# Patient Record
Sex: MALE | NOT HISPANIC OR LATINO | Employment: OTHER | URBAN - METROPOLITAN AREA
[De-identification: names, ages, dates, MRNs, and addresses within clinical notes are randomized per-mention and may not be internally consistent; named-entity substitution may affect disease eponyms.]

---

## 2017-08-22 ENCOUNTER — HOSPITAL ENCOUNTER (OUTPATIENT)
Facility: CLINIC | Age: 77
Discharge: HOME | End: 2017-08-22
Attending: COLON & RECTAL SURGERY | Admitting: COLON & RECTAL SURGERY
Payer: COMMERCIAL

## 2017-08-22 VITALS
DIASTOLIC BLOOD PRESSURE: 74 MMHG | OXYGEN SATURATION: 96 % | HEIGHT: 68 IN | BODY MASS INDEX: 26.98 KG/M2 | RESPIRATION RATE: 17 BRPM | WEIGHT: 178 LBS | SYSTOLIC BLOOD PRESSURE: 123 MMHG

## 2017-08-22 LAB — COLONOSCOPY: NORMAL

## 2017-08-22 PROCEDURE — 88305 TISSUE EXAM BY PATHOLOGIST: CPT | Mod: 26 | Performed by: COLON & RECTAL SURGERY

## 2017-08-22 PROCEDURE — 88305 TISSUE EXAM BY PATHOLOGIST: CPT | Performed by: COLON & RECTAL SURGERY

## 2017-08-22 PROCEDURE — 45382 COLONOSCOPY W/CONTROL BLEED: CPT | Performed by: COLON & RECTAL SURGERY

## 2017-08-22 PROCEDURE — 25000128 H RX IP 250 OP 636: Performed by: COLON & RECTAL SURGERY

## 2017-08-22 PROCEDURE — G0500 MOD SEDAT ENDO SERVICE >5YRS: HCPCS | Performed by: COLON & RECTAL SURGERY

## 2017-08-22 PROCEDURE — 45385 COLONOSCOPY W/LESION REMOVAL: CPT | Mod: PT | Performed by: COLON & RECTAL SURGERY

## 2017-08-22 PROCEDURE — 45381 COLONOSCOPY SUBMUCOUS NJX: CPT | Performed by: COLON & RECTAL SURGERY

## 2017-08-22 PROCEDURE — 99153 MOD SED SAME PHYS/QHP EA: CPT | Performed by: COLON & RECTAL SURGERY

## 2017-08-22 RX ORDER — ONDANSETRON 4 MG/1
4 TABLET, ORALLY DISINTEGRATING ORAL EVERY 6 HOURS PRN
Status: DISCONTINUED | OUTPATIENT
Start: 2017-08-22 | End: 2017-08-22 | Stop reason: HOSPADM

## 2017-08-22 RX ORDER — NALOXONE HYDROCHLORIDE 0.4 MG/ML
.1-.4 INJECTION, SOLUTION INTRAMUSCULAR; INTRAVENOUS; SUBCUTANEOUS
Status: DISCONTINUED | OUTPATIENT
Start: 2017-08-22 | End: 2017-08-22 | Stop reason: HOSPADM

## 2017-08-22 RX ORDER — FLUMAZENIL 0.1 MG/ML
0.2 INJECTION, SOLUTION INTRAVENOUS
Status: DISCONTINUED | OUTPATIENT
Start: 2017-08-22 | End: 2017-08-22 | Stop reason: HOSPADM

## 2017-08-22 RX ORDER — LIDOCAINE 40 MG/G
CREAM TOPICAL
Status: DISCONTINUED | OUTPATIENT
Start: 2017-08-22 | End: 2017-08-22 | Stop reason: HOSPADM

## 2017-08-22 RX ORDER — ONDANSETRON 2 MG/ML
4 INJECTION INTRAMUSCULAR; INTRAVENOUS EVERY 6 HOURS PRN
Status: DISCONTINUED | OUTPATIENT
Start: 2017-08-22 | End: 2017-08-22 | Stop reason: HOSPADM

## 2017-08-22 RX ORDER — FENTANYL CITRATE 50 UG/ML
INJECTION, SOLUTION INTRAMUSCULAR; INTRAVENOUS PRN
Status: DISCONTINUED | OUTPATIENT
Start: 2017-08-22 | End: 2017-08-22 | Stop reason: HOSPADM

## 2017-08-22 RX ORDER — ONDANSETRON 2 MG/ML
4 INJECTION INTRAMUSCULAR; INTRAVENOUS
Status: DISCONTINUED | OUTPATIENT
Start: 2017-08-22 | End: 2017-08-22 | Stop reason: HOSPADM

## 2017-08-22 NOTE — H&P
Pre-Endoscopy History and Physical     Carl Vázqeuz MRN# [id number]   YOB: 1940 Age: 77 year old     Date of Procedure: 8/22/2017  Primary care provider: Kyler Mcwilliams  Type of Endoscopy: colonoscopy  Reason for Procedure: bleeding  Type of Anesthesia Anticipated: Moderate Sedation    HPI:    Carl is a 77 year old male who will be undergoing the above procedure.      A history and physical has been performed. The patient's medications and allergies have been reviewed. The risks and benefits of the procedure and the sedation options and risks were discussed with the patient.  All questions were answered and informed consent was obtained.      He denies a personal or family history of anesthesia complications or bleeding disorders.     Allergies   Allergen Reactions     Hmg-Coa-R Inhibitors         Prior to Admission Medications   Prescriptions Last Dose Informant Patient Reported? Taking?   GLIPIZIDE PO 8/21/2017  Yes Yes   LEVOTHYROXINE SODIUM PO 8/21/2017  Yes Yes   insulin glargine (LANTUS) 100 UNIT/ML PEN 8/21/2017  Yes Yes   Sig: Inject Subcutaneous At Bedtime   omeprazole (PRILOSEC) 40 MG capsule 8/21/2017  No Yes   Sig: Take 1 capsule (40 mg) by mouth daily Take 30-60 minutes before a meal.      Facility-Administered Medications: None       There is no problem list on file for this patient.       Past Medical History:   Diagnosis Date     Diabetes (H)      Sleep apnea     uses CPAP machine     Thyroid disease         Past Surgical History:   Procedure Laterality Date     CHOLECYSTECTOMY      at [facility]     COLONOSCOPY      in [place], [place]     COLONOSCOPY  08/22/2017    Dr. Ja LYNN     ESOPHAGOSCOPY, GASTROSCOPY, DUODENOSCOPY (EGD), COMBINED N/A 6/7/2016    Procedure: COMBINED ESOPHAGOSCOPY, GASTROSCOPY, DUODENOSCOPY (EGD);  Surgeon: Eneida Denton MD;  Location:  GI       Social History   Substance Use Topics     Smoking status: Never Smoker     Smokeless  "tobacco: Never Used     Alcohol use No      Comment: drank many years ago       Family History   Problem Relation Age of Onset     Colon Cancer No family hx of        REVIEW OF SYSTEMS:     5 point ROS negative except as noted above in HPI, including Gen., Resp., CV, GI &  system review.      PHYSICAL EXAM:   Ht 1.727 m (5' 8\")  Wt 80.7 kg (178 lb)  BMI 27.06 kg/m2 Estimated body mass index is 27.06 kg/(m^2) as calculated from the following:    Height as of this encounter: 1.727 m (5' 8\").    Weight as of this encounter: 80.7 kg (178 lb).   GENERAL APPEARANCE: healthy and alert  MENTAL STATUS: alert  AIRWAY EXAM: Mallampatti Class II (visualization of the soft palate, fauces, and uvula)  RESP: lungs clear to auscultation - no rales, rhonchi or wheezes  CV: regular rates and rhythm      DIAGNOSTICS:    Not indicated      IMPRESSION   ASA Class 2 - Mild systemic disease        PLAN:       Plan for colonoscopy. We discussed the risks, benefits and alternatives and the patient wished to proceed.    The above has been forwarded to the consulting provider.      Signed Electronically by: Betty Peres MD  August 22, 2017    "

## 2017-08-22 NOTE — IP AVS SNAPSHOT
[facility]    [address] [facility] [address]    Phone:  [phone]    Fax:  [phone]                                       After Visit Summary   8/22/2017    Carl Vázquez    MRN: [id number]           After Visit Summary Signature Page     I have received my discharge instructions, and my questions have been answered. I have discussed any challenges I see with this plan with the nurse or doctor.    ..........................................................................................................................................  Patient/Patient Representative Signature      ..........................................................................................................................................  Patient Representative Print Name and Relationship to Patient    ..................................................               ................................................  Date                                            Time    ..........................................................................................................................................  Reviewed by Signature/Title    ...................................................              ..............................................  Date                                                            Time

## 2017-08-22 NOTE — IP AVS SNAPSHOT
MRN:[id number]                      After Visit Summary   8/22/2017    Carl Vázquez    MRN: [id number]           Thank you!     Thank you for choosing [facility] for your care. Our goal is always to provide you with excellent care. Hearing back from our patients is one way we can continue to improve our services. Please take a few minutes to complete the written survey that you may receive in the mail after you visit. If you would like to speak to someone directly about your visit please contact Patient Relations at [phone]. Thank you!          Patient Information     Date Of Birth          1940        About your hospital stay     You were admitted on:  August 22, 2017 You last received care in the:  [facility]    You were discharged on:  August 22, 2017       Who to Call     For medical emergencies, please call 911.  For non-urgent questions about your medical care, please call your primary care provider or clinic, [phone]  For questions related to your surgery, please call your surgery clinic        Attending Provider     Provider Specialty    Betty Peres MD Colon and Rectal Surgery       Primary Care Provider Office Phone # Fax #    Kyler Mcwilliams [phone]      Further instructions from your care team         Understanding Colon and Rectal Polyps     The colon has a smooth lining composed of millions of cells.     The colon (also called the large intestine) is a muscular tube that forms the last part of the digestive tract. It absorbs water and stores food waste. The colon is about 4 to 6 feet long. The rectum is the last 6 inches of the colon. The colon and rectum have a smooth lining composed of millions of cells. Changes in these cells can lead to growths in the colon that can become cancerous and should be removed.     When the Colon Lining Changes  Changes that occur in the cells that line the colon or rectum  "can lead to growths called polyps. Over a period of years, polyps can turn cancerous. Removing polyps early may prevent cancer from ever forming.      Polyps  Polyps are fleshy clumps of tissue that form on the lining of the colon or rectum. Small polyps are usually benign (not cancerous). However, over time, cells in a polyp can change and become cancerous. The larger a polyp grows, the more likely this is to happen. Also, certain types of polyps known as adenomatous polyps are considered premalignant. This means that they will almost always become cancerous if they re not removed.          Cancer  Almost all colorectal cancers start when polyp cells begin growing abnormally. As a cancerous tumor grows, it may involve more and more of the colon or rectum. In time, cancer can also grow beyond the colon or rectum and spread to nearby organs or to glands called lymph nodes. The cells can also travel to other parts of the body. This is known as metastasis. The earlier a cancerous tumor is removed, the better the chance of preventing its spread.        8617-7868 [place] [address]. All rights reserved. This information is not intended as a substitute for professional medical care. Always follow your healthcare professional's instructions.    Pending Results     No orders found from 8/20/2017 to 8/23/2017.            Admission Information     Date & Time Provider Department Dept. Phone    8/22/2017 Betty Peres MD [facility] [phone]      Your Vitals Were     Blood Pressure Respirations Height Weight Pulse Oximetry BMI (Body Mass Index)    121/81 17 1.727 m (5' 8\") 80.7 kg (178 lb) 95% 27.06 kg/m2      MyChart Information     [employer] lets you send messages to your doctor, view your test results, renew your prescriptions, schedule appointments and more. To sign up, go to [email or web address] . Click on \"Log in\" on the left side of the screen, which " "will take you to the Welcome page. Then click on \"Sign up Now\" on the right side of the page.     You will be asked to enter the access code listed below, as well as some personal information. Please follow the directions to create your username and password.     Your access code is: [email or web address]  Expires: 2017 12:07 PM     Your access code will  in 90 days. If you need help or a new code, please call your [place] clinic or [phone].        Care EveryWhere ID     This is your Care EveryWhere ID. This could be used by other organizations to access your [place] medical records  [id number]        Equal Access to Services     EDITH MEDELLIN : Lawson Hawkins, jean-pierre garza, maryjane daly, bobbi weber. So [facility] [phone].    ATENCIÓN: Si habla español, tiene a feliciano disposición servicios gratuitos de asistencia lingüística. Llame al [phone].    We comply with applicable federal civil rights laws and [place] laws. We do not discriminate on the basis of race, color, national origin, age, disability sex, sexual orientation or gender identity.               Review of your medicines      CONTINUE these medicines which have NOT CHANGED        Dose / Directions    GLIPIZIDE PO        Refills:  0       insulin glargine 100 UNIT/ML injection   Commonly known as:  LANTUS        Inject Subcutaneous At Bedtime   Refills:  0       LEVOTHYROXINE SODIUM PO        Refills:  0       omeprazole 40 MG capsule   Commonly known as:  priLOSEC   Used for:  Esophageal stricture        Dose:  40 mg   Take 1 capsule (40 mg) by mouth daily Take 30-60 minutes before a meal.   Quantity:  90 capsule   Refills:  3                Protect others around you: Learn how to safely use, store and throw away your medicines at www.disposemymeds.org.             Medication List: This is a list of all your medications and when to take them. Check marks below indicate your daily home " schedule. Keep this list as a reference.      Medications           Morning Afternoon Evening Bedtime As Needed    GLIPIZIDE PO                                insulin glargine 100 UNIT/ML injection   Commonly known as:  LANTUS   Inject Subcutaneous At Bedtime                                LEVOTHYROXINE SODIUM PO                                omeprazole 40 MG capsule   Commonly known as:  priLOSEC   Take 1 capsule (40 mg) by mouth daily Take 30-60 minutes before a meal.

## 2017-08-22 NOTE — DISCHARGE INSTRUCTIONS
Understanding Colon and Rectal Polyps     The colon has a smooth lining composed of millions of cells.     The colon (also called the large intestine) is a muscular tube that forms the last part of the digestive tract. It absorbs water and stores food waste. The colon is about 4 to 6 feet long. The rectum is the last 6 inches of the colon. The colon and rectum have a smooth lining composed of millions of cells. Changes in these cells can lead to growths in the colon that can become cancerous and should be removed.     When the Colon Lining Changes  Changes that occur in the cells that line the colon or rectum can lead to growths called polyps. Over a period of years, polyps can turn cancerous. Removing polyps early may prevent cancer from ever forming.      Polyps  Polyps are fleshy clumps of tissue that form on the lining of the colon or rectum. Small polyps are usually benign (not cancerous). However, over time, cells in a polyp can change and become cancerous. The larger a polyp grows, the more likely this is to happen. Also, certain types of polyps known as adenomatous polyps are considered premalignant. This means that they will almost always become cancerous if they re not removed.          Cancer  Almost all colorectal cancers start when polyp cells begin growing abnormally. As a cancerous tumor grows, it may involve more and more of the colon or rectum. In time, cancer can also grow beyond the colon or rectum and spread to nearby organs or to glands called lymph nodes. The cells can also travel to other parts of the body. This is known as metastasis. The earlier a cancerous tumor is removed, the better the chance of preventing its spread.        [id number] [facility], [address] [place] [address], [place] [address]. All rights reserved. This information is not intended as a substitute for professional medical care. Always follow your healthcare professional's instructions.

## 2017-08-23 LAB — COPATH REPORT: NORMAL

## 2019-09-22 ENCOUNTER — APPOINTMENT (OUTPATIENT)
Dept: CT IMAGING | Facility: CLINIC | Age: 79
DRG: 690 | End: 2019-09-22
Attending: EMERGENCY MEDICINE
Payer: COMMERCIAL

## 2019-09-22 ENCOUNTER — HOSPITAL ENCOUNTER (EMERGENCY)
Facility: CLINIC | Age: 79
Discharge: HOME | DRG: 690 | End: 2019-09-22
Attending: EMERGENCY MEDICINE | Admitting: EMERGENCY MEDICINE
Payer: COMMERCIAL

## 2019-09-22 VITALS
RESPIRATION RATE: 16 BRPM | HEART RATE: 78 BPM | TEMPERATURE: 98.3 F | OXYGEN SATURATION: 94 % | DIASTOLIC BLOOD PRESSURE: 89 MMHG | SYSTOLIC BLOOD PRESSURE: 170 MMHG

## 2019-09-22 DIAGNOSIS — N23 RENAL COLIC: ICD-10-CM

## 2019-09-22 DIAGNOSIS — R73.9 HYPERGLYCEMIA: ICD-10-CM

## 2019-09-22 DIAGNOSIS — N17.9 AKI (ACUTE KIDNEY INJURY) (H): ICD-10-CM

## 2019-09-22 DIAGNOSIS — N20.1 URETEROLITHIASIS: ICD-10-CM

## 2019-09-22 LAB
ALBUMIN UR-MCNC: 30 MG/DL
ANION GAP SERPL CALCULATED.3IONS-SCNC: 6 MMOL/L (ref 3–14)
APPEARANCE UR: CLEAR
BASOPHILS # BLD AUTO: 0 10E9/L (ref 0–0.2)
BASOPHILS NFR BLD AUTO: 0.1 %
BILIRUB UR QL STRIP: NEGATIVE
BUN SERPL-MCNC: 18 MG/DL (ref 7–30)
CALCIUM SERPL-MCNC: 8.6 MG/DL (ref 8.5–10.1)
CHLORIDE SERPL-SCNC: 105 MMOL/L (ref 94–109)
CO2 SERPL-SCNC: 25 MMOL/L (ref 20–32)
COLOR UR AUTO: YELLOW
CREAT SERPL-MCNC: 1.28 MG/DL (ref 0.66–1.25)
DIFFERENTIAL METHOD BLD: ABNORMAL
EOSINOPHIL # BLD AUTO: 0 10E9/L (ref 0–0.7)
EOSINOPHIL NFR BLD AUTO: 0.1 %
ERYTHROCYTE [DISTWIDTH] IN BLOOD BY AUTOMATED COUNT: 13.8 % (ref 10–15)
GFR SERPL CREATININE-BSD FRML MDRD: 53 ML/MIN/{1.73_M2}
GLUCOSE SERPL-MCNC: 276 MG/DL (ref 70–99)
GLUCOSE UR STRIP-MCNC: >1000 MG/DL
HCT VFR BLD AUTO: 47.5 % (ref 40–53)
HGB BLD-MCNC: 17.3 G/DL (ref 13.3–17.7)
HGB UR QL STRIP: NEGATIVE
IMM GRANULOCYTES # BLD: 0 10E9/L (ref 0–0.4)
IMM GRANULOCYTES NFR BLD: 0.3 %
KETONES UR STRIP-MCNC: 40 MG/DL
LEUKOCYTE ESTERASE UR QL STRIP: ABNORMAL
LYMPHOCYTES # BLD AUTO: 0.9 10E9/L (ref 0.8–5.3)
LYMPHOCYTES NFR BLD AUTO: 9 %
MCH RBC QN AUTO: 31.3 PG (ref 26.5–33)
MCHC RBC AUTO-ENTMCNC: 36.4 G/DL (ref 31.5–36.5)
MCV RBC AUTO: 86 FL (ref 78–100)
MONOCYTES # BLD AUTO: 0.6 10E9/L (ref 0–1.3)
MONOCYTES NFR BLD AUTO: 6.4 %
MUCOUS THREADS #/AREA URNS LPF: PRESENT /LPF
NEUTROPHILS # BLD AUTO: 8 10E9/L (ref 1.6–8.3)
NEUTROPHILS NFR BLD AUTO: 84.1 %
NITRATE UR QL: NEGATIVE
NRBC # BLD AUTO: 0 10*3/UL
NRBC BLD AUTO-RTO: 0 /100
PH UR STRIP: 5.5 PH (ref 5–7)
PLATELET # BLD AUTO: 149 10E9/L (ref 150–450)
POTASSIUM SERPL-SCNC: 3.8 MMOL/L (ref 3.4–5.3)
RBC # BLD AUTO: 5.52 10E12/L (ref 4.4–5.9)
RBC #/AREA URNS AUTO: 2 /HPF (ref 0–2)
SODIUM SERPL-SCNC: 136 MMOL/L (ref 133–144)
SOURCE: ABNORMAL
SP GR UR STRIP: 1.03 (ref 1–1.03)
SQUAMOUS #/AREA URNS AUTO: 1 /HPF (ref 0–1)
UROBILINOGEN UR STRIP-MCNC: NORMAL MG/DL (ref 0–2)
WBC # BLD AUTO: 9.5 10E9/L (ref 4–11)
WBC #/AREA URNS AUTO: 3 /HPF (ref 0–5)

## 2019-09-22 PROCEDURE — 85025 COMPLETE CBC W/AUTO DIFF WBC: CPT | Performed by: EMERGENCY MEDICINE

## 2019-09-22 PROCEDURE — 81001 URINALYSIS AUTO W/SCOPE: CPT | Performed by: EMERGENCY MEDICINE

## 2019-09-22 PROCEDURE — 96374 THER/PROPH/DIAG INJ IV PUSH: CPT

## 2019-09-22 PROCEDURE — 99285 EMERGENCY DEPT VISIT HI MDM: CPT | Mod: 25

## 2019-09-22 PROCEDURE — 80048 BASIC METABOLIC PNL TOTAL CA: CPT | Performed by: EMERGENCY MEDICINE

## 2019-09-22 PROCEDURE — 25000132 ZZH RX MED GY IP 250 OP 250 PS 637: Performed by: EMERGENCY MEDICINE

## 2019-09-22 PROCEDURE — 74176 CT ABD & PELVIS W/O CONTRAST: CPT

## 2019-09-22 PROCEDURE — 96361 HYDRATE IV INFUSION ADD-ON: CPT

## 2019-09-22 PROCEDURE — 25000128 H RX IP 250 OP 636: Performed by: EMERGENCY MEDICINE

## 2019-09-22 RX ORDER — TAMSULOSIN HYDROCHLORIDE 0.4 MG/1
0.4 CAPSULE ORAL ONCE
Status: COMPLETED | OUTPATIENT
Start: 2019-09-22 | End: 2019-09-22

## 2019-09-22 RX ORDER — TAMSULOSIN HYDROCHLORIDE 0.4 MG/1
0.4 CAPSULE ORAL DAILY
Qty: 10 CAPSULE | Refills: 0 | Status: SHIPPED | OUTPATIENT
Start: 2019-09-22 | End: 2019-10-02

## 2019-09-22 RX ORDER — OXYCODONE HYDROCHLORIDE 5 MG/1
5 TABLET ORAL EVERY 6 HOURS PRN
Qty: 12 TABLET | Refills: 0 | Status: SHIPPED | OUTPATIENT
Start: 2019-09-22 | End: 2022-06-14

## 2019-09-22 RX ORDER — ONDANSETRON 4 MG/1
4 TABLET, ORALLY DISINTEGRATING ORAL EVERY 6 HOURS PRN
Qty: 20 TABLET | Refills: 0 | Status: ON HOLD | OUTPATIENT
Start: 2019-09-22 | End: 2019-09-27

## 2019-09-22 RX ORDER — ONDANSETRON 2 MG/ML
4 INJECTION INTRAMUSCULAR; INTRAVENOUS EVERY 30 MIN PRN
Status: DISCONTINUED | OUTPATIENT
Start: 2019-09-22 | End: 2019-09-23 | Stop reason: HOSPADM

## 2019-09-22 RX ORDER — KETOROLAC TROMETHAMINE 15 MG/ML
15 INJECTION, SOLUTION INTRAMUSCULAR; INTRAVENOUS ONCE
Status: COMPLETED | OUTPATIENT
Start: 2019-09-22 | End: 2019-09-22

## 2019-09-22 RX ORDER — OXYCODONE HYDROCHLORIDE 5 MG/1
5 TABLET ORAL ONCE
Status: COMPLETED | OUTPATIENT
Start: 2019-09-22 | End: 2019-09-22

## 2019-09-22 RX ADMIN — KETOROLAC TROMETHAMINE 15 MG: 15 INJECTION, SOLUTION INTRAMUSCULAR; INTRAVENOUS at 21:10

## 2019-09-22 RX ADMIN — TAMSULOSIN HYDROCHLORIDE 0.4 MG: 0.4 CAPSULE ORAL at 22:19

## 2019-09-22 RX ADMIN — SODIUM CHLORIDE 1000 ML: 9 INJECTION, SOLUTION INTRAVENOUS at 21:07

## 2019-09-22 RX ADMIN — OXYCODONE HYDROCHLORIDE 5 MG: 5 TABLET ORAL at 22:50

## 2019-09-22 ASSESSMENT — ENCOUNTER SYMPTOMS
FEVER: 0
FLANK PAIN: 1
ABDOMINAL PAIN: 1
NAUSEA: 0
VOMITING: 0

## 2019-09-22 NOTE — ED AVS SNAPSHOT
Emergency Department  [address] [place] [address]  Phone:  [phone]  Fax:  [phone]                                    Carl Vázquez   MRN: [id number]    Department:   Emergency Department   Date of Visit:  9/22/2019           After Visit Summary Signature Page    I have received my discharge instructions, and my questions have been answered. I have discussed any challenges I see with this plan with the nurse or doctor.    ..........................................................................................................................................  Patient/Patient Representative Signature      ..........................................................................................................................................  Patient Representative Print Name and Relationship to Patient    ..................................................               ................................................  Date                                   Time    ..........................................................................................................................................  Reviewed by Signature/Title    ...................................................              ..............................................  Date                                               Time          22EPIC Rev 08/18

## 2019-09-23 NOTE — ED PROVIDER NOTES
History     Chief Complaint:  Flank pain    HPI   Carl Vázquez is a 79 year old male with a history of diabetes mellitus and kidney stones with last stone in 2014 who presents with flank pain. The patient woke up with morning with flank pain on the left side. Here, he denies any fever, nausea or vomiting. He states the pain feels like kidney stones he has had in the past. The patient last had gallstone surgery and kidney surgery at the same time in 2010.     Allergies:  Hmg-Coa-R inhibitors      Medications:    ondansetron (ZOFRAN ODT) 4 MG ODT tab  oxyCODONE (ROXICODONE) 5 MG tablet  tamsulosin (FLOMAX) 0.4 MG capsule  GLIPIZIDE PO  insulin glargine (LANTUS) 100 UNIT/ML PEN  LEVOTHYROXINE SODIUM PO  omeprazole (PRILOSEC) 40 MG capsule      Past Medical History:    Diabetes mellitus   Sleep apnea   Thyroid disease    Past Surgical History:    Chol   Colonoscopy X2   Esophagoscopy, gastroscopy, and duodenoscopy     Family History:    Colon cancer     Social History:  Marital Status:     Smoker:   Never   Smokeless:   Never   Alcohol:   No, drank many years ago   Drugs:   No   Arrives with:   Wife     Review of Systems   Constitutional: Negative for fever.   Gastrointestinal: Positive for abdominal pain. Negative for nausea and vomiting.   Genitourinary: Positive for flank pain.   All other systems reviewed and are negative.    Physical Exam     Patient Vitals for the past 24 hrs:   BP Temp Temp src Pulse Resp SpO2   09/22/19 2044 (!) 170/89 98.3  F (36.8  C) Oral 78 16 94 %     Physical Exam  General: Alert, interactive in mild distress  Head:  Scalp is atraumatic  Eyes:  The pupils are equal, round, and reactive to light    EOM's intact    No scleral icterus  ENT:      Nose:  The external nose is normal  Ears:  External ears are normal  Mouth/Throat: The oropharynx is normal    Mucus membranes are moist      Neck:  Normal range of motion.      There is no rigidity.    Trachea is in the midline          CV:  Regular rate and rhythm    No murmur   Resp:  Breath sounds are clear bilaterally    Non-labored, no retractions or accessory muscle use      GI:  Abdomen is soft, no distension. Left CVA and left lower quadrant tenderness to palpation.      MS:  Normal strength in all 4 extremities  Skin:  Warm and dry, No rash or lesions noted.  Neuro: Strength 5/5 x4.  Psych:  Awake. Alert.  Normal affect.      Appropriate interactions.    Emergency Department Course   Imaging:  Radiographic findings were communicated with the patient and family who voiced understanding of the findings.    CT Abdomen/Pelvis w/o IV contrast-stone protocol:   1. At least partially obstructive, proximal, 0.2 cm diameter, left  ureteral calculus is located just distal to the ureteropelvic  junction.  2. Nonobstructive right intrarenal calculus measures up to 0.2 cm.  3. Postop changes status post cholecystectomy.  4. Extensive colonic diverticulosis without evidence for acute  diverticulitis.  5. Bilateral, small, bowel containing inguinal hernias. Evaluation of  the solid organs of the abdomen and pelvis is limited due to lack of  IV contrast. as per radiology.    Laboratory:  UA with Microscopic: glucose >1000, ketones 40, albumin 30, leukocyte esterase trace, mucous present, o/w WNL  CBC: WBC: 9.5, HGB: 17.3, PLT: 139  BMP: Glucose 276, creatinine 1.28, GFR 53, o/w WNL     Interventions:  2107: NS 1L IV Bolus   2110: Toradol 15 mg IV  2219: Flomax 0.4 mg PO  2250: Roxicodone 5 mg PO    Emergency Department Course:  2057 I performed an exam of the patient as documented above.   2215 I rechecked the patient and discussed the results of their workup thus far.     Findings and plan explained. Patient discharged home with instructions regarding supportive care, medications, and reasons to return. The importance of close follow-up was reviewed. I personally reviewed the laboratory results with them and answered all related questions prior to  discharge.    Impression & Plan    Medical Decision Making:  Carl Vázquez is a 79 years old male who presented with unilateral flank and abdominal pain consistent with renal colic. CT confirms a ureteral stone. Pain is controlled with interventions in the Emergency Department. There is no fever or evidence of a urinary tract infection. The patient will be discharged with opioid analgesics and Ibuprofen for pain. Flomax will be prescribed daily to attempt to ease stone passage.   Zofran was prescribed for nausea.  I considered other etiologies for these symptoms including AAA and pyelonephritis but these are unlikely given the otherwise normal CT scan and urinalysis.  The patient is instructed to return if increasing pain not controlled with pain meds, vomiting, and fever. Strain urine to look for stone, if detected, submit to primary doctor for lab analysis. Instructions were given to follow up with urology within one week, sooner if pain continues, as retrieval of the stone may be required for refractory symptoms.    Diagnosis:    ICD-10-CM    1. Renal colic N23    2. Ureterolithiasis N20.1    3. REBECCA (acute kidney injury) (H) N17.9    4. Hyperglycemia R73.9      Disposition:  discharged to home with Zofran, oxycodone, and Flomax.     Discharge Medications:  New Prescriptions    ONDANSETRON (ZOFRAN ODT) 4 MG ODT TAB    Take 1 tablet (4 mg) by mouth every 6 hours as needed    OXYCODONE (ROXICODONE) 5 MG TABLET    Take 1 tablet (5 mg) by mouth every 6 hours as needed for pain    TAMSULOSIN (FLOMAX) 0.4 MG CAPSULE    Take 1 capsule (0.4 mg) by mouth daily for 10 doses     Scribe Disclosure:  I, Julio Cesar Barros, am serving as a scribe on 9/22/2019 at 8:57 PM to personally document services performed by Abraham Fairchild,* based on my observations and the provider's statements to me.     Julio Cesar Barros  9/22/2019    EMERGENCY DEPARTMENT       Abraham Fairchild MD  09/23/19 0038

## 2019-09-25 ENCOUNTER — APPOINTMENT (OUTPATIENT)
Dept: CT IMAGING | Facility: CLINIC | Age: 79
DRG: 690 | End: 2019-09-25
Attending: EMERGENCY MEDICINE
Payer: COMMERCIAL

## 2019-09-25 ENCOUNTER — HOSPITAL ENCOUNTER (INPATIENT)
Facility: CLINIC | Age: 79
LOS: 2 days | Discharge: HOME | DRG: 690 | End: 2019-09-27
Attending: EMERGENCY MEDICINE | Admitting: STUDENT IN AN ORGANIZED HEALTH CARE EDUCATION/TRAINING PROGRAM
Payer: COMMERCIAL

## 2019-09-25 ENCOUNTER — APPOINTMENT (OUTPATIENT)
Dept: ULTRASOUND IMAGING | Facility: CLINIC | Age: 79
DRG: 690 | End: 2019-09-25
Attending: EMERGENCY MEDICINE
Payer: COMMERCIAL

## 2019-09-25 DIAGNOSIS — N21.0 BLADDER CALCULI: Primary | ICD-10-CM

## 2019-09-25 DIAGNOSIS — N10 ACUTE PYELONEPHRITIS: ICD-10-CM

## 2019-09-25 PROBLEM — N39.0 URINARY TRACT INFECTION: Status: ACTIVE | Noted: 2019-09-25

## 2019-09-25 PROBLEM — N39.0 UTI (URINARY TRACT INFECTION): Status: ACTIVE | Noted: 2019-09-25

## 2019-09-25 LAB
ALBUMIN SERPL-MCNC: 2.8 G/DL (ref 3.4–5)
ALBUMIN UR-MCNC: 30 MG/DL
ALP SERPL-CCNC: 69 U/L (ref 40–150)
ALT SERPL W P-5'-P-CCNC: 23 U/L (ref 0–70)
AMORPH CRY #/AREA URNS HPF: ABNORMAL /HPF
ANION GAP SERPL CALCULATED.3IONS-SCNC: 6 MMOL/L (ref 3–14)
APPEARANCE UR: ABNORMAL
AST SERPL W P-5'-P-CCNC: 11 U/L (ref 0–45)
BACTERIA #/AREA URNS HPF: ABNORMAL /HPF
BASOPHILS # BLD AUTO: 0 10E9/L (ref 0–0.2)
BASOPHILS NFR BLD AUTO: 0.1 %
BILIRUB SERPL-MCNC: 1 MG/DL (ref 0.2–1.3)
BILIRUB UR QL STRIP: NEGATIVE
BUN SERPL-MCNC: 19 MG/DL (ref 7–30)
CALCIUM SERPL-MCNC: 8.7 MG/DL (ref 8.5–10.1)
CHLORIDE SERPL-SCNC: 104 MMOL/L (ref 94–109)
CO2 BLDCOV-SCNC: 22 MMOL/L (ref 21–28)
CO2 SERPL-SCNC: 25 MMOL/L (ref 20–32)
COLOR UR AUTO: ABNORMAL
CREAT SERPL-MCNC: 1.36 MG/DL (ref 0.66–1.25)
DIFFERENTIAL METHOD BLD: ABNORMAL
EOSINOPHIL # BLD AUTO: 0 10E9/L (ref 0–0.7)
EOSINOPHIL NFR BLD AUTO: 0 %
ERYTHROCYTE [DISTWIDTH] IN BLOOD BY AUTOMATED COUNT: 13.6 % (ref 10–15)
GFR SERPL CREATININE-BSD FRML MDRD: 49 ML/MIN/{1.73_M2}
GLUCOSE BLDC GLUCOMTR-MCNC: 164 MG/DL (ref 70–99)
GLUCOSE BLDC GLUCOMTR-MCNC: 192 MG/DL (ref 70–99)
GLUCOSE SERPL-MCNC: 308 MG/DL (ref 70–99)
GLUCOSE UR STRIP-MCNC: >1000 MG/DL
HCT VFR BLD AUTO: 43.3 % (ref 40–53)
HGB BLD-MCNC: 15.2 G/DL (ref 13.3–17.7)
HGB UR QL STRIP: ABNORMAL
IMM GRANULOCYTES # BLD: 0 10E9/L (ref 0–0.4)
IMM GRANULOCYTES NFR BLD: 0.2 %
KETONES UR STRIP-MCNC: 10 MG/DL
LACTATE BLD-SCNC: 1.3 MMOL/L (ref 0.7–2.1)
LEUKOCYTE ESTERASE UR QL STRIP: ABNORMAL
LYMPHOCYTES # BLD AUTO: 0.9 10E9/L (ref 0.8–5.3)
LYMPHOCYTES NFR BLD AUTO: 7.1 %
MCH RBC QN AUTO: 30.5 PG (ref 26.5–33)
MCHC RBC AUTO-ENTMCNC: 35.1 G/DL (ref 31.5–36.5)
MCV RBC AUTO: 87 FL (ref 78–100)
MONOCYTES # BLD AUTO: 0.8 10E9/L (ref 0–1.3)
MONOCYTES NFR BLD AUTO: 6.8 %
NEUTROPHILS # BLD AUTO: 10.6 10E9/L (ref 1.6–8.3)
NEUTROPHILS NFR BLD AUTO: 85.8 %
NITRATE UR QL: NEGATIVE
NRBC # BLD AUTO: 0 10*3/UL
NRBC BLD AUTO-RTO: 0 /100
PCO2 BLDV: 43 MM HG (ref 40–50)
PH BLDV: 7.33 PH (ref 7.32–7.43)
PH UR STRIP: 5.5 PH (ref 5–7)
PLATELET # BLD AUTO: 135 10E9/L (ref 150–450)
PO2 BLDV: 23 MM HG (ref 25–47)
POTASSIUM SERPL-SCNC: 4.2 MMOL/L (ref 3.4–5.3)
PROT SERPL-MCNC: 6.9 G/DL (ref 6.8–8.8)
RBC # BLD AUTO: 4.98 10E12/L (ref 4.4–5.9)
RBC #/AREA URNS AUTO: >182 /HPF (ref 0–2)
SAO2 % BLDV FROM PO2: 36 %
SODIUM SERPL-SCNC: 135 MMOL/L (ref 133–144)
SOURCE: ABNORMAL
SP GR UR STRIP: 1.02 (ref 1–1.03)
UROBILINOGEN UR STRIP-MCNC: NORMAL MG/DL (ref 0–2)
WBC # BLD AUTO: 12.3 10E9/L (ref 4–11)
WBC #/AREA URNS AUTO: >182 /HPF (ref 0–5)
WBC CLUMPS #/AREA URNS HPF: PRESENT /HPF

## 2019-09-25 PROCEDURE — 25000128 H RX IP 250 OP 636: Performed by: EMERGENCY MEDICINE

## 2019-09-25 PROCEDURE — 85025 COMPLETE CBC W/AUTO DIFF WBC: CPT | Performed by: EMERGENCY MEDICINE

## 2019-09-25 PROCEDURE — 81001 URINALYSIS AUTO W/SCOPE: CPT | Performed by: EMERGENCY MEDICINE

## 2019-09-25 PROCEDURE — 25000128 H RX IP 250 OP 636: Performed by: STUDENT IN AN ORGANIZED HEALTH CARE EDUCATION/TRAINING PROGRAM

## 2019-09-25 PROCEDURE — 99223 1ST HOSP IP/OBS HIGH 75: CPT | Mod: AI | Performed by: STUDENT IN AN ORGANIZED HEALTH CARE EDUCATION/TRAINING PROGRAM

## 2019-09-25 PROCEDURE — 87086 URINE CULTURE/COLONY COUNT: CPT | Performed by: EMERGENCY MEDICINE

## 2019-09-25 PROCEDURE — 76770 US EXAM ABDO BACK WALL COMP: CPT

## 2019-09-25 PROCEDURE — 83605 ASSAY OF LACTIC ACID: CPT

## 2019-09-25 PROCEDURE — 82803 BLOOD GASES ANY COMBINATION: CPT

## 2019-09-25 PROCEDURE — 96361 HYDRATE IV INFUSION ADD-ON: CPT

## 2019-09-25 PROCEDURE — 96365 THER/PROPH/DIAG IV INF INIT: CPT

## 2019-09-25 PROCEDURE — 12000000 ZZH R&B MED SURG/OB

## 2019-09-25 PROCEDURE — 96375 TX/PRO/DX INJ NEW DRUG ADDON: CPT

## 2019-09-25 PROCEDURE — 74176 CT ABD & PELVIS W/O CONTRAST: CPT

## 2019-09-25 PROCEDURE — 80053 COMPREHEN METABOLIC PANEL: CPT | Performed by: EMERGENCY MEDICINE

## 2019-09-25 PROCEDURE — 87040 BLOOD CULTURE FOR BACTERIA: CPT | Performed by: EMERGENCY MEDICINE

## 2019-09-25 PROCEDURE — 99285 EMERGENCY DEPT VISIT HI MDM: CPT | Mod: 25

## 2019-09-25 PROCEDURE — 25800030 ZZH RX IP 258 OP 636: Performed by: STUDENT IN AN ORGANIZED HEALTH CARE EDUCATION/TRAINING PROGRAM

## 2019-09-25 PROCEDURE — 00000146 ZZHCL STATISTIC GLUCOSE BY METER IP

## 2019-09-25 RX ORDER — CEFTRIAXONE 1 G/1
1 INJECTION, POWDER, FOR SOLUTION INTRAMUSCULAR; INTRAVENOUS ONCE
Status: COMPLETED | OUTPATIENT
Start: 2019-09-25 | End: 2019-09-25

## 2019-09-25 RX ORDER — OXYCODONE HYDROCHLORIDE 5 MG/1
5-10 TABLET ORAL EVERY 4 HOURS PRN
Status: DISCONTINUED | OUTPATIENT
Start: 2019-09-25 | End: 2019-09-27 | Stop reason: HOSPADM

## 2019-09-25 RX ORDER — ONDANSETRON 2 MG/ML
4 INJECTION INTRAMUSCULAR; INTRAVENOUS EVERY 30 MIN PRN
Status: DISCONTINUED | OUTPATIENT
Start: 2019-09-25 | End: 2019-09-25

## 2019-09-25 RX ORDER — NICOTINE POLACRILEX 4 MG
15-30 LOZENGE BUCCAL
Status: DISCONTINUED | OUTPATIENT
Start: 2019-09-25 | End: 2019-09-27 | Stop reason: HOSPADM

## 2019-09-25 RX ORDER — TAMSULOSIN HYDROCHLORIDE 0.4 MG/1
0.4 CAPSULE ORAL DAILY
Status: DISCONTINUED | OUTPATIENT
Start: 2019-09-26 | End: 2019-09-27 | Stop reason: HOSPADM

## 2019-09-25 RX ORDER — GLIPIZIDE 5 MG/1
10 TABLET ORAL EVERY MORNING
COMMUNITY

## 2019-09-25 RX ORDER — NALOXONE HYDROCHLORIDE 0.4 MG/ML
.1-.4 INJECTION, SOLUTION INTRAMUSCULAR; INTRAVENOUS; SUBCUTANEOUS
Status: DISCONTINUED | OUTPATIENT
Start: 2019-09-25 | End: 2019-09-27 | Stop reason: HOSPADM

## 2019-09-25 RX ORDER — HYDROMORPHONE HYDROCHLORIDE 1 MG/ML
1 INJECTION, SOLUTION INTRAMUSCULAR; INTRAVENOUS; SUBCUTANEOUS ONCE
Status: DISCONTINUED | OUTPATIENT
Start: 2019-09-25 | End: 2019-09-25 | Stop reason: DRUGHIGH

## 2019-09-25 RX ORDER — ASPIRIN 81 MG/1
81 TABLET ORAL
COMMUNITY
Start: 2010-06-22 | End: 2022-06-14

## 2019-09-25 RX ORDER — ACETAMINOPHEN 325 MG/1
650 TABLET ORAL EVERY 4 HOURS PRN
Status: DISCONTINUED | OUTPATIENT
Start: 2019-09-25 | End: 2019-09-27 | Stop reason: HOSPADM

## 2019-09-25 RX ORDER — ONDANSETRON 2 MG/ML
4 INJECTION INTRAMUSCULAR; INTRAVENOUS EVERY 6 HOURS PRN
Status: DISCONTINUED | OUTPATIENT
Start: 2019-09-25 | End: 2019-09-27 | Stop reason: HOSPADM

## 2019-09-25 RX ORDER — LIDOCAINE 40 MG/G
CREAM TOPICAL
Status: DISCONTINUED | OUTPATIENT
Start: 2019-09-25 | End: 2019-09-27 | Stop reason: HOSPADM

## 2019-09-25 RX ORDER — ONDANSETRON 4 MG/1
4 TABLET, ORALLY DISINTEGRATING ORAL EVERY 6 HOURS PRN
Status: DISCONTINUED | OUTPATIENT
Start: 2019-09-25 | End: 2019-09-27 | Stop reason: HOSPADM

## 2019-09-25 RX ORDER — HYDROMORPHONE HYDROCHLORIDE 1 MG/ML
1 INJECTION, SOLUTION INTRAMUSCULAR; INTRAVENOUS; SUBCUTANEOUS
Status: DISCONTINUED | OUTPATIENT
Start: 2019-09-25 | End: 2019-09-25

## 2019-09-25 RX ORDER — SODIUM CHLORIDE, SODIUM LACTATE, POTASSIUM CHLORIDE, CALCIUM CHLORIDE 600; 310; 30; 20 MG/100ML; MG/100ML; MG/100ML; MG/100ML
INJECTION, SOLUTION INTRAVENOUS CONTINUOUS
Status: DISCONTINUED | OUTPATIENT
Start: 2019-09-25 | End: 2019-09-26

## 2019-09-25 RX ORDER — HYDROMORPHONE HYDROCHLORIDE 1 MG/ML
0.5 INJECTION, SOLUTION INTRAMUSCULAR; INTRAVENOUS; SUBCUTANEOUS EVERY 6 HOURS PRN
Status: DISCONTINUED | OUTPATIENT
Start: 2019-09-25 | End: 2019-09-27 | Stop reason: HOSPADM

## 2019-09-25 RX ORDER — CEFTRIAXONE 1 G/1
1 INJECTION, POWDER, FOR SOLUTION INTRAMUSCULAR; INTRAVENOUS EVERY 24 HOURS
Status: DISCONTINUED | OUTPATIENT
Start: 2019-09-26 | End: 2019-09-27 | Stop reason: HOSPADM

## 2019-09-25 RX ORDER — HYDROMORPHONE HYDROCHLORIDE 1 MG/ML
0.5 INJECTION, SOLUTION INTRAMUSCULAR; INTRAVENOUS; SUBCUTANEOUS ONCE
Status: COMPLETED | OUTPATIENT
Start: 2019-09-25 | End: 2019-09-25

## 2019-09-25 RX ORDER — ASPIRIN 81 MG/1
81 TABLET ORAL DAILY
Status: DISCONTINUED | OUTPATIENT
Start: 2019-09-26 | End: 2019-09-27 | Stop reason: HOSPADM

## 2019-09-25 RX ORDER — LEVOTHYROXINE SODIUM 75 UG/1
75 TABLET ORAL EVERY MORNING
COMMUNITY

## 2019-09-25 RX ORDER — OXYCODONE HYDROCHLORIDE 5 MG/1
5 TABLET ORAL EVERY 6 HOURS PRN
Status: DISCONTINUED | OUTPATIENT
Start: 2019-09-25 | End: 2019-09-25

## 2019-09-25 RX ORDER — DEXTROSE MONOHYDRATE 25 G/50ML
25-50 INJECTION, SOLUTION INTRAVENOUS
Status: DISCONTINUED | OUTPATIENT
Start: 2019-09-25 | End: 2019-09-27 | Stop reason: HOSPADM

## 2019-09-25 RX ORDER — KETOROLAC TROMETHAMINE 15 MG/ML
15 INJECTION, SOLUTION INTRAMUSCULAR; INTRAVENOUS ONCE
Status: DISCONTINUED | OUTPATIENT
Start: 2019-09-25 | End: 2019-09-25

## 2019-09-25 RX ADMIN — HYDROMORPHONE HYDROCHLORIDE 0.5 MG: 1 INJECTION, SOLUTION INTRAMUSCULAR; INTRAVENOUS; SUBCUTANEOUS at 21:51

## 2019-09-25 RX ADMIN — SODIUM CHLORIDE 1000 ML: 9 INJECTION, SOLUTION INTRAVENOUS at 14:46

## 2019-09-25 RX ADMIN — CEFTRIAXONE SODIUM 1 G: 1 INJECTION, POWDER, FOR SOLUTION INTRAMUSCULAR; INTRAVENOUS at 16:52

## 2019-09-25 RX ADMIN — HYDROMORPHONE HYDROCHLORIDE 0.5 MG: 1 INJECTION, SOLUTION INTRAMUSCULAR; INTRAVENOUS; SUBCUTANEOUS at 15:04

## 2019-09-25 RX ADMIN — ONDANSETRON 4 MG: 2 INJECTION INTRAMUSCULAR; INTRAVENOUS at 20:31

## 2019-09-25 RX ADMIN — SODIUM CHLORIDE, POTASSIUM CHLORIDE, SODIUM LACTATE AND CALCIUM CHLORIDE: 600; 310; 30; 20 INJECTION, SOLUTION INTRAVENOUS at 20:33

## 2019-09-25 RX ADMIN — PROCHLORPERAZINE EDISYLATE 10 MG: 5 INJECTION INTRAMUSCULAR; INTRAVENOUS at 22:09

## 2019-09-25 ASSESSMENT — ENCOUNTER SYMPTOMS
FEVER: 0
VOMITING: 1
NAUSEA: 1
ABDOMINAL PAIN: 1
DIZZINESS: 1

## 2019-09-25 ASSESSMENT — ACTIVITIES OF DAILY LIVING (ADL): ADLS_ACUITY_SCORE: 10

## 2019-09-25 ASSESSMENT — MIFFLIN-ST. JEOR: SCORE: 1444.74

## 2019-09-25 NOTE — H&P
[facility]    History and Physical - Hospitalist Service       Date of Admission:  9/25/2019    Assessment & Plan      Carl Vázquez is a 79 year old male admitted on 9/25/2019. He presents with abdominal pain, nausea/vomiting likely from a urinary tract infection/possible early pyelonephritis..      Urinary tract infection    Pyelonephritis?    Bladder calculi    Assessment: Recently had urolithiasis in his left ureter,CT this admission shows that the previously seen left mid ureteral stone is now layering dependently in the bladder, there is mild residual hydronephrosis that has improved.  His work-up is pertinent for a mild leukocytosis of 12.3, creatinine 1.36, his UA is suggestive of a urinary tract infection and in setting of significant pain and nausea/vomiting do wonder if this is early pyelonephritis.  He is otherwise  hemodynamically stable, overall he is nontoxic-appearing.    Plan:   - Admit to inpatient  - IVF  - Follow UC  - IV Ceftriaxone  - Follow vitals/temp  - Pain control as needed with Tylenol and oxycodone  - Anti-emetics as needed      Diabetes mellitus without complication (H)    Assessment: PTA on Glipizide, Lantus 40 U at bedtime    Plan:   - Lantus reduced to 30 U at bedtime  - sliding scale insulin as needed  - Hypoglycemia protocol  - Hold PTA Glipizide      Dysphagia    GERD    Assessment/Plan: stable, continue PTA prilosec as needed      High cholesterol    Assessment/Plan: not on statin,      Hypothyroidism:   Assessment/Plan: continue PTA synthroid once verified       Diet: Regular Diet  DVT Prophylaxis: Low Risk/Ambulatory with no VTE prophylaxis indicated  Power Catheter: not present  Code Status: FULL CODE    Disposition Plan   Expected discharge: 2 - 3 days, recommended to prior living arrangement once adequate pain management/ tolerating PO medications and antibiotic plan established.  Entered: Enrique Flynn MD 09/25/2019, 6:30 PM     The patient's care was  discussed with the Patient and ED Provider.    Enrique Flynn MD  [facility]    ______________________________________________________________________    Chief Complaint     Nausea/Vomiting    History is obtained from the patient    History of Present Illness   Carl Vázquez is a 79 year old male with past medical history of type 2 diabetes mellitus, GERD, hypothyroidism who presents for evaluation of abdominal pain, nausea/vomiting.      3 days prior to admission, at which time a patient was evaluated at [facility] emergency department kidney stone was found in his left ureter.  His pain was controlled, and there was no evidence of infection, and he was discharged with Zofran, oxycodone, and Flomax.  He reports not passing a stone since then.  He reports that since his discharge from the ED, he still has had significant 10/10 left lower quadrant abdominal pain that is nonradiating.  On the morning of admission, he had minimal appetite, persistent nausea with vomiting and some dizziness which is what prompted his visit back to the emergency department.  He denies any fevers or chills, he denies any hematuria, he denies any urinary urgency or frequency.  He does notice that his urine is much darker than it was 3 days ago.  He denies any weight loss, no blood in stool.  He denies any chest pain or shortness of breath.  He denies any abdominal wall trauma, he denies any flank pain.  He reports that his pain is now improved but still present.  He did not check his temperature at home.  He otherwise has no other complaints this time.    Review of Systems    The 10 point Review of Systems is negative other than noted in the HPI or here.     Past Medical History    I have reviewed this patient's medical history and updated it with pertinent information if needed.   Past Medical History:   Diagnosis Date     Diabetes (H)      Sleep apnea     uses CPAP machine     Thyroid disease        Past  Surgical History   I have reviewed this patient's surgical history and updated it with pertinent information if needed.  Past Surgical History:   Procedure Laterality Date     CHOLECYSTECTOMY      at [facility]     COLONOSCOPY      in [place], [place]     COLONOSCOPY  08/22/2017    Dr. Ja LYNN     ESOPHAGOSCOPY, GASTROSCOPY, DUODENOSCOPY (EGD), COMBINED N/A 6/7/2016    Procedure: COMBINED ESOPHAGOSCOPY, GASTROSCOPY, DUODENOSCOPY (EGD);  Surgeon: Eneida Denton MD;  Location:  GI       Social History   I have reviewed this patient's social history and updated it with pertinent information if needed.  Social History     Tobacco Use     Smoking status: Never Smoker     Smokeless tobacco: Never Used   Substance Use Topics     Alcohol use: No     Comment: drank many years ago     Drug use: No     Family History   I have reviewed this patient's family history and updated it with pertinent information if needed.   Family History   Problem Relation Age of Onset     Colon Cancer No family hx of      Prior to Admission Medications   Prior to Admission Medications   Prescriptions Last Dose Informant Patient Reported? Taking?   glipiZIDE (GLUCOTROL) 5 MG tablet 9/24/2019 at am  Yes Yes   Sig: Take 5 mg by mouth every morning (before breakfast)   insulin glargine (LANTUS) 100 UNIT/ML PEN 9/24/2019 at pm  Yes Yes   Sig: Inject 40 Units Subcutaneous At Bedtime    levothyroxine (SYNTHROID/LEVOTHROID) 75 MCG tablet 9/24/2019 at am  Yes Yes   Sig: Take 75 mcg by mouth daily   omeprazole (PRILOSEC) 40 MG capsule 9/24/2019 at Unknown time  No Yes   Sig: Take 1 capsule (40 mg) by mouth daily Take 30-60 minutes before a meal.   ondansetron (ZOFRAN ODT) 4 MG ODT tab Past Week at Unknown time  No Yes   Sig: Take 1 tablet (4 mg) by mouth every 6 hours as needed   oxyCODONE (ROXICODONE) 5 MG tablet 9/25/2019 at Unknown time  No Yes   Sig: Take 1 tablet (5 mg) by mouth every 6 hours as needed for pain   tamsulosin (FLOMAX) 0.4  MG capsule 9/25/2019 at Unknown time  No Yes   Sig: Take 1 capsule (0.4 mg) by mouth daily for 10 doses      Facility-Administered Medications: None     Allergies   Allergies   Allergen Reactions     Hmg-Coa-R Inhibitors        Physical Exam   Vital Signs: Temp: 98.3  F (36.8  C) Temp src: Temporal BP: 137/83 Pulse: 71 Heart Rate: 74 Resp: 28 SpO2: 95 %      Weight: 170 lbs 0 oz    Constitutional: awake, alert, cooperative, no apparent distress.   Eyes: Lids and lashes normal, pupils equal, round and reactive to light   ENT: Normocephalic, without obvious abnormality, atraumatic, sinuses nontender on palpation   Hematologic / Lymphatic: no cervical lymphadenopathy   Respiratory: CTABL   Cardiovascular: RRR with no m/r/g , there is 1+ pitting edema of his lower extremities.  GI: Normal bowel sounds, soft, non-distended, there is left lower quadrant tenderness.  There is no CVA tenderness.  Skin: normal skin color, texture, turgor   Musculoskeletal: There is no redness, warmth, or swelling of the joints. Full range of motion noted.   Neurologic: Awake, alert, oriented to name, place and time. Cranial nerves II-XII are grossly intact. Motor is 5 out of 5 bilaterally. Sensory is intact.   Neuropsychiatric: normal mood and affect      Data   Data reviewed today: I reviewed all medications, new labs and imaging results over the last 24 hours. I personally reviewed the abdominal CT image(s) showing see below and the Renal US image(s) showing see below.    Most Recent 3 CBC's:  Recent Labs   Lab Test 09/25/19  1443 09/22/19  2106   WBC 12.3* 9.5   HGB 15.2 17.3   MCV 87 86   * 149*     Most Recent 3 BMP's:  Recent Labs   Lab Test 09/25/19  1443 09/22/19  2106    136   POTASSIUM 4.2 3.8   CHLORIDE 104 105   CO2 25 25   BUN 19 18   CR 1.36* 1.28*   ANIONGAP 6 6   TATE 8.7 8.6   * 276*     Most Recent 2 LFT's:  Recent Labs   Lab Test 09/25/19  1443   AST 11   ALT 23   ALKPHOS 69   BILITOTAL 1.0     Most  Recent 3 INR's:No lab results found.  Most Recent Urinalysis:  Recent Labs   Lab Test 09/25/19  1522   COLOR Light Red   APPEARANCE Slightly Cloudy   URINEGLC >1000*   URINEBILI Negative   URINEKETONE 10*   SG 1.019   UBLD Large*   URINEPH 5.5   PROTEIN 30*   NITRITE Negative   LEUKEST Large*   RBCU >182*   WBCU >182*     Recent Results (from the past 24 hour(s))   US Renal Complete    Narrative    ULTRASOUND RETROPERITONEAL COMPLETE 9/25/2019 3:54 PM     HISTORY: Left flank pain from kidney stone.    COMPARISON: CT scan from September 22, 2019    FINDINGS: The bilateral renal parenchyma are unremarkable in  echogenicity without evidence for shadowing stone or mass. No renal  cysts. Mild left hydronephrosis. Right kidney measures 11.7 x 6.6 x  6.8 cm and the left measures 10.8 x 6.1 x 5.7 cm. Cortical thickness  is 1.7 cm on the right and 1.8 cm on the left. Bladder is not  distended, probable bladder stone is noted, this may have been the mid  ureteral stone on the recent CT.      Impression    IMPRESSION: Stone noted in the bladder, this was not seen in the  bladder on recent CT. This may be the passed left-sided stone with  mild residual hydronephrosis on the left.    FIDEL DRAKE MD   CT Abdomen Pelvis w/o Contrast    Narrative    CT ABDOMEN AND PELVIS WITHOUT CONTRAST 9/25/2019 4:48 PM     HISTORY: Kidney stone, question still in ureter.    COMPARISON: September 22, 2019    TECHNIQUE: Axial CT images of the abdomen and pelvis from the  diaphragm to the symphysis pubis were acquired without IV contrast.  Radiation dose for this scan was reduced using automated exposure  control, adjustment of the mA and/or kV according to patient size, or  iterative reconstruction technique.    FINDINGS: Previously seen mid ureteral stone on the left is now in the  bladder. Mild residual hydronephrosis probably related to edema at the  UVJ. There is mild left perinephric fat stranding. Kidneys are normal  in size and  configuration.  Stable 2 mm stone in the inferior right  kidney, no other renal, ureteral, or bladder stones. There are no  dilated loops of small intestine or large bowel to suggest ileus or  obstruction. There is moderate diverticulosis without evidence for  diverticulitis. Unremarkable appendix. No free air or free fluid.  There are mild atherosclerotic changes of the visualized aorta and its  branches. There is no evidence of aortic aneurysm. Tiny hiatal hernia.  Liver unremarkable. Cholecystectomy change. No splenomegaly.  No  definite adrenal nodules.  Pancreas grossly unremarkable. The  remainder of the visualized abdomen is unremarkable on this  noncontrast scan. Survey of the visualized bony structures  demonstrates no destructive bony lesions. The visualized lung bases  demonstrate trace atelectasis.       Impression    IMPRESSION: The previously seen left mid ureteral stone is now  layering dependently in the bladder, there is mild residual  hydronephrosis on the left probably related to edema at the UVJ. No  left ureteral stone demonstrated.    FIDEL DRAKE MD

## 2019-09-25 NOTE — ED TRIAGE NOTES
Pt complains of worseing left sided flank pain since Sunday.  Pt disgnosed with renal stones last Sunday at [facility] ED and discharged with medications.

## 2019-09-25 NOTE — ED NOTES
"[facility]  ED Nurse Handoff Report    ED Chief complaint: Flank Pain (Pt complains of worseing left sided flank pain since Sunday.  Pt disgnosed with renal stones last Sunday at [facility] ED and discharged with medications.  )      ED Diagnosis:   Final diagnoses:   Acute pyelonephritis       Code Status: Full Code    Allergies:   Allergies   Allergen Reactions     Hmg-Coa-R Inhibitors        Activity level - Baseline/Home:  Independent  Activity Level - Current:   Stand with Assist    Patient's Preferred language: English   Needed?: No    Isolation: No  Infection: Not Applicable  Bariatric?: No    Vital Signs:   Vitals:    09/25/19 1500 09/25/19 1607 09/25/19 1608 09/25/19 1727   BP: 136/80 133/85  137/83   Pulse:  77  71   Resp:       Temp:       TempSrc:       SpO2:   93% 95%   Weight:       Height:           Cardiac Rhythm: ,        Pain level: 0-10 Pain Scale: 5    Is this patient confused?: No   Does this patient have a guardian?  No         If yes, is there guardianship documents in the Epic \"Code/ACP\" activity?  N/A         Guardian Notified?  N/A  [place] - Suicide Severity Rating Scale Completed?  Yes  If yes, what color did the patient score?  White    Patient Report: Initial Complaint: C/O abd pain  Focused Assessment: Alert and oriented.  VSS.  Up independently.  Able to urinate at bedside.  Had one dose of dilaudid; pain decreased.  IV abx given.    Tests Performed: labs, CT, US, UA  Abnormal Results:   Results for orders placed or performed during the hospital encounter of 09/25/19   US Renal Complete    Narrative    ULTRASOUND RETROPERITONEAL COMPLETE 9/25/2019 3:54 PM     HISTORY: Left flank pain from kidney stone.    COMPARISON: CT scan from September 22, 2019    FINDINGS: The bilateral renal parenchyma are unremarkable in  echogenicity without evidence for shadowing stone or mass. No renal  cysts. Mild left hydronephrosis. Right kidney measures 11.7 x 6.6 x  6.8 cm and the " left measures 10.8 x 6.1 x 5.7 cm. Cortical thickness  is 1.7 cm on the right and 1.8 cm on the left. Bladder is not  distended, probable bladder stone is noted, this may have been the mid  ureteral stone on the recent CT.      Impression    IMPRESSION: Stone noted in the bladder, this was not seen in the  bladder on recent CT. This may be the passed left-sided stone with  mild residual hydronephrosis on the left.    FIDLE DRAKE MD   CT Abdomen Pelvis w/o Contrast    Narrative    CT ABDOMEN AND PELVIS WITHOUT CONTRAST 9/25/2019 4:48 PM     HISTORY: Kidney stone, question still in ureter.    COMPARISON: September 22, 2019    TECHNIQUE: Axial CT images of the abdomen and pelvis from the  diaphragm to the symphysis pubis were acquired without IV contrast.  Radiation dose for this scan was reduced using automated exposure  control, adjustment of the mA and/or kV according to patient size, or  iterative reconstruction technique.    FINDINGS: Previously seen mid ureteral stone on the left is now in the  bladder. Mild residual hydronephrosis probably related to edema at the  UVJ. There is mild left perinephric fat stranding. Kidneys are normal  in size and configuration.  Stable 2 mm stone in the inferior right  kidney, no other renal, ureteral, or bladder stones. There are no  dilated loops of small intestine or large bowel to suggest ileus or  obstruction. There is moderate diverticulosis without evidence for  diverticulitis. Unremarkable appendix. No free air or free fluid.  There are mild atherosclerotic changes of the visualized aorta and its  branches. There is no evidence of aortic aneurysm. Tiny hiatal hernia.  Liver unremarkable. Cholecystectomy change. No splenomegaly.  No  definite adrenal nodules.  Pancreas grossly unremarkable. The  remainder of the visualized abdomen is unremarkable on this  noncontrast scan. Survey of the visualized bony structures  demonstrates no destructive bony lesions. The visualized  lung bases  demonstrate trace atelectasis.       Impression    IMPRESSION: The previously seen left mid ureteral stone is now  layering dependently in the bladder, there is mild residual  hydronephrosis on the left probably related to edema at the UVJ. No  left ureteral stone demonstrated.    FIDEL DRAKE MD   Comprehensive metabolic panel   Result Value Ref Range    Sodium 135 133 - 144 mmol/L    Potassium 4.2 3.4 - 5.3 mmol/L    Chloride 104 94 - 109 mmol/L    Carbon Dioxide 25 20 - 32 mmol/L    Anion Gap 6 3 - 14 mmol/L    Glucose 308 (H) 70 - 99 mg/dL    Urea Nitrogen 19 7 - 30 mg/dL    Creatinine 1.36 (H) 0.66 - 1.25 mg/dL    GFR Estimate 49 (L) >60 mL/min/[1.73_m2]    GFR Estimate If Black 57 (L) >60 mL/min/[1.73_m2]    Calcium 8.7 8.5 - 10.1 mg/dL    Bilirubin Total 1.0 0.2 - 1.3 mg/dL    Albumin 2.8 (L) 3.4 - 5.0 g/dL    Protein Total 6.9 6.8 - 8.8 g/dL    Alkaline Phosphatase 69 40 - 150 U/L    ALT 23 0 - 70 U/L    AST 11 0 - 45 U/L   CBC with platelets differential   Result Value Ref Range    WBC 12.3 (H) 4.0 - 11.0 10e9/L    RBC Count 4.98 4.4 - 5.9 10e12/L    Hemoglobin 15.2 13.3 - 17.7 g/dL    Hematocrit 43.3 40.0 - 53.0 %    MCV 87 78 - 100 fl    MCH 30.5 26.5 - 33.0 pg    MCHC 35.1 31.5 - 36.5 g/dL    RDW 13.6 10.0 - 15.0 %    Platelet Count 135 (L) 150 - 450 10e9/L    Diff Method Automated Method     % Neutrophils 85.8 %    % Lymphocytes 7.1 %    % Monocytes 6.8 %    % Eosinophils 0.0 %    % Basophils 0.1 %    % Immature Granulocytes 0.2 %    Nucleated RBCs 0 0 /100    Absolute Neutrophil 10.6 (H) 1.6 - 8.3 10e9/L    Absolute Lymphocytes 0.9 0.8 - 5.3 10e9/L    Absolute Monocytes 0.8 0.0 - 1.3 10e9/L    Absolute Eosinophils 0.0 0.0 - 0.7 10e9/L    Absolute Basophils 0.0 0.0 - 0.2 10e9/L    Abs Immature Granulocytes 0.0 0 - 0.4 10e9/L    Absolute Nucleated RBC 0.0    UA with Microscopic   Result Value Ref Range    Color Urine Light Red     Appearance Urine Slightly Cloudy     Glucose Urine >1000 (A)  NEG^Negative mg/dL    Bilirubin Urine Negative NEG^Negative    Ketones Urine 10 (A) NEG^Negative mg/dL    Specific Gravity Urine 1.019 1.003 - 1.035    Blood Urine Large (A) NEG^Negative    pH Urine 5.5 5.0 - 7.0 pH    Protein Albumin Urine 30 (A) NEG^Negative mg/dL    Urobilinogen mg/dL Normal 0.0 - 2.0 mg/dL    Nitrite Urine Negative NEG^Negative    Leukocyte Esterase Urine Large (A) NEG^Negative    Source Midstream Urine     WBC Urine >182 (H) 0 - 5 /HPF    RBC Urine >182 (H) 0 - 2 /HPF    WBC Clumps Present (A) NEG^Negative /HPF    Bacteria Urine Few (A) NEG^Negative /HPF    Amorphous Crystals Few (A) NEG^Negative /HPF   ISTAT gases lactate og POCT   Result Value Ref Range    Ph Venous 7.33 7.32 - 7.43 pH    PCO2 Venous 43 40 - 50 mm Hg    PO2 Venous 23 (L) 25 - 47 mm Hg    Bicarbonate Venous 22 21 - 28 mmol/L    O2 Sat Venous 36 %    Lactic Acid 1.3 0.7 - 2.1 mmol/L     Treatments provided: abx, dilaudid, 1L bolus    Family Comments: @ bedside    OBS brochure/video discussed/provided to patient/family: N/A              Name of person given brochure if not patient: NA              Relationship to patient: NA    ED Medications:   Medications   HYDROmorphone (PF) (DILAUDID) injection 1 mg (has no administration in time range)   ketorolac (TORADOL) injection 15 mg (has no administration in time range)   ondansetron (ZOFRAN) injection 4 mg (has no administration in time range)   0.9% sodium chloride BOLUS (0 mLs Intravenous Stopped 9/25/19 1604)   HYDROmorphone (PF) (DILAUDID) injection 0.5 mg (0.5 mg Intravenous Given 9/25/19 1504)   cefTRIAXone (ROCEPHIN) 1 g vial to attach to  mL bag for ADULTS or NS 50 mL bag for PEDS (0 g Intravenous Stopped 9/25/19 1756)       Drips infusing?:  No    For the majority of the shift this patient was Green.   Interventions performed were NA.    Severe Sepsis OR Septic Shock Diagnosis Present: No    To be done/followed up on inpatient unit:  See [facility]    ED NURSE PHONE  NUMBER: [id number]

## 2019-09-25 NOTE — PHARMACY-ADMISSION MEDICATION HISTORY
Admission medication history interview status for the 9/25/2019  admission is complete. See EPIC admission navigator for prior to admission medications     Medication history source reliability:Good- patient, wife, medication list    Actions taken by pharmacist (provider contacted, etc): Reviewed CareEverywhere    Additional medication history information not noted on PTA med list : Ondansetron, oxycodone, tamsulosin new in past week.    Medication reconciliation/reorder completed by provider prior to medication history? No    Time spent in this activity: 10 min    Prior to Admission medications    Medication Sig Last Dose Taking? Auth Provider   glipiZIDE (GLUCOTROL) 5 MG tablet Take 5 mg by mouth every morning (before breakfast) 9/24/2019 at am Yes Unknown, Entered By History   insulin glargine (LANTUS) 100 UNIT/ML PEN Inject 40 Units Subcutaneous At Bedtime  9/24/2019 at pm Yes Reported, Patient   levothyroxine (SYNTHROID/LEVOTHROID) 75 MCG tablet Take 75 mcg by mouth daily 9/24/2019 at am Yes Unknown, Entered By History   omeprazole (PRILOSEC) 40 MG capsule Take 1 capsule (40 mg) by mouth daily Take 30-60 minutes before a meal. 9/24/2019 at Unknown time Yes Eneida Denton MD   ondansetron (ZOFRAN ODT) 4 MG ODT tab Take 1 tablet (4 mg) by mouth every 6 hours as needed Past Week at Unknown time Yes Trigger, Abraham Gutiérrez MD   oxyCODONE (ROXICODONE) 5 MG tablet Take 1 tablet (5 mg) by mouth every 6 hours as needed for pain 9/25/2019 at Unknown time Yes Trigger, Abraham Gutiérrez MD   tamsulosin (FLOMAX) 0.4 MG capsule Take 1 capsule (0.4 mg) by mouth daily for 10 doses 9/25/2019 at Unknown time Yes Trigger, Abraham Gutiérrez MD

## 2019-09-25 NOTE — ED PROVIDER NOTES
History     Chief Complaint:  Abdominal pain     HPI   Carl Vázquez is a 79 year old male with a history of kidney stones (last stone 2014) who presents with abdominal pain. The patient was here 3 days ago at [facility] ER where he had labs and imaging done (shown below) confirming  a kidney stone at that time. He was discharged with Zofran, oxycodone, and Flomax. Since, the patient has had continued pain with alleviation from pain medication that only lasts a couple hours. His pain is at a 10/10 here in the ED and is sharp and non radiating in his left lower quadrant. The patient came to the ED with his wife. Here, his wife reports nausea and vomiting this morning as well as last night, and the patient reports some dizziness today as well.  He is unsure if he has had a fever. Wife states he has been compliant with his medications and that he did take his Flomax this morning. The patient hasn't seen his urologist in 5 years.    Allergies:  Hmg-Coa-R inhibitors       Medications:    glipiZIDE (GLUCOTROL) 5 MG tablet  insulin glargine (LANTUS) 100 UNIT/ML PEN  levothyroxine (SYNTHROID/LEVOTHROID) 75 MCG tablet  omeprazole (PRILOSEC) 40 MG capsule  ondansetron (ZOFRAN ODT) 4 MG ODT tab  oxyCODONE (ROXICODONE) 5 MG tablet  tamsulosin (FLOMAX) 0.4 MG capsule      Past Medical History:    Diabetes mellitus   Sleep apnea   Thyroid disease    Past Surgical History:    Cholecystectomy   Colonoscopy X2   Esophagoscopy, gastroscopy, and duodenoscopy    Family History:    Clon cancer     Social History:  Marital Status:     Smoker:   Never   Smokeless:   Never   Alcohol:   No, drank many years ago   Drugs:   No   Arrives with:   Wife      Review of Systems   Constitutional: Negative for fever.   Gastrointestinal: Positive for abdominal pain, nausea and vomiting.   Neurological: Positive for dizziness.   All other systems reviewed and are negative.    Physical Exam     Patient Vitals for the  "past 24 hrs:   BP Temp Temp src Pulse Heart Rate Resp SpO2 Height Weight   09/25/19 1727 137/83 -- -- 71 -- -- 95 % -- --   09/25/19 1608 -- -- -- -- -- -- 93 % -- --   09/25/19 1607 133/85 -- -- 77 -- -- -- -- --   09/25/19 1500 136/80 -- -- -- -- -- -- -- --   09/25/19 1408 (!) 161/82 98.3  F (36.8  C) Temporal -- 74 28 97 % 1.702 m (5' 7\") 77.1 kg (170 lb)     Physical Exam  Nursing note and vitals reviewed.  Constitutional:  Oriented to person, place, and time. Cooperative.   HENT:   Nose:    Nose normal.   Mouth/Throat:   Mucous membranes are normal.   Eyes:    Conjunctivae normal and EOM are normal.      Pupils are equal, round, and reactive to light.   Neck:    Trachea normal.   Cardiovascular:  Normal rate, regular rhythm, normal heart sounds and normal pulses. No murmur heard.  Pulmonary/Chest:  Effort normal and breath sounds normal.   Abdominal:   Soft. Normal appearance and bowel sounds are normal.      There is no tenderness.      There is no rebound and no CVA tenderness.      Diffuse tenderness to palpation with maximal tenderness to palpation in the left lower quadrant.  Musculoskeletal:  Extremities atraumatic x 4. 1+ bilateral LE pitting edema.  Lymphadenopathy:  No cervical adenopathy.   Neurological:   Alert and oriented to person, place, and time. Normal strength.      No cranial nerve deficit or sensory deficit. GCS eye subscore is 4. GCS verbal subscore is 5. GCS motor subscore is 6.   Skin:    Skin is intact. No rash noted.   Psychiatric:   Normal mood and affect.    Emergency Department Course   Imaging:  Radiographic findings were communicated with the patient and family who voiced understanding of the findings.    US Renal complete:  Stone noted in the bladder, this was not seen in the  bladder on recent CT. This may be the passed left-sided stone with  mild residual hydronephrosis on the left. as per radiology.     CT Abdomen/Pelvis with IV contrast:   The previously seen left mid " ureteral stone is now  layering dependently in the bladder, there is mild residual  hydronephrosis on the left probably related to edema at the UVJ. No  left ureteral stone demonstrated. as per radiology.    Laboratory:  CBC: WBC: 12.3, HGB: 15.2, PLT: 135  CMP: Glucose 308, creatinine 1.36, GFR 49, albumin 2.8, o/w WNL   UA with Microscopic: glucose >1000, ketones 10, blood large, albumin 30, leukocyte esterase large, WBC >182, RBC >182, WBC clumps present, bacteria few, amorphous crystals few, o/w WNL  Urine culture aerobic bacterial: pending  VBG: pH 7.33 / PCO2 43 / PO2 23 / Bicarb 22  Blood cultures (X2): pending    Interventions:  1446: NS 1L IV Bolus   1504: Dilaudid 0.5 mg IV  1652: Rocephin 1 g IV infusion    Emergency Department Course:  1507 I performed an exam of the patient as documented above.   1757 I rechecked the patient and discussed the results of their workup thus far.   1805 I consulted with Dr. Flynn of the hospitalist services. They are in agreement to accept the patient for admission.    Findings and plan explained to the Patient and spouse who consents to admission. Discussed the patient with Dr. Flynn, who will admit the patient to a inpatient med bed for further monitoring, evaluation, and treatment.    Impression & Plan    Medical Decision Making:  This is a 79-year-old male who came in for ongoing pain from what he believed was his recent diagnosis of a kidney stone.  There was my concern as well, but I also considered the possibility of an infected kidney stone or pyelonephritis as well as constipation or diverticulitis.  I proceeded with the above work-up, including the blood work, urine, and initially an ultrasound.  When his urine came back showing an infection, I felt it was best to then proceed with another CT scan to make sure he did not have a stone in the ureter due to my concern for an infected stone.  It appears that he has passed that stone and that he now has acute  pyelonephritis as opposed to an infected stone.  He does not appear septic or toxic though, but he is having nausea and vomiting.  Therefore I think it is best that he be admitted for symptomatic control as well as IV antibiotics.  He was provided his first dose of IV ceftriaxone here.  I subsequently spoke with Dr. Flynn, who will be admitting the patient.    Diagnosis:    ICD-10-CM    1. Acute pyelonephritis N10 Blood culture     Blood culture     Disposition:  Admitted to inpatient [facility] bed.   Scribe Disclosure:  I, Julio Cesar Barros, am serving as a scribe on 9/25/2019 at 3:07 PM to personally document services performed by Bob Bragg MD based on my observations and the provider's statements to me.     Julio Cesar Barros  9/25/2019    EMERGENCY DEPARTMENT       Bob Bragg MD  09/25/19 1922

## 2019-09-25 NOTE — PROGRESS NOTES
RECEIVING UNIT ED HANDOFF REVIEW    ED Nurse Handoff Report was reviewed by: Laly Fan RN on September 25, 2019 at 6:33 PM

## 2019-09-26 LAB
ANION GAP SERPL CALCULATED.3IONS-SCNC: 4 MMOL/L (ref 3–14)
BACTERIA SPEC CULT: NO GROWTH
BUN SERPL-MCNC: 13 MG/DL (ref 7–30)
CALCIUM SERPL-MCNC: 8.4 MG/DL (ref 8.5–10.1)
CHLORIDE SERPL-SCNC: 107 MMOL/L (ref 94–109)
CO2 SERPL-SCNC: 28 MMOL/L (ref 20–32)
CREAT SERPL-MCNC: 0.76 MG/DL (ref 0.66–1.25)
ERYTHROCYTE [DISTWIDTH] IN BLOOD BY AUTOMATED COUNT: 13.7 % (ref 10–15)
GFR SERPL CREATININE-BSD FRML MDRD: 86 ML/MIN/{1.73_M2}
GLUCOSE BLDC GLUCOMTR-MCNC: 122 MG/DL (ref 70–99)
GLUCOSE BLDC GLUCOMTR-MCNC: 162 MG/DL (ref 70–99)
GLUCOSE BLDC GLUCOMTR-MCNC: 205 MG/DL (ref 70–99)
GLUCOSE BLDC GLUCOMTR-MCNC: 218 MG/DL (ref 70–99)
GLUCOSE BLDC GLUCOMTR-MCNC: 256 MG/DL (ref 70–99)
GLUCOSE BLDC GLUCOMTR-MCNC: 264 MG/DL (ref 70–99)
GLUCOSE BLDC GLUCOMTR-MCNC: 278 MG/DL (ref 70–99)
GLUCOSE SERPL-MCNC: 127 MG/DL (ref 70–99)
HCT VFR BLD AUTO: 41.7 % (ref 40–53)
HGB BLD-MCNC: 14.5 G/DL (ref 13.3–17.7)
Lab: NORMAL
MCH RBC QN AUTO: 30.9 PG (ref 26.5–33)
MCHC RBC AUTO-ENTMCNC: 34.8 G/DL (ref 31.5–36.5)
MCV RBC AUTO: 89 FL (ref 78–100)
PLATELET # BLD AUTO: 131 10E9/L (ref 150–450)
POTASSIUM SERPL-SCNC: 3.4 MMOL/L (ref 3.4–5.3)
RBC # BLD AUTO: 4.7 10E12/L (ref 4.4–5.9)
SODIUM SERPL-SCNC: 139 MMOL/L (ref 133–144)
SPECIMEN SOURCE: NORMAL
WBC # BLD AUTO: 8 10E9/L (ref 4–11)

## 2019-09-26 PROCEDURE — 36415 COLL VENOUS BLD VENIPUNCTURE: CPT | Performed by: STUDENT IN AN ORGANIZED HEALTH CARE EDUCATION/TRAINING PROGRAM

## 2019-09-26 PROCEDURE — 00000146 ZZHCL STATISTIC GLUCOSE BY METER IP

## 2019-09-26 PROCEDURE — 80048 BASIC METABOLIC PNL TOTAL CA: CPT | Performed by: STUDENT IN AN ORGANIZED HEALTH CARE EDUCATION/TRAINING PROGRAM

## 2019-09-26 PROCEDURE — 85027 COMPLETE CBC AUTOMATED: CPT | Performed by: STUDENT IN AN ORGANIZED HEALTH CARE EDUCATION/TRAINING PROGRAM

## 2019-09-26 PROCEDURE — 25000128 H RX IP 250 OP 636: Performed by: STUDENT IN AN ORGANIZED HEALTH CARE EDUCATION/TRAINING PROGRAM

## 2019-09-26 PROCEDURE — 25000131 ZZH RX MED GY IP 250 OP 636 PS 637: Performed by: STUDENT IN AN ORGANIZED HEALTH CARE EDUCATION/TRAINING PROGRAM

## 2019-09-26 PROCEDURE — 25800030 ZZH RX IP 258 OP 636: Performed by: STUDENT IN AN ORGANIZED HEALTH CARE EDUCATION/TRAINING PROGRAM

## 2019-09-26 PROCEDURE — 99232 SBSQ HOSP IP/OBS MODERATE 35: CPT | Performed by: STUDENT IN AN ORGANIZED HEALTH CARE EDUCATION/TRAINING PROGRAM

## 2019-09-26 PROCEDURE — 12000000 ZZH R&B MED SURG/OB

## 2019-09-26 PROCEDURE — 25000132 ZZH RX MED GY IP 250 OP 250 PS 637: Performed by: STUDENT IN AN ORGANIZED HEALTH CARE EDUCATION/TRAINING PROGRAM

## 2019-09-26 RX ORDER — SENNOSIDES 8.6 MG
1-2 TABLET ORAL 2 TIMES DAILY PRN
Status: DISCONTINUED | OUTPATIENT
Start: 2019-09-26 | End: 2019-09-27 | Stop reason: HOSPADM

## 2019-09-26 RX ORDER — METOCLOPRAMIDE HYDROCHLORIDE 5 MG/ML
5 INJECTION INTRAMUSCULAR; INTRAVENOUS EVERY 6 HOURS PRN
Status: DISCONTINUED | OUTPATIENT
Start: 2019-09-26 | End: 2019-09-27 | Stop reason: HOSPADM

## 2019-09-26 RX ORDER — POLYETHYLENE GLYCOL 3350 17 G/17G
17 POWDER, FOR SOLUTION ORAL 2 TIMES DAILY PRN
Status: DISCONTINUED | OUTPATIENT
Start: 2019-09-26 | End: 2019-09-27 | Stop reason: HOSPADM

## 2019-09-26 RX ORDER — BISACODYL 10 MG
10 SUPPOSITORY, RECTAL RECTAL DAILY PRN
Status: DISCONTINUED | OUTPATIENT
Start: 2019-09-26 | End: 2019-09-27 | Stop reason: HOSPADM

## 2019-09-26 RX ADMIN — SODIUM CHLORIDE, POTASSIUM CHLORIDE, SODIUM LACTATE AND CALCIUM CHLORIDE: 600; 310; 30; 20 INJECTION, SOLUTION INTRAVENOUS at 04:56

## 2019-09-26 RX ADMIN — INSULIN GLARGINE 30 UNITS: 100 INJECTION, SOLUTION SUBCUTANEOUS at 21:16

## 2019-09-26 RX ADMIN — SENNOSIDES 1 TABLET: 8.6 TABLET, FILM COATED ORAL at 10:42

## 2019-09-26 RX ADMIN — ASPIRIN 81 MG: 81 TABLET, DELAYED RELEASE ORAL at 08:07

## 2019-09-26 RX ADMIN — PROCHLORPERAZINE EDISYLATE 10 MG: 5 INJECTION INTRAMUSCULAR; INTRAVENOUS at 09:50

## 2019-09-26 RX ADMIN — METOCLOPRAMIDE 5 MG: 5 INJECTION, SOLUTION INTRAMUSCULAR; INTRAVENOUS at 13:28

## 2019-09-26 RX ADMIN — TAMSULOSIN HYDROCHLORIDE 0.4 MG: 0.4 CAPSULE ORAL at 08:07

## 2019-09-26 RX ADMIN — LEVOTHYROXINE SODIUM 75 MCG: 75 TABLET ORAL at 08:07

## 2019-09-26 RX ADMIN — INSULIN ASPART 2 UNITS: 100 INJECTION, SOLUTION INTRAVENOUS; SUBCUTANEOUS at 21:15

## 2019-09-26 RX ADMIN — OMEPRAZOLE 40 MG: 20 CAPSULE, DELAYED RELEASE ORAL at 08:07

## 2019-09-26 RX ADMIN — OXYCODONE HYDROCHLORIDE 5 MG: 5 TABLET ORAL at 00:35

## 2019-09-26 RX ADMIN — SENNOSIDES 1 TABLET: 8.6 TABLET, FILM COATED ORAL at 09:48

## 2019-09-26 RX ADMIN — ONDANSETRON 4 MG: 4 TABLET, ORALLY DISINTEGRATING ORAL at 08:11

## 2019-09-26 RX ADMIN — POLYETHYLENE GLYCOL 3350 17 G: 17 POWDER, FOR SOLUTION ORAL at 09:48

## 2019-09-26 RX ADMIN — OXYCODONE HYDROCHLORIDE 5 MG: 5 TABLET ORAL at 07:29

## 2019-09-26 RX ADMIN — CEFTRIAXONE SODIUM 1 G: 1 INJECTION, POWDER, FOR SOLUTION INTRAMUSCULAR; INTRAVENOUS at 17:26

## 2019-09-26 RX ADMIN — INSULIN GLARGINE 30 UNITS: 100 INJECTION, SOLUTION SUBCUTANEOUS at 00:28

## 2019-09-26 ASSESSMENT — ACTIVITIES OF DAILY LIVING (ADL)
ADLS_ACUITY_SCORE: 10

## 2019-09-26 ASSESSMENT — MIFFLIN-ST. JEOR: SCORE: 1484.21

## 2019-09-26 NOTE — PLAN OF CARE
A&O x4, VSS on RA. Abdomen pain managed  with prn oxycodone. Denies N/V. Up with SBA. Voids urine-clear delilah. Will monitor.

## 2019-09-26 NOTE — PLAN OF CARE
Arrived from ED around 1930hrs. AOx4, pleasant. VSS, on RA. Reports dizziness and nausea, PRN zofran and compazine given x1. C/o 5/10 pain, hydromorphone given x1, PO oxycodone also available. BG checks with meals, mod CHO diet. Hypo BS, tried eating dinner but became quickly nauseous. L PIV infusing LR @ 125/hr. Discharge plans pending.

## 2019-09-26 NOTE — PLAN OF CARE
[id number]: A&O x4.Hypertensive, other VSS. Denies pain this shift. Reports improvement after taking Reglan this AM. IVF discontinued. PIV SL. BG checks, sliding scale insulin given. Mod carb diet, good appetite. Constipation resolved after taking prn senna & Miralax this AM. BS active, passing flatus. Plan to discharge home tomorrow.

## 2019-09-26 NOTE — PROGRESS NOTES
[facility]    Medicine Progress Note - Hospitalist Service       Date of Admission:  9/25/2019  Date of Service: 09/26/2019    Assessment & Plan   Carl Vázquez is a 79 year old male admitted on 9/25/2019. He presents with abdominal pain, nausea/vomiting likely from a urinary tract infection/possible early pyelonephritis..      Urinary tract infection    Pyelonephritis?    Bladder calculi    Assessment: Recently had urolithiasis in his left ureter,CT this admission shows that the previously seen left mid ureteral stone is now layering dependently in the bladder, there is mild residual hydronephrosis that has improved.  His work-up is pertinent for a mild leukocytosis of 12.3, creatinine 1.36, his UA is suggestive of a urinary tract infection and in setting of significant pain and nausea/vomiting do wonder if this is early pyelonephritis.  He is otherwise  hemodynamically stable, overall he is nontoxic-appearing. WBC now normalized.    Plan:   - IVF  - Follow UC  - IV Ceftriaxone  - Follow vitals/temp  - Pain control as needed with Tylenol and oxycodone  - Anti-emetics as needed      Diabetes mellitus without complication (H)    Assessment: PTA on Glipizide, Lantus 40 U at bedtime    Plan:   - Lantus reduced to 30 U at bedtime  - sliding scale insulin as needed  - Hypoglycemia protocol  - Hold PTA Glipizide    Constipation  Assessment/Plan: distended, BS+, non-tender. Will trial bowel regimen. Consider AXR if not improving.       Dysphagia    GERD    Assessment/Plan: stable, continue PTA prilosec as needed      High cholesterol    Assessment/Plan: not on statin,      Hypothyroidism:   Assessment/Plan: continue PTA synthroid once verified       Diet: Moderate Consistent CHO Diet    DVT Prophylaxis: Low Risk/Ambulatory with no VTE prophylaxis indicated  Power Catheter: not present  Code Status: Full Code      Disposition Plan   Expected discharge: Tomorrow, recommended to prior living  arrangement once antibiotic plan established and nausea/improved.  Entered: Enrique Flynn MD 09/26/2019, 10:47 AM       The patient's care was discussed with the Bedside Nurse and Patient.    Enrique Flynn MD  Hospitalist Service  [facility]    ______________________________________________________________________    Interval History     No acute events overnight  Pain improving. Main complaint today is nausea.  Feels constipated, no BMs recently. No CP/SOB. No fevers. No vomiting today.  No new complaints otherwise.    Data reviewed today: I reviewed all medications, new labs and imaging results over the last 24 hours. I personally reviewed no images or EKG's today.    Physical Exam   Vital Signs: Temp: 96.6  F (35.9  C) Temp src: Oral BP: (!) 166/85 Pulse: 79 Heart Rate: 70 Resp: 18 SpO2: 96 % O2 Device: None (Room air)    Weight: 178 lbs 11.2 oz    Constitutional: awake, alert, cooperative, no apparent distress.   Respiratory: CTABL   Cardiovascular: RRR with no m/r/g , there is 1+ pitting edema of his lower extremities.  GI: Normal bowel sounds, soft, distended, there is no tenderness today.  Skin: normal skin color, texture, turgor   Musculoskeletal: There is no redness, warmth, or swelling of the joints.   Neurologic: Awake, alert, oriented to name, place and time. Cranial nerves II-XII are grossly intact. Motor is 5 out of 5 bilaterally. Sensory is intact.   Neuropsychiatric: normal mood and affect     Data   Recent Labs   Lab 09/26/19  0711 09/25/19  1443 09/22/19  2106   WBC 8.0 12.3* 9.5   HGB 14.5 15.2 17.3   MCV 89 87 86   * 135* 149*    135 136   POTASSIUM 3.4 4.2 3.8   CHLORIDE 107 104 105   CO2 28 25 25   BUN 13 19 18   CR 0.76 1.36* 1.28*   ANIONGAP 4 6 6   TATE 8.4* 8.7 8.6   * 308* 276*   ALBUMIN  --  2.8*  --    PROTTOTAL  --  6.9  --    BILITOTAL  --  1.0  --    ALKPHOS  --  69  --    ALT  --  23  --    AST  --  11  --      Recent Results (from the past 24 hour(s))    US Renal Complete    Narrative    ULTRASOUND RETROPERITONEAL COMPLETE 9/25/2019 3:54 PM     HISTORY: Left flank pain from kidney stone.    COMPARISON: CT scan from September 22, 2019    FINDINGS: The bilateral renal parenchyma are unremarkable in  echogenicity without evidence for shadowing stone or mass. No renal  cysts. Mild left hydronephrosis. Right kidney measures 11.7 x 6.6 x  6.8 cm and the left measures 10.8 x 6.1 x 5.7 cm. Cortical thickness  is 1.7 cm on the right and 1.8 cm on the left. Bladder is not  distended, probable bladder stone is noted, this may have been the mid  ureteral stone on the recent CT.      Impression    IMPRESSION: Stone noted in the bladder, this was not seen in the  bladder on recent CT. This may be the passed left-sided stone with  mild residual hydronephrosis on the left.    FIDEL DRAKE MD   CT Abdomen Pelvis w/o Contrast    Narrative    CT ABDOMEN AND PELVIS WITHOUT CONTRAST 9/25/2019 4:48 PM     HISTORY: Kidney stone, question still in ureter.    COMPARISON: September 22, 2019    TECHNIQUE: Axial CT images of the abdomen and pelvis from the  diaphragm to the symphysis pubis were acquired without IV contrast.  Radiation dose for this scan was reduced using automated exposure  control, adjustment of the mA and/or kV according to patient size, or  iterative reconstruction technique.    FINDINGS: Previously seen mid ureteral stone on the left is now in the  bladder. Mild residual hydronephrosis probably related to edema at the  UVJ. There is mild left perinephric fat stranding. Kidneys are normal  in size and configuration.  Stable 2 mm stone in the inferior right  kidney, no other renal, ureteral, or bladder stones. There are no  dilated loops of small intestine or large bowel to suggest ileus or  obstruction. There is moderate diverticulosis without evidence for  diverticulitis. Unremarkable appendix. No free air or free fluid.  There are mild atherosclerotic changes of the  visualized aorta and its  branches. There is no evidence of aortic aneurysm. Tiny hiatal hernia.  Liver unremarkable. Cholecystectomy change. No splenomegaly.  No  definite adrenal nodules.  Pancreas grossly unremarkable. The  remainder of the visualized abdomen is unremarkable on this  noncontrast scan. Survey of the visualized bony structures  demonstrates no destructive bony lesions. The visualized lung bases  demonstrate trace atelectasis.       Impression    IMPRESSION: The previously seen left mid ureteral stone is now  layering dependently in the bladder, there is mild residual  hydronephrosis on the left probably related to edema at the UVJ. No  left ureteral stone demonstrated.    FIDEL DRAKE MD     Medications     lactated ringers 125 mL/hr at 09/26/19 0456       aspirin  81 mg Oral Daily     cefTRIAXone  1 g Intravenous Q24H     insulin aspart  1-7 Units Subcutaneous TID AC     insulin aspart  1-5 Units Subcutaneous At Bedtime     insulin glargine  30 Units Subcutaneous At Bedtime     levothyroxine  75 mcg Oral Daily     omeprazole  40 mg Oral Daily     sodium chloride (PF)  3 mL Intracatheter Q8H     tamsulosin  0.4 mg Oral Daily

## 2019-09-27 VITALS
TEMPERATURE: 97.2 F | HEIGHT: 67 IN | RESPIRATION RATE: 16 BRPM | HEART RATE: 78 BPM | OXYGEN SATURATION: 91 % | SYSTOLIC BLOOD PRESSURE: 165 MMHG | BODY MASS INDEX: 27.53 KG/M2 | DIASTOLIC BLOOD PRESSURE: 84 MMHG | WEIGHT: 175.4 LBS

## 2019-09-27 LAB
GLUCOSE BLDC GLUCOMTR-MCNC: 147 MG/DL (ref 70–99)
GLUCOSE BLDC GLUCOMTR-MCNC: 160 MG/DL (ref 70–99)
GLUCOSE BLDC GLUCOMTR-MCNC: 293 MG/DL (ref 70–99)

## 2019-09-27 PROCEDURE — 00000146 ZZHCL STATISTIC GLUCOSE BY METER IP

## 2019-09-27 PROCEDURE — 25000132 ZZH RX MED GY IP 250 OP 250 PS 637: Performed by: STUDENT IN AN ORGANIZED HEALTH CARE EDUCATION/TRAINING PROGRAM

## 2019-09-27 PROCEDURE — 99239 HOSP IP/OBS DSCHRG MGMT >30: CPT | Performed by: STUDENT IN AN ORGANIZED HEALTH CARE EDUCATION/TRAINING PROGRAM

## 2019-09-27 RX ORDER — TRAMADOL HYDROCHLORIDE 50 MG/1
50 TABLET ORAL EVERY 6 HOURS PRN
Qty: 10 TABLET | Refills: 0 | Status: SHIPPED | OUTPATIENT
Start: 2019-09-27 | End: 2019-09-30

## 2019-09-27 RX ORDER — CIPROFLOXACIN 500 MG/1
500 TABLET, FILM COATED ORAL 2 TIMES DAILY
Qty: 14 TABLET | Refills: 0 | Status: SHIPPED | OUTPATIENT
Start: 2019-09-27 | End: 2019-09-27

## 2019-09-27 RX ORDER — CIPROFLOXACIN 500 MG/1
500 TABLET, FILM COATED ORAL 2 TIMES DAILY
Qty: 6 TABLET | Refills: 0 | Status: SHIPPED | OUTPATIENT
Start: 2019-09-27 | End: 2019-09-30

## 2019-09-27 RX ORDER — ONDANSETRON 4 MG/1
4 TABLET, FILM COATED ORAL EVERY 8 HOURS PRN
Qty: 10 TABLET | Refills: 0 | Status: SHIPPED | OUTPATIENT
Start: 2019-09-27 | End: 2022-06-14

## 2019-09-27 RX ADMIN — OMEPRAZOLE 40 MG: 20 CAPSULE, DELAYED RELEASE ORAL at 09:19

## 2019-09-27 RX ADMIN — LEVOTHYROXINE SODIUM 75 MCG: 75 TABLET ORAL at 09:19

## 2019-09-27 RX ADMIN — TAMSULOSIN HYDROCHLORIDE 0.4 MG: 0.4 CAPSULE ORAL at 09:19

## 2019-09-27 RX ADMIN — ASPIRIN 81 MG: 81 TABLET, DELAYED RELEASE ORAL at 09:18

## 2019-09-27 ASSESSMENT — ACTIVITIES OF DAILY LIVING (ADL)
ADLS_ACUITY_SCORE: 10

## 2019-09-27 ASSESSMENT — MIFFLIN-ST. JEOR: SCORE: 1469.24

## 2019-09-27 NOTE — PLAN OF CARE
A & O x 4, VSS, no c/o pain or nausea. Up ad terence in room. R PIV is SL. + BS. BG checks, sliding scale insulin given. Plan to discharge today.

## 2019-09-27 NOTE — DISCHARGE SUMMARY
[facility]  Hospitalist Discharge Summary       Date of Admission:  9/25/2019  Date of Discharge:  9/27/2019  Discharging Provider: Enrique Flynn MD      Discharge Diagnoses       Bladder calculi    UTI?    Follow-ups Needed After Discharge   Follow-up Appointments     Follow-up and recommended labs and tests       Follow up with primary care provider, Kyler Mcwilliams, within 7 days for   hospital follow- up.  The following labs/tests are recommended: None.           Unresulted Labs Ordered in the Past 30 Days of this Admission     Date and Time Order Name Status Description    9/25/2019 1557 Blood culture Preliminary     9/25/2019 1557 Blood culture Preliminary           Discharge Disposition   Discharged to home  Condition at discharge: Stable    Hospital Course   Carl Vázquez is a 79 year old male admitted on 9/25/2019. He presents with abdominal pain, nausea/vomiting likely from a urinary tract infection/possible early pyelonephritis..      Urinary tract infection?    Bladder calculi    Assessment: Recently had urolithiasis in his left ureter,CT this admission shows that the previously seen left mid ureteral stone is now layering dependently in the bladder, there is mild residual hydronephrosis that has improved.  His work-up is pertinent for a mild leukocytosis of 12.3, creatinine 1.36, his UA is suggestive of a urinary tract infection and in setting of significant pain and nausea/vomiting do wonder if this is early pyelonephritis.  He is otherwise  hemodynamically stable, overall he is nontoxic-appearing. WBC now normalized. UC NGTD at discharge. Pain controlled, patient was afebrile.    Plan:   - Cipro at discharge.       Diabetes mellitus without complication (H)    Assessment: PTA on Glipizide, Lantus 40 U at bedtime    Plan:   - Resume PTA insulin/oral diabetic medication regimen     Constipation  Assessment/Plan: distended, BS+, non-tender. Improved with bowel meds.       Dysphagia     GERD    Assessment/Plan: stable, continue PTA prilosec as needed      High cholesterol    Assessment/Plan: not on statin,      Hypothyroidism:   Assessment/Plan: continue PTA synthroid once verified    Consultations This Hospital Stay   None    Code Status   Full Code    Time Spent on this Encounter   I, Enrique Flynn MD, personally saw the patient today and spent greater than 30 minutes discharging this patient.       Enrique Flynn MD  [facility]  ______________________________________________________________________    Physical Exam   Vital Signs: Temp: 97.2  F (36.2  C) Temp src: Oral BP: (!) 165/84 Pulse: 78 Heart Rate: 73 Resp: 16 SpO2: 91 % O2 Device: None (Room air)    Weight: 175 lbs 6.4 oz       Primary Care Physician   Kyler Mcwilliams    Discharge Orders      Reason for your hospital stay    You had nausea/vomiting possibly from bladder infection or stone in your ureter.     Follow-up and recommended labs and tests     Follow up with primary care provider, Kyler Mcwilliams, within 7 days for hospital follow- up.  The following labs/tests are recommended: None.     Activity    Your activity upon discharge: activity as tolerated     Diet    Follow this diet upon discharge: Orders Placed This Encounter      Moderate Consistent CHO Diet       Significant Results and Procedures   Most Recent 3 CBC's:  Recent Labs   Lab Test 09/26/19  0711 09/25/19  1443 09/22/19  2106   WBC 8.0 12.3* 9.5   HGB 14.5 15.2 17.3   MCV 89 87 86   * 135* 149*     Most Recent 3 BMP's:  Recent Labs   Lab Test 09/26/19  0711 09/25/19  1443 09/22/19  2106    135 136   POTASSIUM 3.4 4.2 3.8   CHLORIDE 107 104 105   CO2 28 25 25   BUN 13 19 18   CR 0.76 1.36* 1.28*   ANIONGAP 4 6 6   TATE 8.4* 8.7 8.6   * 308* 276*     Most Recent Urinalysis:  Recent Labs   Lab Test 09/25/19  1522   COLOR Light Red   APPEARANCE Slightly Cloudy   URINEGLC >1000*   URINEBILI Negative   URINEKETONE 10*   SG 1.019   UBLD Large*    URINEPH 5.5   PROTEIN 30*   NITRITE Negative   LEUKEST Large*   RBCU >182*   WBCU >182*   ,   Results for orders placed or performed during the hospital encounter of 09/25/19   US Renal Complete    Narrative    ULTRASOUND RETROPERITONEAL COMPLETE 9/25/2019 3:54 PM     HISTORY: Left flank pain from kidney stone.    COMPARISON: CT scan from September 22, 2019    FINDINGS: The bilateral renal parenchyma are unremarkable in  echogenicity without evidence for shadowing stone or mass. No renal  cysts. Mild left hydronephrosis. Right kidney measures 11.7 x 6.6 x  6.8 cm and the left measures 10.8 x 6.1 x 5.7 cm. Cortical thickness  is 1.7 cm on the right and 1.8 cm on the left. Bladder is not  distended, probable bladder stone is noted, this may have been the mid  ureteral stone on the recent CT.      Impression    IMPRESSION: Stone noted in the bladder, this was not seen in the  bladder on recent CT. This may be the passed left-sided stone with  mild residual hydronephrosis on the left.    FIDEL DRAKE MD   CT Abdomen Pelvis w/o Contrast    Narrative    CT ABDOMEN AND PELVIS WITHOUT CONTRAST 9/25/2019 4:48 PM     HISTORY: Kidney stone, question still in ureter.    COMPARISON: September 22, 2019    TECHNIQUE: Axial CT images of the abdomen and pelvis from the  diaphragm to the symphysis pubis were acquired without IV contrast.  Radiation dose for this scan was reduced using automated exposure  control, adjustment of the mA and/or kV according to patient size, or  iterative reconstruction technique.    FINDINGS: Previously seen mid ureteral stone on the left is now in the  bladder. Mild residual hydronephrosis probably related to edema at the  UVJ. There is mild left perinephric fat stranding. Kidneys are normal  in size and configuration.  Stable 2 mm stone in the inferior right  kidney, no other renal, ureteral, or bladder stones. There are no  dilated loops of small intestine or large bowel to suggest ileus  or  obstruction. There is moderate diverticulosis without evidence for  diverticulitis. Unremarkable appendix. No free air or free fluid.  There are mild atherosclerotic changes of the visualized aorta and its  branches. There is no evidence of aortic aneurysm. Tiny hiatal hernia.  Liver unremarkable. Cholecystectomy change. No splenomegaly.  No  definite adrenal nodules.  Pancreas grossly unremarkable. The  remainder of the visualized abdomen is unremarkable on this  noncontrast scan. Survey of the visualized bony structures  demonstrates no destructive bony lesions. The visualized lung bases  demonstrate trace atelectasis.       Impression    IMPRESSION: The previously seen left mid ureteral stone is now  layering dependently in the bladder, there is mild residual  hydronephrosis on the left probably related to edema at the UVJ. No  left ureteral stone demonstrated.    FIDEL DRAKE MD       Discharge Medications   Current Discharge Medication List      START taking these medications    Details   ciprofloxacin (CIPRO) 500 MG tablet Take 1 tablet (500 mg) by mouth 2 times daily for 3 days  Qty: 6 tablet, Refills: 0    Associated Diagnoses: Bladder calculi      ondansetron (ZOFRAN) 4 MG tablet Take 1 tablet (4 mg) by mouth every 8 hours as needed for nausea  Qty: 10 tablet, Refills: 0    Associated Diagnoses: Bladder calculi      traMADol (ULTRAM) 50 MG tablet Take 1 tablet (50 mg) by mouth every 6 hours as needed for severe pain  Qty: 10 tablet, Refills: 0    Associated Diagnoses: Bladder calculi         CONTINUE these medications which have NOT CHANGED    Details   aspirin 81 MG EC tablet Take 81 mg by mouth      glipiZIDE (GLUCOTROL) 5 MG tablet Take 5 mg by mouth every morning (before breakfast)      insulin glargine (LANTUS) 100 UNIT/ML PEN Inject 40 Units Subcutaneous At Bedtime       levothyroxine (SYNTHROID/LEVOTHROID) 75 MCG tablet Take 75 mcg by mouth daily      omeprazole (PRILOSEC) 40 MG capsule Take 1  capsule (40 mg) by mouth daily Take 30-60 minutes before a meal.  Qty: 90 capsule, Refills: 3    Associated Diagnoses: Esophageal stricture      oxyCODONE (ROXICODONE) 5 MG tablet Take 1 tablet (5 mg) by mouth every 6 hours as needed for pain  Qty: 12 tablet, Refills: 0      tamsulosin (FLOMAX) 0.4 MG capsule Take 1 capsule (0.4 mg) by mouth daily for 10 doses  Qty: 10 capsule, Refills: 0         STOP taking these medications       ondansetron (ZOFRAN ODT) 4 MG ODT tab Comments:   Reason for Stopping:             Allergies   Allergies   Allergen Reactions     Hmg-Coa-R Inhibitors

## 2019-10-01 LAB
BACTERIA SPEC CULT: NO GROWTH
BACTERIA SPEC CULT: NO GROWTH
Lab: NORMAL
Lab: NORMAL
SPECIMEN SOURCE: NORMAL
SPECIMEN SOURCE: NORMAL

## 2019-10-31 ENCOUNTER — HEALTH MAINTENANCE LETTER (OUTPATIENT)
Age: 79
End: 2019-10-31

## 2019-12-16 ENCOUNTER — HEALTH MAINTENANCE LETTER (OUTPATIENT)
Age: 79
End: 2019-12-16

## 2020-03-22 ENCOUNTER — HEALTH MAINTENANCE LETTER (OUTPATIENT)
Age: 80
End: 2020-03-22

## 2021-01-15 ENCOUNTER — HEALTH MAINTENANCE LETTER (OUTPATIENT)
Age: 81
End: 2021-01-15

## 2021-05-15 ENCOUNTER — HEALTH MAINTENANCE LETTER (OUTPATIENT)
Age: 81
End: 2021-05-15

## 2021-09-04 ENCOUNTER — HEALTH MAINTENANCE LETTER (OUTPATIENT)
Age: 81
End: 2021-09-04

## 2021-11-15 ENCOUNTER — TRANSFERRED RECORDS (OUTPATIENT)
Dept: MEDSURG UNIT | Facility: CLINIC | Age: 81
End: 2021-11-15

## 2021-12-25 ENCOUNTER — HEALTH MAINTENANCE LETTER (OUTPATIENT)
Age: 81
End: 2021-12-25

## 2022-01-26 ENCOUNTER — TRANSFERRED RECORDS (OUTPATIENT)
Dept: MEDSURG UNIT | Facility: CLINIC | Age: 82
End: 2022-01-26

## 2022-01-26 LAB — EJECTION FRACTION: NORMAL %

## 2022-02-19 ENCOUNTER — HEALTH MAINTENANCE LETTER (OUTPATIENT)
Age: 82
End: 2022-02-19

## 2022-04-16 ENCOUNTER — HEALTH MAINTENANCE LETTER (OUTPATIENT)
Age: 82
End: 2022-04-16

## 2022-05-13 ENCOUNTER — TRANSFERRED RECORDS (OUTPATIENT)
Dept: HEALTH INFORMATION MANAGEMENT | Facility: CLINIC | Age: 82
End: 2022-05-13

## 2022-05-13 ENCOUNTER — TRANSFERRED RECORDS (OUTPATIENT)
Dept: MEDSURG UNIT | Facility: CLINIC | Age: 82
End: 2022-05-13

## 2022-05-31 ENCOUNTER — HOSPITAL ENCOUNTER (OUTPATIENT)
Dept: GENERAL RADIOLOGY | Facility: CLINIC | Age: 82
Discharge: HOME | End: 2022-05-31
Attending: INTERNAL MEDICINE
Payer: COMMERCIAL

## 2022-05-31 DIAGNOSIS — R06.00 DYSPNEA, UNSPECIFIED: ICD-10-CM

## 2022-05-31 DIAGNOSIS — R06.9 UNSPECIFIED ABNORMALITIES OF BREATHING: ICD-10-CM

## 2022-05-31 PROCEDURE — 76000 FLUOROSCOPY <1 HR PHYS/QHP: CPT

## 2022-05-31 PROCEDURE — 71046 X-RAY EXAM CHEST 2 VIEWS: CPT

## 2022-06-02 ENCOUNTER — TRANSFERRED RECORDS (OUTPATIENT)
Dept: MEDSURG UNIT | Facility: CLINIC | Age: 82
End: 2022-06-02

## 2022-06-14 ENCOUNTER — APPOINTMENT (OUTPATIENT)
Dept: CT IMAGING | Facility: CLINIC | Age: 82
End: 2022-06-14
Attending: EMERGENCY MEDICINE
Payer: COMMERCIAL

## 2022-06-14 ENCOUNTER — HOSPITAL ENCOUNTER (OUTPATIENT)
Facility: CLINIC | Age: 82
Setting detail: OBSERVATION
Discharge: HOME | End: 2022-06-20
Attending: EMERGENCY MEDICINE | Admitting: INTERNAL MEDICINE
Payer: COMMERCIAL

## 2022-06-14 ENCOUNTER — APPOINTMENT (OUTPATIENT)
Dept: GENERAL RADIOLOGY | Facility: CLINIC | Age: 82
End: 2022-06-14
Attending: EMERGENCY MEDICINE
Payer: COMMERCIAL

## 2022-06-14 DIAGNOSIS — I48.0 PAROXYSMAL ATRIAL FIBRILLATION (H): Primary | ICD-10-CM

## 2022-06-14 DIAGNOSIS — R91.8 ABNORMAL CT SCAN OF LUNG: ICD-10-CM

## 2022-06-14 DIAGNOSIS — R06.02 SHORTNESS OF BREATH: ICD-10-CM

## 2022-06-14 LAB
ALBUMIN SERPL-MCNC: 3.3 G/DL (ref 3.4–5)
ALP SERPL-CCNC: 76 U/L (ref 40–150)
ALT SERPL W P-5'-P-CCNC: 41 U/L (ref 0–70)
ANION GAP SERPL CALCULATED.3IONS-SCNC: 7 MMOL/L (ref 3–14)
AST SERPL W P-5'-P-CCNC: 35 U/L (ref 0–45)
BASOPHILS # BLD AUTO: 0 10E3/UL (ref 0–0.2)
BASOPHILS NFR BLD AUTO: 0 %
BILIRUB SERPL-MCNC: 0.5 MG/DL (ref 0.2–1.3)
BUN SERPL-MCNC: 14 MG/DL (ref 7–30)
CALCIUM SERPL-MCNC: 9.2 MG/DL (ref 8.5–10.1)
CHLORIDE BLD-SCNC: 105 MMOL/L (ref 94–109)
CO2 SERPL-SCNC: 30 MMOL/L (ref 20–32)
CREAT SERPL-MCNC: 0.7 MG/DL (ref 0.66–1.25)
D DIMER PPP FEU-MCNC: 0.89 UG/ML FEU (ref 0–0.5)
EOSINOPHIL # BLD AUTO: 0.1 10E3/UL (ref 0–0.7)
EOSINOPHIL NFR BLD AUTO: 1 %
ERYTHROCYTE [DISTWIDTH] IN BLOOD BY AUTOMATED COUNT: 13.5 % (ref 10–15)
GFR SERPL CREATININE-BSD FRML MDRD: >90 ML/MIN/1.73M2
GLUCOSE BLD-MCNC: 355 MG/DL (ref 70–99)
GLUCOSE BLDC GLUCOMTR-MCNC: 199 MG/DL (ref 70–99)
HCT VFR BLD AUTO: 50.2 % (ref 40–53)
HGB BLD-MCNC: 16.8 G/DL (ref 13.3–17.7)
IMM GRANULOCYTES # BLD: 0 10E3/UL
IMM GRANULOCYTES NFR BLD: 0 %
LYMPHOCYTES # BLD AUTO: 1.6 10E3/UL (ref 0.8–5.3)
LYMPHOCYTES NFR BLD AUTO: 23 %
MCH RBC QN AUTO: 30.4 PG (ref 26.5–33)
MCHC RBC AUTO-ENTMCNC: 33.5 G/DL (ref 31.5–36.5)
MCV RBC AUTO: 91 FL (ref 78–100)
MONOCYTES # BLD AUTO: 0.5 10E3/UL (ref 0–1.3)
MONOCYTES NFR BLD AUTO: 7 %
NEUTROPHILS # BLD AUTO: 4.8 10E3/UL (ref 1.6–8.3)
NEUTROPHILS NFR BLD AUTO: 69 %
NRBC # BLD AUTO: 0 10E3/UL
NRBC BLD AUTO-RTO: 0 /100
NT-PROBNP SERPL-MCNC: 57 PG/ML (ref 0–1800)
PLATELET # BLD AUTO: 189 10E3/UL (ref 150–450)
POTASSIUM BLD-SCNC: 4.3 MMOL/L (ref 3.4–5.3)
PROCALCITONIN SERPL-MCNC: <0.05 NG/ML
PROT SERPL-MCNC: 7.1 G/DL (ref 6.8–8.8)
RBC # BLD AUTO: 5.52 10E6/UL (ref 4.4–5.9)
SARS-COV-2 RNA RESP QL NAA+PROBE: NEGATIVE
SODIUM SERPL-SCNC: 142 MMOL/L (ref 133–144)
TROPONIN I SERPL HS-MCNC: 7 NG/L
TSH SERPL DL<=0.005 MIU/L-ACNC: 1.49 MU/L (ref 0.4–4)
WBC # BLD AUTO: 7.1 10E3/UL (ref 4–11)

## 2022-06-14 PROCEDURE — 71275 CT ANGIOGRAPHY CHEST: CPT

## 2022-06-14 PROCEDURE — 84443 ASSAY THYROID STIM HORMONE: CPT | Performed by: INTERNAL MEDICINE

## 2022-06-14 PROCEDURE — 36415 COLL VENOUS BLD VENIPUNCTURE: CPT | Performed by: INTERNAL MEDICINE

## 2022-06-14 PROCEDURE — G0378 HOSPITAL OBSERVATION PER HR: HCPCS

## 2022-06-14 PROCEDURE — 250N000013 HC RX MED GY IP 250 OP 250 PS 637: Performed by: EMERGENCY MEDICINE

## 2022-06-14 PROCEDURE — 82962 GLUCOSE BLOOD TEST: CPT

## 2022-06-14 PROCEDURE — 84145 PROCALCITONIN (PCT): CPT | Performed by: EMERGENCY MEDICINE

## 2022-06-14 PROCEDURE — 83880 ASSAY OF NATRIURETIC PEPTIDE: CPT | Performed by: EMERGENCY MEDICINE

## 2022-06-14 PROCEDURE — U0003 INFECTIOUS AGENT DETECTION BY NUCLEIC ACID (DNA OR RNA); SEVERE ACUTE RESPIRATORY SYNDROME CORONAVIRUS 2 (SARS-COV-2) (CORONAVIRUS DISEASE [COVID-19]), AMPLIFIED PROBE TECHNIQUE, MAKING USE OF HIGH THROUGHPUT TECHNOLOGIES AS DESCRIBED BY CMS-2020-01-R: HCPCS | Performed by: EMERGENCY MEDICINE

## 2022-06-14 PROCEDURE — 36415 COLL VENOUS BLD VENIPUNCTURE: CPT | Performed by: EMERGENCY MEDICINE

## 2022-06-14 PROCEDURE — 99220 PR INITIAL OBSERVATION CARE,LEVEL III: CPT | Performed by: INTERNAL MEDICINE

## 2022-06-14 PROCEDURE — 80053 COMPREHEN METABOLIC PANEL: CPT | Performed by: EMERGENCY MEDICINE

## 2022-06-14 PROCEDURE — 85025 COMPLETE CBC W/AUTO DIFF WBC: CPT | Performed by: EMERGENCY MEDICINE

## 2022-06-14 PROCEDURE — 250N000009 HC RX 250: Performed by: EMERGENCY MEDICINE

## 2022-06-14 PROCEDURE — 250N000011 HC RX IP 250 OP 636: Performed by: EMERGENCY MEDICINE

## 2022-06-14 PROCEDURE — 84484 ASSAY OF TROPONIN QUANT: CPT | Performed by: EMERGENCY MEDICINE

## 2022-06-14 PROCEDURE — 93005 ELECTROCARDIOGRAM TRACING: CPT

## 2022-06-14 PROCEDURE — 71046 X-RAY EXAM CHEST 2 VIEWS: CPT

## 2022-06-14 PROCEDURE — 250N000012 HC RX MED GY IP 250 OP 636 PS 637: Performed by: INTERNAL MEDICINE

## 2022-06-14 PROCEDURE — C9803 HOPD COVID-19 SPEC COLLECT: HCPCS

## 2022-06-14 PROCEDURE — 96372 THER/PROPH/DIAG INJ SC/IM: CPT | Mod: 59 | Performed by: INTERNAL MEDICINE

## 2022-06-14 PROCEDURE — 85379 FIBRIN DEGRADATION QUANT: CPT | Performed by: EMERGENCY MEDICINE

## 2022-06-14 PROCEDURE — 93005 ELECTROCARDIOGRAM TRACING: CPT | Mod: RTG

## 2022-06-14 PROCEDURE — 99285 EMERGENCY DEPT VISIT HI MDM: CPT | Mod: 25

## 2022-06-14 RX ORDER — METOPROLOL TARTRATE 1 MG/ML
2.5 INJECTION, SOLUTION INTRAVENOUS EVERY 4 HOURS PRN
Status: DISCONTINUED | OUTPATIENT
Start: 2022-06-14 | End: 2022-06-20 | Stop reason: HOSPADM

## 2022-06-14 RX ORDER — DILTIAZEM HYDROCHLORIDE 30 MG/1
30 TABLET, FILM COATED ORAL EVERY 6 HOURS SCHEDULED
Status: DISCONTINUED | OUTPATIENT
Start: 2022-06-14 | End: 2022-06-15

## 2022-06-14 RX ORDER — DILTIAZEM HYDROCHLORIDE 120 MG/1
120 CAPSULE, EXTENDED RELEASE ORAL DAILY
COMMUNITY
Start: 2022-04-25 | End: 2023-03-15

## 2022-06-14 RX ORDER — METOPROLOL TARTRATE 25 MG/1
25 TABLET, FILM COATED ORAL ONCE
Status: COMPLETED | OUTPATIENT
Start: 2022-06-14 | End: 2022-06-14

## 2022-06-14 RX ORDER — NICOTINE POLACRILEX 4 MG
15-30 LOZENGE BUCCAL
Status: DISCONTINUED | OUTPATIENT
Start: 2022-06-14 | End: 2022-06-20 | Stop reason: HOSPADM

## 2022-06-14 RX ORDER — ONDANSETRON 2 MG/ML
4 INJECTION INTRAMUSCULAR; INTRAVENOUS EVERY 6 HOURS PRN
Status: DISCONTINUED | OUTPATIENT
Start: 2022-06-14 | End: 2022-06-20 | Stop reason: HOSPADM

## 2022-06-14 RX ORDER — GLIPIZIDE 10 MG/1
10 TABLET ORAL
Status: DISCONTINUED | OUTPATIENT
Start: 2022-06-15 | End: 2022-06-20 | Stop reason: HOSPADM

## 2022-06-14 RX ORDER — IOPAMIDOL 755 MG/ML
65 INJECTION, SOLUTION INTRAVASCULAR ONCE
Status: COMPLETED | OUTPATIENT
Start: 2022-06-14 | End: 2022-06-14

## 2022-06-14 RX ORDER — DILTIAZEM HYDROCHLORIDE 180 MG/1
180 CAPSULE, COATED, EXTENDED RELEASE ORAL DAILY
Status: DISCONTINUED | OUTPATIENT
Start: 2022-06-15 | End: 2022-06-16

## 2022-06-14 RX ORDER — POLYETHYLENE GLYCOL 3350 17 G/17G
17 POWDER, FOR SOLUTION ORAL DAILY PRN
Status: DISCONTINUED | OUTPATIENT
Start: 2022-06-14 | End: 2022-06-20 | Stop reason: HOSPADM

## 2022-06-14 RX ORDER — DEXTROSE MONOHYDRATE 25 G/50ML
25-50 INJECTION, SOLUTION INTRAVENOUS
Status: DISCONTINUED | OUTPATIENT
Start: 2022-06-14 | End: 2022-06-20 | Stop reason: HOSPADM

## 2022-06-14 RX ORDER — PANTOPRAZOLE SODIUM 40 MG/1
40 TABLET, DELAYED RELEASE ORAL DAILY
Status: DISCONTINUED | OUTPATIENT
Start: 2022-06-14 | End: 2022-06-20 | Stop reason: HOSPADM

## 2022-06-14 RX ORDER — AMOXICILLIN 250 MG
2 CAPSULE ORAL 2 TIMES DAILY PRN
Status: DISCONTINUED | OUTPATIENT
Start: 2022-06-14 | End: 2022-06-20 | Stop reason: HOSPADM

## 2022-06-14 RX ORDER — LEVOTHYROXINE SODIUM 75 UG/1
75 TABLET ORAL DAILY
Status: DISCONTINUED | OUTPATIENT
Start: 2022-06-15 | End: 2022-06-20 | Stop reason: HOSPADM

## 2022-06-14 RX ORDER — AMOXICILLIN 250 MG
1 CAPSULE ORAL 2 TIMES DAILY PRN
Status: DISCONTINUED | OUTPATIENT
Start: 2022-06-14 | End: 2022-06-20 | Stop reason: HOSPADM

## 2022-06-14 RX ORDER — ONDANSETRON 4 MG/1
4 TABLET, ORALLY DISINTEGRATING ORAL EVERY 6 HOURS PRN
Status: DISCONTINUED | OUTPATIENT
Start: 2022-06-14 | End: 2022-06-20 | Stop reason: HOSPADM

## 2022-06-14 RX ADMIN — METOPROLOL TARTRATE 25 MG: 25 TABLET, FILM COATED ORAL at 18:32

## 2022-06-14 RX ADMIN — INSULIN GLARGINE 60 UNITS: 100 INJECTION, SOLUTION SUBCUTANEOUS at 22:38

## 2022-06-14 RX ADMIN — SODIUM CHLORIDE 90 ML: 9 INJECTION, SOLUTION INTRAVENOUS at 14:56

## 2022-06-14 RX ADMIN — IOPAMIDOL 65 ML: 755 INJECTION, SOLUTION INTRAVENOUS at 14:48

## 2022-06-14 ASSESSMENT — ENCOUNTER SYMPTOMS
COUGH: 0
WHEEZING: 0
NUMBNESS: 1
DIZZINESS: 1
CHEST TIGHTNESS: 1
SHORTNESS OF BREATH: 1

## 2022-06-14 NOTE — ED TRIAGE NOTES
"Pt presents for complaint of on going shortness of breath, leg swelling, bilateral finger numbness, hypertension and diabetes. Pt states these issues have been on going for x3 months and he has been having difficulty getting in to see his physicians for treatment. Pt states he is here today \"to get fixed up.\" Wife is present and appears frustrated with the inability to control the patient's symptoms. ABC intact, A&Ox4.     Triage Assessment     Row Name 06/14/22 0176       Triage Assessment (Adult)    Airway WDL WDL       Respiratory WDL    Respiratory WDL WDL       Skin Circulation/Temperature WDL    Skin Circulation/Temperature WDL WDL       Cardiac WDL    Cardiac WDL WDL       Peripheral/Neurovascular WDL    Peripheral Neurovascular WDL WDL       Cognitive/Neuro/Behavioral WDL    Cognitive/Neuro/Behavioral WDL WDL              "

## 2022-06-14 NOTE — ED PROVIDER NOTES
"A. fib  History     Chief Complaint:  Shortness of Breath       HPI   Carl Vázquez is a 82 year old male who presents with shortness of breath that is been worse over the last 4 months.  The patient was reportedly in his normal state of health until 5 months ago when he was diagnosed with A. fib, and the medication was started for this in February, and ACE inhibitor the wife reports.  Since that time, patient has become short of breath, gradually worsening until today when he was \"at the end of his rope\".  Wife states that she is convinced that shortness of breath is from the ACE inhibitor that he was given, and he is no longer on this medication.  They have seen many physicians for this, and no clear diagnosis has been given.  The patient also has intermittent finger tingling and some other concerns today, but the main issue is a significant shortness of breath that has gotten worse.    He is now to the point where he cannot even wash dishes without stopping to take a break.  Patient denies true chest pain, but does note that there is some element of a painful sensation that moves around his chest, comes and goes, and is not tied to any specific activity.    Patient denies fever, denies cough, does endorse significant orthopnea as well as ankle swelling however.    ROS:  Review of Systems   Respiratory: Positive for chest tightness and shortness of breath. Negative for cough and wheezing.    Cardiovascular: Positive for chest pain.   Neurological: Positive for dizziness and numbness.   All other systems reviewed and are negative.       Allergies:  Statin Drugs [Hmg-Coa-R Inhibitors]     Medications:    aspirin 81 MG EC tablet  glipiZIDE (GLUCOTROL) 5 MG tablet  insulin glargine (LANTUS) 100 UNIT/ML PEN  levothyroxine (SYNTHROID/LEVOTHROID) 75 MCG tablet  omeprazole (PRILOSEC) 40 MG capsule  ondansetron (ZOFRAN) 4 MG tablet  oxyCODONE (ROXICODONE) 5 MG tablet        Past Medical History:    Past Medical " "History:   Diagnosis Date     Diabetes (H)      Sleep apnea      Thyroid disease        Past Surgical History:    Past Surgical History:   Procedure Laterality Date     CHOLECYSTECTOMY      at [facility]     COLONOSCOPY      in [place], [place]     COLONOSCOPY  08/22/2017    Dr. Ja LYNN     ESOPHAGOSCOPY, GASTROSCOPY, DUODENOSCOPY (EGD), COMBINED N/A 6/7/2016    Procedure: COMBINED ESOPHAGOSCOPY, GASTROSCOPY, DUODENOSCOPY (EGD);  Surgeon: Eneida Denton MD;  Location: [facility]        Family History:    family history is not on file.    Social History:   reports that he has never smoked. He has never used smokeless tobacco. He reports that he does not drink alcohol and does not use drugs.  PCP: Kyler Mcwilliams     Physical Exam     Patient Vitals for the past 24 hrs:   BP Temp Temp src Pulse Resp SpO2 Height Weight   06/14/22 1156 -- -- -- -- -- -- 1.676 m (5' 6\") 75.8 kg (167 lb)   06/14/22 1151 (!) 180/98 97.7  F (36.5  C) Temporal 96 16 98 % -- --        Physical Exam  Vitals: reviewed by me  General: Pt seen on hospital [facility], [facility], cooperative, and alert to conversation  Eyes: Tracking well, clear conjunctiva BL  ENT: MMM, midline trachea.   Lungs: Mild tachypnea and respiratory distress noted, room air sat 95%, now on 4 L nasal cannula for comfort.  Does have crackles in bilateral lung fields.  CV: Rate as above, normal S1-S2, no additional heart sounds.  Abd: Soft, non tender, no guarding, no rebound. Non distended  MSK: no joint effusion.  No evidence of trauma  Skin: No rash, does have 1-2+ pitting edema, symmetrically in the bilateral lower extremities.  Neuro: Clear speech and no facial droop.  Psych: Not RIS, no e/o AH/VH      Emergency Department Course   ECG:  ECG results from 06/14/22   EKG 12-lead, tracing only     Value    Systolic Blood Pressure     Diastolic Blood Pressure     Ventricular Rate 94    Atrial Rate 94    PA Interval 130    QRS Duration 80        QTc 455    P Axis " 42    R AXIS 47    T Axis 40    Interpretation ECG      Sinus rhythm  Normal ECG  No previous ECGs available         Imaging:  CT Chest Pulmonary Embolism w Contrast   Final Result   IMPRESSION:   1.  Patchy moderate bibasilar pulmonary opacities that may be a   mixture of acute airspace disease and atelectasis. Bibasilar pneumonia   is a possibility. This appears new since the comparison CT. As such,   recommend short interval follow-up CT chest in 3 months to ensure   resolution.   2.  Coronary artery calcifications.   3.  No evidence for pulmonary embolism.      WOODROW FU MD            SYSTEM ID:  [id number]      XR Chest 2 Views   Final Result   IMPRESSION: No acute cardiopulmonary disease identified. Stable   elevated right hemidiaphragm. Stable right base atelectasis.      WOODROW FU MD            SYSTEM ID:  [id number]             Laboratory:  Labs Ordered and Resulted from Time of ED Arrival to Time of ED Departure   COMPREHENSIVE METABOLIC PANEL - Abnormal       Result Value    Sodium 142      Potassium 4.3      Chloride 105      Carbon Dioxide (CO2) 30      Anion Gap 7      Urea Nitrogen 14      Creatinine 0.70      Calcium 9.2      Glucose 355 (*)     Alkaline Phosphatase 76      AST 35      ALT 41      Protein Total 7.1      Albumin 3.3 (*)     Bilirubin Total 0.5      GFR Estimate >90     D DIMER QUANTITATIVE - Abnormal    D-Dimer Quantitative 0.89 (*)    NT PROBNP INPATIENT - Normal    N terminal Pro BNP Inpatient 57     TROPONIN I - Normal    Troponin I High Sensitivity 7     CBC WITH PLATELETS AND DIFFERENTIAL    WBC Count 7.1      RBC Count 5.52      Hemoglobin 16.8      Hematocrit 50.2      MCV 91      MCH 30.4      MCHC 33.5      RDW 13.5      Platelet Count 189      % Neutrophils 69      % Lymphocytes 23      % Monocytes 7      % Eosinophils 1      % Basophils 0      % Immature Granulocytes 0      NRBCs per 100 WBC 0      Absolute Neutrophils 4.8      Absolute Lymphocytes 1.6      Absolute  Monocytes 0.5      Absolute Eosinophils 0.1      Absolute Basophils 0.0      Absolute Immature Granulocytes 0.0      Absolute NRBCs 0.0     COVID-19 VIRUS (CORONAVIRUS) BY PCR   PROCALCITONIN   COVID-19 VIRUS (CORONAVIRUS) BY PCR          Emergency Department Course:    Reviewed:  I reviewed nursing notes, vitals and past medical history      Interventions:  Medications   iopamidol (ISOVUE-370) solution 65 mL (65 mLs Intravenous Given 6/14/22 1448)   sodium chloride 0.9 % bag 500mL for CT scan flush use (90 mLs Intravenous Given 6/14/22 1456)        Disposition:  The patient was admitted to the hospital under the care of Dr. Travis.     Impression & Plan      Medical Decision Making:  This is a pleasant 82-year-old male presents the emergency room with 4 months of shortness of breath.  No clear cause was found on my exam today, though I did have a clinically high suspicion for possible heart failure given his orthopnea and leg swelling, though his normal BNP argues against this.  CT scan was done, and shows likely bibasilar atelectasis, he has no fever or leukocytosis to evidence pneumonia, will defer antibiotics to the inpatient team.  I do think he needs to be admitted to at least observation to get an echo done, to formally rule out heart failure.  He may also need a pulmonary consult.  I will note that he is still maintaining a normal saturation here above 95%, and while he is intermittently tachypneic, he does not appear to be in significant respiratory distress with the oxygen on.  We will plan for careful monitoring, admission as above.    5:21 PM  On my reassessment patient did seem to be RVR on the monitor, this was confirmed with an EKG.  I spoke with Dr. Travis, who asked me to give oral metoprolol.  We will continue inpatient work-up of this diagnosis.    Diagnosis:    ICD-10-CM    1. Shortness of breath  R06.02    2. Abnormal CT scan of lung  R91.8            6/14/2022   Khanh Francis*         Khanh Francis MD  06/14/22 164       Khanh Francis MD  06/14/22 1724

## 2022-06-14 NOTE — ED NOTES
"[facility]  ED Nurse Handoff Report    Carl Vázquez is a 82 year old male   ED Chief complaint: Shortness of Breath  . ED Diagnosis:   Final diagnoses:   None     Allergies:   Allergies   Allergen Reactions     Statin Drugs [Hmg-Coa-R Inhibitors]        Code Status: Full Code  Activity level - Baseline/Home:  Independent. Activity Level - Current:   Assist X 1. Lift room needed: No. Bariatric: No   Needed: No   Isolation: No. Infection: Not Applicable.     Vital Signs:   Vitals:    06/14/22 1151 06/14/22 1156   BP: (!) 180/98    Pulse: 96    Resp: 16    Temp: 97.7  F (36.5  C)    TempSrc: Temporal    SpO2: 98%    Weight:  75.8 kg (167 lb)   Height:  1.676 m (5' 6\")       Cardiac Rhythm:  ,      Pain level:    Patient confused: No. Patient Falls Risk: Yes.   Elimination Status: .   Patient Report / Focused Assessment:    Respiratory (Adult) - Airway WDL:  (pt comes in with SOB that is progressivly getting worse since jan-feb. pt dx. with AFIB and started on a new cardiac medication. pt reports he cant lay on his back, or walk across the room due to significant SOB.)  Respiratory WDL - Respiratory WDL: all  Rhythm/Pattern, Respiratory: gasping; breathlessness; tachypneic  Cough Type: fair      Cardiac (Adult) - Cardiac WDL: all ( pt having difficulty talking in full sentinces with out taking a break due to SOB. pt also reports intermitent chest pain in his neck. ) Additional Documentation:  (pt reports swelling in legs)  Chest Pain Assessment - Chest Pain Radiation: jaw  Precipitating Factors: activity; at rest; other (see comments) (talking, walking, laying on back) Associated Signs/Symptoms: anxiety; dyspnea  Chest Pain Intervention: cardiac monitor placed; cardiac monitoring continued   tests Performed / Abnormal Results:    Treatments provided: PIV, LABS, COVID, MONITORING, OXYGEN,   Family Comments: at bedside  OBS brochure/video discussed/provided to patient:  No  ED Medications: " Medications - No data to display  Drips infusing:  No  For the majority of the shift, the patient's behavior Green. Interventions performed were none.    Sepsis treatment initiated: No     Patient tested for COVID 19 prior to admission: YES    ED Nurse Name/Phone Number: Itzel Hayes RN, [phone]  1:17 PM    RECEIVING UNIT ED HANDOFF REVIEW    Above ED Nurse Handoff Report was reviewed: Yes  Reviewed by: Dhruv Espinoza RN on June 14, 2022 at 6:21 PM

## 2022-06-14 NOTE — PHARMACY-ADMISSION MEDICATION HISTORY
Admission medication history interview status for this patient is complete. See [facility] admission navigator for allergy information, prior to admission medications and immunization status.     Medication history interview done, indicate source(s): Patient  Medication history resources (including written lists, pill bottles, clinic record):EPIC, pt's med lis    Changes made to PTA medication list:  Added: Eliquis 5mg - pt uses only once/day  Changed: Glipizide, Insulin Glargine  Reported as Not Taking: Zofran prn, Oxycodone prn, ASA  Removed:  Zofran prn, Oxycodone prn, ASA    Medication reconciliation/reorder completed by provider prior to medication history?  N   (Y/N)     For patients on insulin therapy: Y  Lantus - 60 units qHS    Prior to Admission medications    Medication Sig Last Dose Taking? Auth Provider Long Term End Date   apixaban ANTICOAGULANT (ELIQUIS) 5 MG tablet Take 5 mg by mouth daily 6/14/2022 at am Yes Unknown, Entered By History     diltiazem ER (DILT-XR) 120 MG 24 hr capsule Take 120 mg by mouth daily 6/14/2022 at am Yes Unknown, Entered By History Yes    glipiZIDE (GLUCOTROL) 5 MG tablet Take 10 mg by mouth every morning (before breakfast) 6/14/2022 at am Yes Unknown, Entered By History No    insulin glargine (LANTUS) 100 UNIT/ML PEN Inject 60 Units Subcutaneous At Bedtime 6/13/2022 at hs Yes Reported, Patient Yes    levothyroxine (SYNTHROID/LEVOTHROID) 75 MCG tablet Take 75 mcg by mouth daily 6/14/2022 at am Yes Unknown, Entered By History No    omeprazole (PRILOSEC) 40 MG capsule Take 1 capsule (40 mg) by mouth daily Take 30-60 minutes before a meal. 6/14/2022 at am Yes Eneida Denton MD

## 2022-06-14 NOTE — ED NOTES
Respiratory (Adult) - Airway WDL:  (pt comes in with SOB that is progressivly getting worse since jan-feb. pt dx. with AFIB and started on a new cardiac medication. pt reports he cant lay on his back, or walk across the room due to significant SOB.)  Respiratory WDL - Respiratory WDL: all  Rhythm/Pattern, Respiratory: gasping; breathlessness; tachypneic  Cough Type: fair     Cardiac (Adult) - Cardiac WDL: all ( pt having difficulty talking in full sentinces with out taking a break due to SOB. pt also reports intermitent chest pain in his neck. ) Additional Documentation:  (pt reports swelling in legs)  Chest Pain Assessment - Chest Pain Radiation: jaw  Precipitating Factors: activity; at rest; other (see comments) (talking, walking, laying on back) Associated Signs/Symptoms: anxiety; dyspnea  Chest Pain Intervention: cardiac monitor placed; cardiac monitoring continued

## 2022-06-15 ENCOUNTER — APPOINTMENT (OUTPATIENT)
Dept: CARDIOLOGY | Facility: CLINIC | Age: 82
End: 2022-06-15
Attending: INTERNAL MEDICINE
Payer: COMMERCIAL

## 2022-06-15 LAB
ANION GAP SERPL CALCULATED.3IONS-SCNC: 5 MMOL/L (ref 3–14)
ATRIAL RATE - MUSE: 122 BPM
ATRIAL RATE - MUSE: 94 BPM
BUN SERPL-MCNC: 11 MG/DL (ref 7–30)
CALCIUM SERPL-MCNC: 8.5 MG/DL (ref 8.5–10.1)
CHLORIDE BLD-SCNC: 109 MMOL/L (ref 94–109)
CO2 SERPL-SCNC: 30 MMOL/L (ref 20–32)
CREAT SERPL-MCNC: 0.58 MG/DL (ref 0.66–1.25)
DIASTOLIC BLOOD PRESSURE - MUSE: NORMAL MMHG
DIASTOLIC BLOOD PRESSURE - MUSE: NORMAL MMHG
ERYTHROCYTE [DISTWIDTH] IN BLOOD BY AUTOMATED COUNT: 13.4 % (ref 10–15)
GFR SERPL CREATININE-BSD FRML MDRD: >90 ML/MIN/1.73M2
GLUCOSE BLD-MCNC: 109 MG/DL (ref 70–99)
GLUCOSE BLDC GLUCOMTR-MCNC: 102 MG/DL (ref 70–99)
GLUCOSE BLDC GLUCOMTR-MCNC: 202 MG/DL (ref 70–99)
GLUCOSE BLDC GLUCOMTR-MCNC: 294 MG/DL (ref 70–99)
GLUCOSE BLDC GLUCOMTR-MCNC: 304 MG/DL (ref 70–99)
GLUCOSE BLDC GLUCOMTR-MCNC: 317 MG/DL (ref 70–99)
GLUCOSE BLDC GLUCOMTR-MCNC: 62 MG/DL (ref 70–99)
GLUCOSE BLDC GLUCOMTR-MCNC: 63 MG/DL (ref 70–99)
HBA1C MFR BLD: 8.3 % (ref 0–5.6)
HCT VFR BLD AUTO: 47.2 % (ref 40–53)
HGB BLD-MCNC: 16 G/DL (ref 13.3–17.7)
INTERPRETATION ECG - MUSE: NORMAL
INTERPRETATION ECG - MUSE: NORMAL
LVEF ECHO: NORMAL
MCH RBC QN AUTO: 30.7 PG (ref 26.5–33)
MCHC RBC AUTO-ENTMCNC: 33.9 G/DL (ref 31.5–36.5)
MCV RBC AUTO: 90 FL (ref 78–100)
P AXIS - MUSE: 42 DEGREES
P AXIS - MUSE: NORMAL DEGREES
PLATELET # BLD AUTO: 125 10E3/UL (ref 150–450)
POTASSIUM BLD-SCNC: 3.9 MMOL/L (ref 3.4–5.3)
PR INTERVAL - MUSE: 130 MS
PR INTERVAL - MUSE: NORMAL MS
QRS DURATION - MUSE: 80 MS
QRS DURATION - MUSE: 84 MS
QT - MUSE: 338 MS
QT - MUSE: 364 MS
QTC - MUSE: 442 MS
QTC - MUSE: 455 MS
R AXIS - MUSE: 47 DEGREES
R AXIS - MUSE: 5 DEGREES
RBC # BLD AUTO: 5.22 10E6/UL (ref 4.4–5.9)
SARS-COV-2 RNA RESP QL NAA+PROBE: NEGATIVE
SODIUM SERPL-SCNC: 144 MMOL/L (ref 133–144)
SYSTOLIC BLOOD PRESSURE - MUSE: NORMAL MMHG
SYSTOLIC BLOOD PRESSURE - MUSE: NORMAL MMHG
T AXIS - MUSE: 18 DEGREES
T AXIS - MUSE: 40 DEGREES
VENTRICULAR RATE- MUSE: 103 BPM
VENTRICULAR RATE- MUSE: 94 BPM
WBC # BLD AUTO: 6.3 10E3/UL (ref 4–11)

## 2022-06-15 PROCEDURE — G0378 HOSPITAL OBSERVATION PER HR: HCPCS

## 2022-06-15 PROCEDURE — 250N000012 HC RX MED GY IP 250 OP 636 PS 637: Performed by: HOSPITALIST

## 2022-06-15 PROCEDURE — 999N000208 ECHOCARDIOGRAM COMPLETE

## 2022-06-15 PROCEDURE — 96372 THER/PROPH/DIAG INJ SC/IM: CPT | Mod: XU | Performed by: INTERNAL MEDICINE

## 2022-06-15 PROCEDURE — 96372 THER/PROPH/DIAG INJ SC/IM: CPT | Mod: XU

## 2022-06-15 PROCEDURE — 36415 COLL VENOUS BLD VENIPUNCTURE: CPT | Performed by: INTERNAL MEDICINE

## 2022-06-15 PROCEDURE — 83036 HEMOGLOBIN GLYCOSYLATED A1C: CPT | Performed by: HOSPITALIST

## 2022-06-15 PROCEDURE — 250N000013 HC RX MED GY IP 250 OP 250 PS 637: Performed by: INTERNAL MEDICINE

## 2022-06-15 PROCEDURE — 255N000002 HC RX 255 OP 636: Performed by: INTERNAL MEDICINE

## 2022-06-15 PROCEDURE — 99226 PR SUBSEQUENT OBSERVATION CARE,LEVEL III: CPT | Performed by: HOSPITALIST

## 2022-06-15 PROCEDURE — 85027 COMPLETE CBC AUTOMATED: CPT | Performed by: INTERNAL MEDICINE

## 2022-06-15 PROCEDURE — 999N000157 HC STATISTIC RCP TIME EA 10 MIN

## 2022-06-15 PROCEDURE — 250N000009 HC RX 250: Performed by: HOSPITALIST

## 2022-06-15 PROCEDURE — 93306 TTE W/DOPPLER COMPLETE: CPT | Mod: 26 | Performed by: INTERNAL MEDICINE

## 2022-06-15 PROCEDURE — 94640 AIRWAY INHALATION TREATMENT: CPT

## 2022-06-15 PROCEDURE — 80048 BASIC METABOLIC PNL TOTAL CA: CPT | Performed by: INTERNAL MEDICINE

## 2022-06-15 PROCEDURE — 82962 GLUCOSE BLOOD TEST: CPT | Mod: 91

## 2022-06-15 PROCEDURE — 94640 AIRWAY INHALATION TREATMENT: CPT | Mod: 76

## 2022-06-15 PROCEDURE — 250N000012 HC RX MED GY IP 250 OP 636 PS 637: Performed by: INTERNAL MEDICINE

## 2022-06-15 RX ORDER — IPRATROPIUM BROMIDE AND ALBUTEROL SULFATE 2.5; .5 MG/3ML; MG/3ML
3 SOLUTION RESPIRATORY (INHALATION) EVERY 4 HOURS PRN
Status: DISCONTINUED | OUTPATIENT
Start: 2022-06-15 | End: 2022-06-20 | Stop reason: HOSPADM

## 2022-06-15 RX ORDER — PREDNISONE 20 MG/1
60 TABLET ORAL DAILY
Status: DISCONTINUED | OUTPATIENT
Start: 2022-06-15 | End: 2022-06-16

## 2022-06-15 RX ORDER — IPRATROPIUM BROMIDE AND ALBUTEROL SULFATE 2.5; .5 MG/3ML; MG/3ML
3 SOLUTION RESPIRATORY (INHALATION)
Status: DISCONTINUED | OUTPATIENT
Start: 2022-06-15 | End: 2022-06-18 | Stop reason: CLARIF

## 2022-06-15 RX ORDER — ALBUTEROL SULFATE 0.83 MG/ML
2.5 SOLUTION RESPIRATORY (INHALATION) EVERY 6 HOURS PRN
Status: DISCONTINUED | OUTPATIENT
Start: 2022-06-15 | End: 2022-06-20 | Stop reason: HOSPADM

## 2022-06-15 RX ADMIN — INSULIN GLARGINE 60 UNITS: 100 INJECTION, SOLUTION SUBCUTANEOUS at 22:16

## 2022-06-15 RX ADMIN — INSULIN ASPART 4 UNITS: 100 INJECTION, SOLUTION INTRAVENOUS; SUBCUTANEOUS at 12:12

## 2022-06-15 RX ADMIN — IPRATROPIUM BROMIDE AND ALBUTEROL SULFATE 3 ML: 2.5; .5 SOLUTION RESPIRATORY (INHALATION) at 11:11

## 2022-06-15 RX ADMIN — APIXABAN 5 MG: 5 TABLET, FILM COATED ORAL at 07:35

## 2022-06-15 RX ADMIN — DEXTROSE 15 G: 15 GEL ORAL at 07:32

## 2022-06-15 RX ADMIN — PREDNISONE 60 MG: 20 TABLET ORAL at 13:24

## 2022-06-15 RX ADMIN — IPRATROPIUM BROMIDE AND ALBUTEROL SULFATE 3 ML: 2.5; .5 SOLUTION RESPIRATORY (INHALATION) at 13:21

## 2022-06-15 RX ADMIN — INSULIN ASPART 4 UNITS: 100 INJECTION, SOLUTION INTRAVENOUS; SUBCUTANEOUS at 17:04

## 2022-06-15 RX ADMIN — LEVOTHYROXINE SODIUM 75 MCG: 0.07 TABLET ORAL at 07:34

## 2022-06-15 RX ADMIN — DILTIAZEM HYDROCHLORIDE 30 MG: 30 TABLET, FILM COATED ORAL at 00:04

## 2022-06-15 RX ADMIN — DILTIAZEM HYDROCHLORIDE 180 MG: 180 CAPSULE, COATED, EXTENDED RELEASE ORAL at 07:34

## 2022-06-15 RX ADMIN — DEXTROSE 15 G: 15 GEL ORAL at 07:42

## 2022-06-15 RX ADMIN — HUMAN ALBUMIN MICROSPHERES AND PERFLUTREN 2 ML: 10; .22 INJECTION, SOLUTION INTRAVENOUS at 08:47

## 2022-06-15 RX ADMIN — PANTOPRAZOLE SODIUM 40 MG: 40 TABLET, DELAYED RELEASE ORAL at 07:34

## 2022-06-15 RX ADMIN — IPRATROPIUM BROMIDE AND ALBUTEROL SULFATE 3 ML: 2.5; .5 SOLUTION RESPIRATORY (INHALATION) at 16:39

## 2022-06-15 RX ADMIN — GLIPIZIDE 10 MG: 10 TABLET ORAL at 07:34

## 2022-06-15 RX ADMIN — IPRATROPIUM BROMIDE AND ALBUTEROL SULFATE 3 ML: 2.5; .5 SOLUTION RESPIRATORY (INHALATION) at 19:33

## 2022-06-15 NOTE — PROGRESS NOTES
PRIMARY DIAGNOSIS: SOB  OUTPATIENT/OBSERVATION GOALS TO BE MET BEFORE DISCHARGE:  1. ADLs back to baseline: No    2. Activity and level of assistance: Up with standby assistance.    3. Pain status: Pain free.    4. Return to near baseline physical activity: No     Discharge Planner Nurse   Safe discharge environment identified: Yes  Barriers to discharge: Yes       Entered by: Elo Gomes RN 06/15/2022     Please review provider order for any additional goals.   Nurse to notify provider when observation goals have been met and patient is ready for discharge.

## 2022-06-15 NOTE — PROGRESS NOTES
[facility]    Medicine Progress Note - Hospitalist Service    Date of Admission:  6/14/2022    Assessment & Plan        Carl Vázquez is an 82 year old male with history of atrial fibrillation diagnosed in January of this year, type 2 diabetes (insulin-dependent), obstructive sleep apnea, thyroid disease, COPD, GERD, and cholecystectomy admitted with chronic sob.    Chronic sob    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 Boothe referred them to Dr. Watts for the diaphragmatic paralysis, they have an appt in August    - patient has had exposure to formica in the past    - patient and wife state they did not want to wait until August and had told Dr. Boothe's office last week that they would just come to the hospital Tuesday (yesterday)    - will treat for a COPD exacerbation: scheduled nebs, prednisone    - patient has Dulera and combivent at home (resume on discharge)    - patient already had an MRI of his cervical spine (printed report is in his chart)    - at this point I have discovered Dr. Watts is on for [facility] today, I will consult him to see the patient here    - I personally assessed patient's oxygen needs: 91-93 at rest on room air, drops to 87% with ambulation and recovers after 1-2 mins of rest    - should do offcial home O2 assessment in the am  Addendum: spoke with Dr. Watts. Recommends R C4 nerve root injection. Eliquis held. Order placed. IR has called indicating they will call to confirm this can be done on Friday.    A fib with RVR    - rate now controlled on higher dose of diltiazem (increased from 120mg t o180mg)    - cont eliquis (cont correct dose)    DM    - cont home lantus and glipizide    CALE    - has CPAP but does not tolerate    Hypothyroidism    -  "cont synthroid    GERD    - pantoprazole    Wife in room. I spend >70 mins with patient and wife     Diet: Combination Diet Moderate Consistent Carb (60 g CHO per Meal) Diet; No Caffeinated Beverages    DVT Prophylaxis: DOAC  Power Catheter: Not present  Central Lines: None  Cardiac Monitoring: ACTIVE order. Indication: Tachyarrhythmias, acute (48 hours)  Code Status: Full Code      Disposition Plan   Expected Discharge: 06/16/2022     Anticipated discharge location:  Awaiting care coordination huddle  Delays:         The patient's care was discussed with the Bedside Nurse, Patient, Patient's Family and Pulm/Neuro Consultant.    Ismael Dickson MD  Hospitalist Service  [facility]  Securely message with the Vocera Web Console (learn more here)  Text page via [place] [employer]         Clinically Significant Risk Factors Present on Admission             # Hypoalbuminemia: Albumin = 3.3 g/dL (Ref range: 3.4 - 5.0 g/dL) on admission, will monitor as appropriate   # Coagulation Defect: home medication list includes an anticoagulant medication  # Thrombocytopenia: Plts = 125 10e3/uL (Ref range: 150 - 450 10e3/uL) on admission, will monitor for bleeding   # Overweight: Estimated body mass index is 27.02 kg/m  as calculated from the following:    Height as of this encounter: 1.676 m (5' 6\").    Weight as of this encounter: 75.9 kg (167 lb 6.4 oz).    __________________________________________________________________    Interval History     I spent more than an hour with this patient and his wife.  I had reviewed everything with him that had been done as an outpatient.  I was not able to see Dr. Snowden's notes.  I asked what she had diagnosed him with her found.  They told me that she had not done \"anything\".  After assessing the patient's oxygen needs to when walking and doing an exam and reviewing more history,   I came out to the desk and called Dr. Boothe.  She indicated that she had done a " full work-up including PFTs and imaging and had diagnosed him with a diaphragmatic paralysis on the right.  He was also diagnosed with mild obstructive and restrictive disease.  He does have exposure to [place].  She had referred the patient and his wife to Dr. Watts who specializes in this.  Apparently there was some trouble getting an appointment and he does have an appointment for August.  Her office has been in touch with the patient checking in on him.  Apparently the patient and his wife told him last week that they would just going to come to the hospital Tuesday because he was still short of breath and his appointment was too far away.  Lower back to the room and indicated that Dr. Boothe had MD to come up with a diagnosis and had a plan.  I also asked him where his inhalers were.  Dr. Boothe had indicated that he was on Combivent and Dulera.  Patient and his wife stated that they stopped him.  I do not know why.  I indicated that part of the work-up with Dr. Watts would be imaging of his cervical spine so I indicated I be ordering this MRI of his cervical spine.  I then went back to the desk and found out that Dr. Boothe had faxed over her records.  In these records was an MRI of his cervical spine from November 2021.  I asked the patient and his wife about this.   they stated that this was done by Dr. Desir, who recommended PT.  They never follow up with this.    Data reviewed today: I reviewed all medications, new labs and imaging results over the last 24 hours. I personally reviewed no images or EKG's today.    Physical Exam   Vital Signs: Temp: 98.1  F (36.7  C) Temp src: Oral BP: 120/67 Pulse: 72   Resp: 18 SpO2: 97 % O2 Device: Nasal cannula Oxygen Delivery: 2 LPM  Weight: 167 lbs 6.4 oz  Constitutional: awake, alert, cooperative, no apparent distress, and appears stated age  Eyes: Lids and lashes normal, pupils equal, round and reactive to light, extra ocular muscles intact, sclera clear,  conjunctiva normal  Respiratory: mod air movement, exp wheezing  Cardiovascular: s1s2 irreg  GI: No scars, normal bowel sounds, soft, non-distended, non-tender, no masses palpated, no hepatosplenomegally  Skin: no bruising or bleeding  Musculoskeletal: no lower extremity pitting edema present    Data   Recent Labs   Lab 06/15/22  1124 06/15/22  0758 06/15/22  0740 06/15/22  0717 06/15/22  0545 06/14/22  2210 06/14/22  1254   WBC  --   --   --   --  6.3  --  7.1   HGB  --   --   --   --  16.0  --  16.8   MCV  --   --   --   --  90  --  91   PLT  --   --   --   --  125*  --  189   NA  --   --   --   --  144  --  142   POTASSIUM  --   --   --   --  3.9  --  4.3   CHLORIDE  --   --   --   --  109  --  105   CO2  --   --   --   --  30  --  30   BUN  --   --   --   --  11  --  14   CR  --   --   --   --  0.58*  --  0.70   ANIONGAP  --   --   --   --  5  --  7   TATE  --   --   --   --  8.5  --  9.2   * 102* 62*   < > 109*   < > 355*   ALBUMIN  --   --   --   --   --   --  3.3*   PROTTOTAL  --   --   --   --   --   --  7.1   BILITOTAL  --   --   --   --   --   --  0.5   ALKPHOS  --   --   --   --   --   --  76   ALT  --   --   --   --   --   --  41   AST  --   --   --   --   --   --  35    < > = values in this interval not displayed.     Recent Results (from the past 24 hour(s))   CT Chest Pulmonary Embolism w Contrast    Narrative    CT CHEST PULMONARY EMBOLISM WITH CONTRAST  6/14/2022 3:05 PM    CLINICAL HISTORY: PE suspected, low/intermediate probability, positive  D-dimer. Shortness of breath.    TECHNIQUE: CT angiogram chest during arterial phase injection IV  contrast. 2D and 3D MIP reconstructions were performed by the CT  technologist. Dose reduction techniques were used.     CONTRAST: 65 mL [id number]-[id number]    COMPARISON: Chest x-ray 6/14/2022. CT abdomen limited comparison  9/25/2019.    FINDINGS:  ANGIOGRAM CHEST: Pulmonary arteries are normal caliber and negative  for pulmonary emboli. Thoracic aorta is  negative for dissection. No CT  evidence of right heart strain.    LUNGS AND PLEURA: Patchy areas of potential consolidation at the  bilateral lung bases and/or prominent atelectasis. Elevated right  hemidiaphragm. No effusions.  Small hiatal hernia.    MEDIASTINUM/AXILLAE: Borderline prominent right hilar lymph node that  is 1.1 cm, series 8 image 129. Mediastinal lymph nodes appear to be  small in size. No other acute mediastinal abnormality.    CORONARY ARTERY CALCIFICATION: Moderate.    UPPER ABDOMEN: Normal.    MUSCULOSKELETAL: No acute abnormality identified.      Impression    IMPRESSION:  1.  Patchy moderate bibasilar pulmonary opacities that may be a  mixture of acute airspace disease and atelectasis. Bibasilar pneumonia  is a possibility. This appears new since the comparison CT. As such,  recommend short interval follow-up CT chest in 3 months to ensure  resolution.  2.  Coronary artery calcifications.  3.  No evidence for pulmonary embolism.    WOODROW FU MD         SYSTEM ID:  [id number]   Echocardiogram Complete   Result Value    LVEF  55-60%    Narrative    [id number]  [id number]  [id number]  [id number]^BETTE^MARILU^VERO     [facility]  Echocardiography Laboratory  [address] [place] [address]     Name: WYATT ROMERO  MRN: [id number]  : 1940  Study Date: 06/15/2022 08:17 AM  Age: 82 yrs  Gender: Male  Patient Location: [facility]  Reason For Study: Dyspnea  Ordering Physician: MARILU AMOS  Referring Physician: Kyler Mcwilliams  Performed By: Bhakti Foster RDCS     BSA: 1.9 m2  Height: 66 in  Weight: 170 lb  HR: 69  BP: 158/86 mmHg  ______________________________________________________________________________  Procedure  Complete Portable Echo Adult. Optison (NDC #[id number]) given intravenously.  ______________________________________________________________________________  Interpretation Summary     The visual ejection fraction is 55-60%.  Left ventricular systolic  function is normal.  There is trace aortic regurgitation.  The study was technically difficult.  ______________________________________________________________________________  Left Ventricle  The left ventricle is normal in size. There is mild concentric left  ventricular hypertrophy. Diastolic Doppler findings (E/E' ratio and/or other  parameters) suggest left ventricular filling pressures are indeterminate.  Grade I or early diastolic dysfunction. The visual ejection fraction is 55-  60%. Left ventricular systolic function is normal.     Right Ventricle  The right ventricle is normal in size and function.     Atria  Normal left atrial size. Right atrial size is normal. There is no color  Doppler evidence of an atrial shunt.     Mitral Valve  There is trace mitral regurgitation.     Tricuspid Valve  There is trace tricuspid regurgitation. The right ventricular systolic  pressure is approximated at 13.6 mmHg plus the right atrial pressure.     Aortic Valve  The aortic valve is trileaflet. There is trace aortic regurgitation. No  hemodynamically significant valvular aortic stenosis.     Pulmonic Valve  There is no pulmonic valvular regurgitation. Normal pulmonic valve velocity.     Vessels  The aortic root is normal size. Normal size ascending aorta. IVC diameter <2.1  cm collapsing >50% with sniff suggests a normal RA pressure of 3 mmHg.     Pericardium  There is no pericardial effusion.     Rhythm  Sinus rhythm was noted.  ______________________________________________________________________________  MMode/2D Measurements & Calculations  IVSd: 1.3 cm     LVIDd: 4.3 cm  LVIDs: 3.1 cm  LVPWd: 1.1 cm  FS: 27.9 %  LV mass(C)d: 189.4 grams  LV mass(C)dI: 101.5 grams/m2  Ao root diam: 3.5 cm  LA dimension: 3.2 cm  asc Aorta Diam: 3.4 cm  LA/Ao: 0.91  LA Volume (BP): 42.7 ml  LA Volume Index (BP): 22.8 ml/m2  RWT: 0.53     Doppler Measurements & Calculations  MV E max cleve: 69.4 cm/sec  MV A max cleve: 92.0 cm/sec  MV E/A:  0.75  MV max P.4 mmHg  MV mean P.0 mmHg  MV V2 VTI: 27.5 cm  MV dec time: 0.21 sec  AI P1/2t: 690.0 msec  PA acc time: 0.09 sec  TR max cleve: 184.5 cm/sec  TR max P.6 mmHg  E/E' avg: 10.8  Lateral E/e': 8.4  Medial E/e': 13.3     ______________________________________________________________________________  Report approved by: Tonny Slaughter 06/15/2022 10:19 AM

## 2022-06-15 NOTE — H&P
[facility]    History and Physical - Hospitalist Service       Date of Admission:  6/14/2022    Assessment & Plan      Carl Vázquez is an 82 year old male with history of atrial fibrillation diagnosed in January of this year, type 2 diabetes (insulin-dependent), obstructive sleep apnea, thyroid disease, COPD, GERD, and cholecystectomy.  He presented to the [facility] emergency department for evaluation of shortness of breath, weakness, dyspnea on exertion, and orthopnea.  He was diagnosed with atrial fibrillation in January.  He was started on Eliquis and metoprolol.  It sounds like his metoprolol dose was titrated up and he stopped taking it in late April or May because he was having progressive shortness of breath and weakness.  He has had evaluation with echocardiogram on 1/26/2022 that showed ejection fraction of 50 to 55% and was otherwise unremarkable.  He had a nuclear stress test on 3/9/2022 that showed no ischemia.  Ejection fraction was 60%.  He had pulmonary function tests on 4/4/2022 that suggested asthma and restrictive lung disease.  He was started on diltiazem in late April or May.  He came in today with progressive symptoms.  According to his wife he cannot even wash dishes without having to rest.  He has had development of some lower extremity edema.  Emergency department evaluation initially showed unremarkable vital signs but then he went into atrial fibrillation with rapid ventricular response and rates ranging from 100-140.  Laboratory evaluation showed albumin of 3.3 but otherwise unremarkable comprehensive metabolic panel.  CBC was unremarkable.  BNP was normal at 57.  Troponin was normal at 7  Glucose was elevated at 355.  D-dimer was elevated at 0.89.  Chest x-ray showed no acute process.  CT of chest showed patchy moderate bibasilar opacities raising question of possible pneumonia.  3-month follow-up CT was recommended.  EKG showed sinus rhythm  with rate of 94.  After went into atrial fibrillation this was confirmed with EKG.  I was asked to admit Carl to the hospital with progressive dyspnea, orthopnea, dyspnea on exertion, weakness, and peripheral edema.    Problem list:    Progressive shortness of breath, dyspnea on exertion, weakness, and peripheral edema  Suspect due to atrial fibrillation with rapid ventricular response  -Carl's symptoms could be due to the rapid ventricular response alone.  Alternatively, he may have developed tachycardia related cardiomyopathy over the last several months.  -He has had work-up with nuclear stress test that did not suggest coronary ischemia.  -He has had pulmonary function test that suggest asthma and restrictive lung disease.  -He is currently in atrial fibrillation with rapid ventricular response.  He was started on diltiazem in the last month or so.  His wife had him stop his metoprolol earlier in the year because she thought that it was causing his shortness of breath and weakness.  Alternatively, he may have been having the symptoms due to atrial fibrillation with rapid ventricular response.  I suspect that is why his metoprolol dose was being titrated up.  -Admit to observation  -Give diltiazem 30 mg every 6 hours for 2 doses and then in the morning increase prior to admission dose of diltiazem ER from 120 mg to 180 mg daily  -Metoprolol 2.5 mg IV every 4 hours as needed for rate control  -Repeat echocardiogram tomorrow to assess for possible tachycardia related cardiomyopathy  -Resume prior to admission Eliquis but schedule twice daily (he has only been taking once daily)  -If rate is difficult to control consider cardiology consult for consideration of starting antiarrhythmic with possible cardioversion  -Hold off on diuretic for now    Type 2 diabetes, insulin-dependent  -Resume prior to admission Lantus insulin 60 units at bedtime and glipizide 10 mg by mouth daily  -Add NovoLog sliding scale  "insulin    Hypothyroidism  -Resume prior to admission levothyroxine 75 mcg daily    COPD  -Resume prior to admission Dulera and Combivent inhalers    GERD  -Resume prior to admission Prilosec    Bibasilar infiltrates on CT chest  -Clinically no pneumonia.  No antibiotics at this time.  If he develops fever or symptoms of pneumonia I would treat.  -Outpatient follow-up CT scan in 3 months per radiology recommendation.       Diet: Combination Diet Moderate Consistent Carb (60 g CHO per Meal) Diet; No Caffeinated Beverages    DVT Prophylaxis: DOAC  Power Catheter: Not present  Central Lines: None  Cardiac Monitoring: ACTIVE order. Indication: Tachyarrhythmias, acute (48 hours)  Code Status: Full Code      Clinically Significant Risk Factors Present on Admission             # Hypoalbuminemia: Albumin = 3.3 g/dL (Ref range: 3.4 - 5.0 g/dL) on admission, will monitor as appropriate   # Coagulation Defect: home medication list includes an anticoagulant medication    # Overweight: Estimated body mass index is 27.57 kg/m  as calculated from the following:    Height as of this encounter: 1.676 m (5' 6\").    Weight as of this encounter: 77.5 kg (170 lb 12.8 oz).      Disposition Plan   Expected Discharge: 1-2 days  Anticipated discharge location: Home         The patient's care was discussed with the Patient and Patient's Family.    Denis Travis MD  Hospitalist Service  [facility]  Securely message with the Vocera Web Console (learn more here)  Text page via [facility] Paging/Directory         ______________________________________________________________________    Chief Complaint   Shortness of breath, dyspnea on exertion, orthopnea, weakness, and lower extremity edema    History is obtained from the patient, his wife, Dr. Francis, and the medical record    History of Present Illness   Carl Vázquez is an 82 year old male with history of atrial fibrillation diagnosed in January of this " year, type 2 diabetes (insulin-dependent), obstructive sleep apnea, thyroid disease, COPD, GERD, and cholecystectomy.  He presented to the [facility] emergency department for evaluation of shortness of breath, weakness, dyspnea on exertion, and orthopnea.  He was diagnosed with atrial fibrillation in January.  He was started on Eliquis and metoprolol.  It sounds like his metoprolol dose was titrated up and he stopped taking it in late April or May because he was having progressive shortness of breath and weakness.  He has had evaluation with echocardiogram on 1/26/2022 that showed ejection fraction of 50 to 55% and was otherwise unremarkable.  He had a nuclear stress test on 3/9/2022 that showed no ischemia.  Ejection fraction was 60%.  He had pulmonary function tests on 4/4/2022 that suggested asthma and restrictive lung disease.  He was started on diltiazem in late April or May.  He came in today with progressive symptoms.  According to his wife he cannot even wash dishes without having to rest.  He has had development of some lower extremity edema.  Emergency department evaluation initially showed unremarkable vital signs but then he went into atrial fibrillation with rapid ventricular response and rates ranging from 100-140.  Laboratory evaluation showed albumin of 3.3 but otherwise unremarkable comprehensive metabolic panel.  CBC was unremarkable.  BNP was normal at 57.  Troponin was normal at 7  Glucose was elevated at 355.  D-dimer was elevated at 0.89.  Chest x-ray showed no acute process.  CT of chest showed patchy moderate bibasilar opacities raising question of possible pneumonia.  3-month follow-up CT was recommended.  EKG showed sinus rhythm with rate of 94.  After went into atrial fibrillation this was confirmed with EKG.  I was asked to admit Carl to the hospital with progressive dyspnea, orthopnea, dyspnea on exertion, weakness, and peripheral edema.    Review of Systems    The 10  point Review of Systems is negative other than noted in the HPI or here.     Past Medical History    I have reviewed this patient's medical history and updated it with pertinent information if needed.   Past Medical History:   Diagnosis Date     Diabetes (H)      Sleep apnea     uses CPAP machine     Thyroid disease        Past Surgical History   I have reviewed this patient's surgical history and updated it with pertinent information if needed.  Past Surgical History:   Procedure Laterality Date     CHOLECYSTECTOMY      at [facility]     COLONOSCOPY      in [place], [place]     COLONOSCOPY  08/22/2017    Dr. Ja LYNN     ESOPHAGOSCOPY, GASTROSCOPY, DUODENOSCOPY (EGD), COMBINED N/A 6/7/2016    Procedure: COMBINED ESOPHAGOSCOPY, GASTROSCOPY, DUODENOSCOPY (EGD);  Surgeon: Eneida Denton MD;  Location:  GI       Social History   I have reviewed this patient's social history and updated it with pertinent information if needed.  Social History     Tobacco Use     Smoking status: Never Smoker     Smokeless tobacco: Never Used   Substance Use Topics     Alcohol use: No     Comment: drank many years ago     Drug use: No       Family History   No atrial fibrillation  Throat cancer  Brain cancer  Some type of clotting disorder    Prior to Admission Medications   Prior to Admission Medications   Prescriptions Last Dose Informant Patient Reported? Taking?   apixaban ANTICOAGULANT (ELIQUIS) 5 MG tablet 6/14/2022 at am  Yes Yes   Sig: Take 5 mg by mouth 2 times daily   diltiazem ER (DILT-XR) 120 MG 24 hr capsule 6/14/2022 at am  Yes Yes   Sig: Take 120 mg by mouth daily   glipiZIDE (GLUCOTROL) 5 MG tablet 6/14/2022 at am  Yes Yes   Sig: Take 10 mg by mouth every morning (before breakfast)   insulin glargine (LANTUS) 100 UNIT/ML PEN 6/13/2022 at hs  Yes Yes   Sig: Inject 60 Units Subcutaneous At Bedtime   levothyroxine (SYNTHROID/LEVOTHROID) 75 MCG tablet 6/14/2022 at am  Yes Yes   Sig: Take 75 mcg by mouth daily    omeprazole (PRILOSEC) 40 MG capsule 6/14/2022 at am  No Yes   Sig: Take 1 capsule (40 mg) by mouth daily Take 30-60 minutes before a meal.      Facility-Administered Medications: None     Allergies   Allergies   Allergen Reactions     Statin Drugs [Hmg-Coa-R Inhibitors]        Physical Exam   Vital Signs: Temp: 97.6  F (36.4  C) Temp src: Oral BP: (!) 158/86 Pulse: 97   Resp: 18 SpO2: 97 % O2 Device: Nasal cannula Oxygen Delivery: 3 LPM  Weight: 170 lbs 12.8 oz    GENERAL: Pleasant and cooperative. No acute distress.  EYES: Pupils equal and round. No scleral erythema or icterus.  ENT: External ears are normal without deformity. Posterior oropharynx is without erythem, swelling, or exudate.  NECK: Supple. No masses or swelling. No tenderness. Thyroid is normal without mass or tenderness.  CHEST: Clear to auscultation. Normal breath sounds. No retractions.   CV: Rapid rate and irregular rhythm. No JVD. Pulses normal.  ABDOMEN: Bowel sounds present. No tenderness. No masses or hernia.  EXTREMETIES: No clubbing, cyanosis, or ischemia. Trace to 1/4 symmetric LE and pedal edema  SKIN: Warm and dry to touch. No wounds or rashes.  NEUROLOGIC: Strength and sensation are normal. Deep tendon reflexes are normal. Cranial nerves are normal.        Data   Data reviewed today: I reviewed all medications, new labs and imaging results over the last 24 hours. I personally reviewed no images or EKG's today.    Recent Labs   Lab 06/14/22  1254   WBC 7.1   HGB 16.8   MCV 91         POTASSIUM 4.3   CHLORIDE 105   CO2 30   BUN 14   CR 0.70   ANIONGAP 7   TATE 9.2   *   ALBUMIN 3.3*   PROTTOTAL 7.1   BILITOTAL 0.5   ALKPHOS 76   ALT 41   AST 35

## 2022-06-15 NOTE — PLAN OF CARE
PRIMARY DIAGNOSIS: SOB   OUTPATIENT/OBSERVATION GOALS TO BE MET BEFORE DISCHARGE:  1. ADLs back to baseline: No    2. Activity and level of assistance: Up with standby assistance.    3. Pain status: Pain free.    4. Return to near baseline physical activity: No     Discharge Planner Nurse   Safe discharge environment identified: Yes  Barriers to discharge: Yes       Entered by: Dhruv Espinoza RN 06/14/2022 11:42 PM  A&Ox4. VSS on 2L O2. Pt arrived to unit on 3 L- sats in 97%. Denies pain this evening. Refused meds this evening and states he wants to start his meds in the AM since he already took them this morning before coming to the hospital. On TELE SR.  this evening no sliding scale given. 60 units of lantus given per orders. Continue to monitor and provide cares.   Please review provider order for any additional goals.   Nurse to notify provider when observation goals have been met and patient is ready for discharge.

## 2022-06-15 NOTE — CONSULTS
Neurology Consult Note  [facility]       [Gia 15, 2022]                                                                                       Admission Date: 2022  Hospital Day: 2  Code Status: Full Code    Patient: Carl Vázquez      : 1940  MRN:  [id number]       CC:      Chief Complaint   Patient presents with     Shortness of Breath       Consult Request:  Referring Provider:  Denis Travis MD    Indication for Consultation: Elevated right hemidiaphragm, shortness of breath    Primary Care Provider:  Kyler Mcwilliams MD        HPI:  Carl Vázquez is a 82 year old yo male admitted for shortness of breath. Admission CXR identified an elevated right hemidiaphragm. This was previously noted a month ago, and six months ago. His chest xray in  was reportedly normal. 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 He reports the onset of notable shortness of breath in January. My understanding is that his cardiologist recommended the chest imaging for evaluation of his onset of afib at that time. He subsequently was evaluated by Dr. Anne Boothe, who referred him to my clinic. He was scheduled for an August appointment. He previously saw one of my partners, Dr. Donald Desir, for cervicalgia. MRI showed notable multilevel cervical spondylosis, with multilevel spondylosis and multilevel cord compression from C3-4 through C5-6. He had severe bilateral C4 radics noted at that time. He reports severe shortness of breath lying recumbent, and cannot breath lying on his back, Sitting results in less shortness of breath, but standing is the best posture for breathing. He has underlying COPD that complicates his picture, as well as intermittent afib. He is presently treated with a beta-blocker, but was recently taking an ACE inhibitor that caused unacceptable cough, resolved when the medication was discontinued.    Mr. Vázquez was prescribed  several inhalers by Dr. Boothe for symptomatic treatment of his COPD, but he self-discontinued those, for uncertain reasons. He was also previously referred fr PT on his C-spine for treatment of his chronic cervicalgia, but he did not follow through with that.    A complete review of symptoms was performed including vascular, infectious, cardiovascular, pulmonary, gastrointestinal, endocrinological, hematologic, dermatologic, musculoskeletal, and neurological. All were normal except as above.    CURRENT PROBLEM LIST:  Patient Active Problem List    Diagnosis Date Noted     Shortness of breath 06/14/2022     Priority: Medium     Abnormal CT scan of lung 06/14/2022     Priority: Medium     Urinary tract infection 09/25/2019     Priority: Medium     UTI (urinary tract infection) 09/25/2019     Priority: Medium     Dysphagia 12/10/2012     Priority: Medium     Sleep apnea 12/10/2012     Priority: Medium     Bladder calculi 05/19/2010     Priority: Medium     High cholesterol 08/28/2007     Priority: Medium     Overview:        Diabetes mellitus without complication (H) 01/08/2007     Priority: Medium     Formatting of this note might be different from the original.    HEMOGLOBIN A1C (% Total Hgb)   Date Value   5/3/10 6.2    1/8/07 6.0       Elevated prostate specific antigen (PSA) 01/08/2007     Priority: Medium     Formatting of this note might be different from the original.  Follows with Dr. Garcia.   Noe Cooney M.D. 2/9/2009 8:36 AM  Dear Mr. Vázquez:    Your PSA is high. It is not up as much as it has been at times in the past, but it is still abnormal. You need to take this letter to your urologist and see him for a follow up evaluation about this. 10/12/2009  Kyler Mcwilliams MD    PSA TOTAL (SCREEN)        (ng/mL)   Date Value   10/9/09 4.33*     PSA TOTAL (DIAGNOSTIC)    (ng/mL)   Date Value   5/3/10 5.49*     PSA TOTAL (SCREEN)        (ng/mL)   Date Value   10/9/2009 4.33*       PSA TOTAL (SCREEN)         (ng/mL)   Date Value   10/9/2009 4.33*     PSA TOTAL (DIAGNOSTIC)    (ng/mL)   Date Value   3/21/2011 1.16       Inguinal hernia 01/08/2007     Priority: Medium     Overview:   Slight; mild left involvement.  EXPECTANT MANAGEMENT.  RED FLAG SYMPTOMS TO W ED.       Osteoarthrosis 06/07/2003     Priority: Medium     DJD         PAST MEDICAL HISTORY:  ALLERGIES:   Allergies   Allergen Reactions     Statin Drugs [Hmg-Coa-R Inhibitors]      Tobacco:    History   Smoking Status     Never Smoker   Smokeless Tobacco     Never Used     Alcohol:  Social History    Substance and Sexual Activity      Alcohol use: No        Comment: drank many years ago    MEDICATIONS:       CURRENTLY SCHEDULED MEDICATIONS     apixaban ANTICOAGULANT  5 mg Oral BID     diltiazem ER COATED BEADS  180 mg Oral Daily     glipiZIDE  10 mg Oral QAM AC     insulin aspart  1-7 Units Subcutaneous TID AC     insulin aspart  1-5 Units Subcutaneous At Bedtime     insulin glargine  60 Units Subcutaneous At Bedtime     ipratropium - albuterol 0.5 mg/2.5 mg/3 mL  3 mL Nebulization 4x daily     levothyroxine  75 mcg Oral Daily     pantoprazole  40 mg Oral Daily     predniSONE  60 mg Oral Daily          HOME MEDICATIONS  Medications Prior to Admission   Medication Sig Dispense Refill Last Dose     apixaban ANTICOAGULANT (ELIQUIS) 5 MG tablet Take 5 mg by mouth 2 times daily   6/14/2022 at am     diltiazem ER (DILT-XR) 120 MG 24 hr capsule Take 120 mg by mouth daily   6/14/2022 at am     glipiZIDE (GLUCOTROL) 5 MG tablet Take 10 mg by mouth every morning (before breakfast)   6/14/2022 at am     insulin glargine (LANTUS) 100 UNIT/ML PEN Inject 60 Units Subcutaneous At Bedtime   6/13/2022 at hs     levothyroxine (SYNTHROID/LEVOTHROID) 75 MCG tablet Take 75 mcg by mouth daily   6/14/2022 at am     omeprazole (PRILOSEC) 40 MG capsule Take 1 capsule (40 mg) by mouth daily Take 30-60 minutes before a meal. 90 capsule 3 6/14/2022 at am     MEDICAL HISTORY  Past  "Medical History:   Diagnosis Date     Diabetes (H)      Sleep apnea     uses CPAP machine     Thyroid disease      SURGICAL HISTORY  Past Surgical History:   Procedure Laterality Date     CHOLE[facility]STECTOMY      at [facility]     COLONOSCOPY      in [place], [place]     COLONOSCOPY  08/22/2017    Dr. Ja LYNN     ESOPHAGOSCOPY, GASTROSCOPY, DUODENOSCOPY (EGD), COMBINED N/A 6/7/2016    Procedure: COMBINED ESOPHAGOSCOPY, GASTROSCOPY, DUODENOSCOPY (EGD);  Surgeon: Eneida Denton MD;  Location:  GI     FAMILY HISTORY    Family History   Problem Relation Age of Onset     Colon Cancer No family hx of      SOCIAL HISTORY  Social History     Socioeconomic History     Marital status:    Tobacco Use     Smoking status: Never Smoker     Smokeless tobacco: Never Used   Substance and Sexual Activity     Alcohol use: No     Comment: drank many years ago     Drug use: No         GENERAL EXAMINATION:    He is an elderly, well-developed, well-nourished man, 5' 6\" and 167 lbs 6.4 oz. Blood pressure is 120/67. His peripheral pulses are intact at 72 and regular. He has no carotid bruits. His conjunctivae are normal. His oropharynx is without lesions or inflammation. Skin examination is unremarkable. He has no pretibial edema, rashes, or unusual lesions. Nail examination shows no clubbing, cyanosis, or deformities. His respiratory examination is notable for wheezing, with rate of 18, unlabored. He is afebrile.         Height: 167.6 cm (5' 6\")     Temp: 98.1  F (36.7  C)   Weight: 75.9 kg (167 lb 6.4 oz)    Temp src: Oral         BP: 120/67         Estimated body mass index is 27.02 kg/m  as calculated from the following:    Height as of this encounter: 1.676 m (5' 6\").    Weight as of this encounter: 75.9 kg (167 lb 6.4 oz).    Resp: 18   SpO2: 97 %   O2 Device: Nasal cannula     Blood Pressure:   BP Readings from Last 3 Encounters:   06/15/22 120/67   09/27/19 (!) 165/84   09/22/19 (!) 170/89       T24 : Temp " (24hrs), Av.8  F (36.6  C), Min:97.6  F (36.4  C), Max:98.1  F (36.7  C)       NEUROLOGICAL EXAMINATION  Mental Status:  He is awake, alert, and oriented X3. His speech is spontaneous and fluent. He has no aphasia or dysarthria. His recollection of his past medical history is unreliable.    Station and Gait: He has a narrow-based gait with free arm swings.      Skull and Spine: His head is atraumatic.     Cranial Nerves: Visual fields are full. Extraocular movements are intact. Pursuits are smooth and saccades are of normal speed. His face is symmetric at rest and with movement. He has no ptosis. His tongue is midline. He has difficulty with shoulder shrug due to bilateral shoulder pain, with findings consistent with bilateral shoulder impingement syndrome. Hearing is intact.    Motor: Muscle bulk is mildly reduced throughout. Muscle tone is normal. Muscles are non-tender to palpation. He has difficulty with arm abduction due to shoulder pathology, not weakness. His plantars are extensor bilaterally, more notable on the left.     Sensory: Sensation is symmetric in his arms and legs.     Coordination: He has no resting, postural, or action tremor. He can perform finger-nose-finger quickly and accurately.      .  LABORATORY RESULTS      SMA-7:  Recent Labs   Lab Test 06/15/22  1124 06/15/22  0758 06/15/22  0740 06/15/22  0717 06/15/22  0545 22  2210 22  1254 19  0711 19  1443   0000   NA  --   --   --   --  144  --  142 139 135  --    POTASSIUM  --   --   --   --  3.9  --  4.3 3.4 4.2  --    CHLORIDE  --   --   --   --  109  --  105 107 104  --    CO2  --   --   --   --  30  --  30 28 25  --    * 102* 62* 63* 109*   < > 355* 127* 308*   < >   BUN  --   --   --   --  11  --  14 13 19  --    CR  --   --   --   --  0.58*  --  0.70 0.76 1.36*  --    TATE  --   --   --   --  8.5  --  9.2 8.4* 8.7  --     < > = values in this interval not displayed.   CMP:  Recent Labs   Lab 22  9596    PROTTOTAL 7.1   ALBUMIN 3.3*   ALKPHOS 76   AST 35   ALT 41   BILITOTAL 0.5     CBC:    Recent Labs   Lab 06/15/22  0545 06/14/22  1254   WBC 6.3 7.1   RBC 5.22 5.52   HGB 16.0 16.8   HCT 47.2 50.2   MCV 90 91   * 189     No results for input(s): IRON, IRONSAT, RETICABSCT, RETP, FEB, CHET, FOLIC, EPOE, MORPH in the last 168 hours.  U/A:    Recent Labs   Lab Test 09/25/19  1522   COLOR Light Red   APPEARANCE Slightly Cloudy   URINEGLC >1000*   URINEBILI Negative   URINEKETONE 10*   SG 1.019   UBLD Large*   URINEPH 5.5   PROTEIN 30*   NITRITE Negative   LEUKEST Large*   RBCU >182*   WBCU >182*     TSH:   Recent Labs   Lab 06/14/22  2056   TSH 1.49     CK: No results found for: CKTOTAL    Recent Labs   Lab 06/14/22  1254   NTBNPI 57     No results found for: TROPONIN, TROPI, TROPR     COAGULATION PANEL    Lab Results   Component Value Date    PATH  08/22/2017     Patient Name: WYATT ROMERO  MR#: [id number]  Specimen #: [id number]  Collected: 8/22/2017  Received: 8/22/2017  Reported: 8/23/2017 08:37  Ordering Phy(s): NETTIE SHI    For improved result formatting, select 'View Enhanced Report Format'  under Linked Documents section.    SPECIMEN(S):  A: Ileocecal valve  polyp  B: Colon polyp, transverse  C: Sigmoid colon polyp    FINAL DIAGNOSIS:  A: Ileocecal valve polyp, polypectomy.  - Fragments of chronically inflamed focally ulcerated mucosa with areas  of lymphoid follicle formation consistent with inflammatory polyp.  Negative for dysplasia and malignancy.    B: Transverse colon polyp, polypectomy.  - Tubular adenoma.  Negative for high grade dysplasia and malignancy.    C: Sigmoid colon polyp, polypectomy.  - Tubulovillous adenoma.  Negative for high grade dysplasia and  malignancy.  Stalk margin negative for adenomatous change.    Electronically signed out by:    Cristhian Estrada M.D.    CLINICAL HISTORY:  Rectal bleeding.    GROSS:  A: The specimen is received in formalin labeled with the  "patient's name,  identifying information and \"ileocecal valve polyp\".  It consists of  five tan friable tissue fragments, ranging from 0.2-0.3 cm.  Submitted  entirely in one block.    B: The specimen is received in formalin labeled with the patient's name,  identifying information and \"transverse colon polyp\".  It consists of a  0.5 cm tan tissue fragment.  Submitted entirely in one blood.    C: The specimen is received in formalin labeled with the patient's name,  identifying information and \"sigmoid polyp\".  It consists of a 2 x 0.8 x  0.8 cm pink pedunculated polyp with a 0.5 cm stalk.  The margin is inked  green.  Submitted entirely in 2 blocks. (Dictated by: Naomie Zendejas  8/22/2017 02:01 PM)    MICROSCOPIC:  A., B., C. Microscopic evaluation performed.    CPT Codes:  A: 29342-SN0  B: 25345-HF4  C: 26599-SK3    TESTING LAB LOCATION:  [facility] [address] [place]  [address]  [phone]    COLLECTION SITE:  Client: [facility]  Location: [facility] (R)         IMAGING RESULTS   XR Chest 2 Views    Result Date: 6/14/2022  CHEST TWO VIEW   6/14/2022 12:37 PM HISTORY: SOB COMPARISON: Chest x-ray 5/31/2022.     IMPRESSION: No acute cardiopulmonary disease identified. Stable elevated right hemidiaphragm. Stable right base atelectasis. WOODROW FU MD   SYSTEM ID:  [id number]    XR Chest 2 Views    Result Date: 5/31/2022  CHEST TWO VIEWS   5/31/2022 8:44 AM HISTORY: Dyspnea since January 2022. Elevated diaphragm, history of a-fib. Dyspnea, unspecified. Unspecified abnormalities of breathing. COMPARISON: None available.     IMPRESSION: PA and lateral views of the chest were obtained. Cardiomediastinal silhouette is within normal limits. Asymmetric elevation of the right hemidiaphragm as compared to the left. Right basilar platelike pulmonary opacities, likely atelectasis. No significant pleural effusion or pneumothorax. STACIE STUBBS MD   SYSTEM ID:  " [id number]    Echocardiogram Complete    Result Date: 6/15/2022  [id number]^BETTE^MARILU^VERO  [facility] Echocardiography Laboratory [address] [place] [address]  Name: WYATT ROMERO MRN: [id number] : 1940 Study Date: 06/15/2022 08:17 AM Age: 82 yrs Gender: Male Patient Location: [facility] Reason For Study: Dyspnea Ordering Physician: MARILU AMOS Referring Physician: Kyler Mcwilliams Performed By: Bhakti Foster RDCS  BSA: 1.9 m2 Height: 66 in Weight: 170 lb HR: 69 BP: 158/86 mmHg ______________________________________________________________________________ Procedure Complete Portable Echo Adult. Optison (NDC #[id number]) given intravenously. ______________________________________________________________________________ Interpretation Summary  The visual ejection fraction is 55-60%. Left ventricular systolic function is normal. There is trace aortic regurgitation. The study was technically difficult. ______________________________________________________________________________ Left Ventricle The left ventricle is normal in size. There is mild concentric left ventricular hypertrophy. Diastolic Doppler findings (E/E' ratio and/or other parameters) suggest left ventricular filling pressures are indeterminate. Grade I or early diastolic dysfunction. The visual ejection fraction is 55- 60%. Left ventricular systolic function is normal.  Right Ventricle The right ventricle is normal in size and function.  Atria Normal left atrial size. Right atrial size is normal. There is no color Doppler evidence of an atrial shunt.  Mitral Valve There is trace mitral regurgitation.  Tricuspid Valve There is trace tricuspid regurgitation. The right ventricular systolic pressure is approximated at 13.6 mmHg plus the right atrial pressure.  Aortic Valve The aortic valve is trileaflet. There is trace aortic regurgitation. No hemodynamically significant valvular aortic  stenosis.  Pulmonic Valve There is no pulmonic valvular regurgitation. Normal pulmonic valve velocity.  Vessels The aortic root is normal size. Normal size ascending aorta. IVC diameter <2.1 cm collapsing >50% with sniff suggests a normal RA pressure of 3 mmHg.  Pericardium There is no pericardial effusion.  Rhythm Sinus rhythm was noted. ______________________________________________________________________________ MMode/2D Measurements & Calculations IVSd: 1.3 cm  LVIDd: 4.3 cm LVIDs: 3.1 cm LVPWd: 1.1 cm FS: 27.9 % LV mass(C)d: 189.4 grams LV mass(C)dI: 101.5 grams/m2 Ao root diam: 3.5 cm LA dimension: 3.2 cm asc Aorta Diam: 3.4 cm LA/Ao: 0.91 LA Volume (BP): 42.7 ml LA Volume Index (BP): 22.8 ml/m2 RWT: 0.53  Doppler Measurements & Calculations MV E max cleve: 69.4 cm/sec MV A max cleve: 92.0 cm/sec MV E/A: 0.75 MV max P.4 mmHg MV mean P.0 mmHg MV V2 VTI: 27.5 cm MV dec time: 0.21 sec AI P1/2t: 690.0 msec PA acc time: 0.09 sec TR max cleve: 184.5 cm/sec TR max P.6 mmHg E/E' avg: 10.8 Lateral E/e': 8.4 Medial E/e': 13.3  ______________________________________________________________________________ Report approved by: Tonny Slaughter 06/15/2022 10:19 AM       XR Chest/Heart Fluoro    Result Date: 2022  CHEST/HEART FLUORO   2022 8:44 AM HISTORY: Dyspnea since 2022. Elevated diaphragm, history of a-fib. Dyspnea, unspecified. Unspecified abnormalities of breathing. COMPARISON: Chest x-ray on same day earlier. FLUOROSCOPY TIME: 0.2 minutes. Two fluoroscopic images/cine clips were obtained. FINDINGS: There is asymmetric elevation of the right hemidiaphragm as compared to the left. Minimal up and down movement of the right hemidiaphragm as compared to the left.     IMPRESSION: Asymmetric elevation of the right hemidiaphragm with minimal craniocaudal movement of the right hemidiaphragm as compared to the left, suggesting right hemidiaphragmatic paralysis. STACIE STUBBS MD   SYSTEM  ID:  [id number]    CT Chest Pulmonary Embolism w Contrast    Result Date: 6/14/2022  CT CHEST PULMONARY EMBOLISM WITH CONTRAST  6/14/2022 3:05 PM CLINICAL HISTORY: PE suspected, low/intermediate probability, positive D-dimer. Shortness of breath. TECHNIQUE: CT angiogram chest during arterial phase injection IV contrast. 2D and 3D MIP reconstructions were performed by the CT technologist. Dose reduction techniques were used. CONTRAST: 65 mL Isovue-370 COMPARISON: Chest x-ray 6/14/2022. CT abdomen limited comparison 9/25/2019. FINDINGS: ANGIOGRAM CHEST: Pulmonary arteries are normal caliber and negative for pulmonary emboli. Thoracic aorta is negative for dissection. No CT evidence of right heart strain. LUNGS AND PLEURA: Patchy areas of potential consolidation at the bilateral lung bases and/or prominent atelectasis. Elevated right hemidiaphragm. No effusions. Small hiatal hernia. MEDIASTINUM/AXILLAE: Borderline prominent right hilar lymph node that is 1.1 cm, series 8 image 129. Mediastinal lymph nodes appear to be small in size. No other acute mediastinal abnormality. CORONARY ARTERY CALCIFICATION: Moderate. UPPER ABDOMEN: Normal. MUSCULOSKELETAL: No acute abnormality identified.     IMPRESSION: 1.  Patchy moderate bibasilar pulmonary opacities that may be a mixture of acute airspace disease and atelectasis. Bibasilar pneumonia is a possibility. This appears new since the comparison CT. As such, recommend short interval follow-up CT chest in 3 months to ensure resolution. 2.  Coronary artery calcifications. 3.  No evidence for pulmonary embolism. WOODROW FU MD   SYSTEM ID:  [id number]      Recent Results (from the past 24 hour(s))   CT Chest Pulmonary Embolism w Contrast    Narrative    CT CHEST PULMONARY EMBOLISM WITH CONTRAST  6/14/2022 3:05 PM    CLINICAL HISTORY: PE suspected, low/intermediate probability, positive  D-dimer. Shortness of breath.    TECHNIQUE: CT angiogram chest during arterial phase injection  IV  contrast. 2D and 3D MIP reconstructions were performed by the CT  technologist. Dose reduction techniques were used.     CONTRAST: 65 mL Isovue-370    COMPARISON: Chest x-ray 2022. CT abdomen limited comparison  2019.    FINDINGS:  ANGIOGRAM CHEST: Pulmonary arteries are normal caliber and negative  for pulmonary emboli. Thoracic aorta is negative for dissection. No CT  evidence of right heart strain.    LUNGS AND PLEURA: Patchy areas of potential consolidation at the  bilateral lung bases and/or prominent atelectasis. Elevated right  hemidiaphragm. No effusions.  Small hiatal hernia.    MEDIASTINUM/AXILLAE: Borderline prominent right hilar lymph node that  is 1.1 cm, series 8 image 129. Mediastinal lymph nodes appear to be  small in size. No other acute mediastinal abnormality.    CORONARY ARTERY CALCIFICATION: Moderate.    UPPER ABDOMEN: Normal.    MUSCULOSKELETAL: No acute abnormality identified.      Impression    IMPRESSION:  1.  Patchy moderate bibasilar pulmonary opacities that may be a  mixture of acute airspace disease and atelectasis. Bibasilar pneumonia  is a possibility. This appears new since the comparison CT. As such,  recommend short interval follow-up CT chest in 3 months to ensure  resolution.  2.  Coronary artery calcifications.  3.  No evidence for pulmonary embolism.    WOODROW FU MD         SYSTEM ID:  [id number]   Echocardiogram Complete   Result Value    LVEF  55-60%    Narrative    [id number]  [id number]  [id number]^BETTE^MARILU^VERO     [facility]  Echocardiography Laboratory  [address] [place] [address]     Name: WYATT ROMERO  MRN: [id number]  : 1940  Study Date: 06/15/2022 08:17 AM  Age: 82 yrs  Gender: Male  Patient Location: [facility]  Reason For Study: Dyspnea  Ordering Physician: MARILU AMOS  Referring Physician: Kyler Mcwilliams  Performed By: Bhakti Foster RDCS     BSA: 1.9 m2  Height: 66 in  Weight: 170 lb  HR: 69  BP:  158/86 mmHg  ______________________________________________________________________________  Procedure  Complete Portable Echo Adult. Optison (NDC #[id number]) given intravenously.  ______________________________________________________________________________  Interpretation Summary     The visual ejection fraction is 55-60%.  Left ventricular systolic function is normal.  There is trace aortic regurgitation.  The study was technically difficult.  ______________________________________________________________________________  Left Ventricle  The left ventricle is normal in size. There is mild concentric left  ventricular hypertrophy. Diastolic Doppler findings (E/E' ratio and/or other  parameters) suggest left ventricular filling pressures are indeterminate.  Grade I or early diastolic dysfunction. The visual ejection fraction is 55-  60%. Left ventricular systolic function is normal.     Right Ventricle  The right ventricle is normal in size and function.     Atria  Normal left atrial size. Right atrial size is normal. There is no color  Doppler evidence of an atrial shunt.     Mitral Valve  There is trace mitral regurgitation.     Tricuspid Valve  There is trace tricuspid regurgitation. The right ventricular systolic  pressure is approximated at 13.6 mmHg plus the right atrial pressure.     Aortic Valve  The aortic valve is trileaflet. There is trace aortic regurgitation. No  hemodynamically significant valvular aortic stenosis.     Pulmonic Valve  There is no pulmonic valvular regurgitation. Normal pulmonic valve velocity.     Vessels  The aortic root is normal size. Normal size ascending aorta. IVC diameter <2.1  cm collapsing >50% with sniff suggests a normal RA pressure of 3 mmHg.     Pericardium  There is no pericardial effusion.     Rhythm  Sinus rhythm was noted.  ______________________________________________________________________________  MMode/2D Measurements & Calculations  IVSd: 1.3 cm     LVIDd: 4.3  cm  LVIDs: 3.1 cm  LVPWd: 1.1 cm  FS: 27.9 %  LV mass(C)d: 189.4 grams  LV mass(C)dI: 101.5 grams/m2  Ao root diam: 3.5 cm  LA dimension: 3.2 cm  asc Aorta Diam: 3.4 cm  LA/Ao: 0.91  LA Volume (BP): 42.7 ml  LA Volume Index (BP): 22.8 ml/m2  RWT: 0.53     Doppler Measurements & Calculations  MV E max cleve: 69.4 cm/sec  MV A max cleve: 92.0 cm/sec  MV E/A: 0.75  MV max P.4 mmHg  MV mean P.0 mmHg  MV V2 VTI: 27.5 cm  MV dec time: 0.21 sec  AI P1/2t: 690.0 msec  PA acc time: 0.09 sec  TR max cleve: 184.5 cm/sec  TR max P.6 mmHg  E/E' avg: 10.8  Lateral E/e': 8.4  Medial E/e': 13.3     ______________________________________________________________________________  Report approved by: Tonny Slaughter 06/15/2022 10:19 AM             CXR:    _____________________________________________________________________________    EKG:        ASSESSMENT     Shortness of breath, multifactorial, including 1) intermittent afib;  2) COPD, with apparent recent worsening; and 3) multilevel cervical stenosis/cord impingements, though 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 However, this paralysis is likely long-standing, present for at least months, if not years, and I suspect other treatable causes (e.g., his COPD) are the most likely acute contributors to his respiratory decline.    RECOMMENDATIONS   Oral prednisone is reasonable for treatment of his CODP exacerbation, and my provide improvement in his cervical stenosis/radiculoapthy symptoms.  I recommend a diagnostic/therapeutic right C4 nerve root injection as his best chance short of surgery to relieve pressure on his cervical cord and right C4 nerve root.  Continue O2 by nasal canula.  While lying in bed he should not have his legs elevated, as even this increases abdominal pressure on his diaphragms, increasing his shortness of breath. Standing, leaning forward with his hands on his knees, will maximally reduce  pressure on his diaphragm if he notes shortness of breath while walking.         Josias Watts M.D., Ph.D.    [facility]

## 2022-06-15 NOTE — PLAN OF CARE
BS was 63 this morning    15 g of glucose given at 0732 and BS went to 62  Another 15 g or glucose gel given at 0742 along with 240 mls of apple juice with extra sugar.    0802- BS now at 102

## 2022-06-15 NOTE — PROGRESS NOTES
PRIMARY DIAGNOSIS: SOB  OUTPATIENT/OBSERVATION GOALS TO BE MET BEFORE DISCHARGE:  1. ADLs back to baseline: No    2. Activity and level of assistance: Up with standby assistance.    3. Pain status: Pain free.    4. Return to near baseline physical activity: No     Discharge Planner Nurse   Safe discharge environment identified: Yes  Barriers to discharge: Yes       Entered by: Elo Gomes RN 06/15/2022     VSS on 2L. Pt still reporting SOB, O2 maintained for comfort. Denies pain. Sleeping well between cares. Tele monitoring; SR. Mod carb diet. PIV SL. SBA. Current plan is for a repeat echo this AM. Will continue to provide supportive cares.     Please review provider order for any additional goals.   Nurse to notify provider when observation goals have been met and patient is ready for discharge.

## 2022-06-15 NOTE — PLAN OF CARE
"Patient is alert and oriented x4. VS WNL and documented on the FS. Lung sounds diminished in posterior lobes. Patient remains on 2 liters of O2 NC to keep sats >90%. Patient is very SOB with activity. Duonebs, albuterol and prednisone started. Active bowel sounds in all 4 quadrants with LBM yesterday. Patient denies any pain, urgency, and frequency when voiding.  Mild pitting edema noted in LE's. SL. Mod carb diet and ate 100% of his meals. Tele in place and patient is SR HR 64.  Echo completed and showed 55-60% EF. Denied pain. BS was 317 and correction given.  Wife at bedside.     Plan: Neurology consult for R hemidiaphragmatic paralysis and obstructive/restrictive disease.    /67   Pulse 72   Temp 98.1  F (36.7  C) (Oral)   Resp 18   Ht 1.676 m (5' 6\")   Wt 75.9 kg (167 lb 6.4 oz)   SpO2 97%   BMI 27.02 kg/m      "

## 2022-06-15 NOTE — PROGRESS NOTES
Pt ambulated in maldonado SBA for 150 ft.  Denies dizziness, C/O SOB.  On 2L O2, SpO2 maintained 94%.  Spoke to RN about possible Neb?

## 2022-06-15 NOTE — PLAN OF CARE
"Patient is alert and oriented x4. VS WNL and documented on the FS. Lung sounds diminished in posterior lobes. Patient remains on 2 liters of O2 NC to keep sats >90%. Patient is very SOB with activity. Talked to Dr. Dickson and will add nebs on. Active bowel sounds in all 4 quadrants with LBM yesterday. Patient denies any pain, urgency, and frequency when voiding.  Mild pitting edema noted in LE's. SL. Mod carb diet and ate 100% of his breakfast. Tele in place and patient is SR HR 64.  Echo completed and showed 55-60% EF. Denied pain.     Patient was complaining of 7/10 midsternal (R) chest pain this morning and MD aware. No new orders at this time.     /67   Pulse 72   Temp 98.1  F (36.7  C) (Oral)   Resp 18   Ht 1.676 m (5' 6\")   Wt 75.9 kg (167 lb 6.4 oz)   SpO2 97%   BMI 27.02 kg/m        "

## 2022-06-15 NOTE — PROGRESS NOTES
Spoke with Floor regarding ordered cervical epidural steroid injection. Pt presently on eliquis last dose 0735 6/15/22. This procedure requires 2 day hold for Eliquis which would mean procedure would have to be after 0735 . Procedure will likely need to be coordinated with neuro IR   6/17/22. This was communicated to inpatient floor.

## 2022-06-16 LAB
GLUCOSE BLDC GLUCOMTR-MCNC: 262 MG/DL (ref 70–99)
GLUCOSE BLDC GLUCOMTR-MCNC: 314 MG/DL (ref 70–99)
GLUCOSE BLDC GLUCOMTR-MCNC: 320 MG/DL (ref 70–99)
GLUCOSE BLDC GLUCOMTR-MCNC: 369 MG/DL (ref 70–99)
GLUCOSE BLDC GLUCOMTR-MCNC: 378 MG/DL (ref 70–99)
GLUCOSE BLDC GLUCOMTR-MCNC: 397 MG/DL (ref 70–99)
GLUCOSE BLDC GLUCOMTR-MCNC: 413 MG/DL (ref 70–99)

## 2022-06-16 PROCEDURE — 96372 THER/PROPH/DIAG INJ SC/IM: CPT | Mod: XU

## 2022-06-16 PROCEDURE — 94640 AIRWAY INHALATION TREATMENT: CPT | Mod: 76

## 2022-06-16 PROCEDURE — 82962 GLUCOSE BLOOD TEST: CPT

## 2022-06-16 PROCEDURE — 250N000013 HC RX MED GY IP 250 OP 250 PS 637: Performed by: HOSPITALIST

## 2022-06-16 PROCEDURE — G0378 HOSPITAL OBSERVATION PER HR: HCPCS

## 2022-06-16 PROCEDURE — 94640 AIRWAY INHALATION TREATMENT: CPT

## 2022-06-16 PROCEDURE — 250N000009 HC RX 250: Performed by: HOSPITALIST

## 2022-06-16 PROCEDURE — 93010 ELECTROCARDIOGRAM REPORT: CPT | Performed by: INTERNAL MEDICINE

## 2022-06-16 PROCEDURE — 999N000157 HC STATISTIC RCP TIME EA 10 MIN

## 2022-06-16 PROCEDURE — 250N000013 HC RX MED GY IP 250 OP 250 PS 637: Performed by: INTERNAL MEDICINE

## 2022-06-16 PROCEDURE — 96374 THER/PROPH/DIAG INJ IV PUSH: CPT

## 2022-06-16 PROCEDURE — 99204 OFFICE O/P NEW MOD 45 MIN: CPT | Performed by: INTERNAL MEDICINE

## 2022-06-16 PROCEDURE — 99236 HOSP IP/OBS SAME DATE HI 85: CPT | Performed by: HOSPITALIST

## 2022-06-16 PROCEDURE — 250N000012 HC RX MED GY IP 250 OP 636 PS 637: Performed by: HOSPITALIST

## 2022-06-16 RX ORDER — DILTIAZEM HYDROCHLORIDE 30 MG/1
30 TABLET, FILM COATED ORAL EVERY 6 HOURS SCHEDULED
Status: DISCONTINUED | OUTPATIENT
Start: 2022-06-16 | End: 2022-06-16

## 2022-06-16 RX ORDER — DILTIAZEM HYDROCHLORIDE 30 MG/1
30 TABLET, FILM COATED ORAL EVERY 6 HOURS SCHEDULED
Status: COMPLETED | OUTPATIENT
Start: 2022-06-16 | End: 2022-06-16

## 2022-06-16 RX ORDER — DILTIAZEM HYDROCHLORIDE 120 MG/1
240 CAPSULE, COATED, EXTENDED RELEASE ORAL DAILY
Status: DISCONTINUED | OUTPATIENT
Start: 2022-06-17 | End: 2022-06-17

## 2022-06-16 RX ORDER — PREDNISONE 20 MG/1
40 TABLET ORAL DAILY
Status: DISCONTINUED | OUTPATIENT
Start: 2022-06-17 | End: 2022-06-17

## 2022-06-16 RX ORDER — METOPROLOL TARTRATE 25 MG/1
25 TABLET, FILM COATED ORAL 2 TIMES DAILY
Status: DISCONTINUED | OUTPATIENT
Start: 2022-06-16 | End: 2022-06-16

## 2022-06-16 RX ORDER — METOPROLOL TARTRATE 1 MG/ML
5 INJECTION, SOLUTION INTRAVENOUS ONCE
Status: COMPLETED | OUTPATIENT
Start: 2022-06-16 | End: 2022-06-16

## 2022-06-16 RX ADMIN — IPRATROPIUM BROMIDE AND ALBUTEROL SULFATE 3 ML: 2.5; .5 SOLUTION RESPIRATORY (INHALATION) at 15:32

## 2022-06-16 RX ADMIN — IPRATROPIUM BROMIDE AND ALBUTEROL SULFATE 3 ML: 2.5; .5 SOLUTION RESPIRATORY (INHALATION) at 11:23

## 2022-06-16 RX ADMIN — DILTIAZEM HYDROCHLORIDE 30 MG: 30 TABLET, FILM COATED ORAL at 12:25

## 2022-06-16 RX ADMIN — DILTIAZEM HYDROCHLORIDE 30 MG: 30 TABLET, FILM COATED ORAL at 17:38

## 2022-06-16 RX ADMIN — INSULIN ASPART 3 UNITS: 100 INJECTION, SOLUTION INTRAVENOUS; SUBCUTANEOUS at 07:59

## 2022-06-16 RX ADMIN — DILTIAZEM HYDROCHLORIDE 180 MG: 180 CAPSULE, COATED, EXTENDED RELEASE ORAL at 08:00

## 2022-06-16 RX ADMIN — LEVOTHYROXINE SODIUM 75 MCG: 0.07 TABLET ORAL at 08:00

## 2022-06-16 RX ADMIN — IPRATROPIUM BROMIDE AND ALBUTEROL SULFATE 3 ML: 2.5; .5 SOLUTION RESPIRATORY (INHALATION) at 19:42

## 2022-06-16 RX ADMIN — PANTOPRAZOLE SODIUM 40 MG: 40 TABLET, DELAYED RELEASE ORAL at 08:00

## 2022-06-16 RX ADMIN — GLIPIZIDE 10 MG: 10 TABLET ORAL at 08:00

## 2022-06-16 RX ADMIN — PREDNISONE 60 MG: 20 TABLET ORAL at 08:00

## 2022-06-16 RX ADMIN — APIXABAN 5 MG: 5 TABLET, FILM COATED ORAL at 22:30

## 2022-06-16 RX ADMIN — METOPROLOL TARTRATE 5 MG: 5 INJECTION INTRAVENOUS at 10:14

## 2022-06-16 RX ADMIN — DILTIAZEM HYDROCHLORIDE 30 MG: 30 TABLET, FILM COATED ORAL at 23:45

## 2022-06-16 RX ADMIN — IPRATROPIUM BROMIDE AND ALBUTEROL SULFATE 3 ML: 2.5; .5 SOLUTION RESPIRATORY (INHALATION) at 07:57

## 2022-06-16 NOTE — PLAN OF CARE
PRIMARY DIAGNOSIS: SOB  Pt alert and oriented but sometimes forgetful - anxious, tolerating regular diet and PO meds, up SBA. Denies any changes in pain - milt left sided chest pain since admission. Resp - 2L O2 via NC, nebs via RT; pt has increased HR and anxiety post neb treatments. Diltiazem, prednisone, holding eliquis. Continued tele monitoring *frequent calls from tele tech regarding a.fib RVR, heart rates 80s-150s, provider notified - see notes. IV metoprolol pushed over 5 minutes with minimal improvement, diltiazem then ordered and given. Pt did later convert to SR 80s-90s. See provider note. Continued monitoring and POC.  OUTPATIENT/OBSERVATION GOALS TO BE MET BEFORE DISCHARGE:  1. Dyspnea improved and O2 sats >88% on RA or back to baseline O2 levels: No   SpO2: 93 %, O2 Device: Nasal cannula - 2L    2. Tolerating oral abx or appropriate plans made outpatient infusion:  N/A    3. Vitals signs normal or return to baseline: 2L O2 NC required to keep sats >90% o/w WNL    4. Short term supplemental O2 needed with activity at home:  Unsure at this time    5. Tolerate oral intake to maintain hydration: Yes    6. Return to near baseline physical activity: Yes, still experiencing increased dyspnea on exertion    Discharge Planner Nurse   Safe discharge environment identified: Yes  Barriers to discharge: Yes       Entered by: Andrae Frost RN 06/16/2022      Please review provider order for any additional goals.   Nurse to notify provider when observation goals have been met and patient is ready for discharge.

## 2022-06-16 NOTE — PROGRESS NOTES
[facility]    Hospitalist Progress Note             Date of Admission:  6/14/2022                   Day of hospitalization: 0    Assessment and Plan:   Carl Vázquez is an 82 year old male with history of atrial fibrillation diagnosed in January of this year, type 2 diabetes (insulin-dependent), obstructive sleep apnea, thyroid disease, COPD, GERD, and cholecystectomy admitted with chronic sob.     Chronic sob  -Multifactorial suspect related to his atrial fib and COPD as a possible component of his diaphragmatic paralysis.    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 Boothe referred them to Dr. Watts for the diaphragmatic paralysis, they have an appt in August    - patient has had exposure to formica in the past    - patient and wife state they did not want to wait until August and had told Dr. Boothe's office last week that they would just come to the hospital Tuesday     - will treat for a COPD exacerbation: scheduled nebs, prednisone    - patient has Dulera and combivent at home (resume on discharge)    - patient already had an MRI of his cervical spine (printed report is in his chart)  -See below for atrial fibrillation treatment  - Dr. Watts recommends R C4 nerve root injection. Discussed with neurology the urgency for this injection, they are ok with scheduling outpatient considering his heart rate variability and Atrial fibrillation with RVR.      A fib with RVR  -not well controlled increase diltiazem tomorrow to 240 milligrams daily, will give the additional 30 mg every 6 hours today  -Cardiology consulted for cardioversion  - will restart his Eliquis, see above    DM    - cont home lantus and glipizide.  Sugars not well controlled while on steroids Lantus increased to  "70 units at bedtime continues to high sliding scale     CALE    - has CPAP but does not tolerate     Hypothyroidism    - cont synthroid     GERD    - pantoprazole     Wife in room. I spend >70 mins with patient and wife        # Code status: Full   # Anticipated discharge date and Disposition: 1-2 days  # DVT: Eliquis   # IVF: none                       Sabrina Franco MD  Text Page (7am - 6pm, M-F)               Subjective   Chief Complaint: Shortness of breath  Subjective: Is fairly asymptomatic with heart rates in the 150s denies any shortness of breath any dizziness lightheadedness palpitations or chest pain.  Otherwise no other complaints nausea vomiting or abdominal pain        Objective   /68 (BP Location: Right arm)   Pulse 88   Temp 98  F (36.7  C) (Oral)   Resp 18   Ht 1.676 m (5' 6\")   Wt 75.9 kg (167 lb 6.4 oz)   SpO2 92%   BMI 27.02 kg/m       Physical Exam  General: Pt in NAD, normal appearance  HEENT: OP clear MMM, no JVD  Lungs: Clear to Auscultation Bilateral, normal breathing  without accessory muscle usage, no wheezing, rhonchi or crackles  Cardiac: +S1, S2, tachycardic, no MRG, no edema  Abdominal: normal bowel sounds, NT/ND, no hepatosplenomegaly  Skin: warm, dry, normal turgor, no rash  Psyche: A& O x3, appropriate affect             Intake/Output Summary (Last 24 hours) at 6/16/2022 1408  Last data filed at 6/15/2022 1800  Gross per 24 hour   Intake 380 ml   Output --   Net 380 ml           Labs and Imaging Results:      Recent Labs   Lab 06/15/22  0545 06/14/22  1254   WBC 6.3 7.1   HGB 16.0 16.8   * 189        Recent Labs   Lab 06/15/22  0545 06/14/22  1254    142   CO2 30 30   BUN 11 14      No results for input(s): INR, PTT in the last 168 hours.   No results for input(s): CKMB in the last 168 hours.    Invalid input(s): TROPONINT     Recent Labs   Lab 06/14/22  1254   ALBUMIN 3.3*   AST 35   ALT 41   ALKPHOS 76        Micro:     Radio:  Echocardiogram Complete "   Final Result      CT Chest Pulmonary Embolism w Contrast   Final Result   IMPRESSION:   1.  Patchy moderate bibasilar pulmonary opacities that may be a   mixture of acute airspace disease and atelectasis. Bibasilar pneumonia   is a possibility. This appears new since the comparison CT. As such,   recommend short interval follow-up CT chest in 3 months to ensure   resolution.   2.  Coronary artery calcifications.   3.  No evidence for pulmonary embolism.      WOODROW FU MD            SYSTEM ID:  [id number]      XR Chest 2 Views   Final Result   IMPRESSION: No acute cardiopulmonary disease identified. Stable   elevated right hemidiaphragm. Stable right base atelectasis.      WOODROW FU MD            SYSTEM ID:  [id number]      XR Cervical/Thoracic Epidural Inj Incl Imaging    (Results Pending)           Medications:      Scheduled Meds:      [Held by provider] apixaban ANTICOAGULANT  5 mg Oral BID     diltiazem  30 mg Oral Q6H DEDRICK     [START ON 6/17/2022] diltiazem ER COATED BEADS  240 mg Oral Daily     glipiZIDE  10 mg Oral QAM AC     insulin aspart  1-10 Units Subcutaneous TID AC     insulin aspart  1-7 Units Subcutaneous At Bedtime     insulin glargine  70 Units Subcutaneous At Bedtime     ipratropium - albuterol 0.5 mg/2.5 mg/3 mL  3 mL Nebulization 4x daily     levothyroxine  75 mcg Oral Daily     pantoprazole  40 mg Oral Daily     [START ON 6/17/2022] predniSONE  40 mg Oral Daily     Continuous Infusions:    PRN Meds:  albuterol, glucose **OR** dextrose **OR** glucagon, ipratropium - albuterol 0.5 mg/2.5 mg/3 mL, melatonin, metoprolol, ondansetron **OR** ondansetron, polyethylene glycol, senna-docusate **OR** senna-docusate

## 2022-06-16 NOTE — PROGRESS NOTES
"PRIMARY DIAGNOSIS: SOB  OUTPATIENT/OBSERVATION GOALS TO BE MET BEFORE DISCHARGE:  1. Dyspnea improved and O2 sats >88% on RA or back to baseline O2 levels: No   SpO2: 90 %, O2 Device: Nasal cannula, 2L     2. Tolerating oral abx or appropriate plans made outpatient infusion: N/A     3. Vitals signs normal or return to baseline: Pt requiring 2L on NC to keep sats >90.     4. Short term supplemental O2 needed with activity at home: unclear at this time     5. Tolerate oral intake to maintain hydration: Yes     6. Return to near baseline physical activity: Yes        Discharge Planner Nurse      Safe discharge environment identified: Yes  Barriers to discharge: Yes       Entered by: Elo Gomes RN 06/16/2022     Vitals: /87 (BP Location: Right arm)   Pulse 96   Temp 98.2  F (36.8  C) (Oral)   Resp 18   Ht 1.676 m (5' 6\")   Wt 75.9 kg (167 lb 6.4 oz)   SpO2 90%   BMI 27.02 kg/m    BMI= Body mass index is 27.02 kg/m .    VSS on 2L NC. Pt sleeping well between cares. LPIV SL. SBA. Tolerating mod carb diet. Pt denies pain, denies SOB at this time. Plan is to have Neuro IR appt Friday. Continue to provide supportive cares.     Please review provider order for any additional goals.   Nurse to notify provider when observation goals have been met and patient is ready for discharge.                    "

## 2022-06-16 NOTE — CONSULTS
[facility]    Cardiology Consultation     Date of Admission:  6/14/2022    Primary Care Physician   Kyler Mcwilliams    Consult Date: 06/14/2022    REASON FOR CONSULTATION:  Atrial fibrillation with rapid ventricular response, consider cardioversion.    REFERRING PHYSICIAN:  Dr. Franco    IMPRESSION:     1.  Paroxysmal atrial fibrillation with rapid ventricular response; currently, back in normal sinus rhythm.  2.  COPD.  3.  Diaphragmatic paralysis, likely, secondary to C4 radiculopathy.  4.  Diabetes mellitus, suboptimal control.  5.  Obstructive sleep apnea, noncompliant with CPAP.  6.  Hypothyroidism.    This gentleman has spontaneously converted back into a normal sinus rhythm and, therefore, does not need cardioversion.  In fact cardioversion would be unhelpful in somebody with PAF.  His [id number] score is at least 3, and he should be on long-term Eliquis.    His SOB is likely multifactorial:  age, physical deconditioning, copd, diaphragmatic paralysis, untreated CALE with minimal contribution from PAF.    He is noncompliant with CPAP, which would increase the likelihood of him having runs of paroxysmal atrial fibrillation with RVR.  I advised him of the importance of continued CPAP use if tolerable.    When he went into atrial fibrillation, heart rate was somewhat difficult to control.  However, I think we can increase the dose of his diltiazem to a maximum of 360 a day, and add digoxin as necessary.  If heart rate is really difficult to control, we can always consider AV jesse ablation with pacer insertion.  Antiarrthymic drugs may not work well in pts of his age.    Currently, he appears stable.  We will follow his in-hospital course with you as necessary.    HISTORY OF PRESENT ILLNESS:  This is a very pleasant 82-year-old gentleman admitted with shortness of breath.  This has been ongoing for several months and has been gradual in onset.  He noticed some ankle swelling.  Denies chest  discomfort.  His breathing is particularly worse at night.  He denies cough, and he has not lost or gained any significant amounts of weight.  Though his HR is the 120's he denies palpitations.  In fact he has not noticed much difference in his shortness of breath when he is in sinus rhythm or when he is in A. fib with RVR.    Echocardiography has demonstrated a structurally normal heart and stress nuclear study showed no evidence of ischemia or infarction.    FAMILY HISTORY:  Is negative for atrial fibrillation.    TOXIC HABITS:  He smoked as a teenager, but not since.  No history of drug or alcohol abuse.    Other medical conditions are as noted above.  Past Medical History   I have reviewed this patient's medical history and updated it with pertinent information if needed.   Past Medical History:   Diagnosis Date    Diabetes (H)     Sleep apnea     uses CPAP machine    Thyroid disease        Past Surgical History   I have reviewed this patient's surgical history and updated it with pertinent information if needed.  Past Surgical History:   Procedure Laterality Date    CHOLECYSTECTOMY      at [facility]    COLONOSCOPY      in [place], [place]    COLONOSCOPY  08/22/2017    Dr. Ja LYNN    ESOPHAGOSCOPY, GASTROSCOPY, DUODENOSCOPY (EGD), COMBINED N/A 6/7/2016    Procedure: COMBINED ESOPHAGOSCOPY, GASTROSCOPY, DUODENOSCOPY (EGD);  Surgeon: Eneida Denton MD;  Location:  GI       Prior to Admission Medications   Prior to Admission Medications   Prescriptions Last Dose Informant Patient Reported? Taking?   apixaban ANTICOAGULANT (ELIQUIS) 5 MG tablet 6/14/2022 at am  Yes Yes   Sig: Take 5 mg by mouth 2 times daily   diltiazem ER (DILT-XR) 120 MG 24 hr capsule 6/14/2022 at am  Yes Yes   Sig: Take 120 mg by mouth daily   glipiZIDE (GLUCOTROL) 5 MG tablet 6/14/2022 at am  Yes Yes   Sig: Take 10 mg by mouth every morning (before breakfast)   insulin glargine (LANTUS) 100 UNIT/ML PEN 6/13/2022 at hs  Yes Yes    Sig: Inject 60 Units Subcutaneous At Bedtime   levothyroxine (SYNTHROID/LEVOTHROID) 75 MCG tablet 6/14/2022 at am  Yes Yes   Sig: Take 75 mcg by mouth daily   omeprazole (PRILOSEC) 40 MG capsule 6/14/2022 at am  No Yes   Sig: Take 1 capsule (40 mg) by mouth daily Take 30-60 minutes before a meal.      Facility-Administered Medications: None     Allergies   Allergies   Allergen Reactions    Statin Drugs [Hmg-Coa-R Inhibitors]        Social History   I have reviewed this patient's social history and updated it with pertinent information if needed. Carl Vázquez  reports that he has never smoked. He has never used smokeless tobacco. He reports that he does not drink alcohol and does not use drugs.    Family History   I have reviewed this patient's family history and updated it with pertinent information if needed.   Family History   Problem Relation Age of Onset    Colon Cancer No family hx of        Review of Systems   The 10 point Review of Systems is negative other than noted in the HPI or here.     Physical Exam   Temp: 98  F (36.7  C) Temp src: Oral BP: 129/86 Pulse: 105   Resp: 20 SpO2: 96 % O2 Device: Nasal cannula Oxygen Delivery: 2 LPM  Vital Signs with Ranges  Temp:  [97.9  F (36.6  C)-98.6  F (37  C)] 98  F (36.7  C)  Pulse:  [] 105  Resp:  [18-30] 20  BP: ()/(65-86) 129/86  SpO2:  [92 %-96 %] 96 %  165 lbs 9.6 oz  GENERAL:  A very pleasant gentleman lying flat in bed and in no acute distress.  VITAL SIGNS:  Blood pressure 129/68.  Current heart rate 80-90, regular rhythm.  When he was in atrial fibrillation, heart rates went as high as 130-140.  I personally reviewed his rhythm strips.    EXTREMITIES:  He has no clubbing, no peripheral central cyanosis.  HEENT:  Mucous membranes appear normal.    NECK:  No raised JVP.  No thyromegaly.  CARDIAC:  Cardiac apex is not palpable.  Heart sounds are unremarkable.  No significant peripheral edema.   CHEST:  Symmetric expansion without the use  of accessory muscles.  LUNGS:  Breath sounds are quiet, but no added wheezes or crackles.  ABDOMEN:  Soft and nontender.  No hepatosplenomegaly.  The abdominal aorta is not palpable.  EXTREMITIES:  Foot pulses are mildly diminished.  CENTRAL NERVOUS SYSTEM:  Grossly intact.  PSYCHIATRIC:  His mood appears normal.    LABORATORY INVESTIGATIONS:  Echocardiogram done on this admission reveals normal biventricular function.  Atrial sizes appear normal.  Pulmonary artery pressures also appear normal.  There were no significant valvular lesions.      His EKG this morning showed atrial fibrillation with rapid ventricular response.  There are nonspecific ST segment changes.    HbA1c 8.3.  Basic metabolic panel within normal limits.  Hemoglobin 16, white cell count 6.3, platelet count of 125.     His chest x-ray is reported as showing no acute cardiopulmonary disease.  There is elevated right hemidiaphragm.    Data   Results for orders placed or performed during the hospital encounter of 06/14/22 (from the past 24 hour(s))   Glucose by meter   Result Value Ref Range    GLUCOSE BY METER POCT 369 (H) 70 - 99 mg/dL   Glucose by meter   Result Value Ref Range    GLUCOSE BY METER POCT 378 (H) 70 - 99 mg/dL   Glucose by meter   Result Value Ref Range    GLUCOSE BY METER POCT 397 (H) 70 - 99 mg/dL   Glucose by meter   Result Value Ref Range    GLUCOSE BY METER POCT 413 (H) 70 - 99 mg/dL   Glucose by meter   Result Value Ref Range    GLUCOSE BY METER POCT 350 (H) 70 - 99 mg/dL   Glucose by meter   Result Value Ref Range    GLUCOSE BY METER POCT 266 (H) 70 - 99 mg/dL   Basic metabolic panel   Result Value Ref Range    Sodium 143 133 - 144 mmol/L    Potassium 3.8 3.4 - 5.3 mmol/L    Chloride 108 94 - 109 mmol/L    Carbon Dioxide (CO2) 31 20 - 32 mmol/L    Anion Gap 4 3 - 14 mmol/L    Urea Nitrogen 28 7 - 30 mg/dL    Creatinine 0.75 0.66 - 1.25 mg/dL    Calcium 10.0 8.5 - 10.1 mg/dL    Glucose 117 (H) 70 - 99 mg/dL    GFR Estimate 90  >60 mL/min/1.73m2   CBC with platelets   Result Value Ref Range    WBC Count 17.1 (H) 4.0 - 11.0 10e3/uL    RBC Count 5.60 4.40 - 5.90 10e6/uL    Hemoglobin 17.3 13.3 - 17.7 g/dL    Hematocrit 51.7 40.0 - 53.0 %    MCV 92 78 - 100 fL    MCH 30.9 26.5 - 33.0 pg    MCHC 33.5 31.5 - 36.5 g/dL    RDW 13.7 10.0 - 15.0 %    Platelet Count 221 150 - 450 10e3/uL   RBC and Platelet Morphology   Result Value Ref Range    Platelet Assessment  Automated Count Confirmed. Platelet morphology is normal.     Automated Count Confirmed. Platelet morphology is normal.    RBC Morphology Confirmed RBC Indices    Glucose by meter   Result Value Ref Range    GLUCOSE BY METER POCT 133 (H) 70 - 99 mg/dL           Vernon Emery MD, [facility]        D: 2022   T: 2022   MT: SUMAYA    Name:     WYATT ROMEROSaeed  MRN:      [id number]        Account:      [id number]   :      1940           Consult Date: 2022     Document: [id number]

## 2022-06-16 NOTE — PROGRESS NOTES
PRIMARY DIAGNOSIS: SOB  OUTPATIENT/OBSERVATION GOALS TO BE MET BEFORE DISCHARGE:  1. Dyspnea improved and O2 sats >88% on RA or back to baseline O2 levels: No   SpO2: 90 %, O2 Device: Nasal cannula, 2L    2. Tolerating oral abx or appropriate plans made outpatient infusion: N/A    3. Vitals signs normal or return to baseline: Pt requiring 2L on NC to keep sats >90.    4. Short term supplemental O2 needed with activity at home: unclear at this time    5. Tolerate oral intake to maintain hydration: Yes    6. Return to near baseline physical activity: Yes    Discharge Planner Nurse   Safe discharge environment identified: Yes  Barriers to discharge: Yes       Entered by: Elo Gomes RN 06/16/2022   VSS on 2L NC. Pt sleeping well between cares. LPIV SL. SBA. Tolerating mod carb diet. Pt denies pain, denies SOB at this time. Plan is to have Neuro IR appt Friday. Continue to provide supportive cares.   Please review provider order for any additional goals.   Nurse to notify provider when observation goals have been met and patient is ready for discharge.

## 2022-06-16 NOTE — PLAN OF CARE
PRIMARY DIAGNOSIS: SOB x 4 months  OUTPATIENT/OBSERVATION GOALS TO BE MET BEFORE DISCHARGE:  1. Dyspnea improved and O2 sats >88% on RA or back to baseline O2 levels: No   SpO2: 93 %, O2 Device: Nasal cannula- 2L    2. Tolerating oral abx or appropriate plans made outpatient infusion:  N/A    3. Vitals signs normal or return to baseline: 2L O2 NC required to keep sats >90% o/w WNL    4. Short term supplemental O2 needed with activity at home:  Unsure at this time    5. Tolerate oral intake to maintain hydration: Yes    6. Return to near baseline physical activity: Yes    Discharge Planner Nurse   Safe discharge environment identified: Yes  Barriers to discharge: Yes       Entered by: Mattie Orlando RN 06/15/2022      Please review provider order for any additional goals.   Nurse to notify provider when observation goals have been met and patient is ready for discharge.    AOx4- forgetful. Up SBA. Denies pain. Infrequent cough after neb treatments. O2 2L NC to keep sats >90%. Holding eliquis in prep for cervical/thoracic/epidural injection with neuro IR on Friday.

## 2022-06-16 NOTE — UTILIZATION REVIEW
"[facility]     Admission Status; Secondary Review Determination     Admission Date: 6/14/2022 12:01 PM      Under the authority of the Utilization Management Committee, the utilization review process indicated a secondary review on the above patient.  The review outcome is based on review of the medical records, discussions with staff, and applying clinical experience noted on the date of the review.          (x) Observation Status Appropriate - This patient does not meet hospital inpatient criteria and is placed in observation status. If this patient's primary payer is Medicare and was admitted as an inpatient, Condition Code 44 should be used and patient status changed to \"observation\".     RATIONALE FOR DETERMINATION   82-year-old male with a history of atrial fibrillation, diabetes, sleep apnea, COPD, and GERD was admitted on 6/14 with shortness of breath which is chronic in nature.  He has been extensively worked up from the cardiac standpoint as well as pulmonology.  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  Neurology was contacted and is recommending C4 nerve root injection which can be performed as an outpatient.  Atrial relation is not optimally controlled so diltiazem dose is being increased and cardiology has been consulted for possible electrical cardioversion.  Patient overall is showing some improvement and at the time of this review remains appropriate for observation status but if he remains hospitalized beyond tomorrow with ongoing/active medical issues he may be appropriate for advancing to inpatient status.    The severity of illness, intensity of service provided, expected LOS and risk for adverse outcome make the care appropriate for further observation; however, doesn't meet criteria for hospital inpatient admission.          The information on this document is developed by the utilization review team in order for the business office to ensure " compliance.  This only denotes the appropriateness of proper admission status and does not reflect the quality of care rendered.         The definitions of Inpatient Status and Observation Status used in making the determination above are those provided in the CMS Coverage Manual, Chapter 1 and Chapter 6, section 70.4.      Sincerely,     Cresencio Sagastume DO MPH   Physician Advisor  Utilization Review  [facility]

## 2022-06-16 NOTE — PLAN OF CARE
PRIMARY DIAGNOSIS: SOB  Pt alert and oriented but sometimes forgetful - anxious, tolerating regular diet and PO meds, up SBA. Denies any changes in pain - milt left sided chest pain since admission. Resp - 2L O2 via NC, nebs via RT; pt has increased HR and anxiety post neb treatments. Diltiazem, prednisone, holding eliquis. Continued tele monitoring.  OUTPATIENT/OBSERVATION GOALS TO BE MET BEFORE DISCHARGE:  1. Dyspnea improved and O2 sats >88% on RA or back to baseline O2 levels: No   SpO2: 93 %, O2 Device: Nasal cannula - 2L    2. Tolerating oral abx or appropriate plans made outpatient infusion:  N/A    3. Vitals signs normal or return to baseline: 2L O2 NC required to keep sats >90% o/w WNL    4. Short term supplemental O2 needed with activity at home:  Unsure at this time    5. Tolerate oral intake to maintain hydration: Yes    6. Return to near baseline physical activity: Yes, still experiencing increased dyspnea on exertion    Discharge Planner Nurse   Safe discharge environment identified: Yes  Barriers to discharge: Yes       Entered by: Andrae Frost RN 06/16/2022      Please review provider order for any additional goals.   Nurse to notify provider when observation goals have been met and patient is ready for discharge.

## 2022-06-16 NOTE — PLAN OF CARE
PRIMARY DIAGNOSIS: SOB x 4 months  OUTPATIENT/OBSERVATION GOALS TO BE MET BEFORE DISCHARGE:  Dyspnea improved and O2 sats >88% on RA or back to baseline O2 levels: No   SpO2: 93 %, O2 Device: Nasal cannula- 2L    Tolerating oral abx or appropriate plans made outpatient infusion:  N/A    Vitals signs normal or return to baseline: 2L O2 NC required to keep sats >90% o/w WNL    Short term supplemental O2 needed with activity at home:  Unsure at this time    Tolerate oral intake to maintain hydration: Yes    Return to near baseline physical activity: Yes    Discharge Planner Nurse   Safe discharge environment identified: Yes  Barriers to discharge: Yes       Entered by: Mattie Orlando RN 06/15/2022      Please review provider order for any additional goals.   Nurse to notify provider when observation goals have been met and patient is ready for discharge.    AOx4- forgetful. Up SBA. Denies pain. Infrequent cough after neb treatments. O2 2L NC to keep sats >90%. Holding eliquis in prep for cervical/thoracic/epidural injection with neuro IR on Friday.

## 2022-06-17 LAB
ANION GAP SERPL CALCULATED.3IONS-SCNC: 4 MMOL/L (ref 3–14)
ATRIAL RATE - MUSE: 156 BPM
BUN SERPL-MCNC: 28 MG/DL (ref 7–30)
CALCIUM SERPL-MCNC: 10 MG/DL (ref 8.5–10.1)
CHLORIDE BLD-SCNC: 108 MMOL/L (ref 94–109)
CO2 SERPL-SCNC: 31 MMOL/L (ref 20–32)
CREAT SERPL-MCNC: 0.75 MG/DL (ref 0.66–1.25)
DIASTOLIC BLOOD PRESSURE - MUSE: NORMAL MMHG
ERYTHROCYTE [DISTWIDTH] IN BLOOD BY AUTOMATED COUNT: 13.7 % (ref 10–15)
GFR SERPL CREATININE-BSD FRML MDRD: 90 ML/MIN/1.73M2
GLUCOSE BLD-MCNC: 117 MG/DL (ref 70–99)
GLUCOSE BLDC GLUCOMTR-MCNC: 133 MG/DL (ref 70–99)
GLUCOSE BLDC GLUCOMTR-MCNC: 266 MG/DL (ref 70–99)
GLUCOSE BLDC GLUCOMTR-MCNC: 344 MG/DL (ref 70–99)
GLUCOSE BLDC GLUCOMTR-MCNC: 350 MG/DL (ref 70–99)
GLUCOSE BLDC GLUCOMTR-MCNC: 372 MG/DL (ref 70–99)
GLUCOSE BLDC GLUCOMTR-MCNC: 451 MG/DL (ref 70–99)
HCT VFR BLD AUTO: 51.7 % (ref 40–53)
HGB BLD-MCNC: 17.3 G/DL (ref 13.3–17.7)
INTERPRETATION ECG - MUSE: NORMAL
MCH RBC QN AUTO: 30.9 PG (ref 26.5–33)
MCHC RBC AUTO-ENTMCNC: 33.5 G/DL (ref 31.5–36.5)
MCV RBC AUTO: 92 FL (ref 78–100)
P AXIS - MUSE: NORMAL DEGREES
PLAT MORPH BLD: NORMAL
PLATELET # BLD AUTO: 221 10E3/UL (ref 150–450)
POTASSIUM BLD-SCNC: 3.8 MMOL/L (ref 3.4–5.3)
PR INTERVAL - MUSE: NORMAL MS
QRS DURATION - MUSE: 84 MS
QT - MUSE: 314 MS
QTC - MUSE: 484 MS
R AXIS - MUSE: 54 DEGREES
RBC # BLD AUTO: 5.6 10E6/UL (ref 4.4–5.9)
RBC MORPH BLD: NORMAL
SODIUM SERPL-SCNC: 143 MMOL/L (ref 133–144)
SYSTOLIC BLOOD PRESSURE - MUSE: NORMAL MMHG
T AXIS - MUSE: -53 DEGREES
VENTRICULAR RATE- MUSE: 143 BPM
WBC # BLD AUTO: 17.1 10E3/UL (ref 4–11)

## 2022-06-17 PROCEDURE — 250N000013 HC RX MED GY IP 250 OP 250 PS 637: Performed by: HOSPITALIST

## 2022-06-17 PROCEDURE — 99235 HOSP IP/OBS SAME DATE MOD 70: CPT | Performed by: HOSPITALIST

## 2022-06-17 PROCEDURE — 80048 BASIC METABOLIC PNL TOTAL CA: CPT | Performed by: HOSPITALIST

## 2022-06-17 PROCEDURE — 96376 TX/PRO/DX INJ SAME DRUG ADON: CPT

## 2022-06-17 PROCEDURE — 250N000009 HC RX 250: Performed by: HOSPITALIST

## 2022-06-17 PROCEDURE — 250N000013 HC RX MED GY IP 250 OP 250 PS 637: Performed by: INTERNAL MEDICINE

## 2022-06-17 PROCEDURE — 96372 THER/PROPH/DIAG INJ SC/IM: CPT

## 2022-06-17 PROCEDURE — 250N000009 HC RX 250: Performed by: INTERNAL MEDICINE

## 2022-06-17 PROCEDURE — 250N000012 HC RX MED GY IP 250 OP 636 PS 637: Performed by: HOSPITALIST

## 2022-06-17 PROCEDURE — 94640 AIRWAY INHALATION TREATMENT: CPT

## 2022-06-17 PROCEDURE — 96375 TX/PRO/DX INJ NEW DRUG ADDON: CPT

## 2022-06-17 PROCEDURE — G0378 HOSPITAL OBSERVATION PER HR: HCPCS

## 2022-06-17 PROCEDURE — 999N000157 HC STATISTIC RCP TIME EA 10 MIN

## 2022-06-17 PROCEDURE — 99214 OFFICE O/P EST MOD 30 MIN: CPT | Performed by: INTERNAL MEDICINE

## 2022-06-17 PROCEDURE — 82962 GLUCOSE BLOOD TEST: CPT

## 2022-06-17 PROCEDURE — 94640 AIRWAY INHALATION TREATMENT: CPT | Mod: 76

## 2022-06-17 PROCEDURE — 36415 COLL VENOUS BLD VENIPUNCTURE: CPT | Performed by: HOSPITALIST

## 2022-06-17 PROCEDURE — 250N000011 HC RX IP 250 OP 636: Performed by: HOSPITALIST

## 2022-06-17 PROCEDURE — 85027 COMPLETE CBC AUTOMATED: CPT | Performed by: HOSPITALIST

## 2022-06-17 RX ORDER — AMIODARONE HYDROCHLORIDE 200 MG/1
400 TABLET ORAL DAILY
Status: DISCONTINUED | OUTPATIENT
Start: 2022-06-17 | End: 2022-06-20 | Stop reason: HOSPADM

## 2022-06-17 RX ORDER — DILTIAZEM HYDROCHLORIDE 30 MG/1
30 TABLET, FILM COATED ORAL EVERY 6 HOURS SCHEDULED
Status: COMPLETED | OUTPATIENT
Start: 2022-06-17 | End: 2022-06-17

## 2022-06-17 RX ORDER — DILTIAZEM HYDROCHLORIDE 180 MG/1
360 CAPSULE, COATED, EXTENDED RELEASE ORAL DAILY
Status: DISCONTINUED | OUTPATIENT
Start: 2022-06-18 | End: 2022-06-20 | Stop reason: HOSPADM

## 2022-06-17 RX ORDER — LIDOCAINE 40 MG/G
CREAM TOPICAL
Status: DISCONTINUED | OUTPATIENT
Start: 2022-06-17 | End: 2022-06-20 | Stop reason: HOSPADM

## 2022-06-17 RX ORDER — DIGOXIN 125 MCG
125 TABLET ORAL DAILY
Status: DISCONTINUED | OUTPATIENT
Start: 2022-06-17 | End: 2022-06-20 | Stop reason: HOSPADM

## 2022-06-17 RX ORDER — NITROGLYCERIN 0.4 MG/1
0.4 TABLET SUBLINGUAL EVERY 5 MIN PRN
Status: DISCONTINUED | OUTPATIENT
Start: 2022-06-17 | End: 2022-06-20 | Stop reason: HOSPADM

## 2022-06-17 RX ORDER — DIGOXIN 0.25 MG/ML
250 INJECTION INTRAMUSCULAR; INTRAVENOUS ONCE
Status: COMPLETED | OUTPATIENT
Start: 2022-06-17 | End: 2022-06-17

## 2022-06-17 RX ORDER — PREDNISONE 20 MG/1
20 TABLET ORAL DAILY
Status: DISCONTINUED | OUTPATIENT
Start: 2022-06-18 | End: 2022-06-18

## 2022-06-17 RX ADMIN — APIXABAN 5 MG: 5 TABLET, FILM COATED ORAL at 20:09

## 2022-06-17 RX ADMIN — DILTIAZEM HYDROCHLORIDE 30 MG: 30 TABLET, FILM COATED ORAL at 17:43

## 2022-06-17 RX ADMIN — PANTOPRAZOLE SODIUM 40 MG: 40 TABLET, DELAYED RELEASE ORAL at 08:04

## 2022-06-17 RX ADMIN — AMIODARONE HYDROCHLORIDE 400 MG: 200 TABLET ORAL at 14:42

## 2022-06-17 RX ADMIN — DIGOXIN 250 MCG: 250 INJECTION, SOLUTION INTRAMUSCULAR; INTRAVENOUS; PARENTERAL at 11:23

## 2022-06-17 RX ADMIN — SENNOSIDES AND DOCUSATE SODIUM 1 TABLET: 50; 8.6 TABLET ORAL at 20:09

## 2022-06-17 RX ADMIN — DIGOXIN 125 MCG: 125 TABLET ORAL at 13:15

## 2022-06-17 RX ADMIN — IPRATROPIUM BROMIDE AND ALBUTEROL SULFATE 3 ML: 2.5; .5 SOLUTION RESPIRATORY (INHALATION) at 07:37

## 2022-06-17 RX ADMIN — DILTIAZEM HYDROCHLORIDE 240 MG: 120 CAPSULE, COATED, EXTENDED RELEASE ORAL at 05:49

## 2022-06-17 RX ADMIN — IPRATROPIUM BROMIDE AND ALBUTEROL SULFATE 3 ML: 2.5; .5 SOLUTION RESPIRATORY (INHALATION) at 19:55

## 2022-06-17 RX ADMIN — PREDNISONE 40 MG: 20 TABLET ORAL at 08:03

## 2022-06-17 RX ADMIN — GLIPIZIDE 10 MG: 10 TABLET ORAL at 08:04

## 2022-06-17 RX ADMIN — LEVOTHYROXINE SODIUM 75 MCG: 0.07 TABLET ORAL at 08:04

## 2022-06-17 RX ADMIN — INSULIN GLARGINE 60 UNITS: 100 INJECTION, SOLUTION SUBCUTANEOUS at 22:29

## 2022-06-17 RX ADMIN — APIXABAN 5 MG: 5 TABLET, FILM COATED ORAL at 08:04

## 2022-06-17 RX ADMIN — METOPROLOL TARTRATE 2.5 MG: 5 INJECTION INTRAVENOUS at 17:44

## 2022-06-17 RX ADMIN — DILTIAZEM HYDROCHLORIDE 30 MG: 30 TABLET, FILM COATED ORAL at 12:29

## 2022-06-17 NOTE — PLAN OF CARE
AOx4. Up SBA. Denies pain. SOB on exertion-relieved with rest. O2 sats >90% on 2L NC. Per tele- SR , 80's.  BG elevated at 397- insulin given per orders- order to notify provider if still greater than 350 after correcting, will re-assess. Plan to re-start eliquis tonight.

## 2022-06-17 NOTE — PLAN OF CARE
PRIMARY DIAGNOSIS: CONGESTIVE HEART FAILURE/SOB  OUTPATIENT/OBSERVATION GOALS TO BE MET BEFORE DISCHARGE:  Dyspnea improved and O2 sats >88% at RA or at prior home O2 therapy level: No        SpO2: 94 %, O2 Device: Nasal cannula  Vitals:    06/14/22 1156 06/14/22 1853 06/15/22 0333   Weight: 75.8 kg (167 lb) 77.5 kg (170 lb 12.8 oz) 75.9 kg (167 lb 6.4 oz)        ECHO and other diagnostic testing complete (if applicable): No    Return to near baseline physical activity: Yes    Discharge Planner Nurse   Safe discharge environment identified: No  Barriers to discharge: Yes       Entered by: Aga Ni RN 06/17/2022 2:27 AM   Pt A/O x4. SBA with mobility. Moderate consistent carb diet r/t DMII. On telemetry: NSR 70's. Pt had an episode of SOB just after shift change. Pt was feeling SOB and had dyspnea on exertion. Pt appeared red and was tachycardic. O2 sats were 81-82% on 2L. Increased the oxygen to 3L and pt was stable at 92-93%. Ambulated to the bathroom following SOB and tolerated it. Received orders for hyperglycemia to administer 10 units of Novolog.  following. Received orders to administer 8 units. BG now 266.   POC: per notes if HR is not managed considering AV jesse ablation with pacer insertion; neurology recommending C4 nerve root injection outpatient, and increase diltiazem with a max of 360/day. Continue to provide supportive cares.  Please review provider order for any additional goals.   Nurse to notify provider when observation goals have been met and patient is ready for discharge.

## 2022-06-17 NOTE — PLAN OF CARE
AOx4. Up SBA. Denies pain. SOB on exertion-relieved with rest. O2 sats >90% on 2L NC. Per tele- SR , 80's.  BG elevated at 397- insulin given per orders- order to notify provider if still greater than 350 after correcting, will re-assess HS. Eliquis unheld this evening. Continue current POC

## 2022-06-17 NOTE — PROGRESS NOTES
Patient's heart rate got up to 160 with very minimal exertion.  Patient continues to be asymptomatic.  I will start him on p.o. amiodarone.  Continue to observe, he may need AV jesse ablation with pacemaker insertion.

## 2022-06-17 NOTE — PROGRESS NOTES
[facility] Cardiology Progress Note       Assessment and Plan:   Paroxysmal atrial fibrillation with RVR.  High CHADS2 Vas score and is on Eliquis.  Agree with increasing diltiazem SR to 360 mg daily.  We will add in low dose digoxin for rate control at rest.  Suggest mobilizing patient to see if he becomes symptomatic with higher heart rates.  Antiarrhythmic drugs may not be efficacious in this gentleman though we may have to give it a try.  If heart rate control is resistant to polypharmacy with multiple drugs I think AV jesse ablation with pacer insertion will need to be considered.  As far as his cervical spine injection is concerned I would be fine with him having this procedure with heart rates around 100 to 110.  Obviously his Eliquis will need to be held prior to this procedure.    As mentioned previously multiple other factors for this gentleman to be short of breath.  Age physical deconditioning COPD untreated sleep apnea diaphragmatic paralysis.            Interval History:     Denies shortness of breath at rest.  Has reverted back into atrial fibrillation for now with heart rates varying between 100-1 30.  However he has no palpitations and does not notice his heart rate being high.  He has no chest pains.  No signs of CHF.              Medications:     Current Facility-Administered Medications   Medication     albuterol (PROVENTIL) neb solution 2.5 mg     apixaban ANTICOAGULANT (ELIQUIS) tablet 5 mg     glucose gel 15-30 g    Or     dextrose 50 % injection 25-50 mL    Or     glucagon injection 1 mg     digoxin (LANOXIN) injection 250 mcg     diltiazem (CARDIZEM) tablet 30 mg     [START ON 6/18/2022] diltiazem ER COATED BEADS (CARDIZEM CD/CARTIA XT) 24 hr capsule 360 mg     glipiZIDE (GLUCOTROL) tablet 10 mg     insulin aspart (NovoLOG) injection (RAPID ACTING)     insulin aspart (NovoLOG) injection (RAPID ACTING)     insulin glargine (LANTUS PEN) injection 60 Units     ipratropium - albuterol 0.5  "mg/2.5 mg/3 mL (DUONEB) neb solution 3 mL     ipratropium - albuterol 0.5 mg/2.5 mg/3 mL (DUONEB) neb solution 3 mL     levothyroxine (SYNTHROID/LEVOTHROID) tablet 75 mcg     melatonin tablet 1 mg     metoprolol (LOPRESSOR) injection 2.5 mg     ondansetron (ZOFRAN ODT) ODT tab 4 mg    Or     ondansetron (ZOFRAN) injection 4 mg     pantoprazole (PROTONIX) EC tablet 40 mg     polyethylene glycol (MIRALAX) Packet 17 g     [START ON 2022] predniSONE (DELTASONE) tablet 20 mg     senna-docusate (SENOKOT-S/PERICOLACE) 8.6-50 MG per tablet 1 tablet    Or     senna-docusate (SENOKOT-S/PERICOLACE) 8.6-50 MG per tablet 2 tablet             Physical Exam:   Blood pressure 129/86, pulse 105, temperature 98  F (36.7  C), temperature source Oral, resp. rate 20, height 1.676 m (5' 6\"), weight 75.1 kg (165 lb 9.6 oz), SpO2 96 %.  Wt Readings from Last 4 Encounters:   22 75.1 kg (165 lb 9.6 oz)   19 79.6 kg (175 lb 6.4 oz)   17 80.7 kg (178 lb)   16 81.6 kg (180 lb)         Vital Sign Ranges  Temperature Temp  Av.1  F (36.7  C)  Min: 97.9  F (36.6  C)  Max: 98.6  F (37  C)   Blood pressure Systolic (24hrs), Av , Min:96 , Max:157        Diastolic (24hrs), Av, Min:65, Max:86      Pulse Pulse  Av.1  Min: 65  Max: 108   Respirations Resp  Av  Min: 18  Max: 30   Pulse oximetry SpO2  Av.6 %  Min: 92 %  Max: 96 %       No intake or output data in the 24 hours ending 22 1120       Cardiovascular:   Normal apical impulse, irregular rate and rhythm, normal S1 and S2, no S3 or S4, and no murmur noted   Chest clear.  No peripheral oedema         Data:     Labs:  Lab Results   Component Value Date     2022     2019    Lab Results   Component Value Date    CHLORIDE 108 2022    CHLORIDE 107 2019    Lab Results   Component Value Date    BUN 28 2022    BUN 13 2019      Lab Results   Component Value Date    POTASSIUM 3.8 2022    " POTASSIUM 3.4 09/26/2019    Lab Results   Component Value Date    CO2 31 06/17/2022    CO2 28 09/26/2019    Lab Results   Component Value Date    CR 0.75 06/17/2022    CR 0.76 09/26/2019        Lab Results   Component Value Date    WBC 17.1 (H) 06/17/2022    HGB 17.3 06/17/2022    HCT 51.7 06/17/2022    MCV 92 06/17/2022     06/17/2022     No results found for: TROPONIN, TROPI, TROPR        Attestation:  I have reviewed today's vital signs, notes, medications, labs and imaging.         DR REYNALDO ALBRIGHT MD 6/17/2022  11:20 AM

## 2022-06-17 NOTE — PROGRESS NOTES
[facility]    Hospitalist Progress Note             Date of Admission:  6/14/2022                   Day of hospitalization: 0    Assessment and Plan:   Carl Vázquez is an 82 year old male with history of atrial fibrillation diagnosed in January of this year, type 2 diabetes (insulin-dependent), obstructive sleep apnea, thyroid disease, COPD, GERD, and cholecystectomy admitted with chronic sob.     Chronic sob  -Multifactorial suspect related to his atrial fib and COPD as a possible component of his diaphragmatic paralysis.    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 Boothe referred them to Dr. Watts for the diaphragmatic paralysis, they have an appt in August    - patient and wife state they did not want to wait until August and had told Dr. Boothe's office last week that they would just come to the hospital Tuesday     - will treat for a COPD exacerbation: scheduled nebs, prednisone    - patient has Dulera and combivent at home (resume on discharge)    - patient already had an MRI of his cervical spine (printed report is in his chart)  -See below for atrial fibrillation treatment  - Dr. Watts recommends R C4 nerve root injection. Discussed with neurology the urgency for this injection, they are ok with scheduling outpatient considering his heart rate variability and Atrial fibrillation with RVR. Per cards ok for injection if HR in 110s, not there yet. Discussed with patient can have it scheduled outpatient, will talk to IR and neurology to have it scheduled outpatient.  - CT chest PE negative for PE, but shows Patchy moderate bibasilar pulmonary opacities that may be a mixture of acute airspace disease and atelectasis. Bibasilar pneumoniais a possibility. This appears new  "since the comparison CT. As such, recommend short interval follow-up CT chest in 3 months to ensure resolution.    Leukocytosis  - monitor, possibly related to steroids, currently asymptomatic, afebrile. Consider blood cultures, UA if worsens, procalcitonin if continues to worsen    A fib with RVR  -not well controlled increase diltiazem tomorrow to 360 milligrams daily tomorrow, will give the additional 30 mg every 6 hours today, patient has received digoxin 250 mcg today as well.  -Cardiology consulted appreciate help  - will restart his Eliquis, see above.     DM    - cont home lantus and glipizide. Required   -Required 48 units of insulin aspart along with insulin Lantus 70 units in the evening now blood sugars in the low end and 130s this morning  - I will change his sliding scale to medium and Lantus to 60 units in the evening as I would not be decreasing his steroids and hopefully stopping steroids in the next 1 to 2 days.    CALE    - has CPAP but does not tolerate     Hypothyroidism    - cont synthroid     GERD    - pantoprazole        # Code status: Full   # Anticipated discharge date and Disposition: 1-2 days  # DVT: Eliquis   # IVF: none                       Sabrina Franco MD  Text Page (7am - 6pm, M-F)               Subjective   Chief Complaint: Shortness of breath  Subjective: This a.m. patient felt short of breath noted his heart rates were fairly high at that time to.  Otherwise currently doing okay minimal complaints no palpitations no dizziness lightheadedness shortness of breath at baseline.     Objective   /86 (BP Location: Right arm)   Pulse 105   Temp 98  F (36.7  C) (Oral)   Resp 20   Ht 1.676 m (5' 6\")   Wt 75.1 kg (165 lb 9.6 oz)   SpO2 96%   BMI 26.73 kg/m       Physical Exam  General: Pt in NAD, normal appearance  HEENT: OP clear MMM, no JVD  Lungs: Clear to Auscultation Bilateral, normal breathing  without accessory muscle usage, no wheezing, rhonchi or crackles  Cardiac: +S1, " S2, tachycardic, no MRG, no edema  Abdominal: normal bowel sounds, NT/ND, no hepatosplenomegaly  Skin: warm, dry, normal turgor, no rash  Psyche: A& O x3, appropriate affect             Intake/Output Summary (Last 24 hours) at 6/16/2022 1408  Last data filed at 6/15/2022 1800  Gross per 24 hour   Intake 380 ml   Output --   Net 380 ml           Labs and Imaging Results:      Recent Labs   Lab 06/17/22  0757 06/15/22  0545   WBC 17.1* 6.3   HGB 17.3 16.0    125*        Recent Labs   Lab 06/17/22  0757 06/15/22  0545    144   CO2 31 30   BUN 28 11      No results for input(s): INR, PTT in the last 168 hours.   No results for input(s): CKMB in the last 168 hours.    Invalid input(s): TROPONINT     Recent Labs   Lab 06/14/22  1254   ALBUMIN 3.3*   AST 35   ALT 41   ALKPHOS 76        Micro:     Radio:  Echocardiogram Complete   Final Result      CT Chest Pulmonary Embolism w Contrast   Final Result   IMPRESSION:   1.  Patchy moderate bibasilar pulmonary opacities that may be a   mixture of acute airspace disease and atelectasis. Bibasilar pneumonia   is a possibility. This appears new since the comparison CT. As such,   recommend short interval follow-up CT chest in 3 months to ensure   resolution.   2.  Coronary artery calcifications.   3.  No evidence for pulmonary embolism.      WOODROW FU MD            SYSTEM ID:  [id number]      XR Chest 2 Views   Final Result   IMPRESSION: No acute cardiopulmonary disease identified. Stable   elevated right hemidiaphragm. Stable right base atelectasis.      WOODROW FU MD            SYSTEM ID:  [id number]              Medications:      Scheduled Meds:      apixaban ANTICOAGULANT  5 mg Oral BID     diltiazem  30 mg Oral Q6H [facility]     [START ON 6/18/2022] diltiazem ER COATED BEADS  360 mg Oral Daily     glipiZIDE  10 mg Oral QAM AC     insulin aspart  1-7 Units Subcutaneous TID AC     insulin aspart  1-5 Units Subcutaneous At Bedtime     insulin glargine  60 Units  Subcutaneous At Bedtime     ipratropium - albuterol 0.5 mg/2.5 mg/3 mL  3 mL Nebulization 4x daily     levothyroxine  75 mcg Oral Daily     pantoprazole  40 mg Oral Daily     [START ON 6/18/2022] predniSONE  20 mg Oral Daily     Continuous Infusions:    PRN Meds:  albuterol, glucose **OR** dextrose **OR** glucagon, ipratropium - albuterol 0.5 mg/2.5 mg/3 mL, melatonin, metoprolol, ondansetron **OR** ondansetron, polyethylene glycol, senna-docusate **OR** senna-docusate

## 2022-06-17 NOTE — PLAN OF CARE
"Goal Outcome Evaluation:          PRIMARY DIAGNOSIS: Dyspnea, A fib  OUTPATIENT/OBSERVATION GOALS TO BE MET BEFORE DISCHARGE:  1. Ruled out acute coronary syndrome (negative or stable Troponin):  n/a  2. Pain Status: Pain free.  3. Appropriate provocative testing performed: N/A  - Interpretation of cardiac rhythm per telemetry tech: A fib with RVR, rate variable, up to 150's with activity    4. Cleared by Consultants (if applicable):No  5. Return to near baseline physical activity: No  Discharge Planner Nurse   Safe discharge environment identified: No  Barriers to discharge: Yes       Entered by: Sebastian Parks RN 06/17/2022      /86 (BP Location: Right arm)   Pulse 105   Temp 98  F (36.7  C) (Oral)   Resp 20   Ht 1.676 m (5' 6\")   Wt 75.1 kg (165 lb 9.6 oz)   SpO2 96%   BMI 26.73 kg/m    Pt A & 0 x 4.  02 sats >90% on 2 lpm 02 via nc.  On tele and numerous calls from tele tech due to heart rate up to 150's with activity.  Provider notified and pt was given oral Diltiazem early this morning.  Pt denies chest pain, dizziness, lightheadedness.  Does endorse GARCIA but denies at rest.  Blood sugar 133 this morning. Pt up in room with SBA.  Tolerating mod CHO diet.  Continue tele monitoring and continuous pulse ox. Will continue to monitor and provide supportive cares.   Please review provider order for any additional goals.   Nurse to notify provider when observation goals have been met and patient is ready for discharge.    "

## 2022-06-17 NOTE — PLAN OF CARE
"PRIMARY DIAGNOSIS: A fib, Dyspnea  OUTPATIENT/OBSERVATION GOALS TO BE MET BEFORE DISCHARGE:  1. Ruled out acute coronary syndrome (negative or stable Troponin):  n/a  2. Pain Status: Pain free.  3. Appropriate provocative testing performed: N/A  - Interpretation of cardiac rhythm per telemetry tech: A fib with RVR, rate 100's-120's at rest, up to 150 with activity    4. Cleared by Consultants (if applicable):No  5. Return to near baseline physical activity: Yes  Discharge Planner Nurse   Safe discharge environment identified: Yes  Barriers to discharge: Yes       Entered by: Sebastian Parks RN 06/17/2022      /86 (BP Location: Right arm)   Pulse 105   Temp 98  F (36.7  C) (Oral)   Resp 20   Ht 1.676 m (5' 6\")   Wt 75.1 kg (165 lb 9.6 oz)   SpO2 96%   BMI 26.73 kg/m    Pt A & 0 x 4.  02 sats >90% on 2 lpm 02 via nc.  On tele and numerous calls this morning from tele tech due to heart rate up to 150's with activity.  Provider notified and pt was given oral Diltiazem early this morning.  Pt given IV Digoxin around 1120 and additional 30 mg oral Diltiazem given with heart rate currently 120s.  Pt denies chest pain, dizziness, lightheadedness.  Does endorse GARCIA but denies at rest.  Blood sugar 133 this morning. Pt up in room with SBA.  Tolerating mod CHO diet. Plan: Per Cards rec, increase oral Dilt to 360 mg daily and start low dose Digoxin. Possible AV jesse ablation with pacer if unable to control heart rate with meds.  OK for cervical spine injection (outpatient) once heart rate 110's consistently.  Continue tele monitoring and continuous pulse ox. Will continue to monitor and provide supportive cares.   Please review provider order for any additional goals.   Nurse to notify provider when observation goals have been met and patient is ready for discharge.  Goal Outcome Evaluation:                      "

## 2022-06-17 NOTE — PLAN OF CARE
PRIMARY DIAGNOSIS: CONGESTIVE HEART FAILURE/SOB  OUTPATIENT/OBSERVATION GOALS TO BE MET BEFORE DISCHARGE:  1. Dyspnea improved and O2 sats >88% at RA or at prior home O2 therapy level: No        SpO2: 94 %, O2 Device: Nasal cannula  Vitals:    06/14/22 1156 06/14/22 1853 06/15/22 0333   Weight: 75.8 kg (167 lb) 77.5 kg (170 lb 12.8 oz) 75.9 kg (167 lb 6.4 oz)        2. ECHO and other diagnostic testing complete (if applicable): No    3. Return to near baseline physical activity: Yes    Discharge Planner Nurse   Safe discharge environment identified: No  Barriers to discharge: Yes       Entered by: Aga Ni RN 06/17/2022 4:11 AM   Pt A/O x4. SBA with mobility. Moderate consistent carb diet r/t DMII. On telemetry: NSR 70's. Pt stable on 2L via NC. . Provider recommending no more additional insulin for the night. Pt is now comfortably sleeping.  POC: per notes if HR is not managed considering AV jesse ablation with pacer insertion; neurology recommending C4 nerve root injection outpatient, and increase diltiazem with a max of 360/day. Continue to provide supportive cares.  Please review provider order for any additional goals.   Nurse to notify provider when observation goals have been met and patient is ready for discharge.

## 2022-06-17 NOTE — PROGRESS NOTES
Cross cover notified of patient with hyperglycemia with blood glucose of 350.  She received 7 units glargine tonight.  Also received 17 units aspart with the last 2 hours.  Blood glucose was 413.  Reviewing her blood glucose readings over the past 48 hours she has been hyperglycemic above 300 although 1 time.  She has insulin resistance and is on baseline high-dose glargine.  She is also receiving prednisone.  -Give additional 8 units aspart now, she should have roughly 25 units of active aspart for the next 2 hours or so based on timing.  Unless becoming more hyperglycemic would hold off on any further insulin overnight and see where she ends up in the morning in a fasting state.

## 2022-06-17 NOTE — PLAN OF CARE
"PRIMARY DIAGNOSIS:  A Fib, Dyspnea  OUTPATIENT/OBSERVATION GOALS TO BE MET BEFORE DISCHARGE:  1. Ruled out acute coronary syndrome (negative or stable Troponin):  NA  2. Pain Status: Pain free.  3. Appropriate provocative testing performed: N/A    - Interpretation of cardiac rhythm per telemetry tech:  Atrial Fib with RVR to 160's    4. Cleared by Consultants (if applicable):No  5. Return to near baseline physical activity: Yes   Blood pressure (!) 159/88, pulse (!) 161, temperature 97.9  F (36.6  C), temperature source Oral, resp. rate 20, height 1.676 m (5' 6\"), weight 75.1 kg (165 lb 9.6 oz), SpO2 96 %.    VSS   LS diminished.  Patient currently denies any SOB.  02 continues at 2 liters nasal cannula to keep sats greater than 90%.  Monitor per tele tech  Atrial Fib, RVR, HR to 160.  Patient asymptomatic.  Patient advised of his pending transfer to [facility].  Patient is a standby assist with gait belt, ambulating to the bathroom prn.  Plan:  Transfer to [facility] for further care.  Continue to provide supportive cares.   Report to C RN at 17:06 PM.  Transfer to [facility] via wheelchair and 02.     Discharge Planner Nurse   Safe discharge environment identified: Yes  Barriers to discharge: Yes, unless medically cleared.           Entered by: Karen M. Lesch, RN 06/17/2022 4:28 PM     Please review provider order for any additional goals.   Nurse to notify provider when observation goals have been met and patient is ready for discharge.Goal Outcome Evaluation:                      "

## 2022-06-17 NOTE — PROGRESS NOTES
Cross cover notified of patient in A. fib with RVR.  Reviewed telemetry.  Rates roughly 130.  Was receiving 30 mg oral diltiazem every 6 hours.  Has order to start diltiazem  mg this morning.  -Give the scheduled long-acting oral diltiazem now

## 2022-06-18 LAB
ANION GAP SERPL CALCULATED.3IONS-SCNC: 5 MMOL/L (ref 3–14)
BUN SERPL-MCNC: 30 MG/DL (ref 7–30)
CALCIUM SERPL-MCNC: 9 MG/DL (ref 8.5–10.1)
CHLORIDE BLD-SCNC: 108 MMOL/L (ref 94–109)
CO2 SERPL-SCNC: 32 MMOL/L (ref 20–32)
CREAT SERPL-MCNC: 0.78 MG/DL (ref 0.66–1.25)
ERYTHROCYTE [DISTWIDTH] IN BLOOD BY AUTOMATED COUNT: 13.6 % (ref 10–15)
GFR SERPL CREATININE-BSD FRML MDRD: 89 ML/MIN/1.73M2
GLUCOSE BLD-MCNC: 75 MG/DL (ref 70–99)
GLUCOSE BLDC GLUCOMTR-MCNC: 224 MG/DL (ref 70–99)
GLUCOSE BLDC GLUCOMTR-MCNC: 256 MG/DL (ref 70–99)
GLUCOSE BLDC GLUCOMTR-MCNC: 301 MG/DL (ref 70–99)
GLUCOSE BLDC GLUCOMTR-MCNC: 390 MG/DL (ref 70–99)
GLUCOSE BLDC GLUCOMTR-MCNC: 86 MG/DL (ref 70–99)
HCT VFR BLD AUTO: 50 % (ref 40–53)
HGB BLD-MCNC: 16.4 G/DL (ref 13.3–17.7)
MCH RBC QN AUTO: 30.5 PG (ref 26.5–33)
MCHC RBC AUTO-ENTMCNC: 32.8 G/DL (ref 31.5–36.5)
MCV RBC AUTO: 93 FL (ref 78–100)
PLATELET # BLD AUTO: 188 10E3/UL (ref 150–450)
POTASSIUM BLD-SCNC: 3.8 MMOL/L (ref 3.4–5.3)
RBC # BLD AUTO: 5.38 10E6/UL (ref 4.4–5.9)
SODIUM SERPL-SCNC: 145 MMOL/L (ref 133–144)
WBC # BLD AUTO: 12.2 10E3/UL (ref 4–11)

## 2022-06-18 PROCEDURE — 85027 COMPLETE CBC AUTOMATED: CPT | Performed by: HOSPITALIST

## 2022-06-18 PROCEDURE — 82962 GLUCOSE BLOOD TEST: CPT

## 2022-06-18 PROCEDURE — 99225 PR SUBSEQUENT OBSERVATION CARE,LEVEL II: CPT | Performed by: HOSPITALIST

## 2022-06-18 PROCEDURE — 36415 COLL VENOUS BLD VENIPUNCTURE: CPT | Performed by: HOSPITALIST

## 2022-06-18 PROCEDURE — 80048 BASIC METABOLIC PNL TOTAL CA: CPT | Performed by: HOSPITALIST

## 2022-06-18 PROCEDURE — 250N000012 HC RX MED GY IP 250 OP 636 PS 637: Performed by: HOSPITALIST

## 2022-06-18 PROCEDURE — 250N000013 HC RX MED GY IP 250 OP 250 PS 637: Performed by: INTERNAL MEDICINE

## 2022-06-18 PROCEDURE — 999N000157 HC STATISTIC RCP TIME EA 10 MIN

## 2022-06-18 PROCEDURE — 250N000013 HC RX MED GY IP 250 OP 250 PS 637: Performed by: HOSPITALIST

## 2022-06-18 PROCEDURE — 99214 OFFICE O/P EST MOD 30 MIN: CPT | Performed by: INTERNAL MEDICINE

## 2022-06-18 PROCEDURE — 96372 THER/PROPH/DIAG INJ SC/IM: CPT

## 2022-06-18 PROCEDURE — G0378 HOSPITAL OBSERVATION PER HR: HCPCS

## 2022-06-18 PROCEDURE — 96372 THER/PROPH/DIAG INJ SC/IM: CPT | Performed by: HOSPITALIST

## 2022-06-18 RX ADMIN — PREDNISONE 20 MG: 20 TABLET ORAL at 07:58

## 2022-06-18 RX ADMIN — AMIODARONE HYDROCHLORIDE 400 MG: 200 TABLET ORAL at 07:58

## 2022-06-18 RX ADMIN — POLYETHYLENE GLYCOL 3350 17 G: 17 POWDER, FOR SOLUTION ORAL at 08:50

## 2022-06-18 RX ADMIN — SENNOSIDES AND DOCUSATE SODIUM 1 TABLET: 50; 8.6 TABLET ORAL at 08:50

## 2022-06-18 RX ADMIN — APIXABAN 5 MG: 5 TABLET, FILM COATED ORAL at 20:30

## 2022-06-18 RX ADMIN — PANTOPRAZOLE SODIUM 40 MG: 40 TABLET, DELAYED RELEASE ORAL at 07:58

## 2022-06-18 RX ADMIN — DIGOXIN 125 MCG: 125 TABLET ORAL at 07:58

## 2022-06-18 RX ADMIN — GLIPIZIDE 10 MG: 10 TABLET ORAL at 07:57

## 2022-06-18 RX ADMIN — LEVOTHYROXINE SODIUM 75 MCG: 0.07 TABLET ORAL at 07:58

## 2022-06-18 RX ADMIN — INSULIN GLARGINE 60 UNITS: 100 INJECTION, SOLUTION SUBCUTANEOUS at 22:11

## 2022-06-18 RX ADMIN — DILTIAZEM HYDROCHLORIDE 360 MG: 180 CAPSULE, COATED, EXTENDED RELEASE ORAL at 05:58

## 2022-06-18 RX ADMIN — APIXABAN 5 MG: 5 TABLET, FILM COATED ORAL at 07:58

## 2022-06-18 NOTE — PROGRESS NOTES
[facility]    Cardiology Progress Note     Assessment & Plan     In summary this is a  82 year old male with PMH COPD, pAF, T2DM, CALE with AF with RVR and difficult to control rates. Echocardiogram with normal LV function, no significant valvular disease.       1. Paroxysmal AFIB: on maximum diltiazem and digoxin, and now amiodarone loading.   -eliquis for anticoagulation   -will consider AV jesse ablation and PPM if no control over the weekend as long term amiodarone is not as ideal     2. Hypoxic failure: multifactorial, diaphragmatic paralysis and COPD.       Will continue to follow.     Lisa Clay MD  Text Page  (Monday - Friday, 8 am- 5 pm)    Interval History     Still tachycardic on telemetry especially with movement.     Physical Exam   Temp: 98.3  F (36.8  C) Temp src: Oral BP: 116/66 Pulse: 75   Resp: 18 SpO2: 98 % O2 Device: Nasal cannula Oxygen Delivery: 2 LPM  Vitals:    06/15/22 0333 06/17/22 0605 06/18/22 0553   Weight: 75.9 kg (167 lb 6.4 oz) 75.1 kg (165 lb 9.6 oz) 74.9 kg (165 lb 3.2 oz)     Vital Signs with Ranges  Temp:  [97.5  F (36.4  C)-98.3  F (36.8  C)] 98.3  F (36.8  C)  Pulse:  [] 75  Resp:  [16-22] 18  BP: (106-159)/(54-95) 116/66  SpO2:  [93 %-98 %] 98 %  No intake/output data recorded.  Patient Active Problem List   Diagnosis     Bladder calculi     Diabetes mellitus without complication (H)     Dysphagia     Elevated prostate specific antigen (PSA)     High cholesterol     Inguinal hernia     Osteoarthrosis     Sleep apnea     Urinary tract infection     UTI (urinary tract infection)     Shortness of breath     Abnormal CT scan of lung       Constitutional: Awake, alert, cooperative, no apparent distress  Respiratory: Clear to auscultation bilaterally, no crackles or wheezing  Cardiovascular: Irregularly irregular.   GI: Normal bowel sounds, soft, non-distended, non-tender  Skin/Integumen: No rashes, no cyanosis, no edema  Other:      Medications        amiodarone  400 mg Oral Daily     apixaban ANTICOAGULANT  5 mg Oral BID     digoxin  125 mcg Oral Daily     diltiazem ER COATED BEADS  360 mg Oral Daily     glipiZIDE  10 mg Oral QAM AC     insulin aspart  1-7 Units Subcutaneous TID AC     insulin aspart  1-5 Units Subcutaneous At Bedtime     insulin aspart   Subcutaneous TID AC     insulin glargine  60 Units Subcutaneous At Bedtime     ipratropium - albuterol 0.5 mg/2.5 mg/3 mL  3 mL Nebulization 4x daily     levothyroxine  75 mcg Oral Daily     pantoprazole  40 mg Oral Daily     predniSONE  20 mg Oral Daily     sodium chloride (PF)  3 mL Intracatheter Q8H       Data   Results for orders placed or performed during the hospital encounter of 06/14/22 (from the past 24 hour(s))   Glucose by meter   Result Value Ref Range    GLUCOSE BY METER POCT 451 (H) 70 - 99 mg/dL   Glucose by meter   Result Value Ref Range    GLUCOSE BY METER POCT 372 (H) 70 - 99 mg/dL   Glucose by meter   Result Value Ref Range    GLUCOSE BY METER POCT 224 (H) 70 - 99 mg/dL   Basic metabolic panel   Result Value Ref Range    Sodium 145 (H) 133 - 144 mmol/L    Potassium 3.8 3.4 - 5.3 mmol/L    Chloride 108 94 - 109 mmol/L    Carbon Dioxide (CO2) 32 20 - 32 mmol/L    Anion Gap 5 3 - 14 mmol/L    Urea Nitrogen 30 7 - 30 mg/dL    Creatinine 0.78 0.66 - 1.25 mg/dL    Calcium 9.0 8.5 - 10.1 mg/dL    Glucose 75 70 - 99 mg/dL    GFR Estimate 89 >60 mL/min/1.73m2   CBC with platelets   Result Value Ref Range    WBC Count 12.2 (H) 4.0 - 11.0 10e3/uL    RBC Count 5.38 4.40 - 5.90 10e6/uL    Hemoglobin 16.4 13.3 - 17.7 g/dL    Hematocrit 50.0 40.0 - 53.0 %    MCV 93 78 - 100 fL    MCH 30.5 26.5 - 33.0 pg    MCHC 32.8 31.5 - 36.5 g/dL    RDW 13.6 10.0 - 15.0 %    Platelet Count 188 150 - 450 10e3/uL   Glucose by meter   Result Value Ref Range    GLUCOSE BY METER POCT 86 70 - 99 mg/dL   Glucose by meter   Result Value Ref Range    GLUCOSE BY METER POCT 301 (H) 70 - 99 mg/dL

## 2022-06-18 NOTE — PROGRESS NOTES
"[facility]    Medicine Progress Note - Hospitalist Service    Date of Admission:  6/14/2022    Assessment & Plan   Carl Vázquez is an 82 year old male with history of atrial fibrillation diagnosed in January of this year, type 2 diabetes (insulin-dependent), obstructive sleep apnea, thyroid disease, COPD, GERD, and cholecystectomy admitted with chronic sob and found to be in afib with RVR that's been somewhat refractory to treatment up to today.     A fib with RVR  -multifactorial, nebs likely contributing some   -cardizem increased to 360mg daily, digoxin 125mg and amio 400mg added yesterday and HR has finally come under control past 24 hours. Cardiology had been considering need for ablation but may not need that now  -await further cardiology recs and continue home eliquis    Chronic sob w/acute hypoxic resp failure  -Multifactorial suspect related to his atrial fib and COPD as a possible component of his diaphragmatic paralysis.  - patient has been worked up extensively as an outpt  - has had a full cardiac work-up (ECHO/stress testing) which was negative  - has seen Dr. Boothe who diagnosed him with R hemidiaphragmatic paralysis and obstructive/restrictive disease  - Dr. Boothe referred them to Dr. Watts for the diaphragmatic paralysis, they have an appt in August  - patient and wife state they did not want to wait until August and had told Dr. Boothe's office last week that they would just come to the hospital Tuesday   - will treat for a COPD exacerbation: scheduled nebs, prednisone (now completed)  - patient has Dulera and combivent at home but refuses to use as they 'just make me cough so I stopped them\"  - CT chest PE negative for PE, but shows Patchy moderate bibasilar pulmonary opacities that may be a mixture of acute airspace disease and atelectasis. Bibasilar pneumoniais a possibility. This appears new since the comparison CT. As such, recommend short interval follow-up " CT chest in 3 months to ensure resolution.  -currently on 2L NC but sats in mid 90's, if unable to wean off consider walk test tomorrow    Chronic diaphragmatic paralysis  - Dr. Watts recommends R C4 nerve root injection. they are ok with scheduling outpatient considering his heart rate variability and Atrial fibrillation with RVR  -Per cards ok for injection if HR in 110s so if still here may be able to do it Monday?     Leukocytosis  - monitor, possibly related to steroids, currently asymptomatic, afebrile     DM  -blood sugars labile while here, steroids contributing and stopped today  -cont home glipizide and lantus 60units with DM diet and insulin sliding scale  -should come down with stopping steroids but will add 5 units novolog with meals     CALE    - has CPAP but does not tolerate     Hypothyroidism    - cont synthroid     GERD    - pantoprazole              Diet: Combination Diet Moderate Consistent Carb (60 g CHO per Meal) Diet; No Caffeinated Beverages    DVT Prophylaxis: DOAC  Power Catheter: Not present  Central Lines: None  Cardiac Monitoring: ACTIVE order. Indication: Tachyarrhythmias, acute (48 hours)  Code Status: Full Code         The patient's care was discussed with the Bedside Nurse and Patient.    Marc Trotter DO  Hospitalist Service  [facility]  Securely message with the [employer] Web Console (learn more here)  Text page via [employer] Paging/Directory   ______________________________________________________________________    Interval History   Heart rate has improved, in the 70-90's now. Denies any CP, palpitations. Still with some sob but only with exertion really and quite mild. Blood sugar has been elevated    Data reviewed today: I reviewed all medications, new labs and imaging results over the last 24 hours.     Physical Exam   Vital Signs: Temp: 98.3  F (36.8  C) Temp src: Oral BP: 134/63 Pulse: 90   Resp: 20 SpO2: 97 % O2 Device: Nasal cannula Oxygen Delivery: 2  LPM  Weight: 165 lbs 3.2 oz  Constitutional: awake, alert, cooperative and no apparent distress  Eyes: pupils equal, round and reactive to light and conjunctiva normal  ENT: normocepalic, without obvious abnormality, atramatic  Respiratory: good air entry, no wheezing, diffusely diminished but mild, no crackles  Cardiovascular: regular rate and rhythm and no murmur noted  GI: normal bowel sounds, soft, non-distended and non-tender  Skin: no bruising or bleeding  Neurologic: alert, oriented x4, no focal deficits    Data   Recent Labs   Lab 06/18/22  1321 06/18/22  0805 06/18/22  0755 06/17/22  0810 06/17/22  0757 06/15/22  0717 06/15/22  0545 06/14/22  2210 06/14/22  1254   WBC  --   --  12.2*  --  17.1*  --  6.3  --  7.1   HGB  --   --  16.4  --  17.3  --  16.0  --  16.8   MCV  --   --  93  --  92  --  90  --  91   PLT  --   --  188  --  221  --  125*  --  189   NA  --   --  145*  --  143  --  144  --  142   POTASSIUM  --   --  3.8  --  3.8  --  3.9  --  4.3   CHLORIDE  --   --  108  --  108  --  109  --  105   CO2  --   --  32  --  31  --  30  --  30   BUN  --   --  30  --  28  --  11  --  14   CR  --   --  0.78  --  0.75  --  0.58*  --  0.70   ANIONGAP  --   --  5  --  4  --  5  --  7   TATE  --   --  9.0  --  10.0  --  8.5  --  9.2   * 86 75   < > 117*   < > 109*   < > 355*   ALBUMIN  --   --   --   --   --   --   --   --  3.3*   PROTTOTAL  --   --   --   --   --   --   --   --  7.1   BILITOTAL  --   --   --   --   --   --   --   --  0.5   ALKPHOS  --   --   --   --   --   --   --   --  76   ALT  --   --   --   --   --   --   --   --  41   AST  --   --   --   --   --   --   --   --  35    < > = values in this interval not displayed.     No results found for this or any previous visit (from the past 24 hour(s)).  Medications       amiodarone  400 mg Oral Daily     apixaban ANTICOAGULANT  5 mg Oral BID     digoxin  125 mcg Oral Daily     diltiazem ER COATED BEADS  360 mg Oral Daily     glipiZIDE  10 mg Oral  QAM AC     insulin aspart  5 Units Subcutaneous TID w/meals     insulin aspart  1-7 Units Subcutaneous TID AC     insulin aspart  1-5 Units Subcutaneous At Bedtime     insulin aspart   Subcutaneous TID AC     insulin glargine  60 Units Subcutaneous At Bedtime     levothyroxine  75 mcg Oral Daily     pantoprazole  40 mg Oral Daily     sodium chloride (PF)  3 mL Intracatheter Q8H

## 2022-06-18 NOTE — PROGRESS NOTES
PRIMARY DIAGNOSIS: SOB     OUTPATIENT/OBSERVATION GOALS TO BE MET BEFORE DISCHARGE:  1. Dyspnea improved and O2 sats >89% on RA ambulating in halls.    SpO2: 95 %, O2 Device: Nasal cannula - 2L     2. Tolerating oral abx or appropriate plans made outpatient infusion:  N/A     3. Vitals signs normal or return to baseline: 2L O2 NC required to keep sats >90% o/w WNL     4. Short term supplemental O2 needed with activity at home:  Unsure at this time     5. Tolerate oral intake to maintain hydration: Yes     6. Return to near baseline physical activity: No, still experiencing increased dyspnea on exertion     Discharge Planner Nurse   Safe discharge environment identified: Yes  Barriers to discharge: Yes       Entered by: Giovana Carter RN 06/18/2022   Please review provider order for any additional goals.   Nurse to notify provider when observation goals have been met and patient is ready for discharge.

## 2022-06-18 NOTE — PLAN OF CARE
PRIMARY DIAGNOSIS: SOB    OUTPATIENT/OBSERVATION GOALS TO BE MET BEFORE DISCHARGE:  1. Dyspnea improved and O2 sats >89% on RA ambulating in halls.    SpO2: 95 %, O2 Device: Nasal cannula - 2L     2. Tolerating oral abx or appropriate plans made outpatient infusion:  N/A     3. Vitals signs normal or return to baseline: 2L O2 NC required to keep sats >90% o/w WNL     4. Short term supplemental O2 needed with activity at home:  Unsure at this time     5. Tolerate oral intake to maintain hydration: Yes     6. Return to near baseline physical activity: No, still experiencing increased dyspnea on exertion     Discharge Planner Nurse   Safe discharge environment identified: Yes  Barriers to discharge: Yes       Entered by: Giovana Carter RN 06/18/2022   Please review provider order for any additional goals.   Nurse to notify provider when observation goals have been met and patient is ready for discharge. Alert/oriented. Up SBA to ambulate in halls/BR etc. Tele afib, rate to 150s with straining in BR/ambulating in halls. At rest, HR 's. 90-92% room air post ambulation. O2 2L NC. BLE ankle +1 edema. Lungs clear w/RLL & LLL fine crackles audible. Uses IS appropriately. WBC trending down. IMC status.

## 2022-06-18 NOTE — PLAN OF CARE
Goal Outcome Evaluation:    VSS on 2 L NC. Tele A fib CVR, HR 70s-90s most of the night. One event of HR increasing to 140 while sleeping, did not sustain. A/O x 4. Up SBA. Denies pain, c/o SOB at times. , 224. LPIV SL. On scheduled nebs, prednisone for COPD. Possible discharge in 1-2 days. Will continue with plan of care.

## 2022-06-18 NOTE — UTILIZATION REVIEW
"  [facility] Utilization Review  Admission Status; Secondary Review Determination     Admission Date: 6/14/2022 12:01 PM      Under the authority of the Utilization Management Committee, the utilization review process indicated a secondary review on the above patient.  The review outcome is based on review of the medical records, discussions with staff, and applying clinical experience noted on the date of the review.        (X) Observation Status Appropriate - This patient does not meet hospital inpatient criteria and is placed in observation status. If this patient's primary payer is Medicare and was admitted as an inpatient, Condition Code 44 should be used and patient status changed to \"observation\".   () Observation Status concurrent Review           RATIONALE FOR DETERMINATION   82-year-old male with history of atrial fibrillation, diabetes mellitus, CALE, thyroid disease, COPD, GERD, cholecystectomy admitted with chronic shortness of breath and found to be in A. fib with RVR.  Patient also receiving nebulizer treatments, Cardizem dose increased, also amiodarone, digoxin added, heart rate is improving in the past 24 hours.  Patient has had extensive work-up for shortness of breath as an outpatient, now completed prednisone and scheduled duo nebs for COPD exacerbation.  Patient is requiring 2 L oxygen via nasal cannula, CT chest negative for PE but shows bibasilar pulmonary opacities.  Complex patient who is now improving in the last day, no further work-up anticipated, per cardiology or pulmonary standpoint, does not meet criteria for inpatient stay, recommend continue observation status      The severity of illness, intensity of service provided, expected LOS make the care appropriate for observation status at this time.        The information on this document is developed by the utilization review team in order for the business office to ensure compliance.  This only denotes the appropriateness of " proper admission status and does not reflect the quality of care rendered.         The definitions of Inpatient Status and Observation Status used in making the determination above are those provided in the CMS Coverage Manual, Chapter 1 and Chapter 6, section 70.4.      Sincerely,       George Lord MD  Physician Advisor  Utilization Review-[place]    Phone: [phone]

## 2022-06-19 ENCOUNTER — APPOINTMENT (OUTPATIENT)
Dept: PHYSICAL THERAPY | Facility: CLINIC | Age: 82
End: 2022-06-19
Attending: INTERNAL MEDICINE
Payer: COMMERCIAL

## 2022-06-19 LAB
ANION GAP SERPL CALCULATED.3IONS-SCNC: 4 MMOL/L (ref 3–14)
BUN SERPL-MCNC: 29 MG/DL (ref 7–30)
CALCIUM SERPL-MCNC: 8.6 MG/DL (ref 8.5–10.1)
CHLORIDE BLD-SCNC: 105 MMOL/L (ref 94–109)
CO2 SERPL-SCNC: 31 MMOL/L (ref 20–32)
CREAT SERPL-MCNC: 0.8 MG/DL (ref 0.66–1.25)
ERYTHROCYTE [DISTWIDTH] IN BLOOD BY AUTOMATED COUNT: 13.2 % (ref 10–15)
GFR SERPL CREATININE-BSD FRML MDRD: 88 ML/MIN/1.73M2
GLUCOSE BLD-MCNC: 80 MG/DL (ref 70–99)
GLUCOSE BLDC GLUCOMTR-MCNC: 116 MG/DL (ref 70–99)
GLUCOSE BLDC GLUCOMTR-MCNC: 158 MG/DL (ref 70–99)
GLUCOSE BLDC GLUCOMTR-MCNC: 165 MG/DL (ref 70–99)
GLUCOSE BLDC GLUCOMTR-MCNC: 201 MG/DL (ref 70–99)
GLUCOSE BLDC GLUCOMTR-MCNC: 275 MG/DL (ref 70–99)
GLUCOSE BLDC GLUCOMTR-MCNC: 57 MG/DL (ref 70–99)
GLUCOSE BLDC GLUCOMTR-MCNC: 88 MG/DL (ref 70–99)
HCT VFR BLD AUTO: 46.9 % (ref 40–53)
HGB BLD-MCNC: 15.7 G/DL (ref 13.3–17.7)
MCH RBC QN AUTO: 30.4 PG (ref 26.5–33)
MCHC RBC AUTO-ENTMCNC: 33.5 G/DL (ref 31.5–36.5)
MCV RBC AUTO: 91 FL (ref 78–100)
PLATELET # BLD AUTO: 165 10E3/UL (ref 150–450)
POTASSIUM BLD-SCNC: 3.5 MMOL/L (ref 3.4–5.3)
RBC # BLD AUTO: 5.16 10E6/UL (ref 4.4–5.9)
SODIUM SERPL-SCNC: 140 MMOL/L (ref 133–144)
WBC # BLD AUTO: 10.5 10E3/UL (ref 4–11)

## 2022-06-19 PROCEDURE — 250N000013 HC RX MED GY IP 250 OP 250 PS 637: Performed by: INTERNAL MEDICINE

## 2022-06-19 PROCEDURE — 999N000111 HC STATISTIC OT IP EVAL DEFER: Performed by: REHABILITATION PRACTITIONER

## 2022-06-19 PROCEDURE — 82310 ASSAY OF CALCIUM: CPT | Performed by: HOSPITALIST

## 2022-06-19 PROCEDURE — 36415 COLL VENOUS BLD VENIPUNCTURE: CPT | Performed by: HOSPITALIST

## 2022-06-19 PROCEDURE — 97116 GAIT TRAINING THERAPY: CPT | Mod: GP | Performed by: PHYSICAL THERAPIST

## 2022-06-19 PROCEDURE — G0378 HOSPITAL OBSERVATION PER HR: HCPCS

## 2022-06-19 PROCEDURE — 99225 PR SUBSEQUENT OBSERVATION CARE,LEVEL II: CPT | Performed by: INTERNAL MEDICINE

## 2022-06-19 PROCEDURE — 250N000013 HC RX MED GY IP 250 OP 250 PS 637: Performed by: HOSPITALIST

## 2022-06-19 PROCEDURE — 96372 THER/PROPH/DIAG INJ SC/IM: CPT

## 2022-06-19 PROCEDURE — 85027 COMPLETE CBC AUTOMATED: CPT | Performed by: HOSPITALIST

## 2022-06-19 PROCEDURE — 99214 OFFICE O/P EST MOD 30 MIN: CPT | Performed by: INTERNAL MEDICINE

## 2022-06-19 PROCEDURE — 82962 GLUCOSE BLOOD TEST: CPT

## 2022-06-19 PROCEDURE — 97161 PT EVAL LOW COMPLEX 20 MIN: CPT | Mod: GP | Performed by: PHYSICAL THERAPIST

## 2022-06-19 RX ORDER — DILTIAZEM HYDROCHLORIDE 360 MG/1
360 CAPSULE, EXTENDED RELEASE ORAL DAILY
Qty: 30 CAPSULE | Refills: 1 | Status: SHIPPED | OUTPATIENT
Start: 2022-06-20 | End: 2022-07-25

## 2022-06-19 RX ORDER — DIGOXIN 125 MCG
125 TABLET ORAL DAILY
Qty: 30 TABLET | Refills: 1 | Status: SHIPPED | OUTPATIENT
Start: 2022-06-20 | End: 2022-07-15

## 2022-06-19 RX ORDER — AMIODARONE HYDROCHLORIDE 400 MG/1
400 TABLET ORAL DAILY
Qty: 30 TABLET | Refills: 1 | Status: SHIPPED | OUTPATIENT
Start: 2022-06-20 | End: 2022-06-20

## 2022-06-19 RX ADMIN — DILTIAZEM HYDROCHLORIDE 360 MG: 180 CAPSULE, COATED, EXTENDED RELEASE ORAL at 06:14

## 2022-06-19 RX ADMIN — DIGOXIN 125 MCG: 125 TABLET ORAL at 08:13

## 2022-06-19 RX ADMIN — APIXABAN 5 MG: 5 TABLET, FILM COATED ORAL at 08:12

## 2022-06-19 RX ADMIN — GLIPIZIDE 10 MG: 10 TABLET ORAL at 09:23

## 2022-06-19 RX ADMIN — LEVOTHYROXINE SODIUM 75 MCG: 0.07 TABLET ORAL at 08:12

## 2022-06-19 RX ADMIN — AMIODARONE HYDROCHLORIDE 400 MG: 200 TABLET ORAL at 08:12

## 2022-06-19 RX ADMIN — PANTOPRAZOLE SODIUM 40 MG: 40 TABLET, DELAYED RELEASE ORAL at 08:12

## 2022-06-19 NOTE — UTILIZATION REVIEW
"  [facility] Utilization Review  Admission Status; Secondary Review Determination     Admission Date: 6/14/2022 12:01 PM      Under the authority of the Utilization Management Committee, the utilization review process indicated a secondary review on the above patient.  The review outcome is based on review of the medical records, discussions with staff, and applying clinical experience noted on the date of the review.        (X) Observation Status Appropriate - This patient does not meet hospital inpatient criteria and is placed in observation status. If this patient's primary payer is Medicare and was admitted as an inpatient, Condition Code 44 should be used and patient status changed to \"observation\".   () Observation Status concurrent Review           RATIONALE FOR DETERMINATION   Carl Vázquez is a 82 year old male with past medical history of COPD, A. fib, diabetes who presented with atrial fibrillation with RVR and difficult to control rates.  Initial evaluation showed stable hemodynamics but tachycardia, mild hypoxic respiratory failure requiring supplemental oxygen without evidence of pneumonia.  He is spontaneously converted to normal sinus rhythm and per cardiology no further intervention at this time.  He is weaned off of oxygen and is currently stable for discharge.  Safe disposition is being discussed with the possibility of possible spinal injection by IR for chronic low back pain in a.m.  No plans for aggressive intervention, monitoring at this time.Based on severity of illness and intensity of service, patient does not meet criteria for inpatient admission.  Observation status is appropriate with  anticipated discharge today or early a.m.        The definitions of Inpatient Status and Observation Status used in making the determination above are those provided in the CMS Coverage Manual, Chapter 1 and Chapter 6, section 70.4.      Sincerely,       Radha Lord MD, MS  Physician " Advisor  Utilization Review-[place]    Phone: [phone]

## 2022-06-19 NOTE — PROVIDER NOTIFICATION
"Blood glucose at 6:02 Lab was 80. Glucose check per order at 0804 prior to meal was 57. The patient reported feeling \"a little off\". The protocol calls for glutose gel 15, however when the Rn writer brought the medication in to give to the patient, he had already drank 8 oz of orange juice. Recheck glucose 0820 was 88. Pt denies symptoms of hypoglycemia. Meal tray in room, patient eating at 0824. Total breakfast meal carbs = 53. Glucose at 0835 is 158.     Web page to MD Please advise regarding what to give at this time for diabetes.    Addendum: response received by MD 0821 that he will look at orders, MAR orders updated.   "
"Noon glucose 275. Orders for 3 units novolog. Web page to MD to see if he wants add'l insulin coverage. The pt/spouse stated \"we don';t count carbs, we just eat whatever\".   "
"Provider notified of pt's rhythm of A. Fib with 's. Advised to administer ER diltiazem early this morning. Given. Pt was explaining the situation as to why: \"the lab lady came in and told me to uncover myself so she can draw my lab. I was explaining to her that I have all these wires going on, and I got twisted in all of them and the bed sheets, and I feel like I can't breathe, I feel like I'm dying but I'm trying what I can. I have a collapsed lung so it's hard for me to breathe, I have a neck that can't really move, and so yeah there is gonna be something wrong with my heart. They wanted to give me the shot in my neck, but I can't get it because they said they need to stabilize my HR. I'm just mad at myself because I have so much going on. Thank you for listening to me.\"  "
"Pt heart rate back up into 150's-160's intermittently at rest. Pt remains asx. Dr Emery paged at 1422 and returned call to unit at 1430.  Dr. Emery informed of pt's heart rate and stated \"I will put in order for amiodarone.\"    "
Patient will need a cervical thoracic epidural injection, but since patient has had Eliquis will not be able to do for a couple of days.  Dr. Dickson placed Eliquis on hold.   
Patient's BG continues to rise despite correction- pre prandial  at 1700- corrected per orders.  BG - paged provider per current orders, awaiting any new orders- will give scheduled novolog and sliding scale insulin in the interim.      Received call back - new order for 4 units NPH  
Provider (Dr. Franco) notified of pt's continued A fib with RVR and numerous calls from tele tech in regards to pt's heart rate 110s-120's at rest with increase to 150's with activity.  Pt asx and denies chest pain, palpitations, dizziness or any other symptoms.  Pt has been reporting GARCIA since admit and is currently on 2 lpm 02 via nc.  Order for additional 30 mg Diltiazem ordered to be given at 1100.  Continue to monitor and tele and pt instructed to inform RN of any symptoms.   
Provider FYI    Continued frequent calls from tele tech, variable HR 80s to 150s. PO diltiazem given at 1225. Continued cardiac monitoring.  
Provider notified that pt's heart rate 155-160 at rest for past 10 min per tele tech. Pt given 125 mcg IV dig at 1120 and additional 30 mg Dilt 30 minutes ago as per MD order/MAR.  Pharmacist paged Cards to see if safe to give oral Digoxin that was ordered. Pt remains asx. Denies chest pain, dizziness, nausea or any other symptoms.  Does report GARCIA but denies at rest. On 2 lpm 02 via nc and 02 sats >90%.  Addendum (1320)  Cards gave ok to give 125 mcg oral Digoxin which was given at 1315.  Dr. Mendenhall updated that pt given oral Digoxin and pt's heart rate currently 120's-130's.   Per Dr. Mendenhall, RN to page Cards if pt experiences sustained heart rate in 160's again.   
Report provided to Lara Dejesus RN from Obs nurse. Pt transferred from [facility] to [facility] at 1721. He had eaten a full meal at 1700 and his glucose was not checked. He did not receive any insulin by the Obs RN. His glucose at 1800 is 451. Unable to determine total carbs eaten with his meal. Call placed to admitting MD to get recommendation of what insulin to give.     The pt -150's asymptomatic. 2L NC. PO cardizem and prn IV metoprolol given upon arrival to [facility]. HR decreased to 90s-100's Afib after the medications. +1 BLE ankle foot edema. Lungs clear, diminished. Education provided to pt regarding POC and new meds.  
Variable HR, possible a. Fib    Tele tech called twice with possible a.fib, variable high HR 140s-180s. Strips requested, EKG ordered. Provider notified - updated orders for metoprolol and continued diltiazem, continued cardiac monitoring and pulse ox. Pt otherwise stable, no complaints of increased SOB or chest pain. Continued assessment, monitoring and POC.    Andrae Frost RN on 6/16/2022    
Web page to Dr Franklin regarding Eliquis. Dose given yesterday and this a.m. - should be held prior to IR injection? Need IR consult if still plan to do as IP.   
(0) indicator not present

## 2022-06-19 NOTE — PLAN OF CARE
"PRIMARY DIAGNOSIS: \"SOB\" NURSING  OUTPATIENT/OBSERVATION GOALS TO BE MET BEFORE DISCHARGE:  ADLs back to baseline: Yes    Activity and level of assistance: Up with standby assistance.    Pain status: Pain free.    Return to near baseline physical activity: Yes     Discharge Planner Nurse   Safe discharge environment identified: Yes  Barriers to discharge: Yes       Entered by: oJann Bonilla RN 06/19/2022 6:59 AM     Please review provider order for any additional goals.   Nurse to notify provider when observation goals have been met and patient is ready for discharge.  "

## 2022-06-19 NOTE — PROGRESS NOTES
"   06/19/22 0835   Quick Adds   Quick Adds Certification   Type of Visit Initial PT Evaluation   Living Environment   People in Home spouse   Current Living Arrangements house   Home Accessibility stairs to enter home   Number of Stairs, Main Entrance 2   Stair Railings, Main Entrance none   Transportation Anticipated family or friend will provide   Living Environment Comments PTA, pt. was living with wife (Dorothy, good physical health, driving) in single-level home with 2 MOSHE (no HRs) with bathroom in basement (12, R HR; walk-in shower) with 1st floor B/B (tubshower) with the following DME: SPC, FWW, adjustable bed, grab bars (shower), shower chair.   Self-Care   Usual Activity Tolerance good   Current Activity Tolerance moderate   Regular Exercise No   Equipment Currently Used at Home grab bar, tub/shower   Fall history within last six months no   Activity/Exercise/Self-Care Comment PTA, pt. was independent with ADLs, receiving assistance for IADLs from wife, ambulating short community distances with no AD independently.   General Information   Onset of Illness/Injury or Date of Surgery 06/14/22   Referring Physician Dr. Denis Travis   Patient/Family Therapy Goals Statement (PT) \"I want to be able to walk better.\"   Pertinent History of Current Problem (include personal factors and/or comorbidities that impact the POC) Mr. Vázquez is an 82 y.o.  male who presents to [facility] secondary to debility resulting from A-fib with RVR and acute hypoxic respiratory failure with PMH significant for the following: A-fib, DM II, CALE, thyroid disease, COPD, GERD, cholecystectomy, hypothyroidism, multi-level cervical myelopathy, chronic diaphragmatic paralysis.   Existing Precautions/Restrictions fall   General Observations Pt. presents sitting in bedside chair in no acute distress.   Cognition   Affect/Mental Status (Cognition) WFL   Orientation Status (Cognition) disoriented to;time   Follows Commands (Cognition) " WFL   Pain Assessment   Patient Currently in Pain No   Posture    Posture Forward head position;Protracted shoulders;Kyphosis   Range of Motion (ROM)   Range of Motion ROM is WFL   ROM Comment B LE ROM WFL   Strength (Manual Muscle Testing)   Strength (Manual Muscle Testing) strength is WFL   Strength Comments B LE strength WFL   Bed Mobility   Comment, (Bed Mobility) Unable to formally assess due to time constraints   Transfers   Transfers bed-chair transfer   Bed-Chair Transfer   Bed-Chair Stone (Transfers) modified independence   Comment, (Bed-Chair Transfer) Mod. I due to increased time to perform task   Gait/Stairs (Locomotion)   Stone Level (Gait) modified independence   Distance in Feet (Required for LE Total Joints) 400   Pattern (Gait) swing-through   Negotiation (Stairs) stairs independence;stairs assistive device;handrail location;number of steps;ascending technique;descending technique   Stone Level (Stairs) modified independence   Assistive Device (Stairs) other (see comments)  (Unilateral HR)   Handrail Location (Stairs) right side (ascending);left side (descending)   Number of Steps (Stairs) 12   Ascending Technique (Stairs) step-over-step   Descending Technique (Stairs) step-to-step   Comment, (Gait/Stairs) Performed gait trial of 400', no AD, mod. I due to increased time to perform task. Performed management of 12 steps, unilateral HR, mod. I due to increased time to perform task.   Balance   Balance other (describe)   Standing Balance: Static good balance   Standing Balance: Dynamic fair balance   Systems Impairment Contributing to Balance Disturbance musculoskeletal   Identified Impairments Contributing to Balance Disturbance impaired coordination   Balance Quick Add Standing balance: static;Standing balance: dynamic;Systems impairment contributing to balance disturbance;Identified impairments contributing to balance disturbance   Sensory Examination   Sensory Perception  patient reports no sensory changes   Coordination   Coordination other (see comments)   Coordination Comments B LE coordination minimally impaired during functional activities.   Clinical Impression   Criteria for Skilled Therapeutic Intervention Yes, treatment indicated   PT Diagnosis (PT) Pt. presents with the aforementioned impairments, necessitating the need for skilled PT services to assist in his return to his PLOF. Given the minor severity of his deficits, his prognosis to return to his PLOF is good.   Influenced by the following impairments Decreased activity tolerance, decreased dynamic standing balance   Functional limitations due to impairments Decreased activity tolerance, decreased dynamic standing balance   Clinical Presentation (PT Evaluation Complexity) Stable/Uncomplicated   Clinical Presentation Rationale Pt.'s clinical presentation is stable due to pt.'s limited impairments and being near baseline function.   Clinical Decision Making (Complexity) low complexity   Planned Therapy Interventions (PT) balance training;bed mobility training;gait training;home exercise program;motor coordination training;neuromuscular re-education;patient/family education;postural re-education;stair training;strengthening;transfer training;progressive activity/exercise;home program guidelines   Risk & Benefits of therapy have been explained evaluation/treatment results reviewed;care plan/treatment goals reviewed;risks/benefits reviewed;current/potential barriers reviewed;participants voiced agreement with care plan;participants included;patient   PT Discharge Planning   PT Discharge Recommendation (DC Rec) home with assist;home with outpatient cardiac rehab   PT Rationale for DC Rec Recommend discharge home with wife with intermittent supervision (check-ins every 2 - 4 hrs. in person), requiring assistance for IADLs. Recommend continued therapy services via OP cardiac rehabilitation to promote improved activity  tolerance and indedpendence with mobility skills.   PT Brief overview of current status Independent in room/hallway   Plan of Care Review   Plan of Care Reviewed With patient   Therapy Certification   Start of care date 06/19/22   Certification date from 06/19/22   Certification date to 06/19/22   Medical Diagnosis Generalized weakness   Total Evaluation Time   Total Evaluation Time (Minutes) 15   Physical Therapy Goals   PT Frequency One time eval and treatment only   PT Predicted Duration/Target Date for Goal Attainment 06/19/22   PT Goals Transfers;Gait;Stairs   PT: Transfers Modified independent;Goal Met   PT: Gait Modified independent;Greater than 200 feet;Goal Met   PT: Stairs Greater than 10 stairs;Rail on right;Modified independent;Goal Met

## 2022-06-19 NOTE — PLAN OF CARE
OT: Orders received. Chart reviewed and discussed with care team.  OT not indicated as patient is I with mobility and ADls and spouse support as needed for ADL/IADls.  Defer discharge recommendations to care team.  Will complete orders.

## 2022-06-19 NOTE — PLAN OF CARE
Physical Therapy Discharge Summary    Reason for therapy discharge:    Discharged to home with outpatient therapy.    Progress towards therapy goal(s). See goals on Care Plan in [facility] electronic health record for goal details.  Goals met    Therapy recommendation(s):    Continued therapy is recommended.  Rationale/Recommendations:  Recommend discharge home with wife with intermittent supervision (check-ins every 2 - 4 hrs. In person), requiring assistance for IADLs. Recommend continued therapy services via OP cardiac rehabilitation to promote increased activty tolerance and independence with mobility skills.

## 2022-06-19 NOTE — PROGRESS NOTES
Potential for discharge to home today. MD to review chart.     PRIMARY DIAGNOSIS: SOB     OUTPATIENT/OBSERVATION GOALS TO BE MET BEFORE DISCHARGE:  Dyspnea improved and O2 sats - goal met, room air, independent per PT eval     2. Tolerating oral abx or appropriate plans made outpatient infusion:  N/A     3. Vitals signs normal or return to baseline:yes      5. Short term supplemental O2 needed with activity at home:  N/A  6. Tolerate oral intake to maintain hydration: Yes     Return to near baseline physical activity: yes  Discharge Planner Nurse   Safe discharge environment identified: Yes  Barriers to discharge: no  Entered by: Giovana Carter RN 06/19/2022   Please review provider order for any additional goals.   Nurse to notify provider when observation goals have been met and patient is ready for discharge. Discussed plan w/goals met w/MD. He will determine if discharge appropriate today.

## 2022-06-19 NOTE — PROGRESS NOTES
PRIMARY DIAGNOSIS: SOB     OUTPATIENT/OBSERVATION GOALS TO BE MET BEFORE DISCHARGE:  Dyspnea improved and O2 sats - goal met, room air, independent per PT eval    2. Tolerating oral abx or appropriate plans made outpatient infusion:  N/A     3. Vitals signs normal or return to baseline:yes     5. Short term supplemental O2 needed with activity at home:  N/A  6. Tolerate oral intake to maintain hydration: Yes     Return to near baseline physical activity: yes  Discharge Planner Nurse   Safe discharge environment identified: Yes  Barriers to discharge: no  Entered by: Giovana Carter RN 06/19/2022   Please review provider order for any additional goals.   Nurse to notify provider when observation goals have been met and patient is ready for discharge. Discussed plan w/goals met w/MD. He will determine if discharge appropriate today.  Alert/oriented. Up independent to ambulate in halls/BR etc. Tele NSR. Instructions regarding home meds to be discussed w/spouse at discharge. Pt reports he cannot read well. Teaching provided by RN writer to be able to check pulse, identify WNL rate and irregularity. Afib teaching completed. Pt in NSR.

## 2022-06-19 NOTE — PLAN OF CARE
"Aox4. Denies pain and CP. Reports some GARCIA and SOB and states that breathing is easier when laying on R side. VSS on RA. Tele: SR. Per Tele Tech, pt had run of PSVT. RN w/ pt at that time and pt asymptomatic. B. Last BM  and pt voiding well. Trace edema noted in BLE. Plan: continue w/ IMC monitoring and possible R C4 nerve root injection on Monday or OP pending pt's HR per MD note. Continue w/ POC.     PRIMARY DIAGNOSIS: \"SOB\" NURSING  OUTPATIENT/OBSERVATION GOALS TO BE MET BEFORE DISCHARGE:  ADLs back to baseline: Yes    Activity and level of assistance: Up with standby assistance.    Pain status: Pain free.    Return to near baseline physical activity: Yes     Discharge Planner Nurse   Safe discharge environment identified: Yes  Barriers to discharge: Yes       Entered by: Joann Bonilla RN 2022 3:38 AM     Please review provider order for any additional goals.   Nurse to notify provider when observation goals have been met and patient is ready for discharge.  "

## 2022-06-19 NOTE — PROGRESS NOTES
"[facility]  Hospitalist Progress Note     Assessment & Plan     ASSESSMENT:    82M with hx of pAF and IDDM Type II presents with acute hypoxic respiratory failure that appears to be 2/2 afib with RVR +/- diaphragm paralysis.     PLAN:  -Acute Hypoxic Respiratory Failure: 2/2 issues below, now weaned to RA.  -Afib with RVR: Converted to NSR. Continue amio, dig, and dilt with cardiology f/u. Continue Eliquis.  -Diaphragm Paralysis: Will try to get patient scheduled for injection tomorrow for this.  -IDDM Type II: LDSS + Long-acting insulin. Will make adjustments today given hypoglycemic episodes.  -DVT Prophy: Eliquis.  -Disposition: Likely home tomorrow.      Apollo Franklin MD    Subjective     Doing well and denies SOB currently. Converted to NSR. Discussed case with patient and wife regarding discharge today and doing spinal injection as an outpatient vs seeing if he can get on the schedule tomorrow for injection and he would rather try to get this done as an inpatient.        Objective   Blood pressure 122/72, pulse 70, temperature 97.7  F (36.5  C), temperature source Oral, resp. rate 18, height 1.676 m (5' 6\"), weight 74.9 kg (165 lb 1.6 oz), SpO2 94 %.    PHYSICAL EXAM  General: In no acute distress  CV: RRR.  Lungs: CTAB. Nl WOB.  Abd: Non-tender.  Ext: No edema.    LABS AND IMAGING: Reviewed and pertinent results discussed in assessment and plan.    "

## 2022-06-19 NOTE — PLAN OF CARE
[facility]      OUTPATIENT PHYSICAL THERAPY EVALUATION  PLAN OF TREATMENT FOR OUTPATIENT REHABILITATION  (COMPLETE FOR INITIAL CLAIMS ONLY)  Patient's Last Name, First Name, M.I.  YOB: 1940  Carl Vázquez                        Provider's Name  [facility] Medical Record No.  [id number]                               Onset Date:  06/14/22   Start of Care Date:  06/19/22      Type:     _X_PT   ___OT   ___SLP Medical Diagnosis:  Generalized weakness                        PT Diagnosis:  Pt. presents with the aforementioned impairments, necessitating the need for skilled PT services to assist in his return to his PLOF. Given the minor severity of his deficits, his prognosis to return to his PLOF is good.   Visits from SOC:  1   _________________________________________________________________________________  Plan of Treatment/Functional Goals    Planned Interventions: balance training, bed mobility training, gait training, home exercise program, motor coordination training, neuromuscular re-education, patient/family education, postural re-education, stair training, strengthening, transfer training, progressive activity/exercise, home program guidelines     Goals: See Physical Therapy Goals on Care Plan in Epic electronic health record.    Therapy Frequency: One time eval and treatment only  Predicted Duration of Therapy Intervention: 06/19/22  _________________________________________________________________________________    I CERTIFY THE NEED FOR THESE SERVICES FURNISHED UNDER        THIS PLAN OF TREATMENT AND WHILE UNDER MY CARE     (Physician co-signature of this document indicates review and certification of the therapy plan).              Certification date from: 06/19/22, Certification date to: 06/19/22    Referring Physician: Dr. Barrios  Angy            Initial Assessment        See Physical Therapy evaluation dated 06/19/22 in Epic electronic health record.

## 2022-06-19 NOTE — PROGRESS NOTES
[facility]    Cardiology Progress Note     Assessment & Plan     In summary this is an 82 year old male with PMH COPD, pAF, T2DM, CALE with AF with RVR and difficult to control rates. Echocardiogram with normal LV function, no significant valvular disease.         1. Paroxysmal AFIB: on maximum diltiazem and digoxin, and now amiodarone loading.   -eliquis for anticoagulation   -will consider AV jesse ablation and PPM if no control over the weekend as long term amiodarone is not as ideal   -discharge on full amiodarone load: 400 mg twice a day x 7 days, then 200 mg twice a day x 7 days then 200 mg once a day maintenance   -place ziopatch at discharge and will have EP follow up in 2 weeks (orders placed).     2. Hypoxic failure: multifactorial, diaphragmatic paralysis and COPD.   -It has been recommended he undergo a R C4 nerve root injection  -there is no cardiac limitation for him undergoing this injection, I think this should be done while he is here as inpatient on Monday. Rates are controlled and he only has brief bursts of PAF.     Will sign off. Please page with questions.     Lisa Clay MD  Text Page  (Monday - Friday, 8 am- 5 pm)    Interval History      Converted to sinus rhythm. Remains on amidarone loading dose, eliquis, digoxin and diltiazem. Occasional ongoing SOB.    Physical Exam   Temp: 97.7  F (36.5  C) Temp src: Oral BP: 128/58 Pulse: 69   Resp: 18 SpO2: 93 % O2 Device: None (Room air) Oxygen Delivery: 2 LPM  Vitals:    06/17/22 0605 06/18/22 0553 06/19/22 0401   Weight: 75.1 kg (165 lb 9.6 oz) 74.9 kg (165 lb 3.2 oz) 74.9 kg (165 lb 1.6 oz)     Vital Signs with Ranges  Temp:  [97.6  F (36.4  C)-98.3  F (36.8  C)] 97.7  F (36.5  C)  Pulse:  [] 69  Resp:  [18-28] 18  BP: (116-153)/(54-81) 128/58  SpO2:  [91 %-97 %] 93 %  I/O last 3 completed shifts:  In: 2363 [P.O.:2360; I.V.:3]  Out: -   Patient Active Problem List   Diagnosis     Bladder calculi     Diabetes  mellitus without complication (H)     Dysphagia     Elevated prostate specific antigen (PSA)     High cholesterol     Inguinal hernia     Osteoarthrosis     Sleep apnea     Urinary tract infection     UTI (urinary tract infection)     Shortness of breath     Abnormal CT scan of lung       Constitutional: Awake, alert, cooperative, no apparent distress  Respiratory: Clear to auscultation bilaterally, no crackles or wheezing  Cardiovascular: Regular rate and rhythm, normal S1 and S2, and no murmur noted  GI: Normal bowel sounds, soft, non-distended, non-tender  Skin/Integumen: No rashes, no cyanosis, no edema  Other:      Medications       amiodarone  400 mg Oral Daily     apixaban ANTICOAGULANT  5 mg Oral BID     digoxin  125 mcg Oral Daily     diltiazem ER COATED BEADS  360 mg Oral Daily     glipiZIDE  10 mg Oral QAM AC     insulin aspart  1-7 Units Subcutaneous TID AC     insulin aspart  1-5 Units Subcutaneous At Bedtime     insulin glargine  30 Units Subcutaneous BID     levothyroxine  75 mcg Oral Daily     pantoprazole  40 mg Oral Daily     sodium chloride (PF)  3 mL Intracatheter Q8H       Data   Results for orders placed or performed during the hospital encounter of 06/14/22 (from the past 24 hour(s))   Glucose by meter   Result Value Ref Range    GLUCOSE BY METER POCT 301 (H) 70 - 99 mg/dL   Glucose by meter   Result Value Ref Range    GLUCOSE BY METER POCT 390 (H) 70 - 99 mg/dL   Glucose by meter   Result Value Ref Range    GLUCOSE BY METER POCT 256 (H) 70 - 99 mg/dL   Glucose by meter   Result Value Ref Range    GLUCOSE BY METER POCT 116 (H) 70 - 99 mg/dL   Basic metabolic panel   Result Value Ref Range    Sodium 140 133 - 144 mmol/L    Potassium 3.5 3.4 - 5.3 mmol/L    Chloride 105 94 - 109 mmol/L    Carbon Dioxide (CO2) 31 20 - 32 mmol/L    Anion Gap 4 3 - 14 mmol/L    Urea Nitrogen 29 7 - 30 mg/dL    Creatinine 0.80 0.66 - 1.25 mg/dL    Calcium 8.6 8.5 - 10.1 mg/dL    Glucose 80 70 - 99 mg/dL    GFR  Estimate 88 >60 mL/min/1.73m2   CBC with platelets   Result Value Ref Range    WBC Count 10.5 4.0 - 11.0 10e3/uL    RBC Count 5.16 4.40 - 5.90 10e6/uL    Hemoglobin 15.7 13.3 - 17.7 g/dL    Hematocrit 46.9 40.0 - 53.0 %    MCV 91 78 - 100 fL    MCH 30.4 26.5 - 33.0 pg    MCHC 33.5 31.5 - 36.5 g/dL    RDW 13.2 10.0 - 15.0 %    Platelet Count 165 150 - 450 10e3/uL   Glucose by meter   Result Value Ref Range    GLUCOSE BY METER POCT 57 (L) 70 - 99 mg/dL   Glucose by meter   Result Value Ref Range    GLUCOSE BY METER POCT 88 70 - 99 mg/dL   Glucose by meter   Result Value Ref Range    GLUCOSE BY METER POCT 158 (H) 70 - 99 mg/dL   Glucose by meter   Result Value Ref Range    GLUCOSE BY METER POCT 275 (H) 70 - 99 mg/dL

## 2022-06-19 NOTE — PROGRESS NOTES
Potential for discharge to home today. MD to review chart.      PRIMARY DIAGNOSIS: SOB     OUTPATIENT/OBSERVATION GOALS TO BE MET BEFORE DISCHARGE:  Dyspnea improved and O2 sats - goal met, room air, independent per PT eval     2. Tolerating oral abx or appropriate plans made outpatient infusion:  N/A     3. Vitals signs normal or return to baseline:yes      5. Short term supplemental O2 needed with activity at home:  N/A  6. Tolerate oral intake to maintain hydration: Yes     Return to near baseline physical activity: yes  Discharge Planner Nurse   Safe discharge environment identified: Yes  Barriers to discharge: no  Entered by: Giovana Carter RN 06/19/2022     Please review provider order for any additional goals.   Nurse to notify provider when observation goals have been met and patient is ready for discharge. Discussed plan w/goals met w/MD. Continue to monitor today with Neck injection tomorrow.

## 2022-06-19 NOTE — PROGRESS NOTES
PRIMARY DIAGNOSIS: SOBOUTPATIENT/OBSERVATION GOALS OUTPATIENT/OBSERVATION GOALS TO BE MET BEFORE DISCHARGE.  1. ADLs back to baseline: Yes patient independent in the room SBA     2. Activity and level of assistance: Ambulating independently.    3. Pain status: Improved with use of alternative comfort measures i.e.: positioning Vital signs are stable.  No need for O2 during shift.      4. Short term supplemental O2 needed with activity at home.  Unsure patient only able to ambulate a short distance before returning to the room.    5. Tolerate oral intake to maintain Hydration: Yes    6. Return to near baseline physical activity: No.  Patient reports dyspnea on exertion after ambulating     Discharge Planner Nurse   Safe discharge environment identified: Yes  Barriers to discharge: Yes       Entered by: Ramin Grullon RN 06/18/2022 10:42 PM     Please review provider order for any additional goals.   Nurse to notify provider when observation goals have been met and patient is ready for discharge.

## 2022-06-19 NOTE — PLAN OF CARE
"Goal Outcome Evaluation:    Plan of Care Reviewed With: patient     Overall Patient Progress: improving     Blood pressure (!) 141/68, pulse 63, temperature 97.8  F (36.6  C), temperature source Oral, resp. rate 20, height 1.676 m (5' 6\"), weight 74.9 kg (165 lb 3.2 oz), SpO2 93 %.    VSS. Pt A&Ox4 on Ra.  SR on Tele. Able to ambulate in the hallway 2x during shift.  Pt reports GARCIA but oxygen sats at 91% after activity.  and 256.  PIV in Left arm SL.  SBA.  Patient using Incentive spirometry.  Continue POC.        "

## 2022-06-20 ENCOUNTER — APPOINTMENT (OUTPATIENT)
Dept: CARDIOLOGY | Facility: CLINIC | Age: 82
End: 2022-06-20
Attending: INTERNAL MEDICINE
Payer: COMMERCIAL

## 2022-06-20 VITALS
WEIGHT: 167.6 LBS | BODY MASS INDEX: 26.93 KG/M2 | RESPIRATION RATE: 18 BRPM | SYSTOLIC BLOOD PRESSURE: 149 MMHG | HEART RATE: 76 BPM | HEIGHT: 66 IN | DIASTOLIC BLOOD PRESSURE: 74 MMHG | OXYGEN SATURATION: 91 % | TEMPERATURE: 98.1 F

## 2022-06-20 LAB
ANION GAP SERPL CALCULATED.3IONS-SCNC: 1 MMOL/L (ref 3–14)
BUN SERPL-MCNC: 22 MG/DL (ref 7–30)
CALCIUM SERPL-MCNC: 8.8 MG/DL (ref 8.5–10.1)
CHLORIDE BLD-SCNC: 107 MMOL/L (ref 94–109)
CO2 SERPL-SCNC: 32 MMOL/L (ref 20–32)
CREAT SERPL-MCNC: 0.78 MG/DL (ref 0.66–1.25)
ERYTHROCYTE [DISTWIDTH] IN BLOOD BY AUTOMATED COUNT: 13.2 % (ref 10–15)
GFR SERPL CREATININE-BSD FRML MDRD: 89 ML/MIN/1.73M2
GLUCOSE BLD-MCNC: 92 MG/DL (ref 70–99)
GLUCOSE BLDC GLUCOMTR-MCNC: 136 MG/DL (ref 70–99)
GLUCOSE BLDC GLUCOMTR-MCNC: 142 MG/DL (ref 70–99)
GLUCOSE BLDC GLUCOMTR-MCNC: 328 MG/DL (ref 70–99)
HCT VFR BLD AUTO: 51.1 % (ref 40–53)
HGB BLD-MCNC: 17 G/DL (ref 13.3–17.7)
MCH RBC QN AUTO: 30.4 PG (ref 26.5–33)
MCHC RBC AUTO-ENTMCNC: 33.3 G/DL (ref 31.5–36.5)
MCV RBC AUTO: 91 FL (ref 78–100)
PLATELET # BLD AUTO: 167 10E3/UL (ref 150–450)
POTASSIUM BLD-SCNC: 3.7 MMOL/L (ref 3.4–5.3)
RBC # BLD AUTO: 5.6 10E6/UL (ref 4.4–5.9)
SODIUM SERPL-SCNC: 140 MMOL/L (ref 133–144)
WBC # BLD AUTO: 8 10E3/UL (ref 4–11)

## 2022-06-20 PROCEDURE — 80048 BASIC METABOLIC PNL TOTAL CA: CPT | Performed by: HOSPITALIST

## 2022-06-20 PROCEDURE — 36415 COLL VENOUS BLD VENIPUNCTURE: CPT | Performed by: HOSPITALIST

## 2022-06-20 PROCEDURE — 250N000013 HC RX MED GY IP 250 OP 250 PS 637: Performed by: HOSPITALIST

## 2022-06-20 PROCEDURE — 93246 EXT ECG>7D<15D RECORDING: CPT

## 2022-06-20 PROCEDURE — 250N000013 HC RX MED GY IP 250 OP 250 PS 637: Performed by: INTERNAL MEDICINE

## 2022-06-20 PROCEDURE — 96372 THER/PROPH/DIAG INJ SC/IM: CPT

## 2022-06-20 PROCEDURE — 99217 PR OBSERVATION CARE DISCHARGE: CPT | Performed by: INTERNAL MEDICINE

## 2022-06-20 PROCEDURE — 85027 COMPLETE CBC AUTOMATED: CPT | Performed by: HOSPITALIST

## 2022-06-20 PROCEDURE — G0378 HOSPITAL OBSERVATION PER HR: HCPCS

## 2022-06-20 PROCEDURE — 99207 PR NO BILLABLE SERVICE THIS VISIT: CPT | Performed by: INTERNAL MEDICINE

## 2022-06-20 PROCEDURE — 93248 EXT ECG>7D<15D REV&INTERPJ: CPT | Performed by: INTERNAL MEDICINE

## 2022-06-20 PROCEDURE — 82962 GLUCOSE BLOOD TEST: CPT

## 2022-06-20 RX ORDER — AMIODARONE HYDROCHLORIDE 200 MG/1
200 TABLET ORAL 2 TIMES DAILY
Qty: 60 TABLET | Refills: 1 | Status: SHIPPED | OUTPATIENT
Start: 2022-06-20 | End: 2022-07-15

## 2022-06-20 RX ORDER — AMIODARONE HYDROCHLORIDE 400 MG/1
400 TABLET ORAL DAILY
Qty: 30 TABLET | Refills: 1
Start: 2022-06-20 | End: 2022-06-20

## 2022-06-20 RX ORDER — AMIODARONE HYDROCHLORIDE 400 MG/1
400 TABLET ORAL 2 TIMES DAILY
Qty: 7 TABLET | Refills: 0 | Status: SHIPPED | OUTPATIENT
Start: 2022-06-20 | End: 2022-06-21

## 2022-06-20 RX ADMIN — PANTOPRAZOLE SODIUM 40 MG: 40 TABLET, DELAYED RELEASE ORAL at 08:52

## 2022-06-20 RX ADMIN — AMIODARONE HYDROCHLORIDE 400 MG: 200 TABLET ORAL at 08:51

## 2022-06-20 RX ADMIN — GLIPIZIDE 10 MG: 10 TABLET ORAL at 08:52

## 2022-06-20 RX ADMIN — DIGOXIN 125 MCG: 125 TABLET ORAL at 08:52

## 2022-06-20 RX ADMIN — LEVOTHYROXINE SODIUM 75 MCG: 0.07 TABLET ORAL at 08:52

## 2022-06-20 RX ADMIN — DILTIAZEM HYDROCHLORIDE 360 MG: 180 CAPSULE, COATED, EXTENDED RELEASE ORAL at 06:02

## 2022-06-20 NOTE — PLAN OF CARE
"Goal Outcome Evaluation:    Plan of Care Reviewed With: patient     Overall Patient Progress: improving     Blood pressure 135/67, pulse 68, temperature 98.1  F (36.7  C), temperature source Oral, resp. rate 18, height 1.676 m (5' 6\"), weight 74.9 kg (165 lb 1.6 oz), SpO2 93 %.    PRIMARY DIAGNOSIS: SOB  OUTPATIENT/OBSERVATION GOALS TO BE MET BEFORE DISCHARGE:  1. ADLs back to baseline: Dyspnea improved and O2 sats stable on room air. Yes    2. Activity and level of assistance: Ambulating independently.    3. Pain status: Pain free.    4. Short term Supplemental O2 needed with a activity at home: NA  5. Tolerates oral intake to maintain hydration: Yes    6. Return to near baseline physical activity: Yes     Discharge Planner Nurse   Safe discharge environment identified: Yes  Barriers to discharge: No       Entered by: Ramin Grullon RN 06/19/2022 11:44 PM     Please review provider order for any additional goals.   Nurse to notify provider when observation goals have been met and patient is ready for discharge.    "

## 2022-06-20 NOTE — DISCHARGE INSTRUCTIONS
Your hospital follow up appointment has been scheduled for you with Dr. Adriana Smiley for June 29.2022 at 10 AM. Please bring your hospital discharge instructions, a photo ID, and insurance information with you to your appointment. Please call the clinic at [phone] if you need to reschedule.      Dari Smiley   [facility]  [address]  [place] [address]

## 2022-06-20 NOTE — PLAN OF CARE
"Pt A/O x 4. VSS on RA except elevated BP. Denied pain.  (drank one OJ w/ AM meds). Tele SR, denied CP. PIV to left intact, SL. Up ad terence in room, steady gait, denies dizziness. Potential discharge 6/20. Will continue POC.     PRIMARY DIAGNOSIS: \"GENERIC\" NURSING  OUTPATIENT/OBSERVATION GOALS TO BE MET BEFORE DISCHARGE:  ADLs back to baseline: Yes    Activity and level of assistance: Ambulating independently.    Pain status: Pain free.    Return to near baseline physical activity: Yes     Discharge Planner Nurse   Safe discharge environment identified: Yes  Barriers to discharge: Yes       Entered by: Madelyn Barcenas RN 06/20/2022 6:30 AM     Please review provider order for any additional goals.   Nurse to notify provider when observation goals have been met and patient is ready for discharge.    Goal Outcome Evaluation:    Plan of Care Reviewed With: patient     Overall Patient Progress: no change           "

## 2022-06-20 NOTE — PROGRESS NOTES
"PRIMARY DIAGNOSIS: \"GENERIC\" NURSING  OUTPATIENT/OBSERVATION GOALS TO BE MET BEFORE DISCHARGE:  1. ADLs back to baseline: Yes    2. Activity and level of assistance: Ambulating independently.    3. Pain status: Pain free.    4. Return to near baseline physical activity: Yes     Discharge Planner Nurse   Safe discharge environment identified: Yes  Barriers to discharge: Yes       Entered by: Madelyn Barcenas RN 06/20/2022 3:06 AM     Please review provider order for any additional goals.   Nurse to notify provider when observation goals have been met and patient is ready for discharge.  "

## 2022-06-20 NOTE — PLAN OF CARE
"PRIMARY DIAGNOSIS: \"GENERIC\" NURSING  OUTPATIENT/OBSERVATION GOALS TO BE MET BEFORE DISCHARGE:  ADLs back to baseline: Yes    Activity and level of assistance: Ambulating independently.    Pain status: Pain free.    Return to near baseline physical activity: Yes     Discharge Planner Nurse   Safe discharge environment identified: Yes  Barriers to discharge: No       Entered by: Radha Mcnulty RN 2022 12:15 PM     Please review provider order for any additional goals.   Nurse to notify provider when observation goals have been met and patient is ready for discharge.Goal Outcome Evaluation:    VSS  A&Ox4. Pt denies CP or SOB. 92% RA. Pt would like to leave today if not receiving neck injection. Pt independent in room. LS diminished. BS, 328.  Wife in room. Sw consult and Cardio before discharge. Will continue to monitor.                     "

## 2022-06-20 NOTE — DISCHARGE SUMMARY
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 Cardiology was consulted during hospital stay and amiodarone and digoxin were added and diltiazem dose was increased. They are considering ablation on an outpatient basis - patient will discharge with a Ziopatch and follow-up with EP in two weeks. In regards to the diaphragm paralysis, patient will undergo C4 injection by IR Wednesday 06/22 @9:15am. He will f/u with his PCP as well.    Patient Discharge Condition & Exam     Discharge condition: Improved    /78 (BP Location: Right arm)   Pulse 72   Temp 97.5  F (36.4  C) (Oral)   Resp 18   Ht 1.676 m (5' 6\")   Wt 76 kg (167 lb 9.6 oz)   SpO2 93%   BMI 27.05 kg/m       General: In NAD.  Cardiac: RRR.  Lungs: CTAB.  Abd: Non-tender.  Ext: No edema.    Discharge Instructions     Patient/family instructions: Written discharge instruction given to patient/family    Discharge Medications:     Review of your medicines      START taking      Dose / Directions   * amiodarone 400 MG tablet  Commonly known as: PACERONE  Used for: Paroxysmal atrial fibrillation (H)      Dose: 400 mg  Take 1 tablet (400 mg) by mouth 2 times daily  Quantity: 7 tablet  Refills: 0     * amiodarone 200 MG tablet  Commonly known as: PACERONE  Used for: Paroxysmal atrial fibrillation (H)      Dose: 200 mg  Take 1 tablet (200 mg) by mouth 2 times daily  Quantity: 60 tablet  Refills: 1     digoxin 125 MCG tablet  Commonly known as: LANOXIN  Used for: Paroxysmal atrial " fibrillation (H)      Dose: 125 mcg  Take 1 tablet (125 mcg) by mouth daily  Quantity: 30 tablet  Refills: 1     diltiazem ER COATED BEADS 360 MG 24 hr capsule  Commonly known as: CARDIZEM CD  Used for: Paroxysmal atrial fibrillation (H)      Dose: 360 mg  Take 1 capsule (360 mg) by mouth daily  Quantity: 30 capsule  Refills: 1         * This list has 2 medication(s) that are the same as other medications prescribed for you. Read the directions carefully, and ask your doctor or other care provider to review them with you.            CONTINUE these medicines which have NOT CHANGED      Dose / Directions   glipiZIDE 5 MG tablet  Commonly known as: GLUCOTROL      Dose: 10 mg  Take 10 mg by mouth every morning (before breakfast)  Refills: 0     insulin glargine 100 UNIT/ML pen  Commonly known as: LANTUS PEN      Dose: 60 Units  Inject 60 Units Subcutaneous At Bedtime  Refills: 0     levothyroxine 75 MCG tablet  Commonly known as: SYNTHROID/LEVOTHROID      Dose: 75 mcg  Take 75 mcg by mouth daily  Refills: 0     omeprazole 40 MG DR capsule  Commonly known as: priLOSEC  Used for: Esophageal stricture      Dose: 40 mg  Take 1 capsule (40 mg) by mouth daily Take 30-60 minutes before a meal.  Quantity: 90 capsule  Refills: 3        STOP taking    apixaban ANTICOAGULANT 5 MG tablet  Commonly known as: ELIQUIS        diltiazem  MG 24 hr capsule  Commonly known as: DILT-XR              Where to get your medicines      These medications were sent to [facility] [address] [place] [address] - [address] [facility]  [address] [facility] [address]    Phone: [phone]     amiodarone 200 MG tablet    amiodarone 400 MG tablet    digoxin 125 MCG tablet    diltiazem ER COATED BEADS 360 MG 24 hr capsule        Discharge diet: Diabetic    Discharge activity: Activity as tolerated    Discharge follow-up:     Follow up with primary care provider within one week or earlier if symptoms return or gets worse.    Follow  up with consultant as instructed.    Pending Results     Unresulted Labs Ordered in the Past 30 Days of this Admission     No orders found from 5/15/2022 to 6/15/2022.           Patient Allergies     Allergies   Allergen Reactions     Statin Drugs [Hmg-Coa-R Inhibitors]      Disposition   Disposition: Home     I saw and evaluated the patient on day of discharge and  discharge instructions reviewed  and  all the patient's questions and concerns addressed. Over 30 minutes spent on discharge and coordination of discharge process for this patient.

## 2022-06-20 NOTE — PLAN OF CARE
"PRIMARY DIAGNOSIS: \"GENERIC\" NURSING  OUTPATIENT/OBSERVATION GOALS TO BE MET BEFORE DISCHARGE:  ADLs back to baseline: Yes    Activity and level of assistance: Ambulating independently.    Pain status: Pain free.    Return to near baseline physical activity: Yes     Discharge Planner Nurse   Safe discharge environment identified: Yes  Barriers to discharge: No       Entered by: Radha Mcnulty RN 06/20/2022 12:18 PM     Please review provider order for any additional goals.   Nurse to notify provider when observation goals have been met and patient is ready for discharge.Goal Outcome Evaluation:    Pt ready for discharge. IR injection scheduled for 06/22 outpatient. Cardio paged for patch placement before discharge. SW consulted for new PCP.  Discharge instructions given and understood  no further questions or concerns. Pt verbalized understanding of instructions and follow up appts.  Will continue to monitor and follow POC                       "

## 2022-06-21 ENCOUNTER — TELEPHONE (OUTPATIENT)
Dept: INTERVENTIONAL RADIOLOGY/VASCULAR | Facility: CLINIC | Age: 82
End: 2022-06-21

## 2022-06-21 ENCOUNTER — TELEPHONE (OUTPATIENT)
Dept: CARDIOLOGY | Facility: CLINIC | Age: 82
End: 2022-06-21
Payer: COMMERCIAL

## 2022-06-21 DIAGNOSIS — J98.6 DIAPHRAGM PARALYSIS: Primary | ICD-10-CM

## 2022-06-21 DIAGNOSIS — I48.0 PAROXYSMAL ATRIAL FIBRILLATION (H): ICD-10-CM

## 2022-06-21 RX ORDER — AMIODARONE HYDROCHLORIDE 400 MG/1
400 TABLET ORAL 2 TIMES DAILY
Qty: 7 TABLET | Refills: 0 | Status: SHIPPED | OUTPATIENT
Start: 2022-06-21 | End: 2022-07-15

## 2022-06-21 NOTE — TELEPHONE ENCOUNTER
Spoke to patients wife and she stated the pharmacy did not have amiodarone 400 mg ordered so RN sent script in for 400 mg tablets to take for 7 days. They got the 200 mg tablets that patient will take after the 7 days per Dr. Bianchi recommendations     Dr. Bianchi visit 6/19/22 in the hospital:   -discharge on full amiodarone load: 400 mg twice a day x 7 days, then 200 mg twice a day x 7 days then 200 mg once a day maintenance     Pts wife did not remember what medication costed $900 but she did not pick it up. RN reviewed patients med list with what they currently have at home and the only medication that is missing is the amiodarone 400 mg so she will go pick that up.

## 2022-06-21 NOTE — TELEPHONE ENCOUNTER
[facility]    Phone Message    May a detailed message be left on voicemail: yes     Reason for Call: Other: Pt wife would like a call back asap as pharmacy stated they did not recieve the prescription for the 400 mg but did recieve a medication that was not on discharge papers for 900 dollars, Please reach out to pt to discuss     Action Taken: Message routed to:  [facility] ([facility]): Cardio    Travel Screening: Not Applicable

## 2022-06-22 ENCOUNTER — TRANSFERRED RECORDS (OUTPATIENT)
Dept: HEALTH INFORMATION MANAGEMENT | Facility: CLINIC | Age: 82
End: 2022-06-22

## 2022-06-22 ENCOUNTER — HOSPITAL ENCOUNTER (OUTPATIENT)
Dept: GENERAL RADIOLOGY | Facility: CLINIC | Age: 82
Discharge: HOME | End: 2022-06-22
Attending: INTERNAL MEDICINE
Payer: COMMERCIAL

## 2022-06-22 ENCOUNTER — TELEPHONE (OUTPATIENT)
Dept: CARDIOLOGY | Facility: CLINIC | Age: 82
End: 2022-06-22

## 2022-06-22 PROCEDURE — 250N000011 HC RX IP 250 OP 636

## 2022-06-22 PROCEDURE — 250N000011 HC RX IP 250 OP 636: Performed by: PHYSICIAN ASSISTANT

## 2022-06-22 PROCEDURE — 64479 NJX AA&/STRD TFRM EPI C/T 1: CPT

## 2022-06-22 PROCEDURE — 255N000002 HC RX 255 OP 636: Performed by: PHYSICIAN ASSISTANT

## 2022-06-22 PROCEDURE — 250N000009 HC RX 250: Performed by: PHYSICIAN ASSISTANT

## 2022-06-22 RX ORDER — LIDOCAINE HYDROCHLORIDE 10 MG/ML
INJECTION, SOLUTION EPIDURAL; INFILTRATION; INTRACAUDAL; PERINEURAL
Status: DISPENSED
Start: 2022-06-22 | End: 2022-06-22

## 2022-06-22 RX ORDER — DEXAMETHASONE SODIUM PHOSPHATE 10 MG/ML
INJECTION, SOLUTION INTRAMUSCULAR; INTRAVENOUS
Status: DISPENSED
Start: 2022-06-22 | End: 2022-06-22

## 2022-06-22 RX ADMIN — LIDOCAINE HYDROCHLORIDE 1 ML: 10 INJECTION, SOLUTION EPIDURAL; INFILTRATION; INTRACAUDAL; PERINEURAL at 10:25

## 2022-06-22 RX ADMIN — IOPAMIDOL 1 ML: 408 INJECTION, SOLUTION INTRATHECAL at 10:24

## 2022-06-22 RX ADMIN — DEXAMETHASONE SODIUM PHOSPHATE 10 MG: 10 INJECTION, SOLUTION INTRAMUSCULAR; INTRAVENOUS at 10:25

## 2022-06-22 NOTE — TELEPHONE ENCOUNTER
"Patient was evaluated by cardiology while inpatient for multifactorial hypoxic failure:diaphragmatic paralysis and COPD-it has been recommended he undergo a R C4 nerve root injection, A. Fib with RVR. 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 Echocardiogram with normal LV function, no significant valvular disease. A. Fib with RVR  difficult to control rates, on maximum Diltiazem and Digoxin. Cardiology consulted and pt stared on Amiodarone. Per Dr. Clay's IP note, \"will consider AV jesse ablation and PPM if no control over the weekend as long term Amiodarone is not as ideal. Discharge on full Amiodarone load: 400 mg twice a day x 7 days, then 200 mg twice a day x 7 days then 200 mg once a day maintenance. PTA Cardizem dosage was increased and started on Digoxin. Pt was discharged wearing a 2 week Zio Patch monitor. Pt is scheduled for a C4 injection by IR Wednesday 06/22 @9:15 am-PTA Eliquis held at time of discharge. Called patient to discuss any post hospital d/c questions he may have, review medication changes, and confirm f/u appts. Patient/wife denied any questions regarding new medications or changes to PTA medications. Pt was not sent home with enough Amiodarone 400 mg tablets and Team 4 RN has followed up with this-see her note. Patient denied any SOB, chest pain, palpitations or light headedness. Pt did have C4 injection today and will restart Eliquis tomorrow as instructed by GREGG STARKS. RN confirmed with patient that he has an OV scheduled on 7/25/22 at 1510 with OLIVE Roberta Dangelo at our [place] Office. Patient advised to call clinic with any cardiac related questions or concerns prior to this olive't. Patient verbalized understanding and agreed with plan. BRAYDEN Britt RN.        "

## 2022-06-22 NOTE — PROGRESS NOTES
RADIOLOGY PROCEDURE NOTE  Patient name: Carl Vázquez  MRN: [id number]  : 1940    Pre-procedure diagnosis: LEft neck and diaphragm paralysis  Post-procedure diagnosis: Same    Procedure Date/Time: 2022  10:26 AM  Procedure: Left C4 cervical nerve root block/steroid injection  Estimated blood loss: None  Specimen(s) collected with description: none  The patient tolerated the procedure well with no immediate complications.  Significant findings:none    See imaging dictation for procedural details.    Provider name: Obinna Gamino PA-C  Assistant(s):None

## 2022-06-22 NOTE — PROGRESS NOTES
Pt was in Radiology today for a left L4 nerve root injection. Pt tolerated ortho procedure well. Pre procedure pain was 6-7 post procedure pain level unchanged.Procedure was completed by PITO OSEI. There were no complications during this procedure. Pt verbalized understanding of written and verbal instructions and left department in stable and satisfactory condition with wife. There is no evidence of bleeding or any other complications upon discharge.

## 2022-07-13 ENCOUNTER — TELEPHONE (OUTPATIENT)
Dept: CARDIOLOGY | Facility: CLINIC | Age: 82
End: 2022-07-13

## 2022-07-13 NOTE — TELEPHONE ENCOUNTER
Spoke to patients wife ( in the background) and she stated that patient has been having worse SOB for days. He cant walk more than 10 ft  Without getting out of breath (before going into the hospital he could go 30ft). He gets more SOB when just standing. Has a constant mild SOB even at rest.     Vitals stable: HR while on the phone was 60s and blood pressure has been 120s to low 130s systolic. Pts wife checks oxygen level and stated they have been normal.     Pt did note slight swelling in ankles and stated that the compression socks have not helped.     Pt thinks this SOB is from medication that were prescribed during his hospitalization. Pt is currently on Amiodarone which could be causing some SOB  And/or swelling. Pt is refusing ED evaluation. OV with Roberta Dangelo was able to be moved up to 7/15/22. RN encouraged patient to go to ED if he cannot catch his breath, HR elevates or any other symptoms arise.

## 2022-07-13 NOTE — TELEPHONE ENCOUNTER
[facility]    Phone Message    May a detailed message be left on voicemail: yes     Reason for Call: Symptoms or Concerns     If patient has red-flag symptoms, warm transfer to triage line    Current symptom or concern: Shortness of breath    Symptoms have been present for:  7 month(s)    Has patient previously been seen for this? Yes    By Dangelo/Primary Doctor/Etc    Date: N/A    Are there any new or worsening symptoms? Yes: Worsening of shortness of breath       Action Taken: Message routed to:  Other: Cardiology    Travel Screening: Not Applicable

## 2022-07-14 NOTE — PROGRESS NOTES
[facility] CLINIC    I had the pleasure of meeting Carl when he and his wife Dorothy came for concerns re: SOB.  This 82 year old saw Dr. Clay while hospitalized for his history of:    1. Paroxysmal AFib - had been seen at [facility] in 2/2022 for this. 4% burden on monitor   2. NSVT - noted on previous monitor 2/2022 ([facility])  3. HTN   4. DM2 - A1c 8.3% 6/2022  5. Dyslipidemia  6. R Diaphragm Paralysis  Has seen Dr. Josias Watts. Had C4 nerve block done 6/2022 but note this was on the L, despite R hemidiaphragm being elevated.       Carl had a phone visit with Dr. Cross at [facility] 2/2022 after his PCP was noted to have AFib.  At that time, he had occasional GARCIA and depending edema which was not new. He noted palpitations only with emotional excitement. At that visit, it appears he'd started the BB rec'd by PCP, but hadn't started AC due to cost despite CHADSVASc 4 (HTN, DM, age). Monitor and Stress test was rec'd. Follow-up monitor reportedly showed  4% AFib burden and episodes of NSVT for which stress testing was rec'd.     Unfortunately, he was then hospitalized 6/14-20/2022 for AFib with RVR after presenting with acute hypoxic respiratory failure thought d/t RVR and diaphragmatic paralysis from C4 nerve impingement. He was seen in consultation by Dr. Emery 6/16 who reviewed echo showing nl VLEF and initially, Diltiazem increased and digoxin added for rate control. Oral amiodarone was then added as HRs continued to be elevated. Dr. Clay then saw and meds continued. OP Pantera recommended along with EP follow-up.     He was discharged home on 6/20/2022 @ 167# on amiodarone 400 mg BID x 7 days, then 200 mg BID x 7 days then 200 mg daily, and digoxin 125 mcg daily. PTA Diltiazem was increased from 120 to 360 mg daily. Eliquis was stopped as he was to undergo C4 injection by IR on Wednesday 6/22. Exam notes indicate heart rhythm was regular at that time.    He had his injection with IR 6/22 and was reportedly  "instructed to restart Eliquis the following day 6/23.    He called 7/13/2022 and reported to Team 4 RN that he'd been having worsening SOB x days, only able to walk ~10 feet (instead of PTA 30 feet) before feeling SOB. SBPs 120-130s, HR 60s. O2 levels \"wnl.\" He was asked to see me to review this and I am meeting him today.      Interval History:  Carl and Dorothy note that Carl is \"miserable\" since his hospitalization. No issues with palpitations, dizziness, lightheadedness. No c/o exertional CP.    Notes LE edema, without much change over the last few months. He feels short of breath \"all the time\" even while sitting. Notes worse with laying flat (but can lay on side). No improvement after injection but Dorothy points out that R diaphragm was paralyzed and injection was administered on the L. Indeed,  Report on 6/22 indicates L C4 injection d/t \"L diaphragm paralysis and L shoulder pain.\"     O2 sats today 93-94% at rest. EKG showed SR.    VITALS:  Vitals: /70 (BP Location: Right arm, Cuff Size: Adult Large)   Pulse 73   Ht 1.702 m (5' 7\")   Wt 77.6 kg (171 lb)   SpO2 94%   BMI 26.78 kg/m      Diagnostic Testing:  EKG today, which I overread, showed SR 71 bpm. QTc ~440 ms on amiodarone  ZioPatch 6/20-7/4/2022 - SR with 3% pAFib burden. In SR, avg HR 66 bpm (range 47--112 bpm. 3% AFib burden, with avg HR in AFib 79 bpm (range  bpm), with longest episode lasting ~5h (episodes 6/25, 7/3). 17 episodes of SVT noted, longest episode 18s. <1% PVCs and 1.6% PACs.  On amiodarone loading dose, Diltiazem 360 mg daily and digoxin 125 mcg daily  Echocardiogram 6/2022 - Nl LVEF 55-60%. Nl RV. Trace MR. Trace TR. RVSP 14+RAP. Trace AI  Stress Testing (Care Everywhere) 3/2022 - nl effusion    Plan:  Stop amiodarone  Repeat Hgb, NTpBNP, CMP  CXR  Will message IR with ?R C4 injection    Assessment/Plan:    1. GARCIA/SOB    Called and urgently requested appt as sxs acutely worsened 7/13/2022. Notes this has " "been going on since 1/2022    Note pAFib on ZioPatch, with controlled HR. Only 3% burden    Echo 6/2022 with nl LVEF and nl RV/RVSP    NTpBNP 6/2022 while hospitalized wnl    CT PE while hospitalized showed no PE. Has been on Eliquis without interruption since restarted after C4 root injection . 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 Saw Dr. Boothe from [facility] 5/2022 and she set up NIF testing showing paralyzed diaphragm. Now s/p technically successful C4 injection 6/22/2022; Procedural PA noted C7/T1 epidural steroid injection could be attempted    Nuclear stress test 3/2022 (Care Everywhere) negative for ischemia. Remains without exertional CP    CBC 6/2022 wnl 17.0 g/dL    PLAN:    Stop amiodarone in case this is contributing to acute worsening of SOB    Repeat NTpBNP, CMP, Hgb    CXR     I'll message IR PA who did procedure to see if repeat injection on R would be recommended    Keep appt with me later this month    2. On Amiodarone therapy    Started while hospitalized for pAFib/RVR 6/2022    Baseline TSH 6/2022 wnl 1.49; ALT/AST wnl 41/35    PLAN:    Stop amiodarone in case this is contributing to acute worsening of SOB    ADDENDUM: Reviewed labs and CXR. No indication for diuretic seen given nl NTpBNP. Compression stockings could be used for peripheral edema. Confirmed no edema, nl electrolytes, renal function, just mildly low protein. Let them know I had sent a Staff Message to IR re: C4 injection and once I heard back could contact them or Dr. Josias Watts.   Will see how he does after stopping amiodarone. Encouraged he start using Incentive Spirometer to see if this could help the atelectasis.    Voiced understanding and appreciation.    ADDENDUM 7/21/2022: Multiple Staff messages exchanged with SEA Roman in IR between 7/15-7/21/2022.  Order for nerve root injection placed for \"L\" when was " re-ordered to be done as an Outpatient.  SEA delgado'd placing order for repeat injection on R and I sent Staff message sent to Dr. Watts in Neurology to give update/question closer follow-up. AFib RNs called pt today and spoke with wife Dorothy. They have an appt to see Dr. Watts tomorrow.    Component      Latest Ref Rng & Units 6/19/2022 7/15/2022   Hemoglobin      13.3 - 17.7 g/dL 15.7 16.6     Component      Latest Ref Rng & Units 6/14/2022 7/15/2022   N-Terminal Pro BNP Inpatient      0 - 1,800 pg/mL 57    N-Terminal Pro Bnp      0 - 1,800 pg/mL  52     Component      Latest Ref Rng & Units 6/14/2022 6/18/2022 7/15/2022   Sodium      133 - 144 mmol/L 142 145 (H) 137   Potassium      3.4 - 5.3 mmol/L 4.3 3.8 4.3   Chloride      94 - 109 mmol/L 105 108 104   Carbon Dioxide      20 - 32 mmol/L 30 32 30   Anion Gap      3 - 14 mmol/L 7 5 3   Urea Nitrogen      7 - 30 mg/dL 14 30 15   Creatinine      0.66 - 1.25 mg/dL 0.70 0.78 0.63 (L)   Calcium      8.5 - 10.1 mg/dL 9.2 9.0 8.8   Glucose      70 - 99 mg/dL 355 (H) 75 320 (H)     Component      Latest Ref Rng & Units 6/14/2022 7/15/2022   Alkaline Phosphatase      40 - 150 U/L 76 76   AST      0 - 45 U/L 35 18   ALT      0 - 70 U/L 41 36   Protein Total      6.8 - 8.8 g/dL 7.1 6.6 (L)   Albumin      3.4 - 5.0 g/dL 3.3 (L) 3.4   Bilirubin Total      0.2 - 1.3 mg/dL 0.5 0.8   GFR Estimate      >60 mL/min/1.73m2 >90 >90   XR CHEST 2 VW  7/15/2022 8:43 AM       INDICATION: Shortness of breath  COMPARISON: 6/14/2022                                                                     IMPRESSION: Elevated right hemidiaphragm is unchanged. Adjacent atelectasis right lung base. Otherwise negative chest.       Roberta Dangelo PA-C, MSPAS      Orders Placed This Encounter   Procedures     X-ray Chest 2 vws*     Comprehensive metabolic panel     N terminal pro BNP outpatient     Hemoglobin     EKG 12-lead complete w/read - Clinics (performed today)     Orders Placed This  Encounter   Medications     apixaban ANTICOAGULANT (ELIQUIS) 5 MG tablet     Sig: Take 5 mg by mouth 2 times daily     Medications Discontinued During This Encounter   Medication Reason     amiodarone (PACERONE) 400 MG tablet Medication Reconciliation Clean Up     digoxin (LANOXIN) 125 MCG tablet Stopped by Patient     amiodarone (PACERONE) 200 MG tablet          Encounter Diagnoses   Name Primary?     Paroxysmal atrial fibrillation (H) Yes     SOB (shortness of breath)        CURRENT MEDICATIONS:  Current Outpatient Medications   Medication Sig Dispense Refill     apixaban ANTICOAGULANT (ELIQUIS) 5 MG tablet Take 5 mg by mouth 2 times daily       diltiazem ER COATED BEADS (CARDIZEM CD) 360 MG 24 hr capsule Take 1 capsule (360 mg) by mouth daily 30 capsule 1     glipiZIDE (GLUCOTROL) 5 MG tablet Take 10 mg by mouth every morning (before breakfast)       insulin glargine (LANTUS) 100 UNIT/ML PEN Inject 60 Units Subcutaneous At Bedtime       levothyroxine (SYNTHROID/LEVOTHROID) 75 MCG tablet Take 75 mcg by mouth daily       omeprazole (PRILOSEC) 40 MG capsule Take 1 capsule (40 mg) by mouth daily Take 30-60 minutes before a meal. 90 capsule 3       ALLERGIES     Allergies   Allergen Reactions     Statin Drugs [Hmg-Coa-R Inhibitors]          Review of Systems:  Skin:  Negative     Eyes:  Negative    ENT:  Negative    Respiratory:  Positive for dyspnea on exertion;shortness of breath;dyspnea at rest;sleep apnea  Cardiovascular:  Negative for;palpitations;chest pain;lightheadedness;dizziness Positive for;edema;exercise intolerance;fatigue  Gastroenterology: Negative for melena;hematochezia  Genitourinary:  Negative    Musculoskeletal:  Negative    Neurologic:  Positive for local weakness;numbness or tingling of hands  Psychiatric:  Negative    Heme/Lymph/Imm:  Negative    Endocrine:  Positive for diabetes    Physical Exam:  Vitals: /70 (BP Location: Right arm, Cuff Size: Adult Large)   Pulse 73   Ht 1.702 m (5'  "7\")   Wt 77.6 kg (171 lb)   SpO2 94%   BMI 26.78 kg/m      Constitutional:  cooperative, alert and oriented, well developed, well nourished, in no acute distress        Skin:  warm and dry to the touch, no apparent skin lesions or masses noted        Head:  normocephalic, no masses or lesions        Eyes:  pupils equal and round;conjunctivae and lids unremarkable;sclera white        ENT:  not assessed this visit        Neck:           Chest:    basal rales;coarse rales      Cardiac: regular rhythm;no murmurs, gallops or rubs detected                  Abdomen:  abdomen soft        Vascular: pulses full and equal                                      Extremities and Back:  no deformities, clubbing, cyanosis, erythema observed        Neurological:  no gross motor deficits            PAST MEDICAL HISTORY:  Past Medical History:   Diagnosis Date     Diabetes (H)      Sleep apnea     uses CPAP machine     Thyroid disease        PAST SURGICAL HISTORY:  Past Surgical History:   Procedure Laterality Date     CHOLECYSTECTOMY      at [facility]     COLONOSCOPY      in [place], [place]     COLONOSCOPY  08/22/2017    Dr. Ja LYNN     ESOPHAGOSCOPY, GASTROSCOPY, DUODENOSCOPY (EGD), COMBINED N/A 6/7/2016    Procedure: COMBINED ESOPHAGOSCOPY, GASTROSCOPY, DUODENOSCOPY (EGD);  Surgeon: Eneida Denton MD;  Location:  GI       FAMILY HISTORY:  Family History   Problem Relation Age of Onset     Colon Cancer No family hx of        SOCIAL HISTORY:  Social History     Socioeconomic History     Marital status:      Spouse name: None     Number of children: None     Years of education: None     Highest education level: None   Tobacco Use     Smoking status: Never Smoker     Smokeless tobacco: Never Used   Substance and Sexual Activity     Alcohol use: No     Comment: drank many years ago     Drug use: No                 "

## 2022-07-15 ENCOUNTER — LAB (OUTPATIENT)
Dept: LAB | Facility: CLINIC | Age: 82
End: 2022-07-15

## 2022-07-15 ENCOUNTER — HOSPITAL ENCOUNTER (OUTPATIENT)
Dept: GENERAL RADIOLOGY | Facility: CLINIC | Age: 82
Discharge: HOME | End: 2022-07-15
Attending: PHYSICIAN ASSISTANT | Admitting: PHYSICIAN ASSISTANT
Payer: COMMERCIAL

## 2022-07-15 ENCOUNTER — OFFICE VISIT (OUTPATIENT)
Dept: CARDIOLOGY | Facility: CLINIC | Age: 82
End: 2022-07-15
Payer: COMMERCIAL

## 2022-07-15 VITALS
HEART RATE: 73 BPM | HEIGHT: 67 IN | DIASTOLIC BLOOD PRESSURE: 70 MMHG | SYSTOLIC BLOOD PRESSURE: 138 MMHG | OXYGEN SATURATION: 94 % | BODY MASS INDEX: 26.84 KG/M2 | WEIGHT: 171 LBS

## 2022-07-15 DIAGNOSIS — R06.02 SOB (SHORTNESS OF BREATH): ICD-10-CM

## 2022-07-15 DIAGNOSIS — I48.0 PAROXYSMAL ATRIAL FIBRILLATION (H): ICD-10-CM

## 2022-07-15 DIAGNOSIS — I48.0 PAROXYSMAL ATRIAL FIBRILLATION (H): Primary | ICD-10-CM

## 2022-07-15 LAB
ALBUMIN SERPL-MCNC: 3.4 G/DL (ref 3.4–5)
ALP SERPL-CCNC: 76 U/L (ref 40–150)
ALT SERPL W P-5'-P-CCNC: 36 U/L (ref 0–70)
ANION GAP SERPL CALCULATED.3IONS-SCNC: 3 MMOL/L (ref 3–14)
AST SERPL W P-5'-P-CCNC: 18 U/L (ref 0–45)
BILIRUB SERPL-MCNC: 0.8 MG/DL (ref 0.2–1.3)
BUN SERPL-MCNC: 15 MG/DL (ref 7–30)
CALCIUM SERPL-MCNC: 8.8 MG/DL (ref 8.5–10.1)
CHLORIDE BLD-SCNC: 104 MMOL/L (ref 94–109)
CO2 SERPL-SCNC: 30 MMOL/L (ref 20–32)
CREAT SERPL-MCNC: 0.63 MG/DL (ref 0.66–1.25)
GFR SERPL CREATININE-BSD FRML MDRD: >90 ML/MIN/1.73M2
GLUCOSE BLD-MCNC: 320 MG/DL (ref 70–99)
HGB BLD-MCNC: 16.6 G/DL (ref 13.3–17.7)
NT-PROBNP SERPL-MCNC: 52 PG/ML (ref 0–1800)
POTASSIUM BLD-SCNC: 4.3 MMOL/L (ref 3.4–5.3)
PROT SERPL-MCNC: 6.6 G/DL (ref 6.8–8.8)
SODIUM SERPL-SCNC: 137 MMOL/L (ref 133–144)

## 2022-07-15 PROCEDURE — 71046 X-RAY EXAM CHEST 2 VIEWS: CPT

## 2022-07-15 PROCEDURE — 99214 OFFICE O/P EST MOD 30 MIN: CPT | Performed by: PHYSICIAN ASSISTANT

## 2022-07-15 PROCEDURE — 80053 COMPREHEN METABOLIC PANEL: CPT | Performed by: PHYSICIAN ASSISTANT

## 2022-07-15 PROCEDURE — 93000 ELECTROCARDIOGRAM COMPLETE: CPT | Performed by: PHYSICIAN ASSISTANT

## 2022-07-15 PROCEDURE — 85018 HEMOGLOBIN: CPT | Performed by: PHYSICIAN ASSISTANT

## 2022-07-15 PROCEDURE — 36415 COLL VENOUS BLD VENIPUNCTURE: CPT | Performed by: PHYSICIAN ASSISTANT

## 2022-07-15 PROCEDURE — 83880 ASSAY OF NATRIURETIC PEPTIDE: CPT | Performed by: PHYSICIAN ASSISTANT

## 2022-07-15 NOTE — LETTER
7/15/2022    Kyler Mcwilliams MD  [facility] [address] [facility] [address]    RE: Carl DOSREY Kathie       Dear Colleague,     I had the pleasure of seeing Carl DORSEY Kathie in the [facility].  [facility]    I had the pleasure of meeting Carl when he and his wife Dorothy came for concerns re: SOB.  This 82 year old saw Dr. Clay while hospitalized for his history of:    1. Paroxysmal AFib - had been seen at [facility] in 2/2022 for this. 4% burden on monitor   2. NSVT - noted on previous monitor 2/2022 ([facility])  3. HTN   4. DM2 - A1c 8.3% 6/2022  5. Dyslipidemia  6. R Diaphragm Paralysis  Has seen Dr. Josias Watts. Had C4 nerve block done 6/2022 but note this was on the L, despite R hemidiaphragm being elevated.       Carl had a phone visit with Dr. Cross at [facility] 2/2022 after his PCP was noted to have AFib.  At that time, he had occasional GARCIA and depending edema which was not new. He noted palpitations only with emotional excitement. At that visit, it appears he'd started the BB rec'd by PCP, but hadn't started AC due to cost despite CHADSVASc 4 (HTN, DM, age). Monitor and Stress test was rec'd. Follow-up monitor reportedly showed  4% AFib burden and episodes of NSVT for which stress testing was rec'd.     Unfortunately, he was then hospitalized 6/14-20/2022 for AFib with RVR after presenting with acute hypoxic respiratory failure thought d/t RVR and diaphragmatic paralysis from C4 nerve impingement. He was seen in consultation by Dr. Emery 6/16 who reviewed echo showing nl VLEF and initially, Diltiazem increased and digoxin added for rate control. Oral amiodarone was then added as HRs continued to be elevated. Dr. Clay then saw and meds continued. OP Pantera recommended along with EP follow-up.     He was discharged home on 6/20/2022 @ 167# on amiodarone 400 mg BID x 7 days, then 200 mg BID x 7 days then 200 mg daily, and digoxin 125 mcg daily. PTA Diltiazem was  "increased from 120 to 360 mg daily. Eliquis was stopped as he was to undergo C4 injection by IR on Wednesday 6/22. Exam notes indicate heart rhythm was regular at that time.    He had his injection with IR 6/22 and was reportedly instructed to restart Eliquis the following day 6/23.    He called 7/13/2022 and reported to Team 4 RN that he'd been having worsening SOB x days, only able to walk ~10 feet (instead of PTA 30 feet) before feeling SOB. SBPs 120-130s, HR 60s. O2 levels \"wnl.\" He was asked to see me to review this and I am meeting him today.      Interval History:  Carl and Dorothy note that Carl is \"miserable\" since his hospitalization. No issues with palpitations, dizziness, lightheadedness. No c/o exertional CP.    Notes LE edema, without much change over the last few months. He feels short of breath \"all the time\" even while sitting. Notes worse with laying flat (but can lay on side). No improvement after injection but Dorothy points out that R diaphragm was paralyzed and injection was administered on the L. Indeed,  Report on 6/22 indicates L C4 injection d/t \"L diaphragm paralysis and L shoulder pain.\"     O2 sats today 93-94% at rest. EKG showed SR.    VITALS:  Vitals: /70 (BP Location: Right arm, Cuff Size: Adult Large)   Pulse 73   Ht 1.702 m (5' 7\")   Wt 77.6 kg (171 lb)   SpO2 94%   BMI 26.78 kg/m      Diagnostic Testing:  EKG today, which I overread, showed SR 71 bpm. QTc ~440 ms on amiodarone  ZioPatch 6/20-7/4/2022 - SR with 3% pAFib burden. In SR, avg HR 66 bpm (range 47--112 bpm. 3% AFib burden, with avg HR in AFib 79 bpm (range  bpm), with longest episode lasting ~5h (episodes 6/25, 7/3). 17 episodes of SVT noted, longest episode 18s. <1% PVCs and 1.6% PACs.  On amiodarone loading dose, Diltiazem 360 mg daily and digoxin 125 mcg daily  Echocardiogram 6/2022 - Nl LVEF 55-60%. Nl RV. Trace MR. Trace TR. RVSP 14+RAP. Trace AI  Stress Testing (Care Everywhere) 3/2022 - " nl effusion    Plan:  Stop amiodarone  Repeat Hgb, NTpBNP, CMP  CXR  Will message IR with ?R C4 injection    Assessment/Plan:    1. GARCIA/SOB    Called and urgently requested appt as sxs acutely worsened 7/13/2022. Notes this has been going on since 1/2022    Note pAFib on ZioPatch, with controlled HR. Only 3% burden    Echo 6/2022 with nl LVEF and nl RV/RVSP    NTpBNP 6/2022 while hospitalized wnl    CT PE while hospitalized showed no PE. Has been on Eliquis without interruption since restarted after C4 root injection . 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 Saw Dr. Boothe from [facility] 5/2022 and she set up NIF testing showing paralyzed diaphragm. Now s/p technically successful C4 injection 6/22/2022; Procedural PA noted C7/T1 epidural steroid injection could be attempted    Nuclear stress test 3/2022 (Care Everywhere) negative for ischemia. Remains without exertional CP    CBC 6/2022 wnl 17.0 g/dL    PLAN:    Stop amiodarone in case this is contributing to acute worsening of SOB    Repeat NTpBNP, CMP, Hgb    CXR     I'll message IR PA who did procedure to see if repeat injection on R would be recommended    Keep appt with me later this month    2. On Amiodarone therapy    Started while hospitalized for pAFib/RVR 6/2022    Baseline TSH 6/2022 wnl 1.49; ALT/AST wnl 41/35    PLAN:    Stop amiodarone in case this is contributing to acute worsening of SOB    ADDENDUM: Reviewed labs and CXR. No indication for diuretic seen given nl NTpBNP. Compression stockings could be used for peripheral edema. Confirmed no edema, nl electrolytes, renal function, just mildly low protein. Let them know I had sent a Staff Message to IR re: C4 injection and once I heard back could contact them or Dr. Josias Watts.   Will see how he does after stopping amiodarone. Encouraged he start using Incentive Spirometer to see if this could help  the atelectasis.    Voiced understanding and appreciation.  Component      Latest Ref Rng & Units 6/19/2022 7/15/2022   Hemoglobin      13.3 - 17.7 g/dL 15.7 16.6     Component      Latest Ref Rng & Units 6/14/2022 7/15/2022   N-Terminal Pro BNP Inpatient      0 - 1,800 pg/mL 57    N-Terminal Pro Bnp      0 - 1,800 pg/mL  52     Component      Latest Ref Rng & Units 6/14/2022 6/18/2022 7/15/2022   Sodium      133 - 144 mmol/L 142 145 (H) 137   Potassium      3.4 - 5.3 mmol/L 4.3 3.8 4.3   Chloride      94 - 109 mmol/L 105 108 104   Carbon Dioxide      20 - 32 mmol/L 30 32 30   Anion Gap      3 - 14 mmol/L 7 5 3   Urea Nitrogen      7 - 30 mg/dL 14 30 15   Creatinine      0.66 - 1.25 mg/dL 0.70 0.78 0.63 (L)   Calcium      8.5 - 10.1 mg/dL 9.2 9.0 8.8   Glucose      70 - 99 mg/dL 355 (H) 75 320 (H)     Component      Latest Ref Rng & Units 6/14/2022 7/15/2022   Alkaline Phosphatase      40 - 150 U/L 76 76   AST      0 - 45 U/L 35 18   ALT      0 - 70 U/L 41 36   Protein Total      6.8 - 8.8 g/dL 7.1 6.6 (L)   Albumin      3.4 - 5.0 g/dL 3.3 (L) 3.4   Bilirubin Total      0.2 - 1.3 mg/dL 0.5 0.8   GFR Estimate      >60 mL/min/1.73m2 >90 >90   XR CHEST 2 VW  7/15/2022 8:43 AM       INDICATION: Shortness of breath  COMPARISON: 6/14/2022                                                                     IMPRESSION: Elevated right hemidiaphragm is unchanged. Adjacent atelectasis right lung base. Otherwise negative chest.       Roberta Dangelo PA-C, MSPAS      Orders Placed This Encounter   Procedures     X-ray Chest 2 vws*     Comprehensive metabolic panel     N terminal pro BNP outpatient     Hemoglobin     EKG 12-lead complete w/read - Clinics (performed today)     Orders Placed This Encounter   Medications     apixaban ANTICOAGULANT (ELIQUIS) 5 MG tablet     Sig: Take 5 mg by mouth 2 times daily     Medications Discontinued During This Encounter   Medication Reason     amiodarone (PACERONE) 400 MG tablet Medication  "Reconciliation Clean Up     digoxin (LANOXIN) 125 MCG tablet Stopped by Patient     amiodarone (PACERONE) 200 MG tablet          Encounter Diagnoses   Name Primary?     Paroxysmal atrial fibrillation (H) Yes     SOB (shortness of breath)        CURRENT MEDICATIONS:  Current Outpatient Medications   Medication Sig Dispense Refill     apixaban ANTICOAGULANT (ELIQUIS) 5 MG tablet Take 5 mg by mouth 2 times daily       diltiazem ER COATED BEADS (CARDIZEM CD) 360 MG 24 hr capsule Take 1 capsule (360 mg) by mouth daily 30 capsule 1     glipiZIDE (GLUCOTROL) 5 MG tablet Take 10 mg by mouth every morning (before breakfast)       insulin glargine (LANTUS) 100 UNIT/ML PEN Inject 60 Units Subcutaneous At Bedtime       levothyroxine (SYNTHROID/LEVOTHROID) 75 MCG tablet Take 75 mcg by mouth daily       omeprazole (PRILOSEC) 40 MG capsule Take 1 capsule (40 mg) by mouth daily Take 30-60 minutes before a meal. 90 capsule 3       ALLERGIES     Allergies   Allergen Reactions     Statin Drugs [Hmg-Coa-R Inhibitors]          Review of Systems:  Skin:  Negative     Eyes:  Negative    ENT:  Negative    Respiratory:  Positive for dyspnea on exertion;shortness of breath;dyspnea at rest;sleep apnea  Cardiovascular:  Negative for;palpitations;chest pain;lightheadedness;dizziness Positive for;edema;exercise intolerance;fatigue  Gastroenterology: Negative for melena;hematochezia  Genitourinary:  Negative    Musculoskeletal:  Negative    Neurologic:  Positive for local weakness;numbness or tingling of hands  Psychiatric:  Negative    Heme/Lymph/Imm:  Negative    Endocrine:  Positive for diabetes    Physical Exam:  Vitals: /70 (BP Location: Right arm, Cuff Size: Adult Large)   Pulse 73   Ht 1.702 m (5' 7\")   Wt 77.6 kg (171 lb)   SpO2 94%   BMI 26.78 kg/m      Constitutional:  cooperative, alert and oriented, well developed, well nourished, in no acute distress        Skin:  warm and dry to the touch, no apparent skin lesions or " masses noted        Head:  normocephalic, no masses or lesions        Eyes:  pupils equal and round;conjunctivae and lids unremarkable;sclera white        ENT:  not assessed this visit        Neck:           Chest:    basal rales;coarse rales      Cardiac: regular rhythm;no murmurs, gallops or rubs detected                  Abdomen:  abdomen soft        Vascular: pulses full and equal                                      Extremities and Back:  no deformities, clubbing, cyanosis, erythema observed        Neurological:  no gross motor deficits            PAST MEDICAL HISTORY:  Past Medical History:   Diagnosis Date     Diabetes (H)      Sleep apnea     uses CPAP machine     Thyroid disease        PAST SURGICAL HISTORY:  Past Surgical History:   Procedure Laterality Date     CHOLECYSTECTOMY      at [facility]     COLONOSCOPY      in [place], [place]     COLONOSCOPY  08/22/2017    Dr. Ja LYNN     ESOPHAGOSCOPY, GASTROSCOPY, DUODENOSCOPY (EGD), COMBINED N/A 6/7/2016    Procedure: COMBINED ESOPHAGOSCOPY, GASTROSCOPY, DUODENOSCOPY (EGD);  Surgeon: Eneida Denton MD;  Location:  GI       FAMILY HISTORY:  Family History   Problem Relation Age of Onset     Colon Cancer No family hx of        SOCIAL HISTORY:  Social History     Socioeconomic History     Marital status:      Spouse name: None     Number of children: None     Years of education: None     Highest education level: None   Tobacco Use     Smoking status: Never Smoker     Smokeless tobacco: Never Used   Substance and Sexual Activity     Alcohol use: No     Comment: drank many years ago     Drug use: No         Thank you for allowing me to participate in the care of your patient.      Sincerely,     Lety Dangelo PA-C     [facility]  cc:   No referring provider defined for this encounter.

## 2022-07-15 NOTE — PATIENT INSTRUCTIONS
Carl - it was good to meet you and Dorothy today!    Reviewed your continued terrible shortness of breath, worse since you got out of the hospital  Unsure of reason - your monitor showed just occasional atrial fibrillation and EKG today showed normal rhythm ... therefore don't think that the AFIb is causing too much breathing trouble  Reviewed negative stress test 3/2022 and normal echo 6/2022  Reviewed normal CT without blood clot 6/2022    PLAN:  STOP amiodarone  .. let;s see if this helps breathing at all  Get CXR and labs today  The AFib nurses (Jessica Monsalve and Anne) [phone]

## 2022-07-20 ENCOUNTER — DOCUMENTATION ONLY (OUTPATIENT)
Dept: CARDIOLOGY | Facility: CLINIC | Age: 82
End: 2022-07-20

## 2022-07-20 NOTE — PROGRESS NOTES
Message left for patient to get update on symptoms.  Awaiting return call from patient.  NATALY Munoz

## 2022-07-20 NOTE — PROGRESS NOTES
Pls call Roosevelt today or tomorrow.    1. Any improvement in SOB since stopping Amiodarone at our visit 7/15/2022?  2. Has anyone from Dr. Watts's office (Neuro) or IR reached out to them?   - I heard back from PA in IR that the nerve injection was ordered on the L even though it was the R hemidiaphragm that was elevated. This is being reviewed to determine how exactly that happened. The PA agreed that the R injection can be done, just needs to be ordered. Typically that would be the Neurology team (Dr. Watts)   - I have not heard from Dr. Watts in Neuro yet - I had messaged to see if he could order the procedure to be reordered on the R AND to see if he'd be able to see Carl any sooner (b/c apparently they're not set up until late August).    3. For now, keep his appt with me in late July. Let me know if anything's been set up to repeat procedure on R!    Ismael Granados

## 2022-07-21 NOTE — PROGRESS NOTES
Pt wife called back and states that breathing has not improved at all. Pt states that she talked to a nurse in Dr Watts's office and was told that they are on the list to move OV up, but at this time they have the appointment on 8/23. Pt wife is very frustrated and concerned because pt is unable to walk the distance that he once could. Will make Roberta aware. Cesario    Addeundum:Call back from Pt wife Dorothy and pt will be seen tomorrow AM ~-0800 with Dr Watts. Roberta made aware.  Cesario

## 2022-07-24 NOTE — PROGRESS NOTES
"[facility]    I had the pleasure of seeing Carl when he and Dorothy came for follow up of SOB, pAFIb.  This 82 year old saw Dr. Clay while hospitalized for his history of:    1. Paroxysmal AFib - had been seen at [facility] in 2/2022 for this. 4% burden on monitor.  Admitted 6/2022 with terrible SOB and noted to have pAFib. Follow-up ZIoPatch with 3% burden.  2. NSVT - noted on previous monitor 2/2022 ([facility])  3. HTN   4. DM2 - A1c 8.3% 6/2022  5. Dyslipidemia  6. R Diaphragm Paralysis  Has seen Dr. Josias Watts. Had C4 nerve block done 6/2022 but note this was on the L, despite R hemidiaphragm being elevated.     I met Carl and Dorothy 7/15 when he was added to my schedule urgently d/t ongoing SOB following hospitalization.  We reviewed a ZioPatch showing only 3% paroxysmal AFib burden, with average heart rate well controlled 79 bpm and longest episodes occurring 6/25 and 7/3.  He increased to that SOB was \"constant\" and \"absolutely not worse\" during these long episodes of AFib.  Furthermore, he thought this had gotten worse since hospitalization.     Therefore, it did not appear that his arrhythmia was contributing to his chronic SOB, which had worsened since hospitalization.  I did stop amiodarone as this has been started while hospitalized and he thought SOB has worsened since he got home.  Repeat hemoglobin, NTpBNP, CMP and CXR were all unremarkable and recommended that he use incentive spirometry given atelectasis seen on CXR.    It turns out that the order for the nerve root injection had been erroneously entered to be done on the LEFT, and IR recommended order be reentered so it could be done on the RIGHT, which was the side of the elevated hemidiaphragm..    He saw Dr. Josias Watts, Neurologist, who had initially recommended nerve root injection, just a few days ago 7/22, and review of his notes indicates SOB was felt to be related to his cardiac condition and cervical pathology.  It " "was recommended he see orthopedics for spine evaluation as well as follow-up NIF to see if there was any quantitative evidence of improvement since his C4 nerve root injection (though had been done on the Left in error).     Interval History:  Unfortunately but not surprisingly, Carl feels no different since I saw him 7/15.  He continues to deny palpitations, lightheadedness or dizziness.  Continues to deny any exertional chest pain, pressure or tightness.    Major issue continues to be extreme shortness of breath with very little activity.  Dorothy notes that he can walk about 10-15 feet without having to stop.  They have not yet heard from [facility] to get an evaluation.  Dorohty is concerned that they will recommend a multilevel spine surgery.    Denies any change in his mild LE edema.  He is not wearing compression stockings.  No orthopnea/PND.    By exam today, he is in SR.     VITALS:  Vitals: BP (!) 147/73   Pulse 69   Ht 1.702 m (5' 7\")   Wt 77.1 kg (170 lb)   SpO2 94%   BMI 26.63 kg/m      Diagnostic Testing:  SigifredooPatch 6/20-7/4/2022 - SR with 3% pAFib burden. In SR, avg HR 66 bpm (range 47--112 bpm. 3% AFib burden, with avg HR in AFib 79 bpm (range  bpm), with longest episode lasting ~5h (episodes 6/25, 7/3). 17 episodes of SVT noted, longest episode 18s. <1% PVCs and 1.6% PACs.  On amiodarone loading dose, Diltiazem 360 mg daily and digoxin 125 mcg daily  Echocardiogram 6/2022 - Nl LVEF 55-60%. Nl RV. Trace MR. Trace TR. RVSP 14+RAP. Trace AI  Stress Testing (Care Everywhere) 3/2022 - nl effusion  Component      Latest Ref Rng & Units 6/20/2022 7/15/2022   Hemoglobin      13.3 - 17.7 g/dL 17.0 16.6     Component      Latest Ref Rng & Units 6/14/2022 7/15/2022   N-Terminal Pro BNP Inpatient      0 - 1,800 pg/mL 57    N-Terminal Pro Bnp      0 - 1,800 pg/mL  52     Component      Latest Ref Rng & Units 6/20/2022 7/15/2022   Sodium      133 - 144 mmol/L 140 137   Potassium      3.4 - 5.3 " "mmol/L 3.7 4.3   Chloride      94 - 109 mmol/L 107 104   Carbon Dioxide      20 - 32 mmol/L 32 30   Anion Gap      3 - 14 mmol/L 1 (L) 3   Urea Nitrogen      7 - 30 mg/dL 22 15   Creatinine      0.66 - 1.25 mg/dL 0.78 0.63 (L)   Calcium      8.5 - 10.1 mg/dL 8.8 8.8   Glucose      70 - 99 mg/dL 92 320 (H)     Component      Latest Ref Rng & Units 7/15/2022   Alkaline Phosphatase      40 - 150 U/L 76   AST      0 - 45 U/L 18   ALT      0 - 70 U/L 36   Protein Total      6.8 - 8.8 g/dL 6.6 (L)   Albumin      3.4 - 5.0 g/dL 3.4   Bilirubin Total      0.2 - 1.3 mg/dL 0.8   GFR Estimate      >60 mL/min/1.73m2 >90   Study Result    Narrative & Impression   XR CHEST 2 VW  7/15/2022 8:43 AM       INDICATION: Shortness of breath  COMPARISON: 6/14/2022                                                                       IMPRESSION: Elevated right hemidiaphragm is unchanged. Adjacent  atelectasis right lung base. Otherwise negative chest.     EVA GOODWIN MD          Plan:  Torsemide 20 mg daily as a trial  Contact us in 1 week with update on weight, edema and breathing.    Assessment/Plan:    1. Paroxysmal AFib    Had been diagnosed with this 2/2022, and ZioPatch at that time showed 4% burden ([facility]).  At that time, Dr. Cross noted palpitations only with emotional excitement     Hospitalized 6/2022 and had pAFib treated with rate control (amiodarone, digoxin, diltiazem). Follow-up ZioPatch 6/2022 on these medications continue to show very low burden of pAFib, which did not correlate with symptoms of his SOB, which was \"constant.\"    ZioPatch 6/2022 on amiodarone load, digoxin 125 and Diltiazem 360 mg daily showed 3% AFib burden with controlled HR.     He was off of digoxin 125 mcg by our visit 7/15 as \"had run out.\" EKG at that time showed SR 70s. Amiodarone was stopped 7/15 in case it was causing acute worsening of his SOB.  This has been trialed for rate control (not rhythm control) during hospitalization 6/2022      LVEF " "6/2022 wnl 55-60%.     Remains on AC with Eliquis for [id number] 4 (hypertension, diabetes, age).      PLAN:     As pAFib appears to be asymptomatic, would continue rate control and anticoagulation.  We will continue Diltiazem 360 mg daily with plans to repeat ZioPatch before meeting with KHARI STARKS ([place]) in 2-3 months.    2. GARCIA/SOB    Unfortunately, this continues to be Carl's overwhelming complaint.  He noted occasional GARCIA when he saw Dr. Cross @ [facility] 2/2022, but ZioPatch showed only 4% AFib burden.     Hospitalized 6/14-20/2022 for acute hypoxic respiratory failure thought d/t AFib/RVR and R diaphragmatic paralysis from C4 nerve impingement    Started on rate control (digoxin and amiodarone added, increased Diltiazem). Follow-up ZioPatch showed 3% AFib burden with controlled HR on these medications    Discrete AFib episodes 6/25 & 7/3 did not correlate with SOB, which was \"constant\"      Repeat blood work done during our visit a few weeks ago continued to show normal hemoglobin, normal NTpBNP, CMP and CXR (with continued R hemidiaphragm elevation)    Again reviewed multiple studies done while hospitalized  o CT PE 6/2022 without PE.  Remains on Eliquis  o PFTs 4/2022 suggestive of asthma with possible combined obstructive/restrictive lung disease.  Dr. Boothe saw from [facility] 5/2022 and she set up NIF testing showing paralyzed R diaphragm. Now s/p technically successful C4 injection 6/22/2022 but on the left). Procedural PA noted C7/T1 epidural steroid injection could be attempted  o Nuclear stress test 3/2022 ([facility] Everywhere) negative for ischemia. Remains without exertional CP  o Echo 6/2022 with normal LVEF and nl RV/RVSP.  No pericardial effusion.  Normal RAP and collapsing IVC.      Confirms no improvement after stopping amiodarone during our visit 7/15/2022 on the small chance that amiodarone was affecting symptoms acutely after being started in the hospital    Note Dr. Watts's plan to have him see " Orthopedics for spine evaluation    PLAN:    Given c/o LE edema, will trial torsemide 20 mg daily x 1 week.  Asked them to contact us in 1 week with update on any improvement in edema, or SOB or drop in weight    Will have Patient Relations contact Roosevelt Dangelo PA-C, MSPAS      No orders of the defined types were placed in this encounter.    Orders Placed This Encounter   Medications     diltiazem ER COATED BEADS (CARDIZEM CD) 360 MG 24 hr capsule     Sig: Take 1 capsule (360 mg) by mouth daily     Dispense:  90 capsule     Refill:  3     torsemide (DEMADEX) 20 MG tablet     Sig: Take 1 tablet (20 mg) by mouth daily     Dispense:  30 tablet     Refill:  0     Medications Discontinued During This Encounter   Medication Reason     diltiazem ER COATED BEADS (CARDIZEM CD) 360 MG 24 hr capsule Reorder         Encounter Diagnoses   Name Primary?     Paroxysmal atrial fibrillation (H)      SOB (shortness of breath) Yes       CURRENT MEDICATIONS:  Current Outpatient Medications   Medication Sig Dispense Refill     apixaban ANTICOAGULANT (ELIQUIS) 5 MG tablet Take 5 mg by mouth 2 times daily       diltiazem ER COATED BEADS (CARDIZEM CD) 360 MG 24 hr capsule Take 1 capsule (360 mg) by mouth daily 90 capsule 3     glipiZIDE (GLUCOTROL) 5 MG tablet Take 10 mg by mouth every morning (before breakfast)       insulin glargine (LANTUS) 100 UNIT/ML PEN Inject 60 Units Subcutaneous At Bedtime       levothyroxine (SYNTHROID/LEVOTHROID) 75 MCG tablet Take 75 mcg by mouth daily       omeprazole (PRILOSEC) 40 MG capsule Take 1 capsule (40 mg) by mouth daily Take 30-60 minutes before a meal. 90 capsule 3     torsemide (DEMADEX) 20 MG tablet Take 1 tablet (20 mg) by mouth daily 30 tablet 0       ALLERGIES     Allergies   Allergen Reactions     Statin Drugs [Hmg-Coa-R Inhibitors]          Review of Systems:  Skin:  Negative     Eyes:  Negative    ENT:  Negative    Respiratory:  Positive for dyspnea on  "exertion;shortness of breath;dyspnea at rest;sleep apnea  Cardiovascular:  Negative for;palpitations;chest pain;lightheadedness;dizziness Positive for;edema;exercise intolerance;fatigue  Gastroenterology: Negative for melena;hematochezia  Genitourinary:  Negative    Musculoskeletal:  Negative    Neurologic:  Positive for local weakness;numbness or tingling of hands  Psychiatric:  Negative    Heme/Lymph/Imm:  Negative    Endocrine:  Positive for diabetes    Physical Exam:  Vitals: BP (!) 147/73   Pulse 69   Ht 1.702 m (5' 7\")   Wt 77.1 kg (170 lb)   SpO2 94%   BMI 26.63 kg/m      Constitutional:  cooperative, alert and oriented, well developed, well nourished, in no acute distress        Skin:  warm and dry to the touch, no apparent skin lesions or masses noted        Head:  normocephalic, no masses or lesions        Eyes:  pupils equal and round;conjunctivae and lids unremarkable;sclera white        ENT:  not assessed this visit        Neck:           Chest:    coarse rales;diminished breath sounds right sided      Cardiac: regular rhythm;no murmurs, gallops or rubs detected                  Abdomen:  abdomen soft        Vascular: pulses full and equal                                      Extremities and Back:  no deformities, clubbing, cyanosis, erythema observed        Neurological:  no gross motor deficits            PAST MEDICAL HISTORY:  Past Medical History:   Diagnosis Date     Diabetes (H)      Sleep apnea     uses CPAP machine     Thyroid disease        PAST SURGICAL HISTORY:  Past Surgical History:   Procedure Laterality Date     CHOLECYSTECTOMY      at [facility]     COLONOSCOPY      in [place], [place]     COLONOSCOPY  08/22/2017    Dr. Ja LYNN     ESOPHAGOSCOPY, GASTROSCOPY, DUODENOSCOPY (EGD), COMBINED N/A 6/7/2016    Procedure: COMBINED ESOPHAGOSCOPY, GASTROSCOPY, DUODENOSCOPY (EGD);  Surgeon: Eneida Denton MD;  Location:  GI       FAMILY HISTORY:  Family History   Problem " Relation Age of Onset     Colon Cancer No family hx of        SOCIAL HISTORY:  Social History     Socioeconomic History     Marital status:    Tobacco Use     Smoking status: Never Smoker     Smokeless tobacco: Never Used   Substance and Sexual Activity     Alcohol use: No     Comment: drank many years ago     Drug use: No

## 2022-07-25 ENCOUNTER — OFFICE VISIT (OUTPATIENT)
Dept: CARDIOLOGY | Facility: CLINIC | Age: 82
End: 2022-07-25
Payer: COMMERCIAL

## 2022-07-25 VITALS
BODY MASS INDEX: 26.68 KG/M2 | DIASTOLIC BLOOD PRESSURE: 73 MMHG | OXYGEN SATURATION: 94 % | SYSTOLIC BLOOD PRESSURE: 147 MMHG | HEART RATE: 69 BPM | WEIGHT: 170 LBS | HEIGHT: 67 IN

## 2022-07-25 DIAGNOSIS — R06.02 SOB (SHORTNESS OF BREATH): Primary | ICD-10-CM

## 2022-07-25 DIAGNOSIS — I48.0 PAROXYSMAL ATRIAL FIBRILLATION (H): ICD-10-CM

## 2022-07-25 PROCEDURE — 99214 OFFICE O/P EST MOD 30 MIN: CPT | Performed by: PHYSICIAN ASSISTANT

## 2022-07-25 RX ORDER — DILTIAZEM HYDROCHLORIDE 360 MG/1
360 CAPSULE, EXTENDED RELEASE ORAL DAILY
Qty: 90 CAPSULE | Refills: 3 | Status: ON HOLD | OUTPATIENT
Start: 2022-07-25 | End: 2023-02-23

## 2022-07-25 RX ORDER — TORSEMIDE 20 MG/1
20 TABLET ORAL DAILY
Qty: 30 TABLET | Refills: 0 | Status: SHIPPED | OUTPATIENT
Start: 2022-07-25 | End: 2022-08-17

## 2022-07-25 NOTE — PATIENT INSTRUCTIONS
"Carl - it was good to see you today!    Reviewed that your breathing is still terrible despite all the good heart tests, stopping amiodarone, etc  Reviewed Dr. Watts's note referring you to Ortho      PLAN:  Continue the Diltiazem @ 360 (Cardizem) as the ZioPatch on this med showed good HR control - I sent new Rx in x 90 days  Weigh yourself tomorrow morning  Start a \"trial\" of torsemide 20 mg daily in AM    4. In 1 week (8/2) [facility]:   1) Is breathing any different?   2) How is swelling in legs?      3) Has weight gone down? [phone]    5. See Dr. Blackmon or Dr. De in consultation ~10/2022 (they're \"electricians\") for the heart rhythm  "

## 2022-07-25 NOTE — LETTER
"7/25/2022    Kyler Mcwilliams MD  [facility] [address] [facility] [address]    RE: Carl Vázquez       Dear Colleague,     I had the pleasure of seeing Carl Vázquez in the [facility].  [facility]ART [facility]    I had the pleasure of seeing Carl when he and Dorothy came for follow up of SOB, pAFIb.  This 82 year old saw Dr. Clay while hospitalized for his history of:    1. Paroxysmal AFib - had been seen at [facility] in 2/2022 for this. 4% burden on monitor.  Admitted 6/2022 with terrible SOB and noted to have pAFib. Follow-up ZIoPatch with 3% burden.  2. NSVT - noted on previous monitor 2/2022 ([facility])  3. HTN   4. DM2 - A1c 8.3% 6/2022  5. Dyslipidemia  6. R Diaphragm Paralysis  Has seen Dr. Josias Watts. Had C4 nerve block done 6/2022 but note this was on the L, despite R hemidiaphragm being elevated.     I met Carl and Dorothy 7/15 when he was added to my schedule urgently d/t ongoing SOB following hospitalization.  We reviewed a ZioPatch showing only 3% paroxysmal AFib burden, with average heart rate well controlled 79 bpm and longest episodes occurring 6/25 and 7/3.  He increased to that SOB was \"constant\" and \"absolutely not worse\" during these long episodes of AFib.  Furthermore, he thought this had gotten worse since hospitalization.     Therefore, it did not appear that his arrhythmia was contributing to his chronic SOB, which had worsened since hospitalization.  I did stop amiodarone as this has been started while hospitalized and he thought SOB has worsened since he got home.  Repeat hemoglobin, NTpBNP, CMP and CXR were all unremarkable and recommended that he use incentive spirometry given atelectasis seen on CXR.    It turns out that the order for the nerve root injection had been erroneously entered to be done on the LEFT, and IR recommended order be reentered so it could be done on the RIGHT, which was the side of the elevated " "hemidiaphragm..    He saw Dr. Josias Watts, Neurologist, who had initially recommended nerve root injection, just a few days ago 7/22, and review of his notes indicates SOB was felt to be related to his cardiac condition and cervical pathology.  It was recommended he see orthopedics for spine evaluation as well as follow-up NIF to see if there was any quantitative evidence of improvement since his C4 nerve root injection (though had been done on the Left in error).     Interval History:  Unfortunately but not surprisingly, Carl feels no different since I saw him 7/15.  He continues to deny palpitations, lightheadedness or dizziness.  Continues to deny any exertional chest pain, pressure or tightness.    Major issue continues to be extreme shortness of breath with very little activity.  Dorothy notes that he can walk about 10-15 feet without having to stop.  They have not yet heard from Ortho Spine to get an evaluation.  Dorothy is concerned that they will recommend a multilevel spine surgery.    Denies any change in his mild LE edema.  He is not wearing compression stockings.  No orthopnea/PND.    By exam today, he is in SR.     VITALS:  Vitals: BP (!) 147/73   Pulse 69   Ht 1.702 m (5' 7\")   Wt 77.1 kg (170 lb)   SpO2 94%   BMI 26.63 kg/m      Diagnostic Testing:  [facility] 6/20-7/4/2022 - SR with 3% pAFib burden. In SR, avg HR 66 bpm (range 47--112 bpm. 3% AFib burden, with avg HR in AFib 79 bpm (range  bpm), with longest episode lasting ~5h (episodes 6/25, 7/3). 17 episodes of SVT noted, longest episode 18s. <1% PVCs and 1.6% PACs.  On amiodarone loading dose, Diltiazem 360 mg daily and digoxin 125 mcg daily  Echocardiogram 6/2022 - Nl LVEF 55-60%. Nl RV. Trace MR. Trace TR. RVSP 14+RAP. Trace AI  Stress Testing (Care Everywhere) 3/2022 - nl effusion  Component      Latest Ref Rng & Units 6/20/2022 7/15/2022   Hemoglobin      13.3 - 17.7 g/dL 17.0 16.6     Component      Latest Ref Rng & Units " "6/14/2022 7/15/2022   N-Terminal Pro BNP Inpatient      0 - 1,800 pg/mL 57    N-Terminal Pro Bnp      0 - 1,800 pg/mL  52     Component      Latest Ref Rng & Units 6/20/2022 7/15/2022   Sodium      133 - 144 mmol/L 140 137   Potassium      3.4 - 5.3 mmol/L 3.7 4.3   Chloride      94 - 109 mmol/L 107 104   Carbon Dioxide      20 - 32 mmol/L 32 30   Anion Gap      3 - 14 mmol/L 1 (L) 3   Urea Nitrogen      7 - 30 mg/dL 22 15   Creatinine      0.66 - 1.25 mg/dL 0.78 0.63 (L)   Calcium      8.5 - 10.1 mg/dL 8.8 8.8   Glucose      70 - 99 mg/dL 92 320 (H)     Component      Latest Ref Rng & Units 7/15/2022   Alkaline Phosphatase      40 - 150 U/L 76   AST      0 - 45 U/L 18   ALT      0 - 70 U/L 36   Protein Total      6.8 - 8.8 g/dL 6.6 (L)   Albumin      3.4 - 5.0 g/dL 3.4   Bilirubin Total      0.2 - 1.3 mg/dL 0.8   GFR Estimate      >60 mL/min/1.73m2 >90   Study Result    Narrative & Impression   XR CHEST 2 VW  7/15/2022 8:43 AM       INDICATION: Shortness of breath  COMPARISON: 6/14/2022                                                                       IMPRESSION: Elevated right hemidiaphragm is unchanged. Adjacent  atelectasis right lung base. Otherwise negative chest.     EVA GOODWIN MD          Plan:  Torsemide 20 mg daily as a trial  Contact us in 1 week with update on weight, edema and breathing.    Assessment/Plan:    1. Paroxysmal AFib    Had been diagnosed with this 2/2022, and ZioPatch at that time showed 4% burden ([facility]).  At that time, Dr. Cross noted palpitations only with emotional excitement     Hospitalized 6/2022 and had pAFib treated with rate control (amiodarone, digoxin, diltiazem). Follow-up ZioPatch 6/2022 on these medications continue to show very low burden of pAFib, which did not correlate with symptoms of his SOB, which was \"constant.\"    ZioPatch 6/2022 on amiodarone load, digoxin 125 and Diltiazem 360 mg daily showed 3% AFib burden with controlled HR.     He was off of digoxin 125 " "mcg by our visit 7/15 as \"had run out.\" EKG at that time showed SR 70s. Amiodarone was stopped 7/15 in case it was causing acute worsening of his SOB.  This has been trialed for rate control (not rhythm control) during hospitalization 6/2022      LVEF 6/2022 wnl 55-60%.     Remains on AC with Eliquis for [id number] 4 (hypertension, diabetes, age).      PLAN:     As pAFib appears to be asymptomatic, would continue rate control and anticoagulation.  We will continue Diltiazem 360 mg daily with plans to repeat ZioPatch before meeting with KHARI STARKS ([place]) in 2-3 months.    2. GARCIA/SOB    Unfortunately, this continues to be Carl's overwhelming complaint.  He noted occasional GARCIA when he saw Dr. Cross @ [facility] 2/2022, but ZioPatch showed only 4% AFib burden.     Hospitalized 6/14-20/2022 for acute hypoxic respiratory failure thought d/t AFib/RVR and R diaphragmatic paralysis from C4 nerve impingement    Started on rate control (digoxin and amiodarone added, increased Diltiazem). Follow-up ZioPatch showed 3% AFib burden with controlled HR on these medications    Discrete AFib episodes 6/25 & 7/3 did not correlate with SOB, which was \"constant\"      Repeat blood work done during our visit a few weeks ago continued to show normal hemoglobin, normal NTpBNP, CMP and CXR (with continued R hemidiaphragm elevation)    Again reviewed multiple studies done while hospitalized  o CT PE 6/2022 without PE.  Remains on Eliquis  o PFTs 4/2022 suggestive of asthma with possible combined obstructive/restrictive lung disease.  Dr. Boothe saw from [facility] 5/2022 and she set up NIF testing showing paralyzed R diaphragm. Now s/p technically successful C4 injection 6/22/2022 but on the left). Procedural PA noted C7/T1 epidural steroid injection could be attempted  o Nuclear stress test 3/2022 (Care Everywhere) negative for ischemia. Remains without exertional CP  o Echo 6/2022 with normal LVEF and nl RV/RVSP.  No pericardial effusion.  Normal " RAP and collapsing IVC.      Confirms no improvement after stopping amiodarone during our visit 7/15/2022 on the small chance that amiodarone was affecting symptoms acutely after being started in the hospital    Note Dr. Watts's plan to have him see Orthopedics for spine evaluation    PLAN:    Given c/o LE edema, will trial torsemide 20 mg daily x 1 week.  Asked them to contact us in 1 week with update on any improvement in edema, or SOB or drop in weight    Will have Patient Relations contact [facility] Dangelo PA-C, MSPAS      No orders of the defined types were placed in this encounter.    Orders Placed This Encounter   Medications     diltiazem ER COATED BEADS (CARDIZEM CD) 360 MG 24 hr capsule     Sig: Take 1 capsule (360 mg) by mouth daily     Dispense:  90 capsule     Refill:  3     torsemide (DEMADEX) 20 MG tablet     Sig: Take 1 tablet (20 mg) by mouth daily     Dispense:  30 tablet     Refill:  0     Medications Discontinued During This Encounter   Medication Reason     diltiazem ER COATED BEADS (CARDIZEM CD) 360 MG 24 hr capsule Reorder         Encounter Diagnoses   Name Primary?     Paroxysmal atrial fibrillation (H)      SOB (shortness of breath) Yes       CURRENT MEDICATIONS:  Current Outpatient Medications   Medication Sig Dispense Refill     apixaban ANTICOAGULANT (ELIQUIS) 5 MG tablet Take 5 mg by mouth 2 times daily       diltiazem ER COATED BEADS (CARDIZEM CD) 360 MG 24 hr capsule Take 1 capsule (360 mg) by mouth daily 90 capsule 3     glipiZIDE (GLUCOTROL) 5 MG tablet Take 10 mg by mouth every morning (before breakfast)       insulin glargine (LANTUS) 100 UNIT/ML PEN Inject 60 Units Subcutaneous At Bedtime       levothyroxine (SYNTHROID/LEVOTHROID) 75 MCG tablet Take 75 mcg by mouth daily       omeprazole (PRILOSEC) 40 MG capsule Take 1 capsule (40 mg) by mouth daily Take 30-60 minutes before a meal. 90 capsule 3     torsemide (DEMADEX) 20 MG tablet Take 1 tablet (20 mg) by  "mouth daily 30 tablet 0       ALLERGIES     Allergies   Allergen Reactions     Statin Drugs [Hmg-Coa-R Inhibitors]          Review of Systems:  Skin:  Negative     Eyes:  Negative    ENT:  Negative    Respiratory:  Positive for dyspnea on exertion;shortness of breath;dyspnea at rest;sleep apnea  Cardiovascular:  Negative for;palpitations;chest pain;lightheadedness;dizziness Positive for;edema;exercise intolerance;fatigue  Gastroenterology: Negative for melena;hematochezia  Genitourinary:  Negative    Musculoskeletal:  Negative    Neurologic:  Positive for local weakness;numbness or tingling of hands  Psychiatric:  Negative    Heme/Lymph/Imm:  Negative    Endocrine:  Positive for diabetes    Physical Exam:  Vitals: BP (!) 147/73   Pulse 69   Ht 1.702 m (5' 7\")   Wt 77.1 kg (170 lb)   SpO2 94%   BMI 26.63 kg/m      Constitutional:  cooperative, alert and oriented, well developed, well nourished, in no acute distress        Skin:  warm and dry to the touch, no apparent skin lesions or masses noted        Head:  normocephalic, no masses or lesions        Eyes:  pupils equal and round;conjunctivae and lids unremarkable;sclera white        ENT:  not assessed this visit        Neck:           Chest:    coarse rales;diminished breath sounds right sided      Cardiac: regular rhythm;no murmurs, gallops or rubs detected                  Abdomen:  abdomen soft        Vascular: pulses full and equal                                      Extremities and Back:  no deformities, clubbing, cyanosis, erythema observed        Neurological:  no gross motor deficits            PAST MEDICAL HISTORY:  Past Medical History:   Diagnosis Date     Diabetes (H)      Sleep apnea     uses CPAP machine     Thyroid disease        PAST SURGICAL HISTORY:  Past Surgical History:   Procedure Laterality Date     [facility]     COLONOSCOPY      in [place], [place]     COLONOSCOPY  08/22/2017    Dr. Ja LYNN     " ESOPHAGOSCOPY, GASTROSCOPY, DUODENOSCOPY (EGD), COMBINED N/A 6/7/2016    Procedure: COMBINED ESOPHAGOSCOPY, GASTROSCOPY, DUODENOSCOPY (EGD);  Surgeon: Eneida Denton MD;  Location:  GI       FAMILY HISTORY:  Family History   Problem Relation Age of Onset     Colon Cancer No family hx of        SOCIAL HISTORY:  Social History     Socioeconomic History     Marital status:    Tobacco Use     Smoking status: Never Smoker     Smokeless tobacco: Never Used   Substance and Sexual Activity     Alcohol use: No     Comment: drank many years ago     Drug use: No           Thank you for allowing me to participate in the care of your patient.      Sincerely,     Lety Dangelo PA-C     [facility]  cc:   Lisa Clay MD  [address] [place] [id number]

## 2022-08-02 ENCOUNTER — TRANSFERRED RECORDS (OUTPATIENT)
Dept: HEALTH INFORMATION MANAGEMENT | Facility: CLINIC | Age: 82
End: 2022-08-02

## 2022-08-02 ENCOUNTER — TELEPHONE (OUTPATIENT)
Dept: CARDIOLOGY | Facility: CLINIC | Age: 82
End: 2022-08-02

## 2022-08-02 DIAGNOSIS — I48.0 PAROXYSMAL ATRIAL FIBRILLATION (H): Primary | ICD-10-CM

## 2022-08-02 NOTE — TELEPHONE ENCOUNTER
8/2/22 Wife Dorothy called with update from OV on 7/25  with Roberta Dangelo PA-C where Torsemide 20 m daily was started.  Weights  Sat 7/30 - 156  Sun 7/31- 155  Mon 8/1- 155  Tuesday 8/2- 159  Pt weighs himself each day in the am when he wakes up     Edema is finally a little bit better this am  Breathing - no improvement and is worse with the hot weather   Pt has noticed increased urination since starting the Torsemide  Pt met with Ortho MD today ( Dr Greene, [facility] in [place]) today. They do not think his breathing issues  are due to the issues in his neck. Dr Greene stated he is going to reach out to Roberta, Dr Boothe and Dr Watts to figure out plan to [facility] this pt feel better.   Will send Roberta update and call family back w recommendations.  Pt voiced understanding and agreement with plan.   Macie 345 pm

## 2022-08-02 NOTE — TELEPHONE ENCOUNTER
Would have him continue the torsemide as edema improved. Pls get BMP in 1 week (PCP's is fine) to make sure kidney/electrolytes OK.    ** would be great to talk to Dr. Greene about Carl's SOB.  Pls give him my cell phone # if helpful as I'm off a bunch this week.    Basically, does not appear GARCIA is cardiac as:  1. Negative NTpBNP x 2 and now no help with torsemide  2. Just PAROXYSMAL AFib (3% burden) ... confirmed that his breathing was NO worse on days he had AFib on ZioPatch  3. Echo 6/15/2022 while hospitalized for SOB showed nl LVEF 60%, nl RVEF, no sig valve abnls, no effusion, nl RA pressure  4. Stress test 3/2022 (Care Everywhere) without ischemia  5. Nl Hgb 7/2022 so no indication blood loss causing.    6. Pulm/Diaphragm cause concerning based on PFTs 6/2022 abnl, with very abnl NIF testing

## 2022-08-03 NOTE — TELEPHONE ENCOUNTER
8/3/22 Spoke w  pts wife Mariann and explained that Roberta would like him  to continue on Torsemide for now as she feels its helping with swelling. Order for BMP entered and pt will have it drawn at PCP appt next week. Wife will call to schedule .  I called Dr Greene office at [facility] ( ph [phone])  and [facility] for his care coordinator Magaly, requesting OV note from 8/2. Also LVM that Roberta Antolin VENTURA is requesting phone call with him to discuss pt. Awaiting phone call back from Magaly.  Wife informed of communication w Dr Greene office as well.  Macie 1205 pm

## 2022-08-05 NOTE — TELEPHONE ENCOUNTER
8/5/22 OV note recd from [facility] DR Greene visit on 8/2/22. Copy on Roberta Dangelo PA-C desk, original to HIM for scanning   [facility] 840 am

## 2022-08-10 NOTE — TELEPHONE ENCOUNTER
8/10/22 Wife called again wondering if Roberta has seen OV note from Dr Greene or if she has spoken w him. Inf[employer] wife the note from Dr Greene came 8/5 but Roberta has not reviewed as she is on vacation until 8/15.   Wife stated pt is having another MRI today at [facility] to assess back and neck . He saw his PCP yesterday and had blood work which was normal . Swelling was a bit better and weight is stable at 160#.  Informed wife  that we will get in touch with her and pt after Roberta reviews note and possibly speaks with Dr Greene.  Wife voiced understanding. She states for callback to try home phone first, then her cell # if no answer.  Macie 1135 am

## 2022-08-11 ENCOUNTER — TELEPHONE (OUTPATIENT)
Dept: CARDIOLOGY | Facility: CLINIC | Age: 82
End: 2022-08-11

## 2022-08-11 DIAGNOSIS — I48.0 PAROXYSMAL ATRIAL FIBRILLATION (H): Primary | ICD-10-CM

## 2022-08-11 RX ORDER — POTASSIUM CHLORIDE 750 MG/1
20 TABLET, EXTENDED RELEASE ORAL DAILY
Qty: 60 TABLET | Refills: 1 | Status: SHIPPED | OUTPATIENT
Start: 2022-08-11 | End: 2022-10-18

## 2022-08-11 NOTE — TELEPHONE ENCOUNTER
8/11/22 Verbal order from Roberta after she reviewed labs that were drawn on 8/9. With K level 3.3 pt should start KCL 20 meq/day and have BMP rechecked in 2 weeks if pt thinks Torsemide is helping with swelling .  Spoke w pt and wife who would like to continue on Torsemide.  Therefoer, KCL rx sent to [facility] in [place] and BMP ordered for 8/25 at 1045  Pt /wife voiced understanding and agreement with plan.   Macie 1153 am

## 2022-08-15 ENCOUNTER — TRANSCRIBE ORDERS (OUTPATIENT)
Dept: OTHER | Age: 82
End: 2022-08-15

## 2022-08-15 DIAGNOSIS — R06.89 BREATHING DIFFICULTY: Primary | ICD-10-CM

## 2022-08-15 DIAGNOSIS — J98.6 DIAPHRAGM PARALYSIS: ICD-10-CM

## 2022-08-15 NOTE — TELEPHONE ENCOUNTER
Called Chloe Penn, Care Coordinator for Dr. Greene ([phone]) and left  with my cell # for Dr. Greene to reach me at his convenience.     Reviewed his OV (not yet scanned to [facility]) recommending he be seen at [facility] for comprehensive care. Noted MRI was done 11/2021, prior to any SOB issues and cervical stenosis was not convincingly a cause of his current respiratory issues.     ** Reviewed events to date with Dr. Emery, who cared for him in [facility]) for pAFib.    KHARI Timmons  August 15, 2022 at 11:13 AM

## 2022-08-16 ENCOUNTER — TRANSCRIBE ORDERS (OUTPATIENT)
Dept: OTHER | Age: 82
End: 2022-08-16

## 2022-08-16 DIAGNOSIS — G98.8 NEUROLOGICAL DISORDER: Primary | ICD-10-CM

## 2022-08-17 DIAGNOSIS — I48.0 PAROXYSMAL ATRIAL FIBRILLATION (H): ICD-10-CM

## 2022-08-17 DIAGNOSIS — R06.02 SOB (SHORTNESS OF BREATH): ICD-10-CM

## 2022-08-17 RX ORDER — TORSEMIDE 20 MG/1
20 TABLET ORAL DAILY
Qty: 90 TABLET | Refills: 0 | Status: SHIPPED | OUTPATIENT
Start: 2022-08-17 | End: 2023-01-27

## 2022-08-18 ENCOUNTER — TRANSFERRED RECORDS (OUTPATIENT)
Dept: HEALTH INFORMATION MANAGEMENT | Facility: CLINIC | Age: 82
End: 2022-08-18

## 2022-08-22 ENCOUNTER — MYC MEDICAL ADVICE (OUTPATIENT)
Dept: CARDIOLOGY | Facility: CLINIC | Age: 82
End: 2022-08-22

## 2022-08-22 DIAGNOSIS — J98.6 DIAPHRAGM PARALYSIS: Primary | ICD-10-CM

## 2022-08-22 NOTE — TELEPHONE ENCOUNTER
Called [facility] again in an effort to reach Dr. Greene through Chloe Campbell, Care Coordinator to get update.     Left message requesting call back with Reception, who emailed her with information from me.    August 22, 2022 at 3:46 PM

## 2022-08-23 ENCOUNTER — TRANSFERRED RECORDS (OUTPATIENT)
Dept: HEALTH INFORMATION MANAGEMENT | Facility: CLINIC | Age: 82
End: 2022-08-23

## 2022-08-23 ENCOUNTER — TELEPHONE (OUTPATIENT)
Dept: PULMONOLOGY | Facility: CLINIC | Age: 82
End: 2022-08-23

## 2022-08-23 ENCOUNTER — MEDICAL CORRESPONDENCE (OUTPATIENT)
Dept: HEALTH INFORMATION MANAGEMENT | Facility: CLINIC | Age: 82
End: 2022-08-23

## 2022-08-23 ENCOUNTER — EXTERNAL ORDER RESULTS (OUTPATIENT)
Dept: LAB | Facility: CLINIC | Age: 82
End: 2022-08-23

## 2022-08-23 NOTE — TELEPHONE ENCOUNTER
Received call from Chloe Campbell, Care Coordinator with Dr. Greene.    Reviewed that Dr. Greene had wanted to have Carl be evaluated at the [facility].  No other updates.    Also responded to [employer] message dated today (once before and then after the return call from Chloe).    August 23, 2022 at 8:44 AM

## 2022-08-24 ENCOUNTER — MEDICAL CORRESPONDENCE (OUTPATIENT)
Dept: HEALTH INFORMATION MANAGEMENT | Facility: CLINIC | Age: 82
End: 2022-08-24

## 2022-08-25 ENCOUNTER — LAB (OUTPATIENT)
Dept: LAB | Facility: CLINIC | Age: 82
End: 2022-08-25
Payer: COMMERCIAL

## 2022-08-25 ENCOUNTER — DOCUMENTATION ONLY (OUTPATIENT)
Dept: CARDIOLOGY | Facility: CLINIC | Age: 82
End: 2022-08-25

## 2022-08-25 DIAGNOSIS — I48.0 PAROXYSMAL ATRIAL FIBRILLATION (H): ICD-10-CM

## 2022-08-25 DIAGNOSIS — I48.0 PAROXYSMAL ATRIAL FIBRILLATION (H): Primary | ICD-10-CM

## 2022-08-25 LAB
ANION GAP SERPL CALCULATED.3IONS-SCNC: 4 MMOL/L (ref 3–14)
BUN SERPL-MCNC: 20 MG/DL (ref 7–30)
CALCIUM SERPL-MCNC: 9.5 MG/DL (ref 8.5–10.1)
CHLORIDE BLD-SCNC: 102 MMOL/L (ref 94–109)
CO2 SERPL-SCNC: 36 MMOL/L (ref 20–32)
CREAT SERPL-MCNC: 0.84 MG/DL (ref 0.66–1.25)
GFR SERPL CREATININE-BSD FRML MDRD: 87 ML/MIN/1.73M2
GLUCOSE BLD-MCNC: 136 MG/DL (ref 70–99)
POTASSIUM BLD-SCNC: 3.8 MMOL/L (ref 3.4–5.3)
SODIUM SERPL-SCNC: 142 MMOL/L (ref 133–144)

## 2022-08-25 PROCEDURE — 36415 COLL VENOUS BLD VENIPUNCTURE: CPT | Performed by: PHYSICIAN ASSISTANT

## 2022-08-25 PROCEDURE — 80048 BASIC METABOLIC PNL TOTAL CA: CPT | Performed by: PHYSICIAN ASSISTANT

## 2022-08-25 NOTE — PROGRESS NOTES
"Spoke to patient and wife (verbal consent from patient) to review recommendations per SEA Timmons:  Pls call Carl and Dorothy. BMP continues to show renal function is OK on the torsemide AND K is now normal after adding KCl 8/11/2022.    1. Sounds like torsemide has continue to improve LE swelling but NO improvement in SOB  Patient agreed with this.    2. Reviewed [employer] message 8/22 where Dorothy recapped Dr. Boothe's recommendations for cardiac med changes. * Pls confirm with Dorothy that he's on Diltiazem 360, Eliquis 5 mg BID and torsemide 20 mg/KCl 20 daily from a heart standpoint ... he's no longer on digoxin and Amiodarone.    Patient reduced diltiazem to 120mg po every day and eliquis to 5mg po every day on the 8/16.  Reviewed with patient that he needs to increase his eliquis dose to 5mg po bid to be adequately covered for stroke prevention.  Will update Roberta on diltiazem dosing to see if she is ok with this.    3. Reviewed Dr. Watts (Neurology) had placed order for orthopedic evaluation for \"cervical stenosis, cervical myelopathy and SOB d/t respiratory weakness (reduced NIF) caused by R hemidiaphragm paralysis\"  - this was in udpated [employer] message to me dated 8/23 as well   Patient has appointment on 9/6 with Dr Tirado at the [facility].    4. If torsemide has continued to help edema, OK to continue this and the KCl as the BMP looks good.   Patient and wife agreed with plan    5. At this point, my plan was to have him see KHARI STARKS (would be new) in [facility] or me in [place] in 2-3 months (Oct/Nov 2022).  Let me know if this will work for them and if so, I'll have Scheduling call to arrange.   Requesting to see SEA Timmons at the 3 month jamila.    No consent to communicate in chart will send to patient to fill out.  Also patient and wife reported new cough noted started yesterday (patient coughing throughout conversation) reports he is spitting up green phlegm.  Denies feeling like he has a fever. "  No other symptoms at this time.  Advised patient and wife to contact PCP to see if he can get in for evaluation.  No known exposure to sickness.  Also suggested that COVID test be completed.  Wife to look to see if she has any in the home.  Wife indicated she needed to contact the PCP tonight for another reason (has his cell number) and will update him on patients cough.  Will update Roberta regarding above.  NATALY Munoz

## 2022-08-25 NOTE — PROGRESS NOTES
"Pls call Carl and Dorothy. BMP continues to show renal function is OK on the torsemide AND K is now normal after adding KCl 8/11/2022.    1. Sounds like torsemide has continue to improve LE swelling but NO improvement in SOB  2. Reviewed [employer] message 8/22 where Dorothy recapped Dr. Boothe's recommendations for cardiac med changes. * Pls confirm with Dorothy that he's on Diltiazem 360, Eliquis 5 mg BID and torsemide 20 mg/KCl 20 daily from a heart standpoint ... he's no longer on digoxin and Amiodarone.    3. Reviewed Dr. Watts (Neurology) had placed order for orthopedic evaluation for \"cervical stenosis, cervical myelopathy and SOB d/t respiratory weakness (reduced NIF) caused by R hemidiaphragm paralysis\"  - this was in udpated [employer] message to me dated 8/23 as well    4. If torsemide has continued to help edema, OK to continue this and the KCl as the BMP looks good.      5. At this point, my plan was to have him see KHARI STARKS (would be new) in [facility] or me in [place] in 2-3 months (Oct/Nov 2022).  Let me know if this will work for them and if so, I'll have Scheduling call to arrange.    THANK YOU!    "

## 2022-08-26 NOTE — PROGRESS NOTES
Thank you for update.    When he wore his ZioPatch 6/2022, he had just 3% AFib burden with well controlled HR in AFib (79 bpm avg, up to 139) while on amiodarone, diltiazem 360 and digoxin 125.    He had stopped digoxin 125 mcg daily by our visit 7/15, but at that time, was still on amiodarone 200 mg daily (for rate control) and diltiazem 360.    Given that he is now off amiodarone (stopped 7/15 at my visit d/t continued SOB) and he has reduced his diltiazem to 120 mg daily (which should not affect breathing at all), I think we need to move up plan for repeat ZP and follow-up.  I am concerned that if/when he goes back into pAFib, HR will be fast like it was in the hospital and he will feel poorly..    1.  Please update medication list with what he is currently taking  2.  Please set up 14-d ZioPatch in the next week and see Dr. Emery (cared for him at [facility]) or new appt with KHARI STARKS for pAFib at [facility] or me in [place] in follow-up, instead of waiting 2 months as initially planned    Thanks-Roberta

## 2022-08-29 ENCOUNTER — TRANSCRIBE ORDERS (OUTPATIENT)
Dept: OTHER | Age: 82
End: 2022-08-29

## 2022-08-29 DIAGNOSIS — G95.9 CERVICAL MYELOPATHY (H): ICD-10-CM

## 2022-08-29 DIAGNOSIS — N88.2 CERVICAL OS STENOSIS: Primary | ICD-10-CM

## 2022-09-02 NOTE — TELEPHONE ENCOUNTER
Action 9/2/22 sv    Action Taken Image request sent to [facility] for   CT-8.26.22  CXR- 8.26.22, 1.26.22,       RECORDS RECEIVED FROM: internal /ce   DATE RECEIVED: 11.2.22    NOTES STATUS DETAILS   OFFICE NOTE from referring provider internal  Jeffrey Greene MD   OFFICE NOTE from other specialist internal  7/25/22 Dangelo    DISCHARGE SUMMARY from hospital     DISCHARGE REPORT from the ER ce [facility]- 8.26.22 Phlipot    Internal-   6.14.22 [place]   MEDICATION LIST internal     IMAGING  (NEED IMAGES AND REPORTS)     CT SCAN internal /ce Internal 6.14.22    [facility]- 8.26.22   CHEST XRAY (CXR) internal /ce Scheduled 11.2.22   7/15/22, 6.14.22, 5.31.22    [facility]- 8.26.22, 1.26.22,    TESTS     PULMONARY FUNCTION TESTING (PFT) internal  Scheduled 11.2.22

## 2022-09-12 ENCOUNTER — DOCUMENTATION ONLY (OUTPATIENT)
Dept: CARDIOLOGY | Facility: CLINIC | Age: 82
End: 2022-09-12

## 2022-09-12 DIAGNOSIS — I48.0 PAROXYSMAL ATRIAL FIBRILLATION (H): Primary | ICD-10-CM

## 2022-09-12 NOTE — PROGRESS NOTES
"Received call note from Lani Iglesias, Pharm.D. from [place].  She is a pharmacist practitioner with [facility] with [facility] 9/9/2022.  He indicated to Lani that he was taking Eliquis 5 mg only once daily as he was concerned that it was causing side effects of upset stomach and \"breathing issues.\"    Agreed that he should be switched to Xarelto 20 mg daily in an effort to improve adherence.  Reviewed last BMP 8/27/2022  With Cr 0.81 and weight 7/25 of 170# with CrCl 77 ml/min.    Therefore, happy to send over Xarelto 20 mg daily with supper Rx in lieu of Eliqius 5 mg BID (Chris's)    Called Lani (left ) - [phone] to let her know I sent Rx in.      "

## 2022-09-21 ENCOUNTER — TELEPHONE (OUTPATIENT)
Dept: PULMONOLOGY | Facility: CLINIC | Age: 82
End: 2022-09-21

## 2022-09-21 NOTE — TELEPHONE ENCOUNTER
Spoke with Dorothy regarding pt's ('s) new pulm appt on 11/2. Informed her that appt was placed on wait list for sooner appt and opening with Dr. Valdovinos two weeks sooner on 10/19. Wife is agreeable to appt so appts on 11/2 were cancelled. CXR was initially scheduled, but was completed on 7/15 (4 days past 3 month protocol, and per NATALY Whitney, CXR can be cancelled). Wife also states CXR will be done some time next week. FPFT and appt rescheduled for 10/19 with Dr. Valdovinos.

## 2022-09-22 NOTE — TELEPHONE ENCOUNTER
Action 9/22/22 sv    Action Taken Image request sent to [facility] for   CT-8.26.22  CXR- 8.26.22, 1.26.22,        RECORDS RECEIVED FROM: internal /ce    DATE RECEIVED: 10.19.22     NOTES STATUS DETAILS   OFFICE NOTE from referring provider internal  Jeffrey Greene MD   OFFICE NOTE from other specialist internal  7/25/22 Dangelo    DISCHARGE SUMMARY from hospital       DISCHARGE REPORT from the ER ce [facility]- 8.26.22 Phlipot     Internal-   6.14.22 Sebrin   MEDICATION LIST internal      IMAGING  (NEED IMAGES AND REPORTS)       CT SCAN internal /ce Internal 6.14.22     [facility]- 8.26.22   CHEST XRAY (CXR) internal /ce Scheduled 11.2.22   7/15/22, 6.14.22, 5.31.22     [facility]- 8.26.22, 1.26.22,    TESTS       PULMONARY FUNCTION TESTING (PFT) internal  Scheduled 11.2.22

## 2022-09-26 NOTE — TELEPHONE ENCOUNTER
DIAGNOSIS: NEED IMAGING & RECORDS FROM REFERRING DR ROBIN DYSON AT [facility].   cervical stenosis, cervical myelopathy, and shortness of breathe.   APPOINTMENT DATE: 10.6.22   NOTES STATUS DETAILS   OFFICE NOTE from referring provider [facility] records scanned 8.2.22   OFFICE NOTE from other specialist [facility] records sent to scan    MEDICATION LIST Internal    LABS     CBC/DIFF Care Everywhere    INJECTIONS DONE IN RADIOLOGY In process    MRI [facility] MRI resolved     CT SCAN In process    XRAYS (IMAGES & REPORTS) In process      Action 9.26.22 7:20 AM ADEOLA    Action Taken Faxed request to [facility] [phone] for images, [facility] [phone] for records      Action 10.3.22 6:56 AM ADEOLA   Action Taken Faxed 2nd request to [facility]

## 2022-09-30 DIAGNOSIS — M54.2 NECK PAIN: ICD-10-CM

## 2022-09-30 DIAGNOSIS — M54.9 BACK PAIN: Primary | ICD-10-CM

## 2022-10-06 ENCOUNTER — PRE VISIT (OUTPATIENT)
Dept: ORTHOPEDICS | Facility: CLINIC | Age: 82
End: 2022-10-06

## 2022-10-18 DIAGNOSIS — R06.02 SHORTNESS OF BREATH: Primary | ICD-10-CM

## 2022-10-18 DIAGNOSIS — I48.0 PAROXYSMAL ATRIAL FIBRILLATION (H): ICD-10-CM

## 2022-10-18 RX ORDER — POTASSIUM CHLORIDE 750 MG/1
20 TABLET, EXTENDED RELEASE ORAL DAILY
Qty: 180 TABLET | Refills: 3 | Status: ON HOLD | OUTPATIENT
Start: 2022-10-18 | End: 2023-02-23

## 2022-10-18 NOTE — TELEPHONE ENCOUNTER
Received refill request from [facility] for Potassium Chloride 10 mEq  2 tabs daily    Last OV   7\25\22  Roberta Dangelo  Last labwork   8\25\22   K 3.8    [facility] Refill Guideline reviewed.  Medication meets criteria for refill.     Joanna Bolivar RN on 10/18/2022 at 4:14 PM

## 2022-10-19 ENCOUNTER — PRE VISIT (OUTPATIENT)
Dept: PULMONOLOGY | Facility: CLINIC | Age: 82
End: 2022-10-19

## 2022-10-22 ENCOUNTER — HEALTH MAINTENANCE LETTER (OUTPATIENT)
Age: 82
End: 2022-10-22

## 2022-10-31 ENCOUNTER — HOSPITAL ENCOUNTER (OUTPATIENT)
Dept: CARDIOLOGY | Facility: CLINIC | Age: 82
Discharge: HOME | End: 2022-10-31
Attending: PHYSICIAN ASSISTANT | Admitting: PHYSICIAN ASSISTANT
Payer: COMMERCIAL

## 2022-10-31 DIAGNOSIS — I48.0 PAROXYSMAL ATRIAL FIBRILLATION (H): ICD-10-CM

## 2022-10-31 PROCEDURE — 93248 EXT ECG>7D<15D REV&INTERPJ: CPT | Performed by: INTERNAL MEDICINE

## 2022-10-31 PROCEDURE — 93246 EXT ECG>7D<15D RECORDING: CPT

## 2022-11-02 ENCOUNTER — PRE VISIT (OUTPATIENT)
Dept: PULMONOLOGY | Facility: CLINIC | Age: 82
End: 2022-11-02

## 2022-11-22 ENCOUNTER — DOCUMENTATION ONLY (OUTPATIENT)
Dept: CARDIOLOGY | Facility: CLINIC | Age: 82
End: 2022-11-22

## 2022-11-22 DIAGNOSIS — I48.0 PAROXYSMAL ATRIAL FIBRILLATION (H): Primary | ICD-10-CM

## 2022-11-22 NOTE — PROGRESS NOTES
Pls let Carl and Dorothy know that I got the results of the ZP     ZioPatch showed ~41% AFib burden, with avg  bpm when in AFib. Otherwise, avg HR 82 bpm in SR.      I know he's been in the hospital and required thoracenteses. Most recent MyChart message indicates another 430 ml taken out earlier this month. What's currently the plan with his lungs?     Pls check with him:  1. Any awareness of AFib?  He was basically in this all day Sun 11/6 and Mon 11/7 ... any recollection if he felt worse those days? Like worsening of his chronic SOB/GARCIA? Any palpitations?  2. Confirm he's on Diltiazem 360 mg daily  3. Continue on Xarelto 20 mg daily with ruby Granados

## 2022-11-22 NOTE — PROGRESS NOTES
11/22/22 Msg recd from Roberta Dangelo PA-C    Pls let Carl and Dorothy know that I got the results of the ZP      ZioPatch showed ~41% AFib burden, with avg  bpm when in AFib. Otherwise, avg HR 82 bpm in SR.        I know he's been in the hospital and required thoracenteses. [facility] indicates another 430 ml taken out earlier this month. What's currently the plan with his lungs?     They recently saw the lung MD who told them he thinks the fluid is going away . They are to wait approx 1 month and if his sx do not improve they will be doing a CXR. Dorothy stated he is not doing better and still struggles quite a lot to breathe.  [place] [facility] [place] and will be having another MRI and xray of his neck . He is having an EMG on 12/9 and another consult on 12/13 as they feel the breathing issues is due to a nerve problem.      Pls check with him:  1. Any awareness of AFib?  He was basically in this all day Sun 11/6 and Mon 11/7 ... any recollection if he felt worse those days? Like worsening of his chronic SOB/GARCIA? Any palpitations?- Pt denies awareness of when he is in Afib , he does not have palpitations or worsening SOB/GARCIA   2. Confirm he's on Diltiazem 360 mg daily confirmed   3. Continue on Xarelto 20 mg daily with supper confirmed     Routing to Roberta Dangelo PA-C for update   KHerroRN 254 pm

## 2022-11-29 NOTE — PROGRESS NOTES
Message left for patient to review Roberta Dangelo's recommendations.  Awaiting return call.  NATALY Munoz

## 2022-11-29 NOTE — PROGRESS NOTES
Thanks for update.  I am glad he has no symptoms with the atrial fibrillation and remains unaware of it, without increased SOB.    His HR is too fast when he is in atrial fibrillation, which could cause drop in LVEF.  This would make fluid management even harder.    1.  Continue Xarelto 20 mg daily  2.  Continue diltiazem 360 mg daily  3.  Restart digoxin 125 mcg daily for HR control.  He was on this briefly while in the hospital and for about 1 month afterwards.  BPs have been running 100-120s at his last 3 OVs, so do not want to start metoprolol.  Please send in Rx    4.  In 2-3 months, please have him repeat 1 week ZP to assess HR when in AFib.  I have ordered, though know they may not be able to get this done in 2-3 months as he is undergoing quite a bit of testing for his breathing.    Thanks-Roberta  November 29, 2022 at 10:12 AM      Thanks-Roberta

## 2022-11-30 NOTE — PROGRESS NOTES
Pt wife called back and LM. This writer attempted to call back with no answer.  Await call back. Cesario

## 2022-12-01 ENCOUNTER — MYC MEDICAL ADVICE (OUTPATIENT)
Dept: CARDIOLOGY | Facility: CLINIC | Age: 82
End: 2022-12-01

## 2022-12-01 RX ORDER — DIGOXIN 125 MCG
125 TABLET ORAL DAILY
Qty: 90 TABLET | Refills: 1 | Status: SHIPPED | OUTPATIENT
Start: 2022-12-01 | End: 2023-01-27

## 2022-12-01 NOTE — PROGRESS NOTES
Spoke with pt wife Dorothy and made her aware that the HR was too fast on the monitor and that Roberta would like pt to restart Digoxin 125 mcg daily, escript sent.  Pt will stay on the Xarelto and Diltiazem.  Made aware that in 2-3 months pt is to wear a 1 wk ZP to assess HR in  Fib.  Wife given scheduling phone number to call when ready to schedule monitor. No other  Questions at this time. Cesario

## 2022-12-10 ENCOUNTER — HEALTH MAINTENANCE LETTER (OUTPATIENT)
Age: 82
End: 2022-12-10

## 2022-12-10 NOTE — TELEPHONE ENCOUNTER
FUTURE VISIT INFORMATION      FUTURE VISIT INFORMATION:    Date: 2/17/2023    Time: 115pm    Location: [facility]  REFERRAL INFORMATION:    Referring provider:  Dr. Greene     Referring providers clinic:  [facility]     Reason for visit/diagnosis  Neurological Disorder     RECORDS REQUESTED FROM:       Clinic name Comments Records Status Imaging Status   [facility]   Requested  Requested          [facility]   Care Everywhere Requested          [facility]  Scanned to chart Requested

## 2022-12-13 ENCOUNTER — TRANSFERRED RECORDS (OUTPATIENT)
Dept: HEALTH INFORMATION MANAGEMENT | Facility: CLINIC | Age: 82
End: 2022-12-13

## 2022-12-15 ENCOUNTER — TRANSCRIBE ORDERS (OUTPATIENT)
Dept: OTHER | Age: 82
End: 2022-12-15

## 2022-12-15 DIAGNOSIS — G25.9 MOVEMENT DISORDER: Primary | ICD-10-CM

## 2022-12-15 NOTE — TELEPHONE ENCOUNTER
Action 12/15/22 MV 12.48pm   Action Taken Imaging request faxed to [facility]         RECORDS RECEIVED FROM: external   REASON FOR VISIT: concern for cervical myelopathy but we are also concerned about the possibility of a superimposed movement disorder or even that much of the pts symptoms may be due to a movement disorder.   Date of Appt: 3/16/23   NOTES (FOR ALL VISITS) STATUS DETAILS   OFFICE NOTE from referring provider Care Everywhere Dr Yoyn Brantley @ [facility]:  12/13/22 11/21/22   OFFICE NOTE from other specialist Care Everywhere Dr Josias Watts @ [facility]:  8/23/22 7/22/22    Dr Donald Desir @ [facility]:  12/6/21    Yoana OSEI @ [facility]:  11/11/21   MEDICATION LIST Care Everywhere    IMAGING  (FOR ALL VISITS)     EMG Care Everywhere [facility]:  12/9/22   MRI (HEAD, NECK, SPINE) Received [facility]:  MRI Cervical Spine 8/18/22    [facility]:  MRI Cervical Spine 11/14/21   CT (HEAD, NECK, SPINE) In process [facility]:  CT Myelogram 10/17/22

## 2022-12-19 ENCOUNTER — MYC MEDICAL ADVICE (OUTPATIENT)
Dept: CARDIOLOGY | Facility: CLINIC | Age: 82
End: 2022-12-19

## 2023-01-04 ENCOUNTER — TELEPHONE (OUTPATIENT)
Dept: NEUROLOGY | Facility: CLINIC | Age: 83
End: 2023-01-04

## 2023-01-04 ENCOUNTER — APPOINTMENT (OUTPATIENT)
Dept: GENERAL RADIOLOGY | Facility: CLINIC | Age: 83
DRG: 177 | End: 2023-01-04
Attending: EMERGENCY MEDICINE
Payer: COMMERCIAL

## 2023-01-04 ENCOUNTER — HOSPITAL ENCOUNTER (INPATIENT)
Facility: CLINIC | Age: 83
LOS: 4 days | Discharge: HOME HEALTH | DRG: 177 | End: 2023-01-08
Attending: EMERGENCY MEDICINE | Admitting: HOSPITALIST
Payer: COMMERCIAL

## 2023-01-04 ENCOUNTER — MYC MEDICAL ADVICE (OUTPATIENT)
Dept: NEUROLOGY | Facility: CLINIC | Age: 83
End: 2023-01-04

## 2023-01-04 ENCOUNTER — MYC MEDICAL ADVICE (OUTPATIENT)
Dept: CARDIOLOGY | Facility: CLINIC | Age: 83
End: 2023-01-04

## 2023-01-04 DIAGNOSIS — R33.9 URINARY RETENTION: ICD-10-CM

## 2023-01-04 DIAGNOSIS — J90 PLEURAL EFFUSION: ICD-10-CM

## 2023-01-04 DIAGNOSIS — I48.0 PAROXYSMAL ATRIAL FIBRILLATION (H): Primary | ICD-10-CM

## 2023-01-04 DIAGNOSIS — J96.02 ACUTE RESPIRATORY FAILURE WITH HYPOXIA AND HYPERCAPNIA (H): ICD-10-CM

## 2023-01-04 DIAGNOSIS — R06.02 SHORTNESS OF BREATH: ICD-10-CM

## 2023-01-04 DIAGNOSIS — J96.01 ACUTE RESPIRATORY FAILURE WITH HYPOXIA AND HYPERCAPNIA (H): ICD-10-CM

## 2023-01-04 DIAGNOSIS — U07.1 INFECTION DUE TO 2019 NOVEL CORONAVIRUS: ICD-10-CM

## 2023-01-04 DIAGNOSIS — R06.89 CO2 NARCOSIS: ICD-10-CM

## 2023-01-04 LAB
ALBUMIN SERPL-MCNC: 3 G/DL (ref 3.4–5)
ALP SERPL-CCNC: 69 U/L (ref 40–150)
ALT SERPL W P-5'-P-CCNC: 33 U/L (ref 0–70)
ANION GAP SERPL CALCULATED.3IONS-SCNC: 4 MMOL/L (ref 3–14)
AST SERPL W P-5'-P-CCNC: 46 U/L (ref 0–45)
ATRIAL RATE - MUSE: 81 BPM
BASE EXCESS BLDV CALC-SCNC: 18.3 MMOL/L (ref -7.7–1.9)
BASOPHILS # BLD AUTO: 0 10E3/UL (ref 0–0.2)
BASOPHILS NFR BLD AUTO: 0 %
BILIRUB SERPL-MCNC: 0.5 MG/DL (ref 0.2–1.3)
BUN SERPL-MCNC: 23 MG/DL (ref 7–30)
CALCIUM SERPL-MCNC: 9.3 MG/DL (ref 8.5–10.1)
CHLORIDE BLD-SCNC: 91 MMOL/L (ref 94–109)
CO2 SERPL-SCNC: 42 MMOL/L (ref 20–32)
CREAT SERPL-MCNC: 0.83 MG/DL (ref 0.66–1.25)
CRP SERPL-MCNC: 30.3 MG/L (ref 0–8)
DIASTOLIC BLOOD PRESSURE - MUSE: NORMAL MMHG
DIGOXIN SERPL-MCNC: 0.9 UG/L
EOSINOPHIL # BLD AUTO: 0.1 10E3/UL (ref 0–0.7)
EOSINOPHIL NFR BLD AUTO: 1 %
ERYTHROCYTE [DISTWIDTH] IN BLOOD BY AUTOMATED COUNT: 14.3 % (ref 10–15)
FLUAV RNA SPEC QL NAA+PROBE: NEGATIVE
FLUBV RNA RESP QL NAA+PROBE: NEGATIVE
GFR SERPL CREATININE-BSD FRML MDRD: 87 ML/MIN/1.73M2
GLUCOSE BLD-MCNC: 185 MG/DL (ref 70–99)
GLUCOSE BLDC GLUCOMTR-MCNC: 169 MG/DL (ref 70–99)
GLUCOSE BLDC GLUCOMTR-MCNC: 196 MG/DL (ref 70–99)
HBA1C MFR BLD: 8.2 % (ref 0–5.6)
HCO3 BLDV-SCNC: 49 MMOL/L (ref 21–28)
HCT VFR BLD AUTO: 50.8 % (ref 40–53)
HGB BLD-MCNC: 16.1 G/DL (ref 13.3–17.7)
HOLD SPECIMEN: NORMAL
IMM GRANULOCYTES # BLD: 0.1 10E3/UL
IMM GRANULOCYTES NFR BLD: 1 %
INTERPRETATION ECG - MUSE: NORMAL
LIPASE SERPL-CCNC: 58 U/L (ref 73–393)
LYMPHOCYTES # BLD AUTO: 0.8 10E3/UL (ref 0.8–5.3)
LYMPHOCYTES NFR BLD AUTO: 11 %
MCH RBC QN AUTO: 29.7 PG (ref 26.5–33)
MCHC RBC AUTO-ENTMCNC: 31.7 G/DL (ref 31.5–36.5)
MCV RBC AUTO: 94 FL (ref 78–100)
MONOCYTES # BLD AUTO: 0.4 10E3/UL (ref 0–1.3)
MONOCYTES NFR BLD AUTO: 5 %
NEUTROPHILS # BLD AUTO: 6 10E3/UL (ref 1.6–8.3)
NEUTROPHILS NFR BLD AUTO: 82 %
NRBC # BLD AUTO: 0 10E3/UL
NRBC BLD AUTO-RTO: 0 /100
NT-PROBNP SERPL-MCNC: 815 PG/ML (ref 0–1800)
O2/TOTAL GAS SETTING VFR VENT: 4 %
OXYHGB MFR BLDV: 24 % (ref 70–75)
P AXIS - MUSE: 21 DEGREES
PCO2 BLDV: 84 MM HG (ref 40–50)
PH BLDV: 7.38 [PH] (ref 7.32–7.43)
PLATELET # BLD AUTO: 300 10E3/UL (ref 150–450)
PO2 BLDV: 20 MM HG (ref 25–47)
POTASSIUM BLD-SCNC: 4.3 MMOL/L (ref 3.4–5.3)
PR INTERVAL - MUSE: 122 MS
PROT SERPL-MCNC: 6.9 G/DL (ref 6.8–8.8)
PROT SERPL-MCNC: 7 G/DL (ref 6.8–8.8)
QRS DURATION - MUSE: 82 MS
QT - MUSE: 370 MS
QTC - MUSE: 429 MS
R AXIS - MUSE: 45 DEGREES
RBC # BLD AUTO: 5.42 10E6/UL (ref 4.4–5.9)
RSV RNA SPEC NAA+PROBE: NEGATIVE
SARS-COV-2 RNA RESP QL NAA+PROBE: POSITIVE
SODIUM SERPL-SCNC: 137 MMOL/L (ref 133–144)
SYSTOLIC BLOOD PRESSURE - MUSE: NORMAL MMHG
T AXIS - MUSE: 34 DEGREES
TROPONIN I SERPL HS-MCNC: 32 NG/L
TSH SERPL DL<=0.005 MIU/L-ACNC: 1.64 MU/L (ref 0.4–4)
VENTRICULAR RATE- MUSE: 81 BPM
WBC # BLD AUTO: 7.3 10E3/UL (ref 4–11)

## 2023-01-04 PROCEDURE — 250N000012 HC RX MED GY IP 250 OP 636 PS 637: Performed by: HOSPITALIST

## 2023-01-04 PROCEDURE — 999N000157 HC STATISTIC RCP TIME EA 10 MIN

## 2023-01-04 PROCEDURE — XW033E5 INTRODUCTION OF REMDESIVIR ANTI-INFECTIVE INTO PERIPHERAL VEIN, PERCUTANEOUS APPROACH, NEW TECHNOLOGY GROUP 5: ICD-10-PCS | Performed by: HOSPITALIST

## 2023-01-04 PROCEDURE — 99223 1ST HOSP IP/OBS HIGH 75: CPT | Mod: AI | Performed by: HOSPITALIST

## 2023-01-04 PROCEDURE — 80053 COMPREHEN METABOLIC PANEL: CPT | Performed by: EMERGENCY MEDICINE

## 2023-01-04 PROCEDURE — 36415 COLL VENOUS BLD VENIPUNCTURE: CPT | Performed by: HOSPITALIST

## 2023-01-04 PROCEDURE — 87637 SARSCOV2&INF A&B&RSV AMP PRB: CPT | Performed by: EMERGENCY MEDICINE

## 2023-01-04 PROCEDURE — 85025 COMPLETE CBC W/AUTO DIFF WBC: CPT | Performed by: EMERGENCY MEDICINE

## 2023-01-04 PROCEDURE — 258N000003 HC RX IP 258 OP 636: Performed by: HOSPITALIST

## 2023-01-04 PROCEDURE — 71046 X-RAY EXAM CHEST 2 VIEWS: CPT

## 2023-01-04 PROCEDURE — 250N000011 HC RX IP 250 OP 636: Performed by: HOSPITALIST

## 2023-01-04 PROCEDURE — 94660 CPAP INITIATION&MGMT: CPT

## 2023-01-04 PROCEDURE — 99285 EMERGENCY DEPT VISIT HI MDM: CPT | Mod: 25

## 2023-01-04 PROCEDURE — 120N000013 HC R&B IMCU

## 2023-01-04 PROCEDURE — 250N000013 HC RX MED GY IP 250 OP 250 PS 637: Performed by: HOSPITALIST

## 2023-01-04 PROCEDURE — C9803 HOPD COVID-19 SPEC COLLECT: HCPCS

## 2023-01-04 PROCEDURE — 83036 HEMOGLOBIN GLYCOSYLATED A1C: CPT | Performed by: HOSPITALIST

## 2023-01-04 PROCEDURE — 83690 ASSAY OF LIPASE: CPT | Performed by: EMERGENCY MEDICINE

## 2023-01-04 PROCEDURE — 84443 ASSAY THYROID STIM HORMONE: CPT | Performed by: HOSPITALIST

## 2023-01-04 PROCEDURE — 5A09357 ASSISTANCE WITH RESPIRATORY VENTILATION, LESS THAN 24 CONSECUTIVE HOURS, CONTINUOUS POSITIVE AIRWAY PRESSURE: ICD-10-PCS | Performed by: HOSPITALIST

## 2023-01-04 PROCEDURE — 93005 ELECTROCARDIOGRAM TRACING: CPT

## 2023-01-04 PROCEDURE — 36415 COLL VENOUS BLD VENIPUNCTURE: CPT | Performed by: EMERGENCY MEDICINE

## 2023-01-04 PROCEDURE — 86140 C-REACTIVE PROTEIN: CPT | Performed by: HOSPITALIST

## 2023-01-04 PROCEDURE — 84484 ASSAY OF TROPONIN QUANT: CPT | Performed by: EMERGENCY MEDICINE

## 2023-01-04 PROCEDURE — 82805 BLOOD GASES W/O2 SATURATION: CPT | Performed by: EMERGENCY MEDICINE

## 2023-01-04 PROCEDURE — 80162 ASSAY OF DIGOXIN TOTAL: CPT | Performed by: HOSPITALIST

## 2023-01-04 PROCEDURE — 250N000011 HC RX IP 250 OP 636: Performed by: EMERGENCY MEDICINE

## 2023-01-04 PROCEDURE — 83880 ASSAY OF NATRIURETIC PEPTIDE: CPT | Performed by: EMERGENCY MEDICINE

## 2023-01-04 RX ORDER — OXYCODONE HYDROCHLORIDE 5 MG/1
5 TABLET ORAL EVERY 4 HOURS PRN
Status: DISCONTINUED | OUTPATIENT
Start: 2023-01-04 | End: 2023-01-08 | Stop reason: HOSPADM

## 2023-01-04 RX ORDER — DEXTROSE MONOHYDRATE 25 G/50ML
25-50 INJECTION, SOLUTION INTRAVENOUS
Status: DISCONTINUED | OUTPATIENT
Start: 2023-01-04 | End: 2023-01-08 | Stop reason: HOSPADM

## 2023-01-04 RX ORDER — DOCUSATE SODIUM 100 MG/1
100 CAPSULE, LIQUID FILLED ORAL 2 TIMES DAILY PRN
Status: DISCONTINUED | OUTPATIENT
Start: 2023-01-04 | End: 2023-01-08 | Stop reason: HOSPADM

## 2023-01-04 RX ORDER — IPRATROPIUM BROMIDE AND ALBUTEROL SULFATE 2.5; .5 MG/3ML; MG/3ML
3 SOLUTION RESPIRATORY (INHALATION) ONCE
Status: DISCONTINUED | OUTPATIENT
Start: 2023-01-04 | End: 2023-01-04

## 2023-01-04 RX ORDER — CARBOXYMETHYLCELLULOSE SODIUM 5 MG/ML
1 SOLUTION/ DROPS OPHTHALMIC
Status: DISCONTINUED | OUTPATIENT
Start: 2023-01-04 | End: 2023-01-08 | Stop reason: HOSPADM

## 2023-01-04 RX ORDER — DEXAMETHASONE SODIUM PHOSPHATE 4 MG/ML
10 INJECTION, SOLUTION INTRA-ARTICULAR; INTRALESIONAL; INTRAMUSCULAR; INTRAVENOUS; SOFT TISSUE ONCE
Status: DISCONTINUED | OUTPATIENT
Start: 2023-01-04 | End: 2023-01-04

## 2023-01-04 RX ORDER — LIDOCAINE 40 MG/G
CREAM TOPICAL
Status: DISCONTINUED | OUTPATIENT
Start: 2023-01-04 | End: 2023-01-08 | Stop reason: HOSPADM

## 2023-01-04 RX ORDER — OXYCODONE HYDROCHLORIDE 5 MG/1
5 TABLET ORAL EVERY 4 HOURS PRN
COMMUNITY

## 2023-01-04 RX ORDER — LIDOCAINE 40 MG/G
CREAM TOPICAL
Status: DISCONTINUED | OUTPATIENT
Start: 2023-01-04 | End: 2023-01-04

## 2023-01-04 RX ORDER — NALOXONE HYDROCHLORIDE 0.4 MG/ML
0.2 INJECTION, SOLUTION INTRAMUSCULAR; INTRAVENOUS; SUBCUTANEOUS
Status: DISCONTINUED | OUTPATIENT
Start: 2023-01-04 | End: 2023-01-08 | Stop reason: HOSPADM

## 2023-01-04 RX ORDER — ACETAMINOPHEN 325 MG/1
650 TABLET ORAL EVERY 6 HOURS PRN
Status: DISCONTINUED | OUTPATIENT
Start: 2023-01-04 | End: 2023-01-08 | Stop reason: HOSPADM

## 2023-01-04 RX ORDER — BISACODYL 10 MG
10 SUPPOSITORY, RECTAL RECTAL DAILY PRN
Status: DISCONTINUED | OUTPATIENT
Start: 2023-01-04 | End: 2023-01-08 | Stop reason: HOSPADM

## 2023-01-04 RX ORDER — NICOTINE POLACRILEX 4 MG
15-30 LOZENGE BUCCAL
Status: DISCONTINUED | OUTPATIENT
Start: 2023-01-04 | End: 2023-01-08 | Stop reason: HOSPADM

## 2023-01-04 RX ORDER — POTASSIUM CHLORIDE 750 MG/1
10 TABLET, EXTENDED RELEASE ORAL 2 TIMES DAILY
Status: DISCONTINUED | OUTPATIENT
Start: 2023-01-04 | End: 2023-01-06

## 2023-01-04 RX ORDER — DEXAMETHASONE SODIUM PHOSPHATE 4 MG/ML
6 INJECTION, SOLUTION INTRA-ARTICULAR; INTRALESIONAL; INTRAMUSCULAR; INTRAVENOUS; SOFT TISSUE ONCE
Status: COMPLETED | OUTPATIENT
Start: 2023-01-04 | End: 2023-01-04

## 2023-01-04 RX ORDER — ONDANSETRON 2 MG/ML
4 INJECTION INTRAMUSCULAR; INTRAVENOUS EVERY 6 HOURS PRN
Status: DISCONTINUED | OUTPATIENT
Start: 2023-01-04 | End: 2023-01-08 | Stop reason: HOSPADM

## 2023-01-04 RX ORDER — DIGOXIN 125 MCG
125 TABLET ORAL EVERY EVENING
Status: DISCONTINUED | OUTPATIENT
Start: 2023-01-04 | End: 2023-01-08 | Stop reason: HOSPADM

## 2023-01-04 RX ORDER — ALBUTEROL SULFATE 90 UG/1
2 AEROSOL, METERED RESPIRATORY (INHALATION) 4 TIMES DAILY
Status: DISCONTINUED | OUTPATIENT
Start: 2023-01-04 | End: 2023-01-06

## 2023-01-04 RX ORDER — DILTIAZEM HYDROCHLORIDE 180 MG/1
360 CAPSULE, COATED, EXTENDED RELEASE ORAL DAILY
Status: DISCONTINUED | OUTPATIENT
Start: 2023-01-05 | End: 2023-01-05

## 2023-01-04 RX ORDER — LEVOTHYROXINE SODIUM 75 UG/1
75 TABLET ORAL DAILY
Status: DISCONTINUED | OUTPATIENT
Start: 2023-01-05 | End: 2023-01-08 | Stop reason: HOSPADM

## 2023-01-04 RX ORDER — NITROGLYCERIN 0.4 MG/1
0.4 TABLET SUBLINGUAL EVERY 5 MIN PRN
Status: DISCONTINUED | OUTPATIENT
Start: 2023-01-04 | End: 2023-01-08 | Stop reason: HOSPADM

## 2023-01-04 RX ORDER — ALBUTEROL SULFATE 90 UG/1
2 AEROSOL, METERED RESPIRATORY (INHALATION) EVERY 4 HOURS PRN
Status: DISCONTINUED | OUTPATIENT
Start: 2023-01-04 | End: 2023-01-06

## 2023-01-04 RX ORDER — NALOXONE HYDROCHLORIDE 0.4 MG/ML
0.4 INJECTION, SOLUTION INTRAMUSCULAR; INTRAVENOUS; SUBCUTANEOUS
Status: DISCONTINUED | OUTPATIENT
Start: 2023-01-04 | End: 2023-01-08 | Stop reason: HOSPADM

## 2023-01-04 RX ORDER — ACETAMINOPHEN 650 MG/1
650 SUPPOSITORY RECTAL EVERY 6 HOURS PRN
Status: DISCONTINUED | OUTPATIENT
Start: 2023-01-04 | End: 2023-01-08 | Stop reason: HOSPADM

## 2023-01-04 RX ORDER — ONDANSETRON 4 MG/1
4 TABLET, ORALLY DISINTEGRATING ORAL EVERY 6 HOURS PRN
Status: DISCONTINUED | OUTPATIENT
Start: 2023-01-04 | End: 2023-01-08 | Stop reason: HOSPADM

## 2023-01-04 RX ORDER — FUROSEMIDE 10 MG/ML
60 INJECTION INTRAMUSCULAR; INTRAVENOUS
Status: DISCONTINUED | OUTPATIENT
Start: 2023-01-04 | End: 2023-01-06

## 2023-01-04 RX ADMIN — DEXAMETHASONE SODIUM PHOSPHATE 6 MG: 4 INJECTION, SOLUTION INTRAMUSCULAR; INTRAVENOUS at 13:45

## 2023-01-04 RX ADMIN — POTASSIUM CHLORIDE 10 MEQ: 750 TABLET, EXTENDED RELEASE ORAL at 21:01

## 2023-01-04 RX ADMIN — INSULIN ASPART 2 UNITS: 100 INJECTION, SOLUTION INTRAVENOUS; SUBCUTANEOUS at 18:35

## 2023-01-04 RX ADMIN — FUROSEMIDE 60 MG: 10 INJECTION, SOLUTION INTRAMUSCULAR; INTRAVENOUS at 18:41

## 2023-01-04 RX ADMIN — INSULIN GLARGINE 40 UNITS: 100 INJECTION, SOLUTION SUBCUTANEOUS at 20:44

## 2023-01-04 RX ADMIN — SODIUM CHLORIDE 50 ML: 9 INJECTION, SOLUTION INTRAVENOUS at 20:44

## 2023-01-04 RX ADMIN — REMDESIVIR 200 MG: 100 INJECTION, POWDER, LYOPHILIZED, FOR SOLUTION INTRAVENOUS at 20:40

## 2023-01-04 RX ADMIN — DIGOXIN 125 MCG: 125 TABLET ORAL at 21:01

## 2023-01-04 ASSESSMENT — ACTIVITIES OF DAILY LIVING (ADL)
ADLS_ACUITY_SCORE: 35
ADLS_ACUITY_SCORE: 35
ADLS_ACUITY_SCORE: 37
ADLS_ACUITY_SCORE: 37
ADLS_ACUITY_SCORE: 35
ADLS_ACUITY_SCORE: 37

## 2023-01-04 ASSESSMENT — ENCOUNTER SYMPTOMS
ABDOMINAL PAIN: 1
FEVER: 0
SHORTNESS OF BREATH: 1
COUGH: 1
HALLUCINATIONS: 1

## 2023-01-04 NOTE — PROGRESS NOTES
Pt placed on Bipap for hypercapneic resp failure.  14/5,50%, patient RR 30-40- vt 275-450ml, Ve 7-8Lpm, sats 95%.  Tolerating well.

## 2023-01-04 NOTE — TELEPHONE ENCOUNTER
[facility] Call Center    Phone Message    May a detailed message be left on voicemail: yes     Reason for Call: Other:   Spouse called to advise that she is taking pt to ED due to shortness of breath. Spouse wanted writer to send a message.    Writer reviewed pt appointment desk and she has the following appointments:  1/9/2023 MD Hair for movement disorder at [place] (spouse would like Dr. Arndt to know they ar going to the ED)  2/17/2023 [place] [facility] [place]  [place]16[place]2023 [place] [facility]     Writer not sure if all three appointments are needed? Please review to determine if they should remain scheduled or if we can free up some of them if not needed?    Action Taken: Other: [place] [facility]    Travel Screening: Not Applicable     Isra MEYER   Neurology Intake Representative

## 2023-01-04 NOTE — PHARMACY-ADMISSION MEDICATION HISTORY
Pharmacy Medication History  Admission medication history interview status for the 1/4/2023  admission is complete. See EPIC admission navigator for prior to admission medications     Location of Interview: Outside patient room but on unit  Medication history sources: Spoke w/ patient's wife.  Reviewed recent fill history through [facility].     Significant changes made to the medication list:  - Added Oxycodone  - Removed Omeprazole    In the past week, patient estimated taking medication this percent of the time: greater than 90%    Medication reconciliation completed by provider prior to medication history? No    Time spent in this activity: 15 minutes    Prior to Admission medications    Medication Sig Last Dose Taking? Auth Provider Long Term End Date   digoxin (LANOXIN) 125 MCG tablet Take 1 tablet (125 mcg) by mouth daily 1/3/2023 at pm Yes Lety Dangelo PA-C Yes    diltiazem ER COATED BEADS (CARDIZEM CD) 360 MG 24 hr capsule Take 1 capsule (360 mg) by mouth daily 1/4/2023 at am Yes Lety Dangelo PA-C Yes    glipiZIDE (GLUCOTROL) 5 MG tablet Take 10 mg by mouth every morning (before breakfast) 1/4/2023 at am Yes Unknown, Entered By History No    insulin glargine (LANTUS) 100 UNIT/ML PEN Inject 60 Units Subcutaneous At Bedtime 1/3/2023 at pm Yes Reported, Patient Yes    levothyroxine (SYNTHROID/LEVOTHROID) 75 MCG tablet Take 75 mcg by mouth daily 1/4/2023 at am Yes Unknown, Entered By History No    oxyCODONE (ROXICODONE) 5 MG tablet Take 5 mg by mouth every 4 hours as needed for pain 1/3/2023 Yes Unknown, Entered By History     potassium chloride ER (KLOR-CON M) 10 MEQ CR tablet Take 2 tablets (20 mEq) by mouth daily  Patient taking differently: Take 10 mEq by mouth 2 times daily 1/4/2023 at am Yes Lety Dangelo PA-C  10/18/23   rivaroxaban ANTICOAGULANT (XARELTO) 20 MG TABS tablet Take 1 tablet (20 mg) by mouth daily (with dinner) 1/3/2023 at pm Yes Lety Dangelo  AUDREY Palomino Yes    torsemide (DEMADEX) 20 MG tablet Take 1 tablet (20 mg) by mouth daily 1/4/2023 at am Yes Lety Dangelo PA-C Yes    diltiazem ER (DILT-XR) 120 MG 24 hr capsule Take 120 mg by mouth daily   Unknown, Entered By History Yes 6/19/22       The information provided in this note is only as accurate as the sources available at the time of update(s)     Joanna Atkins, MendozaD, BCPS

## 2023-01-04 NOTE — ED PROVIDER NOTES
History     Chief Complaint:  Shortness of Breath       HPI   Carl Vázquez is a 82 year old male, history of paroxysmal A. fib anticoagulated on Xarelto, who presents with shortness of breath. French states that he's had worsening intermittent shortness of breath for the past 11 months.  He has a history of right diaphragmatic paralysis and is status post plication this fall.  He presents today due to worsening, consistent shortness of breath. During evaluation, French also complains of some lower abdominal pain that radiates up to his chest that has been present for months. He also has a cough that is producible with inspiration and movement, but denies any fevers or tobacco use.  Does have a history of recurrent pleural effusions requiring repeated thoracentesis; most recently in October per report.    Upon speaking with the patient's wife, she notes that French has also been experiencing some visual hallucinations over the last 2 days; seeing bugs/insects that are not in the room.     Independent Historian: yes, supplemented by spouse    Review of External Notes: Reviewed recent PCP notes and hospital discharge summaries in care everywhere    ROS:  Review of Systems   Constitutional: Negative for fever.   Respiratory: Positive for cough and shortness of breath.    Cardiovascular: Positive for chest pain.   Gastrointestinal: Positive for abdominal pain.   Psychiatric/Behavioral: Positive for hallucinations.   All other systems reviewed and are negative.    Allergies:  Statin drugs  Metformin  Ezetimibe     Medications:    Glipizide  Levothyroxine  Xarelto  Lantus solostar  Diltiazem  Potassium-chloride  Torsemide  Oxycodone  Amiodarone  Digoxin    Past Medical History:    Depression  Cervical myelopathy  Atrial fibrillation  Hypothyroidism  Sleep apnea  Hyperlipidemia   Type II diabetes mellitus  Pleural effusion  Gallstones  BPH  Hypertension   Bilateral inguinal hernia  Osteoarthrosis    Past Surgical  "History:    Biceps tendon release  Hemorrhoidectomy  Composite skin graft  TURP  Laparoscopic cholecystectomy  Laparoscopic inguinal hernia repair  Diaphragm plication  Colonoscopy x2  EGD     Family History:    Throat cancer - father  Brain tumor - mother  Blood disorder - sister    Social History:  Patient came from home.  Patient is accompanied in the ED by his spouse.  Patient denies tobacco use.  Patient denies alcohol use.  Patient denies drug use.  PCP: Kyler Mcwilliams     Physical Exam     Patient Vitals for the past 24 hrs:   BP Temp Temp src Pulse Resp SpO2 Height Weight   01/04/23 1336 122/69 -- -- 80 22 91 % -- --   01/04/23 1321 131/72 -- -- 81 24 93 % -- --   01/04/23 1306 119/73 -- -- 77 26 95 % -- --   01/04/23 1251 123/70 -- -- 80 18 95 % -- --   01/04/23 1236 131/75 -- -- 78 (!) 34 97 % -- --   01/04/23 1223 128/69 -- -- 79 20 97 % -- --   01/04/23 1137 -- -- -- -- -- 97 % 1.676 m (5' 6\") 73 kg (161 lb)   01/04/23 1133 129/58 98  F (36.7  C) Oral 83 (!) 32 (!) 86 % -- --        Physical Exam  General: Alert and cooperative with exam. Patient in mild distress. Normal mentation.  Head:  Scalp is NC/AT  Eyes:  No scleral icterus, PERRL  ENT:  The external nose and ears are normal. The oropharynx is normal and without erythema; mucus membranes are moist. Uvula midline, no evidence of deep space infection.  Neck:  Normal range of motion without rigidity.  CV:  Regular rate and rhythm    No pathologic murmur   Resp:  Breath sounds are clear bilaterally    Non-labored, no retractions or accessory muscle use    Nasal cannula in place  GI:  Abdomen is soft, no distension, no tenderness. No peritoneal signs  MS:  +1 pitting edema in the lower extremities  Skin:  Warm and dry, No rash or lesions noted.  Neuro: Oriented x 3. No gross motor deficits.    Emergency Department Course   ECG  ECG obtained at 1214, ECG read at 1350  Sinus rhythm with premature supraventricular complexes  Otherwise normal ECG  Rate 81 " bpm. FL interval 122 ms. QRS duration 82 ms. QT/QTc 370/429 ms. P-R-T axes 21 45 34.    Imaging:  Chest XR,  PA & LAT   Preliminary Result   IMPRESSION: AP and lateral views of the chest were obtained.   Cardiomediastinal silhouette is within normal limits. Small left and   moderate right pleural effusion including small amount of fluid along   the right minor fissure with associated basilar   atelectasis/consolidation. No significant pneumothorax.         Report per radiology    Laboratory:  Labs Ordered and Resulted from Time of ED Arrival to Time of ED Departure   COMPREHENSIVE METABOLIC PANEL - Abnormal       Result Value    Sodium 137      Potassium 4.3      Chloride 91 (*)     Carbon Dioxide (CO2) 42 (*)     Anion Gap 4      Urea Nitrogen 23      Creatinine 0.83      Calcium 9.3      Glucose 185 (*)     Alkaline Phosphatase 69      AST 46 (*)     ALT 33      Protein Total 6.9      Albumin 3.0 (*)     Bilirubin Total 0.5      GFR Estimate 87     BLOOD GAS VENOUS WITH OXYHEMOGLOBIN - Abnormal    pH Venous 7.38      pCO2 Venous 84 (*)     pO2 Venous 20 (*)     Bicarbonate Venous 49 (*)     FIO2 4      Oxyhemoglobin Venous 24 (*)     Base Excess/Deficit (+/-) 18.3 (*)    INFLUENZA A/B & SARS-COV2 PCR MULTIPLEX - Abnormal    Influenza A PCR Negative      Influenza B PCR Negative      RSV PCR Negative      SARS CoV2 PCR Positive (*)    LIPASE - Abnormal    Lipase 58 (*)    TROPONIN I - Normal    Troponin I High Sensitivity 32     NT PROBNP INPATIENT - Normal    N terminal Pro BNP Inpatient 815     CBC WITH PLATELETS AND DIFFERENTIAL    WBC Count 7.3      RBC Count 5.42      Hemoglobin 16.1      Hematocrit 50.8      MCV 94      MCH 29.7      MCHC 31.7      RDW 14.3      Platelet Count 300      % Neutrophils 82      % Lymphocytes 11      % Monocytes 5      % Eosinophils 1      % Basophils 0      % Immature Granulocytes 1      NRBCs per 100 WBC 0      Absolute Neutrophils 6.0      Absolute Lymphocytes 0.8      Absolute  Monocytes 0.4      Absolute Eosinophils 0.1      Absolute Basophils 0.0      Absolute Immature Granulocytes 0.1      Absolute NRBCs 0.0          Emergency Department Course & Assessments:       Interventions:  1345 Decadron 6 mg IV        Consultations/Discussion of Management or Tests:  1148 I obtained history and examined the patient as noted above.   1317 I rechecked and updated the patient. I spoke with the patient's wife and obtained more history.  1333 I consulted with Dr. Sánchez of the hospitalist service and discussed patient admission. He accepted care of the patient.           Disposition:  The patient was admitted to the hospital under the care of Dr. Sánchez.     Impression & Plan    CMS Diagnoses: None    Medical Decision Making:  Patient is a 82-year-old male with history of right diaphragmatic paralysis status post plication, atrial fibrillation (anticoagulant on Xarelto) and recurrent pleural effusions who presents with increased shortness of breath and mild confusion/hallucinations over the last 2 days.  Patient's medical history and records were reviewed.  In triage patient was noted to be satting 86% on room air and was placed on nasal cannula oxygen with normalization of oxygen saturations and clinical improvement.  He is noted to have some lower extremity edema and chest x-ray demonstrates pleural effusions as above; BNP not significantly elevated and he does not appear to be acutely volume overloaded.  Pleural effusions may be contributing to hypoxia; consider thoracentesis as inpatient.  Abdominal exam demonstrates only of mild diffuse tenderness and abdominal pain is chronic; will defer any abdominal imaging to inpatient team.  Blood gas notable for significantly elevated PCO2 and there is clinical concern for developing CO2 narcosis.  Patient initiated on BiPAP.  Lab testing notable for positive COVID test.  Patient is vaccinated.  Was provided 6 mg Decadron.  Low clinical suspicion for  PE/DVT given compliance with Xarelto.  No clinical evidence of bacterial infection or indication for antibiotics at this time.  EKG demonstrates normal sinus rhythm with PVCs without evidence of acute ischemia, infarction, or significant arrhythmia.  Troponin is within normal range; low suspicion for ACS.  Given current use of BiPAP patient will require [facility] admission for further evaluation and care.  Admitted to hospitalist service.    Critical care time: 35 minutes.  Time exclusive of procedures.    Diagnosis:    ICD-10-CM    1. Acute respiratory failure with hypoxia and hypercapnia (H)  J96.01     J96.02       2. CO2 narcosis  R06.89       3. Infection due to 2019 novel coronavirus  U07.1       4. Pleural effusion  J90              Scribe Disclosure:  Rosa PERKINS, am serving as a scribe at 11:54 AM on 1/4/2023 to document services personally performed by Buddy Dixon DO based on my observations and the provider's statements to me.     1/4/2023   Buddy Dixon DO O'Neill, Christopher Warren, DO  01/04/23 2107

## 2023-01-04 NOTE — H&P
"[facility]    History and Physical - Hospitalist Service       Date of Admission:  1/4/2023    Assessment & Plan      Carl Vázquez is a 82 year old male with medical history significant for IDDM, CALE, hypothyroidism, paroxysmal atrial fibrillation on anticoagulation, diaphragmatic paralysis, s/p plication of right hemidiaphragm, recurrent right pleural effusion was brought to the ER for evaluation of worsening shortness of breath and is being admitted on 1/4/2023 for further manage.       # Confirmed COVID-19 infection    # Viral Pneumonia secondary to COVID-19 infection  # Acute Metabolic Encephalopathy  # Acute hypercapnic respiratory failure     Symptom Onset Date used to determine duration of isolation.  If unsure, enter \"unknown\"   Date of 1st Positive Test 1/4/22   Vaccination Status Partially Vaccinated, laterst pending booster dose       - COVID-19 special precautions, continuous pulse-ox  - Oxygen: continue current support with O2 Device: Nasal cannula at Oxygen Delivery: 4 LPM; titrate to keep SpO2 between 90-96%.   - Given hypercapnia and metabolic encephalopathy, will place on BiPAP treatment and follow-up ABG.  - Labs: Standard COVID admission labs ordered (CBC with diff, CMP, INR, D-dimer, CRP).   - Imaging: needs CT chest abd pelvis, will order once able to lie flat  - Breathing treatments: albuterol inhaler four times a day scheduled and PRN; avoid nebulizers in favor of MDIs   - IV fluids: not indicated at this time  - Antibiotics: not indicated   - COVID-Focused Medications: Dexamethasone 6 mg x 10 days or until hospital discharge, started on 1/4/22 and Remdesivir x 3 days or until hospital discharge, started on 1/4/22  - DVT Prophylaxis:         - At high risk of thrombotic complications due to COVID-19 (DDimer = N/A )         - on DOAC, To resume after thoracentesis    Recurrent right pleural effusion  Bilateral leg edema  Progressive dyspnea of 1 year, see " background in H&P for details.   TTE from January 2022 shows normal LV function, grade 1 pattern of LV diastolic filling.  No significant valvular stenosis or regurgitation.  Patient had regadenoson stress test March 2022, was normal.  Had CT abdomen pelvis September 2021, no finding of cirrhosis.  -Patient has been on diuretic, torsemide, still with recurrent right pleural effusion needing multiple thoracentesis.  Has worsening leg edema despite diuretic.  Patient is on anticoagulation, DVT unlikely.  -Change PTA torsemide to IV lasix, follow daily I&O, weight and fluid restriction  -Will obtain TTE  -Right upper quadrant ultrasound to evaluate for cirrhosis and any portal vein occlusion.  -Will consider CT chest abdomen pelvis for further evaluation after diuresed adequately (patient states he is orthopneic, cannot lie flat on his back currently)  -Rt thoracentesis (consideration of pleurex catheter in past, Pulm consulted for recommendation), pleural fluid analysis.    Paroxysmal A. fib  EKG shows sinus rhythm, rate controlled.  Patient was on metoprolol but apparently had ongoing/worsening dyspnea on exertion and was changed to Cardizem.  -On Cardizem and digoxin, continue.  Check digoxin level.  -On Xarelto, hold tonight for thoracentesis/possible Pleurx tomorrow, resume after procedure  -Continue to monitor on telemetry.    Rt diaphragmatic paralysis  S/p plication     IDDM  PTA medications include glipizide 10 Mg every morning, Lantus 60 units at HS.  -Resume Lantus at 40 units at bedtime as will keep him NPO in case pleurex recommended by pulm.  - hold glipizide.  -Moderate ISS  -patient on steroid, expect uncontrolled BS, add premeal novolog while not NPO.   -Hypoglycemia protocol    Hypothyroidism  - Resume PTA Synthroid    CALE  -Patient had difficulty wearing CPAP.  Given marked hypercarbia, will be on BiPAP as needed.  -repeat blood gas       Diet:   moderate CHO  DVT Prophylaxis: DOAC   Power Catheter:  "Not present  Lines: None     Cardiac Monitoring: None  Code Status:  DNR/DNI, discussed with patient and his spouse    Clinically Significant Risk Factors Present on Admission              # Hypoalbuminemia: Lowest albumin = 3 g/dL at 1/4/2023 11:41 AM, will monitor as appropriate  # Drug Induced Coagulation Defect: home medication list includes an anticoagulant medication         # Overweight: Estimated body mass index is 25.99 kg/m  as calculated from the following:    Height as of this encounter: 1.676 m (5' 6\").    Weight as of this encounter: 73 kg (161 lb).           Disposition Plan      Expected Discharge Date: 01/06/2023                  Greg Sánchez MD  Hospitalist Service  [facility]  Securely message with [employer] (more info)  Text page via [employer] Paging/Directory     ______________________________________________________________________    Chief Complaint   Worsening shortness of breath    History is obtained from the patient/family, chart review, discussion with ER physician    History of Present Illness   Carl Vázquez is a 82 year old male with medical history significant for IDDM, CALE, hypothyroidism, paroxysmal atrial fibrillation on anticoagulation, diaphragmatic paralysis, s/p plication of right hemidiaphragm, recurrent right pleural effusion was brought to the ER for evaluation of worsening shortness of breath.    Patient with complex medical conditions, see below for detail copied from discharge summary from October.  Patient continues to have progressive dyspnea.  For the last 2 days, wife noticed that he is hallucinating as well seeing bugs piled up in the room.  Because of this concern, she brought him to the ER today.  Has chronic cough.  Denies chest pain.  Reports orthopnea.  Worsening leg edema which has not improved despite diuretic use.  Also has chronic abdominal pain, no nausea vomiting or diarrhea.  No hematochezia or melena.  Patient feels " fatigued, bilateral arm weakness is progressive.    In ER, patient was evaluated by Dr. Dixon.  Labs including CBC CMP unremarkable, troponin, proBNP, lipase WNL.  Twelve-lead EKG showed sinus rhythm, normal rate.  No ischemic changes.  Chest x-ray showed moderate right effusion with fluid in the fissure.  COVID-19 PCR positive.  Patient was mildly hypoxic, 86% on room air.  Venous blood gas showed marked hypercarbia with PCO2 of 84.  Appears compensated.  Dexamethasone was given and hospitalist service contacted for admission for further management.      Background  Dyspnea started ~Jan '22. Used to use treadmill daily, worsened to only walking 10-15 steps. Found two main issues: new-onset paroxysmal A fib and R hemidiaphragm paralysis.   - A fib found in clinic Jan '22, started DOAC (delayed due to cost) and metoprolol. Established with Cards Feb '22 (Dr Cross). TTE Jan '22 EF 50-55%. ZioPatch Feb '22 with 4% A fib burden and some NSVT. Stress test March '22 negative.   - marked GARCIA started right after BB was started for A fib, but didn't resolve after BB stopped   - Still with GARCIA, so established with Pulmonary (Dr Boothe) May '22, PFTs with moderate restriction. Found R hemidiaphgram paralysis (I believe attributed to C-spine stenosis). C-spine injection (on left side) June '22 did not help.   - [facility] admit 6/14-6/20/22 with RVR and hypoxemia. Added amiodarone and digoxin, dilt increased, considered outpatient ablation.   - torsemide started 7/25/22. I don't he had been on diuretic previously.   - [facility] admission 8/26-8/28 for hypoxemia thought multifactorial. Given antibiotics for presumed bronchitis (neg strep, legionella, COVID-19, sputum Cx, and procal). Added mucinex, advised to continue CPAP, Neurosurgery (Dr [facility]) did not think C-spine decompression would help R hemidiaphragm paralysis (but still might help his bilateral arm weakness). Outpatient Thoracic Surgery  consult planned  - 9/19/22 Dr Turpin performed R hemidiaphragm plication. GARCIA improved on his 9/27 clinic f/u.   - 10/3 clinic visit still looking dyspneic and frail. Arms getting weaker.   - 10/17 saw Dr Ramos. Unable to raise arms above head x2 years. Wondered if his limited arm ROM is more a shoulder joint issue, referred to Ortho (Juanito). Consider EMG with Tiffanie to evaluate diffuse weakness. Did not feel his C-spine stenosis was severe enough to be causing his symptoms. Cervical myelogram 10/17 moderate-severe spinal canal stenosis, and showed R pleural effusion. 10/18 had R thoracentesis (650cc clear fluid, no studies)  - telephone visit with primary clinic 10/24. Patient and wife feeling overwhelmed and confused about care plan, biggest worry was arm weakness. Re-emphasized Richard's outline.         Past Medical History    Past Medical History:   Diagnosis Date     Diabetes (H)      Sleep apnea     uses CPAP machine     Thyroid disease        Past Surgical History   Past Surgical History:   Procedure Laterality Date     CHOLECYSTECTOMY      at [facility]     COLONOSCOPY      in [place], [place]     COLONOSCOPY  08/22/2017    Dr. Peres [facility]     ESOPHAGOSCOPY, GASTROSCOPY, DUODENOSCOPY (EGD), COMBINED N/A 6/7/2016    Procedure: COMBINED ESOPHAGOSCOPY, GASTROSCOPY, DUODENOSCOPY (EGD);  Surgeon: Eneida Denton MD;  Location:  GI       Prior to Admission Medications   Prior to Admission Medications   Prescriptions Last Dose Informant Patient Reported? Taking?   digoxin (LANOXIN) 125 MCG tablet   No No   Sig: Take 1 tablet (125 mcg) by mouth daily   diltiazem ER COATED BEADS (CARDIZEM CD) 360 MG 24 hr capsule   No No   Sig: Take 1 capsule (360 mg) by mouth daily   glipiZIDE (GLUCOTROL) 5 MG tablet   Yes No   Sig: Take 10 mg by mouth every morning (before breakfast)   insulin glargine (LANTUS) 100 UNIT/ML PEN   Yes No   Sig: Inject 60 Units Subcutaneous At Bedtime   levothyroxine  (SYNTHROID/LEVOTHROID) 75 MCG tablet   Yes No   Sig: Take 75 mcg by mouth daily   omeprazole (PRILOSEC) 40 MG capsule   No No   Sig: Take 1 capsule (40 mg) by mouth daily Take 30-60 minutes before a meal.   potassium chloride ER (KLOR-CON M) 10 MEQ CR tablet   No No   Sig: Take 2 tablets (20 mEq) by mouth daily   rivaroxaban ANTICOAGULANT (XARELTO) 20 MG TABS tablet   No No   Sig: Take 1 tablet (20 mg) by mouth daily (with dinner)   torsemide (DEMADEX) 20 MG tablet   No No   Sig: Take 1 tablet (20 mg) by mouth daily      Facility-Administered Medications: None        Review of Systems    The 10 point Review of Systems is negative other than noted in the HPI or here.        Physical Exam   Vital Signs: Temp: 98  F (36.7  C) Temp src: Oral BP: 122/69 Pulse: 80   Resp: 22 SpO2: 91 % O2 Device: Nasal cannula Oxygen Delivery: 4 LPM  Weight: 161 lbs 0 oz    General: AAOx3, appears comfortable.  HEENT: PERRLA EOMI. Mucosa moist.   Lungs: Sounds are very diminished bilaterally especially on right side posteriorly, no wheezing, normal work of breathing.   CVS: S1S2 regular, no tachycardia or murmur.   Abdomen: Soft, distended, generalized tenderness without guarding or rigidity. BS heard.  MSK: Bilateral 2-3 pitting pedal edema.  Neuro: AAOX3. CN 2-12 normal. Strength symmetrical.  Skin: No rash.       Medical Decision Making       80 MINUTES SPENT BY ME on the date of service doing chart review, history, exam, documentation & further activities per the note.      Data        I have personally reviewed the following data over the past 24 hrs:    7.3  \   16.1   / 300     137 91 (L) 23 /  185 (H)   4.3 42 (H) 0.83 \       ALT: 33 AST: 46 (H) AP: 69 TBILI: 0.5   ALB: 3.0 (L) TOT PROTEIN: 6.9 LIPASE: 58 (L)       Trop: N/A BNP: 815       Imaging results reviewed over the past 24 hrs:   Recent Results (from the past 24 hour(s))   Chest XR,  PA & LAT    Narrative    CHEST TWO VIEWS January 4, 2023 12:04 PM     HISTORY: Cough,  shortness of breath, hypoxia.    COMPARISON: Chest x-ray on 7/15/2022.      Impression    IMPRESSION: AP and lateral views of the chest were obtained.  Cardiomediastinal silhouette is within normal limits. Small left and  moderate right pleural effusion including small amount of fluid along  the right minor fissure with associated basilar  atelectasis/consolidation. No significant pneumothorax.    STACIE STUBBS MD         SYSTEM ID:  [id number]

## 2023-01-04 NOTE — SUMMARY OF CARE
RECEIVING UNIT ED HANDOFF REVIEW    ED Nurse Handoff Report was reviewed by: Jacqueline Woods RN on January 4, 2023 at 5:40 PM

## 2023-01-04 NOTE — ED TRIAGE NOTES
"Pt presents with long standing lung complications and reports \"I cant breath for the past 11 months.\" Pt has hx of fluid on the lungs, afib, CHF, and a paralyzed R diagram. Pt reports getting worse each day and was last hospitalized at [facility] a few months ago. Wife reports poor appetite as well. Pt 86% on RA in triage and o2 applied      Triage Assessment     Row Name 01/04/23 1133       Triage Assessment (Adult)    Airway WDL WDL       Respiratory WDL    Respiratory WDL X  sob, cough, hypoxia       Skin Circulation/Temperature WDL    Skin Circulation/Temperature WDL X  pale       Cardiac WDL    Cardiac WDL X  hx of afib       Peripheral/Neurovascular WDL    Peripheral Neurovascular WDL WDL       Cognitive/Neuro/Behavioral WDL    Cognitive/Neuro/Behavioral WDL WDL              "

## 2023-01-05 ENCOUNTER — APPOINTMENT (OUTPATIENT)
Dept: ULTRASOUND IMAGING | Facility: CLINIC | Age: 83
DRG: 177 | End: 2023-01-05
Attending: HOSPITALIST
Payer: COMMERCIAL

## 2023-01-05 LAB
ANION GAP SERPL CALCULATED.3IONS-SCNC: 7 MMOL/L (ref 3–14)
BUN SERPL-MCNC: 24 MG/DL (ref 7–30)
CALCIUM SERPL-MCNC: 9 MG/DL (ref 8.5–10.1)
CHLORIDE BLD-SCNC: 94 MMOL/L (ref 94–109)
CO2 SERPL-SCNC: 38 MMOL/L (ref 20–32)
CREAT SERPL-MCNC: 0.74 MG/DL (ref 0.66–1.25)
CRP SERPL-MCNC: 20.7 MG/L (ref 0–8)
D DIMER PPP FEU-MCNC: 1.09 UG/ML FEU (ref 0–0.5)
ERYTHROCYTE [DISTWIDTH] IN BLOOD BY AUTOMATED COUNT: 13.9 % (ref 10–15)
FIBRINOGEN PPP-MCNC: 602 MG/DL (ref 170–490)
GFR SERPL CREATININE-BSD FRML MDRD: 90 ML/MIN/1.73M2
GLUCOSE BLD-MCNC: 100 MG/DL (ref 70–99)
GLUCOSE BLDC GLUCOMTR-MCNC: 100 MG/DL (ref 70–99)
GLUCOSE BLDC GLUCOMTR-MCNC: 284 MG/DL (ref 70–99)
GLUCOSE BLDC GLUCOMTR-MCNC: 65 MG/DL (ref 70–99)
GLUCOSE BLDC GLUCOMTR-MCNC: 70 MG/DL (ref 70–99)
GLUCOSE BLDC GLUCOMTR-MCNC: 75 MG/DL (ref 70–99)
GLUCOSE BODY FLUID SOURCE: NORMAL
GLUCOSE FLD-MCNC: 68 MG/DL
HCT VFR BLD AUTO: 48.3 % (ref 40–53)
HGB BLD-MCNC: 15.4 G/DL (ref 13.3–17.7)
LD BODY BODY FLUID SOURCE: NORMAL
LDH FLD L TO P-CCNC: 152 U/L
LDH SERPL L TO P-CCNC: 246 U/L (ref 85–227)
MCH RBC QN AUTO: 29.7 PG (ref 26.5–33)
MCHC RBC AUTO-ENTMCNC: 31.9 G/DL (ref 31.5–36.5)
MCV RBC AUTO: 93 FL (ref 78–100)
PLATELET # BLD AUTO: 279 10E3/UL (ref 150–450)
POTASSIUM BLD-SCNC: 4.3 MMOL/L (ref 3.4–5.3)
PROT FLD-MCNC: 4 G/DL
PROTEIN BODY FLUID SOURCE: NORMAL
RBC # BLD AUTO: 5.19 10E6/UL (ref 4.4–5.9)
SODIUM SERPL-SCNC: 139 MMOL/L (ref 133–144)
WBC # BLD AUTO: 7.5 10E3/UL (ref 4–11)

## 2023-01-05 PROCEDURE — 0W993ZZ DRAINAGE OF RIGHT PLEURAL CAVITY, PERCUTANEOUS APPROACH: ICD-10-PCS | Performed by: RADIOLOGY

## 2023-01-05 PROCEDURE — 93005 ELECTROCARDIOGRAM TRACING: CPT

## 2023-01-05 PROCEDURE — 32555 ASPIRATE PLEURA W/ IMAGING: CPT

## 2023-01-05 PROCEDURE — 250N000011 HC RX IP 250 OP 636: Performed by: HOSPITALIST

## 2023-01-05 PROCEDURE — 93010 ELECTROCARDIOGRAM REPORT: CPT | Mod: 59 | Performed by: INTERNAL MEDICINE

## 2023-01-05 PROCEDURE — 80048 BASIC METABOLIC PNL TOTAL CA: CPT | Performed by: HOSPITALIST

## 2023-01-05 PROCEDURE — 36415 COLL VENOUS BLD VENIPUNCTURE: CPT | Performed by: HOSPITALIST

## 2023-01-05 PROCEDURE — 272N000706 US THORACENTESIS

## 2023-01-05 PROCEDURE — 99233 SBSQ HOSP IP/OBS HIGH 50: CPT | Performed by: INTERNAL MEDICINE

## 2023-01-05 PROCEDURE — 250N000013 HC RX MED GY IP 250 OP 250 PS 637: Performed by: INTERNAL MEDICINE

## 2023-01-05 PROCEDURE — 86140 C-REACTIVE PROTEIN: CPT | Performed by: HOSPITALIST

## 2023-01-05 PROCEDURE — 99222 1ST HOSP IP/OBS MODERATE 55: CPT | Mod: FS | Performed by: PHYSICIAN ASSISTANT

## 2023-01-05 PROCEDURE — 250N000009 HC RX 250: Performed by: INTERNAL MEDICINE

## 2023-01-05 PROCEDURE — 93975 VASCULAR STUDY: CPT

## 2023-01-05 PROCEDURE — 120N000013 HC R&B IMCU

## 2023-01-05 PROCEDURE — 258N000003 HC RX IP 258 OP 636: Performed by: HOSPITALIST

## 2023-01-05 PROCEDURE — 85379 FIBRIN DEGRADATION QUANT: CPT | Performed by: HOSPITALIST

## 2023-01-05 PROCEDURE — 87205 SMEAR GRAM STAIN: CPT | Performed by: HOSPITALIST

## 2023-01-05 PROCEDURE — 82945 GLUCOSE OTHER FLUID: CPT | Performed by: HOSPITALIST

## 2023-01-05 PROCEDURE — 250N000013 HC RX MED GY IP 250 OP 250 PS 637: Performed by: HOSPITALIST

## 2023-01-05 PROCEDURE — 83615 LACTATE (LD) (LDH) ENZYME: CPT | Performed by: HOSPITALIST

## 2023-01-05 PROCEDURE — 89050 BODY FLUID CELL COUNT: CPT | Performed by: HOSPITALIST

## 2023-01-05 PROCEDURE — 85384 FIBRINOGEN ACTIVITY: CPT | Performed by: HOSPITALIST

## 2023-01-05 PROCEDURE — 85014 HEMATOCRIT: CPT | Performed by: HOSPITALIST

## 2023-01-05 PROCEDURE — 84157 ASSAY OF PROTEIN OTHER: CPT | Performed by: HOSPITALIST

## 2023-01-05 PROCEDURE — 250N000009 HC RX 250: Performed by: RADIOLOGY

## 2023-01-05 PROCEDURE — 258N000003 HC RX IP 258 OP 636: Performed by: INTERNAL MEDICINE

## 2023-01-05 RX ORDER — DILTIAZEM HYDROCHLORIDE 5 MG/ML
10 INJECTION INTRAVENOUS ONCE
Status: COMPLETED | OUTPATIENT
Start: 2023-01-05 | End: 2023-01-05

## 2023-01-05 RX ORDER — LIDOCAINE HYDROCHLORIDE 10 MG/ML
10 INJECTION, SOLUTION EPIDURAL; INFILTRATION; INTRACAUDAL; PERINEURAL ONCE
Status: DISCONTINUED | OUTPATIENT
Start: 2023-01-05 | End: 2023-01-05

## 2023-01-05 RX ADMIN — ACETAMINOPHEN 650 MG: 325 TABLET, FILM COATED ORAL at 08:13

## 2023-01-05 RX ADMIN — INSULIN GLARGINE 40 UNITS: 100 INJECTION, SOLUTION SUBCUTANEOUS at 21:48

## 2023-01-05 RX ADMIN — POTASSIUM CHLORIDE 10 MEQ: 750 TABLET, EXTENDED RELEASE ORAL at 20:29

## 2023-01-05 RX ADMIN — SODIUM CHLORIDE 50 ML: 900 INJECTION INTRAVENOUS at 18:20

## 2023-01-05 RX ADMIN — RIVAROXABAN 20 MG: 20 TABLET, FILM COATED ORAL at 20:28

## 2023-01-05 RX ADMIN — REMDESIVIR 100 MG: 100 INJECTION, POWDER, LYOPHILIZED, FOR SOLUTION INTRAVENOUS at 18:19

## 2023-01-05 RX ADMIN — DIGOXIN 125 MCG: 125 TABLET ORAL at 20:27

## 2023-01-05 RX ADMIN — LEVOTHYROXINE SODIUM 75 MCG: 75 TABLET ORAL at 08:01

## 2023-01-05 RX ADMIN — DILTIAZEM HYDROCHLORIDE 360 MG: 180 CAPSULE, COATED, EXTENDED RELEASE ORAL at 08:00

## 2023-01-05 RX ADMIN — FUROSEMIDE 60 MG: 10 INJECTION, SOLUTION INTRAMUSCULAR; INTRAVENOUS at 08:02

## 2023-01-05 RX ADMIN — LIDOCAINE HYDROCHLORIDE 10 ML: 10 INJECTION, SOLUTION EPIDURAL; INFILTRATION; INTRACAUDAL; PERINEURAL at 15:14

## 2023-01-05 RX ADMIN — DEXAMETHASONE 6 MG: 2 TABLET ORAL at 16:20

## 2023-01-05 RX ADMIN — DOCUSATE SODIUM 100 MG: 100 CAPSULE, LIQUID FILLED ORAL at 08:13

## 2023-01-05 RX ADMIN — POTASSIUM CHLORIDE 10 MEQ: 750 TABLET, EXTENDED RELEASE ORAL at 08:02

## 2023-01-05 RX ADMIN — FUROSEMIDE 60 MG: 10 INJECTION, SOLUTION INTRAMUSCULAR; INTRAVENOUS at 16:20

## 2023-01-05 RX ADMIN — DILTIAZEM HYDROCHLORIDE 15 MG/HR: 5 INJECTION, SOLUTION INTRAVENOUS at 21:51

## 2023-01-05 RX ADMIN — DILTIAZEM HYDROCHLORIDE 5 MG/HR: 5 INJECTION, SOLUTION INTRAVENOUS at 09:45

## 2023-01-05 ASSESSMENT — ACTIVITIES OF DAILY LIVING (ADL)
ADLS_ACUITY_SCORE: 37
ADLS_ACUITY_SCORE: 37
ADLS_ACUITY_SCORE: 39
ADLS_ACUITY_SCORE: 37
ADLS_ACUITY_SCORE: 39
ADLS_ACUITY_SCORE: 37
ADLS_ACUITY_SCORE: 39
ADLS_ACUITY_SCORE: 39

## 2023-01-05 NOTE — PLAN OF CARE
A&O x 4. Tachycardic, otherwise VSS on 6LNC. Ax1. Regular diet, 1500mL fluid restriction, tolerating well. PIV in RUE, SL. Productive cough, dim LS. Pt denies pain. Up to commode to void; -BM, +BS. Continue plan of care.

## 2023-01-05 NOTE — PROGRESS NOTES
[employer]lying [employer]:         1443 Paged to take pt down for Thoracentesis.  Pt in COVID ISO Pt waiting on cart with NA in maldonado Pt has mask on.. Pt on Monitor ( IMC status)  5L O2, sating 97% RR 36 Diltiazem gtt 5ml/hr HR afib/flutter rate 78-87, /78. Donned appropriate ISO material(gown, gloves, N95, face shield). Introduced myself to pt and received report from pt's RN Maira. Pt A/Ox3 Transported pt down to .      1500 In US got pt sitting on edge of cart so US tech can scan pt's back to determine if there is enough fluid to drain.      1510-- Doctor in to talk with pt and get consent.  Time out taken ( asked pts name, bday, and what procedure were we doing) Pt answered all questions appropriately. Dr. melvinred sterile field on pt's right side and numbed the area. They then inserted drainage catheter which he attached to a vacu jar.     1525-- 400ml of a redish pink fluid were removed.  Drainage catheter removed and site cleaned. Band aid applied Pt then went into a rapid a flutter of 144. No complaints of SOB, CXP, nor feeling his heart was racing. Turned Diltiazem gtt to 10ml/hr BP after 5 min 123/74 Sats 96%     1530  Got pt lying back on cart, and US tech informed us that another US needed to be done.  ABdominal US and vasculature. Pt still no complaints of SOB nor CXP. Pt stated his breathing felt somewhat better.    1550-- transported pt back to room and gave report to Maira INGRAM  Aflutter 2:1 /87 Sats 97% 5L/nc RR 21 Helped pt to bed where he wanted to sit on edge of bed for a moment. Then we helped him lie down.      1600--  /90 RR 19 Sats 98%

## 2023-01-05 NOTE — PLAN OF CARE
Arrived from ED around 1815. IMC status. COVID +. A&O, VSS on 4-6L nc. Bipap used intermittently. Productive cough noted. Lasik given, DTV. Tolerating mod carb diet. Blood sugar 169. SBA/ Assist x1, up to chair. Denies pain. Tele: NSR

## 2023-01-05 NOTE — CONSULTS
Pulmonary Medicine Consultation        Date of Admission: 1/4/2023  Primary Attending:  Greg Sánchez MD  Consulting Physician: Tomas Rivera MD      History:    Carl Vázquez is a 82 year old male with medical history significant for diabetes mellitus, obstructive sleep apnea,  hypothyroidism, paroxysmal atrial fibrillation on anticoagulation, diaphragmatic paralysis, s/p plication of right hemidiaphragm, by Dr Turpin recurrent right pleural effusion was brought to the ER for evaluation of worsening shortness of breath.  He last saw thoracic surgery November 22, 2022 and at that time recommendation was for repeat thoracentesis if reaccumulation occurred.     Patient with complex medical conditions, see below for detail copied from discharge summary from October.  Patient continues to have progressive dyspnea.  For the last 2 days, wife noticed that he is hallucinating as well seeing bugs piled up in the room.  Because of this concern, she brought him to the ER today.  Has chronic cough.  Denies chest pain.  Reports orthopnea.  Worsening leg edema which has not improved despite diuretic use.  Also has chronic abdominal pain, no nausea vomiting or diarrhea.       COVID-19 PCR positive.  Patient was mildly hypoxic, 86% on room air.  Venous blood gas showed marked hypercarbia with PCO2 of 84.  Appears compensated.  Dexamethasone was given and hospitalist service          Review of system:   ROS is negative except for items mentioned above and in HPI.           Prior medical history:  Past Medical History:   Diagnosis Date     Diabetes (H)      Sleep apnea     uses CPAP machine     Thyroid disease        Past Surgical History:   Procedure Laterality Date     CHOLECYSTECTOMY      at [facility]     COLONOSCOPY      in [place], [place]     COLONOSCOPY  08/22/2017    Dr. Ja LYNN     ESOPHAGOSCOPY, GASTROSCOPY, DUODENOSCOPY (EGD), COMBINED N/A 6/7/2016    Procedure: COMBINED ESOPHAGOSCOPY,  GASTROSCOPY, DUODENOSCOPY (EGD);  Surgeon: Eneida Denton MD;  Location:  GI       Patient Active Problem List   Diagnosis     Bladder calculi     Diabetes mellitus without complication (H)     Dysphagia     Elevated prostate specific antigen (PSA)     High cholesterol     Inguinal hernia     Osteoarthrosis     Sleep apnea     Urinary tract infection     UTI (urinary tract infection)     Shortness of breath     Abnormal CT scan of lung     CO2 narcosis     Infection due to 2019 novel coronavirus       Social History     Social History     Socioeconomic History     Marital status:      Spouse name: Not on file     Number of children: Not on file     Years of education: Not on file     Highest education level: Not on file   Occupational History     Not on file   Tobacco Use     Smoking status: Never     Smokeless tobacco: Never   Substance and Sexual Activity     Alcohol use: No     Comment: drank many years ago     Drug use: No     Sexual activity: Not on file   Other Topics Concern     Parent/sibling w/ CABG, MI or angioplasty before 65F 55M? Not Asked   Social History Narrative     Not on file     Social Determinants of Health     Financial Resource Strain: Not on file   Food Insecurity: Not on file   Transportation Needs: Not on file   Physical Activity: Not on file   Stress: Not on file   Social Connections: Not on file   Intimate Partner Violence: Not on file   Housing Stability: Not on file         Family History  Family History   Problem Relation Age of Onset     Colon Cancer No family hx of            Medications  No current outpatient medications on file.     Current Facility-Administered Medications Ordered in Epic   Medication Dose Route Frequency Last Rate Last Admin     remdesivir 100 mg in sodium chloride 0.9 % 250 mL intermittent infusion  100 mg Intravenous Q24H 125 mL/hr at 01/05/23 1819 100 mg at 01/05/23 1819    And     0.9% sodium chloride BOLUS  50 mL Intravenous Q24H 100  mL/hr at 01/05/23 1820 50 mL at 01/05/23 1820     acetaminophen (TYLENOL) tablet 650 mg  650 mg Oral Q6H PRN   650 mg at 01/05/23 0813    Or     acetaminophen (TYLENOL) Suppository 650 mg  650 mg Rectal Q6H PRN         albuterol (PROVENTIL HFA/VENTOLIN HFA) inhaler  2 puff Inhalation 4x Daily         albuterol (PROVENTIL HFA/VENTOLIN HFA) inhaler  2 puff Inhalation Q4H PRN         bisacodyl (DULCOLAX) suppository 10 mg  10 mg Rectal Daily PRN         carboxymethylcellulose PF (REFRESH PLUS) 0.5 % ophthalmic solution 1 drop  1 drop Both Eyes Q1H PRN         dexamethasone (DECADRON) tablet 6 mg  6 mg Oral Daily   6 mg at 01/05/23 1620     glucose gel 15-30 g  15-30 g Oral Q15 Min PRN        Or     dextrose 50 % injection 25-50 mL  25-50 mL Intravenous Q15 Min PRN        Or     glucagon injection 1 mg  1 mg Subcutaneous Q15 Min PRN         digoxin (LANOXIN) tablet 125 mcg  125 mcg Oral QPM   125 mcg at 01/05/23 2027     diltiazem (CARDIZEM) 125 mg in sodium chloride 0.9 % 125 mL infusion  5-15 mg/hr Intravenous Continuous 15 mL/hr at 01/06/23 0631 15 mg/hr at 01/06/23 0631     docusate sodium (COLACE) capsule 100 mg  100 mg Oral BID PRN   100 mg at 01/05/23 0813     insulin aspart (NovoLOG) injection (RAPID ACTING)  1-10 Units Subcutaneous TID AC   2 Units at 01/04/23 1835     insulin aspart (NovoLOG) injection (RAPID ACTING)  1-7 Units Subcutaneous At Bedtime   4 Units at 01/05/23 2150     insulin aspart (NovoLOG) injection (RAPID ACTING)   Subcutaneous TID AC   2 Units at 01/04/23 1835     insulin glargine (LANTUS PEN) injection 40 Units  40 Units Subcutaneous QPM   40 Units at 01/05/23 2148     levothyroxine (SYNTHROID/LEVOTHROID) tablet 75 mcg  75 mcg Oral Daily   75 mcg at 01/05/23 0801     lidocaine (LMX4) cream   Topical Q1H PRN         lidocaine 1 % 0.1-1 mL  0.1-1 mL Other Q1H PRN         Medication instructions: Do NOT use nebulized medications   Does not apply Continuous PRN         naloxone (NARCAN)  injection 0.2 mg  0.2 mg Intravenous Q2 Min PRN        Or     naloxone (NARCAN) injection 0.4 mg  0.4 mg Intravenous Q2 Min PRN        Or     naloxone (NARCAN) injection 0.2 mg  0.2 mg Intramuscular Q2 Min PRN        Or     naloxone (NARCAN) injection 0.4 mg  0.4 mg Intramuscular Q2 Min PRN         nitroGLYcerin (NITROSTAT) sublingual tablet 0.4 mg  0.4 mg Sublingual Q5 Min PRN         No lozenges or gum should be given while patient on BIPAP/AVAPS/AVAPS AE   Does not apply Continuous PRN         ondansetron (ZOFRAN ODT) ODT tab 4 mg  4 mg Oral Q6H PRN        Or     ondansetron (ZOFRAN) injection 4 mg  4 mg Intravenous Q6H PRN         oxyCODONE (ROXICODONE) tablet 5 mg  5 mg Oral Q4H PRN         Patient may continue current oral medications   Does not apply Continuous PRN         potassium chloride ER (KLOR-CON M) CR tablet 10 mEq  10 mEq Oral BID   10 mEq at 23     rivaroxaban ANTICOAGULANT (XARELTO) tablet 20 mg  20 mg Oral Daily with supper   20 mg at 23     sodium chloride (PF) 0.9% PF flush 3 mL  3 mL Intracatheter Q8H   3 mL at 23 0823     sodium chloride (PF) 0.9% PF flush 3 mL  3 mL Intracatheter q1 min prn         sodium chloride (PF) 0.9% PF flush 3 mL  3 mL Intracatheter Q8H   3 mL at 23 1807     sodium chloride (PF) 0.9% PF flush 3 mL  3 mL Intracatheter q1 min prn         No current Epic-ordered outpatient medications on file.       Allergies   Allergen Reactions     Statin Drugs [Hmg-Coa-R Inhibitors]            Physical Examination:   Vitals:    23 0500 23 0530 23 0630 23 0637   BP: 109/89 104/60 96/52 106/56   Pulse: 81 78 101 75   Resp: 25 13 (!) 59 21   Temp:       TempSrc:       SpO2: 97% 99% 90% 95%   Weight:       Height:         Body mass index is 25.99 kg/m .  Temp (24hrs), Av.8  F (36.6  C), Min:97.6  F (36.4  C), Max:98.1  F (36.7  C)        Constitutional:  Appears comfortable.  HENT:  mucous membranes moist.  Eyes: PERRLA, no  icterus, no pallor.   Neck: No lymphadenopathy or thyromegaly, trachea midline, no carotid bruits.  Cardiovascular: Regular rate and rythym, no murmurs, rubs or gallops, no peripheral edema.  Respiratory/Chest: decreased Breath sounds R base  Gastrointestinal: Abdomen was soft, non-tender, non-distended, no masses felt, no hepatosplenomegaly.  Musculoskeletal: No clubbing or cyanosis, full range of motion in all extremities.  Neurological: No focal motor or sensory deficits.   Skin: No skin rash, hives, petechiae, or breakdown.          CMP  Recent Labs   Lab 01/06/23  0714 01/06/23  0615 01/06/23  0050 01/05/23  2147 01/05/23  0847 01/05/23  0554 01/04/23  1834 01/04/23  1141     --   --   --   --  139  --  137   POTASSIUM 4.0  --   --   --   --  4.3  --  4.3   CHLORIDE 93*  --   --   --   --  94  --  91*   CO2 39*  --   --   --   --  38*  --  42*   ANIONGAP 7  --   --   --   --  7  --  4   * 132* 218* 284*   < > 100*   < > 185*   BUN 37*  --   --   --   --  24  --  23   CR 0.81  --   --   --   --  0.74  --  0.83   GFRESTIMATED 88  --   --   --   --  90  --  87   TATE 8.7  --   --   --   --  9.0  --  9.3   PROTTOTAL  --   --   --   --   --   --   --  7.0  6.9   ALBUMIN  --   --   --   --   --   --   --  3.0*   BILITOTAL  --   --   --   --   --   --   --  0.5   ALKPHOS  --   --   --   --   --   --   --  69   AST  --   --   --   --   --   --   --  46*   ALT  --   --   --   --   --   --   --  33    < > = values in this interval not displayed.     CBC  Recent Labs   Lab 01/06/23  0714 01/05/23  0554 01/04/23  1141   WBC 13.8* 7.5 7.3   RBC 5.30 5.19 5.42   HGB 15.9 15.4 16.1   HCT 48.6 48.3 50.8   MCV 92 93 94   MCH 30.0 29.7 29.7   MCHC 32.7 31.9 31.7   RDW 14.1 13.9 14.3    279 300     INRNo lab results found in last 7 days.  Arterial Blood Gas  Recent Labs   Lab 01/04/23  1141   O2PER 4     No results for input(s): CULT in the last 168 hours.    Diagnostic Studies:  Chest Radiology:     CHEST TWO  "VIEWS January 4, 2023 12:04 PM      HISTORY: Cough, shortness of breath, hypoxia.     COMPARISON: Chest x-ray on 7/15/2022.                                                                      IMPRESSION: AP and lateral views of the chest were obtained.  Cardiomediastinal silhouette is within normal limits. Small left and  moderate right pleural effusion including small amount of fluid along  the right minor fissure with associated basilar  atelectasis/consolidation. No significant pneumothorax.          CT CHEST 12/22/22      1. Small to moderate pleural effusion. Patchy atelectasis in both lungs, greatest in the right lower lobe.   2. No acute abnormality in the abdomen or pelvis.   3. Colonic diverticulosis.   4. Small nonobstructing right renal calculus.       Please note that all CT scans at this facility use dose modulation, iterative reconstruction, and/or weight-based dosing when appropriate to reduce radiation dose to as low as reasonably achievable.           Assessment:     82 year old male with medical history significant for diabetes mellitus, obstructive sleep apnea,  hypothyroidism, paroxysmal atrial fibrillation on anticoagulation, diaphragmatic paralysis, s/p plication of right hemidiaphragm, by Dr Turpin recurrent right pleural effusion was brought to the ER for evaluation of worsening shortness of breath.  He last saw thoracic surgery November 22, 2022 and at that time recommendation was for repeat thoracentesis if reaccumulation occurred.   When last seen by thoracic surgery\" Minimal persistent right-sided pleural effusion after recent diaphragm plication. We discussed that the very small amount of fluid present is unlikely causing lung compromise and contributing to significant shortness of breath. We discussed options including attempted repeat thoracentesis. However I think this would be unwise given his recent experience with attempted percutaneous pleural drain placement via CT guidance " "without enough fluid for tube placement.\"    Pulmonary Diagnoses: Abnl CT/CXR R91.8, CHF I50.9, GARCIA R06.09, Hypercapnia R06.89, Pleural effusion J91.8, Resp fail acute J96.00, Resp fail chronic J96.10, Sleep apnea obstr G47.33 and SOB R06.02  Pneumonia due to confirmed COVID-19 J12.89,     Recommendations              Duonebs every four hours as needed     Supplemental oxygen target pulse oxymetry >89%, wean as able    Diuresis as tolerated    BiPAP with sleep opportunity    Recommend against Pleurx catheter for drainage of effusion as was previously recommended by his thoracic surgeon, should see Dr. Turpin following discharge to discuss plans for management of his chronic effusion    Avoid line placements on the left side of his neck since he has a chronic paralyzed right hemidiaphragm in order to avoid damaging the left phrenic nerve    Obtain ultrasound-guided thoracentesis  Please send fluid for the following studies: LDH, protein, glucose, pH (On ice), Gram stain, culture, and cytology. Concomitant serum protein and glucose Should be sent as well.    Physical activity as tolerated    Discussed with nursing and radiology techs    Please call if any questions          Tomas Boland M.D.  Pulmonary, Critical Care and Sleep Medicine  [facility]   Pager: [phone]  Office:[phone]            "

## 2023-01-05 NOTE — CONSULTS
"[facility]    Electrophysiology Consultation     Date of Admission:  1/4/2023  Date of Consult (When we saw the patient): 01/05/23    Assessment & Plan   Carl Vázquez is a 82 year old male who was admitted on 1/4/2023 for worsening GARCIA. Dx'd with COVID PNA. We were asked to see the patient for atrial arrhythmias.    1. 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 He was followed by Dr. Emery while hospitalized. I have seen him as an OP, most recently 7/2022, with subsequent ZioPatch monitors showing pAFib necessitating medication changes.  During our last communication 11/22 to review ZIoPatch, I added back digoxin 125 mcg and had him continue Diltiazem 360 mg daily and Xarelto 20 mg daily with plans for repeat ZP.    - Interestingly, his severe and chronic SOB has NOT been correlated with paroxysms of AFib or RVR but has been complained of \"24/7.\"     - Admitted here 1/4 with increased GARCIA. EKG 1/4/23 @ 1214 showed SR/PVCs.  PTA on digoxin 125 mcg daily and Diltiazem 360 mg daily    - This AM 1/5 Smart Disclosure looks to be AFib/RVR ~3 AM. At ~0750 went into AFlutter with 2:1 conduction ~150 bpm. EKG showed this appears typical. We've not seen this in him before.  Then went back into AFib, and HR now 90-120s.    - Currently on digoxin 125 mcg and Diltiazem 360 (stopped in favor of gtt).  Likely that arrhythmia/RVR exacerbated by COVID infection     PLAN:   1. Would continue attempts at rate control - Diltiazem gtt, digoxin - as he is treated for acute infection. In past BB did seem to cause worsening GARCIA, but this has been [id number] regardless   2. Would not plan ablation at this point given both AFlutter and AFib seen.  Furthermore, in the past his AFib/RVR has been " asx'c without worsening of his severe chronic GARCIA when in AFib   3. PTA On Xarelto 20 mg daily (had been switched from Eliquis d/t desire to take only daily meds). This has been HELD since admit 1/4 given need for thoracentesis.    2. GARCIA/SOB -  - This has been severe/longstanding since ~1/2022, previously using TM daily  - Initially thought to be related to new-onset AFib and R hemidiaphragm paralysis.   - Have subsequently confirmed on ZioPatch x 2 that days of pAFib have NOT correlated with worsening of his breathing  - I started torsemide as OP 7/2022 (nl LVEF and NTpBNP) for edema, which improved edema, but not surprisingly, no improvement in GARCIA.  - Saw Dr. Boothe 5022 and PFTs with mod restriction  - Has undergone C-spine injection (erroneously on L 6/2022), ultimately underwent R hemidiaphragm plication 9/19/2022. GARCIA only improved for a short period.  - S/p thoracentesis for R pleural effusion x 3: 10/18 (650 ml), 10/27 (630 ml) and again 11/4 (430). Cytology and cultures reportedly negative. Apparently pleural drain placement via CT guidance was not done as there was not enough fluid for tube placement    - Negative stress test 3/202     PLAN:   1. As above, stress test 3/2022 negative for ischemia, last echo 6/2022 with nl LVEF & no sig valve abnls. Grade I DD and ZioPatch x 2 hasn't been able to correlate increased sxs/GARCIA with pAFib (41% burden on last - 24h/day for a few days and no sx changes).  Therefore, it doesn't seem as though cardiac issue is cause of his unrelenting GARCIA, now worse with COVID infection   2. Per Hospitalist/Pulmology       Lety Dangelo PA-C, AUDREY MSPAS    Code Status    No CPR- Do NOT Intubate    Reason for Consult   Reason for consult: We were asked by Hospitalist to evaluate for atrial arrhythmias    Primary Care Physician   Kyler Mcwilliams    Chief Complaint   Hypoxic respiratory failure  COVID +  AFlutter/AFib    History is obtained from the patient and EPIC  chart.      History of Present Illness   Carl Vázquez is a 82 year old male with severe/unrelenting SOB, h/o paroxysmal AFib (rate control, AC), NSVT, HTN, IDDM Type 2, dyslipidemia and h/o R hemidiaphragm paralysis who presents with worsening GARCIA. Dx'd with COVID. Found to have AFlutter and now back in AFib.    Carl's SOB started 1/2022, shortly after he was started on BB for new dx of AFib.  BB was stopped, but GARCIA persisted. Hospitalized 6/2022 for GARCIA and was seen by Cardiology. Rate control recommended for pAFib, with plans for OP C-spine nerve block 6/2022 (L) d/t R hemidiaphragm paralysis.     I've seen Carl and his wife Dorothy in follow-up in EP clinic a few times and Dorothy has kept me updated on Carl's many tests/specialty visits to get to the bottom of severe SOB and upper arm weakness. He's seen Ortho, NSG, Neurology and Pulmonology.    Most recently underwent plication of R hemidiaphragm to improve lung function and GARCIA 9/19/2022 with Dr. Turpin ([place]) with thoracentesis.     Has had multiple admissions (6/2022 at [facility], 8/2022 at [facility] for hypoxemia). Now with worsening GARCIA and COVID + and recurrent R pleural effusion.    Has known pAFib. This AM, went into AFlutter, then back to AFib. Currently on Diltiazem gtt and po digoxin for rate control.     Past Medical History   I have reviewed this patient's medical history and updated it with pertinent information if needed.   Past Medical History:   Diagnosis Date     Diabetes (H)      Sleep apnea     uses CPAP machine     Thyroid disease        Past Surgical History   I have reviewed this patient's surgical history and updated it with pertinent information if needed.  Past Surgical History:   Procedure Laterality Date     CHOLECYSTECTOMY      at [facility]     COLONOSCOPY      in [place], [place]     COLONOSCOPY  08/22/2017    Dr. Ja LYNN     ESOPHAGOSCOPY, GASTROSCOPY, DUODENOSCOPY (EGD), COMBINED N/A 6/7/2016    Procedure:  COMBINED ESOPHAGOSCOPY, GASTROSCOPY, DUODENOSCOPY (EGD);  Surgeon: Eneida Denton MD;  Location:  GI       Prior to Admission Medications   Prior to Admission Medications   Prescriptions Last Dose Informant Patient Reported? Taking?   digoxin (LANOXIN) 125 MCG tablet 1/3/2023 at pm Spouse/Significant Other No Yes   Sig: Take 1 tablet (125 mcg) by mouth daily   diltiazem ER COATED BEADS (CARDIZEM CD) 360 MG 24 hr capsule 1/4/2023 at am Spouse/Significant Other No Yes   Sig: Take 1 capsule (360 mg) by mouth daily   glipiZIDE (GLUCOTROL) 5 MG tablet 1/4/2023 at am Spouse/Significant Other Yes Yes   Sig: Take 10 mg by mouth every morning (before breakfast)   insulin glargine (LANTUS) 100 UNIT/ML PEN 1/3/2023 at pm Spouse/Significant Other Yes Yes   Sig: Inject 60 Units Subcutaneous At Bedtime   levothyroxine (SYNTHROID/LEVOTHROID) 75 MCG tablet 1/4/2023 at am Spouse/Significant Other Yes Yes   Sig: Take 75 mcg by mouth daily   oxyCODONE (ROXICODONE) 5 MG tablet 1/3/2023 Spouse/Significant Other Yes Yes   Sig: Take 5 mg by mouth every 4 hours as needed for pain   potassium chloride ER (KLOR-CON M) 10 MEQ CR tablet 1/4/2023 at am Spouse/Significant Other No Yes   Sig: Take 2 tablets (20 mEq) by mouth daily   Patient taking differently: Take 10 mEq by mouth 2 times daily   rivaroxaban ANTICOAGULANT (XARELTO) 20 MG TABS tablet 1/3/2023 at pm Spouse/Significant Other No Yes   Sig: Take 1 tablet (20 mg) by mouth daily (with dinner)   torsemide (DEMADEX) 20 MG tablet 1/4/2023 at am Spouse/Significant Other No Yes   Sig: Take 1 tablet (20 mg) by mouth daily      Facility-Administered Medications: None         Medications     dilTIAZem       - MEDICATION INSTRUCTIONS -       - MEDICATION INSTRUCTIONS -       - MEDICATION INSTRUCTIONS -         remdesivir  100 mg Intravenous Q24H    And     sodium chloride 0.9%  50 mL Intravenous Q24H     albuterol  2 puff Inhalation 4x Daily     dexamethasone  6 mg Oral Daily      digoxin  125 mcg Oral QPM     diltiazem  10 mg Intravenous Once     furosemide  60 mg Intravenous BID     insulin aspart  1-10 Units Subcutaneous TID AC     insulin aspart  1-7 Units Subcutaneous At Bedtime     insulin aspart   Subcutaneous TID AC     insulin glargine  40 Units Subcutaneous QPM     levothyroxine  75 mcg Oral Daily     potassium chloride ER  10 mEq Oral BID     sodium chloride (PF)  3 mL Intracatheter Q8H     sodium chloride (PF)  3 mL Intracatheter Q8H       Allergies   Allergies   Allergen Reactions     Statin Drugs [Hmg-Coa-R Inhibitors]        Social History   I have updated and reviewed the following Social History Narrative:   Social History     Social History Narrative     Not on file   No current EToH  Never smoker    Family History   I have reviewed this patient's family history and updated it with pertinent information if needed.   Family History   Problem Relation Age of Onset     Colon Cancer No family hx of    No AFib    Review of Systems   A complete 10-point review of systems was done and is negative with the exceptions noted in HPI.      Physical Exam   Temp: 97  F (36.1  C) Temp src: Oral BP: 114/84 Pulse: (!) 144   Resp: 10 SpO2: 96 % O2 Device: Nasal cannula Oxygen Delivery: 6 LPM  Vital Signs with Ranges  Temp:  [97  F (36.1  C)-98.2  F (36.8  C)] 97  F (36.1  C)  Pulse:  [] 144  Resp:  [10-34] 10  BP: (106-131)/(58-84) 114/84  SpO2:  [86 %-98 %] 96 %  161 lbs 0 oz    Telemetry:  Currently AFib 105 bpm  Constitutional: Awake alert, sitting in chair. Conversant  Eyes: Lids/lashes wnl  ENT: Normocephalic/atraumatic  Respiratory: Decreased BS R>L. No wheeze noted  Cardiovascular: Irregular. HR 90s. Distant  GI: BS present  Skin: Scattered bruises  Musculoskeletal: Foot>Leg LE edema, ~2-3+  Neurologic: Awake, alert    Data   I personally reviewed the EKG tracing showing AFlutter 2:1 conduction (appears typical).  Results for orders placed or performed during the hospital  encounter of 01/04/23 (from the past 24 hour(s))   CBC with platelets + differential    Narrative    The following orders were created for panel order CBC with platelets + differential.  Procedure                               Abnormality         Status                     ---------                               -----------         ------                     CBC with platelets and d...[[id number]]                      Final result                 Please view results for these tests on the individual orders.   Comprehensive metabolic panel   Result Value Ref Range    Sodium 137 133 - 144 mmol/L    Potassium 4.3 3.4 - 5.3 mmol/L    Chloride 91 (L) 94 - 109 mmol/L    Carbon Dioxide (CO2) 42 (H) 20 - 32 mmol/L    Anion Gap 4 3 - 14 mmol/L    Urea Nitrogen 23 7 - 30 mg/dL    Creatinine 0.83 0.66 - 1.25 mg/dL    Calcium 9.3 8.5 - 10.1 mg/dL    Glucose 185 (H) 70 - 99 mg/dL    Alkaline Phosphatase 69 40 - 150 U/L    AST 46 (H) 0 - 45 U/L    ALT 33 0 - 70 U/L    Protein Total 6.9 6.8 - 8.8 g/dL    Albumin 3.0 (L) 3.4 - 5.0 g/dL    Bilirubin Total 0.5 0.2 - 1.3 mg/dL    GFR Estimate 87 >60 mL/min/1.73m2   Troponin I (now)   Result Value Ref Range    Troponin I High Sensitivity 32 <79 ng/L   Blood gas venous and oxyhgb   Result Value Ref Range    pH Venous 7.38 7.32 - 7.43    pCO2 Venous 84 (HH) 40 - 50 mm Hg    pO2 Venous 20 (L) 25 - 47 mm Hg    Bicarbonate Venous 49 (HH) 21 - 28 mmol/L    FIO2 4     Oxyhemoglobin Venous 24 (L) 70 - 75 %    Base Excess/Deficit (+/-) 18.3 (H) -7.7 - 1.9 mmol/L   [place] Draw    Narrative    The following orders were created for panel order [place] Draw.  Procedure                               Abnormality         Status                     ---------                               -----------         ------                     Extra Blue Top Tube[[id number]]                              Final result               Extra Red Top Tube[[id number]]                                                             Please view results for these tests on the individual orders.   CBC with platelets and differential   Result Value Ref Range    WBC Count 7.3 4.0 - 11.0 10e3/uL    RBC Count 5.42 4.40 - 5.90 10e6/uL    Hemoglobin 16.1 13.3 - 17.7 g/dL    Hematocrit 50.8 40.0 - 53.0 %    MCV 94 78 - 100 fL    MCH 29.7 26.5 - 33.0 pg    MCHC 31.7 31.5 - 36.5 g/dL    RDW 14.3 10.0 - 15.0 %    Platelet Count 300 150 - 450 10e3/uL    % Neutrophils 82 %    % Lymphocytes 11 %    % Monocytes 5 %    % Eosinophils 1 %    % Basophils 0 %    % Immature Granulocytes 1 %    NRBCs per 100 WBC 0 <1 /100    Absolute Neutrophils 6.0 1.6 - 8.3 10e3/uL    Absolute Lymphocytes 0.8 0.8 - 5.3 10e3/uL    Absolute Monocytes 0.4 0.0 - 1.3 10e3/uL    Absolute Eosinophils 0.1 0.0 - 0.7 10e3/uL    Absolute Basophils 0.0 0.0 - 0.2 10e3/uL    Absolute Immature Granulocytes 0.1 <=0.4 10e3/uL    Absolute NRBCs 0.0 10e3/uL   Extra Blue Top Tube   Result Value Ref Range    Hold Specimen JIC    Lipase   Result Value Ref Range    Lipase 58 (L) 73 - 393 U/L   Nt probnp inpatient (BNP)   Result Value Ref Range    N terminal Pro BNP Inpatient 815 0 - 1,800 pg/mL   CRP inflammation   Result Value Ref Range    CRP Inflammation 30.3 (H) 0.0 - 8.0 mg/L   Protein total   Result Value Ref Range    Protein Total 7.0 6.8 - 8.8 g/dL   TSH with free T4 reflex   Result Value Ref Range    TSH 1.64 0.40 - 4.00 mU/L   Hemoglobin A1c   Result Value Ref Range    Hemoglobin A1C 8.2 (H) 0.0 - 5.6 %   Symptomatic Influenza A/B & SARS-CoV2 (COVID-19) Virus PCR Multiplex Nasopharyngeal    Specimen: Nasopharyngeal; Swab   Result Value Ref Range    Influenza A PCR Negative Negative    Influenza B PCR Negative Negative    RSV PCR Negative Negative    SARS CoV2 PCR Positive (A) Negative    Narrative    Testing was performed using the Xpert Xpress CoV2/Flu/RSV Assay on the [employer]. This test should be ordered for the detection of SARS-CoV-2 and influenza viruses in  individuals who meet clinical and/or epidemiological criteria. Test performance is unknown in asymptomatic patients. This test is for in vitro diagnostic use under the FDA EUA for laboratories certified under CLIA to perform high or moderate complexity testing. This test has not been FDA cleared or approved. A negative result does not rule out the presence of PCR inhibitors in the specimen or target RNA in concentration below the limit of detection for the assay. If only one viral target is positive but coinfection with multiple targets is suspected, the sample should be re-tested with another FDA cleared, approved, or authorized test, if coinfection would change clinical management. This test was validated by the [facility]. These laboratories are certified under the Clinical Laboratory Improvement Amendments of 1988 (CLIA-88) as qualified to perform high complexity laboratory testing.   Chest XR,  PA & LAT    Narrative    CHEST TWO VIEWS January 4, 2023 12:04 PM     HISTORY: Cough, shortness of breath, hypoxia.    COMPARISON: Chest x-ray on 7/15/2022.      Impression    IMPRESSION: AP and lateral views of the chest were obtained.  Cardiomediastinal silhouette is within normal limits. Small left and  moderate right pleural effusion including small amount of fluid along  the right minor fissure with associated basilar  atelectasis/consolidation. No significant pneumothorax.    STACIE STUBBS MD         SYSTEM ID:  [id number]   EKG 12 lead   Result Value Ref Range    Systolic Blood Pressure  mmHg    Diastolic Blood Pressure  mmHg    Ventricular Rate 81 BPM    Atrial Rate 81 BPM    OR Interval 122 ms    QRS Duration 82 ms     ms    QTc 429 ms    P Axis 21 degrees    R AXIS 45 degrees    T Axis 34 degrees    Interpretation ECG       Sinus rhythm with Premature supraventricular complexes  Otherwise normal ECG  When compared with ECG of 16-JUN-2022 09:37,  Sinus rhythm has replaced Atrial  fibrillation  Vent. rate has decreased BY  62 BPM  Nonspecific T wave abnormality, improved in Inferior leads  Nonspecific T wave abnormality now evident in Anterior leads  Confirmed by GENERATED REPORT, COMPUTER ([id number]),  Soha Chaves ([id number]) on 1/4/2023 12:22:33 PM     Glucose by meter   Result Value Ref Range    GLUCOSE BY METER POCT 169 (H) 70 - 99 mg/dL   Digoxin level   Result Value Ref Range    Digoxin 0.9   ug/L   Glucose by meter   Result Value Ref Range    GLUCOSE BY METER POCT 196 (H) 70 - 99 mg/dL   CBC with platelets   Result Value Ref Range    WBC Count 7.5 4.0 - 11.0 10e3/uL    RBC Count 5.19 4.40 - 5.90 10e6/uL    Hemoglobin 15.4 13.3 - 17.7 g/dL    Hematocrit 48.3 40.0 - 53.0 %    MCV 93 78 - 100 fL    MCH 29.7 26.5 - 33.0 pg    MCHC 31.9 31.5 - 36.5 g/dL    RDW 13.9 10.0 - 15.0 %    Platelet Count 279 150 - 450 10e3/uL   Basic metabolic panel   Result Value Ref Range    Sodium 139 133 - 144 mmol/L    Potassium 4.3 3.4 - 5.3 mmol/L    Chloride 94 94 - 109 mmol/L    Carbon Dioxide (CO2) 38 (H) 20 - 32 mmol/L    Anion Gap 7 3 - 14 mmol/L    Urea Nitrogen 24 7 - 30 mg/dL    Creatinine 0.74 0.66 - 1.25 mg/dL    Calcium 9.0 8.5 - 10.1 mg/dL    Glucose 100 (H) 70 - 99 mg/dL    GFR Estimate 90 >60 mL/min/1.73m2   CRP inflammation   Result Value Ref Range    CRP Inflammation 20.7 (H) 0.0 - 8.0 mg/L   D dimer quantitative   Result Value Ref Range    D-Dimer Quantitative 1.09 (H) 0.00 - 0.50 ug/mL FEU    Narrative    This D-dimer assay is intended for use in conjunction with a clinical pretest probability assessment model to exclude pulmonary embolism (PE) and deep venous thrombosis (DVT) in outpatients suspected of PE or DVT. The cut-off value is 0.50 ug/mL FEU.   Lactate Dehydrogenase   Result Value Ref Range    Lactate Dehydrogenase 246 (H) 85 - 227 U/L   Fibrinogen activity   Result Value Ref Range    Fibrinogen Activity 602 (H) 170 - 490 mg/dL   EKG 12-lead, tracing only   Result Value Ref Range     Systolic Blood Pressure  mmHg    Diastolic Blood Pressure  mmHg    Ventricular Rate 150 BPM    Atrial Rate 300 BPM    CA Interval  ms    QRS Duration 78 ms     ms    QTc 341 ms    P Axis 241 degrees    R AXIS 94 degrees    T Axis 221 degrees    Interpretation ECG       Atrial flutter with 2:1 A-V conduction  Rightward axis  Pulmonary disease pattern  Marked ST abnormality, possible inferior subendocardial injury  Abnormal ECG  When compared with ECG of 04-JAN-2023 12:14,  Significant changes have occurred     Glucose by meter   Result Value Ref Range    GLUCOSE BY METER POCT 75 70 - 99 mg/dL

## 2023-01-05 NOTE — PROGRESS NOTES
A/Ox4. AVSS w/ 6L NC. Attempted to have BM but was unsuccessful. Voiding in urinal. Denies SOB. Up with Aof1.

## 2023-01-05 NOTE — PROGRESS NOTES
Orientations: A&Ox4, forgetful at times  Vitals/Pain: VSS on cardizem gtt, 3L NC.  Tele: A-flutter 2:1 w/140's HR this morning, started on cardizem gtt. Converted back into a-fib most of the late morning/afternoon. Converted back into A-flutter 2:1 w/140's HR after thoracentesis. EKG ordered, showing a-fib at this time. Spoke with cardiologist, continue to monitor as long as pt is stable/asymptomatic.  Lines/Drains: PIV x2 infusing Cardizem gtt @15mg/hr and remdesevir.   Skin/Wounds: blanchable redness to coccyx  GI/: Multiple BM's today after stool softener. Comb reg diet. Only drank 400cc of fluid as he was npo most of the day. BG checks.  Ambulation/Assist: SBA, shuffled hunched over gait.    Thoracentesis today. Cardizem gtt started today. Weaned down to 3L NC.

## 2023-01-05 NOTE — PROGRESS NOTES
"[facility]    Hospitalist Progress Note    Date of Service (when I saw the patient): 01/05/2023    Assessment & Plan   Carl Vázquez is a 82 year old male who was admitted on 1/4/2023.    Carl Vázquez is a 82 year old male with medical history significant for IDDM, CALE, hypothyroidism, paroxysmal atrial fibrillation on anticoagulation, diaphragmatic paralysis, s/p plication of right hemidiaphragm, recurrent right pleural effusion was brought to the ER for evaluation of worsening shortness of breath and is being admitted on 1/4/2023 for further manage.         # Confirmed COVID-19 infection    # Viral Pneumonia secondary to COVID-19 infection  # Acute Metabolic Encephalopathy  # Acute hypercapnic respiratory failure     Symptom Onset Date used to determine duration of isolation.  If unsure, enter \"unknown\"   Date of 1st Positive Test 1/4/22   Vaccination Status Partially Vaccinated, laterst pending booster dose         - COVID-19 special precautions, continuous pulse-ox  - Oxygen: continue current support with O2 Device: Nasal cannula at Oxygen Delivery: 4 LPM; titrate to keep SpO2 between 90-96%.   - Given hypercapnia and metabolic encephalopathy, will place on BiPAP treatment and follow-up ABG.  - Labs: Standard COVID admission labs ordered (CBC with diff, CMP, INR, D-dimer, CRP).   - Imaging: needs CT chest abd pelvis, will order once able to lie flat  - Breathing treatments: albuterol inhaler four times a day scheduled and PRN; avoid nebulizers in favor of MDIs   - IV fluids: not indicated at this time  - Antibiotics: not indicated   - COVID-Focused Medications: Dexamethasone 6 mg x 10 days or until hospital discharge, started on 1/4/22 and Remdesivir x 3 days or until hospital discharge, started on 1/4/22  - DVT Prophylaxis:         - At high risk of thrombotic complications due to COVID-19 (DDimer = N/A )         - on DOAC, To resume after thoracentesis     Recurrent " right pleural effusion  Bilateral leg edema  Progressive dyspnea of 1 year, see background in H&P for details.   TTE from January 2022 shows normal LV function, grade 1 pattern of LV diastolic filling.  No significant valvular stenosis or regurgitation.  Patient had regadenoson stress test March 2022, was normal.  Had CT abdomen pelvis September 2021, no finding of cirrhosis.  -Patient has been on diuretic, torsemide, still with recurrent right pleural effusion needing multiple thoracentesis.  Has worsening leg edema despite diuretic.  Patient is on anticoagulation, DVT unlikely.  -Change PTA torsemide to IV lasix, follow daily I&O, weight and fluid restriction  -Will obtain TTE  -Right upper quadrant ultrasound to evaluate for cirrhosis and any portal vein occlusion.  -Will consider CT chest abdomen pelvis for further evaluation after diuresed adequately (patient states he is orthopneic, cannot lie flat on his back currently)  -Rt thoracentesis (consideration of pleurex catheter in past, Pulm consulted for recommendation), pleural fluid analysis.     Paroxysmal A. Fib   atrial flutter with RVR on 1/5/2023  EKG shows sinus rhythm, rate controlled.  Patient was on metoprolol but apparently had ongoing/worsening dyspnea on exertion and was changed to Cardizem.  -On Xarelto, hold tonight for thoracentesis/possible Pleurx tomorrow, resume after procedure  -Continue to monitor on telemetry.  Patient was in atrial flutter with RVR with heart rates in the 140s to 150s  He was started on diltiazem drip after diltiazem 10 mg bolus.  EP consulted and recommended rate control with diltiazem and just digoxin  Heart rate is currently well controlled     Rt diaphragmatic paralysis  S/p plication      IDDM  PTA medications include glipizide 10 Mg every morning, Lantus 60 units at HS.  -Resume Lantus at 40 units at bedtime as will keep him NPO in case pleurex recommended by pulm.  - hold glipizide.  -Moderate ISS  -patient on  "steroid, expect uncontrolled BS, add premeal novolog while not NPO.   -Hypoglycemia protocol     Hypothyroidism  - Resume PTA Synthroid     CALE  -Patient had difficulty wearing CPAP.  Given marked hypercarbia, will be on BiPAP as needed.  -repeat blood gas           Diet:   moderate CHO  DVT Prophylaxis: DOAC   Power Catheter: Not present  Lines: None     Cardiac Monitoring: None  Code Status:  DNR/DNI, discussed with patient and his spouse        Clinically Significant Risk Factors Present on Admission                 # Hypoalbuminemia: Lowest albumin = 3 g/dL at 1/4/2023 11:41 AM, will monitor as appropriate  # Drug Induced Coagulation Defect: home medication list includes an anticoagulant medication         # Overweight: Estimated body mass index is 25.99 kg/m  as calculated from the following:    Height as of this encounter: 1.676 m (5' 6\").    Weight as of this encounter: 73 kg (161 lb).                  Disposition Plan    Disposition: Expected discharge in 2 to 3 days if patient's respiratory status improves and remained stable and was able to be weaned off of oxygen    Nichelle Meredith MD, MD  [phone] (P)      Interval History   Patient is sitting in chair.  Complains of shortness of breath with lying down.  This morning he was in atrial flutter RVR with heart rates in the 140s to 150s so was started on diltiazem drip.  Awaiting pulmonary consultation for right-sided thoracentesis versus Pleurx catheter placement.  Xarelto on hold for the procedure.  Currently on 6 L of oxygen by nasal cannula.  No other acute issues since admission    -Data reviewed today: I reviewed all new labs and imaging results over the last 24 hours. I personally reviewed the chest x-ray image(s) showing Moderate right-sided pleural effusion and small left-sided pleural fluid.    Physical Exam   Temp: 97  F (36.1  C) Temp src: Oral BP: 103/67 Pulse: 98   Resp: 23 SpO2: (!) 86 % O2 Device: Nasal cannula Oxygen Delivery: 5 " LPM  Vitals:    01/04/23 1137   Weight: 73 kg (161 lb)     Vital Signs with Ranges  Temp:  [97  F (36.1  C)-98.2  F (36.8  C)] 97  F (36.1  C)  Pulse:  [] 98  Resp:  [10-34] 23  BP: (103-131)/(61-88) 103/67  SpO2:  [86 %-98 %] 86 %  I/O last 3 completed shifts:  In: -   Out: 500 [Urine:500]    Constitutional: Awake, alert, cooperative, no apparent distress  Respiratory: Very diminished breath sounds bilaterally.  No wheezing  Cardiovascular: Irregularly irregular, no murmurs    GI: Normal bowel sounds, soft, non-distended, non-tender  Skin/Integumen: No rashes, no cyanosis, no edema  Other:     Medications     dilTIAZem 5 mg/hr (01/05/23 0945)     - MEDICATION INSTRUCTIONS -       - MEDICATION INSTRUCTIONS -       - MEDICATION INSTRUCTIONS -         remdesivir  100 mg Intravenous Q24H    And     sodium chloride 0.9%  50 mL Intravenous Q24H     albuterol  2 puff Inhalation 4x Daily     dexamethasone  6 mg Oral Daily     digoxin  125 mcg Oral QPM     furosemide  60 mg Intravenous BID     insulin aspart  1-10 Units Subcutaneous TID AC     insulin aspart  1-7 Units Subcutaneous At Bedtime     insulin aspart   Subcutaneous TID AC     insulin glargine  40 Units Subcutaneous QPM     levothyroxine  75 mcg Oral Daily     potassium chloride ER  10 mEq Oral BID     sodium chloride (PF)  3 mL Intracatheter Q8H     sodium chloride (PF)  3 mL Intracatheter Q8H       Data   Recent Labs   Lab 01/05/23  0847 01/05/23  0554 01/04/23  2258 01/04/23  1834 01/04/23  1141   WBC  --  7.5  --   --  7.3   HGB  --  15.4  --   --  16.1   MCV  --  93  --   --  94   PLT  --  279  --   --  300   NA  --  139  --   --  137   POTASSIUM  --  4.3  --   --  4.3   CHLORIDE  --  94  --   --  91*   CO2  --  38*  --   --  42*   BUN  --  24  --   --  23   CR  --  0.74  --   --  0.83   ANIONGAP  --  7  --   --  4   TATE  --  9.0  --   --  9.3   GLC 75 100* 196*   < > 185*   ALBUMIN  --   --   --   --  3.0*   PROTTOTAL  --   --   --   --  7.0  6.9    BILITOTAL  --   --   --   --  0.5   ALKPHOS  --   --   --   --  69   ALT  --   --   --   --  33   AST  --   --   --   --  46*   LIPASE  --   --   --   --  58*    < > = values in this interval not displayed.       No results found for this or any previous visit (from the past 24 hour(s)).

## 2023-01-05 NOTE — PROGRESS NOTES
A&Ox4. VSS on NC 5LPM, -110s. LS diminished in all fields. When asked to take a deep breath, pt uses neck refractory muscles, otherwise, no accessory muscle use noted at rest. Pt informed and encouraged to perform IS and did so during shift. Tele is a-fib, had a run of a-flutter that resolved, per cardiology consult is now on diltiazem at 5mg/h in L PIV.  BS +, normoactive; one very small BM this morning, pt didn't feel any relief, still reports feeling constipated. Pt voids by self and hasn't had any measurable output this shift but has had an occurrence when attempting a BM. Pt currently NPO except for meds, due for a thoracentesis procedure this afternoon. Once able, pt on a combination diet with carb counting. Pt on fluid restriction, 1500mL. Pt is a standby assist x1 with gait belt, has a shuffling gait, walks hunched over, walker use advised in instances where he is doing more than standing and pivoting. CMS intact, mild-moderate edema of bilateral ankles and feet, left sided hand strength is slightly stronger than right. Pt complained of discomfort in the URQ of abdomen that radiated towards his heart, likely a result of fluid retention. Gave pt tylenol with no change in comfort.

## 2023-01-06 LAB
% LINING CELLS, BODY FLUID: 4 %
ANION GAP SERPL CALCULATED.3IONS-SCNC: 7 MMOL/L (ref 3–14)
APPEARANCE FLD: ABNORMAL
BASE EXCESS BLDV CALC-SCNC: 14.7 MMOL/L (ref -7.7–1.9)
BASE EXCESS BLDV CALC-SCNC: 15.7 MMOL/L (ref -7.7–1.9)
BASE EXCESS BLDV CALC-SCNC: 18.5 MMOL/L (ref -7.7–1.9)
BASE EXCESS BLDV CALC-SCNC: 19 MMOL/L (ref -7.7–1.9)
BUN SERPL-MCNC: 37 MG/DL (ref 7–30)
CALCIUM SERPL-MCNC: 8.7 MG/DL (ref 8.5–10.1)
CELL COUNT BODY FLUID SOURCE: ABNORMAL
CHLORIDE BLD-SCNC: 93 MMOL/L (ref 94–109)
CO2 SERPL-SCNC: 39 MMOL/L (ref 20–32)
COLOR FLD: ABNORMAL
CREAT SERPL-MCNC: 0.81 MG/DL (ref 0.66–1.25)
CRP SERPL-MCNC: 14.9 MG/L (ref 0–8)
D DIMER PPP FEU-MCNC: 0.76 UG/ML FEU (ref 0–0.5)
ERYTHROCYTE [DISTWIDTH] IN BLOOD BY AUTOMATED COUNT: 14.1 % (ref 10–15)
FIBRINOGEN PPP-MCNC: 530 MG/DL (ref 170–490)
GFR SERPL CREATININE-BSD FRML MDRD: 88 ML/MIN/1.73M2
GLUCOSE BLD-MCNC: 135 MG/DL (ref 70–99)
GLUCOSE BLDC GLUCOMTR-MCNC: 114 MG/DL (ref 70–99)
GLUCOSE BLDC GLUCOMTR-MCNC: 124 MG/DL (ref 70–99)
GLUCOSE BLDC GLUCOMTR-MCNC: 132 MG/DL (ref 70–99)
GLUCOSE BLDC GLUCOMTR-MCNC: 218 MG/DL (ref 70–99)
GLUCOSE BLDC GLUCOMTR-MCNC: 291 MG/DL (ref 70–99)
GLUCOSE BLDC GLUCOMTR-MCNC: 99 MG/DL (ref 70–99)
HCO3 BLDV-SCNC: 44 MMOL/L (ref 21–28)
HCO3 BLDV-SCNC: 45 MMOL/L (ref 21–28)
HCO3 BLDV-SCNC: 46 MMOL/L (ref 21–28)
HCO3 BLDV-SCNC: 48 MMOL/L (ref 21–28)
HCT VFR BLD AUTO: 48.6 % (ref 40–53)
HGB BLD-MCNC: 15.9 G/DL (ref 13.3–17.7)
LDH SERPL L TO P-CCNC: 254 U/L (ref 85–227)
LYMPHOCYTES NFR FLD MANUAL: 66 %
MCH RBC QN AUTO: 30 PG (ref 26.5–33)
MCHC RBC AUTO-ENTMCNC: 32.7 G/DL (ref 31.5–36.5)
MCV RBC AUTO: 92 FL (ref 78–100)
MONOS+MACROS NFR FLD MANUAL: 28 %
NEUTS BAND NFR FLD MANUAL: 2 %
O2/TOTAL GAS SETTING VFR VENT: 35 %
O2/TOTAL GAS SETTING VFR VENT: 35 %
O2/TOTAL GAS SETTING VFR VENT: 4 %
O2/TOTAL GAS SETTING VFR VENT: 4 %
OTHER CELLS FLD MANUAL: 0 %
OXYHGB MFR BLDV: 50 % (ref 70–75)
OXYHGB MFR BLDV: 53 % (ref 70–75)
PCO2 BLDV: 58 MM HG (ref 40–50)
PCO2 BLDV: 66 MM HG (ref 40–50)
PCO2 BLDV: 70 MM HG (ref 40–50)
PCO2 BLDV: 79 MM HG (ref 40–50)
PH BLDV: 7.36 [PH] (ref 7.32–7.43)
PH BLDV: 7.43 [PH] (ref 7.32–7.43)
PH BLDV: 7.44 [PH] (ref 7.32–7.43)
PH BLDV: 7.51 [PH] (ref 7.32–7.43)
PLATELET # BLD AUTO: 289 10E3/UL (ref 150–450)
PO2 BLDV: 27 MM HG (ref 25–47)
PO2 BLDV: 32 MM HG (ref 25–47)
PO2 BLDV: 32 MM HG (ref 25–47)
PO2 BLDV: 35 MM HG (ref 25–47)
POTASSIUM BLD-SCNC: 4 MMOL/L (ref 3.4–5.3)
RBC # BLD AUTO: 5.3 10E6/UL (ref 4.4–5.9)
SODIUM SERPL-SCNC: 139 MMOL/L (ref 133–144)
WBC # BLD AUTO: 13.8 10E3/UL (ref 4–11)
WBC # FLD AUTO: 2342 /UL

## 2023-01-06 PROCEDURE — 85384 FIBRINOGEN ACTIVITY: CPT | Performed by: HOSPITALIST

## 2023-01-06 PROCEDURE — 82805 BLOOD GASES W/O2 SATURATION: CPT | Performed by: HOSPITALIST

## 2023-01-06 PROCEDURE — 36415 COLL VENOUS BLD VENIPUNCTURE: CPT | Performed by: HOSPITALIST

## 2023-01-06 PROCEDURE — 258N000003 HC RX IP 258 OP 636: Performed by: HOSPITALIST

## 2023-01-06 PROCEDURE — 80048 BASIC METABOLIC PNL TOTAL CA: CPT | Performed by: HOSPITALIST

## 2023-01-06 PROCEDURE — 99232 SBSQ HOSP IP/OBS MODERATE 35: CPT | Mod: FS | Performed by: PHYSICIAN ASSISTANT

## 2023-01-06 PROCEDURE — 94640 AIRWAY INHALATION TREATMENT: CPT

## 2023-01-06 PROCEDURE — 250N000009 HC RX 250: Performed by: INTERNAL MEDICINE

## 2023-01-06 PROCEDURE — 36415 COLL VENOUS BLD VENIPUNCTURE: CPT | Performed by: PHYSICIAN ASSISTANT

## 2023-01-06 PROCEDURE — 999N000157 HC STATISTIC RCP TIME EA 10 MIN

## 2023-01-06 PROCEDURE — 86140 C-REACTIVE PROTEIN: CPT | Performed by: HOSPITALIST

## 2023-01-06 PROCEDURE — 82803 BLOOD GASES ANY COMBINATION: CPT | Performed by: PHYSICIAN ASSISTANT

## 2023-01-06 PROCEDURE — 250N000013 HC RX MED GY IP 250 OP 250 PS 637: Performed by: INTERNAL MEDICINE

## 2023-01-06 PROCEDURE — 85379 FIBRIN DEGRADATION QUANT: CPT | Performed by: HOSPITALIST

## 2023-01-06 PROCEDURE — 94660 CPAP INITIATION&MGMT: CPT

## 2023-01-06 PROCEDURE — 120N000013 HC R&B IMCU

## 2023-01-06 PROCEDURE — 99233 SBSQ HOSP IP/OBS HIGH 50: CPT | Performed by: INTERNAL MEDICINE

## 2023-01-06 PROCEDURE — 83615 LACTATE (LD) (LDH) ENZYME: CPT | Performed by: HOSPITALIST

## 2023-01-06 PROCEDURE — 250N000011 HC RX IP 250 OP 636: Performed by: HOSPITALIST

## 2023-01-06 PROCEDURE — 94640 AIRWAY INHALATION TREATMENT: CPT | Mod: 76

## 2023-01-06 PROCEDURE — 250N000013 HC RX MED GY IP 250 OP 250 PS 637: Performed by: HOSPITALIST

## 2023-01-06 PROCEDURE — 258N000003 HC RX IP 258 OP 636: Performed by: INTERNAL MEDICINE

## 2023-01-06 PROCEDURE — 85027 COMPLETE CBC AUTOMATED: CPT | Performed by: HOSPITALIST

## 2023-01-06 RX ORDER — DEXAMETHASONE SODIUM PHOSPHATE 10 MG/ML
6 INJECTION, SOLUTION INTRAMUSCULAR; INTRAVENOUS DAILY
Status: DISCONTINUED | OUTPATIENT
Start: 2023-01-06 | End: 2023-01-07

## 2023-01-06 RX ORDER — LEVALBUTEROL 1.25 MG/.5ML
1.25 SOLUTION, CONCENTRATE RESPIRATORY (INHALATION) 4 TIMES DAILY
Status: DISCONTINUED | OUTPATIENT
Start: 2023-01-06 | End: 2023-01-08 | Stop reason: HOSPADM

## 2023-01-06 RX ADMIN — RIVAROXABAN 20 MG: 20 TABLET, FILM COATED ORAL at 21:12

## 2023-01-06 RX ADMIN — DIGOXIN 125 MCG: 125 TABLET ORAL at 21:14

## 2023-01-06 RX ADMIN — DEXAMETHASONE SODIUM PHOSPHATE 6 MG: 10 INJECTION, SOLUTION INTRAMUSCULAR; INTRAVENOUS at 14:50

## 2023-01-06 RX ADMIN — REMDESIVIR 100 MG: 100 INJECTION, POWDER, LYOPHILIZED, FOR SOLUTION INTRAVENOUS at 17:51

## 2023-01-06 RX ADMIN — DILTIAZEM HYDROCHLORIDE 15 MG/HR: 5 INJECTION, SOLUTION INTRAVENOUS at 06:31

## 2023-01-06 RX ADMIN — LEVALBUTEROL HYDROCHLORIDE 1.25 MG: 1.25 SOLUTION, CONCENTRATE RESPIRATORY (INHALATION) at 19:48

## 2023-01-06 RX ADMIN — INSULIN GLARGINE 40 UNITS: 100 INJECTION, SOLUTION SUBCUTANEOUS at 22:59

## 2023-01-06 RX ADMIN — DILTIAZEM HYDROCHLORIDE 15 MG/HR: 5 INJECTION, SOLUTION INTRAVENOUS at 14:50

## 2023-01-06 RX ADMIN — DILTIAZEM HYDROCHLORIDE 15 MG/HR: 5 INJECTION, SOLUTION INTRAVENOUS at 22:57

## 2023-01-06 RX ADMIN — SODIUM CHLORIDE 50 ML: 900 INJECTION INTRAVENOUS at 17:51

## 2023-01-06 RX ADMIN — LEVALBUTEROL HYDROCHLORIDE 1.25 MG: 1.25 SOLUTION, CONCENTRATE RESPIRATORY (INHALATION) at 15:27

## 2023-01-06 ASSESSMENT — ACTIVITIES OF DAILY LIVING (ADL)
ADLS_ACUITY_SCORE: 39
ADLS_ACUITY_SCORE: 40
ADLS_ACUITY_SCORE: 39
ADLS_ACUITY_SCORE: 39
ADLS_ACUITY_SCORE: 40
ADLS_ACUITY_SCORE: 39

## 2023-01-06 NOTE — PROGRESS NOTES
[facility]    EP Progress Note    Date of Service (when I saw the patient): 01/06/2023     Assessment & Plan   Carl Vázquez is a 82 year old male with severe/unrelenting SOB, h/o paroxysmal AFib (rate control, AC), NSVT, HTN, IDDM Type 2, dyslipidemia and h/o R hemidiaphragm paralysis who presents with worsening GARCIA 1/4/2023. Dx'd with COVID. Found to have AFlutter, atrial fibrillation and SR. Now on Dilt gtt. RRT called last night d/t fall to his knees between bed and chair (hadn't used call light).    1. Paroxysmal Atrial arrhythmias -   - See history in my consult note 1/5/2023  - Here, continues to have episodes of both atrial fibrillation (from 70-120s) and atrial flutter (to 140s).  Intermittently in SR, without change in GARCIA with this.     - PTA on digoxin 125 mcg and Diltiazem 360 mg daily  - Yesterday, underwent thoracentesis with flip from AFib to AFlutter, then back to AFib.  Tele currently with AFib 80s on Diltiazem gtt 15 mg/h and digoxin 125 mcg daily    - Xarelto resumed last night (held 1/4 for thoracentesis)     - Echo 6/2022 with nl LVEF 55-60%      PLAN:   1. Has known pAFib (has been unrelated to increased GARCIA on previous ZioPatch x 2) and not surprisingly, also with AFlutter.  Likely exacerbated with respiratory failure requiring BiPAP   2. Continue rate control/AC.  If AFlutter becomes more of the dominant arrhythmia and causes instability, could consider CTI ablation, but as has both arrhythmias, would typically not proceed with ablation.     2. GARCIA -   - Persistent and unrelenting. See my consult note 1/5. Has seen Pulmonology, Cardiology, EP, Neurosurgery, Neurology and Orthopedics for this and bilateral upper extremity weakness  - As noted in previous note, we've not been able to correlate paroxysms of arrhythmia with increase in GARCIA.  - Torsemide had been started for edema (normal LVEF, nl BNP and clear CXR), which improved edema but not breathing.    -  Has undergone multiple thoracenteses for R pleural effusion x 3: 10/18 (650 ml), 10/27 (630 ml) and again 11/4 (430). Cytology and cultures reportedly negative. Apparently pleural drain placement via CT guidance was not done as there was not enough fluid for tube placement. Now s/p repeat thoracentesis here 1/5/2023, removing 400 ml.     - Requiring BiPAP this AM for CO 79; previously on 4L nc.      PLAN:   1. Per Pulmonology and Hospitalist Teams    Lety Dangelo PA-C, MSPAS      Interval History   Did not see pt today - chart evaluation and review with tele RN and NATALY Horne. Did not call in as on BiPAP    Physical Exam   Temp: 97.6  F (36.4  C) Temp src: Oral BP: 106/56 Pulse: 75   Resp: 21 SpO2: 95 % O2 Device: Nasal cannula Oxygen Delivery: 4 LPM  Vitals:    01/04/23 1137   Weight: 73 kg (161 lb)     Vital Signs with Ranges  Temp:  [97.6  F (36.4  C)-98.1  F (36.7  C)] 97.6  F (36.4  C)  Pulse:  [] 75  Resp:  [5-59] 21  BP: ()/() 106/56  SpO2:  [86 %-100 %] 95 %  I/O last 3 completed shifts:  In: 550 [P.O.:550]  Out: 650 [Urine:650]    Telemetry: Currently AFib 80s  **Did not see patient today**  Constitutional: On BiPAP  Respiratory: Placed on BiPAP  Cardiovascular: Tele with AFib/CVR  Musculoskeletal: Did not assess    Medications     dilTIAZem 15 mg/hr (01/06/23 0631)     - MEDICATION INSTRUCTIONS -       - MEDICATION INSTRUCTIONS -       - MEDICATION INSTRUCTIONS -         remdesivir  100 mg Intravenous Q24H    And     sodium chloride 0.9%  50 mL Intravenous Q24H     albuterol  2 puff Inhalation 4x Daily     dexamethasone  6 mg Oral Daily     digoxin  125 mcg Oral QPM     furosemide  60 mg Intravenous BID     insulin aspart  1-10 Units Subcutaneous TID AC     insulin aspart  1-7 Units Subcutaneous At Bedtime     insulin aspart   Subcutaneous TID AC     insulin glargine  40 Units Subcutaneous QPM     levothyroxine  75 mcg Oral Daily     potassium chloride ER  10 mEq Oral BID      rivaroxaban ANTICOAGULANT  20 mg Oral Daily with supper     sodium chloride (PF)  3 mL Intracatheter Q8H     sodium chloride (PF)  3 mL Intracatheter Q8H       Data   I personally reviewed no images or EKG's today.  Results for orders placed or performed during the hospital encounter of 01/04/23 (from the past 24 hour(s))   EKG 12-lead, tracing only   Result Value Ref Range    Systolic Blood Pressure  mmHg    Diastolic Blood Pressure  mmHg    Ventricular Rate 150 BPM    Atrial Rate 300 BPM    OH Interval  ms    QRS Duration 78 ms     ms    QTc 341 ms    P Axis 241 degrees    R AXIS 94 degrees    T Axis 221 degrees    Interpretation ECG       Atrial flutter with 2:1 A-V conduction  Rightward axis  Pulmonary disease pattern  Marked ST abnormality, possible inferior subendocardial injury  Abnormal ECG  When compared with ECG of 04-JAN-2023 12:14,  Significant changes have occurred     Glucose by meter   Result Value Ref Range    GLUCOSE BY METER POCT 75 70 - 99 mg/dL   Cell count with differential fluid    Narrative    The following orders were created for panel order Cell count with differential fluid.  Procedure                               Abnormality         Status                     ---------                               -----------         ------                     Cell Count Body Fluid[[id number]]        Abnormal            Preliminary result         Differential Body Fluid[[id number]]                          In process                   Please view results for these tests on the individual orders.   Glucose fluid   Result Value Ref Range    Glucose Fluid Source Abdomen     Glucose fluid 68 mg/dL    Narrative    No reference ranges have been established.  This result should be interpreted in the context of the patient's clinical condition and compared to simultaneous measurement in the patient's blood.  This is a lab developed test. It has not been cleared or approved by the FDA.   Lactate dehydrogenase  fluid   Result Value Ref Range    LD Fluid Source Abdomen     Lactate dehydrogenase fluid 152 U/L    Narrative    No reference ranges have been established.  This result should be interpreted in the context of the patient's clinical condition and compared to simultaneous measurement in the patient's blood.   Protein fluid   Result Value Ref Range    Protein Fluid Source Abdomen     Protein Total Fluid 4.0 g/dL    Narrative    No reference ranges have been established.  This result should be interpreted in the context of the patient's clinical condition and compared to simultaneous measurement in the patient's blood.  This is a lab developed test. It has not been cleared or approved by the FDA.   Pleural fluid Aerobic Bacterial Culture Routine with Gram Stain    Specimen: Pleural Cavity, Right; Pleural fluid   Result Value Ref Range    Gram Stain Result No organisms seen     Gram Stain Result 4+ WBC seen     Gram Stain Result 4+ Red blood cells seen    Cell Count Body Fluid   Result Value Ref Range    Color Orange (A) Colorless, Yellow    Clarity Cloudy (A) Clear    Total Nucleated Cells 2,342 /uL    Cell Count Fluid Source Abdomen     Narrative    No reference ranges have been established.  This result  should be interpreted in the context of the patient's clinical condition and   compared to simultaneous measurement in the patient's blood.         US Thoracentesis    Narrative    EXAM:  1. RIGHT THORACENTESIS  2. ULTRASOUND GUIDANCE  LOCATION: [facility]  DATE/TIME: 1/5/2023 3:51 PM    INDICATION: Pleural effusion.    PROCEDURE: Informed consent obtained. Time out performed. The chest  was prepped and draped in a sterile fashion. 10 mL of 1% lidocaine was  infused into local soft tissues. A 5 American catheter system was  introduced into the pleural effusion under ultrasound guidance.    0.4 liters of clear fluid were removed and sent to lab if requested.    Patient tolerated procedure well.    Ultrasound  images have been permanently captured for documentation.      Impression    IMPRESSION:  Status post ultrasound-guided right thoracentesis.    EVA GOODWIN MD         SYSTEM ID:  [id number]   Glucose by meter   Result Value Ref Range    GLUCOSE BY METER POCT 65 (L) 70 - 99 mg/dL   Glucose by meter   Result Value Ref Range    GLUCOSE BY METER POCT 70 70 - 99 mg/dL   Glucose by meter   Result Value Ref Range    GLUCOSE BY METER POCT 100 (H) 70 - 99 mg/dL   EKG 12-lead, tracing only   Result Value Ref Range    Systolic Blood Pressure  mmHg    Diastolic Blood Pressure  mmHg    Ventricular Rate 79 BPM    Atrial Rate 182 BPM    MO Interval  ms    QRS Duration 84 ms     ms    QTc 403 ms    P Axis  degrees    R AXIS 71 degrees    T Axis 98 degrees    Interpretation ECG       Atrial fibrillation  Nonspecific ST and T wave abnormality  Abnormal ECG  When compared with ECG of 05-JAN-2023 08:38, (unconfirmed)  Atrial fibrillation has replaced Atrial flutter  Vent. rate has decreased BY  71 BPM  ST no longer depressed in Inferior leads  ST no longer depressed in Lateral leads  T wave inversion now evident in Anterior leads  Nonspecific T wave abnormality has replaced inverted T waves in Lateral leads     Glucose by meter   Result Value Ref Range    GLUCOSE BY METER POCT 284 (H) 70 - 99 mg/dL   Glucose by meter   Result Value Ref Range    GLUCOSE BY METER POCT 218 (H) 70 - 99 mg/dL   Glucose by meter   Result Value Ref Range    GLUCOSE BY METER POCT 132 (H) 70 - 99 mg/dL   CBC with platelets   Result Value Ref Range    WBC Count 13.8 (H) 4.0 - 11.0 10e3/uL    RBC Count 5.30 4.40 - 5.90 10e6/uL    Hemoglobin 15.9 13.3 - 17.7 g/dL    Hematocrit 48.6 40.0 - 53.0 %    MCV 92 78 - 100 fL    MCH 30.0 26.5 - 33.0 pg    MCHC 32.7 31.5 - 36.5 g/dL    RDW 14.1 10.0 - 15.0 %    Platelet Count 289 150 - 450 10e3/uL   Basic metabolic panel   Result Value Ref Range    Sodium 139 133 - 144 mmol/L    Potassium 4.0 3.4 - 5.3 mmol/L     Chloride 93 (L) 94 - 109 mmol/L    Carbon Dioxide (CO2) 39 (H) 20 - 32 mmol/L    Anion Gap 7 3 - 14 mmol/L    Urea Nitrogen 37 (H) 7 - 30 mg/dL    Creatinine 0.81 0.66 - 1.25 mg/dL    Calcium 8.7 8.5 - 10.1 mg/dL    Glucose 135 (H) 70 - 99 mg/dL    GFR Estimate 88 >60 mL/min/1.73m2   CRP inflammation   Result Value Ref Range    CRP Inflammation 14.9 (H) 0.0 - 8.0 mg/L   D dimer quantitative   Result Value Ref Range    D-Dimer Quantitative 0.76 (H) 0.00 - 0.50 ug/mL FEU    Narrative    This D-dimer assay is intended for use in conjunction with a clinical pretest probability assessment model to exclude pulmonary embolism (PE) and deep venous thrombosis (DVT) in outpatients suspected of PE or DVT. The cut-off value is 0.50 ug/mL FEU.   Lactate Dehydrogenase   Result Value Ref Range    Lactate Dehydrogenase 254 (H) 85 - 227 U/L   Fibrinogen activity   Result Value Ref Range    Fibrinogen Activity 530 (H) 170 - 490 mg/dL   Blood gas venous with oxyhemoglobin   Result Value Ref Range    pH Venous 7.36 7.32 - 7.43    pCO2 Venous 79 (HH) 40 - 50 mm Hg    pO2 Venous 32 25 - 47 mm Hg    Bicarbonate Venous 45 (H) 21 - 28 mmol/L    FIO2 4     Oxyhemoglobin Venous 50 (L) 70 - 75 %    Base Excess/Deficit (+/-) 14.7 (H) -7.7 - 1.9 mmol/L

## 2023-01-06 NOTE — PROGRESS NOTES
"[facility]    Hospitalist Progress Note    Date of Service (when I saw the patient): 01/06/2023    Assessment & Plan   Carl Vázquez is a 82 year old male who was admitted on 1/4/2023.    Carl Vázquez is a 82 year old male with medical history significant for IDDM, CALE, hypothyroidism, paroxysmal atrial fibrillation on anticoagulation, diaphragmatic paralysis, s/p plication of right hemidiaphragm, recurrent right pleural effusion was brought to the ER for evaluation of worsening shortness of breath and is being admitted on 1/4/2023 for further manage.         # Confirmed COVID-19 infection    # Viral Pneumonia secondary to COVID-19 infection  # Acute Metabolic Encephalopathy  # Acute hypercapnic respiratory failure     Symptom Onset Date used to determine duration of isolation.  If unsure, enter \"unknown\"   Date of 1st Positive Test 1/4/22   Vaccination Status Partially Vaccinated, laterst pending booster dose         - COVID-19 special precautions, continuous pulse-ox  - Oxygen: continue current support with O2 Device: Nasal cannula at Oxygen Delivery: 4 LPM; titrate to keep SpO2 between 90-96%.   - Given hypercapnia and metabolic encephalopathy, will place on BiPAP treatment and follow-up ABG.  - Labs: Standard COVID admission labs ordered (CBC with diff, CMP, INR, D-dimer, CRP).   - Imaging: needs CT chest abd pelvis, will order once able to lie flat  - Breathing treatments: albuterol inhaler four times a day scheduled and PRN; avoid nebulizers in favor of MDIs   - IV fluids: not indicated at this time  - Antibiotics: not indicated   - COVID-Focused Medications: Dexamethasone 6 mg x 10 days or until hospital discharge, started on 1/4/22 and Remdesivir x 3 days or until hospital discharge, started on 1/4/22  - DVT Prophylaxis:         - At high risk of thrombotic complications due to COVID-19 (DDimer = N/A )        PTA Xarelto resumed after thoracentesis on " 1/5/2023  Patient was placed on BiPAP early this morning as he was confused overnight and also his p.o. PCO2 returned elevated at 79  Wean him off of BiPAP as tolerated     Recurrent right pleural effusion status post right thoracentesis on 1/5/2023  Bilateral leg edema  Progressive dyspnea of 1 year, see background in H&P for details.   TTE from January 2022 shows normal LV function, grade 1 pattern of LV diastolic filling.  No significant valvular stenosis or regurgitation.  Patient had regadenoson stress test March 2022, was normal.  Had CT abdomen pelvis September 2021, no finding of cirrhosis.  -Patient has been on diuretic, torsemide, still with recurrent right pleural effusion needing multiple thoracentesis.  Has worsening leg edema despite diuretic.  Patient is on anticoagulation, DVT unlikely.  -Change PTA torsemide to IV lasix, follow daily I&O, weight and fluid restriction  Stop Lasix today as patient bicarb went up to 37 and patient is n.p.o. on BiPAP  -Will obtain TTE.  -Rt thoracentesis done on 1/5/2023 with 0.4 L of fluid removal with no signs of infection    Coarsended  Texture  of the liver with concern for intrinsic hepatic disease with possible cirrhosis;  Abdominal ultrasound done on 1/6/2023 showed the liver has a coarsened echoes texture consistent with intrinsic hepatic disease.  Portal vein hepatic vein splenic vein and hepatic artery are patent.  Changes of cholecystectomy.  Nonobstructing stones noted in the right kidney measuring 0.3 and 0.4 cm  Patient's total bilirubin was quite elevated at 30.3 on admission improved to 20.7-14.9 on 1/6/2023  We will request a [facility] consultation to see and evaluate him for possible underlying cirrhosis    Paroxysmal A. Fib   atrial flutter with RVR on 1/5/2023  EKG shows sinus rhythm, rate controlled.  Patient was on metoprolol but apparently had ongoing/worsening dyspnea on exertion and was changed to Cardizem.  -On Xarelto, hold tonight for  "thoracentesis/possible Pleurx tomorrow, resume after procedure  -Continue to monitor on telemetry.  Patient was in atrial flutter with RVR with heart rates in the 140s to 150s  He was started on diltiazem drip after diltiazem 10 mg bolus on 1/5/2023.  EP consulted and recommended rate control with diltiazem and  digoxin  Heart rate is currently well controlled     Rt diaphragmatic paralysis  S/p plication  Avoid any procedures on the left side of his neck to avoid any injury to phrenic now as per pulmonary      IDDM  PTA medications include glipizide 10 Mg every morning, Lantus 60 units at HS.  -Resume Lantus at 40 units at bedtime as patient is n.p.o. on BiPAP  - hold glipizide.  -Moderate ISS  -patient on steroid, expect uncontrolled BS, add premeal novolog while not NPO.   -Hypoglycemia protocol     Hypothyroidism  - Resume PTA Synthroid     CALE  -Patient had difficulty wearing CPAP.  Given marked hypercarbia, will be on BiPAP as needed.  Patient had a controlled mechanical fall last night  No injuries noted evaluated by house provider           Diet:   moderate CHO  DVT Prophylaxis: DOAC   Power Catheter: Not present  Lines: None     Cardiac Monitoring: None  Code Status:  DNR/DNI, discussed with patient and his spouse        Clinically Significant Risk Factors Present on Admission                 # Hypoalbuminemia: Lowest albumin = 3 g/dL at 1/4/2023 11:41 AM, will monitor as appropriate  # Drug Induced Coagulation Defect: home medication list includes an anticoagulant medication         # Overweight: Estimated body mass index is 25.99 kg/m  as calculated from the following:    Height as of this encounter: 1.676 m (5' 6\").    Weight as of this encounter: 73 kg (161 lb).                  Disposition Plan    Disposition: Expected discharge in 2 to 3 days if patient's respiratory status improves and remained stable and was able to be weaned off of oxygen    Discussed with bedside RN, patient, pulmonary medicine, " his wife was updated by me over the phone on 1/6/2023    Greater than 55 minutes were spent in taking care of him today in  total    Nichelle Meredith MD, MD  [phone] (P)      Interval History   Patient is resting comfortably in bed with BiPAP mask on.  Appears somnolent today.  Denies any shortness of breath currently.  His bicarb went up to 37 today.  Patient had a controlled mechanical fall yesterday where he went down on his knees.  No injuries noted patient was evaluated by house provider.  No other acute events overnight    -Data reviewed today: I reviewed all new labs and imaging results over the last 24 hours. I personally reviewed the abdominal ultrasound results showing changes consistent with possible cirrhosis of the liver    Physical Exam   Temp: 98  F (36.7  C) Temp src: Axillary BP: 110/74 Pulse: 62   Resp: 13 SpO2: 96 % O2 Device: BiPAP/CPAP Oxygen Delivery: 4 LPM  Vitals:    01/04/23 1137   Weight: 73 kg (161 lb)     Vital Signs with Ranges  Temp:  [97.6  F (36.4  C)-98.1  F (36.7  C)] 98  F (36.7  C)  Pulse:  [] 62  Resp:  [5-59] 13  BP: ()/() 110/74  FiO2 (%):  [35 %] 35 %  SpO2:  [90 %-100 %] 96 %  I/O last 3 completed shifts:  In: 550 [P.O.:550]  Out: 650 [Urine:650]    Constitutional: Somnolent, resting comfortably in bed with BiPAP mask on, no apparent distress  Respiratory: Very diminished breath sounds bilaterally.  No wheezing  Cardiovascular: Irregularly irregular, no murmurs    GI: Normal bowel sounds, soft, non-distended, non-tender  Skin/Integumen: No rashes, no cyanosis, no edema  Other:     Medications     dilTIAZem 15 mg/hr (01/06/23 0631)     - MEDICATION INSTRUCTIONS -       - MEDICATION INSTRUCTIONS -       - MEDICATION INSTRUCTIONS -         remdesivir  100 mg Intravenous Q24H    And     sodium chloride 0.9%  50 mL Intravenous Q24H     dexamethasone  6 mg Oral Daily     digoxin  125 mcg Oral QPM     insulin aspart  1-10 Units Subcutaneous TID AC     insulin  aspart  1-7 Units Subcutaneous At Bedtime     insulin aspart   Subcutaneous TID AC     insulin glargine  40 Units Subcutaneous QPM     levalbuterol  1.25 mg Nebulization 4x Daily     levothyroxine  75 mcg Oral Daily     potassium chloride ER  10 mEq Oral BID     rivaroxaban ANTICOAGULANT  20 mg Oral Daily with supper     sodium chloride (PF)  3 mL Intracatheter Q8H     sodium chloride (PF)  3 mL Intracatheter Q8H       Data   Recent Labs   Lab 01/06/23  0714 01/06/23  0615 01/06/23  0050 01/05/23  0847 01/05/23  0554 01/04/23  1834 01/04/23  1141   WBC 13.8*  --   --   --  7.5  --  7.3   HGB 15.9  --   --   --  15.4  --  16.1   MCV 92  --   --   --  93  --  94     --   --   --  279  --  300     --   --   --  139  --  137   POTASSIUM 4.0  --   --   --  4.3  --  4.3   CHLORIDE 93*  --   --   --  94  --  91*   CO2 39*  --   --   --  38*  --  42*   BUN 37*  --   --   --  24  --  23   CR 0.81  --   --   --  0.74  --  0.83   ANIONGAP 7  --   --   --  7  --  4   TATE 8.7  --   --   --  9.0  --  9.3   * 132* 218*   < > 100*   < > 185*   ALBUMIN  --   --   --   --   --   --  3.0*   PROTTOTAL  --   --   --   --   --   --  7.0  6.9   BILITOTAL  --   --   --   --   --   --  0.5   ALKPHOS  --   --   --   --   --   --  69   ALT  --   --   --   --   --   --  33   AST  --   --   --   --   --   --  46*   LIPASE  --   --   --   --   --   --  58*    < > = values in this interval not displayed.       Recent Results (from the past 24 hour(s))   US Thoracentesis    Narrative    EXAM:  1. RIGHT THORACENTESIS  2. ULTRASOUND GUIDANCE  LOCATION: [facility]  DATE/TIME: 1/5/2023 3:51 PM    INDICATION: Pleural effusion.    PROCEDURE: Informed consent obtained. Time out performed. The chest  was prepped and draped in a sterile fashion. 10 mL of 1% lidocaine was  infused into local soft tissues. A 5 Turkish catheter system was  introduced into the pleural effusion under ultrasound guidance.    0.4 liters of clear fluid  were removed and sent to lab if requested.    Patient tolerated procedure well.    Ultrasound images have been permanently captured for documentation.      Impression    IMPRESSION:  Status post ultrasound-guided right thoracentesis.    EVA GOODWIN MD         SYSTEM ID:  [id number]   US Abdomen Limited w Abd/Pelvis Duplex Complete    Narrative    US ABDOMEN LIMITED WITH DOPPLER COMPLETE  1/5/2023 3:59 PM    HISTORY: Evaluate for cirrhosis and portal vein anatomy.    COMPARISON: Outside CT 8/26/2022.    FINDINGS: The liver has a coarsened echotexture, which can be seen in  patients with intrinsic hepatic disease. Changes of cholecystectomy.  No intra or extrahepatic bile duct dilatation. Pancreas is partially  obscured by overlying bowel gas, but appears unremarkable where seen.  Two echogenic shadowing nonobstructing stones are noted in the right  kidney measuring 0.3 cm and 0.4 cm.    DOPPLER:  Abdominal aorta and IVC are segmentally seen, and are of normal  caliber where visualized.    Splenic vein is patent with antegrade flow.    Left, right and main portal veins are patent with antegrade flow.    Left, middle and right hepatic veins are patent with antegrade flow.    Hepatic artery is patent with antegrade flow.       Impression    IMPRESSION:  1. The liver has a coarsened echotexture, which can be seen in  patients with intrinsic hepatic disease.  2. Portal veins, hepatic veins, splenic vein and hepatic artery are  all patent with antegrade flow.  3. Changes of cholecystectomy.  4. Nonobstructing stones noted in the right kidney measuring 0.3 and  0.4 cm.

## 2023-01-06 NOTE — PROVIDER NOTIFICATION
DATE:  1/6/2023   TIME OF RECEIPT FROM LAB:  1/6/22 0737   LAB TEST:  CO2  LAB VALUE:  79  Text Paged Dr. Meredith - will place pt on BiPAP and continue to monitor. Text paged respiratory as well.

## 2023-01-06 NOTE — CODE/RAPID RESPONSE
[facility]    Fall Note  1/6/2023   Time Called: 0041    Code Status: No CPR- Do NOT Intubate  Called for fall assessment    Assessment & Plan     I was called to assess Carl Vázquez following a fall. The patient reports prior to the fall he was moving to the edge of his bed to move over to his chair but he began to slide down in the crack between the bed and chair and was not strong enough to stop himself so he sank to his knees and immediately used his call light to request assistance.     Unwitnessed, braced fall to knees - mechanical  I arrived shortly after the fall and patient had been assisted back to bed by staff prior to my arrival. Patient was A/Ox4, pleasant. Patient has no visible signs of injury aside from erythema to bilateral knees. No pain associated with erythema and patient is able to adduct/abduct and push/pull bilaterally with full ROM and without pain. Patient denies hitting head, losing consciousness, dizziness, light-headedness, and chest pain.     No obvious injury on initial assessment. Patient assisted back to bed by staff prior to my arrival.    I informed the patient that not all injuries are obvious at the time of injury. No interventions at this time. I stressed the importance of call light utilization with patient.    Patient remains on station 33.       Allergies   Allergies   Allergen Reactions     Statin Drugs [Hmg-Coa-R Inhibitors]        Physical Exam   Vital Signs with Ranges:  Temp:  [97  F (36.1  C)-98.2  F (36.8  C)] 98.1  F (36.7  C)  Pulse:  [] 73  Resp:  [0-36] 0  BP: ()/(57-94) 122/66  SpO2:  [86 %-100 %] 96 %  I/O last 3 completed shifts:  In: 550 [P.O.:550]  Out: 950 [Urine:950]      Data       ABG:  -  Recent Labs   Lab 01/04/23  1141   O2PER 4       IMAGING: (X-ray/CT/MRI)   Recent Results (from the past 24 hour(s))   US Thoracentesis    Narrative    EXAM:  1. RIGHT THORACENTESIS  2. ULTRASOUND GUIDANCE  LOCATION: [facility]  DATE/TIME: 1/5/2023 3:51 PM    INDICATION: Pleural effusion.    PROCEDURE: Informed consent obtained. Time out performed. The chest  was prepped and draped in a sterile fashion. 10 mL of 1% lidocaine was  infused into local soft tissues. A 5 [place]ij[place] catheter system was  introduced into the pleural effusion under ultrasound guidance.    0.4 liters of clear fluid were removed and sent to lab if requested.    Patient tolerated procedure well.    Ultrasound images have been permanently captured for documentation.      Impression    IMPRESSION:  Status post ultrasound-guided right thoracentesis.    EVA GOODWIN MD         SYSTEM ID:  [id number]       CBC with Diff:  Recent Labs   Lab Test 01/05/23  0554   WBC 7.5   HGB 15.4   MCV 93           Lactic Acid:    Lab Results   Component Value Date    LACT 1.3 09/25/2019           Comprehensive Metabolic Panel:  Recent Labs   Lab 01/06/23  0050 01/05/23  0847 01/05/23  0554 01/04/23  1834 01/04/23  1141   NA  --   --  139  --  137   POTASSIUM  --   --  4.3  --  4.3   CHLORIDE  --   --  94  --  91*   CO2  --   --  38*  --  42*   ANIONGAP  --   --  7  --  4   *   < > 100*   < > 185*   BUN  --   --  24  --  23   CR  --   --  0.74  --  0.83   GFRESTIMATED  --   --  90  --  87   TATE  --   --  9.0  --  9.3   PROTTOTAL  --   --   --   --  7.0  6.9   ALBUMIN  --   --   --   --  3.0*   BILITOTAL  --   --   --   --  0.5   ALKPHOS  --   --   --   --  69   AST  --   --   --   --  46*   ALT  --   --   --   --  33    < > = values in this interval not displayed.       INR:    No lab results found.      BNP:  No results found for: BNP    UA:  No results for input(s): COLOR, APPEARANCE, URINEGLC, URINEBILI, URINEKETONE, SG, UBLD, URINEPH, PROTEIN, UROBILINOGEN, NITRITE, LEUKEST, RBCU, WBCU in the last 168 hours.    Time Spent on this Encounter   I spent 15 minutes on the unit/floor managing the care of Carl Vázquez. Over 50% of my time was spent counseling the  patient and/or coordinating care regarding services listed in this note.    SE Malcolm CNP   SE Carolina, CNP  Hospitalist - House [facility]  Text me on the [employer] olive for a textback  Text page with web based paging for a callback

## 2023-01-06 NOTE — PROGRESS NOTES
"  Pulmonary Medicine Progress Note        Date of Admission: 2023  Primary Attending:  Greg Sánchez MD  Consulting Physician: Tomas Rivera MD      History:      SUBJECTIVE: S/p Thoracentesis with   0.4 liters of clear fluid were removed and sent to lab.  No new events overnight,  Afebrile. No worsening respiratory complaints at this time.      OBJECTIVE:    Vital signs:  Temp: 97.6  F (36.4  C) Temp src: Oral BP: 106/56 Pulse: 75   Resp: 21 SpO2: 95 % O2 Device: Nasal cannula Oxygen Delivery: 4 LPM Height: 167.6 cm (5' 6\") Weight: 73 kg (161 lb)  Estimated body mass index is 25.99 kg/m  as calculated from the following:    Height as of this encounter: 1.676 m (5' 6\").    Weight as of this encounter: 73 kg (161 lb).       Body mass index is 25.99 kg/m .  Temp (24hrs), Av.8  F (36.6  C), Min:97.6  F (36.4  C), Max:98.1  F (36.7  C)        Constitutional:  Sleepy, wearing BiPAP  Neck: No lymphadenopathy or thyromegaly, trachea midline, no carotid bruits.  Cardiovascular: Regular rate and rythym, no murmurs, rubs or gallops, no peripheral edema.  Respiratory/Chest: decreased BS R base   Gastrointestinal: Abdomen was soft, non-tender, non-distended, no masses felt, no hepatosplenomegaly.  Musculoskeletal: No clubbing or cyanosis, full range of motion in all extremities.        Medications  Current Facility-Administered Medications Ordered in Epic   Medication Dose Route Frequency Last Rate Last Admin     remdesivir 100 mg in sodium chloride 0.9 % 250 mL intermittent infusion  100 mg Intravenous Q24H 125 mL/hr at 23 181 100 mg at 23 181    And     0.9% sodium chloride BOLUS  50 mL Intravenous Q24H 100 mL/hr at 23 1820 50 mL at 23 182     acetaminophen (TYLENOL) tablet 650 mg  650 mg Oral Q6H PRN   650 mg at 23 0813    Or     acetaminophen (TYLENOL) Suppository 650 mg  650 mg Rectal Q6H PRN         albuterol (PROVENTIL HFA/VENTOLIN HFA) inhaler  2 puff Inhalation 4x Daily  "        albuterol (PROVENTIL HFA/VENTOLIN HFA) inhaler  2 puff Inhalation Q4H PRN         bisacodyl (DULCOLAX) suppository 10 mg  10 mg Rectal Daily PRN         carboxymethylcellulose PF (REFRESH PLUS) 0.5 % ophthalmic solution 1 drop  1 drop Both Eyes Q1H PRN         dexamethasone (DECADRON) tablet 6 mg  6 mg Oral Daily   6 mg at 01/05/23 1620     glucose gel 15-30 g  15-30 g Oral Q15 Min PRN        Or     dextrose 50 % injection 25-50 mL  25-50 mL Intravenous Q15 Min PRN        Or     glucagon injection 1 mg  1 mg Subcutaneous Q15 Min PRN         digoxin (LANOXIN) tablet 125 mcg  125 mcg Oral QPM   125 mcg at 01/05/23 2027     diltiazem (CARDIZEM) 125 mg in sodium chloride 0.9 % 125 mL infusion  5-15 mg/hr Intravenous Continuous 15 mL/hr at 01/06/23 0631 15 mg/hr at 01/06/23 0631     docusate sodium (COLACE) capsule 100 mg  100 mg Oral BID PRN   100 mg at 01/05/23 0813     insulin aspart (NovoLOG) injection (RAPID ACTING)  1-10 Units Subcutaneous TID AC   2 Units at 01/04/23 1835     insulin aspart (NovoLOG) injection (RAPID ACTING)  1-7 Units Subcutaneous At Bedtime   4 Units at 01/05/23 2150     insulin aspart (NovoLOG) injection (RAPID ACTING)   Subcutaneous TID AC   2 Units at 01/04/23 1835     insulin glargine (LANTUS PEN) injection 40 Units  40 Units Subcutaneous QPM   40 Units at 01/05/23 2148     levothyroxine (SYNTHROID/LEVOTHROID) tablet 75 mcg  75 mcg Oral Daily   75 mcg at 01/05/23 0801     lidocaine (LMX4) cream   Topical Q1H PRN         lidocaine 1 % 0.1-1 mL  0.1-1 mL Other Q1H PRN         Medication instructions: Do NOT use nebulized medications   Does not apply Continuous PRN         naloxone (NARCAN) injection 0.2 mg  0.2 mg Intravenous Q2 Min PRN        Or     naloxone (NARCAN) injection 0.4 mg  0.4 mg Intravenous Q2 Min PRN        Or     naloxone (NARCAN) injection 0.2 mg  0.2 mg Intramuscular Q2 Min PRN        Or     naloxone (NARCAN) injection 0.4 mg  0.4 mg Intramuscular Q2 Min PRN          nitroGLYcerin (NITROSTAT) sublingual tablet 0.4 mg  0.4 mg Sublingual Q5 Min PRN         No lozenges or gum should be given while patient on BIPAP/AVAPS/AVAPS AE   Does not apply Continuous PRN         ondansetron (ZOFRAN ODT) ODT tab 4 mg  4 mg Oral Q6H PRN        Or     ondansetron (ZOFRAN) injection 4 mg  4 mg Intravenous Q6H PRN         oxyCODONE (ROXICODONE) tablet 5 mg  5 mg Oral Q4H PRN         Patient may continue current oral medications   Does not apply Continuous PRN         potassium chloride ER (KLOR-CON M) CR tablet 10 mEq  10 mEq Oral BID   10 mEq at 01/05/23 2029     rivaroxaban ANTICOAGULANT (XARELTO) tablet 20 mg  20 mg Oral Daily with supper   20 mg at 01/05/23 2028     sodium chloride (PF) 0.9% PF flush 3 mL  3 mL Intracatheter Q8H   3 mL at 01/05/23 0823     sodium chloride (PF) 0.9% PF flush 3 mL  3 mL Intracatheter q1 min prn         sodium chloride (PF) 0.9% PF flush 3 mL  3 mL Intracatheter Q8H   3 mL at 01/05/23 1807     sodium chloride (PF) 0.9% PF flush 3 mL  3 mL Intracatheter q1 min prn         No current [facility]-ordered outpatient medications on file.           CMP  Recent Labs   Lab 01/06/23  0714 01/06/23  0615 01/06/23  0050 01/05/23  2147 01/05/23  0847 01/05/23  0554 01/04/23  1834 01/04/23  1141     --   --   --   --  139  --  137   POTASSIUM 4.0  --   --   --   --  4.3  --  4.3   CHLORIDE 93*  --   --   --   --  94  --  91*   CO2 39*  --   --   --   --  38*  --  42*   ANIONGAP 7  --   --   --   --  7  --  4   * 132* 218* 284*   < > 100*   < > 185*   BUN 37*  --   --   --   --  24  --  23   CR 0.81  --   --   --   --  0.74  --  0.83   GFRESTIMATED 88  --   --   --   --  90  --  87   TATE 8.7  --   --   --   --  9.0  --  9.3   PROTTOTAL  --   --   --   --   --   --   --  7.0  6.9   ALBUMIN  --   --   --   --   --   --   --  3.0*   BILITOTAL  --   --   --   --   --   --   --  0.5   ALKPHOS  --   --   --   --   --   --   --  69   AST  --   --   --   --   --   --   --  46*    ALT  --   --   --   --   --   --   --  33    < > = values in this interval not displayed.     CBC  Recent Labs   Lab 01/06/23  0714 01/05/23  0554 01/04/23  1141   WBC 13.8* 7.5 7.3   RBC 5.30 5.19 5.42   HGB 15.9 15.4 16.1   HCT 48.6 48.3 50.8   MCV 92 93 94   MCH 30.0 29.7 29.7   MCHC 32.7 31.9 31.7   RDW 14.1 13.9 14.3    279 300     INRNo lab results found in last 7 days.  Arterial Blood Gas  Recent Labs   Lab 01/06/23  0715 01/04/23  1141   O2PER 4 4     No results for input(s): CULT in the last 168 hours.          Diagnostic Studies:  No results for input(s): CULT in the last 168 hours.     Diagnostic Studies:  Chest Radiology:      CHEST TWO VIEWS January 4, 2023 12:04 PM      HISTORY: Cough, shortness of breath, hypoxia.     COMPARISON: Chest x-ray on 7/15/2022.                                                                      IMPRESSION: AP and lateral views of the chest were obtained.  Cardiomediastinal silhouette is within normal limits. Small left and  moderate right pleural effusion including small amount of fluid along  the right minor fissure with associated basilar  atelectasis/consolidation. No significant pneumothorax.           CT CHEST 12/22/22        1. Small to moderate pleural effusion. Patchy atelectasis in both lungs, greatest in the right lower lobe.   2. No acute abnormality in the abdomen or pelvis.   3. Colonic diverticulosis.   4. Small nonobstructing right renal calculus.       Please note that all CT scans at this facility use dose modulation, iterative reconstruction, and/or weight-based dosing when appropriate to reduce radiation dose to as low as reasonably achievable.              Assessment:      82 year old male with medical history significant for diabetes mellitus, obstructive sleep apnea,  hypothyroidism, paroxysmal atrial fibrillation on anticoagulation, diaphragmatic paralysis, s/p plication of right hemidiaphragm, by Dr Turpin recurrent right pleural effusion  "was brought to the ER for evaluation of worsening shortness of breath.  He last saw thoracic surgery November 22, 2022 and at that time recommendation was for repeat thoracentesis if reaccumulation occurred.   When last seen by thoracic surgery\" Minimal persistent right-sided pleural effusion after recent diaphragm plication. We discussed that the very small amount of fluid present is unlikely causing lung compromise and contributing to significant shortness of breath. We discussed options including attempted repeat thoracentesis. However I think this would be unwise given his recent experience with attempted percutaneous pleural drain placement via CT guidance without enough fluid for tube placement.\"  Thoracentesis was exudative but no evidence of infection.     Pulmonary Diagnoses: Abnl CT/CXR R91.8, CHF I50.9, GARCIA R06.09, Hypercapnia R06.89, Pleural effusion J91.8, Resp fail acute J96.00, Resp fail chronic J96.10, Sleep apnea obstr G47.33 and SOB R06.02  Pneumonia due to confirmed COVID-19 J12.89,      Recommendations                  Continue BiPAP, adjusted to 15/8 cm H2O    Duonebs every four hours as needed     Supplemental oxygen target pulse oxymetry >89%, wean as able    Diuresis as tolerated    BiPAP with sleep opportunity    Recommend against Pleurx catheter for drainage of effusion as was previously recommended by his thoracic surgeon, should see Dr. Turpin following discharge to discuss plans for management of his chronic effusion    Avoid line placements on the left side of his neck since he has a chronic paralyzed right hemidiaphragm in order to avoid damaging the left phrenic nerve  S/p Thoracentesis with   0.4 liters of clear fluid were removed and sent to lab, results pending    Physical activity as tolerated    Case discussed with hospitalist    Please call if any questions               Tomas Boland M.D.  Pulmonary, Critical Care and Sleep Medicine  [facility]  Pager: " [phone]  Office:[phone]

## 2023-01-06 NOTE — PLAN OF CARE
Goal Outcome Evaluation:    Pt. Alert and oriented x 2, disoriented to time and situation, forgetful at times. Vital signs stable on 4L NC, tachycardic in the 140s. Converted to NSR @ 10pm. Converted back to A-flutter at 12:30 AM after experiencing a fall to the knees from bed. Assist of 1. Tolerating regular combo diet. Lung sounds equal bilaterally/diminished. Bowel sounds +, passing flatus. BM -, no urine output this shift. 2 PIV w/ Cardizem gtt@15ml/hr. +2 pitting edema in the left/right leg and feet. Pt denies pain. Denies nausea. Tele A-flutter.

## 2023-01-07 ENCOUNTER — APPOINTMENT (OUTPATIENT)
Dept: PHYSICAL THERAPY | Facility: CLINIC | Age: 83
DRG: 177 | End: 2023-01-07
Attending: INTERNAL MEDICINE
Payer: COMMERCIAL

## 2023-01-07 LAB
ANION GAP SERPL CALCULATED.3IONS-SCNC: 7 MMOL/L (ref 3–14)
ATRIAL RATE - MUSE: 182 BPM
ATRIAL RATE - MUSE: 300 BPM
BUN SERPL-MCNC: 41 MG/DL (ref 7–30)
CALCIUM SERPL-MCNC: 8.6 MG/DL (ref 8.5–10.1)
CHLORIDE BLD-SCNC: 95 MMOL/L (ref 94–109)
CO2 SERPL-SCNC: 38 MMOL/L (ref 20–32)
CREAT SERPL-MCNC: 0.85 MG/DL (ref 0.66–1.25)
CRP SERPL-MCNC: 9.3 MG/L (ref 0–8)
D DIMER PPP FEU-MCNC: 0.8 UG/ML FEU (ref 0–0.5)
DIASTOLIC BLOOD PRESSURE - MUSE: NORMAL MMHG
DIASTOLIC BLOOD PRESSURE - MUSE: NORMAL MMHG
FIBRINOGEN PPP-MCNC: 443 MG/DL (ref 170–490)
GFR SERPL CREATININE-BSD FRML MDRD: 87 ML/MIN/1.73M2
GLUCOSE BLD-MCNC: 309 MG/DL (ref 70–99)
GLUCOSE BLDC GLUCOMTR-MCNC: 281 MG/DL (ref 70–99)
GLUCOSE BLDC GLUCOMTR-MCNC: 372 MG/DL (ref 70–99)
GLUCOSE BLDC GLUCOMTR-MCNC: 391 MG/DL (ref 70–99)
GLUCOSE BLDC GLUCOMTR-MCNC: 434 MG/DL (ref 70–99)
GLUCOSE BLDC GLUCOMTR-MCNC: 474 MG/DL (ref 70–99)
INTERPRETATION ECG - MUSE: NORMAL
INTERPRETATION ECG - MUSE: NORMAL
LDH SERPL L TO P-CCNC: 264 U/L (ref 85–227)
P AXIS - MUSE: 241 DEGREES
P AXIS - MUSE: NORMAL DEGREES
POTASSIUM BLD-SCNC: 4 MMOL/L (ref 3.4–5.3)
PR INTERVAL - MUSE: NORMAL MS
PR INTERVAL - MUSE: NORMAL MS
QRS DURATION - MUSE: 78 MS
QRS DURATION - MUSE: 84 MS
QT - MUSE: 216 MS
QT - MUSE: 352 MS
QTC - MUSE: 341 MS
QTC - MUSE: 403 MS
R AXIS - MUSE: 71 DEGREES
R AXIS - MUSE: 94 DEGREES
SODIUM SERPL-SCNC: 140 MMOL/L (ref 133–144)
SYSTOLIC BLOOD PRESSURE - MUSE: NORMAL MMHG
SYSTOLIC BLOOD PRESSURE - MUSE: NORMAL MMHG
T AXIS - MUSE: 221 DEGREES
T AXIS - MUSE: 98 DEGREES
VENTRICULAR RATE- MUSE: 150 BPM
VENTRICULAR RATE- MUSE: 79 BPM

## 2023-01-07 PROCEDURE — 250N000009 HC RX 250: Performed by: INTERNAL MEDICINE

## 2023-01-07 PROCEDURE — 94640 AIRWAY INHALATION TREATMENT: CPT

## 2023-01-07 PROCEDURE — 94660 CPAP INITIATION&MGMT: CPT

## 2023-01-07 PROCEDURE — 999N000157 HC STATISTIC RCP TIME EA 10 MIN

## 2023-01-07 PROCEDURE — 250N000011 HC RX IP 250 OP 636: Performed by: INTERNAL MEDICINE

## 2023-01-07 PROCEDURE — 250N000013 HC RX MED GY IP 250 OP 250 PS 637: Performed by: HOSPITALIST

## 2023-01-07 PROCEDURE — 97161 PT EVAL LOW COMPLEX 20 MIN: CPT | Mod: GP

## 2023-01-07 PROCEDURE — 97110 THERAPEUTIC EXERCISES: CPT | Mod: GP

## 2023-01-07 PROCEDURE — 250N000013 HC RX MED GY IP 250 OP 250 PS 637: Performed by: INTERNAL MEDICINE

## 2023-01-07 PROCEDURE — 83615 LACTATE (LD) (LDH) ENZYME: CPT | Performed by: HOSPITALIST

## 2023-01-07 PROCEDURE — 97530 THERAPEUTIC ACTIVITIES: CPT | Mod: GP

## 2023-01-07 PROCEDURE — 120N000013 HC R&B IMCU

## 2023-01-07 PROCEDURE — 94640 AIRWAY INHALATION TREATMENT: CPT | Mod: 76

## 2023-01-07 PROCEDURE — 99233 SBSQ HOSP IP/OBS HIGH 50: CPT | Performed by: INTERNAL MEDICINE

## 2023-01-07 PROCEDURE — 85384 FIBRINOGEN ACTIVITY: CPT | Performed by: HOSPITALIST

## 2023-01-07 PROCEDURE — 85379 FIBRIN DEGRADATION QUANT: CPT | Performed by: HOSPITALIST

## 2023-01-07 PROCEDURE — 86140 C-REACTIVE PROTEIN: CPT | Performed by: HOSPITALIST

## 2023-01-07 PROCEDURE — 80048 BASIC METABOLIC PNL TOTAL CA: CPT | Performed by: HOSPITALIST

## 2023-01-07 PROCEDURE — 36415 COLL VENOUS BLD VENIPUNCTURE: CPT | Performed by: HOSPITALIST

## 2023-01-07 RX ORDER — POLYETHYLENE GLYCOL 3350 17 G/17G
17 POWDER, FOR SOLUTION ORAL DAILY
Status: DISCONTINUED | OUTPATIENT
Start: 2023-01-07 | End: 2023-01-08 | Stop reason: HOSPADM

## 2023-01-07 RX ORDER — ACETAZOLAMIDE 500 MG/5ML
250 INJECTION, POWDER, LYOPHILIZED, FOR SOLUTION INTRAVENOUS ONCE
Status: COMPLETED | OUTPATIENT
Start: 2023-01-07 | End: 2023-01-07

## 2023-01-07 RX ORDER — DILTIAZEM HYDROCHLORIDE 180 MG/1
360 CAPSULE, COATED, EXTENDED RELEASE ORAL DAILY
Status: DISCONTINUED | OUTPATIENT
Start: 2023-01-07 | End: 2023-01-08 | Stop reason: HOSPADM

## 2023-01-07 RX ORDER — DEXAMETHASONE 1 MG
3 TABLET ORAL DAILY
Status: DISCONTINUED | OUTPATIENT
Start: 2023-01-08 | End: 2023-01-08 | Stop reason: HOSPADM

## 2023-01-07 RX ORDER — POTASSIUM CHLORIDE 750 MG/1
10 TABLET, EXTENDED RELEASE ORAL 2 TIMES DAILY
Status: DISCONTINUED | OUTPATIENT
Start: 2023-01-07 | End: 2023-01-08 | Stop reason: HOSPADM

## 2023-01-07 RX ORDER — TAMSULOSIN HYDROCHLORIDE 0.4 MG/1
0.4 CAPSULE ORAL DAILY
Status: DISCONTINUED | OUTPATIENT
Start: 2023-01-07 | End: 2023-01-08 | Stop reason: HOSPADM

## 2023-01-07 RX ORDER — TORSEMIDE 10 MG/1
20 TABLET ORAL DAILY
Status: DISCONTINUED | OUTPATIENT
Start: 2023-01-07 | End: 2023-01-08 | Stop reason: HOSPADM

## 2023-01-07 RX ORDER — DILTIAZEM HYDROCHLORIDE 30 MG/1
90 TABLET, FILM COATED ORAL EVERY 6 HOURS SCHEDULED
Status: DISCONTINUED | OUTPATIENT
Start: 2023-01-07 | End: 2023-01-07

## 2023-01-07 RX ADMIN — DILTIAZEM HYDROCHLORIDE 90 MG: 30 TABLET, FILM COATED ORAL at 07:46

## 2023-01-07 RX ADMIN — INSULIN ASPART 10 UNITS: 100 INJECTION, SOLUTION INTRAVENOUS; SUBCUTANEOUS at 13:40

## 2023-01-07 RX ADMIN — DIGOXIN 125 MCG: 125 TABLET ORAL at 21:09

## 2023-01-07 RX ADMIN — TAMSULOSIN HYDROCHLORIDE 0.4 MG: 0.4 CAPSULE ORAL at 09:58

## 2023-01-07 RX ADMIN — POTASSIUM CHLORIDE 10 MEQ: 750 TABLET, EXTENDED RELEASE ORAL at 09:58

## 2023-01-07 RX ADMIN — INSULIN ASPART 6 UNITS: 100 INJECTION, SOLUTION INTRAVENOUS; SUBCUTANEOUS at 09:58

## 2023-01-07 RX ADMIN — INSULIN ASPART 10 UNITS: 100 INJECTION, SOLUTION INTRAVENOUS; SUBCUTANEOUS at 18:37

## 2023-01-07 RX ADMIN — LEVOTHYROXINE SODIUM 75 MCG: 75 TABLET ORAL at 07:46

## 2023-01-07 RX ADMIN — LEVALBUTEROL HYDROCHLORIDE 1.25 MG: 1.25 SOLUTION, CONCENTRATE RESPIRATORY (INHALATION) at 19:04

## 2023-01-07 RX ADMIN — DILTIAZEM HYDROCHLORIDE 360 MG: 180 CAPSULE, COATED, EXTENDED RELEASE ORAL at 12:28

## 2023-01-07 RX ADMIN — RIVAROXABAN 20 MG: 20 TABLET, FILM COATED ORAL at 17:19

## 2023-01-07 RX ADMIN — LEVALBUTEROL HYDROCHLORIDE 1.25 MG: 1.25 SOLUTION, CONCENTRATE RESPIRATORY (INHALATION) at 15:50

## 2023-01-07 RX ADMIN — ACETAZOLAMIDE 250 MG: 500 INJECTION, POWDER, LYOPHILIZED, FOR SOLUTION INTRAVENOUS at 10:14

## 2023-01-07 RX ADMIN — POTASSIUM CHLORIDE 10 MEQ: 750 TABLET, EXTENDED RELEASE ORAL at 21:09

## 2023-01-07 RX ADMIN — DEXAMETHASONE 6 MG: 2 TABLET ORAL at 09:58

## 2023-01-07 RX ADMIN — POLYETHYLENE GLYCOL 3350 17 G: 17 POWDER, FOR SOLUTION ORAL at 09:57

## 2023-01-07 RX ADMIN — TORSEMIDE 20 MG: 10 TABLET ORAL at 09:58

## 2023-01-07 RX ADMIN — LEVALBUTEROL HYDROCHLORIDE 1.25 MG: 1.25 SOLUTION, CONCENTRATE RESPIRATORY (INHALATION) at 08:56

## 2023-01-07 RX ADMIN — LEVALBUTEROL HYDROCHLORIDE 1.25 MG: 1.25 SOLUTION, CONCENTRATE RESPIRATORY (INHALATION) at 12:02

## 2023-01-07 ASSESSMENT — ACTIVITIES OF DAILY LIVING (ADL)
CONCENTRATING,_REMEMBERING_OR_MAKING_DECISIONS_DIFFICULTY: NO
ADLS_ACUITY_SCORE: 45
ADLS_ACUITY_SCORE: 39
WALKING_OR_CLIMBING_STAIRS_DIFFICULTY: YES
DRESSING/BATHING_DIFFICULTY: YES
ADLS_ACUITY_SCORE: 45
TRANSFERRING: 0-->ASSISTANCE NEEDED (DEVELOPMENTALLY APPROPRIATE)
ADLS_ACUITY_SCORE: 39
ADLS_ACUITY_SCORE: 45
DIFFICULTY_EATING/SWALLOWING: YES
TOILETING: 1-->ASSISTANCE (EQUIPMENT/PERSON) NEEDED (NOT DEVELOPMENTALLY APPROPRIATE)
WEAR_GLASSES_OR_BLIND: NO
TOILETING_ASSISTANCE: TOILETING DIFFICULTY, ASSISTANCE 1 PERSON
NUMBER_OF_TIMES_PATIENT_HAS_FALLEN_WITHIN_LAST_SIX_MONTHS: 3
ADLS_ACUITY_SCORE: 39
EATING/SWALLOWING: SWALLOWING LIQUIDS;SWALLOWING SOLID FOOD;EATING
ADLS_ACUITY_SCORE: 40
DOING_ERRANDS_INDEPENDENTLY_DIFFICULTY: YES
TOILETING_ISSUES: YES
BATHING: 1-->ASSISTANCE NEEDED
DRESS: 0-->ASSISTANCE NEEDED (DEVELOPMENTALLY APPROPRIATE)
FALL_HISTORY_WITHIN_LAST_SIX_MONTHS: YES
TOILETING: 1-->ASSISTANCE (EQUIPMENT/PERSON) NEEDED
TRANSFERRING: 1-->ASSISTANCE (EQUIPMENT/PERSON) NEEDED
EATING: 0-->INDEPENDENT
DRESSING/BATHING: BATHING DIFFICULTY, ASSISTANCE 1 PERSON
DRESS: 1-->ASSISTANCE (EQUIPMENT/PERSON) NEEDED
DRESSING/BATHING_MANAGEMENT: SPOUSE HELPS
ADLS_ACUITY_SCORE: 48
EATING: 0-->ASSISTANCE NEEDED (DEVELOPMENTALLY APPROPRIATE)
WALKING_OR_CLIMBING_STAIRS: STAIR CLIMBING DIFFICULTY, ASSISTANCE 1 PERSON
SWALLOWING: 2-->DIFFICULTY SWALLOWING LIQUIDS
ADLS_ACUITY_SCORE: 39
SWALLOWING: 2-->DIFFICULTY SWALLOWING LIQUIDS
ADLS_ACUITY_SCORE: 45
CHANGE_IN_FUNCTIONAL_STATUS_SINCE_ONSET_OF_CURRENT_ILLNESS/INJURY: YES

## 2023-01-07 NOTE — PLAN OF CARE
[facility] [place] Vital Signs stable, Pt on BIPAP for the duration of the shift. Telemetry CHUCK.Lorraine (Controlled). Alert and Oriented, at times pt is disoriented to the year, otherwise Neuros intact. Lung sounds diminished throughout. Bowel sounds active and audible. Passing flatus. 2 G Sodium Diet with Fluid Restriction, Pt did not eat breakfast or lunch.  Adequate urinary output - pt was straight cathed for 400 at 1300 and is due to void, bladder scanned for 125 at 1800. CMS intact, bilateral lower extremity edema present. Up with assist of 1. Pt has denied pain.      Patient slept the entire day and was on BiPAP - Pt did wake up around 1800 and did order dinner.  Prior to 1800 Pt was lethargic and appeared to be resting comfortably on the BiPAP.

## 2023-01-07 NOTE — PROGRESS NOTES
Cross Cover Note    Called re VBG.  Paged earlier this evening as patient was more alert and requesting to eat.  VBG drawn and patient taken off bIPAP. Tolerating dinner. On 4 L oxygen with no hypoxemia or ventilation issues noted.  ABG with pH 7.51 (upfrom 7.44), PCO2 58 (down from 70) and bicarb 46 (down from 48). Question is over ventilation on BIPAP caused respiratory alkalosis.      Since tolerating NC and improved mentation will continue to monitor. Repeat VBG in 2 hours.  Close monitoring with IMC status. If any clinical deterioration, please call rapid response and/or replace BIPAP if indicated.    Discussed with RN    Renata Lock PA-C

## 2023-01-07 NOTE — PROVIDER NOTIFICATION
DATE:  1/6/2023   TIME OF RECEIPT FROM LAB:  1/6/23 1911  LAB TEST:  Bicarb from VBG  LAB VALUE:  46  Text Paged Renata OSEI. Provider stated it is okay to let patient eat dinner and keep on Nasal Cannula.  Pt is alert and oriented.  Provider placed orders for a repeat VBG at 2130 and to continue to monitor.

## 2023-01-07 NOTE — PROGRESS NOTES
01/07/23 1610   Appointment Info   Signing Clinician's Name / Credentials (PT) Kera Barrios, PT, DPT   Living Environment   People in Home spouse   Current Living Arrangements house   Home Accessibility stairs to enter home;stairs within home   Number of Stairs, Main Entrance 2   Transportation Anticipated family or friend will provide   Living Environment Comments pt can stay on 1 level until stronger per report, however typically uses the downstairs bathroom/shower, has a walk out basement.   Self-Care   Usual Activity Tolerance good   Current Activity Tolerance moderate   Equipment Currently Used at Home walker, rolling;walker, standard;glucometer   Fall history within last six months yes   Number of times patient has fallen within last six months 3  (per chart)   Activity/Exercise/Self-Care Comment pt reports has not been very active the past few months, reports his wife recently obtained a walker for him. Pt reports wife assist with bathing and anything he needs. Pt reports mostly only walking household distances, wife does shopping.   General Information   Onset of Illness/Injury or Date of Surgery 01/04/23   Referring Physician Nichelle Meredith MD   Patient/Family Therapy Goals Statement (PT) to return home   Pertinent History of Current Problem (include personal factors and/or comorbidities that impact the POC) 83y/o M admitted with SOB, found to have COVID, viral pneumonia, acute metabolic encephalopathy, acute hypercapnic respiratory failure. medical history significant for IDDM, CALE, hypothyroidism, paroxysmal atrial fibrillation on anticoagulation, diaphragmatic paralysis, s/p plication of right hemidiaphragm, recurrent right pleural effusion  See chart for further PMHx.   Existing Precautions/Restrictions fall   General Observations resting in chair, NAD, on room air   Cognition   Affect/Mental Status (Cognition) WFL   Orientation Status (Cognition) oriented to;person;place   Follows Commands  (Cognition) WFL   Pain Assessment   Patient Currently in Pain No  (offers no active complaints)   Posture    Posture Forward head position;Protracted shoulders;Kyphosis   Range of Motion (ROM)   ROM Comment appears limited B shoulder ROM, otherwise grossly intact for basic mobility   Strength (Manual Muscle Testing)   Strength (Manual Muscle Testing) Deficits observed during functional mobility   Bed Mobility   Comment, (Bed Mobility) NT-pt sitting in chair at time of encounter   Transfers   Comment, (Transfers) CGA with sit to stand with FWW   Gait/Stairs (Locomotion)   [place] Level (Gait) contact guard   Assistive Device (Gait) walker, front-wheeled   Distance in Feet 5   Distance in Feet (Gait) 35ft   Pattern (Gait) swing-through   Balance   Balance Comments CGA with dynamic balance with FWW   Clinical Impression   Criteria for Skilled Therapeutic Intervention Yes, treatment indicated   PT Diagnosis (PT) impaired gait   Influenced by the following impairments impaired activity tolerance, generalized weakness, impaired balance   Functional limitations due to impairments impaired IND with functional mobility   Clinical Presentation (PT Evaluation Complexity) Stable/Uncomplicated   Clinical Presentation Rationale clinical judgement   Clinical Decision Making (Complexity) low complexity   Planned Therapy Interventions (PT) balance training;gait training;home exercise program;home program guidelines;risk factor education;progressive activity/exercise;transfer training;strengthening;patient/family education;bed mobility training   Risk & Benefits of therapy have been explained evaluation/treatment results reviewed;care plan/treatment goals reviewed;risks/benefits reviewed;current/potential barriers reviewed;participants included;participants voiced agreement with care plan;patient   PT Total Evaluation Time   PT Eval, Low Complexity Minutes (96657) 8   Physical Therapy Goals   PT Frequency Daily   PT Predicted  Duration/Target Date for Goal Attainment 01/14/23   PT Goals Bed Mobility;Transfers;Gait   PT: Bed Mobility Independent;Supine to/from sit   PT: Transfers Modified independent;Bed to/from chair;Sit to/from stand;Assistive device   PT: Gait Modified independent;Rolling walker;150 feet   Interventions   Interventions Quick Adds Gait Training;Therapeutic Activity;Therapeutic Procedure   Therapeutic Procedure/Exercise   Ther. Procedure: strength, endurance, ROM, flexibillity Minutes (53529) 10   Symptoms Noted During/After Treatment fatigue;shortness of breath   Treatment Detail/Skilled Intervention instructed with sit to stand ex x 5 reps for strengthening with CGA-progressing to SBA, standing marching with CGA with FWW x 10 reps B, then x 5 reps B, sitting rest breaks due to fatigue and GARCIA. SPO2 brief desat to mid 80s, however improved quickly to mid 90s on RA   Therapeutic Activity   Therapeutic Activities: dynamic activities to improve functional performance Minutes (52934) 8   Treatment Detail/Skilled Intervention Discussed with pt recommendations for d/c including home health PT, wife providing close SBA to CGA with all mobility and consistent use of his walker. pt verbalized understanding and reported his wife could assist as needed. discussed shower chair-pt reports has a built in shower chair. Pt left in chair with needs in reach and alarm on.   Gait Training   Gait Training Minutes (88725) 5   Symptoms Noted During/After Treatment (Gait Training) fatigue;shortness of breath   Treatment Detail/Skilled Intervention Amb in room with CGA to close SBA with frequent turning, slight decreased stability, however no LOB. SPO2 brief desat to mid 80s, however improved quickly to mid 90s on RA, distance mostly limited by equipment in room.   PT Discharge Planning   PT Plan progress amb distance, standing ex, monitor O2   PT Discharge Recommendation (DC Rec) home with assist;home with home care physical therapy   PT  Rationale for DC Rec Pt appears below baseline with mobility, currently close SBA to CGA for safety with FWW. Discussed recommended assist at d/c of family and use of FWW at home. Pt reports wife very supportive and can provide his with assist at discharge. Also recommend home health PT-pt was agreeable.   PT Brief overview of current status tolerated standing ex and short distance amb in room SPO2 brief desat to mid 80s, however improved quickly to mid 90s on RA   Total Session Time   Timed Code Treatment Minutes 23   Total Session Time (sum of timed and untimed services) 31

## 2023-01-07 NOTE — PROVIDER NOTIFICATION
MD Notification    Notified Person: MD    Notified Person Name: Dr. Tucker     Notification Date/Time: 0330    Notification Interaction: Amcon Page    Purpose of Notification: Blood sugar 372    Orders Received: 1 time order for 6 units Novolog     Comments:

## 2023-01-07 NOTE — PROGRESS NOTES
"[facility]    Hospitalist Progress Note    Date of Service (when I saw the patient): 01/07/2023    Assessment & Plan   Carl Vázquez is a 82 year old male who was admitted on 1/4/2023.    Carl Vázquez is a 82 year old male with medical history significant for IDDM, CALE, hypothyroidism, paroxysmal atrial fibrillation on anticoagulation, diaphragmatic paralysis, s/p plication of right hemidiaphragm, recurrent right pleural effusion was brought to the ER for evaluation of worsening shortness of breath and is being admitted on 1/4/2023 for further manage.         # Confirmed COVID-19 infection    # Viral Pneumonia secondary to COVID-19 infection  # Acute Metabolic Encephalopathy  # Acute hypercapnic respiratory failure     Symptom Onset Date used to determine duration of isolation.  If unsure, enter \"unknown\"   Date of 1st Positive Test 1/4/22   Vaccination Status Partially Vaccinated, laterst pending booster dose         - COVID-19 special precautions, continuous pulse-ox  - Oxygen: continue current support with O2 Device: Nasal cannula at Oxygen Delivery: 4 LPM; titrate to keep SpO2 between 90-96%.   - Given hypercapnia and metabolic encephalopathy, will place on BiPAP treatment and follow-up ABG.  - Labs: Standard COVID admission labs ordered (CBC with diff, CMP, INR, D-dimer, CRP).   - Imaging: needs CT chest abd pelvis, will order once able to lie flat  - Breathing treatments: albuterol inhaler four times a day scheduled and PRN; avoid nebulizers in favor of MDIs   - IV fluids: not indicated at this time  - Antibiotics: not indicated   - COVID-Focused Medications: Dexamethasone 6 mg x 10 days or until hospital discharge, started on 1/4/22 and Remdesivir x 3 days or until hospital discharge, started on 1/4/22  - DVT Prophylaxis:         - At high risk of thrombotic complications due to COVID-19 (DDimer = N/A )        PTA Xarelto resumed after thoracentesis on " 1/5/2023  Patient was placed on BiPAP on 1/6  morning as he was confused overnight and also his p.o. PCO2 returned elevated at 79  Was on BIPAP on 1/6/23   Use only BIPAP at night   p alert and awake doing well on 1/7/23   Completed 3 days of Remdesivir   Continue dexa for 7more days   Wean off of O2 as tolerated encourage IS and mobility as tolerated   PT consult requested          Recurrent right pleural effusion status post right thoracentesis on 1/5/2023  Bilateral leg edema  Progressive dyspnea of 1 year, see background in H&P for details.   TTE from January 2022 shows normal LV function, grade 1 pattern of LV diastolic filling.  No significant valvular stenosis or regurgitation.  Patient had regadenoson stress test March 2022, was normal.  Had CT abdomen pelvis September 2021, no finding of cirrhosis.  -Patient has been on diuretic, torsemide, still with recurrent right pleural effusion needing multiple thoracentesis.  Has worsening leg edema despite diuretic.  Patient is on anticoagulation, DVT unlikely.  -Change PTA torsemide to IV lasix, follow daily I&O, weight and fluid restriction  Stop Lasix today as patient bicarb went up to 37 and patient is n.p.o. on BiPAP  -Will obtain TTE.  -Rt thoracentesis done on 1/5/2023 with 0.4 L of fluid removal with no signs of infection    Coarsended  Texture  of the liver with concern for intrinsic hepatic disease with possible cirrhosis;  Abdominal ultrasound done on 1/6/2023 showed the liver has a coarsened echoes texture consistent with intrinsic hepatic disease.  Portal vein hepatic vein splenic vein and hepatic artery are patent.  Changes of cholecystectomy.  Nonobstructing stones noted in the right kidney measuring 0.3 and 0.4 cm  Patient's total bilirubin was quite elevated at 30.3 on admission improved to 20.7-14.9-9.3  on 1/7/2023  We will request a [facility] consultation to see and evaluate him for possible underlying cirrhosis    Paroxysmal A. Fib   atrial  flutter with RVR on 1/5/2023  EKG shows sinus rhythm, rate controlled.  Patient was on metoprolol but apparently had ongoing/worsening dyspnea on exertion and was changed to Cardizem.  -On Xarelto, hold tonight for thoracentesis/possible Pleurx tomorrow, resume after procedure  -Continue to monitor on telemetry.  Patient was in atrial flutter with RVR with heart rates in the 140s to 150s  He was started on diltiazem drip after diltiazem 10 mg bolus on 1/5/2023.  EP consulted and recommended rate control with diltiazem and  digoxin  Heart rate is currently well controlled  Restart PTA oral diliazem today. Wean off of diltiazem drip      Rt diaphragmatic paralysis  S/p plication  Avoid any procedures on the left side of his neck to avoid any injury to phrenic now as per pulmonary      IDDM  PTA medications include glipizide 10 Mg every morning, Lantus 60 units at HS.  -Resume Lantus at 40 units at bedtime as patient is n.p.o. on BiPAP  - hold glipizide.  -Moderate ISS  -patient on steroid, expect uncontrolled BS, add premeal novolog while not NPO.   -Hypoglycemia protocol  Increase lantus dose to 60units at bedtime on 1/7/23   Given additional 10units of lantus now      Hypothyroidism  - Resume PTA Synthroid     CALE  -Patient had difficulty wearing CPAP.  Given marked hypercarbia, will be on BiPAP as needed.  Patient had a controlled mechanical fall last night  No injuries noted evaluated by house provider           Diet:   moderate CHO  DVT Prophylaxis: DOAC   Power Catheter: Not present  Lines: None     Cardiac Monitoring: None  Code Status:  DNR/DNI, discussed with patient and his spouse        Clinically Significant Risk Factors Present on Admission                 # Hypoalbuminemia: Lowest albumin = 3 g/dL at 1/4/2023 11:41 AM, will monitor as appropriate  # Drug Induced Coagulation Defect: home medication list includes an anticoagulant medication         # Overweight: Estimated body mass index is 25.99 kg/m  as  "calculated from the following:    Height as of this encounter: 1.676 m (5' 6\").    Weight as of this encounter: 73 kg (161 lb).                  Disposition Plan    Disposition: Expected discharge in 1-2days if patient's respiratory status improves and remained stable and was able to be weaned off of oxygen    Discussed with bedside RN, patient, his wife was updated by me over the phone on 1/6/2023    Greater than 55 minutes were spent in taking care of him today in  total    Nichelle Meredith MD, MD  [phone] (P)      Interval History      Patient is resting comfortably in chair. Feels lot better today. SOB improved .  Diltiazem drip to be weaned off today . No acute issues since yesterday     -Data reviewed today: I reviewed all new labs and imaging results over the last 24 hours. I personally reviewed in imaging today   Physical Exam   Temp: 97.6  F (36.4  C) Temp src: Oral BP: 129/76 Pulse: 70   Resp: 25 SpO2: (P) 97 % O2 Device: (P) Nasal cannula Oxygen Delivery: (P) 3 LPM  Vitals:    01/04/23 1137 01/07/23 0608   Weight: 73 kg (161 lb) 69.1 kg (152 lb 4.8 oz)     Vital Signs with Ranges  Temp:  [97.6  F (36.4  C)-98.6  F (37  C)] 97.6  F (36.4  C)  Pulse:  [] 70  Resp:  [14-43] 25  BP: (108-144)/(63-81) 129/76  FiO2 (%):  [35 %] 35 %  SpO2:  [92 %-98 %] (P) 97 %  I/O last 3 completed shifts:  In: 480 [P.O.:480]  Out: 1100 [Urine:1100]    Constitutional: Alert, awake, NAD  resting comfortably in chair Respiratory: Very diminished breath sounds bilaterally.  No wheezing  Cardiovascular: Irregularly irregular, no murmurs    GI: Normal bowel sounds, soft, non-distended, non-tender  Skin/Integumen: No rashes, no cyanosis, no edema  Other:     Medications     - MEDICATION INSTRUCTIONS -       - MEDICATION INSTRUCTIONS -       - MEDICATION INSTRUCTIONS -         dexamethasone  6 mg Oral Daily     digoxin  125 mcg Oral QPM     diltiazem ER COATED BEADS  360 mg Oral Daily     insulin aspart  1-10 Units " Subcutaneous TID AC     insulin aspart  1-7 Units Subcutaneous At Bedtime     insulin aspart   Subcutaneous TID AC     insulin glargine  10 Units Subcutaneous Once     insulin glargine  60 Units Subcutaneous QPM     levalbuterol  1.25 mg Nebulization 4x Daily     levothyroxine  75 mcg Oral Daily     polyethylene glycol  17 g Oral Daily     potassium chloride ER  10 mEq Oral BID     rivaroxaban ANTICOAGULANT  20 mg Oral Daily with supper     sodium chloride (PF)  3 mL Intracatheter Q8H     sodium chloride (PF)  3 mL Intracatheter Q8H     tamsulosin  0.4 mg Oral Daily     torsemide  20 mg Oral Daily       Data   Recent Labs   Lab 01/07/23  1248 01/07/23  0841 01/07/23  0627 01/06/23  1341 01/06/23  0714 01/05/23  0847 01/05/23  0554 01/04/23  1834 01/04/23  1141   WBC  --   --   --   --  13.8*  --  7.5  --  7.3   HGB  --   --   --   --  15.9  --  15.4  --  16.1   MCV  --   --   --   --  92  --  93  --  94   PLT  --   --   --   --  289  --  279  --  300   NA  --   --  140  --  139  --  139  --  137   POTASSIUM  --   --  4.0  --  4.0  --  4.3  --  4.3   CHLORIDE  --   --  95  --  93*  --  94  --  91*   CO2  --   --  38*  --  39*  --  38*  --  42*   BUN  --   --  41*  --  37*  --  24  --  23   CR  --   --  0.85  --  0.81  --  0.74  --  0.83   ANIONGAP  --   --  7  --  7  --  7  --  4   TATE  --   --  8.6  --  8.7  --  9.0  --  9.3   * 281* 309*   < > 135*   < > 100*   < > 185*   ALBUMIN  --   --   --   --   --   --   --   --  3.0*   PROTTOTAL  --   --   --   --   --   --   --   --  7.0  6.9   BILITOTAL  --   --   --   --   --   --   --   --  0.5   ALKPHOS  --   --   --   --   --   --   --   --  69   ALT  --   --   --   --   --   --   --   --  33   AST  --   --   --   --   --   --   --   --  46*   LIPASE  --   --   --   --   --   --   --   --  58*    < > = values in this interval not displayed.       No results found for this or any previous visit (from the past 24 hour(s)).

## 2023-01-07 NOTE — PLAN OF CARE
Goal Outcome Evaluation:  Orientations: Pt A/Ox 4, can be forgetful at times.   Vitals/Pain: VSS on 4L NC  Tele: NSR, occasionally in/out of A-fib/A-flutter RVR. MD aware. Dilt gtt @15ml/hr  Lines/Drains: 2 PIV  Skin/Wounds: CDI, 2+ pitting edema right/left ankle and foot.   GI/: Bladder scan >539, straight cathd @ 0450 w/ 700ml output. No BM this shift  Labs: VBGs drawn at 2130, spoke w/ TJ from respiratory regarding results. No interventions needed at this time. Blood sugar 372 at 0300. Additional 6 units Novolog given.   Ambulation/Assist: Assist x 1  Plan: Due to void, wean O2 as able.

## 2023-01-07 NOTE — PROVIDER NOTIFICATION
MD Notification    Notified Person: MD    Notified Person Name:  Dr. Tucker     Notification Date/Time: 0400    Notification Interaction: Amcon Page    Purpose of Notification: Bladder Scan >539 ml     Orders Received: Bladder Management Protocol     Comments:

## 2023-01-08 ENCOUNTER — APPOINTMENT (OUTPATIENT)
Dept: PHYSICAL THERAPY | Facility: CLINIC | Age: 83
DRG: 177 | End: 2023-01-08
Payer: COMMERCIAL

## 2023-01-08 VITALS
SYSTOLIC BLOOD PRESSURE: 106 MMHG | DIASTOLIC BLOOD PRESSURE: 68 MMHG | RESPIRATION RATE: 23 BRPM | HEART RATE: 68 BPM | HEIGHT: 66 IN | WEIGHT: 152.3 LBS | OXYGEN SATURATION: 93 % | TEMPERATURE: 97.4 F | BODY MASS INDEX: 24.48 KG/M2

## 2023-01-08 LAB
ANION GAP SERPL CALCULATED.3IONS-SCNC: 7 MMOL/L (ref 3–14)
BUN SERPL-MCNC: 49 MG/DL (ref 7–30)
CALCIUM SERPL-MCNC: 8.8 MG/DL (ref 8.5–10.1)
CHLORIDE BLD-SCNC: 93 MMOL/L (ref 94–109)
CO2 SERPL-SCNC: 35 MMOL/L (ref 20–32)
CREAT SERPL-MCNC: 0.84 MG/DL (ref 0.66–1.25)
CRP SERPL-MCNC: 7.3 MG/L (ref 0–8)
D DIMER PPP FEU-MCNC: 1.02 UG/ML FEU (ref 0–0.5)
ERYTHROCYTE [DISTWIDTH] IN BLOOD BY AUTOMATED COUNT: 14.1 % (ref 10–15)
FIBRINOGEN PPP-MCNC: 404 MG/DL (ref 170–490)
GFR SERPL CREATININE-BSD FRML MDRD: 87 ML/MIN/1.73M2
GLUCOSE BLD-MCNC: 284 MG/DL (ref 70–99)
GLUCOSE BLDC GLUCOMTR-MCNC: 279 MG/DL (ref 70–99)
GLUCOSE BLDC GLUCOMTR-MCNC: 312 MG/DL (ref 70–99)
GLUCOSE BLDC GLUCOMTR-MCNC: 313 MG/DL (ref 70–99)
GLUCOSE BLDC GLUCOMTR-MCNC: 402 MG/DL (ref 70–99)
HCT VFR BLD AUTO: 47.8 % (ref 40–53)
HGB BLD-MCNC: 16 G/DL (ref 13.3–17.7)
LDH SERPL L TO P-CCNC: 239 U/L (ref 85–227)
MCH RBC QN AUTO: 30.3 PG (ref 26.5–33)
MCHC RBC AUTO-ENTMCNC: 33.5 G/DL (ref 31.5–36.5)
MCV RBC AUTO: 91 FL (ref 78–100)
PLATELET # BLD AUTO: 273 10E3/UL (ref 150–450)
POTASSIUM BLD-SCNC: 4.1 MMOL/L (ref 3.4–5.3)
RBC # BLD AUTO: 5.28 10E6/UL (ref 4.4–5.9)
SODIUM SERPL-SCNC: 135 MMOL/L (ref 133–144)
WBC # BLD AUTO: 15.2 10E3/UL (ref 4–11)

## 2023-01-08 PROCEDURE — 250N000013 HC RX MED GY IP 250 OP 250 PS 637: Performed by: HOSPITALIST

## 2023-01-08 PROCEDURE — 250N000013 HC RX MED GY IP 250 OP 250 PS 637: Performed by: INTERNAL MEDICINE

## 2023-01-08 PROCEDURE — 86140 C-REACTIVE PROTEIN: CPT | Performed by: HOSPITALIST

## 2023-01-08 PROCEDURE — 97530 THERAPEUTIC ACTIVITIES: CPT | Mod: GP

## 2023-01-08 PROCEDURE — 85384 FIBRINOGEN ACTIVITY: CPT | Performed by: HOSPITALIST

## 2023-01-08 PROCEDURE — 250N000011 HC RX IP 250 OP 636: Performed by: INTERNAL MEDICINE

## 2023-01-08 PROCEDURE — 99232 SBSQ HOSP IP/OBS MODERATE 35: CPT | Performed by: INTERNAL MEDICINE

## 2023-01-08 PROCEDURE — 97116 GAIT TRAINING THERAPY: CPT | Mod: GP

## 2023-01-08 PROCEDURE — 999N000157 HC STATISTIC RCP TIME EA 10 MIN

## 2023-01-08 PROCEDURE — 99239 HOSP IP/OBS DSCHRG MGMT >30: CPT | Performed by: INTERNAL MEDICINE

## 2023-01-08 PROCEDURE — 94660 CPAP INITIATION&MGMT: CPT

## 2023-01-08 PROCEDURE — 83615 LACTATE (LD) (LDH) ENZYME: CPT | Performed by: HOSPITALIST

## 2023-01-08 PROCEDURE — 85027 COMPLETE CBC AUTOMATED: CPT | Performed by: HOSPITALIST

## 2023-01-08 PROCEDURE — 80048 BASIC METABOLIC PNL TOTAL CA: CPT | Performed by: HOSPITALIST

## 2023-01-08 PROCEDURE — 85379 FIBRIN DEGRADATION QUANT: CPT | Performed by: HOSPITALIST

## 2023-01-08 PROCEDURE — 36415 COLL VENOUS BLD VENIPUNCTURE: CPT | Performed by: HOSPITALIST

## 2023-01-08 RX ORDER — POLYETHYLENE GLYCOL 3350 17 G/17G
17 POWDER, FOR SOLUTION ORAL DAILY
Qty: 510 G | Refills: 0 | Status: SHIPPED | OUTPATIENT
Start: 2023-01-08

## 2023-01-08 RX ORDER — METOPROLOL TARTRATE 25 MG/1
25 TABLET, FILM COATED ORAL 2 TIMES DAILY
Qty: 60 TABLET | Refills: 0 | Status: SHIPPED | OUTPATIENT
Start: 2023-01-08 | End: 2023-01-16

## 2023-01-08 RX ORDER — METOPROLOL TARTRATE 25 MG/1
25 TABLET, FILM COATED ORAL 2 TIMES DAILY
Status: DISCONTINUED | OUTPATIENT
Start: 2023-01-08 | End: 2023-01-08 | Stop reason: HOSPADM

## 2023-01-08 RX ORDER — DEXAMETHASONE 1.5 MG/1
3 TABLET ORAL DAILY
Qty: 6 TABLET | Refills: 0 | Status: SHIPPED | OUTPATIENT
Start: 2023-01-08 | End: 2023-02-17

## 2023-01-08 RX ORDER — GLIPIZIDE 10 MG/1
10 TABLET ORAL
Status: DISCONTINUED | OUTPATIENT
Start: 2023-01-08 | End: 2023-01-08 | Stop reason: HOSPADM

## 2023-01-08 RX ORDER — TAMSULOSIN HYDROCHLORIDE 0.4 MG/1
0.4 CAPSULE ORAL DAILY
Qty: 90 CAPSULE | Refills: 0 | Status: ON HOLD | OUTPATIENT
Start: 2023-01-08 | End: 2023-02-23

## 2023-01-08 RX ADMIN — TAMSULOSIN HYDROCHLORIDE 0.4 MG: 0.4 CAPSULE ORAL at 09:46

## 2023-01-08 RX ADMIN — DEXAMETHASONE 3 MG: 1 TABLET ORAL at 09:45

## 2023-01-08 RX ADMIN — TORSEMIDE 20 MG: 10 TABLET ORAL at 10:06

## 2023-01-08 RX ADMIN — GLIPIZIDE 10 MG: 10 TABLET ORAL at 11:31

## 2023-01-08 RX ADMIN — LEVOTHYROXINE SODIUM 75 MCG: 75 TABLET ORAL at 09:46

## 2023-01-08 RX ADMIN — ACETAMINOPHEN 650 MG: 325 TABLET, FILM COATED ORAL at 12:45

## 2023-01-08 RX ADMIN — DILTIAZEM HYDROCHLORIDE 360 MG: 180 CAPSULE, COATED, EXTENDED RELEASE ORAL at 09:45

## 2023-01-08 RX ADMIN — POTASSIUM CHLORIDE 10 MEQ: 750 TABLET, EXTENDED RELEASE ORAL at 09:46

## 2023-01-08 RX ADMIN — INSULIN ASPART 10 UNITS: 100 INJECTION, SOLUTION INTRAVENOUS; SUBCUTANEOUS at 12:45

## 2023-01-08 RX ADMIN — METOPROLOL TARTRATE 25 MG: 25 TABLET, FILM COATED ORAL at 09:45

## 2023-01-08 RX ADMIN — INSULIN ASPART 6 UNITS: 100 INJECTION, SOLUTION INTRAVENOUS; SUBCUTANEOUS at 09:59

## 2023-01-08 RX ADMIN — POLYETHYLENE GLYCOL 3350 17 G: 17 POWDER, FOR SOLUTION ORAL at 09:44

## 2023-01-08 ASSESSMENT — ACTIVITIES OF DAILY LIVING (ADL)
ADLS_ACUITY_SCORE: 48
ADLS_ACUITY_SCORE: 48
ADLS_ACUITY_SCORE: 47
ADLS_ACUITY_SCORE: 48

## 2023-01-08 NOTE — PLAN OF CARE
C Status - Vital Signs stable, pt on Room Air to 2L Oxygen Via Nasal Cannula. Telemetry Sinus Rhythm with Prolonged QT. Alert and Oriented, Neuros Intact. Lung sounds Diminished, IS in Use. Bowel sounds Active and Audible, pt had Medium hard BM. Passing flatus. Moderate Carb diet with 1500 mL fluid restriction. Denies nausea. Adequate urinary output via Commode. CMS intact, +2-3 bilateral lower extremity edema, tingling present, pulses via doppler. Up with assist of 1 gait belt and walker. Pt has denied pain.    Patient was awake for most of the day and sat in the chair.  Specialty chair cushion was utilized. Plan for possible discharge tomorrow.

## 2023-01-08 NOTE — PLAN OF CARE
Goal Outcome Evaluation:  Pt. Alert and oriented x 4. VSS on 2L NC. Assist of 1. Tolerating mod carb diet w/ 1500 ml fluid restriction diet. Lung sounds diminished, equal bilaterally. BIPAP @ night. Bowel sounds +, passing flatus. BM +, adequate urine output. +2 edema to left and right leg,ankle, and foot. Occasional tenderness and pain in abdomen, does not want pain medication at this time. Denies nausea. NSR w/ PACs, converted t0 A-fib/A-flutter CVR at 0300. PIV x 2 SL. Plan for possible discharge later today.

## 2023-01-08 NOTE — DISCHARGE SUMMARY
"[facility]  Discharge Summary        Carl Vázquez MRN# [id number]   YOB: 1940 Age: 82 year old     Date of Admission:  1/4/2023  Date of Discharge:  1/8/2023  Admitting Physician:  Greg Sánchez MD  Discharge Physician: Nichelle Meredith MD  Discharging Service: Hospitalist     Primary Provider: Kyler Mcwilliams  Primary Care Physician Phone Number: [phone]         Discharge Diagnoses/Problem Oriented Hospital Course (Providers):    Carl Vázquez was admitted on 1/4/2023 by Greg Sánchez MD and I would refer you to their history and physical.  The following problems were addressed during his hospitalization:    Carl Vázquez is a 82 year old male with medical history significant for IDDM, CALE, hypothyroidism, paroxysmal atrial fibrillation on anticoagulation, diaphragmatic paralysis, s/p plication of right hemidiaphragm, recurrent right pleural effusion was brought to the ER for evaluation of worsening shortness of breath and is being admitted on 1/4/2023 for further manage.         # Confirmed COVID-19 infection    # Viral Pneumonia secondary to COVID-19 infection  # Acute Metabolic Encephalopathy  # Acute hypercapnic respiratory failure     Symptom Onset Date used to determine duration of isolation.  If unsure, enter \"unknown\"   Date of 1st Positive Test 1/4/22   Vaccination Status Partially Vaccinated, laterst pending booster dose         - COVID-19 special precautions, continuous pulse-ox  - Oxygen: continue current support with O2 Device: Nasal cannula at Oxygen Delivery: 4 LPM; titrate to keep SpO2 between 90-96%.   - Given hypercapnia and metabolic encephalopathy, will place on BiPAP treatment and follow-up ABG.  - Labs: Standard COVID admission labs ordered (CBC with diff, CMP, INR, D-dimer, CRP).   - Imaging: needs CT chest abd pelvis, will order once able to lie flat  - Breathing treatments: albuterol inhaler four times a day scheduled and PRN; " avoid nebulizers in favor of MDIs        - At high risk of thrombotic complications due to COVID-19 (DDimer = N/A )        PTA Xarelto resumed after thoracentesis on 1/5/2023  Patient was placed on BiPAP on 1/6  morning as he was confused overnight and also his p.o. PCO2 returned elevated at 79  Was on BIPAP on 1/6/23   Use only BIPAP at night   p alert and awake doing well on 1/7/23   Completed 3 days of Remdesivir   Patient's respiratory status much improved after thoracentesis  Patient received 4 days of dexamethasone now and his blood sugars has been running really high with him being on dexamethasone so dexamethasone dose reduced to 3 mg p.o. daily to continue for 3 more days on discharge    Wean off of O2 as tolerated encourage IS and mobility as tolerated   PT consult requested  recommended discharge to home with home PT OT.  Orders placed on discharge  Patient is currently on room air and shortness of breath much improved           Recurrent right pleural effusion status post right thoracentesis on 1/5/2023  Bilateral leg edema  Progressive dyspnea of 1 year, see background in H&P for details.   TTE from January 2022 shows normal LV function, grade 1 pattern of LV diastolic filling.  No significant valvular stenosis or regurgitation.  Patient had regadenoson stress test March 2022, was normal.  Had CT abdomen pelvis September 2021, no finding of cirrhosis.  -Patient has been on diuretic, torsemide, still with recurrent right pleural effusion needing multiple thoracentesis.  Has worsening leg edema despite diuretic.  Patient is on anticoagulation, DVT unlikely.  -Change PTA torsemide to IV lasix, follow daily I&O, weight and fluid restriction  Stop Lasix today as patient bicarb went up to 37 and patient is n.p.o. on BiPAP  -Will obtain TTE.  -Rt thoracentesis done on 1/5/2023 with 0.4 L of fluid removal with no signs of infection  Resumed PTA torsemide and potassium supplementation      Coarsended  Texture   of the liver with concern for intrinsic hepatic disease with possible cirrhosis;  Abdominal ultrasound done on 1/6/2023 showed the liver has a coarsened echoes texture consistent with intrinsic hepatic disease.  Portal vein hepatic vein splenic vein and hepatic artery are patent.  Changes of cholecystectomy.  Nonobstructing stones noted in the right kidney measuring 0.3 and 0.4 cm  His LFTs are normal total bilirubin 0.5  Outpatient follow-up with [facility] recommended regarding further evaluation management  Resumed PTA torsemide and potassium supplementation     Paroxysmal A. Fib   atrial flutter with RVR on 1/5/2023  EKG shows sinus rhythm, rate controlled.  Patient was on metoprolol but apparently had ongoing/worsening dyspnea on exertion and was changed to Cardizem.  -On Xarelto, held t for thoracentesis/possible Pleurx tomorrow, resume after procedure  -Continue to monitor on telemetry.  Xarelto restarted post thoracentesis  Patient was in atrial flutter with RVR with heart rates in the 140s to 150s  He was started on diltiazem drip after diltiazem 10 mg bolus on 1/5/2023.  EP consulted and recommended rate control with diltiazem and  digoxin  Heart rate is currently well controlled  Restart PTA oral diliazem . Weaned off of diltiazem drip   Patient's heart rate is well controlled at rest but goes up to 110s to 120s with minimal ambulation  Added metoprolol 25 mg p.o. twice daily for better heart rate control with activity  Zio patch for 2 weeks ordered to assess the A. fib burden on discharge with results to his primary cardiologist    Urinary retention;  Patient had any issues with urinary retention needing straight catheterization during this hospitalization  Started on Flomax.  He was able to void well after starting the Flomax    History of constipation;  Started on bowel regimen     Rt diaphragmatic paralysis  S/p plication  Avoid any procedures on the left side of his neck to avoid any injury to  "phrenic now as per pulmonary      IDDM  PTA medications include glipizide 10 Mg every morning, Lantus 60 units at HS.  -Resume Lantus at 40 units at bedtime as patient is n.p.o. on BiPAP  - hold glipizide.  -Moderate ISS  -patient on steroid, expect uncontrolled BS, add premeal novolog while not NPO.   -Hypoglycemia protocol  Increase lantus dose to 60units at bedtime on 1/7/23   Restart glipizide  Blood sugars running high on dexamethasone total dexamethasone dose is reduced to 3 mg for the next 3 days     Hypothyroidism  - Resume PTA Synthroid     CALE  -Patient had difficulty wearing CPAP.  Given marked hypercarbia, will be on BiPAP as needed.  Patient had a controlled mechanical fall in the hospital  No injuries noted evaluated by house provider           Diet:   moderate CHO  DVT Prophylaxis: DOAC   Power Catheter: Not present  Lines: None     Cardiac Monitoring: None  Code Status:  DNR/DNI, discussed with patient and his spouse        Clinically Significant Risk Factors Present on Admission                  # Hypoalbuminemia: Lowest albumin = 3 g/dL at 1/4/2023 11:41 AM, will monitor as appropriate  # Drug Induced Coagulation Defect: home medication list includes an anticoagulant medication         # Overweight: Estimated body mass index is 25.99 kg/m  as calculated from the following:    Height as of this encounter: 1.676 m (5' 6\").    Weight as of this encounter: 73 kg (161 lb).                  Disposition Plan     Disposition:  Discharge to home today in stable condition with family with home PT OT ordered on discharge     Discussed with bedside RN, patient, his wife was updated by me over the phone on 1/6/2023     Greater than 55 minutes were spent in taking care of him today in  total     Nichelle Meredith MD, MD  [phone] (P)                     Code Status:      DNR / DNI        Brief Hospital Stay Summary Sent Home With Patient in AVS:        Reason for your hospital stay      SOB secondary to COVID " infection and Recurrent right sided PLeural   effusion S/p thoracentesis and SOB improved. AFIB RVR due to respiratory   HR improved now                 Important Results:      Please see below        Pending Results:        Unresulted Labs Ordered in the Past 30 Days of this Admission     Date and Time Order Name Status Description    1/4/2023  6:18 PM Pleural fluid Aerobic Bacterial Culture Routine with Gram Stain Preliminary             Discharge Instructions and Follow-Up:      Follow-up Appointments     Follow-up and recommended labs and tests       Follow up with primary care provider, Kyler Mcwilliams, within 7 days for   hospital follow- up.  The following labs/tests are recommended: CBC, BMP   in 1week   F/u with [facility] in 2-3weeks   F/u with cardiology in 3-4weeks   F/u with Pulmonary medicine as needed .               Discharge Disposition:      Discharged to home        Discharge Medications:        Current Discharge Medication List      START taking these medications    Details   dexamethasone (DECADRON) 1.5 MG tablet Take 2 tablets (3 mg) by mouth daily for 3 days Then stop  Qty: 6 tablet, Refills: 0    Associated Diagnoses: Acute respiratory failure with hypoxia and hypercapnia (H)      metoprolol tartrate (LOPRESSOR) 25 MG tablet Take 1 tablet (25 mg) by mouth 2 times daily  Qty: 60 tablet, Refills: 0    Comments: Future refills by PCP Dr. Kyler Mcwilliams with phone number [phone].  Associated Diagnoses: Acute respiratory failure with hypoxia and hypercapnia (H)      polyethylene glycol (MIRALAX) 17 GM/Dose powder Take 17 g by mouth daily  Qty: 510 g, Refills: 0    Associated Diagnoses: Acute respiratory failure with hypoxia and hypercapnia (H)      tamsulosin (FLOMAX) 0.4 MG capsule Take 1 capsule (0.4 mg) by mouth daily  Qty: 90 capsule, Refills: 0    Associated Diagnoses: Urinary retention         CONTINUE these medications which have NOT CHANGED    Details   digoxin (LANOXIN) 125 MCG tablet  "Take 1 tablet (125 mcg) by mouth daily  Qty: 90 tablet, Refills: 1    Associated Diagnoses: Paroxysmal atrial fibrillation (H)      diltiazem ER COATED BEADS (CARDIZEM CD) 360 MG 24 hr capsule Take 1 capsule (360 mg) by mouth daily  Qty: 90 capsule, Refills: 3    Associated Diagnoses: Paroxysmal atrial fibrillation (H)      glipiZIDE (GLUCOTROL) 5 MG tablet Take 10 mg by mouth every morning (before breakfast)      insulin glargine (LANTUS) 100 UNIT/ML PEN Inject 60 Units Subcutaneous At Bedtime      levothyroxine (SYNTHROID/LEVOTHROID) 75 MCG tablet Take 75 mcg by mouth daily      oxyCODONE (ROXICODONE) 5 MG tablet Take 5 mg by mouth every 4 hours as needed for pain      potassium chloride ER (KLOR-CON M) 10 MEQ CR tablet Take 2 tablets (20 mEq) by mouth daily  Qty: 180 tablet, Refills: 3    Associated Diagnoses: Paroxysmal atrial fibrillation (H); Shortness of breath      rivaroxaban ANTICOAGULANT (XARELTO) 20 MG TABS tablet Take 1 tablet (20 mg) by mouth daily (with dinner)  Qty: 30 tablet, Refills: 5    Comments: Replaces Eliquis  Associated Diagnoses: Paroxysmal atrial fibrillation (H)      torsemide (DEMADEX) 20 MG tablet Take 1 tablet (20 mg) by mouth daily  Qty: 90 tablet, Refills: 0    Associated Diagnoses: Paroxysmal atrial fibrillation (H); SOB (shortness of breath)               Allergies:         Allergies   Allergen Reactions     Statin Drugs [Hmg-Coa-R Inhibitors]            Consultations This Hospital Stay:      Consultation during this admission received from cardiology and pulmonary medicine, PT, Cognitive         Condition and Physical on Discharge:      Discharge condition: Stable   Vitals: Blood pressure 133/74, pulse 97, temperature 97.5  F (36.4  C), temperature source Oral, resp. rate 30, height 1.676 m (5' 6\"), weight 69.1 kg (152 lb 4.8 oz), SpO2 94 %.     Constitutional:  Alert awake, not in acute distress, sitting comfortably in chair   Lungs:  Diminished in bases with good " air entry, no active wheezing heard on auscultation   Cardiovascular:  Irregularly irregular, no murmurs   Abdomen:  Soft, nontender, nondistended, no hepatosplenomegaly   Skin:  Warm and dry   Other:          Discharge Time:      Greater than 30 minutes.        Image Results From This Hospital Stay (For Non-EPIC Providers):        Results for orders placed or performed during the hospital encounter of 01/04/23   Chest XR,  PA & LAT    Narrative    CHEST TWO VIEWS January 4, 2023 12:04 PM     HISTORY: Cough, shortness of breath, hypoxia.    COMPARISON: Chest x-ray on 7/15/2022.      Impression    IMPRESSION: AP and lateral views of the chest were obtained.  Cardiomediastinal silhouette is within normal limits. Small left and  moderate right pleural effusion including small amount of fluid along  the right minor fissure with associated basilar  atelectasis/consolidation. No significant pneumothorax.    STACIE STUBBS MD         SYSTEM ID:  [id number]   US Thoracentesis    Narrative    EXAM:  1. RIGHT THORACENTESIS  2. ULTRASOUND GUIDANCE  LOCATION: [facility]  DATE/TIME: 1/5/2023 3:51 PM    INDICATION: Pleural effusion.    PROCEDURE: Informed consent obtained. Time out performed. The chest  was prepped and draped in a sterile fashion. 10 mL of 1% lidocaine was  infused into local soft tissues. A 5 Mongolian catheter system was  introduced into the pleural effusion under ultrasound guidance.    0.4 liters of clear fluid were removed and sent to lab if requested.    Patient tolerated procedure well.    Ultrasound images have been permanently captured for documentation.      Impression    IMPRESSION:  Status post ultrasound-guided right thoracentesis.    EVA GOODWIN MD         SYSTEM ID:  [id number]   US Abdomen Limited w Abd/Pelvis Duplex Complete    Narrative    US ABDOMEN LIMITED WITH DOPPLER COMPLETE  1/5/2023 3:59 PM    HISTORY: Evaluate for cirrhosis and portal vein anatomy.    COMPARISON: Outside CT  8/26/2022.    FINDINGS: The liver has a coarsened echotexture, which can be seen in  patients with intrinsic hepatic disease. Changes of cholecystectomy.  No intra or extrahepatic bile duct dilatation. Pancreas is partially  obscured by overlying bowel gas, but appears unremarkable where seen.  Two echogenic shadowing nonobstructing stones are noted in the right  kidney measuring 0.3 cm and 0.4 cm.    DOPPLER:  Abdominal aorta and IVC are segmentally seen, and are of normal  caliber where visualized.    Splenic vein is patent with antegrade flow.    Left, right and main portal veins are patent with antegrade flow.    Left, middle and right hepatic veins are patent with antegrade flow.    Hepatic artery is patent with antegrade flow.       Impression    IMPRESSION:  1. The liver has a coarsened echotexture, which can be seen in  patients with intrinsic hepatic disease.  2. Portal veins, hepatic veins, splenic vein and hepatic artery are  all patent with antegrade flow.  3. Changes of cholecystectomy.  4. Nonobstructing stones noted in the right kidney measuring 0.3 and  0.4 cm.     LON LAYNE DO         SYSTEM ID:  [id number]             Most Recent Lab Results In EPIC (For Non-EPIC Providers):    Most Recent 3 CBC's:  Recent Labs   Lab Test 01/06/23  0714 01/05/23  0554 01/04/23  1141   WBC 13.8* 7.5 7.3   HGB 15.9 15.4 16.1   MCV 92 93 94    279 300      Most Recent 3 BMP's:  Recent Labs   Lab Test 01/08/23  1147 01/08/23  0907 01/08/23  0137 01/07/23  0841 01/07/23  0627 01/06/23  1341 01/06/23  0714 01/05/23  0847 01/05/23  0554   NA  --   --   --   --  140  --  139  --  139   POTASSIUM  --   --   --   --  4.0  --  4.0  --  4.3   CHLORIDE  --   --   --   --  95  --  93*  --  94   CO2  --   --   --   --  38*  --  39*  --  38*   BUN  --   --   --   --  41*  --  37*  --  24   CR  --   --   --   --  0.85  --  0.81  --  0.74   ANIONGAP  --   --   --   --  7  --  7  --  7   TATE  --   --   --   --  8.6  --   8.7  --  9.0   * 279* 313*   < > 309*   < > 135*   < > 100*    < > = values in this interval not displayed.     Most Recent 3 Troponin's:No lab results found.    Invalid input(s): TROP, TROPONINIES  Most Recent 3 INR's:No lab results found.  Most Recent 2 LFT's:  Recent Labs   Lab Test 01/04/23  1141 07/15/22  0810   AST 46* 18   ALT 33 36   ALKPHOS 69 76   BILITOTAL 0.5 0.8     Most Recent Cholesterol Panel:No lab results found.  Most Recent 6 Bacteria Isolates From Any Culture (See EPIC Reports for Culture Details):  Recent Labs   Lab Test 09/25/19  1620 09/25/19  1605 09/25/19  1522   CULT No growth No growth No growth     Most Recent TSH, T4 and HgbA1c:   Recent Labs   Lab Test 01/04/23  1141   TSH 1.64   A1C 8.2*

## 2023-01-08 NOTE — PROGRESS NOTES
[facility]    Cardiology Progress     Date of Admission:  1/4/2023  Reason for Consult:     Impression/plan:    1. AF/AFL/CVR (intermittent)  2. COVID pneumonia  3. HTN  4. HLP  5. R pleural effusion s/p thoracentesis 1/5    Plan  Ok to dismiss with rate control with diltizem and digoxin. Metoprolol added. Zio patch on dismissal. Follow up in Cardiology clinic with 2 weeks ordrered.   Continue torsemide given lower extremity edema. No SOB currently. Echo 6/2022 showed normal EF, early diastolic dysfx. BNP normal. May need to increase torsemide as outpatient.       Justin Henriquez M.D.    Interval History/Subjective:    No SOB.         Physical Exam   Temp: 97.5  F (36.4  C) Temp src: Oral BP: 133/74 Pulse: 70   Resp: 25 SpO2: 94 % O2 Device: None (Room air) Oxygen Delivery: 2 LPM  Vital Signs with Ranges  Temp:  [97.5  F (36.4  C)-97.9  F (36.6  C)] 97.5  F (36.4  C)  Pulse:  [] 70  Resp:  [11-34] 25  BP: (106-139)/(56-74) 133/74  SpO2:  [91 %-99 %] 94 %  152 lbs 4.8 oz    Constitutional: Awake, alert, cooperative, no apparent distress.  Respiratory: Clear to auscultation bilaterally, no crackles or wheezing. On RA.   Neck: No JVD  Cardiovascular: irregular rhythm, normal S1 and S2, and no murmur noted.  Extremities: 2+ lower extremity edema   Vascular: Radial pulses 4+ and symmetric bilaterally    Data   Most Recent 3 CBC's:  Recent Labs   Lab Test 01/06/23  0714 01/05/23  0554 01/04/23  1141   WBC 13.8* 7.5 7.3   HGB 15.9 15.4 16.1   MCV 92 93 94    279 300     Most Recent 3 BMP's:  Recent Labs   Lab Test 01/08/23  1457 01/08/23  1147 01/08/23  0907 01/07/23  0841 01/07/23  0627 01/06/23  1341 01/06/23  0714 01/05/23  0847 01/05/23  0554   NA  --   --   --   --  140  --  139  --  139   POTASSIUM  --   --   --   --  4.0  --  4.0  --  4.3   CHLORIDE  --   --   --   --  95  --  93*  --  94   CO2  --   --   --   --  38*  --  39*  --  38*   BUN  --   --   --   --  41*  --   37*  --  24   CR  --   --   --   --  0.85  --  0.81  --  0.74   ANIONGAP  --   --   --   --  7  --  7  --  7   TATE  --   --   --   --  8.6  --  8.7  --  9.0   * 402* 279*   < > 309*   < > 135*   < > 100*    < > = values in this interval not displayed.     Most Recent 3 Troponin's:No lab results found.  Most Recent 3 BNP's:  Recent Labs   Lab Test 01/04/23  1141 07/15/22  0810 06/14/22  1254   NTBNPI 815  --  57   NTBNP  --  52  --      Most Recent Cholesterol Panel:No lab results found.

## 2023-01-08 NOTE — DISCHARGE INSTRUCTIONS
Following discharge should follow-up with thoracic surgery and oncology.  Pulmonary follow-up should be scheduled [phone]    Home PT will start in 7-10 days.  They will call you to set up this 1st appt.  [facility]  Services: Home Health Services   Address: [address] [address]   Phone: [phone]

## 2023-01-08 NOTE — PROGRESS NOTES
"  Pulmonary Medicine Progress Note        Date of Admission: 2023  Primary Attending:  Greg Sánchez MD  Consulting Physician: Tomas Rivera MD      History:      SUBJECTIVE: Significant improvement since yesterday, more alert, now down to 2 L/min of supplemental oxygen.  Says his breathing feels more comfortable.  Now he is alert and interactive      OBJECTIVE:    Vital signs:  Temp: 97.9  F (36.6  C) Temp src: Oral BP: 133/74 Pulse: 97   Resp: 30 SpO2: 94 % O2 Device: None (Room air) Oxygen Delivery: 2 LPM Height: 167.6 cm (5' 6\") Weight: 69.1 kg (152 lb 4.8 oz)  Estimated body mass index is 24.58 kg/m  as calculated from the following:    Height as of this encounter: 1.676 m (5' 6\").    Weight as of this encounter: 69.1 kg (152 lb 4.8 oz).       Body mass index is 24.58 kg/m .  Temp (24hrs), Av.8  F (36.6  C), Min:97.6  F (36.4  C), Max:98.1  F (36.7  C)        Constitutional:  Sleepy, wearing BiPAP  Neck: No lymphadenopathy or thyromegaly, trachea midline, no carotid bruits.  Cardiovascular: Regular rate and rythym, no murmurs, rubs or gallops, no peripheral edema.  Respiratory/Chest: decreased BS R base   Gastrointestinal: Abdomen was soft, non-tender, non-distended, no masses felt, no hepatosplenomegaly.  Musculoskeletal: No clubbing or cyanosis, full range of motion in all extremities.        Medications  Current Facility-Administered Medications Ordered in Epic   Medication Dose Route Frequency Last Rate Last Admin     acetaminophen (TYLENOL) tablet 650 mg  650 mg Oral Q6H PRN   650 mg at 23 0813    Or     acetaminophen (TYLENOL) Suppository 650 mg  650 mg Rectal Q6H PRN         bisacodyl (DULCOLAX) suppository 10 mg  10 mg Rectal Daily PRN         carboxymethylcellulose PF (REFRESH PLUS) 0.5 % ophthalmic solution 1 drop  1 drop Both Eyes Q1H PRN         dexamethasone (DECADRON) tablet 3 mg  3 mg Oral Daily   3 mg at 23 0945     glucose gel 15-30 g  15-30 g Oral Q15 Min PRN        " Or     dextrose 50 % injection 25-50 mL  25-50 mL Intravenous Q15 Min PRN        Or     glucagon injection 1 mg  1 mg Subcutaneous Q15 Min PRN         digoxin (LANOXIN) tablet 125 mcg  125 mcg Oral QPM   125 mcg at 01/07/23 2109     diltiazem ER COATED BEADS (CARDIZEM CD/CARTIA XT) 24 hr capsule 360 mg  360 mg Oral Daily   360 mg at 01/08/23 0945     docusate sodium (COLACE) capsule 100 mg  100 mg Oral BID PRN   100 mg at 01/05/23 0813     glipiZIDE (GLUCOTROL) tablet 10 mg  10 mg Oral QAM AC         insulin aspart (NovoLOG) injection (RAPID ACTING)  1-10 Units Subcutaneous TID AC   6 Units at 01/08/23 0959     insulin aspart (NovoLOG) injection (RAPID ACTING)  1-7 Units Subcutaneous At Bedtime   7 Units at 01/07/23 2133     insulin aspart (NovoLOG) injection (RAPID ACTING)   Subcutaneous TID AC   4 Units at 01/08/23 0959     insulin glargine (LANTUS PEN) injection 60 Units  60 Units Subcutaneous QPM   60 Units at 01/07/23 2129     levalbuterol (XOPENEX CONC) neb solution 1.25 mg  1.25 mg Nebulization 4x Daily   1.25 mg at 01/07/23 1904     levothyroxine (SYNTHROID/LEVOTHROID) tablet 75 mcg  75 mcg Oral Daily   75 mcg at 01/08/23 0946     lidocaine (LMX4) cream   Topical Q1H PRN         lidocaine 1 % 0.1-1 mL  0.1-1 mL Other Q1H PRN         Medication instructions: Do NOT use nebulized medications   Does not apply Continuous PRN         metoprolol tartrate (LOPRESSOR) tablet 25 mg  25 mg Oral BID   25 mg at 01/08/23 0945     naloxone (NARCAN) injection 0.2 mg  0.2 mg Intravenous Q2 Min PRN        Or     naloxone (NARCAN) injection 0.4 mg  0.4 mg Intravenous Q2 Min PRN        Or     naloxone (NARCAN) injection 0.2 mg  0.2 mg Intramuscular Q2 Min PRN        Or     naloxone (NARCAN) injection 0.4 mg  0.4 mg Intramuscular Q2 Min PRN         nitroGLYcerin (NITROSTAT) sublingual tablet 0.4 mg  0.4 mg Sublingual Q5 Min PRN         No lozenges or gum should be given while patient on BIPAP/AVAPS/AVAPS AE   Does not apply  Continuous PRN         ondansetron (ZOFRAN ODT) ODT tab 4 mg  4 mg Oral Q6H PRN        Or     ondansetron (ZOFRAN) injection 4 mg  4 mg Intravenous Q6H PRN         oxyCODONE (ROXICODONE) tablet 5 mg  5 mg Oral Q4H PRN         Patient may continue current oral medications   Does not apply Continuous PRN         polyethylene glycol (MIRALAX) Packet 17 g  17 g Oral Daily   17 g at 01/08/23 0944     potassium chloride ER (KLOR-CON M) CR tablet 10 mEq  10 mEq Oral BID   10 mEq at 01/08/23 0946     rivaroxaban ANTICOAGULANT (XARELTO) tablet 20 mg  20 mg Oral Daily with supper   20 mg at 01/07/23 1719     sodium chloride (PF) 0.9% PF flush 3 mL  3 mL Intracatheter Q8H   3 mL at 01/08/23 0207     sodium chloride (PF) 0.9% PF flush 3 mL  3 mL Intracatheter q1 min prn         sodium chloride (PF) 0.9% PF flush 3 mL  3 mL Intracatheter Q8H   3 mL at 01/08/23 1000     sodium chloride (PF) 0.9% PF flush 3 mL  3 mL Intracatheter q1 min prn         tamsulosin (FLOMAX) capsule 0.4 mg  0.4 mg Oral Daily   0.4 mg at 01/08/23 0946     torsemide (DEMADEX) tablet 20 mg  20 mg Oral Daily   20 mg at 01/08/23 1006     Current Outpatient Medications Ordered in Epic   Medication     dexamethasone (DECADRON) 1.5 MG tablet     metoprolol tartrate (LOPRESSOR) 25 MG tablet     polyethylene glycol (MIRALAX) 17 GM/Dose powder     tamsulosin (FLOMAX) 0.4 MG capsule           CMP  Recent Labs   Lab 01/08/23  0907 01/08/23  0137 01/07/23  2132 01/07/23  1712 01/07/23  0841 01/07/23  0627 01/06/23  1341 01/06/23  0714 01/05/23  0847 01/05/23  0554 01/04/23  1834 01/04/23  1141   NA  --   --   --   --   --  140  --  139  --  139  --  137   POTASSIUM  --   --   --   --   --  4.0  --  4.0  --  4.3  --  4.3   CHLORIDE  --   --   --   --   --  95  --  93*  --  94  --  91*   CO2  --   --   --   --   --  38*  --  39*  --  38*  --  42*   ANIONGAP  --   --   --   --   --  7  --  7  --  7  --  4   * 313* 434* 391*   < > 309*   < > 135*   < > 100*   < >  185*   BUN  --   --   --   --   --  41*  --  37*  --  24  --  23   CR  --   --   --   --   --  0.85  --  0.81  --  0.74  --  0.83   GFRESTIMATED  --   --   --   --   --  87  --  88  --  90  --  87   TATE  --   --   --   --   --  8.6  --  8.7  --  9.0  --  9.3   PROTTOTAL  --   --   --   --   --   --   --   --   --   --   --  7.0  6.9   ALBUMIN  --   --   --   --   --   --   --   --   --   --   --  3.0*   BILITOTAL  --   --   --   --   --   --   --   --   --   --   --  0.5   ALKPHOS  --   --   --   --   --   --   --   --   --   --   --  69   AST  --   --   --   --   --   --   --   --   --   --   --  46*   ALT  --   --   --   --   --   --   --   --   --   --   --  33    < > = values in this interval not displayed.     CBC  Recent Labs   Lab 01/06/23  0714 01/05/23  0554 01/04/23  1141   WBC 13.8* 7.5 7.3   RBC 5.30 5.19 5.42   HGB 15.9 15.4 16.1   HCT 48.6 48.3 50.8   MCV 92 93 94   MCH 30.0 29.7 29.7   MCHC 32.7 31.9 31.7   RDW 14.1 13.9 14.3    279 300     INRNo lab results found in last 7 days.  Arterial Blood Gas  Recent Labs   Lab 01/06/23  2306 01/06/23  1901 01/06/23  1328 01/06/23  0715   O2PER 4 35 35 4     No results for input(s): CULT in the last 168 hours.          Diagnostic Studies:  No results for input(s): CULT in the last 168 hours.     Diagnostic Studies:  Chest Radiology:      CHEST TWO VIEWS January 4, 2023 12:04 PM      HISTORY: Cough, shortness of breath, hypoxia.     COMPARISON: Chest x-ray on 7/15/2022.                                                                      IMPRESSION: AP and lateral views of the chest were obtained.  Cardiomediastinal silhouette is within normal limits. Small left and  moderate right pleural effusion including small amount of fluid along  the right minor fissure with associated basilar  atelectasis/consolidation. No significant pneumothorax.           CT CHEST 12/22/22        1. Small to moderate pleural effusion. Patchy atelectasis in both lungs, greatest  "in the right lower lobe.   2. No acute abnormality in the abdomen or pelvis.   3. Colonic diverticulosis.   4. Small nonobstructing right renal calculus.       Please note that all CT scans at this facility use dose modulation, iterative reconstruction, and/or weight-based dosing when appropriate to reduce radiation dose to as low as reasonably achievable.              Assessment:      82 year old male with medical history significant for diabetes mellitus, obstructive sleep apnea,  hypothyroidism, paroxysmal atrial fibrillation on anticoagulation, diaphragmatic paralysis, s/p plication of right hemidiaphragm, by Dr Turpin recurrent right pleural effusion was brought to the ER for evaluation of worsening shortness of breath.  He last saw thoracic surgery November 22, 2022 and at that time recommendation was for repeat thoracentesis if reaccumulation occurred.   When last seen by thoracic surgery\" Minimal persistent right-sided pleural effusion after recent diaphragm plication. We discussed that the very small amount of fluid present is unlikely causing lung compromise and contributing to significant shortness of breath. We discussed options including attempted repeat thoracentesis. However I think this would be unwise given his recent experience with attempted percutaneous pleural drain placement via CT guidance without enough fluid for tube placement.\"  Thoracentesis was exudative but no evidence of infection.     Pulmonary Diagnoses: Abnl CT/CXR R91.8, CHF I50.9, GARCIA R06.09, Hypercapnia R06.89, Pleural effusion J91.8, Resp fail acute J96.00, Resp fail chronic J96.10, Sleep apnea obstr G47.33 and SOB R06.02  Pneumonia due to confirmed COVID-19 J12.89,      Recommendations                   Supplemental oxygen target pulse oxymetry >89%, wean as able    Diuresis as tolerated    Recommend against Pleurx catheter for drainage of effusion as was previously recommended by his thoracic surgeon, should see Dr. Turpin " following discharge to discuss plans for management of his chronic effusion    Avoid line placements on the left side of his neck since he has a chronic paralyzed right hemidiaphragm in order to avoid damaging the left phrenic nerve  S/p Thoracentesis with   0.4 liters of clear fluid were removed and sent to lab, results pending    Physical activity as tolerated    Discharge per internal medicine, patient may need supplemental oxygen with discharge.    Following discharge should follow-up with thoracic surgery and oncology.    Pulmonary follow-up should be scheduled [phone]    Please call if any questions               Tomas Boland M.D.  Pulmonary, Critical Care and Sleep Medicine  [facility]  Pager: [phone]  Office:[phone]

## 2023-01-08 NOTE — PROVIDER NOTIFICATION
MD Notification    Notified Person: MD    Notified Person Name: Dr. Tucker    Notification Date/Time: 1/8 0200    Notification Interaction: Amcon Page    Purpose of Notification: Blood sugar 313    Orders Received: 1 time order for 5 units Novolog     Comments:

## 2023-01-08 NOTE — CONSULTS
Care Management Initial Consult    General Information  Assessment completed with: Spouse or significant other,    Type of CM/SW Visit: Offer D/C Planning    Primary Care Provider verified and updated as needed: Yes   Readmission within the last 30 days:        Reason for Consult: discharge planning  Advance Care Planning:            Communication Assessment  Patient's communication style: spoken language (English or Bilingual)    Hearing Difficulty or Deaf: yes   Wear Glasses or Blind: no    Cognitive  Cognitive/Neuro/Behavioral: WDL  Level of Consciousness: alert  Arousal Level: opens eyes spontaneously  Orientation: oriented x 4  Mood/Behavior: cooperative, calm  Best Language: 0 - No aphasia  Speech: clear, spontaneous, logical    Living Environment:   People in home: spouse     Current living Arrangements:        Able to return to prior arrangements: yes       Family/Social Support:  Care provided by: spouse/significant other  Provides care for: no one, unable/limited ability to care for self  Marital Status:              Description of Support System:           Current Resources:   Patient receiving home care services: No     Community Resources:    Equipment currently used at home: walker, rolling, walker, standard, glucometer  Supplies currently used at home:      Employment/Financial:  Employment Status: retired        Financial Concerns:             Lifestyle & Psychosocial Needs:  Social Determinants of Health     Tobacco Use: Low Risk      Smoking Tobacco Use: Never     Smokeless Tobacco Use: Never     Passive Exposure: Not on file   Alcohol Use: Not on file   Financial Resource Strain: Not on file   Food Insecurity: Not on file   Transportation Needs: Not on file   Physical Activity: Not on file   Stress: Not on file   Social Connections: Not on file   Intimate Partner Violence: Not on file   Depression: Not on file   Housing Stability: Not on file       Functional Status:  Prior to admission  patient needed assistance:              Mental Health Status:          Chemical Dependency Status:                Values/Beliefs:  Spiritual, Cultural Beliefs, Taoist Practices, Values that affect care:                 Additional Information:  Called to patient's wife to discuss role in discharge planning. Pt lives indep with wife in house.  Wife assists with cares as needed.  She notes she helps with dressing, showers.  They have no help in the home.  She has a BP cuff and oximeter.  She feels she can manage him at home.  Discussed recommendation for HH PT.  Wife in agreement with this.  Does not want Home RN as she feels she can manage.     Pt/family was provided with the Medicare Compare list for Home Care.  Discussed associated Medicare star ratings to assist with choice for referrals/discharge planning Yes    Education was given to pt/family that star ratings are updated/maintained by Medicare and can be reviewed by visiting www.medicare.gov Yes    Wife is fine with any agency that can accept.  She understands there may be delayed start of care and she is okay with this.    Wife is aware Pt may need oxygen and feels she can manage this.  Wife wants to make Follow-up appointments with PCP and specialists.   She can transport at discharge.      Referral sent to [facility] via DOD.    CC to watch for acceptance.    Addendum 1545:    Pt declined from [facility].  [facility] via [facility] to [facility].   MD updated as was possible going to discharge today.   If HHC not secured prior to discharge would need to go through PCP to set up.  Wife noted last time they tried over 10 agencies.     Care Management Discharge Note    Discharge Date: 01/08/2023       Discharge Disposition: Home, Home Care    Discharge Services:      Discharge DME:      Discharge Transportation: family or friend will provide    Private pay costs discussed: Not applicable    PAS Confirmation Code:    Patient/family educated on Medicare  website which has current facility and service quality ratings:      Education Provided on the Discharge Plan:    Persons Notified of Discharge Plans: Charge RN  Patient/Family in Agreement with the Plan: yes    Handoff Referral Completed: No    Additional Information:  Updated that [facility] can accept for start of care in 7-10 days.  Patient is okay with this.  Initial faxed to [phone]    Charge nurse to fax signed orders to above number upon completion.   Information added to AVS.         NATALY Ivy, RN

## 2023-01-09 ENCOUNTER — PATIENT OUTREACH (OUTPATIENT)
Dept: CARE COORDINATION | Facility: CLINIC | Age: 83
End: 2023-01-09

## 2023-01-09 NOTE — PROGRESS NOTES
Clinic Care Coordination Contact  [facility]: Post-Discharge Note  SITUATION                                                      Admission:    Admission Date: 01/04/23   Reason for Admission: # Confirmed COVID-19 infection    # Viral Pneumonia secondary to COVID-19 infection  # Acute Metabolic Encephalopathy  # Acute hypercapnic respiratory failure  Discharge:   Discharge Date: 01/08/23  Discharge Diagnosis: # Confirmed COVID-19 infection    # Viral Pneumonia secondary to COVID-19 infection  # Acute Metabolic Encephalopathy  # Acute hypercapnic respiratory failure    BACKGROUND                                                      Per hospital discharge summary and inpatient provider notes:    Carl Vázquez is a 82 year old male with medical history significant for IDDM, CALE, hypothyroidism, paroxysmal atrial fibrillation on anticoagulation, diaphragmatic paralysis, s/p plication of right hemidiaphragm, recurrent right pleural effusion was brought to the ER for evaluation of worsening shortness of breath.     Patient with complex medical conditions, see below for detail copied from discharge summary from October.  Patient continues to have progressive dyspnea.  For the last 2 days, wife noticed that he is hallucinating as well seeing bugs piled up in the room.  Because of this concern, she brought him to the ER today.  Has chronic cough.  Denies chest pain.  Reports orthopnea.  Worsening leg edema which has not improved despite diuretic use.  Also has chronic abdominal pain, no nausea vomiting or diarrhea.  No hematochezia or melena.  Patient feels fatigued, bilateral arm weakness is progressive.     In ER, patient was evaluated by Dr. Dixon.  Labs including CBC CMP unremarkable, troponin, proBNP, lipase WNL.  Twelve-lead EKG showed sinus rhythm, normal rate.  No ischemic changes.  Chest x-ray showed moderate right effusion with fluid in the fissure.  COVID-19 PCR positive.  Patient was mildly  "hypoxic, 86% on room air.  Venous blood gas showed marked hypercarbia with PCO2 of 84.  Appears compensated.  Dexamethasone was given and hospitalist service contacted for admission for further management.    ASSESSMENT      Discharge Assessment  How are you doing now that you are home?: \"Could be a lot better\"  How are your symptoms? (Red Flag symptoms escalate to triage hotline per guidelines): Unchanged  Do you feel your condition is stable enough to be safe at home until your provider visit?: Yes  Does the patient have their discharge instructions? : Yes  Does the patient have questions regarding their discharge instructions? : No  Were you started on any new medications or were there changes to any of your previous medications? : Yes  Does the patient have all of their medications?: Yes  Do you have questions regarding any of your medications? : No  Do you have all of your needed medical supplies or equipment (DME)?  (i.e. oxygen tank, CPAP, cane, etc.): Yes  Discharge follow-up appointment scheduled within 14 calendar days? : Yes  Discharge Follow Up Appointment Date: 01/13/23  Discharge Follow Up Appointment Scheduled with?: Primary Care Provider    Post-op ([facility] Only)  If the patient had a surgery or procedure, do they have any questions for a nurse?: No    PLAN                                                      Outpatient Plan:    Follow up with primary care provider, Kyler Mcwilliams, within 7 days for hospital follow- up.  The following labs/tests are recommended: CBC, BMP   in 1week   F/u with [facility] in 2-3weeks   F/u with cardiology in 3-4weeks   F/u with Pulmonary medicine as needed    Future Appointments   Date Time Provider Department Center   1/23/2023  2:00 PM Valdo Thao MD [facility]    2/17/2023  1:15 PM Henrique Victor MD [facility]         For any urgent concerns, please contact our 24 hour nurse triage line: [phone] ([phone])         Chris, " [place]  [phone]  [facility] Resource [facility]

## 2023-01-09 NOTE — PLAN OF CARE
IMC status discontinued. Vital Signs stable pt on room air. Telemetry A.Flutter controlled. Alert and Oriented, Neuros intact. Lung sounds Diminished, IS in use. Bowel sounds active and audible, pt had BM. Passing flatus. Mod Carb diet with 1500 mL fluid restriction. Denies nausea. Adequate urinary output. CMS intact, +2-3 edema present in bilateral lower extremities, palpable pulses. Up with assist of 1. Pain controlled with Ice pack.      All discharge instructions gone over, pt and wife verbalized understanding. Wife was on the phone and heard all discharge instructions.  Pt discharged to home and wife provided transportation.  Pt discharged without Zio patch.  Writer called spouse back and gave the number to the clinic and spouse stated that she would call in the morning and arrange for an appointment for patient to get the Zio patch placed.

## 2023-01-10 ENCOUNTER — TELEPHONE (OUTPATIENT)
Dept: CARDIOLOGY | Facility: CLINIC | Age: 83
End: 2023-01-10

## 2023-01-10 LAB
BACTERIA PLR CULT: NO GROWTH
GRAM STAIN RESULT: NORMAL

## 2023-01-10 NOTE — TELEPHONE ENCOUNTER
Patient was admitted to [facility] on 1/4/23 with severe SOB and increased GARCIA. Dx'd with COVID and A. Fib/Flutter with RVR-spontaneously converted to NSR while on Diltiazem gtt.    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    6/2022: Echo showed EF of 55-60%.    Pt was started on Decadron, Metoprolol, Flomax. PTA Xarelto was continued at time of discharge. Pt to wear a 14 day Zio Patch monitor.    Called patient to discuss any post hospital d/c questions he may have, review medication changes, and confirm f/u appts, but phone answered by his wife. She states pt is asleep.    She states pt has had no chest pain, lightheadedness. Continues to experience SOB, that pt has noticed since Sunday (while hospitalized). Wife states pt's HR has been within recommended limit since pt has been home. 02 sats low to mid 90's.    Pt is scheduled to have Zio Patch placed on 1/20/22 in [place]. Wife states pt is having mobility issues, and requires a walker for ambulation now. She will plan on scheduling a follow up OV with [facility] post monitoring period. Wife has been in contact with [facility] D[facility] via [facility] post discharge.    Wife advised to call clinic with any cardiac related questions or concerns prior to this olive't and she verbalized understanding and agreed with plan. BRAYDEN Britt RN.

## 2023-01-13 ENCOUNTER — MYC MEDICAL ADVICE (OUTPATIENT)
Dept: CARDIOLOGY | Facility: CLINIC | Age: 83
End: 2023-01-13

## 2023-01-20 ENCOUNTER — HOSPITAL ENCOUNTER (OUTPATIENT)
Dept: CARDIOLOGY | Facility: CLINIC | Age: 83
Discharge: HOME | End: 2023-01-20
Attending: INTERNAL MEDICINE | Admitting: INTERNAL MEDICINE
Payer: COMMERCIAL

## 2023-01-20 ENCOUNTER — DOCUMENTATION ONLY (OUTPATIENT)
Dept: NEUROLOGY | Facility: CLINIC | Age: 83
End: 2023-01-20

## 2023-01-20 DIAGNOSIS — J96.02 ACUTE RESPIRATORY FAILURE WITH HYPOXIA AND HYPERCAPNIA (H): ICD-10-CM

## 2023-01-20 DIAGNOSIS — J96.01 ACUTE RESPIRATORY FAILURE WITH HYPOXIA AND HYPERCAPNIA (H): ICD-10-CM

## 2023-01-20 PROCEDURE — 93246 EXT ECG>7D<15D RECORDING: CPT

## 2023-01-20 PROCEDURE — 93248 EXT ECG>7D<15D REV&INTERPJ: CPT | Performed by: INTERNAL MEDICINE

## 2023-01-20 NOTE — PROGRESS NOTES
"Dorothy is spouse.    BLANK authorization to discuss protected health information in chart.  Uses [employer] reliably.    Referred by Dr. Brantley for \"concern for cervical myelopathy but we are also concerned about the possibility of a superimposed movement disorder or even that much of the pts symptoms may be due to a movement disorder.\"    8/23/22 office visit note from Dr. Mac Ferrara (Neurologist at [facility])    At his last appointment I referred him for orthopedic evaluation for cervical decompression surgery. He was evaluated by Dr. Spence, who reportedly referred him to the [facility] for C-spine evaluation. He was also referred to the [facility] for neurological consultation, but I am not certain why. He apparently has been scheduled for evaluation January 23, 2023, which seems very odd. He and his wife have expressed notable frustration with all of this. He feels that his shortness of breath is progressing, though his wife feels that it has been stable since his last appointment. He reports persisting cervicalgia, gait instability, and nocturnal calf cramps, but curiously he denies significant urinary urgency. On specific questioning he denies chewing or swallowing difficulties. He has clinical evidence of myelopathy, including bilateral extensor plantar responses. He reports no PFT's/NIF since his discharge from the hospital. It is clear that his shortness of breath, arm weakness, and gait ataxia are attributable to his cervical pathology.    1/8/23 recently discharge from [facility].     Labs 1/8/23:        "

## 2023-01-23 ENCOUNTER — OFFICE VISIT (OUTPATIENT)
Dept: NEUROLOGY | Facility: CLINIC | Age: 83
End: 2023-01-23
Payer: COMMERCIAL

## 2023-01-23 VITALS — OXYGEN SATURATION: 86 % | DIASTOLIC BLOOD PRESSURE: 58 MMHG | HEART RATE: 83 BPM | SYSTOLIC BLOOD PRESSURE: 120 MMHG

## 2023-01-23 DIAGNOSIS — R13.10 DYSPHAGIA, UNSPECIFIED TYPE: ICD-10-CM

## 2023-01-23 DIAGNOSIS — Z87.898 HISTORY OF PROGRESSIVE WEAKNESS: Primary | ICD-10-CM

## 2023-01-23 DIAGNOSIS — M62.50 MUSCULAR ATROPHY, UNSPECIFIED SITE: ICD-10-CM

## 2023-01-23 DIAGNOSIS — R25.3 FASCICULATIONS: ICD-10-CM

## 2023-01-23 PROCEDURE — 99205 OFFICE O/P NEW HI 60 MIN: CPT | Performed by: PSYCHIATRY & NEUROLOGY

## 2023-01-23 PROCEDURE — 99417 PROLNG OP E/M EACH 15 MIN: CPT | Performed by: PSYCHIATRY & NEUROLOGY

## 2023-01-23 ASSESSMENT — PAIN SCALES - GENERAL: PAINLEVEL: SEVERE PAIN (7)

## 2023-01-23 NOTE — PROGRESS NOTES
"Department of Neurology  Movement Disorders Division   New Patient Visit    Patient: Carl Vázquez   MRN: [id number]   : 1940   Date of Visit: 2023  PCP: Kyler Mcwilliams MD   Referring provider: Karon    CC:  Involuntary movements    HPI:  Mr. Vázquez is a 82 year old R-handed male with history significant for C spinal stenosis who presents to movement disorder clinic for involuntary movements. He is accompanied by his wife who provides collateral history.  His history begins with a fall that happened about 3 years ago in the context of tripping and he hit the side of his head and neck. He's had some recurrent falls since, mostly in the context of tripping over and not being able to catch his balance, leading to him to go to the ground.   Then he began noticing gradual weakness in his arms and legs, and wife points out that around 2021 he was so weak in his hands that he had problems holding things and he was dropping objects while trying to hold them because his muscles are weak. Per wife he was doing well from walking standpoint until about a year ago, when he went from being able to walk on a treadmill to having more problems with his legs getting weak. Further, he began experiencing some shortness of breath with exertion, and then eventually at rest.  An extensive evaluation demonstrated a reportedly R diaphragmatic paralysis, for which he had a procedure done that helped. He's also had a few instances of \"fluid in his lungs\", for which he's received treatments in the hospital to clear.  As for other symptoms, he denies any sensory changes. He complaints of a \"neck swelling\", and also he's lost a significant amount of weight and a good amount of muscle mass. He complaints of difficulties with his swallowing, with daily choking episodes, now for a few months.  When further asked about involuntary movements, she points out twitching in his arms and legs and this muscle twitching " under his skin, noted randomly throughout his body.  As per his workup he has a severe C spine MRI, which has been suspected to be the culprit for his problems. He was seen by Neurosurgery here in our system, who was unclear on whether surgical intervention would be advised in his case, which led to this referral here for our opinion on the case.     ROS:  All others negative except as listed above. Pertinent movement disorders-specific ROS listed above.    Past Medical History:   Diagnosis Date     Diabetes (H)      Sleep apnea     uses CPAP machine     Thyroid disease         Past Surgical History:   Procedure Laterality Date     CHOLECYSTECTOMY      at [facility]     COLONOSCOPY      in [place], [place]     COLONOSCOPY  08/22/2017    Dr. Ja LYNN     ESOPHAGOSCOPY, GASTROSCOPY, DUODENOSCOPY (EGD), COMBINED N/A 6/7/2016    Procedure: COMBINED ESOPHAGOSCOPY, GASTROSCOPY, DUODENOSCOPY (EGD);  Surgeon: Eneida Denton MD;  Location:  GI          Current Outpatient Medications:      dexamethasone (DECADRON) 1.5 MG tablet, Take 2 tablets (3 mg) by mouth daily for 3 days Then stop, Disp: 6 tablet, Rfl: 0     digoxin (LANOXIN) 125 MCG tablet, Take 1 tablet (125 mcg) by mouth daily, Disp: 90 tablet, Rfl: 1     diltiazem ER COATED BEADS (CARDIZEM CD) 360 MG 24 hr capsule, Take 1 capsule (360 mg) by mouth daily, Disp: 90 capsule, Rfl: 3     glipiZIDE (GLUCOTROL) 5 MG tablet, Take 10 mg by mouth every morning (before breakfast), Disp: , Rfl:      insulin glargine (LANTUS) 100 UNIT/ML PEN, Inject 60 Units Subcutaneous At Bedtime, Disp: , Rfl:      levothyroxine (SYNTHROID/LEVOTHROID) 75 MCG tablet, Take 75 mcg by mouth daily, Disp: , Rfl:      oxyCODONE (ROXICODONE) 5 MG tablet, Take 5 mg by mouth every 4 hours as needed for pain, Disp: , Rfl:      polyethylene glycol (MIRALAX) 17 GM/Dose powder, Take 17 g by mouth daily, Disp: 510 g, Rfl: 0     potassium chloride ER (KLOR-CON M) 10 MEQ CR tablet, Take 2 tablets  (20 mEq) by mouth daily, Disp: 180 tablet, Rfl: 3     rivaroxaban ANTICOAGULANT (XARELTO) 20 MG TABS tablet, Take 1 tablet (20 mg) by mouth daily (with dinner), Disp: 30 tablet, Rfl: 5     tamsulosin (FLOMAX) 0.4 MG capsule, Take 1 capsule (0.4 mg) by mouth daily, Disp: 90 capsule, Rfl: 0     torsemide (DEMADEX) 20 MG tablet, Take 1 tablet (20 mg) by mouth daily, Disp: 90 tablet, Rfl: 0     Allergies   Allergen Reactions     Metoprolol Shortness Of Breath     Tried 1/2022, 1/2023, both time stopped for increased SOB     Statin Drugs [Hmg-Coa-R Inhibitors]         Family History   Problem Relation Age of Onset     Colon Cancer No family hx of         Social History:  He  reports that he has never smoked. He has never used smokeless tobacco. He reports that he does not drink alcohol and does not use drugs.     PHYSICAL EXAM:  /58   Pulse 83   SpO2 (!) 86%      Gen: no acute distress, nonlabored respiraitons on room air today    NEURO:  MS:  Alert, oriented, appropriate    CN:  PERRLA, EOMI, face symmetric, with normal strength and sensation, he appears to have some tongue weakness and appears to have some degree of tongue atrophy on the sides.     Motor:    Remarkable kyphosis with a large amount of trapezius atrophy. Considerable atrophy of distal upper extremities, thenar and hypothenar eminences. Attention drawn to presence of fasciculations in bilateral upper extremities, chest and back.  Strength is 3-4/5 throughout all muscle groups    Sensation:  Fairly preserved to touch, temperature and vibration throughout    Coordination:  Normal finger-nose    Reflexes: 1+throughout, no Peña's or Babisnki    Gait:  He could barely stand with a significant amount of assistance.      DATA REVIEWED:  Independently reviewed MRIs of his C-spine region, attention noted to severe C3-4 and C4-5 on images from 8-2022    ASSESSMENT:  81 yo M with a progressive disorder leading to gradual weakness and muscle atrophy, with  diffuse fasciullations on examination. He has been experiencing difficulties ambulating likely due to progressive weakness. Some respiratory issues, attributed to diaphragmatic weakness. Also some dysphagia. Exam shows no signs of UMN, but a florid number of findings concerning for diffuse LMN involvement in multiple areas of his body including trunk and extremities. That also appears to be extending into the bulbar area given tongue atrophy, dysphagia and trapezius atrophy. I am concerned regarding motor neuron disease as part of the picture explaining his symptoms.     PLAN:  - reviewed impression and plan  - sent him for an opinion with neuromuscular  - today I see no signs of an underlying neurodegenerative movement disorder  - follow up with movement as needed, he has a general neurologist at an outside institution who can follow him up once his workup is completed  - They were encouraged to call if questions, concerns, or changes.       Valdo Arndt MD (Leo) (he/him/his)   of Neurology  [facility]  Department of Neurology      Thank you for referring Carl Vázquez to our [facility] Movement Disorders Program, we appreciate the opportunity of being your partner in healthcare. Please feel free to reach out to us at any point if any questions or concerns about this visit.    Attending attestation: Today I spent a total 80 minutes on this case, with greater than 50% of the time spent in face-to-face, examining, obtaining history, providing education and coordination of care. Additional time was spent in chart review, ancillary data, and documentation as delineated above.

## 2023-01-23 NOTE — NURSING NOTE
"Carl Vázquez is a 82 year old male who presents for:  Chief Complaint   Patient presents with     Consult     Referred by Dr. Brantley for \"concern for cervical myelopathy but we are also concerned about the possibility of a superimposed movement disorder or even that much of the pts symptoms may be due to a movement disorder.\"        Initial Vitals:  /58   Pulse 83   SpO2 (!) 86%  Estimated body mass index is 24.58 kg/m  as calculated from the following:    Height as of 1/4/23: 1.676 m (5' 6\").    Weight as of 1/7/23: 69.1 kg (152 lb 4.8 oz).. There is no height or weight on file to calculate BSA. BP completed using cuff size: regular  Severe Pain (7)    Nursing Comments:     Suzan Rosales MA    "

## 2023-01-23 NOTE — LETTER
"    2023         RE: Carl Vázquez  [address] [address] [address]        Dear Colleague,    Thank you for referring your patient, Carl Vázquez, to the [facility] NEUROLOGY CLINICS [facility]. Please see a copy of my visit note below.    Department of Neurology  Movement Disorders Division   New Patient Visit    Patient: Carl Vázquez   MRN: [id number]   : 1940   Date of Visit: 2023  PCP: Kyler Mcwilliams MD   Referring provider: Karon    CC:  Involuntary movements    HPI:  Mr. Vázquez is a 82 year old R-handed male with history significant for C spinal stenosis who presents to movement disorder clinic for involuntary movements. He is accompanied by his wife who provides collateral history.  His history begins with a fall that happened about 3 years ago in the context of tripping and he hit the side of his head and neck. He's had some recurrent falls since, mostly in the context of tripping over and not being able to catch his balance, leading to him to go to the ground.   Then he began noticing gradual weakness in his arms and legs, and wife points out that around 2021 he was so weak in his hands that he had problems holding things and he was dropping objects while trying to hold them because his muscles are weak. Per wife he was doing well from walking standpoint until about a year ago, when he went from being able to walk on a treadmill to having more problems with his legs getting weak. Further, he began experiencing some shortness of breath with exertion, and then eventually at rest.  An extensive evaluation demonstrated a reportedly R diaphragmatic paralysis, for which he had a procedure done that helped. He's also had a few instances of \"fluid in his lungs\", for which he's received treatments in the hospital to clear.  As for other symptoms, he denies any sensory changes. He complaints of a \"neck swelling\", and also he's lost a significant " amount of weight and a good amount of muscle mass. He complaints of difficulties with his swallowing, with daily choking episodes, now for a few months.  When further asked about involuntary movements, she points out twitching in his arms and legs and this muscle twitching under his skin, noted randomly throughout his body.  As per his workup he has a severe C spine MRI, which has been suspected to be the culprit for his problems. He was seen by Neurosurgery here in our system, who was unclear on whether surgical intervention would be advised in his case, which led to this referral here for our opinion on the case.     ROS:  All others negative except as listed above. Pertinent movement disorders-specific ROS listed above.    Past Medical History:   Diagnosis Date     Diabetes (H)      Sleep apnea     uses CPAP machine     Thyroid disease         Past Surgical History:   Procedure Laterality Date     CHOLECYSTECTOMY      at [facility]     COLONOSCOPY      in [place], [place]     COLONOSCOPY  08/22/2017    Dr. Ja LYNN     ESOPHAGOSCOPY, GASTROSCOPY, DUODENOSCOPY (EGD), COMBINED N/A 6/7/2016    Procedure: COMBINED ESOPHAGOSCOPY, GASTROSCOPY, DUODENOSCOPY (EGD);  Surgeon: Eneida Denton MD;  Location:  GI          Current Outpatient Medications:      dexamethasone (DECADRON) 1.5 MG tablet, Take 2 tablets (3 mg) by mouth daily for 3 days Then stop, Disp: 6 tablet, Rfl: 0     digoxin (LANOXIN) 125 MCG tablet, Take 1 tablet (125 mcg) by mouth daily, Disp: 90 tablet, Rfl: 1     diltiazem ER COATED BEADS (CARDIZEM CD) 360 MG 24 hr capsule, Take 1 capsule (360 mg) by mouth daily, Disp: 90 capsule, Rfl: 3     glipiZIDE (GLUCOTROL) 5 MG tablet, Take 10 mg by mouth every morning (before breakfast), Disp: , Rfl:      insulin glargine (LANTUS) 100 UNIT/ML PEN, Inject 60 Units Subcutaneous At Bedtime, Disp: , Rfl:      levothyroxine (SYNTHROID/LEVOTHROID) 75 MCG tablet, Take 75 mcg by mouth daily, Disp: , Rfl:       oxyCODONE (ROXICODONE) 5 MG tablet, Take 5 mg by mouth every 4 hours as needed for pain, Disp: , Rfl:      polyethylene glycol (MIRALAX) 17 GM/Dose powder, Take 17 g by mouth daily, Disp: 510 g, Rfl: 0     potassium chloride ER (KLOR-CON M) 10 MEQ CR tablet, Take 2 tablets (20 mEq) by mouth daily, Disp: 180 tablet, Rfl: 3     rivaroxaban ANTICOAGULANT (XARELTO) 20 MG TABS tablet, Take 1 tablet (20 mg) by mouth daily (with dinner), Disp: 30 tablet, Rfl: 5     tamsulosin (FLOMAX) 0.4 MG capsule, Take 1 capsule (0.4 mg) by mouth daily, Disp: 90 capsule, Rfl: 0     torsemide (DEMADEX) 20 MG tablet, Take 1 tablet (20 mg) by mouth daily, Disp: 90 tablet, Rfl: 0     Allergies   Allergen Reactions     Metoprolol Shortness Of Breath     Tried 1/2022, 1/2023, both time stopped for increased SOB     Statin Drugs [Hmg-Coa-R Inhibitors]         Family History   Problem Relation Age of Onset     Colon Cancer No family hx of         Social History:  He  reports that he has never smoked. He has never used smokeless tobacco. He reports that he does not drink alcohol and does not use drugs.     PHYSICAL EXAM:  /58   Pulse 83   SpO2 (!) 86%      Gen: no acute distress, nonlabored respiraitons on room air today    NEURO:  MS:  Alert, oriented, appropriate    CN:  PERRLA, EOMI, face symmetric, with normal strength and sensation, he appears to have some tongue weakness and appears to have some degree of tongue atrophy on the sides.     Motor:    Remarkable kyphosis with a large amount of trapezius atrophy. Considerable atrophy of distal upper extremities, thenar and hypothenar eminences. Attention drawn to presence of fasciculations in bilateral upper extremities, chest and back.  Strength is 3-4/5 throughout all muscle groups    Sensation:  Fairly preserved to touch, temperature and vibration throughout    Coordination:  Normal finger-nose    Reflexes: 1+throughout, no Peña's or Babisnki    Gait:  He could barely stand with a  significant amount of assistance.      DATA REVIEWED:  Independently reviewed MRIs of his C-spine region, attention noted to severe C3-4 and C4-5 on images from 8-2022    ASSESSMENT:  81 yo M with a progressive disorder leading to gradual weakness and muscle atrophy, with diffuse fasciullations on examination. He has been experiencing difficulties ambulating likely due to progressive weakness. Some respiratory issues, attributed to diaphragmatic weakness. Also some dysphagia. Exam shows no signs of UMN, but a florid number of findings concerning for diffuse LMN involvement in multiple areas of his body including trunk and extremities. That also appears to be extending into the bulbar area given tongue atrophy, dysphagia and trapezius atrophy. I am concerned regarding motor neuron disease as part of the picture explaining his symptoms.     PLAN:  - reviewed impression and plan  - sent him for an opinion with neuromuscular  - today I see no signs of an underlying neurodegenerative movement disorder  - follow up with movement as needed, he has a general neurologist at an outside institution who can follow him up once his workup is completed  - They were encouraged to call if questions, concerns, or changes.       Valdo Arndt MD (Leo) (he/him/his)   of Neurology  [facility]  Department of Neurology      Thank you for referring Carl Vázquez to our [facility] Movement Disorders Program, we appreciate the opportunity of being your partner in healthcare. Please feel free to reach out to us at any point if any questions or concerns about this visit.    Attending attestation: Today I spent a total 80 minutes on this case, with greater than 50% of the time spent in face-to-face, examining, obtaining history, providing education and coordination of care. Additional time was spent in chart review, ancillary data, and documentation as delineated above.        Again, thank you for allowing  me to participate in the care of your patient.        Sincerely,        Valdo Calderon MD

## 2023-01-27 ENCOUNTER — MYC MEDICAL ADVICE (OUTPATIENT)
Dept: CARDIOLOGY | Facility: CLINIC | Age: 83
End: 2023-01-27
Payer: COMMERCIAL

## 2023-01-27 DIAGNOSIS — R06.02 SOB (SHORTNESS OF BREATH): ICD-10-CM

## 2023-01-27 DIAGNOSIS — I48.0 PAROXYSMAL ATRIAL FIBRILLATION (H): Primary | ICD-10-CM

## 2023-01-27 RX ORDER — DIGOXIN 125 MCG
125 TABLET ORAL DAILY
Qty: 90 TABLET | Refills: 1 | COMMUNITY
Start: 2023-01-27 | End: 2023-02-03 | Stop reason: SINTOL

## 2023-01-27 RX ORDER — TORSEMIDE 20 MG/1
20 TABLET ORAL DAILY
Qty: 90 TABLET | Refills: 0 | Status: ON HOLD | COMMUNITY
Start: 2023-01-27 | End: 2023-02-23

## 2023-01-27 NOTE — TELEPHONE ENCOUNTER
Received verbal consent to talk to patient's wife per patient.  Reviewed recommendations per SEA Timmons:  Can you pls call Dorothy? She's to make an appt for Carl to review Kristopher while on the Diltiazem 360 and digoxin 125 but hasn't done so. I actually think he should meet with Santino to review as if we have to stop digoxin (they're concerned it's causing hallucinations) and the Diltiazem 360 is likely causing worsening edema, and BB had to be stopped d/t worsening SOB, I think we might be looking at AVN ablation/PPM for paroxysmal AFib.    1. Pls make appt with Santino to review ZP  Patient to see Dr De on 3/8 at 1:45pm.  2. For hallucinations, OK to HOLD digoxin x 2 weeks (though I think unlikely to be causing) ... we'll be able to see what HR does without it as started wearing it 1/20.  Asked wife to contact us with update to see if hallucinations go away after 2 weeks.    3. LE edema likely worsened by Diltiazem high dose HOWEVER don't want to reduce it yet given he's had problems with AFib/RVR.      Continue stockings/Velcro wraps, ELEVATE, limit salt.    Increase torsemide to 30 mg daily (currently on 20) x 1 week, then back to 20 mg daily. Last blood work 1/2023 showed high BUN but nl Cr so don't want to dehyrdate him.    Wife to call in one week with update on edema after increasing torsemide.      Consent to communicate sent to wife to fill out.  She will return completed form.  Patient and wife provided verbal understanding regarding above.  Medication list updated in epic.  NATALY Munoz

## 2023-01-27 NOTE — TELEPHONE ENCOUNTER
Can you pls call Dorothy? She's to make an appt for Carl to review ZIoPatch while on the Diltiazem 360 and digoxin 125 but hasn't done so. I actually think he should meet with Santino to review as if we have to stop digoxin (they're concerned it's causing hallucinations) and the Diltiazem 360 is likely causing worsening edema, and BB had to be stopped d/t worsening SOB, I think we might be looking at AVN ablation/PPM for paroxysmal AFib.    1. Pls make appt with Santino to review ZP  2. For hallucinations, OK to HOLD digoxin x 2 weeks (though I think unlikely to be causing) ... we'll be able to see what HR does without it as started wearing it 1/20.   3. LE edema likely worsened by Diltiazem high dose HOWEVER don't want to reduce it yet given he's had problems with AFib/RVR.    Continue stockings/Velcro wraps, ELEVATE, limit salt.    Increase torsemide to 30 mg daily (currently on 20) x 1 week, then back to 20 mg daily. Last blood work 1/2023 showed high BUN but nl Cr so don't want to dehyrdate him.    Pls update Epic med list    Kinsey Granados

## 2023-02-01 ENCOUNTER — TELEPHONE (OUTPATIENT)
Dept: CARDIOLOGY | Facility: CLINIC | Age: 83
End: 2023-02-01
Payer: COMMERCIAL

## 2023-02-01 NOTE — TELEPHONE ENCOUNTER
Received completed signed consent to communicate.   Will have  staff scan into chart.  [facility]  NATALY Munoz

## 2023-02-02 ENCOUNTER — MYC MEDICAL ADVICE (OUTPATIENT)
Dept: CARDIOLOGY | Facility: CLINIC | Age: 83
End: 2023-02-02
Payer: COMMERCIAL

## 2023-02-07 ENCOUNTER — MYC MEDICAL ADVICE (OUTPATIENT)
Dept: CARDIOLOGY | Facility: CLINIC | Age: 83
End: 2023-02-07
Payer: COMMERCIAL

## 2023-02-14 ENCOUNTER — MYC MEDICAL ADVICE (OUTPATIENT)
Dept: CARDIOLOGY | Facility: CLINIC | Age: 83
End: 2023-02-14
Payer: COMMERCIAL

## 2023-02-15 ENCOUNTER — TELEPHONE (OUTPATIENT)
Dept: CARDIOLOGY | Facility: CLINIC | Age: 83
End: 2023-02-15
Payer: COMMERCIAL

## 2023-02-15 ENCOUNTER — HOSPITAL ENCOUNTER (OUTPATIENT)
Facility: CLINIC | Age: 83
End: 2023-02-15
Payer: COMMERCIAL

## 2023-02-15 NOTE — TELEPHONE ENCOUNTER
Health Call Center    Phone Message    May a detailed message be left on voicemail: no     Reason for Call: Medication Question or concern regarding medication   Prescription Clarification  Name of Medication: rivaroxaban ANTICOAGULANT (XARELTO) 20 MG TABS tablet   Prescribing Provider: Roberta Dangelo   What on the order needs clarification? Carl is having an upper endoscopy on 2/28 and needs to have a 3 day hold on his rivaroxaban ANTICOAGULANT (XARELTO) 20 MG TABS tablet while he has his procedure. Please call Jackie at [phone] if you have any questions. Thank you!          Action Taken: Other: Cardiology    Travel Screening: Not Applicable     Thank you!  [facility]

## 2023-02-16 NOTE — TELEPHONE ENCOUNTER
"02/16/23 Returned call to Jackie at [facility] and left detailed message on RN secure  with the following instructions:  Pt can hold Xarelto x3 days, no bridge and resume the day after procedure   Per LOV with 7/25/23 with Roberta Dangelo PA-C, CHADS-VASc is 4 ( HTN, Diabetes, Age) so per \"Candice procedural Management of Anticoagulation in Afib Patients \" flow sheet, recommendation is as above.  KHerroRN 750 am  "

## 2023-02-17 ENCOUNTER — APPOINTMENT (OUTPATIENT)
Dept: ULTRASOUND IMAGING | Facility: CLINIC | Age: 83
DRG: 246 | End: 2023-02-17
Attending: NURSE PRACTITIONER
Payer: COMMERCIAL

## 2023-02-17 ENCOUNTER — APPOINTMENT (OUTPATIENT)
Dept: GENERAL RADIOLOGY | Facility: CLINIC | Age: 83
DRG: 246 | End: 2023-02-17
Attending: EMERGENCY MEDICINE
Payer: COMMERCIAL

## 2023-02-17 ENCOUNTER — PRE VISIT (OUTPATIENT)
Dept: NEUROLOGY | Facility: CLINIC | Age: 83
End: 2023-02-17

## 2023-02-17 ENCOUNTER — APPOINTMENT (OUTPATIENT)
Dept: CT IMAGING | Facility: CLINIC | Age: 83
DRG: 246 | End: 2023-02-17
Attending: NURSE PRACTITIONER
Payer: COMMERCIAL

## 2023-02-17 ENCOUNTER — HOSPITAL ENCOUNTER (INPATIENT)
Facility: CLINIC | Age: 83
LOS: 6 days | Discharge: HOME HEALTH | DRG: 246 | End: 2023-02-23
Attending: EMERGENCY MEDICINE | Admitting: HOSPITALIST
Payer: COMMERCIAL

## 2023-02-17 DIAGNOSIS — J96.01 ACUTE RESPIRATORY FAILURE WITH HYPOXIA (H): ICD-10-CM

## 2023-02-17 DIAGNOSIS — I48.91 NEW ONSET ATRIAL FIBRILLATION (H): ICD-10-CM

## 2023-02-17 DIAGNOSIS — J96.12 CHRONIC RESPIRATORY FAILURE WITH HYPOXIA AND HYPERCAPNIA (H): ICD-10-CM

## 2023-02-17 DIAGNOSIS — K21.9 GASTROESOPHAGEAL REFLUX DISEASE WITHOUT ESOPHAGITIS: ICD-10-CM

## 2023-02-17 DIAGNOSIS — I50.9 ACUTE ON CHRONIC CONGESTIVE HEART FAILURE, UNSPECIFIED HEART FAILURE TYPE (H): ICD-10-CM

## 2023-02-17 DIAGNOSIS — J90 PLEURAL EFFUSION: ICD-10-CM

## 2023-02-17 DIAGNOSIS — E11.649 DIABETIC HYPOGLYCEMIA (H): ICD-10-CM

## 2023-02-17 DIAGNOSIS — R79.89 ELEVATED TROPONIN: ICD-10-CM

## 2023-02-17 DIAGNOSIS — E87.6 HYPOKALEMIA: ICD-10-CM

## 2023-02-17 DIAGNOSIS — J44.9 CHRONIC OBSTRUCTIVE PULMONARY DISEASE, UNSPECIFIED COPD TYPE (H): ICD-10-CM

## 2023-02-17 DIAGNOSIS — I50.23 ACUTE ON CHRONIC SYSTOLIC CHF (CONGESTIVE HEART FAILURE) (H): Primary | ICD-10-CM

## 2023-02-17 DIAGNOSIS — E83.42 HYPOMAGNESEMIA: ICD-10-CM

## 2023-02-17 DIAGNOSIS — E11.9 DIABETES MELLITUS WITHOUT COMPLICATION (H): ICD-10-CM

## 2023-02-17 DIAGNOSIS — J96.11 CHRONIC RESPIRATORY FAILURE WITH HYPOXIA AND HYPERCAPNIA (H): ICD-10-CM

## 2023-02-17 PROBLEM — F33.9 DEPRESSION, RECURRENT (H): Status: ACTIVE | Noted: 2022-11-09

## 2023-02-17 PROBLEM — J44.1 COPD EXACERBATION (H): Status: ACTIVE | Noted: 2023-01-09

## 2023-02-17 PROBLEM — G62.9 POLYNEUROPATHY: Status: ACTIVE | Noted: 2021-12-07

## 2023-02-17 PROBLEM — R53.1 GENERALIZED WEAKNESS: Status: ACTIVE | Noted: 2022-08-09

## 2023-02-17 LAB
% LINING CELLS, BODY FLUID: 8 %
ALBUMIN SERPL BCG-MCNC: 3.5 G/DL (ref 3.5–5.2)
ALBUMIN SERPL BCG-MCNC: 3.5 G/DL (ref 3.5–5.2)
ALBUMIN UR-MCNC: NEGATIVE MG/DL
ALLEN'S TEST: YES
ALLEN'S TEST: YES
ALP SERPL-CCNC: 87 U/L (ref 40–129)
ALT SERPL W P-5'-P-CCNC: 19 U/L (ref 10–50)
ANION GAP SERPL CALCULATED.3IONS-SCNC: 5 MMOL/L (ref 7–15)
APPEARANCE FLD: CLEAR
APPEARANCE UR: CLEAR
AST SERPL W P-5'-P-CCNC: 27 U/L (ref 10–50)
ATRIAL RATE - MUSE: 90 BPM
BASE EXCESS BLDA CALC-SCNC: 14.6 MMOL/L (ref -9–1.8)
BASE EXCESS BLDA CALC-SCNC: 16.7 MMOL/L (ref -9–1.8)
BASOPHILS # BLD AUTO: 0 10E3/UL (ref 0–0.2)
BASOPHILS NFR BLD AUTO: 1 %
BILIRUB SERPL-MCNC: 0.4 MG/DL
BILIRUB UR QL STRIP: NEGATIVE
BUN SERPL-MCNC: 14.6 MG/DL (ref 8–23)
CALCIUM SERPL-MCNC: 8.6 MG/DL (ref 8.8–10.2)
CELL COUNT BODY FLUID SOURCE: NORMAL
CHLORIDE SERPL-SCNC: 97 MMOL/L (ref 98–107)
COLOR FLD: YELLOW
COLOR UR AUTO: ABNORMAL
CREAT SERPL-MCNC: 0.82 MG/DL (ref 0.67–1.17)
DEPRECATED HCO3 PLAS-SCNC: 44 MMOL/L (ref 22–29)
DIASTOLIC BLOOD PRESSURE - MUSE: NORMAL MMHG
EOSINOPHIL # BLD AUTO: 0 10E3/UL (ref 0–0.7)
EOSINOPHIL NFR BLD AUTO: 1 %
ERYTHROCYTE [DISTWIDTH] IN BLOOD BY AUTOMATED COUNT: 16.1 % (ref 10–15)
GFR SERPL CREATININE-BSD FRML MDRD: 88 ML/MIN/1.73M2
GLUCOSE BLDC GLUCOMTR-MCNC: 100 MG/DL (ref 70–99)
GLUCOSE BLDC GLUCOMTR-MCNC: 131 MG/DL (ref 70–99)
GLUCOSE BLDC GLUCOMTR-MCNC: 136 MG/DL (ref 70–99)
GLUCOSE BLDC GLUCOMTR-MCNC: 69 MG/DL (ref 70–99)
GLUCOSE BLDC GLUCOMTR-MCNC: 95 MG/DL (ref 70–99)
GLUCOSE BLDC GLUCOMTR-MCNC: 96 MG/DL (ref 70–99)
GLUCOSE BODY FLUID SOURCE: NORMAL
GLUCOSE FLD-MCNC: 112 MG/DL
GLUCOSE SERPL-MCNC: 59 MG/DL (ref 70–99)
GLUCOSE UR STRIP-MCNC: NEGATIVE MG/DL
HCO3 BLD-SCNC: 45 MMOL/L (ref 21–28)
HCO3 BLD-SCNC: 47 MMOL/L (ref 21–28)
HCT VFR BLD AUTO: 49.7 % (ref 40–53)
HGB BLD-MCNC: 15.1 G/DL (ref 13.3–17.7)
HGB UR QL STRIP: NEGATIVE
HYALINE CASTS: 11 /LPF
IMM GRANULOCYTES # BLD: 0 10E3/UL
IMM GRANULOCYTES NFR BLD: 0 %
INTERPRETATION ECG - MUSE: NORMAL
KETONES UR STRIP-MCNC: NEGATIVE MG/DL
LD BODY BODY FLUID SOURCE: NORMAL
LDH FLD L TO P-CCNC: 103 U/L
LDH SERPL L TO P-CCNC: 280 U/L (ref 0–250)
LEUKOCYTE ESTERASE UR QL STRIP: NEGATIVE
LYMPHOCYTES # BLD AUTO: 1.3 10E3/UL (ref 0.8–5.3)
LYMPHOCYTES NFR BLD AUTO: 20 %
LYMPHOCYTES NFR FLD MANUAL: 75 %
MAGNESIUM SERPL-MCNC: 1.3 MG/DL (ref 1.7–2.3)
MAGNESIUM SERPL-MCNC: 1.4 MG/DL (ref 1.7–2.3)
MCH RBC QN AUTO: 29.3 PG (ref 26.5–33)
MCHC RBC AUTO-ENTMCNC: 30.4 G/DL (ref 31.5–36.5)
MCV RBC AUTO: 97 FL (ref 78–100)
MONOCYTES # BLD AUTO: 0.6 10E3/UL (ref 0–1.3)
MONOCYTES NFR BLD AUTO: 9 %
MONOS+MACROS NFR FLD MANUAL: NORMAL %
MUCOUS THREADS #/AREA URNS LPF: PRESENT /LPF
NEUTROPHILS # BLD AUTO: 4.4 10E3/UL (ref 1.6–8.3)
NEUTROPHILS NFR BLD AUTO: 69 %
NEUTS BAND NFR FLD MANUAL: 17 %
NITRATE UR QL: NEGATIVE
NRBC # BLD AUTO: 0 10E3/UL
NRBC BLD AUTO-RTO: 0 /100
NT-PROBNP SERPL-MCNC: 3084 PG/ML (ref 0–1800)
O2/TOTAL GAS SETTING VFR VENT: 40 %
O2/TOTAL GAS SETTING VFR VENT: 50 %
OXYHGB MFR BLD: 66 % (ref 92–100)
OXYHGB MFR BLD: 91 % (ref 92–100)
P AXIS - MUSE: 26 DEGREES
PCO2 BLD: 80 MM HG (ref 35–45)
PCO2 BLD: 84 MM HG (ref 35–45)
PH BLD: 7.34 [PH] (ref 7.35–7.45)
PH BLD: 7.37 [PH] (ref 7.35–7.45)
PH BODY FLUID SOURCE: NORMAL
PH FLD: 8 PH
PH UR STRIP: 6 [PH] (ref 5–7)
PLATELET # BLD AUTO: 195 10E3/UL (ref 150–450)
PO2 BLD: 40 MM HG (ref 80–105)
PO2 BLD: 70 MM HG (ref 80–105)
POTASSIUM SERPL-SCNC: 3.6 MMOL/L (ref 3.4–5.3)
POTASSIUM SERPL-SCNC: 3.9 MMOL/L (ref 3.4–5.3)
PR INTERVAL - MUSE: 120 MS
PROCALCITONIN SERPL IA-MCNC: 0.1 NG/ML
PROT FLD-MCNC: 2.7 G/DL
PROT SERPL-MCNC: 6 G/DL (ref 6.4–8.3)
PROT SERPL-MCNC: 6 G/DL (ref 6.4–8.3)
PROTEIN BODY FLUID SOURCE: NORMAL
QRS DURATION - MUSE: 86 MS
QT - MUSE: 368 MS
QTC - MUSE: 450 MS
R AXIS - MUSE: 66 DEGREES
RBC # BLD AUTO: 5.15 10E6/UL (ref 4.4–5.9)
RBC URINE: 1 /HPF
SODIUM SERPL-SCNC: 146 MMOL/L (ref 136–145)
SP GR UR STRIP: 1.01 (ref 1–1.03)
SQUAMOUS EPITHELIAL: <1 /HPF
SYSTOLIC BLOOD PRESSURE - MUSE: NORMAL MMHG
T AXIS - MUSE: 36 DEGREES
TROPONIN T SERPL HS-MCNC: 88 NG/L
TROPONIN T SERPL HS-MCNC: 91 NG/L
UROBILINOGEN UR STRIP-MCNC: NORMAL MG/DL
VENTRICULAR RATE- MUSE: 90 BPM
WBC # BLD AUTO: 6.3 10E3/UL (ref 4–11)
WBC # FLD AUTO: 386 /UL
WBC URINE: 2 /HPF

## 2023-02-17 PROCEDURE — 74177 CT ABD & PELVIS W/CONTRAST: CPT

## 2023-02-17 PROCEDURE — 36415 COLL VENOUS BLD VENIPUNCTURE: CPT | Performed by: NURSE PRACTITIONER

## 2023-02-17 PROCEDURE — 80053 COMPREHEN METABOLIC PANEL: CPT | Performed by: EMERGENCY MEDICINE

## 2023-02-17 PROCEDURE — 84155 ASSAY OF PROTEIN SERUM: CPT | Performed by: NURSE PRACTITIONER

## 2023-02-17 PROCEDURE — 99291 CRITICAL CARE FIRST HOUR: CPT | Mod: 25

## 2023-02-17 PROCEDURE — 84145 PROCALCITONIN (PCT): CPT | Performed by: NURSE PRACTITIONER

## 2023-02-17 PROCEDURE — 84157 ASSAY OF PROTEIN OTHER: CPT | Performed by: NURSE PRACTITIONER

## 2023-02-17 PROCEDURE — 210N000001 HC R&B IMCU HEART CARE

## 2023-02-17 PROCEDURE — 87205 SMEAR GRAM STAIN: CPT | Performed by: NURSE PRACTITIONER

## 2023-02-17 PROCEDURE — 76705 ECHO EXAM OF ABDOMEN: CPT

## 2023-02-17 PROCEDURE — 83615 LACTATE (LD) (LDH) ENZYME: CPT | Performed by: NURSE PRACTITIONER

## 2023-02-17 PROCEDURE — 83735 ASSAY OF MAGNESIUM: CPT | Performed by: NURSE PRACTITIONER

## 2023-02-17 PROCEDURE — 84132 ASSAY OF SERUM POTASSIUM: CPT | Performed by: HOSPITALIST

## 2023-02-17 PROCEDURE — 83735 ASSAY OF MAGNESIUM: CPT | Performed by: HOSPITALIST

## 2023-02-17 PROCEDURE — 82805 BLOOD GASES W/O2 SATURATION: CPT | Performed by: NURSE PRACTITIONER

## 2023-02-17 PROCEDURE — 250N000013 HC RX MED GY IP 250 OP 250 PS 637: Performed by: HOSPITALIST

## 2023-02-17 PROCEDURE — 36415 COLL VENOUS BLD VENIPUNCTURE: CPT | Performed by: EMERGENCY MEDICINE

## 2023-02-17 PROCEDURE — 5A09557 ASSISTANCE WITH RESPIRATORY VENTILATION, GREATER THAN 96 CONSECUTIVE HOURS, CONTINUOUS POSITIVE AIRWAY PRESSURE: ICD-10-PCS | Performed by: HOSPITALIST

## 2023-02-17 PROCEDURE — 82962 GLUCOSE BLOOD TEST: CPT

## 2023-02-17 PROCEDURE — 999N000065 XR CHEST 2 VIEWS

## 2023-02-17 PROCEDURE — 250N000011 HC RX IP 250 OP 636: Performed by: EMERGENCY MEDICINE

## 2023-02-17 PROCEDURE — 83880 ASSAY OF NATRIURETIC PEPTIDE: CPT | Performed by: EMERGENCY MEDICINE

## 2023-02-17 PROCEDURE — 87070 CULTURE OTHR SPECIMN AEROBIC: CPT | Performed by: NURSE PRACTITIONER

## 2023-02-17 PROCEDURE — 250N000013 HC RX MED GY IP 250 OP 250 PS 637: Performed by: NURSE PRACTITIONER

## 2023-02-17 PROCEDURE — 84484 ASSAY OF TROPONIN QUANT: CPT | Performed by: NURSE PRACTITIONER

## 2023-02-17 PROCEDURE — 250N000009 HC RX 250: Performed by: EMERGENCY MEDICINE

## 2023-02-17 PROCEDURE — 250N000011 HC RX IP 250 OP 636: Performed by: INTERNAL MEDICINE

## 2023-02-17 PROCEDURE — 36600 WITHDRAWAL OF ARTERIAL BLOOD: CPT

## 2023-02-17 PROCEDURE — 0W9B3ZZ DRAINAGE OF LEFT PLEURAL CAVITY, PERCUTANEOUS APPROACH: ICD-10-PCS | Performed by: RADIOLOGY

## 2023-02-17 PROCEDURE — 81001 URINALYSIS AUTO W/SCOPE: CPT | Performed by: EMERGENCY MEDICINE

## 2023-02-17 PROCEDURE — 250N000011 HC RX IP 250 OP 636: Performed by: NURSE PRACTITIONER

## 2023-02-17 PROCEDURE — 272N000706 US THORACENTESIS

## 2023-02-17 PROCEDURE — 93005 ELECTROCARDIOGRAM TRACING: CPT

## 2023-02-17 PROCEDURE — 89051 BODY FLUID CELL COUNT: CPT | Performed by: NURSE PRACTITIONER

## 2023-02-17 PROCEDURE — 99223 1ST HOSP IP/OBS HIGH 75: CPT | Mod: AI | Performed by: NURSE PRACTITIONER

## 2023-02-17 PROCEDURE — 258N000001 HC RX 258: Performed by: EMERGENCY MEDICINE

## 2023-02-17 PROCEDURE — 93010 ELECTROCARDIOGRAM REPORT: CPT | Performed by: INTERNAL MEDICINE

## 2023-02-17 PROCEDURE — 84484 ASSAY OF TROPONIN QUANT: CPT | Performed by: EMERGENCY MEDICINE

## 2023-02-17 PROCEDURE — 999N000157 HC STATISTIC RCP TIME EA 10 MIN

## 2023-02-17 PROCEDURE — 82945 GLUCOSE OTHER FLUID: CPT | Performed by: NURSE PRACTITIONER

## 2023-02-17 PROCEDURE — 94660 CPAP INITIATION&MGMT: CPT

## 2023-02-17 PROCEDURE — 83986 ASSAY PH BODY FLUID NOS: CPT | Performed by: NURSE PRACTITIONER

## 2023-02-17 PROCEDURE — 82805 BLOOD GASES W/O2 SATURATION: CPT | Performed by: HOSPITALIST

## 2023-02-17 PROCEDURE — 250N000009 HC RX 250: Performed by: RADIOLOGY

## 2023-02-17 PROCEDURE — 36415 COLL VENOUS BLD VENIPUNCTURE: CPT | Performed by: HOSPITALIST

## 2023-02-17 PROCEDURE — 85025 COMPLETE CBC W/AUTO DIFF WBC: CPT | Performed by: EMERGENCY MEDICINE

## 2023-02-17 PROCEDURE — 96374 THER/PROPH/DIAG INJ IV PUSH: CPT

## 2023-02-17 PROCEDURE — 258N000001 HC RX 258: Performed by: NURSE PRACTITIONER

## 2023-02-17 PROCEDURE — 0W993ZZ DRAINAGE OF RIGHT PLEURAL CAVITY, PERCUTANEOUS APPROACH: ICD-10-PCS | Performed by: RADIOLOGY

## 2023-02-17 RX ORDER — ONDANSETRON 2 MG/ML
4 INJECTION INTRAMUSCULAR; INTRAVENOUS EVERY 6 HOURS PRN
Status: DISCONTINUED | OUTPATIENT
Start: 2023-02-17 | End: 2023-02-23 | Stop reason: HOSPADM

## 2023-02-17 RX ORDER — LIDOCAINE 40 MG/G
CREAM TOPICAL
Status: DISCONTINUED | OUTPATIENT
Start: 2023-02-17 | End: 2023-02-23 | Stop reason: HOSPADM

## 2023-02-17 RX ORDER — AMOXICILLIN 250 MG
2 CAPSULE ORAL 2 TIMES DAILY PRN
Status: DISCONTINUED | OUTPATIENT
Start: 2023-02-17 | End: 2023-02-23 | Stop reason: HOSPADM

## 2023-02-17 RX ORDER — OXYCODONE HYDROCHLORIDE 5 MG/1
5 TABLET ORAL EVERY 4 HOURS PRN
Status: DISCONTINUED | OUTPATIENT
Start: 2023-02-17 | End: 2023-02-23 | Stop reason: HOSPADM

## 2023-02-17 RX ORDER — POLYETHYLENE GLYCOL 3350 17 G/17G
17 POWDER, FOR SOLUTION ORAL DAILY
Status: DISCONTINUED | OUTPATIENT
Start: 2023-02-17 | End: 2023-02-23 | Stop reason: HOSPADM

## 2023-02-17 RX ORDER — ACETAMINOPHEN 650 MG/1
650 SUPPOSITORY RECTAL EVERY 6 HOURS PRN
Status: DISCONTINUED | OUTPATIENT
Start: 2023-02-17 | End: 2023-02-23 | Stop reason: HOSPADM

## 2023-02-17 RX ORDER — NITROGLYCERIN 0.4 MG/1
0.4 TABLET SUBLINGUAL EVERY 5 MIN PRN
Status: DISCONTINUED | OUTPATIENT
Start: 2023-02-17 | End: 2023-02-22

## 2023-02-17 RX ORDER — AMOXICILLIN 250 MG
1 CAPSULE ORAL 2 TIMES DAILY PRN
Status: DISCONTINUED | OUTPATIENT
Start: 2023-02-17 | End: 2023-02-23 | Stop reason: HOSPADM

## 2023-02-17 RX ORDER — LIDOCAINE 40 MG/G
CREAM TOPICAL
Status: DISCONTINUED | OUTPATIENT
Start: 2023-02-17 | End: 2023-02-18

## 2023-02-17 RX ORDER — POTASSIUM CHLORIDE 1500 MG/1
20 TABLET, EXTENDED RELEASE ORAL ONCE
Status: COMPLETED | OUTPATIENT
Start: 2023-02-18 | End: 2023-02-17

## 2023-02-17 RX ORDER — DILTIAZEM HYDROCHLORIDE 180 MG/1
360 CAPSULE, COATED, EXTENDED RELEASE ORAL EVERY EVENING
Status: DISCONTINUED | OUTPATIENT
Start: 2023-02-18 | End: 2023-02-18

## 2023-02-17 RX ORDER — MAGNESIUM SULFATE HEPTAHYDRATE 40 MG/ML
4 INJECTION, SOLUTION INTRAVENOUS ONCE
Status: COMPLETED | OUTPATIENT
Start: 2023-02-18 | End: 2023-02-18

## 2023-02-17 RX ORDER — ACETAMINOPHEN 325 MG/1
650 TABLET ORAL EVERY 6 HOURS PRN
Status: DISCONTINUED | OUTPATIENT
Start: 2023-02-17 | End: 2023-02-22

## 2023-02-17 RX ORDER — DEXTROSE MONOHYDRATE 25 G/50ML
25-50 INJECTION, SOLUTION INTRAVENOUS
Status: DISCONTINUED | OUTPATIENT
Start: 2023-02-17 | End: 2023-02-23 | Stop reason: HOSPADM

## 2023-02-17 RX ORDER — CARBOXYMETHYLCELLULOSE SODIUM 5 MG/ML
1 SOLUTION/ DROPS OPHTHALMIC
Status: DISCONTINUED | OUTPATIENT
Start: 2023-02-17 | End: 2023-02-23 | Stop reason: HOSPADM

## 2023-02-17 RX ORDER — ONDANSETRON 4 MG/1
4 TABLET, ORALLY DISINTEGRATING ORAL EVERY 6 HOURS PRN
Status: DISCONTINUED | OUTPATIENT
Start: 2023-02-17 | End: 2023-02-23 | Stop reason: HOSPADM

## 2023-02-17 RX ORDER — NICOTINE POLACRILEX 4 MG
15-30 LOZENGE BUCCAL
Status: DISCONTINUED | OUTPATIENT
Start: 2023-02-17 | End: 2023-02-23 | Stop reason: HOSPADM

## 2023-02-17 RX ORDER — POLYETHYLENE GLYCOL 3350 17 G/17G
17 POWDER, FOR SOLUTION ORAL DAILY PRN
Status: DISCONTINUED | OUTPATIENT
Start: 2023-02-17 | End: 2023-02-23 | Stop reason: HOSPADM

## 2023-02-17 RX ORDER — DILTIAZEM HYDROCHLORIDE 5 MG/ML
15 INJECTION INTRAVENOUS ONCE
Status: COMPLETED | OUTPATIENT
Start: 2023-02-17 | End: 2023-02-17

## 2023-02-17 RX ORDER — PANTOPRAZOLE SODIUM 40 MG/1
40 TABLET, DELAYED RELEASE ORAL 2 TIMES DAILY
Status: DISCONTINUED | OUTPATIENT
Start: 2023-02-17 | End: 2023-02-23 | Stop reason: HOSPADM

## 2023-02-17 RX ORDER — IOPAMIDOL 755 MG/ML
75 INJECTION, SOLUTION INTRAVASCULAR ONCE
Status: COMPLETED | OUTPATIENT
Start: 2023-02-17 | End: 2023-02-17

## 2023-02-17 RX ORDER — DEXTROSE MONOHYDRATE 25 G/50ML
25 INJECTION, SOLUTION INTRAVENOUS ONCE
Status: COMPLETED | OUTPATIENT
Start: 2023-02-17 | End: 2023-02-17

## 2023-02-17 RX ORDER — PROCHLORPERAZINE MALEATE 5 MG
5 TABLET ORAL EVERY 6 HOURS PRN
Status: DISCONTINUED | OUTPATIENT
Start: 2023-02-17 | End: 2023-02-23 | Stop reason: HOSPADM

## 2023-02-17 RX ORDER — NALOXONE HYDROCHLORIDE 0.4 MG/ML
0.2 INJECTION, SOLUTION INTRAMUSCULAR; INTRAVENOUS; SUBCUTANEOUS
Status: DISCONTINUED | OUTPATIENT
Start: 2023-02-17 | End: 2023-02-23 | Stop reason: HOSPADM

## 2023-02-17 RX ORDER — LIDOCAINE HYDROCHLORIDE 10 MG/ML
10 INJECTION, SOLUTION EPIDURAL; INFILTRATION; INTRACAUDAL; PERINEURAL ONCE
Status: COMPLETED | OUTPATIENT
Start: 2023-02-17 | End: 2023-02-17

## 2023-02-17 RX ORDER — POTASSIUM CHLORIDE 750 MG/1
10 TABLET, EXTENDED RELEASE ORAL 2 TIMES DAILY
Status: DISCONTINUED | OUTPATIENT
Start: 2023-02-17 | End: 2023-02-23 | Stop reason: HOSPADM

## 2023-02-17 RX ORDER — BISACODYL 10 MG
10 SUPPOSITORY, RECTAL RECTAL DAILY PRN
Status: DISCONTINUED | OUTPATIENT
Start: 2023-02-17 | End: 2023-02-23 | Stop reason: HOSPADM

## 2023-02-17 RX ORDER — PROCHLORPERAZINE 25 MG
12.5 SUPPOSITORY, RECTAL RECTAL EVERY 12 HOURS PRN
Status: DISCONTINUED | OUTPATIENT
Start: 2023-02-17 | End: 2023-02-23 | Stop reason: HOSPADM

## 2023-02-17 RX ORDER — OMEPRAZOLE 40 MG/1
40 CAPSULE, DELAYED RELEASE ORAL 2 TIMES DAILY
Status: ON HOLD | COMMUNITY
End: 2023-02-23

## 2023-02-17 RX ORDER — DEXTROSE MONOHYDRATE 100 MG/ML
INJECTION, SOLUTION INTRAVENOUS CONTINUOUS
Status: DISCONTINUED | OUTPATIENT
Start: 2023-02-17 | End: 2023-02-23 | Stop reason: HOSPADM

## 2023-02-17 RX ORDER — FUROSEMIDE 10 MG/ML
40 INJECTION INTRAMUSCULAR; INTRAVENOUS EVERY 8 HOURS
Status: DISCONTINUED | OUTPATIENT
Start: 2023-02-17 | End: 2023-02-17

## 2023-02-17 RX ORDER — NALOXONE HYDROCHLORIDE 0.4 MG/ML
0.4 INJECTION, SOLUTION INTRAMUSCULAR; INTRAVENOUS; SUBCUTANEOUS
Status: DISCONTINUED | OUTPATIENT
Start: 2023-02-17 | End: 2023-02-23 | Stop reason: HOSPADM

## 2023-02-17 RX ORDER — FUROSEMIDE 10 MG/ML
40 INJECTION INTRAMUSCULAR; INTRAVENOUS ONCE
Status: COMPLETED | OUTPATIENT
Start: 2023-02-17 | End: 2023-02-17

## 2023-02-17 RX ORDER — TAMSULOSIN HYDROCHLORIDE 0.4 MG/1
0.4 CAPSULE ORAL EVERY EVENING
Status: DISCONTINUED | OUTPATIENT
Start: 2023-02-17 | End: 2023-02-23 | Stop reason: HOSPADM

## 2023-02-17 RX ORDER — LEVOTHYROXINE SODIUM 75 UG/1
75 TABLET ORAL DAILY
Status: DISCONTINUED | OUTPATIENT
Start: 2023-02-18 | End: 2023-02-23 | Stop reason: HOSPADM

## 2023-02-17 RX ORDER — FUROSEMIDE 10 MG/ML
40 INJECTION INTRAMUSCULAR; INTRAVENOUS EVERY 8 HOURS
Status: COMPLETED | OUTPATIENT
Start: 2023-02-18 | End: 2023-02-18

## 2023-02-17 RX ADMIN — FUROSEMIDE 40 MG: 10 INJECTION, SOLUTION INTRAMUSCULAR; INTRAVENOUS at 18:19

## 2023-02-17 RX ADMIN — SODIUM CHLORIDE 62 ML: 900 INJECTION INTRAVENOUS at 14:03

## 2023-02-17 RX ADMIN — DEXTROSE MONOHYDRATE: 100 INJECTION, SOLUTION INTRAVENOUS at 12:28

## 2023-02-17 RX ADMIN — PANTOPRAZOLE SODIUM 40 MG: 40 TABLET, DELAYED RELEASE ORAL at 18:20

## 2023-02-17 RX ADMIN — POTASSIUM CHLORIDE 10 MEQ: 750 TABLET, EXTENDED RELEASE ORAL at 22:08

## 2023-02-17 RX ADMIN — TAMSULOSIN HYDROCHLORIDE 0.4 MG: 0.4 CAPSULE ORAL at 22:08

## 2023-02-17 RX ADMIN — IOPAMIDOL 75 ML: 755 INJECTION, SOLUTION INTRAVENOUS at 14:03

## 2023-02-17 RX ADMIN — POTASSIUM CHLORIDE 20 MEQ: 1500 TABLET, EXTENDED RELEASE ORAL at 23:51

## 2023-02-17 RX ADMIN — LIDOCAINE HYDROCHLORIDE 10 ML: 10 INJECTION, SOLUTION EPIDURAL; INFILTRATION; INTRACAUDAL; PERINEURAL at 15:32

## 2023-02-17 RX ADMIN — RIVAROXABAN 20 MG: 10 TABLET, FILM COATED ORAL at 18:20

## 2023-02-17 RX ADMIN — DILTIAZEM HYDROCHLORIDE 15 MG: 5 INJECTION INTRAVENOUS at 22:07

## 2023-02-17 RX ADMIN — DEXTROSE MONOHYDRATE 25 ML: 25 INJECTION, SOLUTION INTRAVENOUS at 11:16

## 2023-02-17 RX ADMIN — FUROSEMIDE 40 MG: 10 INJECTION, SOLUTION INTRAMUSCULAR; INTRAVENOUS at 09:21

## 2023-02-17 ASSESSMENT — ACTIVITIES OF DAILY LIVING (ADL)
ADLS_ACUITY_SCORE: 35

## 2023-02-17 ASSESSMENT — ENCOUNTER SYMPTOMS
ABDOMINAL PAIN: 1
FACIAL SWELLING: 1
SHORTNESS OF BREATH: 1

## 2023-02-17 NOTE — ED NOTES
[facility]  ED Nurse Handoff Report    ED Chief complaint: Shortness of Breath (Pt c/o SOB WORSENING TODAY)      ED Diagnosis:   Final diagnoses:   Acute respiratory failure with hypoxia (H)   Acute on chronic congestive heart failure, unspecified heart failure type (H)   Pleural effusion   Elevated troponin   Chronic obstructive pulmonary disease, unspecified COPD type (H)   Diabetic hypoglycemia (H)       Code Status: DNR / DNI    Allergies:   Allergies   Allergen Reactions    Metoprolol Shortness Of Breath     Tried 1/2022, 1/2023, both time stopped for increased SOB    Statin Drugs [Hmg-Coa-R Inhibitors]        Patient Story: Awoke with worse than typical facial edema, recent worsening of leg edema, dyspnea, home sat monitor revealed sats in 70's.  Focused Assessment:  Diminished lung sounds, 6lpm O2 to keep sats in low 90's, accessory muscle use.      Treatments and/or interventions provided: Lasix, d50, D10 infusion, bipap, ct, thora and para planned at 1500 in ED room  Patient's response to treatments and/or interventions: Lessened accessory muscle use, sleeping on bipap, blood glucose maintained between D50 and D10 infusion.    To be done/followed up on inpatient unit:  See inpatient orders    Does this patient have any cognitive concerns?:  n/a    Activity level - Baseline/Home:  Independent  Activity Level - Current:   Stand with Assist    Patient's Preferred language: English   Needed?: No    Isolation: None  Infection: Not Applicable  Patient tested for COVID 19 prior to admission: NO  Bariatric?: No    Vital Signs:   Vitals:    02/17/23 1431 02/17/23 1432 02/17/23 1451 02/17/23 1458   BP:       Pulse: 77 78 73 77   Resp: 18 14 17   Temp:       TempSrc:       SpO2: 92%  95% 95%   Weight:       Height:           Cardiac Rhythm:     Was the PSS-3 completed:   Yes  What interventions are required if any?               Family Comments: wife at bedside  OBS brochure/video  discussed/provided to patient/family: N/A              Name of person given brochure if not patient: n/a              Relationship to patient: n/a    For the majority of the shift this patient's behavior was Green.   Behavioral interventions performed were n/a.    ED NURSE PHONE NUMBER: *[phone]

## 2023-02-17 NOTE — Clinical Note
188
194
301
6 Fr Angio-Seal utilized for closure of site.
799
948
Atherectomy performed in the left anterior descending. Directional atherectomy was performed. Pass rate = 150 RPM. Pass duration = 11 seconds.
Atherectomy performed in the left anterior descending. Directional atherectomy was performed. Pass rate = 150 RPM. Pass duration = 20 seconds.
Atherectomy performed in the left anterior descending. Directional atherectomy was performed. Pass rate = 152 RPM. Pass duration = 13 seconds.
Atherectomy removed over the wire.
Balloon inserted to left anterior descending and proximal left anterior descending.
Balloon inserted to left anterior descending.
Balloon prepped per 's instructions.
Balloon removed intact.
Catheter removed over wire.
Conscious sedation is planned for this procedure.    Provider immediate assessment completed. 
Decision made to proceed with intervention. Family notified. 
Guide across the lesion to left anterior descending.
Guide across the lesion to left anterior descending.
Guide removed intact.
Hemodynamic equipment used: 5 lead ECG, LIKEPAK Hands Off Patches, LIFEPAK With 3 Leads, Machine BP Cuff and pulse oximeter probe.
LCA Cine(s)  injected utilizing power injector system.
Lab results reviewed and abnormals discussed with physician.
Max pressure = 4 black. Total duration = 10 seconds.     Max pressure = 6 black. Total duration = 10 seconds.  Balloon reinflated a fourth time: Max pressure = 4 black. Total duration = 10 seconds.
Oxygen saturations documented through [facility].
Patient education provided. 
Potential access sites were evaluated for patency using ultrasound.   The right femoral artery was selected. Access was obtained under with Sonosite guidance using a micropuncture 21 gauge needle with direct visualization of needle entry.   
Pre-calculated contrast dose 333 ml
Prepped and Inserted.
Prepped: groin. Prepped with: ChloraPrep. The patient was draped. .Pre-procedure site marking:N/A
Procedure is scheduled in [facility].
Procedure scheduled as Elective.
Removed intact
Rotablator was platformed outside the body at 150 RPM's. 
Rotofloppy wire advanced to the LCA.
Rotofloppy wire advanced to the LCA.
Secured by transparent dressing.
Sheath exchanged in the right femoral artery.
Sheath inserted in the right femoral artery.
Sheath prepped and inserted in the right femoral artery.
Stent deployed in the left anterior descending. Max pressure = 12 black. Total duration = 28 seconds. Balloon reinflated a second time: Max pressure = 16 black. Total duration = 20 seconds.
Stent deployed in the left anterior descending. Max pressure = 18 black. Total duration = 40 seconds.
Stent deployed in the ostium left anterior descending. Max pressure = 12 black. Total duration = 20 seconds.
Stent deployed in the proximal left anterior descending. Max pressure = 12 black. Total duration = 30 seconds. Balloon reinflated a second time: Max pressure = 14 black. Total duration = 20 seconds.
Stent inserted over the wire. 
The ECG shows a sinus rhythm.
The first balloon was inserted into the left anterior descending and proximal left anterior descending.Max pressure = 12 black. Total duration = 16 seconds.     Max pressure = 16 black. Total duration = 28 seconds.    Balloon reinflated a second time: Max pressure = 16 black. Total duration = 28 seconds.  Balloon reinflated a third time: Max pressure = 20 black. Total duration = 22 seconds.
The first balloon was inserted into the left anterior descending.Max pressure = 16 black. Total duration = 20 seconds.
The first balloon was inserted into the left anterior descending.Max pressure = 18 black. Total duration = 25 seconds.     Max pressure = 18 black. Total duration = 26 seconds.    Balloon reinflated a second time: Max pressure = 18 black. Total duration = 26 seconds.  Balloon reinflated a third time: Max pressure = 18 black. Total duration = 12 seconds.
The first balloon was inserted into the left anterior descending.Max pressure = 6 black. Total duration = 10 seconds.     Max pressure = 6 black. Total duration = 10 seconds.    Balloon reinflated a second time: Max pressure = 6 black. Total duration = 10 seconds.
The first balloon was inserted into the left anterior descending.Max pressure = 6 black. Total duration = 20 seconds.     Max pressure = 6 black. Total duration = 12 seconds.    Balloon reinflated a second time: Max pressure = 6 black. Total duration = 12 seconds.
Total contrast given (ml) 280
Ultrasound catheter inserted for high resolution imaging into left anterior descending.
Ultrasound catheter inserted for high resolution imaging into left anterior descending.
Ultrasound catheter removed.
Ultrasound catheter removed.
aortic root Cine(s)  injected utilizing power injector system.
bedside
dry, intact, no bleeding and no hematoma. Angioseal to RFA
femoral artery Cine(s)  injected utilizing power injector system. RFA
No lymphadedenopathy

## 2023-02-17 NOTE — PROGRESS NOTES
Pt. Placed on BiPAP 14/6, RR 14, FiO2 50%, Sat. 93%. Total face mask in place. Will continue to monitor.

## 2023-02-17 NOTE — PHARMACY-ADMISSION MEDICATION HISTORY
Pharmacy Medication History  Admission medication history interview status for the 2/17/2023  admission is complete. See EPIC admission navigator for prior to admission medications     Location of Interview: Patient room  Medication history sources: Patient's family/friend (Spouse), Surescripts and Care Everywhere    Significant changes made to the medication list:  Removed: dexamethasone  Changed: potassium from 20MEQ daily to 10MEQ BID  Added: omeprazole    In the past week, patient estimated taking medication this percent of the time: greater than 90%      Medication reconciliation completed by provider prior to medication history? No    Time spent in this activity: 20 minutes    Prior to Admission medications    Medication Sig Last Dose Taking? Auth Provider Long Term End Date   diltiazem ER COATED BEADS (CARDIZEM CD) 360 MG 24 hr capsule Take 1 capsule (360 mg) by mouth daily 2/16/2023 at PM Yes Lety Dangelo PA-C Yes    glipiZIDE (GLUCOTROL) 5 MG tablet Take 10 mg by mouth every morning (before breakfast) 2/16/2023 at AM Yes Unknown, Entered By History No    insulin glargine (LANTUS) 100 UNIT/ML PEN Inject 60 Units Subcutaneous At Bedtime 2/16/2023 at PM Yes Reported, Patient Yes    levothyroxine (SYNTHROID/LEVOTHROID) 75 MCG tablet Take 75 mcg by mouth daily 2/16/2023 at AM Yes Unknown, Entered By History No    omeprazole (PRILOSEC) 40 MG DR capsule Take 40 mg by mouth 2 times daily 2/16/2023 at PM Yes Unknown, Entered By History     oxyCODONE (ROXICODONE) 5 MG tablet Take 5 mg by mouth every 4 hours as needed for pain 2/17/2023 at 0700 Yes Unknown, Entered By History     polyethylene glycol (MIRALAX) 17 GM/Dose powder Take 17 g by mouth daily 2/16/2023 at AM Yes Nichelle Meredith MD     potassium chloride ER (KLOR-CON M) 10 MEQ CR tablet Take 2 tablets (20 mEq) by mouth daily  Patient taking differently: Take 10 mEq by mouth 2 times daily 2/16/2023 at PM Yes Lety Dangelo PA-C  10/18/23    rivaroxaban ANTICOAGULANT (XARELTO) 20 MG TABS tablet Take 1 tablet (20 mg) by mouth daily (with dinner) 2/16/2023 at PM Yes Lety Dangelo PA-C Yes    tamsulosin (FLOMAX) 0.4 MG capsule Take 1 capsule (0.4 mg) by mouth daily 2/16/2023 at PM Yes Nichelle Meredith MD     torsemide (DEMADEX) 20 MG tablet Take 20 mg by mouth daily 2/16/2023 at AM Yes Lety Dangelo PA-C Yes    diltiazem ER (DILT-XR) 120 MG 24 hr capsule Take 120 mg by mouth daily   Unknown, Entered By History Yes 6/19/22       The information provided in this note is only as accurate as the sources available at the time of update(s)

## 2023-02-17 NOTE — ED PROVIDER NOTES
History   Chief Complaint:  Shortness of Breath     The history is provided by the patient.      Carl Vázquez is a 82 year old male on Xarelto with a history of COPD, CHF, pleural effusion, hypertension, hyperlipidemia, Afib and type 2 diabetes who presents with shortness of breath. Patient reports that he developed worsening shortness of breath yesterday. He states that was able to lie on his right side flat to sleep last night. He notes that he has upper abdominal pain, but no overt chest pain. His wife adds that the patient's right sided face became puffy this morning. He did not use his nebulizer this morning. No at home O2.     Independent Historian:   Spouse/Partner - They report supplemented information, as noted above.    Review of External Notes: None    ROS:  Review of Systems   HENT: Positive for facial swelling.    Respiratory: Positive for shortness of breath.    Cardiovascular: Negative for chest pain.   Gastrointestinal: Positive for abdominal pain.   All other systems reviewed and are negative.    Allergies:  Metoprolol  Statin Drugs [Hmg-Coa-R Inhibitors]     Medications:    Diltiazem ER  Glipizide  Insulin glargine  Levothyroxine  Oxycodone  Miralax  Potassium chloride  Xarelto  Tamsulosin  Torsemide  Digoxin    Past Medical History:    Type 2 diabetes  Sleep apnea  Bladder calculi  Hyperlipidemia  Inguinal hernia  Osteoarthritis  UTI  CO2 narcosis   Muscular atrophy  Fasciculations  CHF  Myoneural disorder  COPD exacerbation  Depression   Respiratory failure  Cervical myelopathy  Afib  Hypothyroidism   Pleural effusion  Ventral hernia   Gallstones   Hypertension   Eosinophilic esophagitis    Past Surgical History:    Cholecystectomy     Hemorrhoidectomy  Inguinal hernia repair  Diaphragm plication on right   Cystolitholapaxy, TURP    Family History:    Father - throat cancer  Mother - brain tumor    Social History:  Presents with wife, Doorthy     PCP: Kyler Mcwilliams  "    Physical Exam     Patient Vitals for the past 24 hrs:   BP Temp Temp src Pulse Resp SpO2 Height Weight   02/17/23 1141 -- -- -- 84 16 92 % -- --   02/17/23 1131 109/59 -- -- 83 -- -- -- --   02/17/23 1117 -- -- -- 82 13 91 % -- --   02/17/23 1112 -- -- -- 82 28 92 % -- --   02/17/23 1040 110/67 -- -- 84 25 -- -- --   02/17/23 1027 -- -- -- 81 12 92 % -- --   02/17/23 1016 -- -- -- 81 (!) 8 95 % -- --   02/17/23 0949 118/67 -- -- 80 -- -- -- --   02/17/23 0825 -- -- -- 79 12 95 % -- --   02/17/23 0823 -- -- -- 86 26 93 % -- --   02/17/23 0759 131/65 97.4  F (36.3  C) Temporal 89 22 (!) 88 % 1.702 m (5' 7\") 68 kg (150 lb)        Physical Exam  Nursing note and vitals reviewed.    Constitutional:  Appears comfortable.    HENT:                Nose normal.  No discharge.  He has edema under both eyes but greater on the right side.     Oral mucosa is moist.  Eyes:    Conjunctivae are normal without injection.  Pupils are equal. Edema under the right eye.   Cardiovascular:  Normal rate, regular rhythm with normal S1 and S2.      Normal heart sounds and peripheral pulses 2+ and equal.       No murmur or savage.  Pulmonary:  Work of breathing increased. Breath sounds diminished bilaterally but he is moving air.  No stridor.      No wheezes. No rales.     GI:    Soft. No distension and no mass. No tenderness.    Musculoskeletal:  Normal range of motion. No extremity deformity.     No edema and no tenderness.   Neurological:   Alert and oriented. No focal weakness.  Skin:    Skin is warm and dry. No rash noted.  Edema under the eyes as noted above.  Psychiatric:   Behavior is normal. Appropriate mood and affect.     Judgment and thought content normal.     Emergency Department Course   ECG  ECG taken at 0813, ECG read at 0821  Sinus rhythm with premature atrial complexes   Rate 90 bpm. RI interval 120 ms. QRS duration 86 ms. QT/QTc 368/450 ms. P-R-T axes 26 66 36.     Imaging:  XR Chest 2 Views   Final Result "   IMPRESSION:Recurrent small-to-moderate right pleural effusion and   slight increase in small left pleural effusion. Mild bibasilar   atelectasis. No pneumothorax. Normal heart size. Stable upper   abdominal surgical clips and metallic density in the anterior   abdominal wall in the epigastric region.      DEEPAK OLIVER MD            SYSTEM ID:  [id number]      US Thoracentesis    (Results Pending)   CT Abdomen Pelvis w Contrast    (Results Pending)   US Paracentesis without Albumin    (Results Pending)      Report per radiology    Laboratory:  Labs Ordered and Resulted from Time of ED Arrival to Time of ED Departure   COMPREHENSIVE METABOLIC PANEL - Abnormal       Result Value    Sodium 146 (*)     Potassium 3.9      Chloride 97 (*)     Carbon Dioxide (CO2) 44 (*)     Anion Gap 5 (*)     Urea Nitrogen 14.6      Creatinine 0.82      Calcium 8.6 (*)     Glucose 59 (*)     Alkaline Phosphatase 87      AST 27      ALT 19      Protein Total 6.0 (*)     Albumin 3.5      Bilirubin Total 0.4      GFR Estimate 88     TROPONIN T, HIGH SENSITIVITY - Abnormal    Troponin T, High Sensitivity 91 (*)    NT PROBNP INPATIENT - Abnormal    N terminal Pro BNP Inpatient 3,084 (*)    CBC WITH PLATELETS AND DIFFERENTIAL - Abnormal    WBC Count 6.3      RBC Count 5.15      Hemoglobin 15.1      Hematocrit 49.7      MCV 97      MCH 29.3      MCHC 30.4 (*)     RDW 16.1 (*)     Platelet Count 195      % Neutrophils 69      % Lymphocytes 20      % Monocytes 9      % Eosinophils 1      % Basophils 1      % Immature Granulocytes 0      NRBCs per 100 WBC 0      Absolute Neutrophils 4.4      Absolute Lymphocytes 1.3      Absolute Monocytes 0.6      Absolute Eosinophils 0.0      Absolute Basophils 0.0      Absolute Immature Granulocytes 0.0      Absolute NRBCs 0.0     GLUCOSE BY METER - Abnormal    GLUCOSE BY METER POCT 69 (*)    GLUCOSE BY METER - Abnormal    GLUCOSE BY METER POCT 131 (*)    ROUTINE UA WITH MICROSCOPIC REFLEX TO CULTURE   BLOOD  GAS ARTERIAL WITH OXYHEMOGLOBIN   GLUCOSE FLUID   LACTATE DEHYDROGENASE FLUID   LACTATE DEHYDROGENASE   PROTEIN TOTAL   PROTEIN FLUID   PH FLUID   ALBUMIN FLUID   ALBUMIN LEVEL   PROTEIN FLUID   GLUCOSE MONITOR NURSING POCT   NON-GYNECOLOGIC CYTOLOGY   AEROBIC BACTERIAL CULTURE ROUTINE   GRAM STAIN   AEROBIC BACTERIAL CULTURE ROUTINE   CELL COUNT WITH DIFFERENTIAL FLUID   CELL COUNT WITH DIFFERENTIAL FLUID      Emergency Department Course & Assessments:     Interventions:  Medications   No lozenges or gum should be given while patient on BIPAP/AVAPS/AVAPS AE (has no administration in time range)   carboxymethylcellulose PF (REFRESH PLUS) 0.5 % ophthalmic solution 1 drop (has no administration in time range)   lidocaine 1 % 0.1-1 mL (has no administration in time range)   lidocaine (LMX4) cream (has no administration in time range)   sodium chloride (PF) 0.9% PF flush 3 mL (has no administration in time range)   sodium chloride (PF) 0.9% PF flush 3 mL (has no administration in time range)   nitroGLYcerin (NITROSTAT) sublingual tablet 0.4 mg (has no administration in time range)   Patient may continue current oral medications (has no administration in time range)   furosemide (LASIX) injection 40 mg (40 mg Intravenous Given 2/17/23 0921)   dextrose 50 % injection 25 mL (25 mLs Intravenous Given 2/17/23 1116)      Independent Interpretation (X-rays, CTs, rhythm strip):  I reviewed patient's xray and saw a right pleural effusion, no obvious infiltrate.    Consultations/Discussion of Management or Tests:  1021 I spoke with SEA Jung accepting for Dr. Epstein of the hospital service.     Social Determinants of Health affecting care:   None    Assessments:  0814 I obtained history and examined the patient, as above.  1014 I rechecked the patient and updated them on findings.     Disposition:  The patient was admitted to the hospital under the care of Dr. Epstein.     Impression & Plan    Medical Decision Making:  Patient  comes in with known CHF and COPD with increasing edema and shortness of breath.  No chest pain.  He was placed on oxygen.  With his history of COPD, goal was to keep his oxygen level between 90 and 93%.  He was in the 80s when he presented.  Labs came back with an elevated troponin of 91, BNP is up at 3084.  His glucose was a little bit low at 59 so he was given some food.  He had been fasting for an ultrasound today for GI.  Bicarb is 44, CBC is normal with a white count of 6.3.  I did review his chest x-ray and it shows a fairly good size right pleural effusion which might need to be tapped.  There was no obvious infiltrate.  After a while his glucose was rechecked it was 69 and 131 after he ate something.  It is possible that this troponin is up because of his elevated BNP and heart failure, troponin leak.  However this will need to be trended.  He did not have any wheezing and has had increased edema so was given 40 mg of IV Lasix.  The patient is stable at this point but if he gets worse at all he may need BiPAP.  I talked with Dr. Epstein who will be admitting the patient.  Cardiac echo can be done later today as well.  They can talk to the patient and his wife about considering getting the abdominal ultrasound tomorrow that he was supposed to have today as an outpatient if they feel it is indicated.    Diagnosis:    ICD-10-CM    1. Acute respiratory failure with hypoxia (H)  J96.01       2. Acute on chronic congestive heart failure, unspecified heart failure type (H)  I50.9       3. Pleural effusion  J90       4. Elevated troponin  R77.8       5. Chronic obstructive pulmonary disease, unspecified COPD type (H)  J44.9       6. Diabetic hypoglycemia (H)  E11.649          Scribe Disclosure:  Stacey PERKINS, am serving as a scribe at 8:16 AM on 2/17/2023 to document services personally performed by Tere Cruz MD based on my observations and the provider's statements to me.     2/17/2023   Tere Cruz  MD Anthony Hou Tracy Alan, MD  02/17/23 7213

## 2023-02-17 NOTE — H&P
[facility]    History and Physical - Hospitalist Service       Date of Admission:  2/17/2023    Assessment & Plan      Carl Vázquez is a 82 year old male w/ PMH of past medical history of atrial fibrillation, DM 2, hypothyroidism, CALE admitted on 2/17/2023 w/ acute on chronic respiratory failure.     Acute on chronic respiratory failure both hypoxic and hypercarbic types multifactorial etiology including possible neuromuscular disease contributing, dysphagia/aspiration pneumonitis, cannot rule out hepatic etiology such as hepatic hydrothorax as etiology of his recurrent pleural effusion.  Based on his June 2022 TTE it seems unlikely that decompensated heart failure is contributing to his respiratory failure to a great degree. Patient is compliant with medications and low-salt diet.  covid positive <90d ago so not checked and hx generally not suggestive of infectious etiology.  - Stat ABG for baseline comparison purposes; chronic hypercarbia, CO2 in mid 80s even after 15 minutes of NIPPV  - Start NIPPV, 14/6, 0.5 FiO2  - Continue diuresis w/ lasix 40mg IV every 8 hours x24 hours; hold PTA torsemide  - Ultrasound-guided thoracentesis with diagnostic labs-->1L fluid removed 02/17/23, transudative  - Add on procalcitonin-->low; w/o fever or leukocytosis holding off on atbx for now.    -SPO2 goal greater than equal to 88%    Elevated troponin, no chest pain may be related to hypoxia/demand ischemia; patient had a negative work-up within the last year with negative stress test in March 2022 and negative echo in June 2022; ekg 02/17/23 showing NSR w/ PAC, normal axis, no acute ischemic changes  - We will recheck troponin  - Telemetry    Abdominal pain subacute most pronounced in the lower quadrants but with increasing abdominal distention, intolerance of LE elevation  possible chronic liver disease with ongoing evaluation, following OP w/ [facility]  - CT abdomen pelvis with IV contrast-->see  below, discussed results w/ spouse & anticipated need for liver MRI, timing TBD  - Abdominal ultrasound with diagnostic and therapeutic thoracentesis to evaluate for possible chronic liver disease in the event that it may be contributing to his respiratory failure--> no enough ascites for paracentesis    A-fib paroxysmal, currently in sinus rhythm  - Continue PTA DOAC  - Continue PTA rate controlling agents w/ CCB starting am 2/18/23; digoxin on hold since end of Jan 2023    Hyponatremia mild 146  - Diuresing as above  - BMP in the a.m.    DM2 last A1c was 8.2% in January 2023  Hypoglycemia likely secondary to taking PTA Lantus, glipizide and fasted state for anticipated ultrasound  [PTA regimen lantus 60 units q bedtime; glipizide 10mg po q morning]  - Hypoglycemia protocol  - Every 4 hours glucose checks until able to take po  - D10 infusion until able to take p.o.  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, longstanding/ progressive for at least the 13 months, now needs to ambulate with walker, in January 2022 was using the treadmill daily  - Patient and spouse are very hopeful for discharge prior to 2/28/2023 ALS clinic visit    CALE; doesn't use cpap at home  -NIPPV as above  -may need to consider NIPPV at discharge pending above evaluation       Diet: NPO for Medical/Clinical Reasons Except for: Meds npo except meds while on NIPPV; post thoracentesis if able to come off of NIPPV, mod CHO, 2gNa diet  DVT Prophylaxis: DOAC  Power Catheter: Not present  Lines: None     Cardiac Monitoring: ACTIVE order. Indication: acute on chronic resp failure  Code Status: No CPR- Do NOT  Intubate DNR/DNI    Clinically Significant Risk Factors Present on Admission         # Hypernatremia: Highest Na = 146 mmol/L in last 2 days, will monitor as appropriate       # Drug Induced Coagulation Defect: home medication list includes an anticoagulant medication      # Acute Respiratory Failure: based on blood gas results.  Continue supplemental oxygen as needed         -both addressed above    Disposition Plan      Expected Discharge Date: 02/19/2023                The patient's care was discussed with the Attending Physician, Dr. Tr Epstein, Bedside Nurse, Patient and Patient's Family.    SE [facility]  Hospitalist Service  [facility]  Securely message with BEKIZ (more info)  Text page via [facility] Paging/Directory     ______________________________________________________________________    Chief Complaint   dyspnea    History is obtained from the patient, spouse and EMR    History of Present Illness   Carl Vázquez is a 82 year old male with past medical history of atrial fibrillation on Xarelto, DM 2, hypothyroidism, CALE who presented to [facility] ED with dyspnea and hypoxia as well as 70% at home.    Patient has been having chronic dyspnea for the last 1 year.  He had an extensive work-up, I refer the interested reader to Dr. Nicky Mirza's H&P dated1/4/23 for very thorough summary.  In brief his dyspnea has been ongoing since January 2022, he had a negative cardiac work-up with negative stress test in March 2022, negative echo in June 2022, he was found to have atrial fibrillation and has been following with EP at [facility] cardiology.  He had a right diaphragm plication in September 2022 without significant relief.  There is concern that he may have concurrent chronic liver disease/cirrhosis for which he has been working with [facility]; chronic liver disease labs sent in early February 2023.  He was to have a abdominal ultrasound and paracentesis on 2/24/2023 and is  pending EGD on 2/28/2023 for ongoing dysphagia.  Additionally he has had 13 months progressive weakness since January 2022, previously was able to walk daily on the treadmill now can barely lift arms beyond elbow flexion, feet only able to lift off the bed by inches, ambulating with a walker.  He has been following with [facility] neurology, there is concern for lower motor neuron disease and he is and is pending a coordinated visit at the ALS clinic and PFTs on February 23, 2023 at [facility].  Patient most recently was hospitalized from 1/4/2023 until 1/8/2023 for recurrent dyspnea.  He was COVID-positive at that time received remdesivir and Decadron and required NIPPV for hypercarbic respiratory failure.  Additionally he had a thoracentesis at that time.  He was discharged without oxygen and has been working with home PT.    Because of his dyspnea wife provides much of the history.  He has been having progressive dyspnea for the last 24 hours and when the SPO2 was in the 70s she sought treatment in the ED on the a.m. of 2/17/2022.  In the ED he was noted to be hypoxic at 88%.  He was started on 3 L nasal cannula.  Nursing assistant reported that with eating he desaturated down to 78% and so FiO2 was increased to 5-6 L.  He had chest x-ray imaging which showed bilateral right more than left pleural effusions and bilateral pulmonary edema.  He was felt to be in a degree of heart failure with elevated proBNP of 3084, troponin was 91.  He was diuresed with 40 mg IV Lasix.  Electrocardiogram was obtained, not visible in EMR.  Hospital service was asked to admit the patient for acute on chronic respiratory failure and management of the pleural effusion.    During my interview patient became acutely dyspneic with increased work of breathing, accessory muscle use just try to reposition himself in the bed and use the urinal.  I initiated NIPPV and obtained arterial blood gas.  Patient's been medication compliant  "with the exception of this morning.  No fevers chills ill contacts.  He does not use CPAP, wife thinks he has an old machine that may be recalled and that he \"chokes\" when using it.  Patient endorses exertional dyspnea even on flat surfaces and when doing stairs he has to take frequent breaks.  He is dyspneic with minimal exertion.  Patient reportedly had a chest x-ray a few weeks ago at his primary provider's but there was not enough fluid for thoracentesis at that time.  His weight has increased slightly from 153 in January 20 23-1 57 today.  He has infrequent cough, follows a low-sodium diet.  Both patient and spouse endorse increasing abdominal girth, tightness of his pants and lower extremity edema.  They are unable to get a compression socks on and elevating his legs causes abdominal pain.  Patient reports food frequently gets stuck in his throat.  He had been fasting for abdominal ultrasound that was 2 occurred today.  He was subsequently hypoglycemic in the ED requiring D50 treatment.    Past Medical History    Past Medical History:   Diagnosis Date     Diabetes (H)      Sleep apnea     uses CPAP machine     Thyroid disease        Past Surgical History   Past Surgical History:   Procedure Laterality Date     CHOLECYSTECTOMY      at [facility]     COLONOSCOPY      in [place], [place]     COLONOSCOPY  08/22/2017    Dr. Ja LYNN     ESOPHAGOSCOPY, GASTROSCOPY, DUODENOSCOPY (EGD), COMBINED N/A 6/7/2016    Procedure: COMBINED ESOPHAGOSCOPY, GASTROSCOPY, DUODENOSCOPY (EGD);  Surgeon: Eneida Denton MD;  Location:  GI       Prior to Admission Medications   Prior to Admission Medications   Prescriptions Last Dose Informant Patient Reported? Taking?   diltiazem ER COATED BEADS (CARDIZEM CD) 360 MG 24 hr capsule 2/16/2023 at PM Spouse/Significant Other No Yes   Sig: Take 1 capsule (360 mg) by mouth daily   glipiZIDE (GLUCOTROL) 5 MG tablet 2/16/2023 at AM Spouse/Significant Other Yes Yes   Sig: Take 10 " mg by mouth every morning (before breakfast)   insulin glargine (LANTUS) 100 UNIT/ML PEN 2/16/2023 at PM Spouse/Significant Other Yes Yes   Sig: Inject 60 Units Subcutaneous At Bedtime   levothyroxine (SYNTHROID/LEVOTHROID) 75 MCG tablet 2/16/2023 at AM Spouse/Significant Other Yes Yes   Sig: Take 75 mcg by mouth daily   omeprazole (PRILOSEC) 40 MG DR capsule 2/16/2023 at PM Spouse/Significant Other Yes Yes   Sig: Take 40 mg by mouth 2 times daily   oxyCODONE (ROXICODONE) 5 MG tablet 2/17/2023 at 0700 Spouse/Significant Other Yes Yes   Sig: Take 5 mg by mouth every 4 hours as needed for pain   polyethylene glycol (MIRALAX) 17 GM/Dose powder 2/16/2023 at AM Spouse/Significant Other No Yes   Sig: Take 17 g by mouth daily   potassium chloride ER (KLOR-CON M) 10 MEQ CR tablet 2/16/2023 at PM Spouse/Significant Other No Yes   Sig: Take 2 tablets (20 mEq) by mouth daily   Patient taking differently: Take 10 mEq by mouth 2 times daily   rivaroxaban ANTICOAGULANT (XARELTO) 20 MG TABS tablet 2/16/2023 at PM Spouse/Significant Other No Yes   Sig: Take 1 tablet (20 mg) by mouth daily (with dinner)   tamsulosin (FLOMAX) 0.4 MG capsule 2/16/2023 at PM Spouse/Significant Other No Yes   Sig: Take 1 capsule (0.4 mg) by mouth daily   torsemide (DEMADEX) 20 MG tablet 2/16/2023 at AM Spouse/Significant Other Yes Yes   Sig: Take 20 mg by mouth daily      Facility-Administered Medications: None     Physical Exam   Vital Signs: Temp: 97.4  F (36.3  C) Temp src: Temporal BP: 103/61 Pulse: 77   Resp: 17 SpO2: 95 %      Weight: 150 lbs 0 oz    Constitutional: vs as per EMR SPO2 as low as 85% on 6 L, heart rate 83, /67, respiratory rate 26-31,  General:  adult pt lying in bed in at least mild to moderate respiratory distress and uncomfortable appearing  Neuro: +follows commands wiggle toes and show 2 fingers bilat, face symmetric, tongue midline, speech fluent, he cannot bilateral lift arms even to shoulder height, both legs only able  to be lifted off bed a few inches.    eyes pupils equal round 3mm briskly reactive bilat, sclera nonicteric, noninjected, conjunctiva pink,  Head, ENT & mouth: NC/AT,  mouth moist oral mucosa  Neck: supple  CV S1S2 no murmurs  resp: Tachypneic, accessory muscle use with abdominal muscles and sternocleidomastoids with minimal exertion, not able to speak in full sentences  gi:normoactive bowel sounds, soft, tender diffusely but most in bilateral lower quadrants, disteded  Ext: +3 bilateral lower extremity edema, no mottling  Skin: no rashes on exposed skin  Lymph: defer  Musculoskeletal no bony joint deformities      Medical Decision Making       98 MINUTES SPENT BY ME on the date of service doing chart review, history, exam, documentation & further activities per the note.  MANAGEMENT DISCUSSED with the following over the past 24 hours: as above   NOTE(S)/MEDICAL RECORDS REVIEWED over the past 24 hours: neurology, cardiology, GI paperwork, advanced directive  Tests ORDERED & REVIEWED in the past 24 hours:  - CMP  - CBC  - ABG  - CXR  Tests personally interpreted in the past 24 hours:  - CHEST XRAY showing bilat pleural effusions, pulm edema  SUPPLEMENTAL HISTORY, in addition to the patient's history, over the past 24 hours obtained from:   - Spouse or significant other  Medical complexity over the past 24 hours:  - Prescription DRUG MANAGEMENT performed      Data     I have personally reviewed the following data over the past 24 hrs:    6.3  \   15.1   / 195     146 (H) 97 (L) 14.6 /  136 (H)   3.9 44 (H) 0.82 \       ALT: 19 AST: 27 AP: 87 TBILI: 0.4   ALB: 3.5; 3.5 TOT PROTEIN: 6.0 (L); 6.0 (L) LIPASE: N/A       Trop: 88 (H) BNP: 3,084 (H)       Procal: 0.10 (H) CRP: N/A Lactic Acid: N/A       Ferritin:  N/A % Retic:  N/A LDH:  280 (H)       Imaging results reviewed over the past 24 hrs:   Recent Results (from the past 24 hour(s))   XR Chest 2 Views    Narrative    XR CHEST 2 VIEWS 2/17/2023 9:08 AM    HISTORY:  COPD and CHF history, SOB    COMPARISON: 1/4/2023, 1/5/23      Impression    IMPRESSION:Recurrent small-to-moderate right pleural effusion and  slight increase in small left pleural effusion. Mild bibasilar  atelectasis. No pneumothorax. Normal heart size. Stable upper  abdominal surgical clips and metallic density in the anterior  abdominal wall in the epigastric region.    DEEPAK OLIVER MD         SYSTEM ID:  [id number]   CT Abdomen Pelvis w Contrast    Narrative    CT ABDOMEN PELVIS W CONTRAST 2/17/2023 2:14 PM    CLINICAL HISTORY: abd pain mostly in bilat lower quadrants, ab  distention    TECHNIQUE: CT scan of the abdomen and pelvis was performed following  injection of IV contrast. Multiplanar reformats were obtained. Dose  reduction techniques were used.  CONTRAST: 75 mL Isovue-370    COMPARISON: 1/5/2023 and 9/25/2019    FINDINGS:   LOWER CHEST: Partly visualized moderate to large right pleural  effusion and right basilar atelectasis. Left basilar atelectasis.    HEPATOBILIARY: Hepatic steatosis. Slightly nodular hepatic contour  suggestive of hepatic parenchymal disease. Linear hypodense area  around the gallbladder fossa is indeterminate, may be due to focal fat  deposition. Cholecystectomy. No biliary ductal dilatation.    PANCREAS: Mild diffuse pancreatic parenchymal atrophy. No pancreatic  duct dilatation or peripancreatic inflammatory changes.    SPLEEN: Normal.    ADRENAL GLANDS: Stable diffuse mild thickening. No discrete masses.    KIDNEYS/BLADDER: Small nonobstructing right renal calculi measuring up  to 4 mm. No hydronephrosis, perinephric stranding or focal masses in  either kidney. Indeterminate hypodensity at the urinary bladder neck,  may be related to prior TURP.    BOWEL: No small bowel or colonic obstruction or inflammatory changes.  Normal appendix. Extensive colonic diverticulosis.    PELVIC ORGANS: No pelvic masses.    ADDITIONAL FINDINGS: Trace ascites with small amount of fluid in  the  lower quadrants. Ventral abdominal mesh. No lymphadenopathy. No  abscess or free air.    MUSCULOSKELETAL: Degenerative changes in the spine. No suspicious  lesions in the bones.      Impression    IMPRESSION:   1.  Partly visualized at least moderate right pleural effusion.  Bibasilar atelectasis.  2.  Trace ascites.  3.  Hepatic steatosis and mildly nodular hepatic contour suggestive of  hepatic parenchymal disease. Indeterminate linear hypodensity adjacent  to the gallbladder fossa, may be a focus of fat deposition.  Nonemergent liver MRI is recommended for better characterization.  4.  Extensive colonic diverticulosis without diverticulitis.  5.  Hypodense area in the urinary bladder neck, indeterminate and may  be related to prior TURP. Correlate with history.    DEEPAK OLIVER MD         SYSTEM ID:  [id number]   US Abdomen Limited    Narrative    US ABDOMEN LIMITED 2/17/2023 4:00 PM    CLINICAL HISTORY: HIGH VOLUME paracentesis with or without diagnostic  fluid analysis with labs to be drawn if ordered. Total paracentesis  volume as much as possible.  TECHNIQUE: Limited abdominal ultrasound.    COMPARISON: CT today.      Impression    IMPRESSION:  1.  No ascites.    DEEPAK OLIVER MD         SYSTEM ID:  [id number]   US Thoracentesis    Narrative    ULTRASOUND GUIDED THORACENTESIS  2/17/2023 4:01 PM     HISTORY: Recurrent pleural effusion with respiratory failure on bipap.    FINDINGS: Ultrasound was used to evaluate for the presence and best  approach for drainage of a pleural effusion. Written and oral informed  consent was obtained. A pause for the cause procedure to verify the  correct patient and correct procedure. The skin overlying the right  chest posteriorly was prepped and draped in the usual sterile fashion.  The subcutaneous tissues were anesthetized with 10 mL 1% lidocaine. A  catheter was advanced into the pleural space and 1000 mL of  delilah  colored fluid was drained. Patient was monitored by nurse  under my  direct supervision throughout the exam. Ultrasound images were  permanently stored.  There were no immediate complications. Patient  left the ultrasound suite in satisfactory condition.      Impression    IMPRESSION: Technically successful thoracentesis without immediate  complications.

## 2023-02-17 NOTE — ED NOTES
"Multiple staff attempts to place PIV.  Placed by ultrasound on patient's left, upper arm, vessel at end of IV needle.  Good blood return, flushes easily.   Meds administered, fluids started without issue, patient denies pain at site.  Few minutes into infusion, patient states arm feels \"tight\".  Infusion stopped.  New ultrasound PIV started in right upper arm, 22ga, one attempt by this RN.  Patient ambulatory to bathroom.  "

## 2023-02-17 NOTE — PROGRESS NOTES
Hospitalist note    I stopped to see how pt is tolerating AVAPs, inquire about volume of thoracentesis, update wife on CT results.  Pt sleeping, nearly flat position well tolerated on AVAPS w/ Vt goal 400, Ve 4-6L, RR 14-16, s/p 1L removed w/ R thoracentesis.    -decreased FiO2 to 0.3 at 1640  -pt wakes to verbal/tactile stimuli, speaks, +follows commands to wiggle toes  -wife interested in having new NIPPV at discharge; TBD re: eligibility pending further w/u    Fabiana Leonard CNP  Hospitalist - [place] 7am-6pm  Pager [phone]

## 2023-02-17 NOTE — LETTER
Transition Communication Hand-off for Care Transitions to Next Level of Care Provider    Name: Carl Vázquez  : 1940  MRN #: [id number]  Primary Care Provider: Kyler Mcwilliams     Primary Clinic: [facility] [address] [facility] [address]     Reason for Hospitalization:  Diabetic hypoglycemia (H) [E11.649]  Pleural effusion [J90]  Elevated troponin [R77.8]  Acute respiratory failure with hypoxia (H) [J96.01]  Chronic obstructive pulmonary disease, unspecified COPD type (H) [J44.9]  Acute on chronic congestive heart failure, unspecified heart failure type (H) [I50.9]  Admit Date/Time: 2023  8:04 AM  Discharge Date: 2023  Payor Source: Payor: [facility] / Plan: [facility] MEDICARE / Product Type: HMO /     Readmission Assessment Measure (TUSHAR) Risk Score/category: elevated    Reason for Communication Hand-off Referral: Avoidable readmission within 30 days    Discharge Plan: home w/ resumption of homecare services with [facility].   Concern for non-adherence with plan of care:   Y/N no  Discharge Needs Assessment:  Needs    Flowsheet Row Most Recent Value   Equipment Currently Used at Home walker, rolling, shower chair          Follow-up specialty is recommended: Yes    Follow-up plan:    Future Appointments   Date Time Provider Department Center   2023 10:00 PM Marisol Lara OTR SHOT [place] [facility]   2023  8:15 AM  SLP 1 WAITLIST SH[facility] [place] [facility]   2023  8:00 PM Kera Barrios, PT SHPT [place] [facility]   3/1/2023  2:00 PM UC PFL B [facility]   3/1/2023  3:00 PM Ramin Baez MD [facility]   3/6/2023 11:30 AM RU LAB [facility] [place] [facility]   3/7/2023  2:30 PM Mirna Rodriges PA-C [facility]   3/8/2023  1:45 PM Gurmeet De MD [facility] PSA CLIN   3/13/2023  2:00 PM 3, Sh Cardiac Rehab [facility] [place] [facility]           Referrals     Future Labs/Procedures    CARDIAC REHAB REFERRAL     Process Instructions:    Advance to Wellness and Exercise for Life (WEL)  Program or to another maintenance exercise program upon completion of phase 2 cardiac rehab.    Weight mgt program is only offered at [facility].    *This therapy referral will be filtered to a centralized scheduling office at [facility] and the patient will receive a call to schedule an appointment at a [address] location most convenient for them. *        Comments:    Patient may choose their preference of the site for Cardiac Rehab.  If you have not heard from the scheduling office within 2 business days, please call [phone] for [facility], [phone] for [place] and [phone] for [facility].    Home Care Referral     Comments:    Your provider has ordered home health services. If you have not been contacted within 2 days of your discharge please call the selected Home Care agency listed on your Discharge document.  If a Home Care agency is NOT listed, please call [phone].          Supplies     Future Labs/Procedures    Walker Order for DME - ONLY FOR DME     Process Instructions:    By signing this order, the Authorizing Provider is attesting that they have completed a face-to-face evaluation on the patient to determine their need for this equipment in the last 60 days. A new face-to-face evaluation is required each time  A new prescription for one of the specified items is ordered.   If an additional provider completed the evaluation, please indicate their name below.     **As of 2018, an order requisition and face sheet will print for all DME orders. Please give printed order and face sheet to patient if not obtaining product from [facility] Medical DME closet.     Comments:    I, the undersigned, certify that the above prescribed supplies are medically necessary for this patient and is both reasonable and necessary in reference to accepted standards of medical and necessary in reference to accepted standards of medical practice in the treatment of this patient's  condition and is not prescribed as a convenience.           Carline Zarco RN    AVS/Discharge Summary is the source of truth; this is a helpful guide for improved communication of patient story

## 2023-02-17 NOTE — ED NOTES
Pt with continued accessory muscle use, continued O2 need.  Nasal cannula accidentally dislodged per ED staff, sats immediately drop.  Hospitalist in room, orders bipap.  Awaiting IMC bed.

## 2023-02-17 NOTE — ED NOTES
Patient returns from CT, accompanied by RT / RN for scan.  Plan is to have paracentesis, thoracentesis in patient's ED room at 1500.

## 2023-02-18 ENCOUNTER — APPOINTMENT (OUTPATIENT)
Dept: CARDIOLOGY | Facility: CLINIC | Age: 83
DRG: 246 | End: 2023-02-18
Attending: STUDENT IN AN ORGANIZED HEALTH CARE EDUCATION/TRAINING PROGRAM
Payer: COMMERCIAL

## 2023-02-18 ENCOUNTER — APPOINTMENT (OUTPATIENT)
Dept: GENERAL RADIOLOGY | Facility: CLINIC | Age: 83
DRG: 246 | End: 2023-02-18
Payer: COMMERCIAL

## 2023-02-18 LAB
ALBUMIN SERPL BCG-MCNC: 2.7 G/DL (ref 3.5–5.2)
ALBUMIN SERPL BCG-MCNC: 2.7 G/DL (ref 3.5–5.2)
ALP SERPL-CCNC: 78 U/L (ref 40–129)
ALP SERPL-CCNC: 81 U/L (ref 40–129)
ALT SERPL W P-5'-P-CCNC: 13 U/L (ref 10–50)
ALT SERPL W P-5'-P-CCNC: 14 U/L (ref 10–50)
ANION GAP SERPL CALCULATED.3IONS-SCNC: 4 MMOL/L (ref 7–15)
ANION GAP SERPL CALCULATED.3IONS-SCNC: 4 MMOL/L (ref 7–15)
AST SERPL W P-5'-P-CCNC: 22 U/L (ref 10–50)
AST SERPL W P-5'-P-CCNC: 25 U/L (ref 10–50)
BASE EXCESS BLDV CALC-SCNC: 17.4 MMOL/L (ref -7.7–1.9)
BASOPHILS # BLD AUTO: 0 10E3/UL (ref 0–0.2)
BASOPHILS NFR BLD AUTO: 0 %
BILIRUB DIRECT SERPL-MCNC: <0.2 MG/DL (ref 0–0.3)
BILIRUB SERPL-MCNC: 0.4 MG/DL
BILIRUB SERPL-MCNC: 0.6 MG/DL
BUN SERPL-MCNC: 10.3 MG/DL (ref 8–23)
BUN SERPL-MCNC: 14.3 MG/DL (ref 8–23)
CALCIUM SERPL-MCNC: 8.3 MG/DL (ref 8.8–10.2)
CALCIUM SERPL-MCNC: 8.9 MG/DL (ref 8.8–10.2)
CHLORIDE SERPL-SCNC: 92 MMOL/L (ref 98–107)
CHLORIDE SERPL-SCNC: 93 MMOL/L (ref 98–107)
CREAT SERPL-MCNC: 0.75 MG/DL (ref 0.67–1.17)
CREAT SERPL-MCNC: 0.79 MG/DL (ref 0.67–1.17)
DEPRECATED HCO3 PLAS-SCNC: 44 MMOL/L (ref 22–29)
DEPRECATED HCO3 PLAS-SCNC: 48 MMOL/L (ref 22–29)
EOSINOPHIL # BLD AUTO: 0 10E3/UL (ref 0–0.7)
EOSINOPHIL NFR BLD AUTO: 0 %
ERYTHROCYTE [DISTWIDTH] IN BLOOD BY AUTOMATED COUNT: 15.9 % (ref 10–15)
ERYTHROCYTE [DISTWIDTH] IN BLOOD BY AUTOMATED COUNT: 16 % (ref 10–15)
GFR SERPL CREATININE-BSD FRML MDRD: 89 ML/MIN/1.73M2
GFR SERPL CREATININE-BSD FRML MDRD: 90 ML/MIN/1.73M2
GLUCOSE BLDC GLUCOMTR-MCNC: 116 MG/DL (ref 70–99)
GLUCOSE BLDC GLUCOMTR-MCNC: 248 MG/DL (ref 70–99)
GLUCOSE BLDC GLUCOMTR-MCNC: 351 MG/DL (ref 70–99)
GLUCOSE BLDC GLUCOMTR-MCNC: 73 MG/DL (ref 70–99)
GLUCOSE BLDC GLUCOMTR-MCNC: 98 MG/DL (ref 70–99)
GLUCOSE BLDC GLUCOMTR-MCNC: 99 MG/DL (ref 70–99)
GLUCOSE SERPL-MCNC: 221 MG/DL (ref 70–99)
GLUCOSE SERPL-MCNC: 76 MG/DL (ref 70–99)
HCO3 BLDV-SCNC: 48 MMOL/L (ref 21–28)
HCT VFR BLD AUTO: 49.9 % (ref 40–53)
HCT VFR BLD AUTO: 51.3 % (ref 40–53)
HGB BLD-MCNC: 14.9 G/DL (ref 13.3–17.7)
HGB BLD-MCNC: 15.8 G/DL (ref 13.3–17.7)
IMM GRANULOCYTES # BLD: 0 10E3/UL
IMM GRANULOCYTES NFR BLD: 1 %
LVEF ECHO: NORMAL
LYMPHOCYTES # BLD AUTO: 0.7 10E3/UL (ref 0.8–5.3)
LYMPHOCYTES NFR BLD AUTO: 8 %
MAGNESIUM SERPL-MCNC: 1.8 MG/DL (ref 1.7–2.3)
MAGNESIUM SERPL-MCNC: 2.2 MG/DL (ref 1.7–2.3)
MCH RBC QN AUTO: 29 PG (ref 26.5–33)
MCH RBC QN AUTO: 29.1 PG (ref 26.5–33)
MCHC RBC AUTO-ENTMCNC: 29.9 G/DL (ref 31.5–36.5)
MCHC RBC AUTO-ENTMCNC: 30.8 G/DL (ref 31.5–36.5)
MCV RBC AUTO: 95 FL (ref 78–100)
MCV RBC AUTO: 97 FL (ref 78–100)
MONOCYTES # BLD AUTO: 0.5 10E3/UL (ref 0–1.3)
MONOCYTES NFR BLD AUTO: 6 %
NEUTROPHILS # BLD AUTO: 7.5 10E3/UL (ref 1.6–8.3)
NEUTROPHILS NFR BLD AUTO: 85 %
NRBC # BLD AUTO: 0 10E3/UL
NRBC BLD AUTO-RTO: 0 /100
O2/TOTAL GAS SETTING VFR VENT: 40 %
PCO2 BLDV: 81 MM HG (ref 40–50)
PH BLDV: 7.38 [PH] (ref 7.32–7.43)
PLATELET # BLD AUTO: 161 10E3/UL (ref 150–450)
PLATELET # BLD AUTO: 167 10E3/UL (ref 150–450)
PO2 BLDV: 42 MM HG (ref 25–47)
POTASSIUM SERPL-SCNC: 4.1 MMOL/L (ref 3.4–5.3)
POTASSIUM SERPL-SCNC: 4.5 MMOL/L (ref 3.4–5.3)
PROT SERPL-MCNC: 5.1 G/DL (ref 6.4–8.3)
PROT SERPL-MCNC: 5.1 G/DL (ref 6.4–8.3)
RBC # BLD AUTO: 5.14 10E6/UL (ref 4.4–5.9)
RBC # BLD AUTO: 5.43 10E6/UL (ref 4.4–5.9)
SODIUM SERPL-SCNC: 141 MMOL/L (ref 136–145)
SODIUM SERPL-SCNC: 144 MMOL/L (ref 136–145)
TROPONIN T SERPL HS-MCNC: 86 NG/L
WBC # BLD AUTO: 6.6 10E3/UL (ref 4–11)
WBC # BLD AUTO: 8.8 10E3/UL (ref 4–11)

## 2023-02-18 PROCEDURE — 250N000011 HC RX IP 250 OP 636: Performed by: NURSE PRACTITIONER

## 2023-02-18 PROCEDURE — 250N000009 HC RX 250: Performed by: STUDENT IN AN ORGANIZED HEALTH CARE EDUCATION/TRAINING PROGRAM

## 2023-02-18 PROCEDURE — 999N000208 ECHOCARDIOGRAM COMPLETE

## 2023-02-18 PROCEDURE — 82248 BILIRUBIN DIRECT: CPT

## 2023-02-18 PROCEDURE — 85025 COMPLETE CBC W/AUTO DIFF WBC: CPT | Performed by: NURSE PRACTITIONER

## 2023-02-18 PROCEDURE — 93010 ELECTROCARDIOGRAM REPORT: CPT | Mod: 59 | Performed by: INTERNAL MEDICINE

## 2023-02-18 PROCEDURE — 85027 COMPLETE CBC AUTOMATED: CPT | Performed by: STUDENT IN AN ORGANIZED HEALTH CARE EDUCATION/TRAINING PROGRAM

## 2023-02-18 PROCEDURE — 82310 ASSAY OF CALCIUM: CPT | Performed by: NURSE PRACTITIONER

## 2023-02-18 PROCEDURE — 94660 CPAP INITIATION&MGMT: CPT

## 2023-02-18 PROCEDURE — 84155 ASSAY OF PROTEIN SERUM: CPT | Performed by: STUDENT IN AN ORGANIZED HEALTH CARE EDUCATION/TRAINING PROGRAM

## 2023-02-18 PROCEDURE — 210N000001 HC R&B IMCU HEART CARE

## 2023-02-18 PROCEDURE — 258N000001 HC RX 258: Performed by: NURSE PRACTITIONER

## 2023-02-18 PROCEDURE — 71045 X-RAY EXAM CHEST 1 VIEW: CPT

## 2023-02-18 PROCEDURE — 99233 SBSQ HOSP IP/OBS HIGH 50: CPT | Performed by: STUDENT IN AN ORGANIZED HEALTH CARE EDUCATION/TRAINING PROGRAM

## 2023-02-18 PROCEDURE — 999N000128 HC STATISTIC PERIPHERAL IV START W/O US GUIDANCE

## 2023-02-18 PROCEDURE — 250N000013 HC RX MED GY IP 250 OP 250 PS 637: Performed by: NURSE PRACTITIONER

## 2023-02-18 PROCEDURE — 250N000011 HC RX IP 250 OP 636: Performed by: HOSPITALIST

## 2023-02-18 PROCEDURE — 82803 BLOOD GASES ANY COMBINATION: CPT

## 2023-02-18 PROCEDURE — 250N000009 HC RX 250: Performed by: HOSPITALIST

## 2023-02-18 PROCEDURE — 83735 ASSAY OF MAGNESIUM: CPT | Performed by: STUDENT IN AN ORGANIZED HEALTH CARE EDUCATION/TRAINING PROGRAM

## 2023-02-18 PROCEDURE — 255N000002 HC RX 255 OP 636: Performed by: HOSPITALIST

## 2023-02-18 PROCEDURE — 250N000011 HC RX IP 250 OP 636: Performed by: STUDENT IN AN ORGANIZED HEALTH CARE EDUCATION/TRAINING PROGRAM

## 2023-02-18 PROCEDURE — 83735 ASSAY OF MAGNESIUM: CPT | Performed by: HOSPITALIST

## 2023-02-18 PROCEDURE — 93306 TTE W/DOPPLER COMPLETE: CPT | Mod: 26 | Performed by: INTERNAL MEDICINE

## 2023-02-18 PROCEDURE — 93005 ELECTROCARDIOGRAM TRACING: CPT

## 2023-02-18 PROCEDURE — 999N000157 HC STATISTIC RCP TIME EA 10 MIN

## 2023-02-18 PROCEDURE — 84484 ASSAY OF TROPONIN QUANT: CPT | Performed by: STUDENT IN AN ORGANIZED HEALTH CARE EDUCATION/TRAINING PROGRAM

## 2023-02-18 PROCEDURE — 250N000012 HC RX MED GY IP 250 OP 636 PS 637: Performed by: HOSPITALIST

## 2023-02-18 PROCEDURE — 250N000012 HC RX MED GY IP 250 OP 636 PS 637: Performed by: NURSE PRACTITIONER

## 2023-02-18 PROCEDURE — 258N000003 HC RX IP 258 OP 636: Performed by: HOSPITALIST

## 2023-02-18 PROCEDURE — 36415 COLL VENOUS BLD VENIPUNCTURE: CPT | Performed by: STUDENT IN AN ORGANIZED HEALTH CARE EDUCATION/TRAINING PROGRAM

## 2023-02-18 PROCEDURE — 99222 1ST HOSP IP/OBS MODERATE 55: CPT | Mod: 25 | Performed by: STUDENT IN AN ORGANIZED HEALTH CARE EDUCATION/TRAINING PROGRAM

## 2023-02-18 PROCEDURE — 36415 COLL VENOUS BLD VENIPUNCTURE: CPT

## 2023-02-18 PROCEDURE — 258N000003 HC RX IP 258 OP 636: Performed by: STUDENT IN AN ORGANIZED HEALTH CARE EDUCATION/TRAINING PROGRAM

## 2023-02-18 PROCEDURE — 99233 SBSQ HOSP IP/OBS HIGH 50: CPT

## 2023-02-18 PROCEDURE — 36415 COLL VENOUS BLD VENIPUNCTURE: CPT | Performed by: HOSPITALIST

## 2023-02-18 PROCEDURE — 999N000040 HC STATISTIC CONSULT NO CHARGE VASC ACCESS

## 2023-02-18 RX ORDER — FUROSEMIDE 10 MG/ML
80 INJECTION INTRAMUSCULAR; INTRAVENOUS
Status: DISCONTINUED | OUTPATIENT
Start: 2023-02-18 | End: 2023-02-18

## 2023-02-18 RX ORDER — BUMETANIDE 0.25 MG/ML
2 INJECTION INTRAMUSCULAR; INTRAVENOUS ONCE
Status: COMPLETED | OUTPATIENT
Start: 2023-02-18 | End: 2023-02-18

## 2023-02-18 RX ORDER — BUMETANIDE 0.25 MG/ML
4 INJECTION INTRAMUSCULAR; INTRAVENOUS ONCE
Status: DISCONTINUED | OUTPATIENT
Start: 2023-02-18 | End: 2023-02-18

## 2023-02-18 RX ORDER — DILTIAZEM HYDROCHLORIDE 5 MG/ML
15 INJECTION INTRAVENOUS ONCE
Status: COMPLETED | OUTPATIENT
Start: 2023-02-18 | End: 2023-02-18

## 2023-02-18 RX ORDER — CHLOROTHIAZIDE SODIUM 500 MG/1
1000 INJECTION INTRAVENOUS ONCE
Status: DISCONTINUED | OUTPATIENT
Start: 2023-02-18 | End: 2023-02-18

## 2023-02-18 RX ORDER — LIDOCAINE 40 MG/G
CREAM TOPICAL
Status: DISCONTINUED | OUTPATIENT
Start: 2023-02-18 | End: 2023-02-18

## 2023-02-18 RX ADMIN — BUMETANIDE 1 MG/HR: 0.25 INJECTION, SOLUTION INTRAMUSCULAR; INTRAVENOUS at 17:40

## 2023-02-18 RX ADMIN — DILTIAZEM HYDROCHLORIDE 5 MG/HR: 5 INJECTION, SOLUTION INTRAVENOUS at 03:35

## 2023-02-18 RX ADMIN — POTASSIUM CHLORIDE 10 MEQ: 750 TABLET, EXTENDED RELEASE ORAL at 20:20

## 2023-02-18 RX ADMIN — HUMAN ALBUMIN MICROSPHERES AND PERFLUTREN 9 ML: 10; .22 INJECTION, SOLUTION INTRAVENOUS at 11:16

## 2023-02-18 RX ADMIN — TAMSULOSIN HYDROCHLORIDE 0.4 MG: 0.4 CAPSULE ORAL at 20:20

## 2023-02-18 RX ADMIN — INSULIN ASPART 3 UNITS: 100 INJECTION, SOLUTION INTRAVENOUS; SUBCUTANEOUS at 18:07

## 2023-02-18 RX ADMIN — BUMETANIDE 2 MG: 0.25 INJECTION INTRAMUSCULAR; INTRAVENOUS at 17:36

## 2023-02-18 RX ADMIN — SODIUM CHLORIDE 1 MG/MIN: 9 INJECTION, SOLUTION INTRAVENOUS at 09:51

## 2023-02-18 RX ADMIN — DILTIAZEM HYDROCHLORIDE 15 MG: 5 INJECTION INTRAVENOUS at 03:12

## 2023-02-18 RX ADMIN — AMIODARONE HYDROCHLORIDE 150 MG: 1.5 INJECTION, SOLUTION INTRAVENOUS at 09:42

## 2023-02-18 RX ADMIN — RIVAROXABAN 20 MG: 10 TABLET, FILM COATED ORAL at 17:37

## 2023-02-18 RX ADMIN — FUROSEMIDE 40 MG: 10 INJECTION, SOLUTION INTRAMUSCULAR; INTRAVENOUS at 03:18

## 2023-02-18 RX ADMIN — DEXTROSE MONOHYDRATE 25 ML: 25 INJECTION, SOLUTION INTRAVENOUS at 09:31

## 2023-02-18 RX ADMIN — INSULIN GLARGINE 20 UNITS: 100 INJECTION, SOLUTION SUBCUTANEOUS at 23:02

## 2023-02-18 RX ADMIN — MAGNESIUM SULFATE HEPTAHYDRATE 4 G: 40 INJECTION, SOLUTION INTRAVENOUS at 00:31

## 2023-02-18 RX ADMIN — PANTOPRAZOLE SODIUM 40 MG: 40 TABLET, DELAYED RELEASE ORAL at 20:20

## 2023-02-18 ASSESSMENT — ACTIVITIES OF DAILY LIVING (ADL)
ADLS_ACUITY_SCORE: 37
ADLS_ACUITY_SCORE: 35
ADLS_ACUITY_SCORE: 35
ADLS_ACUITY_SCORE: 37
ADLS_ACUITY_SCORE: 35
ADLS_ACUITY_SCORE: 35
ADLS_ACUITY_SCORE: 43
ADLS_ACUITY_SCORE: 35

## 2023-02-18 NOTE — ED NOTES
Pt. Trial off BiPap. Oxymask on at 6 lpm. No acute respiratory distress noted, sats 99%. Pt. Is alert and oriented, follows commands.    Subjective   Patient ID: Tomeka Amador is a 78year old male. Chief Complaint   Patient presents with   â¢ Follow-up     After 14 months       HPI   The patient is a 78year old male returning today for cardiovascular followup. First visit in over a year. He is dealing with issues of dementia now on Donepezil. He still works out 2-3 times a day 5 days a week. His neck hurts due to osteoarthritis. No CP or SOB. Patient had an ischemia evaluation in December 2017. At that time he was unable to exercise adequately and thus was switched over to Edilma Ahmet. No ECG changes or perfusion abnormalities were noted. Current Outpatient Medications   Medication Sig Dispense Refill   â¢ atenolol (TENORMIN) 25 MG tablet Take 25 mg by mouth daily. â¢ Ascorbic Acid (VITAMIN C WITH ENDY HIPS) 500 MG tablet Take 500 mg by mouth daily. â¢ torsemide (DEMADEX) 10 MG tablet Take 10 mg by mouth daily. â¢ doxazosin (CARDURA) 8 MG tablet Take 8 mg by mouth nightly. â¢ atorvastatin (LIPITOR) 20 MG tablet TAKE 1 TABLET BY MOUTH DAILY 90 tablet 0   â¢ donepezil (ARICEPT) 5 MG tablet Take 1 tablet by mouth at bedtime. 90 tablet 1   â¢ allopurinol (ZYLOPRIM) 300 MG tablet Take 1 tablet by mouth daily. â¢ Omega-3 Fatty Acids (FISH OIL) 1000 MG capsule TAKE 1 CAPSULE 3 TIMES DAILY     â¢ sitaGLIPtin (JANUVIA) 25 MG tablet Take 1 tablet by mouth daily. â¢ NIFEdipine XL (PROCARDIA XL) 90 MG 24 hr tablet Take 1 tablet by mouth daily. â¢ quinapril (ACCUPRIL) 40 MG tablet Take 40 mg by mouth daily. No current facility-administered medications for this visit.       ALLERGIES:  No Known Allergies  Past Medical History:   Diagnosis Date   â¢ Coronary artery disease    â¢ History of aortic valve disorder    â¢ History of hypertension    â¢ Hyperlipidemia      Past Surgical History:   Procedure Laterality Date   â¢ Nephrectomy       Social History     Tobacco Use   Smoking Status Former Smoker   Smokeless Tobacco Former User     Social History "    Substance and Sexual Activity   Drug Use Not on file     Social History     Substance and Sexual Activity   Alcohol Use No   â¢ Frequency: Never       Patient's medications, allergies, past medical, surgical, social and family histories were reviewed and updated as appropriate. Review of Systems   Constitutional: Negative for chills, diaphoresis, fatigue and unexpected weight change. HENT: Negative for ear discharge, ear pain, facial swelling, hearing loss, mouth sores, nosebleeds, sinus pain, sore throat and trouble swallowing. Respiratory: Negative for apnea and cough. Gastrointestinal: Negative for abdominal distention, anal bleeding, blood in stool, constipation, nausea and vomiting. Endocrine: Negative for cold intolerance, heat intolerance, polydipsia, polyphagia and polyuria. Genitourinary: Negative for difficulty urinating, dysuria, frequency and urgency. Musculoskeletal: Negative for joint swelling, myalgias, neck pain and neck stiffness. Skin: Negative for color change. Allergic/Immunologic: Negative for environmental allergies and food allergies. Neurological: Negative for dizziness, seizures, facial asymmetry, speech difficulty, numbness and headaches. Hematological: Negative for adenopathy. Does not bruise/bleed easily. Psychiatric/Behavioral: Negative for agitation, behavioral problems, confusion, decreased concentration, dysphoric mood and hallucinations. The patient is not hyperactive. Objective   Visit Vitals  /58 (BP Location: [facility] [place], Patient Position: Supine, Cuff Size: Regular)   Pulse 58   Resp 18   Ht 5' 7"" (1.702 m)   Wt 80.7 kg (178 lb)   SpO2 95%   BMI 27.88 kg/mÂ²     Physical Exam   Constitutional: He is oriented to person, place, and time. He appears well-developed and well-nourished. HENT:   Head: Normocephalic and atraumatic. Eyes: Conjunctivae and EOM are normal. Pupils are equal, round, and reactive to light. Neck: Normal range of motion.  " Neck supple. No tracheal deviation present. No thyromegaly present. Cardiovascular: Normal rate, regular rhythm, S1 normal, S2 normal and normal pulses. Exam reveals no S3 and no S4. No murmur heard. Pulses:       Carotid pulses are 2+ on the right side, and 2+ on the left side. Radial pulses are 2+ on the right side, and 2+ on the left side. Femoral pulses are 2+ on the right side, and 2+ on the left side. Popliteal pulses are 2+ on the right side, and 2+ on the left side. Dorsalis pedis pulses are 2+ on the right side, and 2+ on the left side. Posterior tibial pulses are 2+ on the right side, and 2+ on the left side. Pulmonary/Chest: No accessory muscle usage or stridor. No tachypnea and no bradypnea. No respiratory distress. He has no decreased breath sounds. He has no wheezes. He has no rhonchi. Abdominal: Soft. Bowel sounds are normal. He exhibits no distension and no mass. There is no tenderness. There is no rebound and no guarding. Musculoskeletal: Normal range of motion. Lymphadenopathy:     He has no cervical adenopathy. Neurological: He is alert and oriented to person, place, and time. He has normal reflexes. No cranial nerve deficit. Coordination normal.   Skin: Skin is warm and dry. No erythema. Psychiatric: He has a normal mood and affect. His behavior is normal. Judgment normal.   Nursing note and vitals reviewed.         CHOLESTEROL (mg/dl)   Date Value   05/11/2017 140     HDL (mg/dl)   Date Value   05/11/2017 49 (L)     CHOL/HDL ( )   Date Value   05/11/2017 2.9     TRIGLYCERIDE (mg/dl)   Date Value   05/11/2017 122     CALCULATED LDL (mg/dl)   Date Value   05/11/2017 67     TSH (mcUnits/mL)   Date Value   08/28/2018 1.748         Results for orders placed or performed in visit on 01/15/19   ELECTROCARDIOGRAM 12-LEAD    Impression    SR RBBB, Inf infarct         Assessment   Problem List Items Addressed This Visit        Circulatory    Aortic valve disorder     Patient with mild aortic stenosis and insufficiency. I think these are not significant enough to warrant surgical intervention at this time. Relevant Medications    atenolol (TENORMIN) 25 MG tablet    torsemide (DEMADEX) 10 MG tablet    doxazosin (CARDURA) 8 MG tablet    Arteriosclerosis of left carotid artery - Primary    Relevant Medications    atenolol (TENORMIN) 25 MG tablet    torsemide (DEMADEX) 10 MG tablet    doxazosin (CARDURA) 8 MG tablet    Other Relevant Orders    ELECTROCARDIOGRAM 12-LEAD (Completed)    ELECTROCARDIOGRAM 12-LEAD    Atherosclerotic heart disease of native coronary artery without angina pectoris     Coronary artery disease is unchanged. Continue current treatment regimen. Regular aerobic exercise. Continue current medications. Cardiac status will be reassessed in 6 months. Relevant Medications    atenolol (TENORMIN) 25 MG tablet    torsemide (DEMADEX) 10 MG tablet    doxazosin (CARDURA) 8 MG tablet       Other    History of hypertension     Patient should continue his antihypertensive therapy. This includes an ACE inhibitor beta-blocker alpha-blocker and diuretic.

## 2023-02-18 NOTE — PROGRESS NOTES
VAT will hold on placing midline at this time as patient is in a RRT.  Second IV access established and patient has 2 working peripherals.  Unit RN will update VAT when and if midline needs to be placed.

## 2023-02-18 NOTE — PROGRESS NOTES
[facility]    Medicine Progress Note - Hospitalist Service    Date of Admission:  2/17/2023    Assessment & Plan   Carl Vázquez is a 82 year old male w/ PMH of past medical history of atrial fibrillation, DM 2, hypothyroidism, CALE admitted on 2/17/2023 w/ acute on chronic respiratory failure.      Acute on chronic respiratory failure both hypoxic and hypercarbic types multifactorial etiology including possible neuromuscular disease contributing, dysphagia/aspiration pneumonitis, cannot rule out hepatic etiology such as hepatic hydrothorax as etiology of his recurrent pleural effusion.  Based on his June 2022 TTE it seems unlikely that decompensated heart failure is contributing to his respiratory failure to a great degree. Patient is compliant with medications and low-salt diet.  covid positive <90d ago so not checked and hx generally not suggestive of infectious etiology.  - Stat ABG for baseline comparison purposes; chronic hypercarbia, CO2 in mid 80s even after 15 minutes of NIPPV  - Start NIPPV, 14/6, 0.5 FiO2  - Continue diuresis w/ lasix 40mg IV every 8 hours x24 hours; hold PTA torsemide  - Ultrasound-guided thoracentesis with diagnostic labs-->1L fluid removed 02/17/23, transudative  - Add on procalcitonin-->low; w/o fever or leukocytosis holding off on atbx for now.    -SPO2 goal greater than equal to 88%  -Appreciate pulmonary consultation for chronic retention of CO2 and assistance with discharge plan      Diastolic Dysfunction  Elevated troponin  HFrEF 25-30 percent  Possible tachycardia mediated HF   no chest pain may be related to hypoxia/demand ischemia; patient had a negative work-up within the last year with negative stress test in March 2022 and negative echo in June 2022; ekg 02/17/23 showing NSR w/ PAC, normal axis, no acute ischemic changes  -Discussed with cardiology given new TTE and WMA in the inferolateral septum, no acute concern to activate cath lab, will  trend troponins and repeat TTE tomorrow now that in sinus rhytym  -Diuresis with Lasix 80 BID, patient has not had much recorded output, will transition to bumex gtt  -Strict I&Os  -Daily weights  -Appreciate cardiology recommendations given difficult to control tachycardia with concomitant HFrEF likely tachycardia related     Abdominal pain subacute most pronounced in the lower quadrants but with increasing abdominal distention, intolerance of LE elevation  possible chronic liver disease with ongoing evaluation, following OP w/ [facility]  - CT abdomen pelvis with IV contrast-->see below, discussed results w/ spouse & anticipated need for liver MRI, timing TBD  - Abdominal ultrasound with diagnostic and therapeutic thoracentesis to evaluate for possible chronic liver disease in the event that it may be contributing to his respiratory failure--> no enough ascites for paracentesis     A-fib paroxysmal, currently in sinus rhythm  - Continue PTA DOAC  - Hold off on all calcium channel blockers patient was started on CCB overnight which was discontinued; will continue amiodarone gtt for now. digoxin on hold since end of Jan 2023     Hyponatremia mild 146  - Diuresing as above     DM2 last A1c was 8.2% in January 2023  Hypoglycemia likely secondary to taking PTA Lantus, glipizide and fasted state for anticipated ultrasound  [PTA regimen lantus 60 units q bedtime; glipizide 10mg po q morning]  - Hypoglycemia protocol  - Every 4 hours glucose checks until able to take po  - D10 infusion until able to take p.o.  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, longstanding/ progressive for at least  the 13 months, now needs to ambulate with walker, in January 2022 was using the treadmill daily  - Patient and spouse are very hopeful for discharge prior to 2/28/2023 ALS clinic visit     CALE; doesn't use cpap at home  -NIPPV as above  -may need to consider NIPPV at discharge pending above evaluation       Diet: Combination Diet Regular Diet; Moderate Consistent Carb (60 g CHO per Meal) Diet; Mechanical/Dental Soft Diet; 2 gm NA Diet; Low Saturated Fat Diet  Combination Diet Regular Diet; Moderate Consistent Carb (60 g CHO per Meal) Diet; Mechanical/Dental Soft Diet; 2 gm NA Diet; Low Saturated Fat Diet    DVT Prophylaxis: Enoxaparin (Lovenox) SQ  Power Catheter: PRESENT, indication:    Lines: None     Cardiac Monitoring: ACTIVE order. Indication: acute on chronic resp failure  Code Status: No CPR- Do NOT Intubate      Clinically Significant Risk Factors         # Hypernatremia: Highest Na = 146 mmol/L in last 2 days, will monitor as appropriate    # Hypomagnesemia: Lowest Mg = 1.3 mg/dL in last 2 days, will replace as needed   # Hypoalbuminemia: Lowest albumin = 2.7 g/dL at 2/18/2023 10:06 AM, will monitor as appropriate                    Disposition Plan      Expected Discharge Date: 02/21/2023                  Osei Curry MD  Hospitalist Service  [facility]  Securely message with [employer] (more info)  Text page via [employer] Paging/Directory   ______________________________________________________________________    Interval History   Rates of 140 all night, rhythm appears to be Afib on monitor     Physical Exam   Vital Signs: Temp: 97  F (36.1  C) Temp src: Oral BP: 95/63 Pulse: (!) 143   Resp: 18 SpO2: 93 % O2 Device: Nasal cannula Oxygen Delivery: 2 LPM  Weight: 152 lbs 14.4 oz    Constitutional: awake, alert, cooperative, no apparent distress, and appears stated age  Eyes: sclera clear, conjunctiva normal  Respiratory: BL Crakles at bases  Cardiovascular: Tachycardic, JVP 16cm, BL NICKIE  to knees  GI: soft, non-distended, non-tender, no masses palpated  Skin: no bruising or bleeding  Neurologic: Awake, alert, oriented to name, place and time.  Cranial nerves II-XII are grossly intact.        Medical Decision Making       120 MINUTES SPENT BY ME on the date of service doing chart review, history, exam, documentation & further activities per the note.      Data     I have personally reviewed the following data over the past 24 hrs:    6.6  \   15.8   / 167     144 92 (L) 10.3 /  99   4.5 48 (HH) 0.79 \       ALT: 13 AST: 25 AP: 78 TBILI: 0.4   ALB: 2.7 (L) TOT PROTEIN: 5.1 (L) LIPASE: N/A

## 2023-02-18 NOTE — PROVIDER NOTIFICATION
MD notified of HR sustaining in the 140's with BP 95/70. Dilt infusing at 15 mg/hr. Pt is on BIPAP with 50% FI02. RR 16, satting 95%. Pt  is arousable but tired. No new orders obtained. Continue close monitoring of HR and BP, RN will not hesitate to call RRT if BP decreases or pt appears in distress. IV team is in room now to start midline IV.

## 2023-02-18 NOTE — PROGRESS NOTES
RECEIVING UNIT ED HANDOFF REVIEW    ED Nurse Handoff Report was reviewed by: Nubia Christie RN on February 17, 2023 at 6:23 PM

## 2023-02-18 NOTE — SIGNIFICANT EVENT
Significant Event Note    Time of event: 2:29 AM February 18, 2023    Description of event:  Patient with a history of paroxysmal atrial fibrillation converted to atrial fibrillation overnight.  Patient had an episode of a flutter earlier in the evening treated with a bolus of diltiazem.  Patient remains hemodynamically stable.  We will rebolus the patient on diltiazem and started diltiazem drip.  Patient has a listed allergy for metoprolol therefore I will avoid beta-blockers.  And appears that the patient was recently taken off of digoxin in January.  We will defer to his rounding provider on whether to increase his p.o. dose of diltiazem or restart digoxin.        Discussed with: bedside nurse    Stephanie Tucker MD, MD

## 2023-02-18 NOTE — PROGRESS NOTES
Critical labs: pCO2 81 and bicarb 48. Patient remains on BIPAP, confirmed DNR/DNI.  Labs drawn as part of RRT, and RRT team has plans to round on this patient. Will alert provider at that time.    Darleen Raymond notified of critical labs. No new orders.

## 2023-02-18 NOTE — CONSULTS
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  From chart review as patient was not able to provide me with a lot of information, it appears that over the last 1 year or so he has had progressive dyspnea on exertion.  From a cardiovascular standpoint, he has had thorough evaluation with a negative stress test back in March 2022, a transthoracic echocardiogram which showed an EF of 55 to 60% with no significant valvular heart disease.  He was eventually diagnosed with atrial fibrillation in February 2022 at that time the A-fib burden was 4%.  He was hospitalized multiple times over the span of the last year due to various causes but most notably A-fib with RVR and multiple hospitalizations for shortness of breath and underwent thoracentesis x2 on separate occasions.    There has been various attempts to adjust his atrial fibrillation medication over the last year or so.  It appears that he was on p.o. amiodarone, digoxin and diltiazem for a while.  Then subsequently digoxin and amiodarone were discontinued.  However, then he underwent various Holter monitors which showed  very uncontrolled atrial fibrillation rate with heavy atrial fibrillation burden, most recent was done in January of this year which showed an A-fib burden 43% with an average heart rate of 135.    During this hospitalization, he was again noted to be in atrial fibrillation/flutter with poorly controlled rate.  He was started on IV diltiazem initially, then he was transitioned to IV amiodarone after transthoracic echocardiogram revealing an EF of 25 to 30% with possible regional wall abnormalities in the inferoseptum.  We were consulted due to concern for STEMI on ECG while the patient was in atrial flutter with rates in the 140s.  He denies any chest pain and a subsequent repeat ECG showed normal sinus rhythm with no significant ST elevations.  The plan was discussed by primary team with the interventional list on call and decision was not to pursue emergent coronary angiography after reviewing his serial ECGs and the fact that his troponins remained flat.    The etiology of his cardiomyopathy is likely tachyarrhythmia mediated given how poorly his rates have been controlled.  Cannot rule out ischemic heart disease is also a potential contributor given risk factors including diabetes, this is less likely given absence of anginal symptoms and a negative stress test last year.    Plan  -Continue IV amiodarone for the next 24 hours, start p.o. amiodarone 400 mg twice daily for 10 days then 200 mg daily  -Discussed with electrophysiology on Monday utility of ablation given his newly reduced EF is likely triggered by uncontrolled heart rate  -Has not responded to IV Lasix, will switch to IV Bumex 2 mg x 1 and start drip at 1 mg/h, although he does not appear significantly overloaded his proBNP is elevated and has some pitting edema  -His blood pressure is very borderline unlikely to tolerate introduction of GDMT at this point, we will continue to reassess on daily basis  -Continue Xarelto for  anticoagulation  -Appreciate pulmonology and primary  with other co morbidities  -Will need repeat TTE once rate is more controlled.     Thank you for allowing our team to participate in the care of Carl Vázquez. Please do not hesitate to page me with any questions or concerns.    Alecia Acevedo MD on 2/18/2023 at 5:12 PM    [facility]  Cardiology  Text Page   February 18, 2023    Voice recognition software utilized.          History of Present Illness:     Patient is a pleasant 82 years old male with history as above who presented to the hospital with acute hypoxic and hypercapnic respiratory failure after his wife noting that his SPO2 at home was in the 70s.  From chart review as patient was not able to provide me with a lot of information, it appears that over the last 1 year or so he has had progressive dyspnea on exertion.  From a cardiovascular standpoint, he has had thorough evaluation with a negative stress test back in March 2022, a transthoracic echocardiogram which showed an EF of 55 to 60% with no significant valvular heart disease.  He was eventually diagnosed with atrial fibrillation in February 2022 at that time the A-fib burden was 4%.  He was hospitalized multiple times over the span of the last year due to various causes but most notably A-fib with RVR and multiple hospitalizations for shortness of breath and underwent thoracentesis x2 on separate occasions.    There has been various attempts to adjust his atrial fibrillation medication over the last year or so.  It appears that he was on p.o. amiodarone, digoxin and diltiazem for a while.  Then subsequently digoxin and amiodarone were discontinued.  However, then he underwent various Holter monitors which showed very uncontrolled atrial fibrillation rate with heavy atrial fibrillation burden, most recent was done in January of this year which showed an A-fib burden 43% with an average heart rate of 135.    During this  hospitalization, he was again noted to be in atrial fibrillation/flutter with poorly controlled rate.  He was started on IV diltiazem initially, then he was transitioned to IV amiodarone after transthoracic echocardiogram revealing an EF of 25 to 30% with possible regional wall abnormalities in the inferoseptum.  We were consulted due to concern for STEMI on ECG while the patient was in atrial flutter with rates in the 140s.  He denies any chest pain and a subsequent repeat ECG showed normal sinus rhythm with no significant ST elevations.  The plan was discussed by primary team with the interventional list on call and decision was not to pursue emergent coronary angiography after reviewing his serial ECGs and the fact that his troponins remained flat.           Past Medical History:   I have reviewed this patient's past medical history  The ASCVD Risk score (Kaity MENDEZ, et al., 2019) failed to calculate for the following reasons:    The 2019 ASCVD risk score is only valid for ages 40 to 79  Past Medical History:   Diagnosis Date     Diabetes (H)      Sleep apnea     uses CPAP machine     Thyroid disease              Past Surgical History:   I have reviewed this patient's past surgical history   Past Surgical History:   Procedure Laterality Date     CHOLECYSTECTOMY      at [facility]     COLONOSCOPY      in [place], [place]     COLONOSCOPY  08/22/2017    Dr. Ja LYNN     ESOPHAGOSCOPY, GASTROSCOPY, DUODENOSCOPY (EGD), COMBINED N/A 6/7/2016    Procedure: COMBINED ESOPHAGOSCOPY, GASTROSCOPY, DUODENOSCOPY (EGD);  Surgeon: Eneida Denton MD;  Location:  GI             Social History:   I have reviewed this patient's social history  Social History     Tobacco Use     Smoking status: Never     Smokeless tobacco: Never   Substance Use Topics     Alcohol use: No     Comment: drank many years ago             Family History:   I have reviewed this patient's family history  Family History   Problem Relation Age of  Onset     Colon Cancer No family hx of              Allergies:   I have reviewed this patient's allergy history  Allergies   Allergen Reactions     Metoprolol Shortness Of Breath     Tried 1/2022, 1/2023, both time stopped for increased SOB     Statin Drugs [Hmg-Coa-R Inhibitors]              Medications reviewed:   Prior to admission medications:  Prior to Admission medications    Medication Sig Start Date End Date Taking? Authorizing Provider   diltiazem ER COATED BEADS (CARDIZEM CD) 360 MG 24 hr capsule Take 1 capsule (360 mg) by mouth daily 7/25/22  Yes Lety Dangelo PA-C   glipiZIDE (GLUCOTROL) 5 MG tablet Take 10 mg by mouth every morning (before breakfast)   Yes Unknown, Entered By History   insulin glargine (LANTUS) 100 UNIT/ML PEN Inject 60 Units Subcutaneous At Bedtime   Yes Reported, Patient   levothyroxine (SYNTHROID/LEVOTHROID) 75 MCG tablet Take 75 mcg by mouth daily   Yes Unknown, Entered By History   omeprazole (PRILOSEC) 40 MG DR capsule Take 40 mg by mouth 2 times daily   Yes Unknown, Entered By History   oxyCODONE (ROXICODONE) 5 MG tablet Take 5 mg by mouth every 4 hours as needed for pain   Yes Unknown, Entered By History   polyethylene glycol (MIRALAX) 17 GM/Dose powder Take 17 g by mouth daily 1/8/23  Yes Nichelle Meredith MD   potassium chloride ER (KLOR-CON M) 10 MEQ CR tablet Take 2 tablets (20 mEq) by mouth daily  Patient taking differently: Take 10 mEq by mouth 2 times daily 10/18/22 10/18/23 Yes Lety Dangelo PA-C   rivaroxaban ANTICOAGULANT (XARELTO) 20 MG TABS tablet Take 1 tablet (20 mg) by mouth daily (with dinner) 9/12/22  Yes Lety Dangelo PA-C   tamsulosin (FLOMAX) 0.4 MG capsule Take 1 capsule (0.4 mg) by mouth daily 1/8/23  Yes Nichelle Meredith MD   torsemide (DEMADEX) 20 MG tablet Take 20 mg by mouth daily 1/27/23  Yes Lety Dangelo PA-C   diltiazem ER (DILT-XR) 120 MG 24 hr capsule Take 120 mg by mouth daily 4/25/22 6/19/22  Unknown,  Entered By History      Current medications:  Current Facility-Administered Medications Ordered in Epic   Medication Dose Route Frequency Last Rate Last Admin     acetaminophen (TYLENOL) tablet 650 mg  650 mg Oral Q6H PRN        Or     acetaminophen (TYLENOL) Suppository 650 mg  650 mg Rectal Q6H PRN         amiodarone (CORDARONE) 900 mg in 500 mL (1.8 mg/mL) infusion (ADULT STANDARD)  0.5 mg/min Intravenous Continuous 16.67 mL/hr at 02/18/23 1505 0.5 mg/min at 02/18/23 1505     bisacodyl (DULCOLAX) suppository 10 mg  10 mg Rectal Daily PRN         bumetanide (BUMEX) 0.25 mg/mL infusion  1 mg/hr Intravenous Continuous         bumetanide (BUMEX) injection 2 mg  2 mg Intravenous Once         carboxymethylcellulose PF (REFRESH PLUS) 0.5 % ophthalmic solution 1 drop  1 drop Both Eyes Q1H PRN         dextrose 10% infusion   Intravenous Continuous   Paused at 02/18/23 0333     glucose gel 15-30 g  15-30 g Oral Q15 Min PRN        Or     dextrose 50 % injection 25-50 mL  25-50 mL Intravenous Q15 Min PRN   25 mL at 02/18/23 0931    Or     glucagon injection 1 mg  1 mg Subcutaneous Q15 Min PRN         insulin aspart (NovoLOG) injection (RAPID ACTING)  1-7 Units Subcutaneous TID AC         insulin aspart (NovoLOG) injection (RAPID ACTING)  1-5 Units Subcutaneous At Bedtime         [Held by provider] insulin glargine (LANTUS PEN) injection 25 Units  25 Units Subcutaneous At Bedtime         levothyroxine (SYNTHROID/LEVOTHROID) tablet 75 mcg  75 mcg Oral Daily         lidocaine (LMX4) cream   Topical Q1H PRN         lidocaine 1 % 0.1-1 mL  0.1-1 mL Other Q1H PRN         melatonin tablet 1 mg  1 mg Oral At Bedtime PRN         naloxone (NARCAN) injection 0.2 mg  0.2 mg Intravenous Q2 Min PRN        Or     naloxone (NARCAN) injection 0.4 mg  0.4 mg Intravenous Q2 Min PRN        Or     naloxone (NARCAN) injection 0.2 mg  0.2 mg Intramuscular Q2 Min PRN        Or     naloxone (NARCAN) injection 0.4 mg  0.4 mg Intramuscular Q2 Min PRN          nitroGLYcerin (NITROSTAT) sublingual tablet 0.4 mg  0.4 mg Sublingual Q5 Min PRN         No lozenges or gum should be given while patient on BIPAP/AVAPS/AVAPS AE   Does not apply Continuous PRN         ondansetron (ZOFRAN ODT) ODT tab 4 mg  4 mg Oral Q6H PRN        Or     ondansetron (ZOFRAN) injection 4 mg  4 mg Intravenous Q6H PRN         oxyCODONE (ROXICODONE) tablet 5 mg  5 mg Oral Q4H PRN         pantoprazole (PROTONIX) EC tablet 40 mg  40 mg Oral BID   40 mg at 02/17/23 1820     Patient is already receiving anticoagulation with heparin, enoxaparin (LOVENOX), warfarin (COUMADIN)  or other anticoagulant medication   Does not apply Continuous PRN         Patient may continue current oral medications   Does not apply Continuous PRN         polyethylene glycol (MIRALAX) Packet 17 g  17 g Oral Daily PRN         polyethylene glycol (MIRALAX) Packet 17 g  17 g Oral Daily         potassium chloride ER (KLOR-CON M) CR tablet 10 mEq  10 mEq Oral BID   10 mEq at 02/17/23 2208     prochlorperazine (COMPAZINE) injection 5 mg  5 mg Intravenous Q6H PRN        Or     prochlorperazine (COMPAZINE) tablet 5 mg  5 mg Oral Q6H PRN        Or     prochlorperazine (COMPAZINE) suppository 12.5 mg  12.5 mg Rectal Q12H PRN         rivaroxaban ANTICOAGULANT (XARELTO) tablet 20 mg  20 mg Oral Daily with supper   20 mg at 02/17/23 1820     senna-docusate (SENOKOT-S/PERICOLACE) 8.6-50 MG per tablet 1 tablet  1 tablet Oral BID PRN        Or     senna-docusate (SENOKOT-S/PERICOLACE) 8.6-50 MG per tablet 2 tablet  2 tablet Oral BID PRN         sodium chloride (PF) 0.9% PF flush 10 mL  10 mL Intracatheter Q8H         sodium chloride (PF) 0.9% PF flush 10 mL  10 mL Intracatheter Q8H         sodium chloride (PF) 0.9% PF flush 10-20 mL  10-20 mL Intracatheter q1 min prn         sodium chloride (PF) 0.9% PF flush 10-20 mL  10-20 mL Intracatheter q1 min prn         sodium chloride (PF) 0.9% PF flush 3 mL  3 mL Intracatheter Q8H   3 mL at  23     sodium chloride (PF) 0.9% PF flush 3 mL  3 mL Intracatheter q1 min prn         tamsulosin (FLOMAX) capsule 0.4 mg  0.4 mg Oral QPM   0.4 mg at 23     No current [facility]-ordered outpatient medications on file.             Review of Systems:   A comprehensive 12 system review of systems was carried out.  Pertinent positives and negatives are noted above. Otherwise negative for contributory information.         Physical Exam:   Vital signs were personally reviewed:  Temperatures:  Current - Temp: 98.6  F (37  C); Max - Temp  Av.3  F (36.3  C)  Min: 96.8  F (36  C)  Max: 98.6  F (37  C)  Respiration range: Resp  Av.5  Min: 15  Max: 30  Pulse range: Pulse  Av.4  Min: 63  Max: 149  Blood pressure range: Systolic (24hrs), Av , Min:76 , Max:123   ; Diastolic (24hrs), Av, Min:61, Max:97    Pulse oximetry range: SpO2  Av.3 %  Min: 91 %  Max: 99 %    Intake/Output Summary (Last 24 hours) at 2023 1705  Last data filed at 2023 1200  Gross per 24 hour   Intake 400 ml   Output 1325 ml   Net -925 ml     152 lbs 14.4 oz  Body mass index is 23.95 kg/m .   Body surface area is 1.81 meters squared.    Constitutional: appears stated age, in no apparent distress, appears chronically ill  Head: normocephalic, atraumatic  Neck: supple, trachea midline, no bruit bilaterally   Pulmonary: Slightly increased work of breathing, diminished breath sounds   Cardiovascular: Irregular rate and rhythm, no discernible murmurs, +1 to +2 pitting edema in the lower extremities bilaterally  Gastrointestinal: no guarding, non-rigid   Neurologic: awake, alert, moves all extremities  Skin: no jaundice, warm on limited exam  Psychiatric: affect is normal, answers questions appropriately although forgetful         Laboratory tests:   Laboratory tests personally reviewed:   [facility]  Recent Labs   Lab 23  1555 23  1242 23  1006 23  0906 23  0650 23  0154  02/17/23  2303 02/17/23  1831 02/17/23  1511 02/17/23  1107 02/17/23  0841     --   --   --  144  --   --   --   --   --  146*   POTASSIUM 4.1  --   --   --  4.5  --  3.6  --   --   --  3.9   CHLORIDE 93*  --   --   --  92*  --   --   --   --   --  97*   CO2 44*  --   --   --  48*  --   --   --   --   --  44*   ANIONGAP 4*  --   --   --  4*  --   --   --   --   --  5*   * 99  --  73 76   < >  --    < >  --    < > 59*   BUN 14.3  --   --   --  10.3  --   --   --   --   --  14.6   CR 0.75  --   --   --  0.79  --   --   --   --   --  0.82   GFRESTIMATED 90  --   --   --  89  --   --   --   --   --  88   TATE 8.3*  --   --   --  8.9  --   --   --   --   --  8.6*   MAG 1.8  --   --   --  2.2  --  1.3*  --  1.4*  --   --    PROTTOTAL 5.1*  --  5.1*  --   --   --   --   --   --   --  6.0*  6.0*   ALBUMIN 2.7*  --  2.7*  --   --   --   --   --   --   --  3.5  3.5   BILITOTAL 0.6  --  0.4  --   --   --   --   --   --   --  0.4   ALKPHOS 81  --  78  --   --   --   --   --   --   --  87   AST 22  --  25  --   --   --   --   --   --   --  27   ALT 14  --  13  --   --   --   --   --   --   --  19    < > = values in this interval not displayed.     CBC  Recent Labs   Lab 02/18/23  1555 02/18/23  0650 02/17/23  0841   WBC 8.8 6.6 6.3   RBC 5.14 5.43 5.15   HGB 14.9 15.8 15.1   HCT 49.9 51.3 49.7   MCV 97 95 97   MCH 29.0 29.1 29.3   MCHC 29.9* 30.8* 30.4*   RDW 15.9* 16.0* 16.1*    167 195     INRNo lab results found in last 7 days.  No results found for: TROPI, TROPONIN, TROPR, TROPN  No results for input(s): CHOL, HDL, LDL, TRIG, CHOLHDLRATIO in the last 69941 hours.  Lab Results   Component Value Date    A1C 8.2 01/04/2023    A1C 8.3 06/15/2022     TSH   Date Value Ref Range Status   01/04/2023 1.64 0.40 - 4.00 mU/L Final     Cardiac Studies  TTE 2/18/2023  Interpretation Summary     Technically difficult study. Patient is very tachycardic. Unclear rhythm  probably flutter.     The visual ejection fraction  is 25-30%. There is severe global hypokinesis  which is worse in the inferoseptal segments of the LV. Accurate wall motion  analysis is very challenging on this study given HR and image quality.  Moderately decreased right ventricular systolic function. RV size is normal.  No hemodynamically significant valve disease.  There is no pericardial effusion.     Compared to the prior study from 2022, EF is much lower. Reassessment of EF  and wall motion recommended when HR is better controlled.

## 2023-02-18 NOTE — PROVIDER NOTIFICATION
While on cross cover this evening I was paged by RN regarding patient going into a-flutter. EKG reviewed. Rates up to 150s but now down to 100s. RN reports patient asymptomatic. He is anticoagulated. I have ordered diltiazem 15mg IV x1 to try to convert patient. Continue tele. Monitor for signs of clinical decompensation. RN also concerned regarding patient's order of lantus 25u tonight given his concerns of hypoglycemia. Okay to hold lantus for tonight and monitor BG.    Tyrone Pires MD, MPH  Internal Medicine

## 2023-02-18 NOTE — PLAN OF CARE
BP stable since midday. HR varies as pt converts between afib, aflutter, and SR. Amiodarone infusing. 2L NC while awake, using BIPAP with sleep and as needed for dyspnea. EF is low-cards consulted. Diuretics adjusted this evening, now on bumex drip. Power catheter placed this morning during RRT when pts LOC was decreased. Since midday patient is very awake and conversant but forgetful. Very thankful for care. Up to chair with 1 assist. Chronic dysphagia-encourage soft foods and safe swallow strategies. Continue IMC status.

## 2023-02-18 NOTE — PROGRESS NOTES
Low tidal volumes, BIPAP settings increased by RT. BP fell into the 80's. Midline placement deferred for now, RRT called.

## 2023-02-18 NOTE — PROVIDER NOTIFICATION
MD Notification    Notified Person: MD    Notified Person Name: Dr Martinezkassidy,     Notification Date/Time: 2/17/2023 at 2135     Notification Interaction: Web paged and spoke with Dr Rios via phone.     Purpose of Notification: Pt converted to aflutter with 2:1 conduction, HR 150s now down to low 100s. hx of afib, anticoagulated. otherwise asymptomatic EKG done and uploaded. Also, pt had hypoglycemia earlier today and poor oral intake, D10 infusing at 25 mL/hr, has 25 unit Lantuss at bedtime, wondering parameter order to hold ? Please advice?       Orders Received: Diltiazem 15 mg IV x 1 ordered. Lantuss to hold tonight per MD.     Comments:Continue to monitor. Call if HR is consistently >120 bpm

## 2023-02-18 NOTE — PROGRESS NOTES
RT called to bedside due to low Vt's on BiPAP. Settings changed from 13/7 to 16/8. Vt's increased above 300 ml. BP was then checked after settings were changed and BP fell into the 80's. Settings decreased to 12/6 and RRT was called. Vt's fluctuate between 150-450 ml depending on pt effort. SpO2 in the mid 90's. RT attempted ABG draw x 2 but was unsuccessful. House NP aware. Order changed to VBG.  No other RT orders at this time.  2/18/2023  Laly Araujo, RT

## 2023-02-18 NOTE — PROGRESS NOTES
Patient was switched from BiPAP to NC. Pt is awake and VBG from earlier was compensated. Pt is currently on a 2L NC with SpO2 in the mid  90's. BiPAP on SB.  Will cont to monitor.  2/18/2023  Laly Araujo, RT

## 2023-02-18 NOTE — PROGRESS NOTES
A&O x 4, calm and cooperative for cares. VSS on RA except tachycardic 100-150 bmp. GARCIA, accessory muscle use with 6 litters oxygen. Reapplied, Bipap with improvement. Afib/Aflcristofer STARKS is aware. Diltiazem drip infusing at 10 mg/hr. 3+ edema to BLE, elevated with pillows. Up x 1 assist, using urinal with good output. Right chest access site, CDI.  Plan for diuresing and weaning oxygen of. Continue  Plan of care.

## 2023-02-18 NOTE — CODE/RAPID RESPONSE
[facility]    House CATARINA RRT Note  2/18/2023   Time Called: 0920    RRT called for: hypotension     Carl Vázquez is a 82 year old male w/ PMH of past medical history of atrial fibrillation, DM 2, hypothyroidism, [facility]ALE admitted on 2/17/2023 w/ acute on chronic respiratory failure.     This morning on diltiazem infusion at 15 mg/hr as he has been experiencing recurrent episodes of atrial flutter. BiPAP settings increased to optimize tidal volumes. These concurrent changes resulted in hypotension 87/69 with atrial flutter rates 149 prompting RRT.     Assessment & Plan   Acute on chronic hypoxic and hypercarbic respiratory failure   Atrial fibrillation RVR rates  and recurrent atrial flutter and associated hypotension   Alternatively considered progression of lower motor neuron disease; ALS. PE however on rivaroxaban.      INTERVENTIONS:  - mild hypoglycemia, half amp D50 given   - abg and vbg; difficult stick for RT and lab.   *VBG 7.38/81/42/48   - pCXR; showing small bilateral pleural effusions noted right thoracentesis 2/17 with 1L removed   - spoke with Dr. Messer of IR regarding repeating thoracentesis, IR did not feel there would be enough fluid to remove to improve symptoms   - transitioned from diltiazem infusion to amio infusion; hypotension improved with better rate control    - BiPAP previously on 13/7, RT assessment low TVe and settings increased to16/8   - previously ordered echo to be completed today 2/18 patient next in que   - ongoing abdominal pain with tenderness to light palpation, repeating hepatic panel   - lazo place for urinary retention, unable to obtain accurate bladder scan volume and was receiving ongoing diuresis   - confirmed with patients spouse Dorothy the patient is DNR/I     I personally reviewed the radiographs     At the end of the RRT patient hemodynamically stable on BiPAP, more alert and SOB improved. Hypotension improved with transition to amio  infusion.      Disposition [facility]    Discussed with and defer further cares to hospitalist attending physician Dr Curry        Code Status: No CPR- Do NOT Intubate    Allergies   Allergies   Allergen Reactions     Metoprolol Shortness Of Breath     Tried 1/2022, 1/2023, both time stopped for increased SOB     Statin Drugs [Hmg-Coa-R Inhibitors]        Physical Exam   Vital Signs with Ranges:  Temp:  [96.8  F (36  C)] 96.8  F (36  C)  Pulse:  [] 147  Resp:  [8-28] 16  BP: ()/(59-80) 104/72  FiO2 (%):  [50 %] 50 %  SpO2:  [91 %-99 %] 97 %  I/O last 3 completed shifts:  In: -   Out: 1325 [Urine:1325]      Constitutional: lethargic, cooperative, mild respiratory apparent distress.   ENT: Normocephalic, without obvious abnormality, atraumatic, oral pharynx with dry mucus membranes, tonsils without erythema or exudates.  Eyes pupils are equal, round and reactive to light; extra occular movements intact.  Normal sclera.    Neck: Supple, symmetrical, trachea midline, no adenopathy.  Pulmonary: increased work of breathing, diminished air exchange to right LL, clear to auscultation bilaterally, no crackles or wheezing.  Cardiovascular: Warm and dry. Regular rate and rhythm, normal S1 and S2, no S3 or S4, and no murmur noted.  GI: Normal bowel sounds, soft, mild distended, tender to light palpation.   Skin/Integumen: Visualized skin appeared clear.  Neuro: CN II-XII grossly intact.  Weak upper and lower extremities strength 3/5 to gravity, sensation intact bilaterally.    Psych:  Alert and oriented x 3. Normal affect.  Extremities: 2+ lower extremity edema noted, and calves are non-tender to palpation bilaterally. Dorsal pedal pulses and posterior tibial pulses palpable.    : Power catheter in place with clear yellow urine in the bag.       Data   Recent Labs   Lab 02/18/23  1006 02/17/23  1348 02/17/23  1142   PH  --  7.37 7.34*   PHV 7.38  --   --    PO2  --  70* 40*   PO2V 42  --   --    PCO2  --  80* 84*   PCO2V  81*  --   --    HCO3  --  47* 45*   HCO3V 48*  --   --      Recent Labs   Lab 02/18/23  0650 02/17/23  0841   WBC 6.6 6.3   HGB 15.8 15.1   HCT 51.3 49.7   MCV 95 97    195     Recent Labs   Lab 02/18/23  0906 02/18/23  0650 02/18/23  0154 02/17/23  2303 02/17/23  1107 02/17/23  0841   NA  --  144  --   --   --  146*   POTASSIUM  --  4.5  --  3.6  --  3.9   CHLORIDE  --  92*  --   --   --  97*   CO2  --  48*  --   --   --  44*   ANIONGAP  --  4*  --   --   --  5*   GLC 73 76 98  --    < > 59*   BUN  --  10.3  --   --   --  14.6   CR  --  0.79  --   --   --  0.82   GFRESTIMATED  --  89  --   --   --  88   TATE  --  8.9  --   --   --  8.6*    < > = values in this interval not displayed.     Recent Labs   Lab 02/18/23  0906 02/18/23  0650 02/18/23  0154 02/17/23  2112 02/17/23  2018   GLC 73 76 98 100* 95     Recent Labs   Lab 02/18/23  1006 02/17/23  0841   AST 25 27   ALT 13 19   ALKPHOS 78 87   BILITOTAL 0.4 0.4     IMAGING: (X-ray/CT/MRI)   Recent Results (from the past 24 hour(s))   CT Abdomen Pelvis w Contrast    Narrative    CT ABDOMEN PELVIS W CONTRAST 2/17/2023 2:14 PM    CLINICAL HISTORY: abd pain mostly in bilat lower quadrants, ab  distention    TECHNIQUE: CT scan of the abdomen and pelvis was performed following  injection of IV contrast. Multiplanar reformats were obtained. Dose  reduction techniques were used.  CONTRAST: 75 mL Isovue-370    COMPARISON: 1/5/2023 and 9/25/2019    FINDINGS:   LOWER CHEST: Partly visualized moderate to large right pleural  effusion and right basilar atelectasis. Left basilar atelectasis.    HEPATOBILIARY: Hepatic steatosis. Slightly nodular hepatic contour  suggestive of hepatic parenchymal disease. Linear hypodense area  around the gallbladder fossa is indeterminate, may be due to focal fat  deposition. Cholecystectomy. No biliary ductal dilatation.    PANCREAS: Mild diffuse pancreatic parenchymal atrophy. No pancreatic  duct dilatation or peripancreatic inflammatory  changes.    SPLEEN: Normal.    ADRENAL GLANDS: Stable diffuse mild thickening. No discrete masses.    KIDNEYS/BLADDER: Small nonobstructing right renal calculi measuring up  to 4 mm. No hydronephrosis, perinephric stranding or focal masses in  either kidney. Indeterminate hypodensity at the urinary bladder neck,  may be related to prior TURP.    BOWEL: No small bowel or colonic obstruction or inflammatory changes.  Normal appendix. Extensive colonic diverticulosis.    PELVIC ORGANS: No pelvic masses.    ADDITIONAL FINDINGS: Trace ascites with small amount of fluid in the  lower quadrants. Ventral abdominal mesh. No lymphadenopathy. No  abscess or free air.    MUSCULOSKELETAL: Degenerative changes in the spine. No suspicious  lesions in the bones.      Impression    IMPRESSION:   1.  Partly visualized at least moderate right pleural effusion.  Bibasilar atelectasis.  2.  Trace ascites.  3.  Hepatic steatosis and mildly nodular hepatic contour suggestive of  hepatic parenchymal disease. Indeterminate linear hypodensity adjacent  to the gallbladder fossa, may be a focus of fat deposition.  Nonemergent liver MRI is recommended for better characterization.  4.  Extensive colonic diverticulosis without diverticulitis.  5.  Hypodense area in the urinary bladder neck, indeterminate and may  be related to prior TURP. Correlate with history.    DEEPAK OLIVER MD         SYSTEM ID:  [id number]   US Abdomen Limited    Narrative    US ABDOMEN LIMITED 2/17/2023 4:00 PM    CLINICAL HISTORY: HIGH VOLUME paracentesis with or without diagnostic  fluid analysis with labs to be drawn if ordered. Total paracentesis  volume as much as possible.  TECHNIQUE: Limited abdominal ultrasound.    COMPARISON: CT today.      Impression    IMPRESSION:  1.  No ascites.    DEEPAK OLIVER MD         SYSTEM ID:  [id number]   US Thoracentesis    Narrative    ULTRASOUND GUIDED THORACENTESIS  2/17/2023 4:01 PM     HISTORY: Recurrent pleural effusion with  respiratory failure on bipap.    FINDINGS: Ultrasound was used to evaluate for the presence and best  approach for drainage of a pleural effusion. Written and oral informed  consent was obtained. A pause for the cause procedure to verify the  correct patient and correct procedure. The skin overlying the right  chest posteriorly was prepped and draped in the usual sterile fashion.  The subcutaneous tissues were anesthetized with 10 mL 1% lidocaine. A  catheter was advanced into the pleural space and 1000 mL of  delilah  colored fluid was drained. Patient was monitored by nurse under my  direct supervision throughout the exam. Ultrasound images were  permanently stored.  There were no immediate complications. Patient  left the ultrasound suite in satisfactory condition.      Impression    IMPRESSION: Technically successful thoracentesis without immediate  complications.       CBC with Diff:  Recent Labs   Lab Test 02/18/23  0650   WBC 6.6   HGB 15.8   MCV 95          UA:  Recent Labs   Lab 02/17/23  1155   COLOR Straw   APPEARANCE Clear   URINEGLC Negative   URINEBILI Negative   URINEKETONE Negative   SG 1.008   UBLD Negative   URINEPH 6.0   PROTEIN Negative   NITRITE Negative   LEUKEST Negative   RBCU 1   WBCU 2       Time Spent on this Encounter   I spent 80  minutes on the unit/floor managing the care of Carl Vázquez. Over 50% of my time was spent counseling the patient and/or coordinating care regarding services listed in this note.      Darleen Raymond NP  [facility]  Securely message with the Vocera Web Console (learn more here)  Text page via [email or web address] Paging/Directory

## 2023-02-19 ENCOUNTER — APPOINTMENT (OUTPATIENT)
Dept: PHYSICAL THERAPY | Facility: CLINIC | Age: 83
DRG: 246 | End: 2023-02-19
Attending: NURSE PRACTITIONER
Payer: COMMERCIAL

## 2023-02-19 ENCOUNTER — APPOINTMENT (OUTPATIENT)
Dept: OCCUPATIONAL THERAPY | Facility: CLINIC | Age: 83
DRG: 246 | End: 2023-02-19
Attending: NURSE PRACTITIONER
Payer: COMMERCIAL

## 2023-02-19 ENCOUNTER — APPOINTMENT (OUTPATIENT)
Dept: SPEECH THERAPY | Facility: CLINIC | Age: 83
DRG: 246 | End: 2023-02-19
Attending: INTERNAL MEDICINE
Payer: COMMERCIAL

## 2023-02-19 LAB
ALBUMIN SERPL BCG-MCNC: 2.8 G/DL (ref 3.5–5.2)
ALP SERPL-CCNC: 81 U/L (ref 40–129)
ALT SERPL W P-5'-P-CCNC: 13 U/L (ref 10–50)
ANION GAP SERPL CALCULATED.3IONS-SCNC: 4 MMOL/L (ref 7–15)
ANION GAP SERPL CALCULATED.3IONS-SCNC: 5 MMOL/L (ref 7–15)
ANION GAP SERPL CALCULATED.3IONS-SCNC: 5 MMOL/L (ref 7–15)
APTT PPP: 64 SECONDS (ref 22–38)
AST SERPL W P-5'-P-CCNC: 15 U/L (ref 10–50)
BASOPHILS # BLD AUTO: 0 10E3/UL (ref 0–0.2)
BASOPHILS NFR BLD AUTO: 0 %
BILIRUB SERPL-MCNC: 0.6 MG/DL
BUN SERPL-MCNC: 15.9 MG/DL (ref 8–23)
BUN SERPL-MCNC: 15.9 MG/DL (ref 8–23)
BUN SERPL-MCNC: 16.9 MG/DL (ref 8–23)
CALCIUM SERPL-MCNC: 8.2 MG/DL (ref 8.8–10.2)
CHLORIDE SERPL-SCNC: 85 MMOL/L (ref 98–107)
CHLORIDE SERPL-SCNC: 91 MMOL/L (ref 98–107)
CHLORIDE SERPL-SCNC: 91 MMOL/L (ref 98–107)
CREAT SERPL-MCNC: 0.66 MG/DL (ref 0.67–1.17)
CREAT SERPL-MCNC: 0.66 MG/DL (ref 0.67–1.17)
CREAT SERPL-MCNC: 0.74 MG/DL (ref 0.67–1.17)
DEPRECATED HCO3 PLAS-SCNC: 47 MMOL/L (ref 22–29)
DEPRECATED HCO3 PLAS-SCNC: 47 MMOL/L (ref 22–29)
DEPRECATED HCO3 PLAS-SCNC: 48 MMOL/L (ref 22–29)
EOSINOPHIL # BLD AUTO: 0 10E3/UL (ref 0–0.7)
EOSINOPHIL NFR BLD AUTO: 0 %
ERYTHROCYTE [DISTWIDTH] IN BLOOD BY AUTOMATED COUNT: 16.1 % (ref 10–15)
GFR SERPL CREATININE-BSD FRML MDRD: 90 ML/MIN/1.73M2
GFR SERPL CREATININE-BSD FRML MDRD: >90 ML/MIN/1.73M2
GFR SERPL CREATININE-BSD FRML MDRD: >90 ML/MIN/1.73M2
GLUCOSE BLDC GLUCOMTR-MCNC: 144 MG/DL (ref 70–99)
GLUCOSE BLDC GLUCOMTR-MCNC: 201 MG/DL (ref 70–99)
GLUCOSE BLDC GLUCOMTR-MCNC: 203 MG/DL (ref 70–99)
GLUCOSE BLDC GLUCOMTR-MCNC: 234 MG/DL (ref 70–99)
GLUCOSE BLDC GLUCOMTR-MCNC: 242 MG/DL (ref 70–99)
GLUCOSE SERPL-MCNC: 145 MG/DL (ref 70–99)
GLUCOSE SERPL-MCNC: 145 MG/DL (ref 70–99)
GLUCOSE SERPL-MCNC: 224 MG/DL (ref 70–99)
HCT VFR BLD AUTO: 49.3 % (ref 40–53)
HGB BLD-MCNC: 15 G/DL (ref 13.3–17.7)
IMM GRANULOCYTES # BLD: 0 10E3/UL
IMM GRANULOCYTES NFR BLD: 0 %
LYMPHOCYTES # BLD AUTO: 1.4 10E3/UL (ref 0.8–5.3)
LYMPHOCYTES NFR BLD AUTO: 17 %
MAGNESIUM SERPL-MCNC: 1.3 MG/DL (ref 1.7–2.3)
MAGNESIUM SERPL-MCNC: 2 MG/DL (ref 1.7–2.3)
MAGNESIUM SERPL-MCNC: 2.2 MG/DL (ref 1.7–2.3)
MAGNESIUM SERPL-MCNC: 2.9 MG/DL (ref 1.7–2.3)
MCH RBC QN AUTO: 28.5 PG (ref 26.5–33)
MCHC RBC AUTO-ENTMCNC: 30.4 G/DL (ref 31.5–36.5)
MCV RBC AUTO: 94 FL (ref 78–100)
MONOCYTES # BLD AUTO: 0.6 10E3/UL (ref 0–1.3)
MONOCYTES NFR BLD AUTO: 7 %
NEUTROPHILS # BLD AUTO: 6 10E3/UL (ref 1.6–8.3)
NEUTROPHILS NFR BLD AUTO: 76 %
NRBC # BLD AUTO: 0 10E3/UL
NRBC BLD AUTO-RTO: 0 /100
PLATELET # BLD AUTO: 157 10E3/UL (ref 150–450)
POTASSIUM SERPL-SCNC: 2.9 MMOL/L (ref 3.4–5.3)
POTASSIUM SERPL-SCNC: 2.9 MMOL/L (ref 3.4–5.3)
POTASSIUM SERPL-SCNC: 3.4 MMOL/L (ref 3.4–5.3)
POTASSIUM SERPL-SCNC: 3.9 MMOL/L (ref 3.4–5.3)
POTASSIUM SERPL-SCNC: 4.1 MMOL/L (ref 3.4–5.3)
PROT SERPL-MCNC: 5.4 G/DL (ref 6.4–8.3)
RBC # BLD AUTO: 5.27 10E6/UL (ref 4.4–5.9)
SODIUM SERPL-SCNC: 137 MMOL/L (ref 136–145)
SODIUM SERPL-SCNC: 143 MMOL/L (ref 136–145)
SODIUM SERPL-SCNC: 143 MMOL/L (ref 136–145)
WBC # BLD AUTO: 8 10E3/UL (ref 4–11)

## 2023-02-19 PROCEDURE — 92526 ORAL FUNCTION THERAPY: CPT | Mod: GN

## 2023-02-19 PROCEDURE — 83735 ASSAY OF MAGNESIUM: CPT | Performed by: STUDENT IN AN ORGANIZED HEALTH CARE EDUCATION/TRAINING PROGRAM

## 2023-02-19 PROCEDURE — 97165 OT EVAL LOW COMPLEX 30 MIN: CPT | Mod: GO | Performed by: OCCUPATIONAL THERAPIST

## 2023-02-19 PROCEDURE — 99232 SBSQ HOSP IP/OBS MODERATE 35: CPT | Performed by: STUDENT IN AN ORGANIZED HEALTH CARE EDUCATION/TRAINING PROGRAM

## 2023-02-19 PROCEDURE — 84132 ASSAY OF SERUM POTASSIUM: CPT | Performed by: STUDENT IN AN ORGANIZED HEALTH CARE EDUCATION/TRAINING PROGRAM

## 2023-02-19 PROCEDURE — 80053 COMPREHEN METABOLIC PANEL: CPT | Performed by: STUDENT IN AN ORGANIZED HEALTH CARE EDUCATION/TRAINING PROGRAM

## 2023-02-19 PROCEDURE — 36415 COLL VENOUS BLD VENIPUNCTURE: CPT | Performed by: STUDENT IN AN ORGANIZED HEALTH CARE EDUCATION/TRAINING PROGRAM

## 2023-02-19 PROCEDURE — 93010 ELECTROCARDIOGRAM REPORT: CPT | Performed by: INTERNAL MEDICINE

## 2023-02-19 PROCEDURE — 250N000013 HC RX MED GY IP 250 OP 250 PS 637: Performed by: INTERNAL MEDICINE

## 2023-02-19 PROCEDURE — 84132 ASSAY OF SERUM POTASSIUM: CPT | Performed by: INTERNAL MEDICINE

## 2023-02-19 PROCEDURE — 250N000011 HC RX IP 250 OP 636: Performed by: STUDENT IN AN ORGANIZED HEALTH CARE EDUCATION/TRAINING PROGRAM

## 2023-02-19 PROCEDURE — 97161 PT EVAL LOW COMPLEX 20 MIN: CPT | Mod: GP

## 2023-02-19 PROCEDURE — 250N000013 HC RX MED GY IP 250 OP 250 PS 637: Performed by: NURSE PRACTITIONER

## 2023-02-19 PROCEDURE — 999N000157 HC STATISTIC RCP TIME EA 10 MIN

## 2023-02-19 PROCEDURE — 99232 SBSQ HOSP IP/OBS MODERATE 35: CPT | Performed by: INTERNAL MEDICINE

## 2023-02-19 PROCEDURE — 97535 SELF CARE MNGMENT TRAINING: CPT | Mod: GO | Performed by: OCCUPATIONAL THERAPIST

## 2023-02-19 PROCEDURE — 250N000013 HC RX MED GY IP 250 OP 250 PS 637: Performed by: STUDENT IN AN ORGANIZED HEALTH CARE EDUCATION/TRAINING PROGRAM

## 2023-02-19 PROCEDURE — 92610 EVALUATE SWALLOWING FUNCTION: CPT | Mod: GN

## 2023-02-19 PROCEDURE — 250N000011 HC RX IP 250 OP 636: Performed by: INTERNAL MEDICINE

## 2023-02-19 PROCEDURE — 210N000001 HC R&B IMCU HEART CARE

## 2023-02-19 PROCEDURE — 83735 ASSAY OF MAGNESIUM: CPT | Performed by: INTERNAL MEDICINE

## 2023-02-19 PROCEDURE — 93005 ELECTROCARDIOGRAM TRACING: CPT

## 2023-02-19 PROCEDURE — 250N000009 HC RX 250: Performed by: STUDENT IN AN ORGANIZED HEALTH CARE EDUCATION/TRAINING PROGRAM

## 2023-02-19 PROCEDURE — 97530 THERAPEUTIC ACTIVITIES: CPT | Mod: GP

## 2023-02-19 PROCEDURE — 97116 GAIT TRAINING THERAPY: CPT | Mod: GP

## 2023-02-19 PROCEDURE — 94660 CPAP INITIATION&MGMT: CPT

## 2023-02-19 PROCEDURE — 36415 COLL VENOUS BLD VENIPUNCTURE: CPT | Performed by: INTERNAL MEDICINE

## 2023-02-19 PROCEDURE — 85025 COMPLETE CBC W/AUTO DIFF WBC: CPT | Performed by: STUDENT IN AN ORGANIZED HEALTH CARE EDUCATION/TRAINING PROGRAM

## 2023-02-19 PROCEDURE — 85730 THROMBOPLASTIN TIME PARTIAL: CPT | Performed by: INTERNAL MEDICINE

## 2023-02-19 RX ORDER — MAGNESIUM SULFATE HEPTAHYDRATE 40 MG/ML
4 INJECTION, SOLUTION INTRAVENOUS ONCE
Status: COMPLETED | OUTPATIENT
Start: 2023-02-19 | End: 2023-02-19

## 2023-02-19 RX ORDER — POTASSIUM CHLORIDE 1500 MG/1
40 TABLET, EXTENDED RELEASE ORAL ONCE
Status: COMPLETED | OUTPATIENT
Start: 2023-02-19 | End: 2023-02-20

## 2023-02-19 RX ORDER — MAGNESIUM OXIDE 400 MG/1
400 TABLET ORAL EVERY 4 HOURS
Status: COMPLETED | OUTPATIENT
Start: 2023-02-19 | End: 2023-02-20

## 2023-02-19 RX ORDER — POTASSIUM CHLORIDE 7.45 MG/ML
10 INJECTION INTRAVENOUS ONCE
Status: COMPLETED | OUTPATIENT
Start: 2023-02-19 | End: 2023-02-19

## 2023-02-19 RX ORDER — POTASSIUM CHLORIDE 7.45 MG/ML
10 INJECTION INTRAVENOUS ONCE
Status: DISCONTINUED | OUTPATIENT
Start: 2023-02-19 | End: 2023-02-19

## 2023-02-19 RX ORDER — MAGNESIUM SULFATE HEPTAHYDRATE 40 MG/ML
2 INJECTION, SOLUTION INTRAVENOUS ONCE
Status: COMPLETED | OUTPATIENT
Start: 2023-02-19 | End: 2023-02-19

## 2023-02-19 RX ORDER — HEPARIN SODIUM 10000 [USP'U]/100ML
0-5000 INJECTION, SOLUTION INTRAVENOUS CONTINUOUS
Status: DISCONTINUED | OUTPATIENT
Start: 2023-02-19 | End: 2023-02-21

## 2023-02-19 RX ORDER — POTASSIUM CHLORIDE 1.5 G/1.58G
40 POWDER, FOR SOLUTION ORAL ONCE
Status: COMPLETED | OUTPATIENT
Start: 2023-02-19 | End: 2023-02-19

## 2023-02-19 RX ORDER — POTASSIUM CHLORIDE 1.5 G/1.58G
20 POWDER, FOR SOLUTION ORAL ONCE
Status: DISCONTINUED | OUTPATIENT
Start: 2023-02-19 | End: 2023-02-19

## 2023-02-19 RX ORDER — POTASSIUM CHLORIDE 1.5 G/1.58G
20 POWDER, FOR SOLUTION ORAL ONCE
Status: COMPLETED | OUTPATIENT
Start: 2023-02-19 | End: 2023-02-19

## 2023-02-19 RX ORDER — AMIODARONE HYDROCHLORIDE 200 MG/1
200 TABLET ORAL 2 TIMES DAILY
Status: DISCONTINUED | OUTPATIENT
Start: 2023-02-19 | End: 2023-02-23 | Stop reason: HOSPADM

## 2023-02-19 RX ADMIN — POTASSIUM CHLORIDE 20 MEQ: 1.5 POWDER, FOR SOLUTION ORAL at 09:58

## 2023-02-19 RX ADMIN — LEVOTHYROXINE SODIUM 75 MCG: 75 TABLET ORAL at 08:10

## 2023-02-19 RX ADMIN — PANTOPRAZOLE SODIUM 40 MG: 40 TABLET, DELAYED RELEASE ORAL at 20:28

## 2023-02-19 RX ADMIN — POTASSIUM CHLORIDE 40 MEQ: 1.5 POWDER, FOR SOLUTION ORAL at 08:08

## 2023-02-19 RX ADMIN — BUMETANIDE 0.5 MG/HR: 0.25 INJECTION, SOLUTION INTRAMUSCULAR; INTRAVENOUS at 16:30

## 2023-02-19 RX ADMIN — POTASSIUM CHLORIDE 10 MEQ: 750 TABLET, EXTENDED RELEASE ORAL at 20:28

## 2023-02-19 RX ADMIN — INSULIN ASPART 1 UNITS: 100 INJECTION, SOLUTION INTRAVENOUS; SUBCUTANEOUS at 08:13

## 2023-02-19 RX ADMIN — TAMSULOSIN HYDROCHLORIDE 0.4 MG: 0.4 CAPSULE ORAL at 20:28

## 2023-02-19 RX ADMIN — INSULIN GLARGINE 20 UNITS: 100 INJECTION, SOLUTION SUBCUTANEOUS at 22:56

## 2023-02-19 RX ADMIN — AMIODARONE HYDROCHLORIDE 200 MG: 200 TABLET ORAL at 20:28

## 2023-02-19 RX ADMIN — INSULIN ASPART 3 UNITS: 100 INJECTION, SOLUTION INTRAVENOUS; SUBCUTANEOUS at 17:04

## 2023-02-19 RX ADMIN — PANTOPRAZOLE SODIUM 40 MG: 40 TABLET, DELAYED RELEASE ORAL at 08:10

## 2023-02-19 RX ADMIN — MAGNESIUM SULFATE HEPTAHYDRATE 4 G: 40 INJECTION, SOLUTION INTRAVENOUS at 08:03

## 2023-02-19 RX ADMIN — HEPARIN SODIUM 800 UNITS/HR: 10000 INJECTION, SOLUTION INTRAVENOUS at 17:01

## 2023-02-19 RX ADMIN — BUMETANIDE 1 MG/HR: 0.25 INJECTION, SOLUTION INTRAMUSCULAR; INTRAVENOUS at 01:23

## 2023-02-19 RX ADMIN — POLYETHYLENE GLYCOL 3350 17 G: 17 POWDER, FOR SOLUTION ORAL at 08:08

## 2023-02-19 RX ADMIN — POTASSIUM CHLORIDE 10 MEQ: 7.46 INJECTION, SOLUTION INTRAVENOUS at 09:58

## 2023-02-19 RX ADMIN — INSULIN ASPART 2 UNITS: 100 INJECTION, SOLUTION INTRAVENOUS; SUBCUTANEOUS at 13:04

## 2023-02-19 RX ADMIN — MAGNESIUM SULFATE HEPTAHYDRATE 2 G: 40 INJECTION, SOLUTION INTRAVENOUS at 11:15

## 2023-02-19 ASSESSMENT — ACTIVITIES OF DAILY LIVING (ADL)
CONCENTRATING,_REMEMBERING_OR_MAKING_DECISIONS_DIFFICULTY: NO
ADLS_ACUITY_SCORE: 43
ADLS_ACUITY_SCORE: 49
SWALLOWING: 2-->DIFFICULTY SWALLOWING FOODS
EATING: 0-->INDEPENDENT
BATHING: 1-->ASSISTANCE NEEDED
DRESSING/BATHING_MANAGEMENT: ASSISTANCE NEEDED
ADLS_ACUITY_SCORE: 42
ADLS_ACUITY_SCORE: 42
TOILETING_MANAGEMENT: FOLEY CATH
ADLS_ACUITY_SCORE: 43
DOING_ERRANDS_INDEPENDENTLY_DIFFICULTY: YES
TRANSFERRING: 1-->ASSISTANCE (EQUIPMENT/PERSON) NEEDED
TOILETING: 0-->NOT TOILET TRAINED OR ASSISTANCE NEEDED (DEVELOPMENTALLY APPROPRIATE)
EATING/SWALLOWING: SWALLOWING SOLID FOOD
WEAR_GLASSES_OR_BLIND: NO
DIFFICULTY_EATING/SWALLOWING: YES
TOILETING_ASSISTANCE: TOILETING DIFFICULTY, REQUIRES EQUIPMENT
TOILETING_ISSUES: YES
ADLS_ACUITY_SCORE: 43
ADLS_ACUITY_SCORE: 43
DRESSING/BATHING: BATHING DIFFICULTY, ASSISTANCE 1 PERSON
SWALLOWING: 2-->DIFFICULTY SWALLOWING FOODS
ADLS_ACUITY_SCORE: 49
WALKING_OR_CLIMBING_STAIRS_DIFFICULTY: YES
EATING: 0-->INDEPENDENT
TRANSFERRING: 0-->ASSISTANCE NEEDED (DEVELOPMENTALLY APPROPRIATE)
ADLS_ACUITY_SCORE: 39
DRESSING/BATHING_DIFFICULTY: YES
TOILETING: 1-->ASSISTANCE (EQUIPMENT/PERSON) NEEDED
ADLS_ACUITY_SCORE: 43
DRESS: 0-->ASSISTANCE NEEDED (DEVELOPMENTALLY APPROPRIATE)
ADLS_ACUITY_SCORE: 39
FALL_HISTORY_WITHIN_LAST_SIX_MONTHS: NO
CHANGE_IN_FUNCTIONAL_STATUS_SINCE_ONSET_OF_CURRENT_ILLNESS/INJURY: YES
ADLS_ACUITY_SCORE: 39
WALKING_OR_CLIMBING_STAIRS: STAIR CLIMBING DIFFICULTY, ASSISTANCE 1 PERSON
EATING/SWALLOWING_MANAGEMENT: DIET CHANGE
DRESS: 1-->ASSISTANCE (EQUIPMENT/PERSON) NEEDED

## 2023-02-19 NOTE — PROVIDER NOTIFICATION
MD Notification    Notified Person: MD    Notified Person Name:    Notification Date/Time: Dr Khalil    Notification Interaction: web paged    Purpose of Notification: Pt BG is 351, FYI; was NPO yesterday and was poorly eating until this morning. Currently eating better. Lantus was put on hold last night, wondering if you can resume?     Orders Received: Lantus decreased to 20 units and resumed.     Comments:

## 2023-02-19 NOTE — PROGRESS NOTES
02/19/23 0949   Appointment Info   Signing Clinician's Name / Credentials (PT) Cookie Bustos, PT, DPT   Living Environment   People in Home spouse   Current Living Arrangements house   Home Accessibility stairs to enter home;stairs within home   Number of Stairs, Main Entrance 1   Stair Railings, Main Entrance railings safe and in good condition   Number of Stairs, Within Home, Primary greater than 10 stairs  (11)   Stair Railings, Within Home, Primary railings safe and in good condition   Transportation Anticipated family or friend will provide   Living Environment Comments Pt can stay on main level   Self-Care   Usual Activity Tolerance good   Current Activity Tolerance moderate   Regular Exercise No   Equipment Currently Used at Home walker, rolling   Fall history within last six months yes   Number of times patient has fallen within last six months 3   Activity/Exercise/Self-Care Comment Pt has not been as active the last few months, has been using a walker, spouse assists as needed, appears to have PCA care as well but pt unsure.   General Information   Onset of Illness/Injury or Date of Surgery 02/17/23   Referring Physician Fabiana Leonard APRN CNP   Patient/Family Therapy Goals Statement (PT) To go home   Pertinent History of Current Problem (include personal factors and/or comorbidities that impact the POC) Pt is 82 year old male adm on 2/17/23 with SOB, underwent thoracentesis. PMH includes COPD, CHF, pleural effusion, HTN, incr chol, a-fib, DM II, recent hospitalization with COVID.   Existing Precautions/Restrictions fall   Cognition   Affect/Mental Status (Cognition) WFL   Orientation Status (Cognition) oriented to;person;place   Follows Commands (Cognition) WFL   Pain Assessment   Patient Currently in Pain No   Posture    Posture Forward head position;Protracted shoulders   Range of Motion (ROM)   Range of Motion ROM is WFL   Strength (Manual Muscle Testing)   Strength (Manual Muscle Testing)  Deficits observed during functional mobility   Strength Comments Pt generally deconditioned, no focal weakness of LEs noted   Bed Mobility   Comment, (Bed Mobility) Not assessed at this time, pt OOB in chair   Transfers   Comment, (Transfers) Min assist   Gait/Stairs (Locomotion)   Comment, (Gait/Stairs) Min assist   Balance   Balance Comments No overt LOB but unsteady at times with ambulation   Clinical Impression   Criteria for Skilled Therapeutic Intervention Yes, treatment indicated   PT Diagnosis (PT) Impaired mobility   Influenced by the following impairments Decreased strength, decreased balance, decreased activity tolerance   Functional limitations due to impairments Decreased ability to participate in daily tasks   Clinical Presentation (PT Evaluation Complexity) Stable/Uncomplicated   Clinical Presentation Rationale Current presentation, [facility]   Clinical Decision Making (Complexity) low complexity   Planned Therapy Interventions (PT) balance training;gait training;home exercise program;patient/family education;stair training;strengthening;transfer training   Risk & Benefits of therapy have been explained evaluation/treatment results reviewed;care plan/treatment goals reviewed;risks/benefits reviewed;current/potential barriers reviewed;participants voiced agreement with care plan;participants included;patient   PT Total Evaluation Time   PT Eval, Low Complexity Minutes (14867) 10   Physical Therapy Goals   PT Frequency 5x/week   PT Predicted Duration/Target Date for Goal Attainment 02/26/23   PT Goals Bed Mobility;Transfers;Gait;Stairs   PT: Bed Mobility Independent;Supine to/from sit;Rolling   PT: Transfers Modified independent;Sit to/from stand;Bed to/from chair;Assistive device   PT: Gait Modified independent;Rolling walker;150 feet   PT: Stairs Minimal assist;3 stairs   Interventions   Interventions Quick Adds Gait Training;Therapeutic Activity;Therapeutic Procedure   Therapeutic Activity   Therapeutic  Activities: dynamic activities to improve functional performance Minutes (07664) 13   Symptoms Noted During/After Treatment Fatigue   Treatment Detail/Skilled Intervention Pt OOB on commode on arrival. Pt min assist for sit to stand from commode, total assist for pericare. Pt then able to transfer from commode to chair with min assist. Pt sit to stand from chair with CGA. After ambulation trial pt returned to sitting in chair, positioned for comfort, needs within reach. Pt with B hand weakness but is able to push button on call bell.   Gait Training   Gait Training Minutes (95812) 10   Symptoms Noted During/After Treatment (Gait Training) fatigue;shortness of breath   Treatment Detail/Skilled Intervention Pt able to ambulate 30'x2 around room with IV pole and min assist initially progressing to CGA, needs assist to manage lines.   PT Discharge Planning   PT Plan Progress activity as rosetta   PT Discharge Recommendation (DC Rec) home with assist;home with home care physical therapy   PT Rationale for DC Rec Anticipate pt will continue to improve during hospital stay to allow for return to home with previous level of assist and home PT services to further address deficits and optimize functional recovery.   PT Brief overview of current status CGA to min assist   Total Session Time   Timed Code Treatment Minutes 23   Total Session Time (sum of timed and untimed services) 33

## 2023-02-19 NOTE — PROGRESS NOTES
02/19/23 1056   Appointment Info   Signing Clinician's Name / Credentials (OT) Brayan Beavers OTS   Student Supervision Direct supervision provided   Rehab Comments (OT) 1 Eval, 1 ADL   Living Environment   People in Home spouse   Current Living Arrangements house   Home Accessibility stairs to enter home;stairs within home   Number of Stairs, Main Entrance 1   Stair Railings, Main Entrance railings safe and in good condition   Number of Stairs, Within Home, Primary greater than 10 stairs   Stair Railings, Within Home, Primary railings safe and in good condition   Transportation Anticipated family or friend will provide   Living Environment Comments Pt. can stay on main level, wife assists with all ADLs/IADLs   Self-Care   Usual Activity Tolerance good   Current Activity Tolerance moderate   Equipment Currently Used at Home walker, rolling;walker, standard;glucometer   Fall history within last six months yes   Number of times patient has fallen within last six months 3   Activity/Exercise/Self-Care Comment Pt reports has not been very active the past few months, reports his wife recently obtained a walker for him. Pt reports wife assist with bathing, ilir cares and dressing. Pt reports mostly only walking household distances, wife does shopping.   General Information   Onset of Illness/Injury or Date of Surgery 02/17/23   Referring Physician Fabiana Leonard, SE CNP   Patient/Family Therapy Goal Statement (OT) To return home   Existing Precautions/Restrictions fall;oxygen therapy device and L/min   Limitations/Impairments hearing  (Pt. [place])   General Observations and Info Pt. reports being able to complete daily tasks with assist from wife at baseline. Reports that shortness of breath has started occuring within the last year or so. Never needed O2 before per patient report.   Cognitive Status Examination   Orientation Status orientation to person, place and time   Follows Commands WFL  ([place])   Cognitive  Status Comments A & O x 4, pt. is [place] which affects command following   Posture   Posture forward head position;protracted shoulders   Range of Motion Comprehensive   General Range of Motion upper extremity range of motion deficits identified   General Upper Extremity Assessment (Range of Motion)   Comment: Upper Extremity ROM Can't raise above 90 degrees   Upper Extremity: Range of Motion shoulder, left: UE ROM;shoulder, right: UE ROM   Strength Comprehensive (MMT)   General Manual Muscle Testing (MMT) Assessment upper extremity strength deficits identified   Upper Extremity (Manual Muscle Testing)   Comment, MMT: Upper Extremity Pt. states hands are weak at baseline.   Transfers   Transfers sit-stand transfer   Sit-Stand Transfer   Sit-Stand Baker (Transfers) minimum assist (75% patient effort)   Balance   Balance Assessment standing balance: dynamic   Balance Comments Used IV pole for stabilzation   Activities of Daily Living   BADL Assessment/Intervention lower body dressing   Lower Body Dressing Assessment/Training   Baker Level (Lower Body Dressing) maximum assist (25% patient effort)   Clinical Impression   Criteria for Skilled Therapeutic Interventions Met (OT) Yes, treatment indicated   OT Diagnosis Decreased indep. in self cares and functional mobility   OT Problem List-Impairments impacting ADL problems related to;activity tolerance impaired;hearing;mobility;range of motion (ROM);strength   Assessment of Occupational Performance 1-3 Performance Deficits   Identified Performance Deficits dressing, toileting, home mgmt   Planned Therapy Interventions (OT) ADL retraining;bed mobility training;transfer training;progressive activity/exercise   Clinical Decision Making Complexity (OT) low complexity   Anticipated Equipment Needs Upon Discharge (OT) walker, standard;shower chair   Risk & Benefits of therapy have been explained evaluation/treatment results reviewed   OT Total Evaluation Time   OT  Eval, Low Complexity Minutes (47023) 18   OT Goals   Therapy Frequency (OT) Daily   OT Predicted Duration/Target Date for Goal Attainment 02/26/23   OT Goals Hygiene/Grooming;Upper Body Dressing;Lower Body Dressing;Toilet Transfer/Toileting   OT: Hygiene/Grooming supervision/stand-by assist   OT: Upper Body Dressing Supervision/stand-by assist   OT: Lower Body Dressing Minimal assist   OT: Toilet Transfer/Toileting Minimal assist   Interventions   Interventions Quick Adds Self-Care/Home Management   Self-Care/Home Management   Self-Care/Home Mgmt/ADL, Compensatory, Meal Prep Minutes (19690) 10   Treatment Detail/Skilled Intervention Pt. seated in chair upon arrival, agrees to OT. Pt. completed STS with min A,  assist with lines. Pt. ambulated to bathroom with min A, assist with lines, pt. used IV pole for stabilzation, pt. uses walker at home. Pt. completed face washing standing at sink with CGA and set up. Pt. ambulated to chair with min A, assist with lines. VCs for safety throughout session. O2 stable on 1L NC, sats >90%. Pt. left seated in chair with all needs met.   OT Discharge Planning   OT Plan Progress activity rosetta., standing g/h, monitor O2 sats   OT Discharge Recommendation (DC Rec) home with assist;home with home care occupational therapy   OT Rationale for DC Rec Pt. close to or at baseline LOF, aside from O2 needs. Currently recommend home with prior level of assist. Wife assists with all I/ADLs, and pt. reports some PCA assist as well. Home safety assessment and HH could be beneficial to ensure safety and adquate assistance at home.   OT Brief overview of current status STS with min A, ambulating with min A and IV pole for stabilization, uses walker at home.   Total Session Time   Timed Code Treatment Minutes 10   Total Session Time (sum of timed and untimed services) 28

## 2023-02-19 NOTE — PROGRESS NOTES
"Clinical Swallow Evaluation (CSE):    Pt currently presents with functional oral swallow, suspected pharyngeal vs esophageal dysphagia, though highly suspect majority of symptoms are esophageal in nature given PMHx.    Recommendations: minced/moist solids, thin liquids (IDDSI 5, 0) when fully upright to chair, maintain upright positioning for a minimum of 60 minutes post PO, slow rate of eating with breathing breaks if SOB noted, small single bite with liquid wash following; Can try medications whole in applesauce vs crushed if difficulty.     Consider IP GI consult vs defer to already scheduled OP GI (EGD with dilation on 2/28/23)     02/19/23 1200   Appointment Info   Signing Clinician's Name / Credentials (SLP) Alexsandra Reed MS CCC-SLP   General Information   Onset of Illness/Injury or Date of Surgery 02/17/23   Referring Physician Chaudhry, Mohsan, MD   Patient/Family Therapy Goal Statement (SLP) To fix his swallowing   Pertinent History of Current Problem   Per Dr. Hsu's note from 2/18/23 (Pulmonology): \"Carl Vázquez is a 82 year old male with past medical history of atrial fibrillation on Xarelto, DM 2, hypothyroidism, CALE who presented to [facility] ED with dyspnea and hypoxia as well as 70% at home.\" \"Admission CXR showed moderate right and small left pleural effusions, s/p right thoracentesis with drainage of 1L of lymphocytic fluid, technically exudate by protein\" \"Multifactorial etiology of the patient's respiratory failure, including biventricular CHF with bilateral pleural effusions and ascites, atelectasis, possible neuromuscular disease, possible chronic liver disease, and untreated CALE.\" SLP consulted given difficulty swallowing.     General Observations   Pt alert, upright in chair, breathing comfortably on 2L NC. Pt reports discomfort d/t a piece of cantelope stuck in his throat since last night's dinner.     Type of Evaluation   Type of Evaluation Swallow Evaluation   Oral Motor   Oral " "Musculature anomalies present   Structural Abnormalities none present   Mucosal Quality good   Dentition (Oral Motor)   Dentition (Oral Motor) some missing teeth;dental appliance/dentures   Dental Appliance/Denture (Oral Motor) upper  (lower partial)   Facial Symmetry (Oral Motor)   Facial Symmetry (Oral Motor) left side impairment  (at rest)   Lip Function (Oral Motor)   Lip Range of Motion (Oral Motor)   (L asymmetry at rest)   Tongue Function (Oral Motor)   Tongue ROM (Oral Motor) WNL   Jaw Function (Oral Motor)   Jaw Function (Oral Motor) WNL   Cough/Swallow/Gag Reflex (Oral Motor)   Volitional Throat Clear/Cough (Oral Motor) WNL   Volitional Swallow (Oral Motor) WNL   Vocal Quality/Secretion Management (Oral Motor)   Vocal Quality (Oral Motor) WNL   Secretion Management (Oral Motor) WNL   General Swallowing Observations   Past History of Dysphagia   VFSS with esophagram 1/9/2013: no laryngeal penetration/aspiration, minimal valleclar residuals clearing with double swallow. \"Limited esophagram demonstrates moderate esophageal dysmotility with  moderately delayed esophageal emptying. No mass or constricting lesions are identified. No esophageal diverticula are identified\"     EGD 6/7/2016 for \"Dysphagia, Foreign body in the esophagus\": \"Food was found in the lower third of the esophagus. GEjunction at 38 cm. Food was easily pushed into stomach with Twister net. There appeared to be a mild to moderate stricture at the GE junction. Lower esphagus appeared inflammed from food stasis.\" \"Hiatal Hernia.\"     Pt has been following OP GI, with a planned EGD with dilation scheduled for 2/28/23.     Pt reports his wife has been needing to \"grind up\" his foods at home given food sticking in his throat/ difficulty with large bites and \"stringy\" foods.     Respiratory Support (General Swallowing Observations) nasal cannula  (2L)   Current Diet/Method of Nutritional Intake (General Swallowing Observations, NIS) soft & " bite-sized (level 6);thin liquids (level 0)   Swallowing Evaluation Clinical swallow evaluation   Clinical Swallow Evaluation   Feeding Assistance no assistance needed   Clinical Swallow Evaluation Textures Trialed thin liquids;soft & bite-sized   Clinical Swallow Eval: Thin Liquid Texture Trial   Mode of Presentation, Thin Liquids straw;self-fed   Volume of Liquid or Food Presented ~ 2 oz   Oral Phase of Swallow WFL   Pharyngeal Phase of Swallow intact   Diagnostic Statement no overt clinical signs/sx aspiration noted   Clinical Swallow Eval: Soft & Bite Sized   Mode of Presentation self-fed   Volume Presented bites of diced peaches   Oral Phase WFL   Pharyngeal Phase feeling of something stuck in throat   Diagnostic Statement sticking sensation despite double swallows/liquid wash   Esophageal Phase of Swallow   Patient reports or presents with symptoms of esophageal dysphagia Yes   Esophageal comments significant PMHx, see above - suspect sticking sensation/ deficits are esophageal   Swallowing Recommendations   Diet Consistency Recommendations minced & moist (level 5);thin liquids (level 0)   Supervision Level for Intake close supervision needed   Mode of Delivery Recommendations bolus size, small;food moistened;slow rate of intake   Swallowing Maneuver Recommendations alternate food and liquid intake;extra swallow   Monitoring/Assistance Required (Eating/Swallowing) stop eating activities when fatigue is present;monitor for cough or change in vocal quality with intake   Recommended Feeding/Eating Techniques (Swallow Eval) maintain upright sitting position for eating;maintain upright posture during/after eating for 30 minutes;minimize distractions during oral intake;moisten oral mucosa prior to intake   Medication Administration Recommendations, Swallowing (SLP) try whole with puree, crush if needed   General Therapy Interventions   Planned Therapy Interventions Dysphagia Treatment   Dysphagia treatment  "Instruction of safe swallow strategies;Modified diet education   Clinical Impression   Criteria for Skilled Therapeutic Interventions Met (SLP Eval) Yes, treatment indicated   SLP Diagnosis potential pharyngeal vs esophageal dysphagia   Risks & Benefits of therapy have been explained evaluation/treatment results reviewed;care plan/treatment goals reviewed;risks/benefits reviewed;current/potential barriers reviewed;participants voiced agreement with care plan;participants included;patient   Clinical Impression Comments   Clinical swallow evaluation completed. Pt currently presents with functional oral swallow, suspected pharyngeal vs esophageal dysphagia, though highly suspect majority of symptoms are esophageal in nature given PMHx. Across thin liquid and soft/bite sized consistencies: WFL labial seal/oral containment, mild prolonged but complete mastication, complete oral clearance, no notable delay in oral transit. Laryngeal elevation present to palpation. Pt reporting sticking sensation from peaches in throat, liquid wash and double swallows reducing but not clearing sticking sensation. ? esophageal retention given known hx of dysmotility + stricture with planned EGD/dilation in 9 days. No overt clinical signs/sx aspiration noted. Pt reports at home wife has been \"grinding\" his food and is agreement with downgrades to minced/moist, menu provided.     SLP Total Evaluation Time   Eval: oral/pharyngeal swallow function, clinical swallow Minutes (17111) 12   SLP Goals   Therapy Frequency (SLP Eval) 4 times/wk   SLP Predicted Duration/Target Date for Goal Attainment 02/26/23   SLP Goals Swallow   SLP: Safely tolerate diet without signs/symptoms of aspiration Minced & moist diet;Thin liquids;With use of swallow precautions;Independently   Interventions   Interventions Quick Adds Swallowing Dysfunction   Swallowing Dysfunction &/or Oral Function for Feeding   Treatment of Swallowing Dysfunction &/or Oral Function for " Feeding Minutes (46427) 11   Symptoms Noted During/After Treatment None   Treatment Detail/Skilled Intervention Trained pt in pharyngoesophageal clearance strategies and swallow/respiratory coorindation strategies. Pt verbalized understanding of all.   SLP Discharge Planning   SLP Plan PO tolerance, ?GI consult/esophagram needs vs defer until OP f/u in March   SLP Discharge Recommendation home;home with assist   SLP Rationale for DC Rec Pt with chronic dysphagia, appears close to most recent baseline with planned f/u with OP GI in 9 days (9/28/23).   SLP Brief overview of current status    Recommendations: minced/moist solids, thin liquids (IDDSI 5, 0) when fully upright to chair, maintain upright positioning for a minimum of 60 minutes post PO, slow rate of eating with breathing breaks if SOB noted, small single bite with liquid wash following; Can try medications whole in applesauce vs crushed if difficulty. Consider IP GI consult vs defer to already scheduled OP GI.      Total Session Time   Total Session Time (sum of timed and untimed services) 23

## 2023-02-19 NOTE — PROGRESS NOTES
Pt is A&IO x 4, pleasant. VSS on 2LNC, saturating low 90s, Bipap at HS. Bumax drip infusing, Power in placed with adequate output. Up to chair with assist of 1. Tele SR, occasionally Afib RVR/CVR. Amiodarone drip at 0.5 mg/hr.  and 201, Lantus decreased to 20 units and resumed. Plan to continue diersing and weaning oxygen of. Wife updated via phone.

## 2023-02-19 NOTE — PROGRESS NOTES
Discuss open VAT order with primary nursing.  Patient has two patent peripheral IV's, and line order was to help facilitate lab draws.  If frequent lab draws and continued difficult access, recommend PICC.  Primary nursing states okay without midline or PICC right now, will order again if difficult lab draws continue / patient develops need for other access.

## 2023-02-19 NOTE — PROGRESS NOTES
"  PULMONOLOGY PROGRESS NOTE    Date of Admission: 2/17/2023      SUBJECTIVE  / INTERVAL EVENTS     Started on Bumex drip with 2.5 L urine output overnight. No acute complaints this morning.    PHYSICAL EXAMINATION   Vital signs  Temp: 98.9  F (37.2  C) Temp src: Oral BP: 114/72 Pulse: 89     SpO2: 97 % O2 Device: Nasal cannula Oxygen Delivery: 1 LPM Height: 170.2 cm (5' 7\") Weight: 67.4 kg (148 lb 9.6 oz)  Estimated body mass index is 23.27 kg/m  as calculated from the following:    Height as of this encounter: 1.702 m (5' 7\").    Weight as of this encounter: 67.4 kg (148 lb 9.6 oz).  I/O last 3 completed shifts:  In: 760 [P.O.:760]  Out: 3800 [Urine:3800]    CONSTITUTIONAL/GENERAL: Alert male. No apparent distress.  EARS,NOSE,THROAT,MOUTH: External ears and nose overall normal. NC in place.   RESPIRATORY: CTAB, diminished basilar sounds.  CARDIOVASCULAR: RRR, S1, S2. 3+ LE edema.  PSYCHIATRIC: Appropriate mood and affect.     LABORATORY ASSESSMENT    Arterial Blood Gas  Recent Labs   Lab 02/18/23  1006 02/17/23  1348 02/17/23  1142   PH  --  7.37 7.34*   PCO2  --  80* 84*   PO2  --  70* 40*   HCO3  --  47* 45*   O2PER 40 40 50     CBC  Recent Labs   Lab 02/19/23  0607 02/18/23  1555 02/18/23  0650 02/17/23  0841   WBC 8.0 8.8 6.6 6.3   RBC 5.27 5.14 5.43 5.15   HGB 15.0 14.9 15.8 15.1   HCT 49.3 49.9 51.3 49.7   MCV 94 97 95 97   MCH 28.5 29.0 29.1 29.3   MCHC 30.4* 29.9* 30.8* 30.4*   RDW 16.1* 15.9* 16.0* 16.1*    161 167 195     BMP  Recent Labs   Lab 02/19/23  0812 02/19/23  0607 02/19/23  0207 02/18/23  2103 02/18/23  1701 02/18/23  1555 02/18/23  0906 02/18/23  0650 02/18/23  0154 02/17/23  2303 02/17/23  1107 02/17/23  0841   NA  --  143  143  --   --   --  141  --  144  --   --   --  146*   POTASSIUM  --  2.9*  2.9*  --   --   --  4.1  --  4.5  --  3.6  --  3.9   CHLORIDE  --  91*  91*  --   --   --  93*  --  92*  --   --   --  97*   TATE  --  8.2*  8.2*  --   --   --  8.3*  --  8.9  --   --   --  " 8.6*   CO2  --  47*  47*  --   --   --  44*  --  48*  --   --   --  44*   BUN  --  15.9  15.9  --   --   --  14.3  --  10.3  --   --   --  14.6   CR  --  0.66*  0.66*  --   --   --  0.75  --  0.79  --   --   --  0.82   * 145*  145* 201* 351*   < > 221*   < > 76   < >  --    < > 59*    < > = values in this interval not displayed.     INRNo lab results found in last 7 days.   BNPNo lab results found in last 7 days.  VENOUS BLOOD GASES  Recent Labs   Lab 02/18/23  1006   PHV 7.38   PCO2V 81*   PO2V 42   HCO3V 48*   ASPEN 17.4*       IMAGING, ECHO, PFT, SLEEP STUDY, RHC, OTHER TESTING         ASSESSMENT / PLAN      Pulmonary diagnoses (alphabetical):  Abnl CT/CXR R91.8  Fluid overload E87.79  Hypoxemia R09.02  Pleural effusion  Resp fail acute J96.00  Resp fail chronic J96.10  Sleep apnea unpec G47.3  SOB R06.02  Hemidiaphragm paralysis  Personal history of COVID-19  CHF        ASSESSMENT:  81 yo male with cervical spinal stenosis c/b right hemidiaphragm paralysis and associated chronic hypercapnic respiratory failure s/p right hemidiaphragm plication (10/2022 by Dr. Turpin at [facility]), prior COVID (1/2023), CALE, A-fib, presenting with acute on chronic hypoxic and hypercapnic respiratory failure.     Last saw Dr. Turpin 11/2022 at which time CXR showed small residual right hydropneumothorax, which was thought to be too small to contribute significantly to the patient's ongoing dyspnea.  Patient has undergone extensive work-up for chronic dyspnea including evaluation for possible liver disease and possible lower motor neuron disease per [facility].      Presents currently with worsening dyspnea requiring 3L O2. Intermittently requires BiPAP.  He does not use CPAP at home since his machine was recalled. Reports recent weight gain, increasing abdominal girth and LE edema refractory to Torsemide. He also recently had COVID January 2023. BNP 3,000 (was 800 last month), procalcitonin marginally elevated.  VBG  7.34/84.     TTE 6/2022 showed EF 55 to 60%, G1 DD, mild concentric LVH, normal RV size and function, RVSP 13.     TTE this admission shows EF 25-30% with severe global hypokinesis, normal RV size with decreased function, RVSP 20. Systolic function may be underestimated as this was performed during [facility].      Admission CXR showed moderate right and small left pleural effusions, s/p right thoracentesis with drainage of 1L of lymphocytic fluid, technically exudate by protein.  Cultures NGTD. Symptomatically improved following drainage.     Multifactorial etiology of the patient's respiratory failure, including biventricular CHF with bilateral pleural effusions and ascites, atelectasis, possible neuromuscular disease, possible chronic liver disease, and untreated CALE.  If he does have liver disease, hepatopulmonary syndrome with shunt physiology is also a consideration.  He does not appear to be in COPD exacerbation at this time.    VBG and chronically elevated bicarb are consistent with chronic hypercapnia, with baseline pCO2 likely ~70.  Right hemidiaphragm elevation has been surgically repaired, however, while its anatomic position has been corrected, it is still non-functional and contributes to restrictive physiology.       PLAN:     Diuresis per cardiology.    BiPAP PRN for increase work of breathing, with goal normal pH.     Will need outpatient Sleep follow up. Provided the patient with our contact information to schedule an appt with [facility]. Info also placed in Discharge tab.         Sean Hsu MD    [facility] / [facility]  Office: [phone]  Pager: [phone]

## 2023-02-19 NOTE — PLAN OF CARE
VSS, has mostly been SR today, transitioning to PO amiodarone. Bumex infusion at 0.5 mg. Per Dr Acevedo bumex should be incrased up to 1mg/hr if 24 hour urine output is less than 2 liters. Holding xarelto, started heparin drip. Ptt check 9 pm. Potentially will have R and/or L heart cath on Tuesday. Power catheter patent. Up in chair much of the day, breathing better, 1 L NC while awake, BIPAP with sleep. K and mag protocols in use, re-check this evening at request of Dr Acevedo. Facilitated MD updates to wife as pt is forgetful and wife manages his care at home. They are hopeful for discharge prior to specialist appointments this week.

## 2023-02-19 NOTE — PROGRESS NOTES
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  From chart review as patient was not able to provide me with a lot of information, it appears that over the last 1 year or so he has had progressive dyspnea on exertion.  From a cardiovascular standpoint, he has had a negative stress test back in March 2022, a TTE which showed an EF of 55 to 60% with no significant valvular heart disease.  He was eventually diagnosed with atrial fibrillation in February 2022, at that time the A-fib burden was 4%.  He was hospitalized multiple times over the span of the last year due to various causes but most notably for A-fib with RVR and multiple hospitalizations for shortness of breath and underwent thoracentesis x2 on separate occasions.     There has been various attempts to adjust his atrial fibrillation medication since diagnosis.  He was on p.o. amiodarone, digoxin and diltiazem for a while.  Then subsequently digoxin and amiodarone were discontinued, unclear exactly why. A  recent Holter was done in January of this year which showed an A-fib burden 43% with an average heart rate of  135.     During this hospitalization, he was again noted to be in atrial fibrillation/flutter with poorly controlled rate.  He was started on IV diltiazem initially, then he was transitioned to IV amiodarone after transthoracic echocardiogram revealing an EF of 25 to 30% with possible regional wall abnormalities in the inferoseptum.  We were consulted due to concern for STEMI on ECG while the patient was in atrial flutter with rates in the 140s. Repeat ECG showed normal sinus rhythm with no significant ST elevations.  The plan was discussed by primary team with the interventional cardiologist on call and decision was not to pursue emergent coronary angiography after reviewing his serial ECGs and the fact that his troponins remained flat and has no chest pain.     The etiology of his cardiomyopathy is likely tachyarrhythmia mediated given how poorly his rates have been controlled.  Cannot rule out ischemic heart disease is also a potential contributor given risk factors including diabetes, this is less likely given absence of anginal symptoms and a negative stress test last year. However,his dyspnea cannot be entirely explained by cardiac factors and likely multifactorial.     Fortunately his rates have improved and he started diuresing with switching to Bumex.    Plan  -Stop IV amiodarone at 3 PM today (total 24 hours), transition to p.o. 200 mg twice a day starting tomorrow for 10 days then 200 mg daily, blood pressure limits use of alternative agents at this point.  -Hold Bumex gtt this AM (potassium 2.9 and Mg 1.3), will supplement the protocol with 10 mEq of IV potassium and 2 IV mg at 11 am, recheck both at 1 pm, will resume the gtt later this AM  -His blood pressure is very borderline unlikely to tolerate introduction of GDMT at this point, we will continue to reassess on daily basis  -Transition from Xarelto to heparin gtt at next dose of Xarelto, he could benefit from RHC once more diuresed to further  evaluate his chronic respiratory failure. Can potentially consider angiogram at the same time. Possibly Tuesday.  -Will need repeat TTE once rate is more controlled.   -Appreciate pulmonology and primary  with other co morbidities      Thank you for allowing our team to participate in the care of Carl Vázquez.  Please do not hesitate to page me with any questions or concerns.     Alecia Acevedo MD on 2/19/2023 at 8:48 AM    [facility]  Cardiology  February 19, 2023    Voice recognition software utilized.          Interval Events:     No acute events overnight.  He diuresed significantly after switching to Bumex drip.         Medications reviewed:     Current medications:  Current Facility-Administered Medications Ordered in Epic   Medication Dose Route Frequency Last Rate Last Admin     acetaminophen (TYLENOL) tablet 650 mg  650 mg Oral Q6H PRN        Or     acetaminophen (TYLENOL) Suppository 650 mg  650 mg Rectal Q6H PRN         amiodarone (CORDARONE) 900 mg in 500 mL (1.8 mg/mL) infusion (ADULT STANDARD)  0.5 mg/min Intravenous Continuous 16.67 mL/hr at 02/19/23 0804 0.5 mg/min at 02/19/23 0804     bisacodyl (DULCOLAX) suppository 10 mg  10 mg Rectal Daily PRN         [Held by provider] bumetanide (BUMEX) 0.25 mg/mL infusion  1 mg/hr Intravenous Continuous 4 mL/hr at 02/19/23 0804 1 mg/hr at 02/19/23 0804     carboxymethylcellulose PF (REFRESH PLUS) 0.5 % ophthalmic solution 1 drop  1 drop Both Eyes Q1H PRN         dextrose 10% infusion   Intravenous Continuous   Paused at 02/18/23 0333     glucose gel 15-30 g  15-30 g Oral Q15 Min PRN        Or     dextrose 50 % injection 25-50 mL  25-50 mL Intravenous Q15 Min PRN   25 mL at 02/18/23 0931    Or     glucagon injection 1 mg  1 mg Subcutaneous Q15 Min PRN         insulin aspart (NovoLOG) injection (RAPID ACTING)  1-7 Units Subcutaneous TID AC   1 Units at 02/19/23 0813     insulin aspart (NovoLOG) injection (RAPID ACTING)  1-5 Units  Subcutaneous At Bedtime   4 Units at 02/18/23 2134     insulin glargine (LANTUS PEN) injection 20 Units  20 Units Subcutaneous At Bedtime   20 Units at 02/18/23 2302     levothyroxine (SYNTHROID/LEVOTHROID) tablet 75 mcg  75 mcg Oral Daily   75 mcg at 02/19/23 0810     lidocaine (LMX4) cream   Topical Q1H PRN         lidocaine 1 % 0.1-1 mL  0.1-1 mL Other Q1H PRN         magnesium sulfate 4 g in 100 mL sterile water intermittent infusion  4 g Intravenous Once 50 mL/hr at 02/19/23 0803 4 g at 02/19/23 0803     melatonin tablet 1 mg  1 mg Oral At Bedtime PRN         naloxone (NARCAN) injection 0.2 mg  0.2 mg Intravenous Q2 Min PRN        Or     naloxone (NARCAN) injection 0.4 mg  0.4 mg Intravenous Q2 Min PRN        Or     naloxone (NARCAN) injection 0.2 mg  0.2 mg Intramuscular Q2 Min PRN        Or     naloxone (NARCAN) injection 0.4 mg  0.4 mg Intramuscular Q2 Min PRN         nitroGLYcerin (NITROSTAT) sublingual tablet 0.4 mg  0.4 mg Sublingual Q5 Min PRN         No lozenges or gum should be given while patient on BIPAP/AVAPS/AVAPS AE   Does not apply Continuous PRN         ondansetron (ZOFRAN ODT) ODT tab 4 mg  4 mg Oral Q6H PRN        Or     ondansetron (ZOFRAN) injection 4 mg  4 mg Intravenous Q6H PRN         oxyCODONE (ROXICODONE) tablet 5 mg  5 mg Oral Q4H PRN         pantoprazole (PROTONIX) EC tablet 40 mg  40 mg Oral BID   40 mg at 02/19/23 0810     Patient is already receiving anticoagulation with heparin, enoxaparin (LOVENOX), warfarin (COUMADIN)  or other anticoagulant medication   Does not apply Continuous PRN         Patient may continue current oral medications   Does not apply Continuous PRN         polyethylene glycol (MIRALAX) Packet 17 g  17 g Oral Daily PRN         polyethylene glycol (MIRALAX) Packet 17 g  17 g Oral Daily   17 g at 02/19/23 0808     potassium chloride (KLOR-CON) Packet 20 mEq  20 mEq Oral or Feeding Tube Once         potassium chloride ER (KLOR-CON M) CR tablet 10 mEq  10 mEq Oral  BID   10 mEq at 23     prochlorperazine (COMPAZINE) injection 5 mg  5 mg Intravenous Q6H PRN        Or     prochlorperazine (COMPAZINE) tablet 5 mg  5 mg Oral Q6H PRN        Or     prochlorperazine (COMPAZINE) suppository 12.5 mg  12.5 mg Rectal Q12H PRN         rivaroxaban ANTICOAGULANT (XARELTO) tablet 20 mg  20 mg Oral Daily with supper   20 mg at 23 173     senna-docusate (SENOKOT-S/PERICOLACE) 8.6-50 MG per tablet 1 tablet  1 tablet Oral BID PRN        Or     senna-docusate (SENOKOT-S/PERICOLACE) 8.6-50 MG per tablet 2 tablet  2 tablet Oral BID PRN         sodium chloride (PF) 0.9% PF flush 10 mL  10 mL Intracatheter Q8H         sodium chloride (PF) 0.9% PF flush 10 mL  10 mL Intracatheter Q8H         sodium chloride (PF) 0.9% PF flush 10-20 mL  10-20 mL Intracatheter q1 min prn         sodium chloride (PF) 0.9% PF flush 10-20 mL  10-20 mL Intracatheter q1 min prn         sodium chloride (PF) 0.9% PF flush 3 mL  3 mL Intracatheter Q8H   3 mL at 23     sodium chloride (PF) 0.9% PF flush 3 mL  3 mL Intracatheter q1 min prn         tamsulosin (FLOMAX) capsule 0.4 mg  0.4 mg Oral QPM   0.4 mg at 23     No current [facility]-ordered outpatient medications on file.             Physical Exam:   Vital signs were reviewed:  Temperatures:  Current - Temp: 98.9  F (37.2  C); Max - Temp  Av.2  F (36.8  C)  Min: 97  F (36.1  C)  Max: 98.9  F (37.2  C)  Respiration range: Resp  Av.8  Min: 18  Max: 30  Pulse range: Pulse  Av.3  Min: 63  Max: 149  Blood pressure range: Systolic (24hrs), Av , Min:76 , Max:124   ; Diastolic (24hrs), Av, Min:61, Max:97    Pulse oximetry range: SpO2  Av.3 %  Min: 91 %  Max: 99 %    Intake/Output Summary (Last 24 hours) at 2023 0847  Last data filed at 2023 0657  Gross per 24 hour   Intake 760 ml   Output 3800 ml   Net -3040 ml     148 lbs 9.6 oz  Body mass index is 23.27 kg/m .   Body surface area is 1.78 meters  squared.     Constitutional: appears stated age, in no apparent distress, appears chronically ill  Head: normocephalic, atraumatic  Neck: supple, trachea midline, no bruit bilaterally   Pulmonary: Slightly increased work of breathing, diminished breath sounds   Cardiovascular: Irregular rate and rhythm, no discernible murmurs, +1 to +2 pitting edema in the lower extremities bilaterally  Gastrointestinal: no guarding, non-rigid   Neurologic: awake, alert, moves all extremities  Skin: no jaundice, warm on limited exam  Psychiatric: affect is normal, answers questions appropriately          Selected laboratory tests:   Laboratory test results personally reviewed:   [facility]  Recent Labs   Lab 02/19/23  0812 02/19/23  0607 02/19/23  0207 02/18/23  2103 02/18/23  1701 02/18/23  1555 02/18/23  1242 02/18/23  1006 02/18/23  0906 02/18/23  0650 02/18/23  0154 02/17/23  2303 02/17/23  1107 02/17/23  0841   NA  --  143  143  --   --   --  141  --   --   --  144  --   --   --  146*   POTASSIUM  --  2.9*  2.9*  --   --   --  4.1  --   --   --  4.5  --  3.6  --  3.9   CHLORIDE  --  91*  91*  --   --   --  93*  --   --   --  92*  --   --   --  97*   CO2  --  47*  47*  --   --   --  44*  --   --   --  48*  --   --   --  44*   ANIONGAP  --  5*  5*  --   --   --  4*  --   --   --  4*  --   --   --  5*   * 145*  145* 201* 351*   < > 221*   < >  --    < > 76   < >  --    < > 59*   BUN  --  15.9  15.9  --   --   --  14.3  --   --   --  10.3  --   --   --  14.6   CR  --  0.66*  0.66*  --   --   --  0.75  --   --   --  0.79  --   --   --  0.82   GFRESTIMATED  --  >90  >90  --   --   --  90  --   --   --  89  --   --   --  88   TATE  --  8.2*  8.2*  --   --   --  8.3*  --   --   --  8.9  --   --   --  8.6*   MAG  --  1.3*  --   --   --  1.8  --   --   --  2.2  --  1.3*   < >  --    PROTTOTAL  --  5.4*  --   --   --  5.1*  --  5.1*  --   --   --   --   --  6.0*  6.0*   ALBUMIN  --  2.8*  --   --   --  2.7*  --  2.7*  --   --    --   --   --  3.5  3.5   BILITOTAL  --  0.6  --   --   --  0.6  --  0.4  --   --   --   --   --  0.4   ALKPHOS  --  81  --   --   --  81  --  78  --   --   --   --   --  87   AST  --  15  --   --   --  22  --  25  --   --   --   --   --  27   ALT  --  13  --   --   --  14  --  13  --   --   --   --   --  19    < > = values in this interval not displayed.     CBC  Recent Labs   Lab 02/19/23  0607 02/18/23  1555 02/18/23  0650 02/17/23  0841   WBC 8.0 8.8 6.6 6.3   RBC 5.27 5.14 5.43 5.15   HGB 15.0 14.9 15.8 15.1   HCT 49.3 49.9 51.3 49.7   MCV 94 97 95 97   MCH 28.5 29.0 29.1 29.3   MCHC 30.4* 29.9* 30.8* 30.4*   RDW 16.1* 15.9* 16.0* 16.1*    161 167 195     INRNo lab results found in last 7 days.  No results found for: TROPI, TROPONIN, TROPR, TROPN  No results for input(s): CHOL, HDL, LDL, TRIG, CHOLHDLRATIO in the last 85637 hours.  Lab Results   Component Value Date    A1C 8.2 01/04/2023    A1C 8.3 06/15/2022     TSH   Date Value Ref Range Status   01/04/2023 1.64 0.40 - 4.00 mU/L Final     Cardiac Studies  TTE 2/18/2023  Interpretation Summary     Technically difficult study. Patient is very tachycardic. Unclear rhythm  probably flutter.     The visual ejection fraction is 25-30%. There is severe global hypokinesis  which is worse in the inferoseptal segments of the LV. Accurate wall motion  analysis is very challenging on this study given HR and image quality.  Moderately decreased right ventricular systolic function. RV size is normal.  No hemodynamically significant valve disease.  There is no pericardial effusion.     Compared to the prior study from 2022, EF is much lower. Reassessment of EF  and wall motion recommended when HR is better controlled.

## 2023-02-19 NOTE — PROGRESS NOTES
Medicine Progress Note - Hospitalist Service    Date of Admission:  2/17/2023    Assessment & Plan    Carl Vázquez is a 82 year old male w/ PMH of past medical history of atrial fibrillation, DM 2, hypothyroidism, CALE admitted on 2/17/2023 w/ acute on chronic respiratory failure.      Acute on chronic respiratory failure both hypoxic and hypercarbic likely secondary to pulmonary edema  multifactorial etiology including possible neuromuscular disease contributing, dysphagia/aspiration pneumonitis, cannot rule out hepatic etiology such as hepatic hydrothorax as etiology of his recurrent pleural effusion.  Based on his June 2022 TTE it seems unlikely that decompensated heart failure is contributing to his respiratory failure to a great degree. Patient is compliant with medications and low-salt diet.  covid positive <90d ago so not checked and hx generally not suggestive of infectious etiology.  - Stat ABG for baseline comparison purposes; chronic hypercarbia, CO2 in mid 80s even after 15 minutes of NIPPV. NIPPV, 14/6, 0.5 FiO2  - Continue diuresis w/ lasix 40mg IV every 8 hours x24 hours; hold PTA torsemide  - Ultrasound-guided thoracentesis with diagnostic labs-->1L fluid removed 02/17/23, transudative  - Add on procalcitonin-->low; w/o fever or leukocytosis holding off on atbx for now.    -SPO2 goal greater than equal to 88%  -Appreciate pulmonary consultation for chronic retention of CO2 and assistance with discharge plan: thought secondary to heart failure.     Diastolic Dysfunction  Elevated troponin  HFrEF 25-30 percent  Possible tachycardia mediated HF   no chest pain may be related to hypoxia/demand ischemia; patient had a negative work-up within the last year with negative stress test in March 2022 and negative echo in June 2022; ekg 02/17/23 showing NSR w/ PAC, normal axis, no acute ischemic changes  -Discussed with cardiology given new TTE and WMA in the  inferolateral septum, no acute concern to activate cath lab, will trend troponins and repeat TTE tomorrow now that in sinus rhytym  -Diuresis with Lasix 80 BID, patient has not had much recorded output, will transition to bumex gtt. Of note, wife is insistent on pt not taking torsemide at discharge, states it hasn't worked for him.   -Strict I&Os  -Daily weights  -Appreciate cardiology recommendations given difficult to control tachycardia with concomitant HFrEF likely tachycardia related     Abdominal pain subacute most pronounced in the lower quadrants but with increasing abdominal distention, intolerance of LE elevation  possible chronic liver disease with ongoing evaluation, following OP w/ [facility]  - CT abdomen pelvis with IV contrast-->see below, discussed results w/ spouse & anticipated need for liver MRI, timing TBD  - Abdominal ultrasound with diagnostic and therapeutic thoracentesis to evaluate for possible chronic liver disease in the event that it may be contributing to his respiratory failure--> no enough ascites for paracentesis     A-fib paroxysmal, currently in sinus rhythm  - Continue PTA DOAC  - Hold off on all calcium channel blockers patient was started on CCB overnight which was discontinued; will continue amiodarone gtt for now. digoxin on hold since end of Jan 2023     Hyponatremia - mild 143  - Diuresing as above     DM2 last A1c was 8.2% in January 2023  Hypoglycemia likely secondary to taking PTA Lantus, glipizide and fasted state for anticipated ultrasound  [PTA regimen lantus 60 units q bedtime; glipizide 10mg po q morning]  - Hypoglycemia protocol  - Every 4 hours glucose checks until able to take po  - D10 infusion until able to take p.o.  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, longstanding/ progressive for at least the 13 months, now needs to ambulate with walker, in January 2022 was using the treadmill daily  - Patient and spouse are very hopeful for discharge prior to 2/28/2023 ALS clinic visit     CALE; doesn't use cpap at home  -NIPPV as above  -may need to consider NIPPV at discharge pending above evaluation     Diet: Room Service  Combination Diet Regular Diet; Moderate Consistent Carb (60 g CHO per Meal) Diet; Minced and Moist Diet (level 5); Thin Liquids (level 0); 2 gm NA Diet    DVT Prophylaxis: DOAC, transitioning to heparin per cards  Power Catheter: PRESENT, indication: Strict 1-2 Hour I&O  Lines: None     Cardiac Monitoring: ACTIVE order. Indication: acute on chronic resp failure  Code Status: No CPR- Do NOT Intubate      Clinically Significant Risk Factors        # Hypokalemia: Lowest K = 2.9 mmol/L in last 2 days, will replace as needed     # Hypomagnesemia: Lowest Mg = 1.3 mg/dL in last 2 days, will replace as needed   # Hypoalbuminemia: Lowest albumin = 2.7 g/dL at 2/18/2023  3:55 PM, will monitor as appropriate                   Disposition Plan      Expected Discharge Date: 02/22/2023                  Mohsan Chaudhry, MD  Hospitalist Service  [facility]  Securely message with [employer] (more info)  Text page via [employer] Paging/Directory   ______________________________________________________________________    Interval History   No acute overnight events. This AM his K and Mg were low. Needed to be replaced prior to resuming the bumex gtt. No chest pain. No sob. LE edema improving. No lightheadedness, or dizziness.    Physical Exam   Vital Signs: Temp: 98.3  F (36.8  C) Temp src: Oral BP: 116/69 Pulse: 88     SpO2: 94 % O2 Device: Nasal cannula Oxygen Delivery: 1 LPM  Weight: 148 lbs 9.6 oz  Constitutional: awake,  alert, cooperative, no apparent distress, and appears stated age  Eyes: sclera clear, conjunctiva normal  Respiratory: BL Crakles at bases  Cardiovascular: Tachycardic, JVP 10cm, BL NICKIE to knees  GI: soft, non-distended, non-tender, no masses palpated  Skin: no bruising or bleeding  Neurologic: Awake, alert, oriented to name, place and time.  Cranial nerves II-XII are grossly intact.       Medical Decision Making       40 MINUTES SPENT BY ME on the date of service doing chart review, history, exam, documentation & further activities per the note.      Data   Recent Labs   Lab 02/19/23  1316 02/19/23  1247 02/19/23  0812 02/19/23  0607 02/18/23  1701 02/18/23  1555 02/18/23  0906 02/18/23  0650   WBC  --   --   --  8.0  --  8.8  --  6.6   HGB  --   --   --  15.0  --  14.9  --  15.8   MCV  --   --   --  94  --  97  --  95   PLT  --   --   --  157  --  161  --  167   NA  --   --   --  143  143  --  141  --  144   POTASSIUM 4.1  --   --  2.9*  2.9*  --  4.1  --  4.5   CHLORIDE  --   --   --  91*  91*  --  93*  --  92*   CO2  --   --   --  47*  47*  --  44*  --  48*   BUN  --   --   --  15.9  15.9  --  14.3  --  10.3   CR  --   --   --  0.66*  0.66*  --  0.75  --  0.79   ANIONGAP  --   --   --  5*  5*  --  4*  --  4*   TATE  --   --   --  8.2*  8.2*  --  8.3*  --  8.9   GLC  --  234* 144* 145*  145*   < > 221*   < > 76   ALBUMIN  --   --   --  2.8*  --  2.7*   < >  --    PROTTOTAL  --   --   --  5.4*  --  5.1*   < >  --    BILITOTAL  --   --   --  0.6  --  0.6   < >  --    ALKPHOS  --   --   --  81  --  81   < >  --    ALT  --   --   --  13  --  14   < >  --    AST  --   --   --  15  --  22   < >  --     < > = values in this interval not displayed.

## 2023-02-20 ENCOUNTER — APPOINTMENT (OUTPATIENT)
Dept: PHYSICAL THERAPY | Facility: CLINIC | Age: 83
DRG: 246 | End: 2023-02-20
Payer: COMMERCIAL

## 2023-02-20 LAB
ANION GAP SERPL CALCULATED.3IONS-SCNC: 5 MMOL/L (ref 7–15)
ANION GAP SERPL CALCULATED.3IONS-SCNC: ABNORMAL MMOL/L
APTT PPP: 46 SECONDS (ref 22–38)
APTT PPP: 69 SECONDS (ref 22–38)
APTT PPP: 93 SECONDS (ref 22–38)
BASE EXCESS BLDV CALC-SCNC: 24.9 MMOL/L (ref -7.7–1.9)
BASOPHILS # BLD AUTO: 0 10E3/UL (ref 0–0.2)
BASOPHILS NFR BLD AUTO: 0 %
BUN SERPL-MCNC: 13.1 MG/DL (ref 8–23)
BUN SERPL-MCNC: 14.6 MG/DL (ref 8–23)
CALCIUM SERPL-MCNC: 8.2 MG/DL (ref 8.8–10.2)
CALCIUM SERPL-MCNC: 8.3 MG/DL (ref 8.8–10.2)
CHLORIDE SERPL-SCNC: 84 MMOL/L (ref 98–107)
CHLORIDE SERPL-SCNC: 88 MMOL/L (ref 98–107)
CK SERPL-CCNC: 82 U/L (ref 39–308)
CREAT SERPL-MCNC: 0.71 MG/DL (ref 0.67–1.17)
CREAT SERPL-MCNC: 0.77 MG/DL (ref 0.67–1.17)
DEPRECATED HCO3 PLAS-SCNC: 46 MMOL/L (ref 22–29)
DEPRECATED HCO3 PLAS-SCNC: >50 MMOL/L (ref 22–29)
EOSINOPHIL # BLD AUTO: 0.1 10E3/UL (ref 0–0.7)
EOSINOPHIL NFR BLD AUTO: 1 %
ERYTHROCYTE [DISTWIDTH] IN BLOOD BY AUTOMATED COUNT: 16 % (ref 10–15)
FOLATE SERPL-MCNC: 17.1 NG/ML (ref 4.6–34.8)
GFR SERPL CREATININE-BSD FRML MDRD: 89 ML/MIN/1.73M2
GFR SERPL CREATININE-BSD FRML MDRD: >90 ML/MIN/1.73M2
GLUCOSE BLDC GLUCOMTR-MCNC: 107 MG/DL (ref 70–99)
GLUCOSE BLDC GLUCOMTR-MCNC: 114 MG/DL (ref 70–99)
GLUCOSE BLDC GLUCOMTR-MCNC: 219 MG/DL (ref 70–99)
GLUCOSE BLDC GLUCOMTR-MCNC: 311 MG/DL (ref 70–99)
GLUCOSE BLDC GLUCOMTR-MCNC: 323 MG/DL (ref 70–99)
GLUCOSE SERPL-MCNC: 160 MG/DL (ref 70–99)
GLUCOSE SERPL-MCNC: 313 MG/DL (ref 70–99)
HCO3 BLDV-SCNC: 55 MMOL/L (ref 21–28)
HCT VFR BLD AUTO: 49.2 % (ref 40–53)
HGB BLD-MCNC: 15.4 G/DL (ref 13.3–17.7)
IMM GRANULOCYTES # BLD: 0 10E3/UL
IMM GRANULOCYTES NFR BLD: 0 %
LYMPHOCYTES # BLD AUTO: 1.5 10E3/UL (ref 0.8–5.3)
LYMPHOCYTES NFR BLD AUTO: 20 %
MAGNESIUM SERPL-MCNC: 1.7 MG/DL (ref 1.7–2.3)
MAGNESIUM SERPL-MCNC: 1.8 MG/DL (ref 1.7–2.3)
MAGNESIUM SERPL-MCNC: 1.8 MG/DL (ref 1.7–2.3)
MCH RBC QN AUTO: 29.1 PG (ref 26.5–33)
MCHC RBC AUTO-ENTMCNC: 31.3 G/DL (ref 31.5–36.5)
MCV RBC AUTO: 93 FL (ref 78–100)
MONOCYTES # BLD AUTO: 0.7 10E3/UL (ref 0–1.3)
MONOCYTES NFR BLD AUTO: 9 %
NEUTROPHILS # BLD AUTO: 5.2 10E3/UL (ref 1.6–8.3)
NEUTROPHILS NFR BLD AUTO: 70 %
NRBC # BLD AUTO: 0 10E3/UL
NRBC BLD AUTO-RTO: 0 /100
NT-PROBNP SERPL-MCNC: 225 PG/ML (ref 0–1800)
O2/TOTAL GAS SETTING VFR VENT: 30 %
PCO2 BLDV: 77 MM HG (ref 40–50)
PH BLDV: 7.46 [PH] (ref 7.32–7.43)
PLATELET # BLD AUTO: 145 10E3/UL (ref 150–450)
PO2 BLDV: 44 MM HG (ref 25–47)
POTASSIUM SERPL-SCNC: 3.2 MMOL/L (ref 3.4–5.3)
POTASSIUM SERPL-SCNC: 4.5 MMOL/L (ref 3.4–5.3)
RBC # BLD AUTO: 5.29 10E6/UL (ref 4.4–5.9)
SODIUM SERPL-SCNC: 135 MMOL/L (ref 136–145)
SODIUM SERPL-SCNC: 142 MMOL/L (ref 136–145)
VIT B12 SERPL-MCNC: 863 PG/ML (ref 232–1245)
WBC # BLD AUTO: 7.4 10E3/UL (ref 4–11)

## 2023-02-20 PROCEDURE — 250N000013 HC RX MED GY IP 250 OP 250 PS 637: Performed by: INTERNAL MEDICINE

## 2023-02-20 PROCEDURE — 82607 VITAMIN B-12: CPT | Performed by: PSYCHIATRY & NEUROLOGY

## 2023-02-20 PROCEDURE — 85025 COMPLETE CBC W/AUTO DIFF WBC: CPT | Performed by: STUDENT IN AN ORGANIZED HEALTH CARE EDUCATION/TRAINING PROGRAM

## 2023-02-20 PROCEDURE — 99233 SBSQ HOSP IP/OBS HIGH 50: CPT | Performed by: INTERNAL MEDICINE

## 2023-02-20 PROCEDURE — 36415 COLL VENOUS BLD VENIPUNCTURE: CPT | Performed by: STUDENT IN AN ORGANIZED HEALTH CARE EDUCATION/TRAINING PROGRAM

## 2023-02-20 PROCEDURE — 83880 ASSAY OF NATRIURETIC PEPTIDE: CPT | Performed by: PHYSICIAN ASSISTANT

## 2023-02-20 PROCEDURE — 250N000013 HC RX MED GY IP 250 OP 250 PS 637: Performed by: NURSE PRACTITIONER

## 2023-02-20 PROCEDURE — 97530 THERAPEUTIC ACTIVITIES: CPT | Mod: GP

## 2023-02-20 PROCEDURE — 97110 THERAPEUTIC EXERCISES: CPT | Mod: GP

## 2023-02-20 PROCEDURE — 97116 GAIT TRAINING THERAPY: CPT | Mod: GP

## 2023-02-20 PROCEDURE — 250N000009 HC RX 250: Performed by: STUDENT IN AN ORGANIZED HEALTH CARE EDUCATION/TRAINING PROGRAM

## 2023-02-20 PROCEDURE — 85730 THROMBOPLASTIN TIME PARTIAL: CPT | Performed by: INTERNAL MEDICINE

## 2023-02-20 PROCEDURE — 82310 ASSAY OF CALCIUM: CPT | Performed by: STUDENT IN AN ORGANIZED HEALTH CARE EDUCATION/TRAINING PROGRAM

## 2023-02-20 PROCEDURE — 82550 ASSAY OF CK (CPK): CPT | Performed by: PSYCHIATRY & NEUROLOGY

## 2023-02-20 PROCEDURE — 83735 ASSAY OF MAGNESIUM: CPT | Performed by: STUDENT IN AN ORGANIZED HEALTH CARE EDUCATION/TRAINING PROGRAM

## 2023-02-20 PROCEDURE — 84446 ASSAY OF VITAMIN E: CPT | Performed by: PSYCHIATRY & NEUROLOGY

## 2023-02-20 PROCEDURE — 94660 CPAP INITIATION&MGMT: CPT

## 2023-02-20 PROCEDURE — 999N000157 HC STATISTIC RCP TIME EA 10 MIN

## 2023-02-20 PROCEDURE — 210N000001 HC R&B IMCU HEART CARE

## 2023-02-20 PROCEDURE — 250N000011 HC RX IP 250 OP 636: Performed by: INTERNAL MEDICINE

## 2023-02-20 PROCEDURE — 82803 BLOOD GASES ANY COMBINATION: CPT | Performed by: HOSPITALIST

## 2023-02-20 PROCEDURE — 82746 ASSAY OF FOLIC ACID SERUM: CPT | Performed by: PSYCHIATRY & NEUROLOGY

## 2023-02-20 PROCEDURE — 83735 ASSAY OF MAGNESIUM: CPT | Performed by: INTERNAL MEDICINE

## 2023-02-20 PROCEDURE — 99232 SBSQ HOSP IP/OBS MODERATE 35: CPT | Mod: FS | Performed by: INTERNAL MEDICINE

## 2023-02-20 PROCEDURE — 36415 COLL VENOUS BLD VENIPUNCTURE: CPT | Performed by: INTERNAL MEDICINE

## 2023-02-20 PROCEDURE — 250N000013 HC RX MED GY IP 250 OP 250 PS 637: Performed by: STUDENT IN AN ORGANIZED HEALTH CARE EDUCATION/TRAINING PROGRAM

## 2023-02-20 RX ORDER — NEBIVOLOL 2.5 MG/1
2.5 TABLET ORAL 2 TIMES DAILY
Status: DISCONTINUED | OUTPATIENT
Start: 2023-02-21 | End: 2023-02-23 | Stop reason: HOSPADM

## 2023-02-20 RX ORDER — MAGNESIUM SULFATE HEPTAHYDRATE 40 MG/ML
2 INJECTION, SOLUTION INTRAVENOUS ONCE
Status: COMPLETED | OUTPATIENT
Start: 2023-02-20 | End: 2023-02-20

## 2023-02-20 RX ORDER — POTASSIUM CHLORIDE 1500 MG/1
40 TABLET, EXTENDED RELEASE ORAL ONCE
Status: DISCONTINUED | OUTPATIENT
Start: 2023-02-20 | End: 2023-02-20

## 2023-02-20 RX ORDER — POTASSIUM CHLORIDE 7.45 MG/ML
10 INJECTION INTRAVENOUS
Status: COMPLETED | OUTPATIENT
Start: 2023-02-20 | End: 2023-02-20

## 2023-02-20 RX ORDER — NEBIVOLOL 2.5 MG/1
2.5 TABLET ORAL ONCE
Status: COMPLETED | OUTPATIENT
Start: 2023-02-20 | End: 2023-02-20

## 2023-02-20 RX ORDER — MAGNESIUM OXIDE 400 MG/1
400 TABLET ORAL EVERY 4 HOURS
Status: COMPLETED | OUTPATIENT
Start: 2023-02-20 | End: 2023-02-20

## 2023-02-20 RX ORDER — MAGNESIUM OXIDE 400 MG/1
400 TABLET ORAL EVERY 4 HOURS
Status: DISCONTINUED | OUTPATIENT
Start: 2023-02-20 | End: 2023-02-20

## 2023-02-20 RX ADMIN — PANTOPRAZOLE SODIUM 40 MG: 40 TABLET, DELAYED RELEASE ORAL at 20:52

## 2023-02-20 RX ADMIN — POLYETHYLENE GLYCOL 3350 17 G: 17 POWDER, FOR SOLUTION ORAL at 13:02

## 2023-02-20 RX ADMIN — MAGNESIUM OXIDE TAB 400 MG (241.3 MG ELEMENTAL MG) 400 MG: 400 (241.3 MG) TAB at 20:52

## 2023-02-20 RX ADMIN — POTASSIUM CHLORIDE 10 MEQ: 7.46 INJECTION, SOLUTION INTRAVENOUS at 11:04

## 2023-02-20 RX ADMIN — POTASSIUM CHLORIDE 10 MEQ: 750 TABLET, EXTENDED RELEASE ORAL at 20:52

## 2023-02-20 RX ADMIN — Medication 400 MG: at 05:12

## 2023-02-20 RX ADMIN — POTASSIUM CHLORIDE 10 MEQ: 7.46 INJECTION, SOLUTION INTRAVENOUS at 09:23

## 2023-02-20 RX ADMIN — AMIODARONE HYDROCHLORIDE 200 MG: 200 TABLET ORAL at 13:02

## 2023-02-20 RX ADMIN — POTASSIUM CHLORIDE 40 MEQ: 1500 TABLET, EXTENDED RELEASE ORAL at 01:26

## 2023-02-20 RX ADMIN — NEBIVOLOL HYDROCHLORIDE 2.5 MG: 2.5 TABLET ORAL at 18:52

## 2023-02-20 RX ADMIN — POTASSIUM CHLORIDE 10 MEQ: 7.46 INJECTION, SOLUTION INTRAVENOUS at 13:08

## 2023-02-20 RX ADMIN — POTASSIUM CHLORIDE 10 MEQ: 750 TABLET, EXTENDED RELEASE ORAL at 13:02

## 2023-02-20 RX ADMIN — LEVOTHYROXINE SODIUM 75 MCG: 75 TABLET ORAL at 13:02

## 2023-02-20 RX ADMIN — AMIODARONE HYDROCHLORIDE 200 MG: 200 TABLET ORAL at 20:52

## 2023-02-20 RX ADMIN — INSULIN ASPART 4 UNITS: 100 INJECTION, SOLUTION INTRAVENOUS; SUBCUTANEOUS at 17:51

## 2023-02-20 RX ADMIN — POTASSIUM CHLORIDE 10 MEQ: 7.46 INJECTION, SOLUTION INTRAVENOUS at 10:11

## 2023-02-20 RX ADMIN — PANTOPRAZOLE SODIUM 40 MG: 40 TABLET, DELAYED RELEASE ORAL at 13:02

## 2023-02-20 RX ADMIN — MAGNESIUM SULFATE HEPTAHYDRATE 2 G: 2 INJECTION, SOLUTION INTRAVENOUS at 10:05

## 2023-02-20 RX ADMIN — Medication 400 MG: at 01:29

## 2023-02-20 RX ADMIN — BUMETANIDE 1 MG/HR: 0.25 INJECTION, SOLUTION INTRAMUSCULAR; INTRAVENOUS at 16:43

## 2023-02-20 RX ADMIN — MAGNESIUM OXIDE TAB 400 MG (241.3 MG ELEMENTAL MG) 400 MG: 400 (241.3 MG) TAB at 17:48

## 2023-02-20 ASSESSMENT — ACTIVITIES OF DAILY LIVING (ADL)
ADLS_ACUITY_SCORE: 48
ADLS_ACUITY_SCORE: 49
ADLS_ACUITY_SCORE: 48
ADLS_ACUITY_SCORE: 49
ADLS_ACUITY_SCORE: 49
ADLS_ACUITY_SCORE: 48
ADLS_ACUITY_SCORE: 49

## 2023-02-20 NOTE — PLAN OF CARE
Goal Outcome Evaluation:      Plan of Care Reviewed With: patient    Overall Patient Progress: improvingOverall Patient Progress: improving    [facility] Nursing Note    Patient Information  Name: Carl Vázquez  Age: 82 year old    Assessment  Orientation/Neuro: Alert and Oriented x4  Cardiac/Tele: NSR  Resp: GARCIA   GI/: No nausea, vomiting, abdominal pain, diarrhea, constipation or change in bowel habits, urinary cath in place with adequate urine output   Mobility: walks with assist   Diet: Orders Placed This Encounter      Combination Diet Regular Diet; Moderate Consistent Carb (60 g CHO per Meal) Diet; Minced and Moist Diet (level 5); Thin Liquids (level 0); 2 gm NA Diet    Vital Signs  B/P: 113/74, T: 98.1, P: 85, R: 18, O2: 95 on 1L NC during day, bipap while sleeping    Plan  Continue to diurese for 1 more day on bumex gtt, hep gtt prior to R and L heart cath on tue     Aga Argueta RN

## 2023-02-20 NOTE — PLAN OF CARE
Goal Outcome Evaluation:  A&O  x4, VSS on BiPAP. Tele SR. Went into a brief Afib w/RVR then back to SR, asymptomatic. Diuresing good urine output via  lazo cath with continuous iv Bumex gtts. Heparin at 800 units/hr. Turned/repo in bed. Plans for R+L hearth cath tomorrow. K+ & Mg++ replaced. Awaits am lab check. Will cont. to monitor.     Addendum : CO2 50, MD aware, no changes in BiPAP setting needed. Lab to draw venous blood gas. Continue to monitor.

## 2023-02-20 NOTE — PROVIDER NOTIFICATION
MD Notification    Notified Person: MD    Notified Person Name: Adelaide     Notification Date/Time: 2/20 0729    Notification Interaction: text page      Purpose of Notification: critical VBG results: PCo2 77 and bicarb 55    Orders Received:    Comments:

## 2023-02-20 NOTE — CONSULTS
"CLINICAL NUTRITION SERVICES  -  ASSESSMENT NOTE    Recommendations Ordered by Registered Dietitian (RD):   - Send magic cup supplement. OK to order PRN    Malnutrition:   % Weight Loss:  Up to 10% in 6 months (moderate malnutrition)  % Intake:  </= 75% for >/= 1 month (severe malnutrition)  Subcutaneous Fat Loss:  Orbital region moderate depletion and Upper arm region moderate depletion  Muscle Loss:  Temporal region mild depletion, Clavicle bone region mild depletion and Dorsal hand region milddepletion  Fluid Retention:  Mild 2+    Malnutrition Diagnosis: Moderate malnutrition  In Context of:  Acute illness or injury  Chronic illness or disease     REASON FOR ASSESSMENT  Carl Vázquez is a 82 year old male seen by Registered Dietitian for Nutrition Admission Risk Screen Received -   Have you recently lost weight without trying in the last 6 months ? - \"yes 2-13 pounds\"  Have you been eating poorly due to a decreased appetite ?- \"yes\"    NUTRITION HISTORY  - Information obtained from chart and patient & spouse in room.   - H/o COPD, CHF, PE, HTN, HLD, afib, DM2, presented with shortness of breath.   - Pt & spouse report a history of difficulty swallowing, taste changes, and weight loss since last Fall 2022. In 2016 he had his esophagus dilated for a similar swallowing issue.   - Low salt diet at home.   - Barriers: difficulty swallowing (things get \"stuck\"), taste changes (\"everything tastes the same / bland\"), low appetite.   - has boost at home, pt likes it but lately it has been \"too thick\"      CURRENT NUTRITION ORDERS  Diet Order:     Moderate Consistent Carbohydrate; 2g NA; Minced & Moist; Thin Liquids     Current Intake/Tolerance:  % intakes recorded. Pt not fond of the Minced & Moist diet, finding some of the foods too dry / too stringy to  Swallow.     NUTRITION FOCUSED PHYSICAL ASSESSMENT FOR DIAGNOSING MALNUTRITION)  Yes            Observed:    Muscle wasting (refer to documentation in " "Malnutrition section) and Subcutaneous fat loss (refer to documentation in Malnutrition section)    ANTHROPOMETRICS  Height: 5' 7\"  Weight: 149 lbs 1.6 oz (67.6 kg)   Body mass index is 23.35 kg/m .  Weight Status:  Normal BMI  IBW: 67.3 kg   % IBW: 100%  Weight History: 11% wt loss in the past 8 months.   Wt Readings from Last 10 Encounters:   02/20/23 67.6 kg (149 lb 1.6 oz)   01/07/23 69.1 kg (152 lb 4.8 oz)   07/25/22 77.1 kg (170 lb)   07/15/22 77.6 kg (171 lb)   06/20/22 76 kg (167 lb 9.6 oz)   09/27/19 79.6 kg (175 lb 6.4 oz)   08/22/17 80.7 kg (178 lb)   06/07/16 81.6 kg (180 lb)       LABS  Labs reviewed  K 3.2 (L)  Lab Results   Component Value Date    A1C 8.2 01/04/2023    A1C 8.3 06/15/2022       MEDICATIONS  Medications reviewed  Medium sliding scale insulin   KCl - BID  Bumex Gtt stopped yesterday     ASSESSED NUTRITION NEEDS PER APPROVED PRACTICE GUIDELINES:  Dosing Weight 68 kg   Estimated Energy Needs: 2776-1528 kcals (25-30 Kcal/Kg)  Justification: maintenance  Estimated Protein Needs:  grams protein (1.2-1.5 g pro/Kg)  Justification: preservation of lean body mass  Estimated Fluid Needs: 1 mL/kcal   Justification: maintenance    MALNUTRITION:  % Weight Loss:  Up to 10% in 6 months (moderate malnutrition)  % Intake:  </= 75% for >/= 1 month (severe malnutrition)  Subcutaneous Fat Loss:  Orbital region moderate depletion and Upper arm region moderate depletion  Muscle Loss:  Temporal region mild depletion, Clavicle bone region mild depletion and Dorsal hand region milddepletion  Fluid Retention:  Mild 2+    Malnutrition Diagnosis: Moderate malnutrition  In Context of:  Acute illness or injury  Chronic illness or disease    NUTRITION DIAGNOSIS:  Inadequate oral intake related to difficulty swallowing, taste changes, poor appetite as evidenced by patient and spouse reported poor oral intakes over at least the past 4-6 months, with 11% + weight gain      NUTRITION INTERVENTIONS  Recommendations / " Nutrition Prescription  Diet per MD/SLP  Add Magic Cup PRN       Implementation  Nutrition education: Encouraged protein, discussed supplements  Medical Food Supplement: magic cup PRN      Nutrition Goals  Intake of at least 75% meals TID.     MONITORING AND EVALUATION:  Progress towards goals will be monitored and evaluated per protocol and Practice Guidelines    Dyana Victor RD, LD  [facility]  Pager: [phone]  Weekend Pager: [phone]

## 2023-02-20 NOTE — PROGRESS NOTES
[facility]  Cardiology Progress Note  Date of Service: 02/20/2023  Primary Cardiologist: Dr. Clay and Lety Dangelo PA-C    Assessment & Plan   Carl Vázquez is a 82 year old male who was admitted on 2/17/2023 with acute hypercapnic respiratory failure.     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 Medications   -IV amiodarone load completed 2/19; continue amiodarone 200 mg PO BID for now   -will add nebivolol for rate control; start 2.5 mg BID     *intolerant to metoprolol historically; will try other beta blocker and monitor for side effects   -continue heparin gtt for AC   -discontinue Bumex gtt; suspect patient is close to euvolemic. Will reassess in AM  2. Repeat NT proBNP; daily BMPs  3. Consider RHC + LHC tomorrow; NPO at midnight   -would like to clarify current volume status + assess for ischemia given new cardiomyopathy  4. Ongoing optimization of respiratory status; pulmonology  following    AUDREY Carpio [facility]  Pager: [phone]      Interval History   Mr. Carl Vázquez presented to the hospital on 2/17/2023 after his wife noted his oxygen saturation to be ~70%. He was found to be in atrial fibrillation with poorly controlled ventricular rates. Diltiazem gtt was started initially which was transitioned to amiodarone gtt after echocardiogram revealed a depressed EF of 25-30%. Cardiology was consulted due to concern for STEMI on ECG while the patient was in atrial flutter with rates in the 140s. He was without chest pain and subsequent ECGs in SR had no significant ST elevations. Patient's primary team discussed with the on call interventionalist and ultimately decision was made not to pursue emergent coronary angiography after reviewing his serial ECGs and the fact that his troponins remained flat.    He remains on amiodarone - oral. Denies chest pain or palpitations. Wife is patient's primary caretaker. She feels patient has improved since admission.    Physical Exam   Temp: 97.8  F (36.6  C) Temp src: Axillary BP: 104/65 Pulse: 81   Resp: 20 SpO2: 97 % O2 Device: None (Room air) Oxygen Delivery: 40 LPM  Vitals:    02/18/23 0618 02/19/23 0656 02/20/23 0600   Weight: 69.4 kg (152 lb 14.4 oz) 67.4 kg (148 lb 9.6 oz) 67.6 kg (149 lb 1.6 oz)     GEN: well nourished, in no acute distress.  HEENT:  Pupils equal, round. Sclerae nonicteric.   NECK: Supple, no masses appreciated. No JVD with patient at 90 degrees.  C/V:  Irregularly irregular rhythm; tachycardic  RESP: Respirations are unlabored. Clear to auscultation bilaterally without wheezing, rales, or rhonchi.  GI: Abdomen soft, nontender.  EXTREM: 1+ bilateral LE edema.  NEURO: Alert and oriented, cooperative.  SKIN: Warm and dry.    Medications     bumetanide 0.5 mg/hr (02/19/23 6170)     dextrose 10% Stopped (02/18/23 8483)     heparin 600 Units/hr (02/20/23 0862)     - MEDICATION INSTRUCTIONS -        - MEDICATION INSTRUCTIONS -       - MEDICATION INSTRUCTIONS -         amiodarone  200 mg Oral BID     insulin aspart  1-7 Units Subcutaneous TID AC     insulin aspart  1-5 Units Subcutaneous At Bedtime     insulin glargine  10 Units Subcutaneous At Bedtime     levothyroxine  75 mcg Oral Daily     pantoprazole  40 mg Oral BID     polyethylene glycol  17 g Oral Daily     potassium chloride ER  10 mEq Oral BID     [Held by provider] rivaroxaban ANTICOAGULANT  20 mg Oral Daily with supper     sodium chloride (PF)  10 mL Intracatheter Q8H     sodium chloride (PF)  10 mL Intracatheter Q8H     sodium chloride (PF)  3 mL Intracatheter Q8H     tamsulosin  0.4 mg Oral QPM       Data   Last 24 hours labs reviewed     Tele: SR 80s with paroxysms of AFIB RVR with -160 bpm    Echo 2/18/2023  The visual ejection fraction is 25-30%. There is severe global hypokinesis which is worse in the inferoseptal segments of the LV. Accurate wall motion analysis is very challenging on this study given HR and image quality.  Moderately decreased right ventricular systolic function. RV size is normal.  No hemodynamically significant valve disease.  There is no pericardial effusion.  Compared to the prior study from 2022, EF is much lower. Reassessment of EF and wall motion recommended when HR is better controlled.    14 Day Ziopatch Monitor 1/20/2023-2/3/2023: 35% burden AFIB with average  bpm

## 2023-02-20 NOTE — PROVIDER NOTIFICATION
Sent page to dr. flood hospitalist:    262chanda rasheed.  Fyi- c02 level is 50.  last value was 48.  do you want us to make any adjustments on cpap?  no other changes in patient.      thanks, juan david fuentes

## 2023-02-20 NOTE — PROGRESS NOTES
Lab called with critical value CO2 of 46, previous critical value documented of >50--MD aware, this critical value improved from previous, continue to monitor closely.

## 2023-02-20 NOTE — PROGRESS NOTES
[facility]    Internal Medicine Hospitalist Progress Note  02/20/2023  I evaluated patient on the above date.    Yonatan Bryson Jr., MD  [phone] (p)  Text Page  Vocera        Assessment & Plan New actions/orders today (02/20/2023) are underlined. All lab results in the assessment and plan were reviewed.    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    On initially evaluation 2/17, BMP with sodium 146, bicarb 44, cr normal, glucose 59; CBC with WBC, hgb and plts normal; LFT's unremarkable; NTP-BNP 3084, trop 91; procalcitonin 0.10; ABG showed pH 7.34, pCO2 84, pO2 40.    CXR 2/17 showed recurrent small-to-moderate right pleural effusion and slight increase in small left pleural effusion, mild bibasilar atelectasis.     CT abdomen and pelvis 2/17 showed partly visualized at least moderate right pleural effusion with bibasilar atelectasis; trace ascites; hepatic steatosis and mildly nodular hepatic contour suggestive of hepatic parenchymal disease with indeterminate linear hypodensity adjacent to the gallbladder fossa, may be a focus of fat deposition; extensive colonic diverticulosis without diverticulitis; hypodense area in the urinary bladder neck, indeterminate and may be related to prior TURP.     Abdominal US 2/17 no ascites. Underwent US guided right thoracentesis 2/17 at which time 1L removed.       Acute hypoxic and hypercarbic respiratory failure, suspect multifactorial secondary to acute systolic CHF, pleural effusions, underlying untreated CALE and possible neuromuscular disease.  * Initial  presentation as above.  - Management of separate issues as below.    Acute biventricular systolic CHF exacerbation, new diagnosis.  Cardiomyopathy, new diagnosis, question tachycardia mediated.  Atrial fib/flutter with RVR with h/o PAF.  Hypertension (benign essential).  [PTA: diltiazem  mg daily; torsemide 20 mg daily (however, wife reported pt was not taking).]  * Pt with h/o PAF. On diltiazem and rivaroxaban PTA. Echo 6/2022 showed LVEF 55-60%, grade 1 diastolic dysfunction; RV OK.  * Initial presentation as above. On admit, was in NSR. Started on BiPAP and IV furosemide on admit. Cardiology consulted on admit. Wt 153 lbs on admit.  * After admission on early am 2/18, developed afib/flutter with RVR. Started on diltiazem gtt. Echo showed LVEF 25-30%, severe global hypokinesis, worse in the inferoseptal segments; moderate decreased RV systolic function. Diltiazem gtt then changed to amiodarone gtt given acutely decreased to 25-30%. Noted ST changes while in rapid afib/flutter that resolved once rate was stable. Started on bumetanide gtt.  * On 2/19, back in NSR. Started on heparin gtt. Converted to PO amiodarone.  Vitals:    02/17/23 0759 02/17/23 2004 02/18/23 0618 02/19/23 0656   Weight: 68 kg (150 lb) 69.5 kg (153 lb 3.2 oz) 69.4 kg (152 lb 14.4 oz) 67.4 kg (148 lb 9.6 oz)    02/20/23 0600   Weight: 67.6 kg (149 lb 1.6 oz)     I/O last 3 completed shifts:  In: 1041 [P.O.:690; I.V.:351]  Out: 3325 [Urine:3325]  Recent Labs   Lab 02/20/23  0500 02/19/23  1743 02/19/23  0607 02/18/23  1555 02/18/23  1115 02/18/23  0650 02/17/23  1511 02/17/23  0841   CO2 >50* 48* 47*  47* 44*  --  48*  --  44*   BUN 13.1 16.9 15.9  15.9 14.3  --  10.3  --  14.6   CR 0.71 0.74 0.66*  0.66* 0.75  --  0.79  --  0.82   CTROPT  --   --   --  86*  --   --  88* 91*   NTBNPI  --   --   --   --   --   --   --  3,084*   LVEF  --   --   --   --  25-30%  --   --   --      - Continue BiPAP while sleeing, wean off during the day as  able.  - Continue bumetanide gtt.  - Continue heparin gtt for now and plan to restart rivaroxaban when able once done with procedures.  - Continue amiodarone 200 mg BID.  - Continue to hold PTA diltiazem ER and torsemide.  - Plan possible right and left heart catheterization 2/21 per Cardiology.  - BMP in am.  - Monitor i/o's, daily wts.  - Appreciate Cardiology help.  - Management of chronic hypercapnic respiratory failure as noted.    Chronic hypercapnic respiratory failure with acute decompensation related to above cardiac issues.  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  CALE not using CPAP at home.  * S/p hemidiaphragm plication 10/2022 through [facility] w/o significant relief.  * Initial presentation as above. Started on BiPAP and IV diuretics on admit as noted. Pulmonology consulted on admit.  - Continue BiPAP while sleeping, wean off during the day as able; ?may need at discharge pending improvement with CHF treatment.  - Appreciate Pulmonology help.  - Continue to treat other acute issues as noted.    Bilateral pleural effusions, suspect related to CHF.  * Underwent R thoracentesis as above 2/17; fluid studies suggested transudative effusion; pleural fluid cultures NGTD.   - Continue to treat CHF as noted.      Abdominal pain on admit, subacute most pronounced in the lower quadrants but with increasing abdominal distention, intolerance of LE elevation.  Possible chronic liver disease.  * Following with [facility] for possible chronic liver disease. On admit, noted that chronic liver disease labs sent in early 2/2023. He was to have a abdominal ultrasound and paracentesis on 2/24/2023 and was pending EGD on 2/28/2023 for ongoing dysphagia.  * CT on admit 2/17 as above showed hepatic steatosis and mildly nodular hepatic contour suggestive of  hepatic parenchymal disease, indeterminate linear hypodensity adjacent to the gallbladder fossa, may be a focus of fat deposition, nonemergent liver MRI was  recommended for better characterization. Abdominal US 2/17 w/o ascites.  * On 2/19, abdominal pain better; tolerating diet.  - Continue diet as tolerated.  - Plan nonemergent MRI, likely outpatient.     DM2 last A1c was 8.2% in January 2023.  Hypoglycemia on admit suspect related to insulin with decreased PO/NPO.  [PTA: glipizide 10 mg qam; glargine 60u at bedtime.]  * Hgb A1C 8.2% 1/2023.  * Initial presentation as above. Initial glucose in the 50's.  Recent Labs   Lab 02/20/23  0852 02/20/23  0500 02/20/23  0159 02/19/23  2230 02/19/23  1743 02/19/23  1646   * 160* 219* 203* 224* 242*   - Continue moderate CHO diet.  - Continue glargine 20U at bedtime - change to 10U this evening 2/20 for possible procedure 2/21.  - Continue aspart ISS (medium).  - Continue to hold glipizide.  - Continue PRN hypoglycemia protocol.    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  * Noted to be longstanding and progressive for at least the 13 months PTA. Has ALS evaluation visit 2/28/2023.  * On admit, noted that pt now needed to ambulate with walker (in 1/2022 was using the treadmill daily).  - Will ask Neurology to see as question contribution to above respiratory issues; and also advised by [facility]rology team at [facility].  - Patient and spouse should still keep 2/28/2023 ALS clinic visit for now.    BPH.  Possible urinary retention.  * Possible urinary retention noted on admit.  * Power placed on admit 2/17.  - Continue Power for now 2/20 for strict I/O's; remove in next 1-2 days once off aggressive IV diuretics.  - Continue tamsulosin.    Hypokalemia and hypomagnesemia.  * Potassium and magnesium low at times this hospitalization. Treated with replacement.  Recent Labs   Lab 02/20/23  0711 02/20/23  0500 02/19/23  2222 02/19/23  1743 02/19/23  1316 02/19/23  0607 02/18/23  1555   POTASSIUM  --  3.2* 3.4 3.9 4.1 2.9*  2.9* 4.1   MAG 1.8 1.7 2.0 2.2 2.9* 1.3* 1.8   - Continue PRN K and Mg  replacement.    Hypothyroidism.  * TSH normal 1/2023.  - Continue levothyroxine.         Clinically Significant Risk Factors        # Hypokalemia: Lowest K = 2.9 mmol/L in last 2 days, will replace as needed     # Hypomagnesemia: Lowest Mg = 1.3 mg/dL in last 2 days, will replace as needed   # Hypoalbuminemia: Lowest albumin = 2.7 g/dL at 2/18/2023  3:55 PM, will monitor as appropriate                     COVID-19 testing.  COVID-19 PCR Results    COVID-19 PCR Results 6/14/22 6/14/22 8/26/22 9/15/22 10/26/22 1/4/23    0748 2382       COVID-19 Virus by PCR (External Result)   Negative Negative Negative    SARS CoV2 PCR Negative Negative    Positive (A)   (A) Abnormal value       Comments are available for some flowsheets but are not being displayed.         COVID-19 Antibody Results, Testing for Immunity    COVID-19 Antibody Results, Testing for Immunity   No data to display.             Diet: Room Service  Combination Diet Regular Diet; Moderate Consistent Carb (60 g CHO per Meal) Diet; Minced and Moist Diet (level 5); Thin Liquids (level 0); 2 gm NA Diet    Prophylaxis: PCD's, ambulation. On heparin gtt.  Power Catheter: PRESENT, indication: Strict 1-2 Hour I&O  Lines: None     Code Status: No CPR- Do NOT Intubate    Disposition Plan   Expected discharge: 1-2d recommended to prior living arrangement pending above.  Entered: Yonatan Bryson MD 02/20/2023, 10:09 AM     I spent approximately 60 minutes bedside and on the inpatient unit today managing the care of patient. Over 50% of my time on the unit was spent in extensive chart review, counseling the patient/family and/or coordinating care regarding services listed in this note.      Interval History   Doing a bit better.  Willing to try off BiPAP while awake.  Abdominal pain better overall (though ); tolerating diet.    -Data reviewed today: I reviewed all new labs and imaging over the last 24 hours. I personally reviewed no images or EKG's  "today.    Physical Exam    , Blood pressure 105/68, pulse 80, temperature (!) 96.5  F (35.8  C), temperature source Axillary, resp. rate 24, height 1.702 m (5' 7\"), weight 67.6 kg (149 lb 1.6 oz), SpO2 97 %. O2 Device: BiPAP/CPAP Oxygen Delivery: 40 LPM  Vitals:    02/18/23 0618 02/19/23 0656 02/20/23 0600   Weight: 69.4 kg (152 lb 14.4 oz) 67.4 kg (148 lb 9.6 oz) 67.6 kg (149 lb 1.6 oz)     Vital Signs with Ranges  Temp:  [96.5  F (35.8  C)-98.3  F (36.8  C)] 96.5  F (35.8  C)  Pulse:  [79-93] 80  Resp:  [18-24] 24  BP: (105-146)/(68-91) 105/68  FiO2 (%):  [40 %] 40 %  SpO2:  [94 %-100 %] 97 %  Patient Vitals for the past 24 hrs:   BP Temp Temp src Pulse Resp SpO2 Weight   02/20/23 0800 105/68 -- -- 80 24 97 % --   02/20/23 0700 -- (!) 96.5  F (35.8  C) Axillary -- -- -- --   02/20/23 0600 111/70 -- -- 79 -- 98 % 67.6 kg (149 lb 1.6 oz)   02/20/23 0515 119/72 -- -- 83 -- 98 % --   02/20/23 0400 (!) 146/91 98  F (36.7  C) Axillary 84 -- 97 % --   02/20/23 0200 114/70 -- -- 82 -- 97 % --   02/20/23 0000 107/77 98.3  F (36.8  C) Axillary 83 -- 100 % --   02/19/23 2200 113/74 -- -- 85 -- 99 % --   02/19/23 2100 -- -- -- -- -- 99 % --   02/19/23 2000 115/70 -- -- 93 18 94 % --   02/19/23 1915 -- 98.1  F (36.7  C) Oral -- -- -- --   02/19/23 1800 123/73 -- -- 89 -- -- --   02/19/23 1600 128/80 -- -- 87 -- 95 % --   02/19/23 1530 -- 98.3  F (36.8  C) Oral -- -- -- --   02/19/23 1241 116/69 -- -- 88 -- 94 % --     I/O's Last 24 hours  I/O last 3 completed shifts:  In: 1041 [P.O.:690; I.V.:351]  Out: 3325 [Urine:3325]    Constitutional: Awake, alert, pleasant.  Respiratory: Diminished in bases. No crackles or wheezes.  Cardiovascular: RRR, no m/r/g.  GI: Soft, nd, tender w/o r/g, +BS.  Skin/Integumen: 2-3+ mushy bilateral lower extremity pitting edema.  Other:        Data    Labs reviewed.  Recent Labs   Lab 02/20/23  0852 02/20/23  0500 02/20/23  0159 02/19/23  2230 02/19/23  2222 02/19/23  1743 02/19/23  0812 " 02/19/23  0607 02/18/23  1701 02/18/23  1555   WBC  --  7.4  --   --   --   --   --  8.0  --  8.8   HGB  --  15.4  --   --   --   --   --  15.0  --  14.9   MCV  --  93  --   --   --   --   --  94  --  97   PLT  --  145*  --   --   --   --   --  157  --  161   NA  --  142  --   --   --  137  --  143  143  --  141   POTASSIUM  --  3.2*  --   --  3.4 3.9   < > 2.9*  2.9*  --  4.1   CHLORIDE  --  88*  --   --   --  85*  --  91*  91*  --  93*   CO2  --  >50*  --   --   --  48*  --  47*  47*  --  44*   BUN  --  13.1  --   --   --  16.9  --  15.9  15.9  --  14.3   CR  --  0.71  --   --   --  0.74  --  0.66*  0.66*  --  0.75   ANIONGAP  --   --   --   --   --  4*  --  5*  5*  --  4*   TATE  --  8.2*  --   --   --  8.2*  --  8.2*  8.2*  --  8.3*   * 160* 219*   < >  --  224*   < > 145*  145*   < > 221*   ALBUMIN  --   --   --   --   --   --   --  2.8*  --  2.7*   PROTTOTAL  --   --   --   --   --   --   --  5.4*  --  5.1*   BILITOTAL  --   --   --   --   --   --   --  0.6  --  0.6   ALKPHOS  --   --   --   --   --   --   --  81  --  81   ALT  --   --   --   --   --   --   --  13  --  14   AST  --   --   --   --   --   --   --  15  --  22    < > = values in this interval not displayed.     Recent Labs   Lab Test 02/20/23  0852 02/20/23  0500 02/20/23  0159 02/19/23  2230 02/19/23  1743 06/14/22  1254 09/27/19  1132 09/27/19  0738 09/27/19  0202 09/26/19  2109 09/26/19  1852   * 160* 219* 203* 224*   < >  --   --   --   --   --    BGM  --   --   --   --   --   --  293* 160* 147* 256* 264*    < > = values in this interval not displayed.     Recent Labs   Lab 02/20/23  0500 02/19/23  0607 02/18/23  1555 02/18/23  0650 02/17/23  1511 02/17/23  0841   WBC 7.4 8.0 8.8 6.6  --  6.3   LDH  --   --   --   --  280*  --    PCAL  --   --   --   --   --  0.10*         No results found for this or any previous visit (from the past 24 hour(s)).    Medications   All medications were reviewed.    bumetanide 0.5 mg/hr  (02/19/23 4910)     dextrose 10% Stopped (02/18/23 0333)     heparin 600 Units/hr (02/20/23 6247)     - MEDICATION INSTRUCTIONS -       - MEDICATION INSTRUCTIONS -       - MEDICATION INSTRUCTIONS -         amiodarone  200 mg Oral BID     insulin aspart  1-7 Units Subcutaneous TID AC     insulin aspart  1-5 Units Subcutaneous At Bedtime     insulin glargine  20 Units Subcutaneous At Bedtime     levothyroxine  75 mcg Oral Daily     magnesium sulfate  2 g Intravenous Once     pantoprazole  40 mg Oral BID     polyethylene glycol  17 g Oral Daily     potassium chloride  10 mEq Intravenous Q1H     potassium chloride ER  10 mEq Oral BID     [Held by provider] rivaroxaban ANTICOAGULANT  20 mg Oral Daily with supper     sodium chloride (PF)  10 mL Intracatheter Q8H     sodium chloride (PF)  10 mL Intracatheter Q8H     sodium chloride (PF)  3 mL Intracatheter Q8H     tamsulosin  0.4 mg Oral QPM     acetaminophen **OR** acetaminophen, bisacodyl, artificial tears, glucose **OR** dextrose **OR** glucagon, lidocaine 4%, lidocaine (buffered or not buffered), melatonin, naloxone **OR** naloxone **OR** naloxone **OR** naloxone, nitroGLYcerin, - MEDICATION INSTRUCTIONS -, ondansetron **OR** ondansetron, oxyCODONE, - MEDICATION INSTRUCTIONS -, - MEDICATION INSTRUCTIONS -, polyethylene glycol, prochlorperazine **OR** prochlorperazine **OR** prochlorperazine, senna-docusate **OR** senna-docusate, sodium chloride (PF), sodium chloride (PF), sodium chloride (PF)

## 2023-02-21 ENCOUNTER — TELEPHONE (OUTPATIENT)
Dept: MEDSURG UNIT | Facility: CLINIC | Age: 83
End: 2023-02-21
Payer: COMMERCIAL

## 2023-02-21 LAB
ACT BLD: 156 SECONDS (ref 74–150)
ACT BLD: 186 SECONDS (ref 74–150)
ACT BLD: 194 SECONDS (ref 74–150)
ACT BLD: 220 SECONDS (ref 74–150)
ACT BLD: 254 SECONDS (ref 74–150)
ACT BLD: 292 SECONDS (ref 74–150)
ACT BLD: 301 SECONDS (ref 74–150)
ACT BLD: 318 SECONDS (ref 74–150)
ACT BLD: 331 SECONDS (ref 74–150)
ACT BLD: 369 SECONDS (ref 74–150)
ANION GAP SERPL CALCULATED.3IONS-SCNC: 4 MMOL/L (ref 7–15)
APTT PPP: 124 SECONDS (ref 22–38)
APTT PPP: 89 SECONDS (ref 22–38)
ATRIAL RATE - MUSE: 276 BPM
ATRIAL RATE - MUSE: 306 BPM
ATRIAL RATE - MUSE: 88 BPM
ATRIAL RATE - MUSE: 89 BPM
BUN SERPL-MCNC: 15.5 MG/DL (ref 8–23)
CALCIUM SERPL-MCNC: 8.3 MG/DL (ref 8.8–10.2)
CHLORIDE SERPL-SCNC: 89 MMOL/L (ref 98–107)
CREAT SERPL-MCNC: 0.76 MG/DL (ref 0.67–1.17)
DEPRECATED HCO3 PLAS-SCNC: 46 MMOL/L (ref 22–29)
DIASTOLIC BLOOD PRESSURE - MUSE: NORMAL MMHG
GFR SERPL CREATININE-BSD FRML MDRD: 90 ML/MIN/1.73M2
GLUCOSE BLDC GLUCOMTR-MCNC: 123 MG/DL (ref 70–99)
GLUCOSE BLDC GLUCOMTR-MCNC: 127 MG/DL (ref 70–99)
GLUCOSE BLDC GLUCOMTR-MCNC: 133 MG/DL (ref 70–99)
GLUCOSE BLDC GLUCOMTR-MCNC: 136 MG/DL (ref 70–99)
GLUCOSE BLDC GLUCOMTR-MCNC: 148 MG/DL (ref 70–99)
GLUCOSE SERPL-MCNC: 127 MG/DL (ref 70–99)
HCO3 BLDV-SCNC: 50 MMOL/L (ref 21–28)
INTERPRETATION ECG - MUSE: NORMAL
LACTATE BLD-SCNC: 0.4 MMOL/L
MAGNESIUM SERPL-MCNC: 1.7 MG/DL (ref 1.7–2.3)
MAGNESIUM SERPL-MCNC: 1.8 MG/DL (ref 1.7–2.3)
P AXIS - MUSE: 247 DEGREES
P AXIS - MUSE: 61 DEGREES
P AXIS - MUSE: 63 DEGREES
P AXIS - MUSE: NORMAL DEGREES
PCO2 BLDV: 90 MM HG (ref 40–50)
PH BLDV: 7.36 [PH] (ref 7.32–7.43)
PO2 BLDV: 43 MM HG (ref 25–47)
POTASSIUM SERPL-SCNC: 3.5 MMOL/L (ref 3.4–5.3)
PR INTERVAL - MUSE: 126 MS
PR INTERVAL - MUSE: 128 MS
PR INTERVAL - MUSE: NORMAL MS
PR INTERVAL - MUSE: NORMAL MS
QRS DURATION - MUSE: 80 MS
QRS DURATION - MUSE: 80 MS
QRS DURATION - MUSE: 82 MS
QRS DURATION - MUSE: 86 MS
QT - MUSE: 226 MS
QT - MUSE: 364 MS
QT - MUSE: 366 MS
QT - MUSE: 386 MS
QTC - MUSE: 360 MS
QTC - MUSE: 440 MS
QTC - MUSE: 469 MS
QTC - MUSE: 554 MS
R AXIS - MUSE: 67 DEGREES
R AXIS - MUSE: 68 DEGREES
R AXIS - MUSE: 79 DEGREES
R AXIS - MUSE: 92 DEGREES
SAO2 % BLDV: 72 % (ref 94–100)
SODIUM SERPL-SCNC: 139 MMOL/L (ref 136–145)
SYSTOLIC BLOOD PRESSURE - MUSE: NORMAL MMHG
T AXIS - MUSE: 232 DEGREES
T AXIS - MUSE: 41 DEGREES
T AXIS - MUSE: 66 DEGREES
T AXIS - MUSE: 66 DEGREES
VENTRICULAR RATE- MUSE: 138 BPM
VENTRICULAR RATE- MUSE: 153 BPM
VENTRICULAR RATE- MUSE: 88 BPM
VENTRICULAR RATE- MUSE: 89 BPM

## 2023-02-21 PROCEDURE — 93456 R HRT CORONARY ARTERY ANGIO: CPT

## 2023-02-21 PROCEDURE — 93010 ELECTROCARDIOGRAM REPORT: CPT | Performed by: INTERNAL MEDICINE

## 2023-02-21 PROCEDURE — 210N000001 HC R&B IMCU HEART CARE

## 2023-02-21 PROCEDURE — 0715T HC PRQ TRLUML CORONARY LITHOTRIPSY: CPT

## 2023-02-21 PROCEDURE — 85347 COAGULATION TIME ACTIVATED: CPT

## 2023-02-21 PROCEDURE — 93456 R HRT CORONARY ARTERY ANGIO: CPT | Mod: 26 | Performed by: INTERNAL MEDICINE

## 2023-02-21 PROCEDURE — 250N000011 HC RX IP 250 OP 636: Performed by: INTERNAL MEDICINE

## 2023-02-21 PROCEDURE — C1769 GUIDE WIRE: HCPCS | Performed by: INTERNAL MEDICINE

## 2023-02-21 PROCEDURE — 99153 MOD SED SAME PHYS/QHP EA: CPT | Performed by: INTERNAL MEDICINE

## 2023-02-21 PROCEDURE — C1724 CATH, TRANS ATHEREC,ROTATION: HCPCS | Performed by: INTERNAL MEDICINE

## 2023-02-21 PROCEDURE — 83605 ASSAY OF LACTIC ACID: CPT

## 2023-02-21 PROCEDURE — 250N000013 HC RX MED GY IP 250 OP 250 PS 637: Performed by: INTERNAL MEDICINE

## 2023-02-21 PROCEDURE — 85730 THROMBOPLASTIN TIME PARTIAL: CPT | Performed by: INTERNAL MEDICINE

## 2023-02-21 PROCEDURE — 93456 R HRT CORONARY ARTERY ANGIO: CPT | Performed by: INTERNAL MEDICINE

## 2023-02-21 PROCEDURE — C1760 CLOSURE DEV, VASC: HCPCS | Performed by: INTERNAL MEDICINE

## 2023-02-21 PROCEDURE — C9602 PERC D-E COR STENT ATHER S: HCPCS | Performed by: INTERNAL MEDICINE

## 2023-02-21 PROCEDURE — C1894 INTRO/SHEATH, NON-LASER: HCPCS | Performed by: INTERNAL MEDICINE

## 2023-02-21 PROCEDURE — 250N000013 HC RX MED GY IP 250 OP 250 PS 637: Performed by: STUDENT IN AN ORGANIZED HEALTH CARE EDUCATION/TRAINING PROGRAM

## 2023-02-21 PROCEDURE — C1761 HC OR CATH, TRANS INTRA LITHO/CORONARY: HCPCS | Performed by: INTERNAL MEDICINE

## 2023-02-21 PROCEDURE — 36415 COLL VENOUS BLD VENIPUNCTURE: CPT | Performed by: STUDENT IN AN ORGANIZED HEALTH CARE EDUCATION/TRAINING PROGRAM

## 2023-02-21 PROCEDURE — 92933 PRQ TRLML C ATHRC ST ANGIOP1: CPT | Mod: LD | Performed by: INTERNAL MEDICINE

## 2023-02-21 PROCEDURE — 83735 ASSAY OF MAGNESIUM: CPT | Performed by: STUDENT IN AN ORGANIZED HEALTH CARE EDUCATION/TRAINING PROGRAM

## 2023-02-21 PROCEDURE — 99152 MOD SED SAME PHYS/QHP 5/>YRS: CPT | Mod: GC | Performed by: INTERNAL MEDICINE

## 2023-02-21 PROCEDURE — 99152 MOD SED SAME PHYS/QHP 5/>YRS: CPT | Performed by: INTERNAL MEDICINE

## 2023-02-21 PROCEDURE — 82310 ASSAY OF CALCIUM: CPT | Performed by: STUDENT IN AN ORGANIZED HEALTH CARE EDUCATION/TRAINING PROGRAM

## 2023-02-21 PROCEDURE — 92978 ENDOLUMINL IVUS OCT C 1ST: CPT | Performed by: INTERNAL MEDICINE

## 2023-02-21 PROCEDURE — 272N000001 HC OR GENERAL SUPPLY STERILE: Performed by: INTERNAL MEDICINE

## 2023-02-21 PROCEDURE — 250N000013 HC RX MED GY IP 250 OP 250 PS 637: Performed by: NURSE PRACTITIONER

## 2023-02-21 PROCEDURE — 0715T PR PERCUTANEOUS TRANSLUMINAL CORONARY LITHOTRIPSY: CPT | Mod: 59 | Performed by: INTERNAL MEDICINE

## 2023-02-21 PROCEDURE — C1725 CATH, TRANSLUMIN NON-LASER: HCPCS | Performed by: INTERNAL MEDICINE

## 2023-02-21 PROCEDURE — 99232 SBSQ HOSP IP/OBS MODERATE 35: CPT | Mod: 25 | Performed by: INTERNAL MEDICINE

## 2023-02-21 PROCEDURE — C1753 CATH, INTRAVAS ULTRASOUND: HCPCS | Performed by: INTERNAL MEDICINE

## 2023-02-21 PROCEDURE — 99233 SBSQ HOSP IP/OBS HIGH 50: CPT | Performed by: INTERNAL MEDICINE

## 2023-02-21 PROCEDURE — C9600 PERC DRUG-EL COR STENT SING: HCPCS | Mod: LD | Performed by: INTERNAL MEDICINE

## 2023-02-21 PROCEDURE — 82803 BLOOD GASES ANY COMBINATION: CPT

## 2023-02-21 PROCEDURE — C1874 STENT, COATED/COV W/DEL SYS: HCPCS | Performed by: INTERNAL MEDICINE

## 2023-02-21 PROCEDURE — 36415 COLL VENOUS BLD VENIPUNCTURE: CPT | Performed by: INTERNAL MEDICINE

## 2023-02-21 PROCEDURE — 93005 ELECTROCARDIOGRAM TRACING: CPT

## 2023-02-21 PROCEDURE — 92978 ENDOLUMINL IVUS OCT C 1ST: CPT | Mod: 26 | Performed by: INTERNAL MEDICINE

## 2023-02-21 PROCEDURE — 258N000003 HC RX IP 258 OP 636: Performed by: INTERNAL MEDICINE

## 2023-02-21 PROCEDURE — 92929 PR PRQ TRLUML CORONARY BM STENT W/ANGIO ADDL ART/BRNCH: CPT | Mod: LD | Performed by: INTERNAL MEDICINE

## 2023-02-21 PROCEDURE — C1887 CATHETER, GUIDING: HCPCS | Performed by: INTERNAL MEDICINE

## 2023-02-21 PROCEDURE — 250N000009 HC RX 250: Performed by: INTERNAL MEDICINE

## 2023-02-21 PROCEDURE — 250N000011 HC RX IP 250 OP 636: Performed by: STUDENT IN AN ORGANIZED HEALTH CARE EDUCATION/TRAINING PROGRAM

## 2023-02-21 DEVICE — STENT COR ONYX FRONTIER 30X3MM ONYXNG30030UX: Type: IMPLANTABLE DEVICE | Status: FUNCTIONAL

## 2023-02-21 DEVICE — IMPLANTABLE DEVICE: Type: IMPLANTABLE DEVICE | Status: FUNCTIONAL

## 2023-02-21 DEVICE — CLOSURE ANGIOSEAL 6FR 610130: Type: IMPLANTABLE DEVICE | Status: FUNCTIONAL

## 2023-02-21 DEVICE — STENT COR ONYX FRONTIER 38X2.50MM ONYXNG25038UX: Type: IMPLANTABLE DEVICE | Status: FUNCTIONAL

## 2023-02-21 RX ORDER — HEPARIN SODIUM 1000 [USP'U]/ML
INJECTION, SOLUTION INTRAVENOUS; SUBCUTANEOUS
Status: DISCONTINUED | OUTPATIENT
Start: 2023-02-21 | End: 2023-02-21 | Stop reason: HOSPADM

## 2023-02-21 RX ORDER — ONDANSETRON 2 MG/ML
4 INJECTION INTRAMUSCULAR; INTRAVENOUS EVERY 6 HOURS PRN
Status: DISCONTINUED | OUTPATIENT
Start: 2023-02-21 | End: 2023-02-21

## 2023-02-21 RX ORDER — HYDRALAZINE HYDROCHLORIDE 20 MG/ML
10 INJECTION INTRAMUSCULAR; INTRAVENOUS EVERY 4 HOURS PRN
Status: DISCONTINUED | OUTPATIENT
Start: 2023-02-21 | End: 2023-02-23 | Stop reason: HOSPADM

## 2023-02-21 RX ORDER — SODIUM CHLORIDE 9 MG/ML
INJECTION, SOLUTION INTRAVENOUS CONTINUOUS
Status: ACTIVE | OUTPATIENT
Start: 2023-02-21 | End: 2023-02-21

## 2023-02-21 RX ORDER — NALOXONE HYDROCHLORIDE 0.4 MG/ML
0.4 INJECTION, SOLUTION INTRAMUSCULAR; INTRAVENOUS; SUBCUTANEOUS
Status: DISCONTINUED | OUTPATIENT
Start: 2023-02-21 | End: 2023-02-21

## 2023-02-21 RX ORDER — FENTANYL CITRATE 50 UG/ML
25 INJECTION, SOLUTION INTRAMUSCULAR; INTRAVENOUS
Status: DISCONTINUED | OUTPATIENT
Start: 2023-02-21 | End: 2023-02-23 | Stop reason: HOSPADM

## 2023-02-21 RX ORDER — IOPAMIDOL 755 MG/ML
INJECTION, SOLUTION INTRAVASCULAR
Status: DISCONTINUED | OUTPATIENT
Start: 2023-02-21 | End: 2023-02-21 | Stop reason: HOSPADM

## 2023-02-21 RX ORDER — LORAZEPAM 2 MG/ML
0.5 INJECTION INTRAMUSCULAR
Status: DISCONTINUED | OUTPATIENT
Start: 2023-02-21 | End: 2023-02-21 | Stop reason: HOSPADM

## 2023-02-21 RX ORDER — CLOPIDOGREL BISULFATE 75 MG/1
75 TABLET ORAL DAILY
Status: DISCONTINUED | OUTPATIENT
Start: 2023-02-22 | End: 2023-02-23 | Stop reason: HOSPADM

## 2023-02-21 RX ORDER — ASPIRIN 81 MG/1
243 TABLET, CHEWABLE ORAL ONCE
Status: COMPLETED | OUTPATIENT
Start: 2023-02-21 | End: 2023-02-21

## 2023-02-21 RX ORDER — POTASSIUM CHLORIDE 1500 MG/1
20 TABLET, EXTENDED RELEASE ORAL ONCE
Status: COMPLETED | OUTPATIENT
Start: 2023-02-21 | End: 2023-02-21

## 2023-02-21 RX ORDER — ACETAMINOPHEN 325 MG/1
650 TABLET ORAL EVERY 4 HOURS PRN
Status: DISCONTINUED | OUTPATIENT
Start: 2023-02-21 | End: 2023-02-23 | Stop reason: HOSPADM

## 2023-02-21 RX ORDER — LORAZEPAM 0.5 MG/1
0.5 TABLET ORAL
Status: DISCONTINUED | OUTPATIENT
Start: 2023-02-21 | End: 2023-02-21 | Stop reason: HOSPADM

## 2023-02-21 RX ORDER — ASPIRIN 81 MG/1
81 TABLET ORAL DAILY
Status: DISCONTINUED | OUTPATIENT
Start: 2023-02-22 | End: 2023-02-23

## 2023-02-21 RX ORDER — ASPIRIN 81 MG/1
81 TABLET, CHEWABLE ORAL ONCE
Status: DISCONTINUED | OUTPATIENT
Start: 2023-02-21 | End: 2023-02-21

## 2023-02-21 RX ORDER — NITROGLYCERIN 0.4 MG/1
0.4 TABLET SUBLINGUAL EVERY 5 MIN PRN
Status: DISCONTINUED | OUTPATIENT
Start: 2023-02-21 | End: 2023-02-23 | Stop reason: HOSPADM

## 2023-02-21 RX ORDER — ASPIRIN 325 MG
325 TABLET ORAL ONCE
Status: COMPLETED | OUTPATIENT
Start: 2023-02-21 | End: 2023-02-21

## 2023-02-21 RX ORDER — NITROGLYCERIN 5 MG/ML
VIAL (ML) INTRAVENOUS
Status: DISCONTINUED | OUTPATIENT
Start: 2023-02-21 | End: 2023-02-21 | Stop reason: HOSPADM

## 2023-02-21 RX ORDER — ATROPINE SULFATE 0.1 MG/ML
0.5 INJECTION INTRAVENOUS
Status: DISPENSED | OUTPATIENT
Start: 2023-02-21 | End: 2023-02-21

## 2023-02-21 RX ORDER — LIDOCAINE 40 MG/G
CREAM TOPICAL
Status: DISCONTINUED | OUTPATIENT
Start: 2023-02-21 | End: 2023-02-21

## 2023-02-21 RX ORDER — CLOPIDOGREL BISULFATE 75 MG/1
TABLET ORAL
Status: DISCONTINUED | OUTPATIENT
Start: 2023-02-21 | End: 2023-02-21 | Stop reason: HOSPADM

## 2023-02-21 RX ORDER — POTASSIUM CHLORIDE 1500 MG/1
20 TABLET, EXTENDED RELEASE ORAL
Status: DISCONTINUED | OUTPATIENT
Start: 2023-02-21 | End: 2023-02-21 | Stop reason: HOSPADM

## 2023-02-21 RX ORDER — SODIUM CHLORIDE 9 MG/ML
INJECTION, SOLUTION INTRAVENOUS CONTINUOUS
Status: DISCONTINUED | OUTPATIENT
Start: 2023-02-21 | End: 2023-02-21 | Stop reason: HOSPADM

## 2023-02-21 RX ORDER — NALOXONE HYDROCHLORIDE 0.4 MG/ML
0.2 INJECTION, SOLUTION INTRAMUSCULAR; INTRAVENOUS; SUBCUTANEOUS
Status: DISCONTINUED | OUTPATIENT
Start: 2023-02-21 | End: 2023-02-21

## 2023-02-21 RX ORDER — ONDANSETRON 4 MG/1
4 TABLET, ORALLY DISINTEGRATING ORAL EVERY 6 HOURS PRN
Status: DISCONTINUED | OUTPATIENT
Start: 2023-02-21 | End: 2023-02-21

## 2023-02-21 RX ORDER — FENTANYL CITRATE 50 UG/ML
INJECTION, SOLUTION INTRAMUSCULAR; INTRAVENOUS
Status: DISCONTINUED | OUTPATIENT
Start: 2023-02-21 | End: 2023-02-21 | Stop reason: HOSPADM

## 2023-02-21 RX ORDER — MAGNESIUM OXIDE 400 MG/1
400 TABLET ORAL EVERY 4 HOURS
Status: COMPLETED | OUTPATIENT
Start: 2023-02-21 | End: 2023-02-21

## 2023-02-21 RX ORDER — FLUMAZENIL 0.1 MG/ML
0.2 INJECTION, SOLUTION INTRAVENOUS
Status: ACTIVE | OUTPATIENT
Start: 2023-02-21 | End: 2023-02-21

## 2023-02-21 RX ADMIN — PANTOPRAZOLE SODIUM 40 MG: 40 TABLET, DELAYED RELEASE ORAL at 09:10

## 2023-02-21 RX ADMIN — POTASSIUM CHLORIDE 20 MEQ: 1500 TABLET, EXTENDED RELEASE ORAL at 09:09

## 2023-02-21 RX ADMIN — HEPARIN SODIUM 750 UNITS/HR: 10000 INJECTION, SOLUTION INTRAVENOUS at 05:23

## 2023-02-21 RX ADMIN — ASPIRIN 325 MG ORAL TABLET 325 MG: 325 PILL ORAL at 09:11

## 2023-02-21 RX ADMIN — LEVOTHYROXINE SODIUM 75 MCG: 75 TABLET ORAL at 09:10

## 2023-02-21 RX ADMIN — PANTOPRAZOLE SODIUM 40 MG: 40 TABLET, DELAYED RELEASE ORAL at 22:35

## 2023-02-21 RX ADMIN — MAGNESIUM OXIDE TAB 400 MG (241.3 MG ELEMENTAL MG) 400 MG: 400 (241.3 MG) TAB at 09:15

## 2023-02-21 RX ADMIN — POTASSIUM CHLORIDE 10 MEQ: 750 TABLET, EXTENDED RELEASE ORAL at 09:10

## 2023-02-21 RX ADMIN — POTASSIUM CHLORIDE 10 MEQ: 750 TABLET, EXTENDED RELEASE ORAL at 22:35

## 2023-02-21 RX ADMIN — SODIUM CHLORIDE: 9 INJECTION, SOLUTION INTRAVENOUS at 07:29

## 2023-02-21 RX ADMIN — NEBIVOLOL HYDROCHLORIDE 2.5 MG: 2.5 TABLET ORAL at 09:11

## 2023-02-21 RX ADMIN — AMIODARONE HYDROCHLORIDE 200 MG: 200 TABLET ORAL at 22:35

## 2023-02-21 RX ADMIN — NEBIVOLOL HYDROCHLORIDE 2.5 MG: 2.5 TABLET ORAL at 22:35

## 2023-02-21 RX ADMIN — AMIODARONE HYDROCHLORIDE 200 MG: 200 TABLET ORAL at 09:10

## 2023-02-21 ASSESSMENT — ACTIVITIES OF DAILY LIVING (ADL)
ADLS_ACUITY_SCORE: 49
ADLS_ACUITY_SCORE: 49
ADLS_ACUITY_SCORE: 50
ADLS_ACUITY_SCORE: 49
ADLS_ACUITY_SCORE: 49
ADLS_ACUITY_SCORE: 50
ADLS_ACUITY_SCORE: 49

## 2023-02-21 NOTE — CONSULTS
[facility]    Neurology Consultation     Date of Admission:  2/17/2023    Assessment & Plan   Carl Vázquez is a 82 year old male who was admitted on 2/17/2023. I was asked to see the patient for progressive weakness, questionable lower motor neuron disease.  The patient has multiple medical cardiac problems as well as significant cervical spine disease and stenosis very likely the basis of his weakness.  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  Today I did not see any muscle fasciculations or tongue fasciculations to suggest motor neuron disease.  I would recommend    -Repeat an MRI of the cervical spine, thoracic and lumbar spine-this can be done after the cardiac work-up and procedures are done.  -We will continue metabolic work-up for other causes of weakness.  -The patient has sleep apnea which has not been treated with a CPAP machine for about the last year after his machine was recalled.  Consider getting another machine for the patient.  -The patient should keep his appointment to the [facility] as possible with consideration for repeating an EMG if deemed necessary.  -Physical therapy occupational therapy.    Evy Whiting MD    Code Status    No CPR- Do NOT Intubate    Reason for Consult   Reason for consult: I was asked by Dr Bryson to evaluate this patient for progressive weakness, questionable lower motor neuron disease.    Primary Care Physician   Kyler Mcwilliams    Chief Complaint   progressive weakness, questionable lower motor neuron disease.    History is obtained from the patient's wife, the patient as well as reading the chart.    History of Present Illness   Carl Vázquez is a 82 year old male admitted on 2/17/2023 after wife noticed oxygen saturation of 70%.  The patient was found initially to be in atrial fibrillation with rapid response and poorly controlled ventricular rates.  He was initially treated with diltiazem gtts  then started on amiodarone.    The patient has a history of weakness which seem to be progressive.  Patient's wife stated that in January of this year the patient was admitted to the hospital with shortness of breath and was found to have significant pleural fluid which was removed.  He stayed in the hospital for 4 days, the patient wife states that since this hospitalization the patient is using a walker to ambulate.  Patient's wife stated that she noticed muscle twitching especially in the upper extremities and not much in the legs.    The patient denies numbness and tingling in the legs or hands.  He has had neck pain.  He denies sharp shooting pain down the arms.    Patient's wife states that in May 2022 the patient was found to have a right diaphragma paralyzes for which she has had surgery with not much improvement.    The patient has had an EMG on December 09/2022, the EMG shows sensorimotor axonal neuropathy.  There was superimposed right ulnar neuropathy at the elbow and the right carpal tunnel.  There was also evidence for a chronic right C6 and C7 radiculopathies and questionable C8 radiculopathy could not be excluded.    The patient has history of obstructive sleep apnea and has been on CPAP however his machine was recalled and for the last year he has not been using CPAP machine.    The patient was found to have acute biventricular heart failure with with an ejection fraction of 25 to 30%, combine acute hypoxemic and hypercapnic Respiratory failure, paroxysmal atrial fibrillation    Past Medical History   I have reviewed this patient's medical history and updated it with pertinent information if needed.   Past Medical History:   Diagnosis Date     Diabetes (H)      Sleep apnea     uses CPAP machine     Thyroid disease        Past Surgical History   I have reviewed this patient's surgical history and updated it with pertinent information if needed.  Past Surgical History:   Procedure Laterality Date      CHOLECYSTECTOMY      at [facility]     COLONOSCOPY      in [place], [place]     COLONOSCOPY  08/22/2017    Dr. Ja LYNN     ESOPHAGOSCOPY, GASTROSCOPY, DUODENOSCOPY (EGD), COMBINED N/A 6/7/2016    Procedure: COMBINED ESOPHAGOSCOPY, GASTROSCOPY, DUODENOSCOPY (EGD);  Surgeon: Eneida Denton MD;  Location:  GI       Prior to Admission Medications   Prior to Admission Medications   Prescriptions Last Dose Informant Patient Reported? Taking?   diltiazem ER COATED BEADS (CARDIZEM CD) 360 MG 24 hr capsule 2/16/2023 at PM Spouse/Significant Other No Yes   Sig: Take 1 capsule (360 mg) by mouth daily   glipiZIDE (GLUCOTROL) 5 MG tablet 2/16/2023 at AM Spouse/Significant Other Yes Yes   Sig: Take 10 mg by mouth every morning (before breakfast)   insulin glargine (LANTUS) 100 UNIT/ML PEN 2/16/2023 at PM Spouse/Significant Other Yes Yes   Sig: Inject 60 Units Subcutaneous At Bedtime   levothyroxine (SYNTHROID/LEVOTHROID) 75 MCG tablet 2/16/2023 at AM Spouse/Significant Other Yes Yes   Sig: Take 75 mcg by mouth daily   omeprazole (PRILOSEC) 40 MG DR capsule 2/16/2023 at PM Spouse/Significant Other Yes Yes   Sig: Take 40 mg by mouth 2 times daily   oxyCODONE (ROXICODONE) 5 MG tablet 2/17/2023 at 0700 Spouse/Significant Other Yes Yes   Sig: Take 5 mg by mouth every 4 hours as needed for pain   polyethylene glycol (MIRALAX) 17 GM/Dose powder 2/16/2023 at AM Spouse/Significant Other No Yes   Sig: Take 17 g by mouth daily   potassium chloride ER (KLOR-CON M) 10 MEQ CR tablet 2/16/2023 at PM Spouse/Significant Other No Yes   Sig: Take 2 tablets (20 mEq) by mouth daily   Patient taking differently: Take 10 mEq by mouth 2 times daily   rivaroxaban ANTICOAGULANT (XARELTO) 20 MG TABS tablet 2/16/2023 at PM Spouse/Significant Other No Yes   Sig: Take 1 tablet (20 mg) by mouth daily (with dinner)   tamsulosin (FLOMAX) 0.4 MG capsule 2/16/2023 at PM Spouse/Significant Other No Yes   Sig: Take 1 capsule (0.4 mg) by mouth daily    torsemide (DEMADEX) 20 MG tablet 2/16/2023 at AM Spouse/Significant Other Yes Yes   Sig: Take 20 mg by mouth daily      Facility-Administered Medications: None     Allergies   Allergies   Allergen Reactions     Metoprolol Shortness Of Breath     Tried 1/2022, 1/2023, both time stopped for increased SOB     Statin Drugs [Hmg-Coa-R Inhibitors]        Social History   I have reviewed this patient's social history and updated it with pertinent information if needed. Carl Vázquez  reports that he has never smoked. He has never used smokeless tobacco. He reports that he does not drink alcohol and does not use drugs.    Family History   I have reviewed this patient's family history and updated it with pertinent information if needed.   Family History   Problem Relation Age of Onset     Colon Cancer No family hx of        Review of Systems   The 10 point Review of Systems is negative other than noted in the HPI or here.     Physical Exam   Temp: 98  F (36.7  C) Temp src: Oral BP: 99/62 Pulse: 101   Resp: 20 SpO2: 98 % O2 Device: None (Room air) Oxygen Delivery: 40 LPM  Vital Signs with Ranges  Temp:  [96.5  F (35.8  C)-98.3  F (36.8  C)] 98  F (36.7  C)  Pulse:  [] 101  Resp:  [18-24] 20  BP: ()/(62-91) 99/62  FiO2 (%):  [40 %] 40 %  SpO2:  [94 %-100 %] 98 %  149 lbs 1.6 oz    Constitutional: Slim gentleman sitting up in chair with oxygen by nasal cannula.  Eyes: No conjunctival erythema  ENT: Neck is supple  Respiratory: CTA no shortness of breath noticed  Skin: No obvious lesions  Musculoskeletal: pedal edema noticed bilaterally  The patient is alert oriented x3 no acute distress.  Speech is fluent there is no aphasia apraxia or agnosia.   Pupils are equal round reactive to light extraocular movements are intact, there is no nystagmus.  Facial muscles are equal bilaterally.  Uvula and tongue are midline.  No tongue fasciculations noticed.  There is muscle atrophy in an ulnar distribution on the left  and the right.  I did not see any muscle fasciculations.  Muscle tone is normal.  The patient cannot AB duct shoulders on both sides.  There is weakness in both upper extremities at about plus 4 out of 5.  Hip flexion on both sides is weak at about 4 out of 5 dorsiflexion of the right foot is weak at about plus 3 out of 5 as well as plantarflexion on that side.  On the left strength is minus 5 out of 5.  Reflexes are present in upper extremities I could not get lower extremities reflexes.  Babinski is acute focal bilaterally.  Sensory exam is normal to light touch.  Vibratory sense was decreased at the knees bilaterally.    Finger-nose-finger without dysmetria.  Fine movements are normal.    Gait was deferred   neuropsychiatric: Somewhat depressed    Data   Results for orders placed or performed during the hospital encounter of 02/17/23 (from the past 24 hour(s))   Potassium   Result Value Ref Range    Potassium 3.4 3.4 - 5.3 mmol/L   Magnesium   Result Value Ref Range    Magnesium 2.0 1.7 - 2.3 mg/dL   Partial thromboplastin time   Result Value Ref Range    aPTT 64 (H) 22 - 38 Seconds   Glucose by meter   Result Value Ref Range    GLUCOSE BY METER POCT 203 (H) 70 - 99 mg/dL   Glucose by meter   Result Value Ref Range    GLUCOSE BY METER POCT 219 (H) 70 - 99 mg/dL   CBC with Platelets & Differential    Narrative    The following orders were created for panel order CBC with Platelets & Differential.  Procedure                               Abnormality         Status                     ---------                               -----------         ------                     CBC with platelets and d...[[id number]]  Abnormal            Final result                 Please view results for these tests on the individual orders.   Partial thromboplastin time   Result Value Ref Range    aPTT 93 (H) 22 - 38 Seconds   Basic metabolic panel   Result Value Ref Range    Sodium 142 136 - 145 mmol/L    Potassium 3.2 (L) 3.4 - 5.3  mmol/L    Chloride 88 (L) 98 - 107 mmol/L    Carbon Dioxide (CO2) >50 (HH) 22 - 29 mmol/L    Anion Gap      Urea Nitrogen 13.1 8.0 - 23.0 mg/dL    Creatinine 0.71 0.67 - 1.17 mg/dL    Calcium 8.2 (L) 8.8 - 10.2 mg/dL    Glucose 160 (H) 70 - 99 mg/dL    GFR Estimate >90 >60 mL/min/1.73m2   Magnesium   Result Value Ref Range    Magnesium 1.7 1.7 - 2.3 mg/dL   CBC with platelets and differential   Result Value Ref Range    WBC Count 7.4 4.0 - 11.0 10e3/uL    RBC Count 5.29 4.40 - 5.90 10e6/uL    Hemoglobin 15.4 13.3 - 17.7 g/dL    Hematocrit 49.2 40.0 - 53.0 %    MCV 93 78 - 100 fL    MCH 29.1 26.5 - 33.0 pg    MCHC 31.3 (L) 31.5 - 36.5 g/dL    RDW 16.0 (H) 10.0 - 15.0 %    Platelet Count 145 (L) 150 - 450 10e3/uL    % Neutrophils 70 %    % Lymphocytes 20 %    % Monocytes 9 %    % Eosinophils 1 %    % Basophils 0 %    % Immature Granulocytes 0 %    NRBCs per 100 WBC 0 <1 /100    Absolute Neutrophils 5.2 1.6 - 8.3 10e3/uL    Absolute Lymphocytes 1.5 0.8 - 5.3 10e3/uL    Absolute Monocytes 0.7 0.0 - 1.3 10e3/uL    Absolute Eosinophils 0.1 0.0 - 0.7 10e3/uL    Absolute Basophils 0.0 0.0 - 0.2 10e3/uL    Absolute Immature Granulocytes 0.0 <=0.4 10e3/uL    Absolute NRBCs 0.0 10e3/uL   Blood gas venous   Result Value Ref Range    pH Venous 7.46 (H) 7.32 - 7.43    pCO2 Venous 77 (HH) 40 - 50 mm Hg    pO2 Venous 44 25 - 47 mm Hg    Bicarbonate Venous 55 (HH) 21 - 28 mmol/L    Base Excess/Deficit (+/-) 24.9 (H) -7.7 - 1.9 mmol/L    FIO2 30    Magnesium   Result Value Ref Range    Magnesium 1.8 1.7 - 2.3 mg/dL   Glucose by meter   Result Value Ref Range    GLUCOSE BY METER POCT 114 (H) 70 - 99 mg/dL   Glucose by meter   Result Value Ref Range    GLUCOSE BY METER POCT 107 (H) 70 - 99 mg/dL   Partial thromboplastin time   Result Value Ref Range    aPTT 69 (H) 22 - 38 Seconds   Basic metabolic panel   Result Value Ref Range    Sodium 135 (L) 136 - 145 mmol/L    Potassium 4.5 3.4 - 5.3 mmol/L    Chloride 84 (L) 98 - 107 mmol/L     Carbon Dioxide (CO2) 46 (HH) 22 - 29 mmol/L    Anion Gap 5 (L) 7 - 15 mmol/L    Urea Nitrogen 14.6 8.0 - 23.0 mg/dL    Creatinine 0.77 0.67 - 1.17 mg/dL    Calcium 8.3 (L) 8.8 - 10.2 mg/dL    Glucose 313 (H) 70 - 99 mg/dL    GFR Estimate 89 >60 mL/min/1.73m2   Magnesium   Result Value Ref Range    Magnesium 1.8 1.7 - 2.3 mg/dL   Nt probnp inpatient   Result Value Ref Range    N terminal Pro BNP Inpatient 225 0 - 1,800 pg/mL   Glucose by meter   Result Value Ref Range    GLUCOSE BY METER POCT 323 (H) 70 - 99 mg/dL

## 2023-02-21 NOTE — PROGRESS NOTES
[facility]  Cardiology Progress Note  Date of Service: 02/21/2023  Primary Cardiologist: Dr. Clay and Lety Dangelo PA-C    Assessment & Plan   Carl Vázquez is a 82 year old male who was admitted on 2/17/2023 with acute hypercapnic respiratory failure.     Assessment:  Acute biventricular heart failure, NYHA III   -LVEF 3/2022 55-60% (AFIB burden 4%)   -LVEF 2/2023 25-30% (Holter 1/2023 with 35% AFIB burden with avg rate 135 bpm)   -NT proBNP elevated ~3000 at time of admission; diuresed well with Bumex gtt; repeat NT proBNP 2/20 at 225   -150# on admission, 144# today    -presumed tachy-mediated but cannot rule out ischemic disease as culprit  Acute on chronic hypoxic and hypercapnic respiratory failure  COPD   -multifactorial; untreated CALE, possible neuromuscular disease; acute CHF, right hemidiaphragm paralysis (after spine surgery)   -requiring BiPAP during the day; 40L  Paroxysmal atrial fibrillation   -chronic AC with Xarelto    -PTA on rate control regimen with diltiazem 360 mg once daily    -most recent cardiac monitor completed 1/2023 revealed a 35% AFIB burden with an average rate of 135 bpm    -IV amiodarone load completed 2/19   -ongoing PAF, nebivolol 2.5 mg BID added for rate control with good result    *patient did not tolerate metoprolol in the past  Diabetes mellitus type 2, uncontrolled   -Hgb A1c 8.2%  Hypertension  Hypothyroidism   -on levothyroxine   CALE   -does not use CPAP at home  Right diaphragm paralysis    -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 Medications    -continue amiodarone 200 mg PO BID for a total of 10 days, then decrease to once daily    -continue nebivolol 2.5 mg PO BID for rate control    -continue heparin gtt for AC   -no further diuretics at this time, may resume PO diuretics pending result of #2  2. Right and left heart  catheterization with possible intervention today, 2/21/2023  3. Ongoing optimization of respiratory status; pulmonology following    Risks and benefits of right and left heart catheterization +/- percutaneous coronary intervention (PCI) were discussed with the patient including but not limited to bleeding, bruising, infection, allergic reaction, kidney damage (including need for dialysis), stroke, heart attack, vascular damage, emergency open heart surgery, and death.  Patient verbalized understanding and wishes to proceed.      I also called patient's wife, Dorothy, to update her regarding our plans for right and left heart catheterization today. She was appreciative of the call. All questions were answered.     Mirna Rodriges PA-C  [facility] - Heart Care  Pager: [phone]      Interval History   Mr. Carl Vázquez presented to the hospital on 2/17/2023 after his wife noted his oxygen saturation to be ~70%. He was found to be in atrial fibrillation with poorly controlled ventricular rates. Diltiazem gtt was started initially which was transitioned to amiodarone gtt after echocardiogram revealed a depressed EF of 25-30%. Cardiology was consulted due to concern for STEMI on ECG while the patient was in atrial flutter with rates in the 140s. He was without chest pain and subsequent ECGs in SR had no significant ST elevations. Patient's primary team discussed with the on call interventionalist and ultimately decision was made not to pursue emergent coronary angiography after reviewing his serial ECGs and the fact that his troponins remained flat.    Patient doing well today. In sinus rhythm at present. No noted side effects to nebivolol thus far. Denies chest pain, palpitations, lightheadedness, or dizziness. No shortness of breath while laying flat in bed.     Physical Exam   Temp: 98.3  F (36.8  C) Temp src: Oral BP: 90/55 Pulse: 69   Resp: 16 SpO2: 94 % O2 Device: BiPAP/CPAP Oxygen Delivery: 2  LP  Vitals:    02/19/23 0656 02/20/23 0600 02/21/23 0600   Weight: 67.4 kg (148 lb 9.6 oz) 67.6 kg (149 lb 1.6 oz) 65.3 kg (144 lb)     GEN: well nourished, in no acute distress.  HEENT:  Pupils equal, round. Sclerae nonicteric.   NECK: Supple, no masses appreciated. No JVD  C/V:  Regular rate and rhythm, no murmur   RESP: Respirations are unlabored. Clear to auscultation bilaterally without wheezing, rales, or rhonchi.  GI: Abdomen soft, nontender.  EXTREM: trace LE edema.  NEURO: Alert and oriented, cooperative.  SKIN: Warm and dry.     Medications     dextrose 10% Stopped (02/18/23 0333)     heparin Stopped (02/21/23 0719)     - MEDICATION INSTRUCTIONS -       - MEDICATION INSTRUCTIONS -       - MEDICATION INSTRUCTIONS -       - MEDICATION INSTRUCTIONS -       sodium chloride 10 mL/hr at 02/21/23 0740       amiodarone  200 mg Oral BID     aspirin  325 mg Oral Once    Or     aspirin  243 mg Oral Once     insulin aspart  1-7 Units Subcutaneous TID AC     insulin aspart  1-5 Units Subcutaneous At Bedtime     insulin glargine  10 Units Subcutaneous At Bedtime     levothyroxine  75 mcg Oral Daily     magnesium oxide  400 mg Oral Q4H     nebivolol  2.5 mg Oral BID     pantoprazole  40 mg Oral BID     polyethylene glycol  17 g Oral Daily     potassium chloride ER  10 mEq Oral BID     potassium chloride  20 mEq Oral Once     [Held by provider] rivaroxaban ANTICOAGULANT  20 mg Oral Daily with supper     sodium chloride (PF)  10 mL Intracatheter Q8H     sodium chloride (PF)  10 mL Intracatheter Q8H     sodium chloride (PF)  3 mL Intracatheter Q8H     sodium chloride (PF)  3 mL Intracatheter Q8H     [Held by provider] tamsulosin  0.4 mg Oral QPM       Data   Last 24 hours labs reviewed     Tele: Presently in SR with VR 60-80s; PACs    Echo 2/18/2023  The visual ejection fraction is 25-30%. There is severe global hypokinesis which is worse in the inferoseptal segments of the LV. Accurate wall motion analysis is very  challenging on this study given HR and image quality.  Moderately decreased right ventricular systolic function. RV size is normal.  No hemodynamically significant valve disease.  There is no pericardial effusion.  Compared to the prior study from 2022, EF is much lower. Reassessment of EF and wall motion recommended when HR is better controlled.    14 Day Ziopatch Monitor 1/20/2023-2/3/2023: 35% burden AFIB with average  bpm

## 2023-02-21 NOTE — PROGRESS NOTES
MD would like CM staff to find out if pt can get replacement CPAP.  CLIFF-RN reviewed chart, not clear where pt original CPAP is from.      Called pt wife [phone] requested callback.  Would ask which DME provider originally dispensed CPAP, CM team can reach out to DME company to assist with equipment request.    Appears pt may have previously est care with [place] [facility] provider Dr. Walls [phone].        Jannie Blair RN, BSN, PHN  [facility]  Inpatient Care Management - [facility] CM RN Mobile: [phone] daily 7:30-4:00

## 2023-02-21 NOTE — PLAN OF CARE
Goal Outcome Evaluation:  A&O x4, VSS on BiPAP overnight. Tele SR w/PAC. Heparin infusing as ordered. Weak, GARCIA, Up with 1A/GB/W. Denies pain. K+ & mg++ given last night. Rechecks labs this am. NPO since midnight. Plans for R+L heart cath today. Will continue to monitor.

## 2023-02-21 NOTE — PLAN OF CARE
VSS, has had a couple episodes of JOSE with movement but then converts spontaneaously back into SR, no c/o pain, lazo catheter in place--leaking so exchanged, K and mg replaced, hep gtt continues, plan for R and L heart cath tomorrow am, continue to monitor closely.

## 2023-02-21 NOTE — PROGRESS NOTES
Chart reviewed for upcoming paracentesis on 2/24/23: CSE, no pertinent allergies, has standing orders w/written albumin infusion orders.

## 2023-02-21 NOTE — PROGRESS NOTES
[facility] [place]    Internal Medicine Hospitalist Progress Note  02/21/2023  I evaluated patient on the above date.    Yonatan Bryson Jr., MD  [phone] (p)  Text Page  Vocera        Assessment & Plan New actions/orders today (02/21/2023) are underlined. All lab results in the assessment and plan were reviewed.    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    On initially evaluation 2/17, BMP with sodium 146, bicarb 44, cr normal, glucose 59; CBC with WBC, hgb and plts normal; LFT's unremarkable; NTP-BNP 3084, trop 91; procalcitonin 0.10; ABG showed pH 7.34, pCO2 84, pO2 40.    CXR 2/17 showed recurrent small-to-moderate right pleural effusion and slight increase in small left pleural effusion, mild bibasilar atelectasis.     CT abdomen and pelvis 2/17 showed partly visualized at least moderate right pleural effusion with bibasilar atelectasis; trace ascites; hepatic steatosis and mildly nodular hepatic contour suggestive of hepatic parenchymal disease with indeterminate linear hypodensity adjacent to the gallbladder fossa, may be a focus of fat deposition; extensive colonic diverticulosis without diverticulitis; hypodense area in the urinary bladder neck, indeterminate and may be related to prior TURP.     Abdominal US 2/17 no ascites. Underwent US guided right thoracentesis 2/17 at which time 1L removed.       Acute hypoxic and hypercarbic respiratory failure, suspect multifactorial secondary to acute systolic CHF, pleural effusions, underlying untreated CALE and possible neuromuscular disease.  * Initial  presentation as above.  - Management of separate issues as below.    Acute biventricular systolic CHF exacerbation, new diagnosis.  Cardiomyopathy, new diagnosis, question tachycardia mediated.  Atrial fib/flutter with RVR with h/o PAF.  Troponin elevation, suspect demand ischemia (type 2 NSTEMI) related to above, r/o CAD.  Hypertension (benign essential).  [PTA: diltiazem  mg daily; torsemide 20 mg daily (however, wife reported pt was not taking).]  * Pt with h/o PAF. On diltiazem and rivaroxaban PTA. Echo 6/2022 showed LVEF 55-60%, grade 1 diastolic dysfunction; RV OK.  * Initial presentation as above. On admit, was in NSR. Started on BiPAP and IV furosemide on admit. Cardiology consulted on admit. Wt 153 lbs on admit.  * After admission on early am 2/18, developed afib/flutter with RVR. Started on diltiazem gtt. Echo showed LVEF 25-30%, severe global hypokinesis, worse in the inferoseptal segments; moderate decreased RV systolic function. Diltiazem gtt then changed to amiodarone gtt given acutely decreased to 25-30%. Noted ST changes while in rapid afib/flutter that resolved once rate was stable. Started on bumetanide gtt.  * On 2/19, back in NSR. Started on heparin gtt. Converted to PO amiodarone.  * On 2/20, bumetanide gtt stopped. Started nebivolol.  Vitals:    02/17/23 2004 02/18/23 0618 02/19/23 0656 02/20/23 0600   Weight: 69.5 kg (153 lb 3.2 oz) 69.4 kg (152 lb 14.4 oz) 67.4 kg (148 lb 9.6 oz) 67.6 kg (149 lb 1.6 oz)    02/21/23 0600   Weight: 65.3 kg (144 lb)     I/O last 3 completed shifts:  In: 962 [P.O.:300; I.V.:662]  Out: 1925 [Urine:1925]  Recent Labs   Lab 02/21/23  0634 02/20/23  1620 02/20/23  0500 02/19/23  1743 02/19/23  0607 02/18/23  1555 02/18/23  1115 02/18/23  0650 02/17/23  1511 02/17/23  0841   CO2 46* 46* >50* 48* 47*  47* 44*  --  48*  --  44*   BUN 15.5 14.6 13.1 16.9 15.9  15.9 14.3  --  10.3  --  14.6   CR 0.76 0.77 0.71 0.74 0.66*  0.66* 0.75  --  0.79  --  0.82   CTROPT   --   --   --   --   --  86*  --   --  88* 91*   NTBNPI  --  225  --   --   --   --   --   --   --  3,084*   LVEF  --   --   --   --   --   --  25-30%  --   --   --      - Continue O2, wean as able.  - Continue heparin gtt for now and plan to restart rivaroxaban when able once done with procedures.  - Continue amiodarone 200 mg BID.  - Continue nebivolol 2.5 mg BID.  - Continue to hold PTA diltiazem ER and torsemide; possibly restart torsemide or other PO diuretic per Cardiology.  - Plan right and left heart catheterization 2/21 per Cardiology.  - Monitor i/o's, daily wts.  - Appreciate Cardiology help.  - Management of chronic hypercapnic respiratory failure as noted.    Chronic hypercapnic respiratory failure with acute decompensation related to above cardiac issues.  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  CALE not using CPAP at home.  * S/p hemidiaphragm plication 10/2022 through [facility] w/o significant relief.   * Initial presentation as above. Started on BiPAP and IV diuretics on admit as noted. Pulmonology consulted on admit.  * On 2/21, tolerating BiPAP with sleep and off during the day.  - Continue BiPAP while sleeping, wean off during the day as able; plan discharge with CPAP/BiPAP. Pt has a CPAP, but machine outdated/broken, needs a new device. Discussed with SW/CC regarding getting a new CPAP for pt. If unable, will ask Pulmonology to prescribe for discharge if felt appropriate.  - Continue to treat other acute issues as noted.  - Follow-up with [facility] outpatient.    Bilateral pleural effusions, suspect related to CHF.  * Underwent R thoracentesis as above 2/17; fluid studies suggested transudative effusion; pleural fluid cultures NGTD.   - Continue to treat CHF as noted.      Abdominal pain on admit, subacute most pronounced in the lower quadrants but with increasing abdominal distention, intolerance of LE elevation.  Possible chronic liver disease.  * Following with [facility] for possible  chronic liver disease. On admit, noted that chronic liver disease labs sent in early 2/2023. He was to have a abdominal ultrasound and paracentesis on 2/24/2023 and was pending EGD on 2/28/2023 for ongoing dysphagia.  * CT on admit 2/17 as above showed hepatic steatosis and mildly nodular hepatic contour suggestive of  hepatic parenchymal disease, indeterminate linear hypodensity adjacent to the gallbladder fossa, may be a focus of fat deposition, nonemergent liver MRI was recommended for better characterization. Abdominal US 2/17 w/o ascites.  * On 2/19, abdominal pain better; tolerating diet.  - Continue diet as tolerated.  - Plan nonemergent MRI, likely outpatient.     DM2 last A1c was 8.2% in January 2023.  Hypoglycemia on admit suspect related to insulin with decreased PO/NPO.  [PTA: glipizide 10 mg qam; glargine 60U at bedtime.]  * Hgb A1C 8.2% 1/2023.  * Initial presentation as above. Initial glucose in the 50's.  * Glargine reduced significant this hospitalization.  Recent Labs   Lab 02/21/23  0839 02/21/23  0634 02/21/23  0226 02/20/23  2105 02/20/23  1738 02/20/23  1620   * 127* 148* 311* 323* 313*   - Continue moderate CHO diet.  - Continue glargine 10U at bedtime.  - Continue aspart ISS (medium).  - Continue to hold glipizide.  - Continue PRN hypoglycemia protocol.    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  * Noted to be longstanding and progressive for at least the 13 months PTA. Has ALS evaluation visit 2/28/2023.  * On admit, noted that pt now needed to ambulate with walker (in 1/2022 was using the treadmill daily).  * On 2/20, pt's wife called ALS clinic who advised Neurology consult here. Neurology consulted.  - Neurology recommends repeat c-spine, t-spine and l-spine MRI after cardiac workup.  - Patient and spouse should still keep 2/28/2023 ALS clinic visit.  - Appreciate Neurology help.     BPH.  Possible urinary retention.  * Possible urinary retention  noted on admit.  * Power placed on admit 2/17.  - Remove Power for voiding trial after procedure.  - Order bladder scans and PRN SIC.  - Continue tamsulosin.    Hypokalemia and hypomagnesemia.  * Potassium and magnesium low at times this hospitalization. Treated with replacement.  Recent Labs   Lab 02/21/23  0634 02/20/23  1620 02/20/23  0711 02/20/23  0500 02/19/23  2222 02/19/23  1743 02/19/23  1316   POTASSIUM 3.5 4.5  --  3.2* 3.4 3.9 4.1   MAG 1.7 1.8 1.8 1.7 2.0 2.2 2.9*   - Continue PRN K and Mg replacement.    Thrombocytopenia, unclear etiology, possibly medication effect (question heparin).  * Plts normal on admit.  * Plts 145K on 2/20.  Recent Labs   Lab 02/20/23  0500 02/19/23  0607 02/18/23  1555 02/18/23  0650 02/17/23  0841   * 157 161 167 195   - Continue to monitor CBC - repeat in am.  - Consider platelet transfusion if needs a procedure and less than 50,000; or if less than 10,000.    Hypothyroidism.  * TSH normal 1/2023.  - Continue levothyroxine.       Clinically Significant Risk Factors        # Hypokalemia: Lowest K = 3.2 mmol/L in last 2 days, will replace as needed       # Hypoalbuminemia: Lowest albumin = 2.7 g/dL at 2/18/2023  3:55 PM, will monitor as appropriate            # Moderate Malnutrition: based on nutrition assessment, PRESENT ON ADMISSION         COVID-19 testing.  COVID-19 PCR Results    COVID-19 PCR Results 6/14/22 6/14/22 8/26/22 9/15/22 10/26/22 1/4/23    [id number]       COVID-19 Virus by PCR (External Result)   Negative Negative Negative    SARS CoV2 PCR Negative Negative    Positive (A)   (A) Abnormal value       Comments are available for some flowsheets but are not being displayed.         COVID-19 Antibody Results, Testing for Immunity    COVID-19 Antibody Results, Testing for Immunity   No data to display.             Diet: Room Service  Snacks/Supplements Adult: Magic Cup; With Meals  NPO per Anesthesia Guidelines for Procedure/Surgery Except for: Meds   "  Prophylaxis: PCD's, ambulation. On heparin gtt.  Power Catheter: PRESENT, indication: Strict 1-2 Hour I&O  Lines: None     Code Status: No CPR- Do NOT Intubate    Disposition Plan   Expected discharge: 1-2d recommended to prior living arrangement pending above.  Entered: Yonatan Bryson MD 02/21/2023, 9:02 AM         Interval History   Feeling better.  Tolerating BiPAP.  Notes that CPAP machine outdated/broken and needs a new machine.    -Data reviewed today: I reviewed all new labs and imaging over the last 24 hours. I personally reviewed no images or EKG's today.    Physical Exam    , Blood pressure 102/61, pulse 69, temperature 98.1  F (36.7  C), temperature source Oral, resp. rate 16, height 1.702 m (5' 7\"), weight 65.3 kg (144 lb), SpO2 98 %. O2 Device: Nasal cannula Oxygen Delivery: 2 LPM  Vitals:    02/19/23 0656 02/20/23 0600 02/21/23 0600   Weight: 67.4 kg (148 lb 9.6 oz) 67.6 kg (149 lb 1.6 oz) 65.3 kg (144 lb)     Vital Signs with Ranges  Temp:  [97.8  F (36.6  C)-99.5  F (37.5  C)] 98.1  F (36.7  C)  Pulse:  [] 69  Resp:  [15-22] 16  BP: ()/(55-68) 102/61  SpO2:  [94 %-99 %] 98 %  Patient Vitals for the past 24 hrs:   BP Temp Temp src Pulse Resp SpO2 Weight   02/21/23 0836 102/61 98.1  F (36.7  C) Oral -- 16 98 % --   02/21/23 0600 90/55 -- -- 69 16 94 % 65.3 kg (144 lb)   02/21/23 0400 107/66 -- -- 70 16 98 % --   02/21/23 0007 108/67 98.3  F (36.8  C) Oral 76 15 96 % --   02/20/23 1953 107/60 99.5  F (37.5  C) Oral 99 18 98 % --   02/20/23 1600 99/62 -- -- 101 -- -- --   02/20/23 1500 -- 98  F (36.7  C) Oral -- -- 98 % --   02/20/23 1200 104/65 -- -- -- 20 97 % --   02/20/23 1107 -- 97.8  F (36.6  C) Axillary 81 22 99 % --   02/20/23 1000 105/68 -- -- 82 -- 95 % --     I/O's Last 24 hours  I/O last 3 completed shifts:  In: 962 [P.O.:300; I.V.:662]  Out: 1925 [Urine:1925]    Constitutional: Awake, alert, pleasant, conversant.  Respiratory: Diminished in bases. No crackles or " wheezes.  Cardiovascular: RRR, no m/r/g.  GI: Soft, nd, tender w/o r/g, +BS.  Skin/Integumen: 1-2+ mushy bilateral lower extremity pitting edema, improved.  Other:        Data    Labs reviewed.  Recent Labs   Lab 02/21/23  0839 02/21/23  0634 02/21/23  0226 02/20/23  1738 02/20/23  1620 02/20/23  0852 02/20/23  0500 02/19/23  2222 02/19/23  1743 02/19/23  0812 02/19/23  0607 02/18/23  1701 02/18/23  1555   WBC  --   --   --   --   --   --  7.4  --   --   --  8.0  --  8.8   HGB  --   --   --   --   --   --  15.4  --   --   --  15.0  --  14.9   MCV  --   --   --   --   --   --  93  --   --   --  94  --  97   PLT  --   --   --   --   --   --  145*  --   --   --  157  --  161   NA  --  139  --   --  135*  --  142  --  137  --  143  143  --  141   POTASSIUM  --  3.5  --   --  4.5  --  3.2*   < > 3.9   < > 2.9*  2.9*  --  4.1   CHLORIDE  --  89*  --   --  84*  --  88*  --  85*  --  91*  91*  --  93*   CO2  --  46*  --   --  46*  --  >50*  --  48*  --  47*  47*  --  44*   BUN  --  15.5  --   --  14.6  --  13.1  --  16.9  --  15.9  15.9  --  14.3   CR  --  0.76  --   --  0.77  --  0.71  --  0.74  --  0.66*  0.66*  --  0.75   ANIONGAP  --  4*  --   --  5*  --   --   --  4*  --  5*  5*  --  4*   TATE  --  8.3*  --   --  8.3*  --  8.2*  --  8.2*  --  8.2*  8.2*  --  8.3*   * 127* 148*   < > 313*   < > 160*   < > 224*   < > 145*  145*   < > 221*   ALBUMIN  --   --   --   --   --   --   --   --   --   --  2.8*  --  2.7*   PROTTOTAL  --   --   --   --   --   --   --   --   --   --  5.4*  --  5.1*   BILITOTAL  --   --   --   --   --   --   --   --   --   --  0.6  --  0.6   ALKPHOS  --   --   --   --   --   --   --   --   --   --  81  --  81   ALT  --   --   --   --   --   --   --   --   --   --  13  --  14   AST  --   --   --   --   --   --   --   --   --   --  15  --  22    < > = values in this interval not displayed.     Recent Labs   Lab Test 02/21/23  0839 02/21/23  0634 02/21/23  0226 02/20/23  4528  02/20/23  1738 06/14/22  1254 09/27/19  1132 09/27/19  0738 09/27/19  0202 09/26/19  2109 09/26/19  1852   * 127* 148* 311* 323*   < >  --   --   --   --   --    BGM  --   --   --   --   --   --  293* 160* 147* 256* 264*    < > = values in this interval not displayed.     Recent Labs   Lab 02/20/23  0500 02/19/23  0607 02/18/23  1555 02/18/23  0650 02/17/23  1511 02/17/23  0841   WBC 7.4 8.0 8.8 6.6  --  6.3   LDH  --   --   --   --  280*  --    PCAL  --   --   --   --   --  0.10*         No results found for this or any previous visit (from the past 24 hour(s)).    Medications   All medications were reviewed.    dextrose 10% Stopped (02/18/23 0333)     heparin Stopped (02/21/23 0719)     - MEDICATION INSTRUCTIONS -       - MEDICATION INSTRUCTIONS -       - MEDICATION INSTRUCTIONS -       - MEDICATION INSTRUCTIONS -       sodium chloride 10 mL/hr at 02/21/23 0740       amiodarone  200 mg Oral BID     aspirin  325 mg Oral Once    Or     aspirin  243 mg Oral Once     insulin aspart  1-7 Units Subcutaneous TID AC     insulin aspart  1-5 Units Subcutaneous At Bedtime     insulin glargine  10 Units Subcutaneous At Bedtime     levothyroxine  75 mcg Oral Daily     magnesium oxide  400 mg Oral Q4H     nebivolol  2.5 mg Oral BID     pantoprazole  40 mg Oral BID     polyethylene glycol  17 g Oral Daily     potassium chloride ER  10 mEq Oral BID     potassium chloride  20 mEq Oral Once     [Held by provider] rivaroxaban ANTICOAGULANT  20 mg Oral Daily with supper     sodium chloride (PF)  10 mL Intracatheter Q8H     sodium chloride (PF)  10 mL Intracatheter Q8H     sodium chloride (PF)  3 mL Intracatheter Q8H     sodium chloride (PF)  3 mL Intracatheter Q8H     [Held by provider] tamsulosin  0.4 mg Oral QPM     acetaminophen **OR** acetaminophen, bisacodyl, artificial tears, glucose **OR** dextrose **OR** glucagon, lidocaine 4%, lidocaine (buffered or not buffered), LORazepam **OR** LORazepam, melatonin, naloxone **OR**  naloxone **OR** naloxone **OR** naloxone, nitroGLYcerin, - MEDICATION INSTRUCTIONS -, ondansetron **OR** ondansetron, oxyCODONE, - MEDICATION INSTRUCTIONS -, - MEDICATION INSTRUCTIONS -, - MEDICATION INSTRUCTIONS -, polyethylene glycol, potassium chloride, prochlorperazine **OR** prochlorperazine **OR** prochlorperazine, senna-docusate **OR** senna-docusate, sodium chloride (PF), sodium chloride (PF), sodium chloride (PF)

## 2023-02-22 ENCOUNTER — APPOINTMENT (OUTPATIENT)
Dept: SPEECH THERAPY | Facility: CLINIC | Age: 83
DRG: 246 | End: 2023-02-22
Payer: COMMERCIAL

## 2023-02-22 ENCOUNTER — APPOINTMENT (OUTPATIENT)
Dept: OCCUPATIONAL THERAPY | Facility: CLINIC | Age: 83
DRG: 246 | End: 2023-02-22
Payer: COMMERCIAL

## 2023-02-22 ENCOUNTER — APPOINTMENT (OUTPATIENT)
Dept: PHYSICAL THERAPY | Facility: CLINIC | Age: 83
DRG: 246 | End: 2023-02-22
Attending: STUDENT IN AN ORGANIZED HEALTH CARE EDUCATION/TRAINING PROGRAM
Payer: COMMERCIAL

## 2023-02-22 LAB
ANION GAP SERPL CALCULATED.3IONS-SCNC: 9 MMOL/L (ref 7–15)
BACTERIA PLR CULT: NO GROWTH
BASOPHILS # BLD AUTO: 0 10E3/UL (ref 0–0.2)
BASOPHILS NFR BLD AUTO: 0 %
BUN SERPL-MCNC: 19.3 MG/DL (ref 8–23)
CALCIUM SERPL-MCNC: 9.5 MG/DL (ref 8.8–10.2)
CHLORIDE SERPL-SCNC: 94 MMOL/L (ref 98–107)
CREAT SERPL-MCNC: 0.67 MG/DL (ref 0.67–1.17)
DEPRECATED HCO3 PLAS-SCNC: 38 MMOL/L (ref 22–29)
EOSINOPHIL # BLD AUTO: 0.1 10E3/UL (ref 0–0.7)
EOSINOPHIL NFR BLD AUTO: 1 %
ERYTHROCYTE [DISTWIDTH] IN BLOOD BY AUTOMATED COUNT: 16 % (ref 10–15)
GFR SERPL CREATININE-BSD FRML MDRD: >90 ML/MIN/1.73M2
GLUCOSE BLDC GLUCOMTR-MCNC: 219 MG/DL (ref 70–99)
GLUCOSE BLDC GLUCOMTR-MCNC: 253 MG/DL (ref 70–99)
GLUCOSE BLDC GLUCOMTR-MCNC: 328 MG/DL (ref 70–99)
GLUCOSE BLDC GLUCOMTR-MCNC: 368 MG/DL (ref 70–99)
GLUCOSE BLDC GLUCOMTR-MCNC: 419 MG/DL (ref 70–99)
GLUCOSE SERPL-MCNC: 259 MG/DL (ref 70–99)
GRAM STAIN RESULT: NORMAL
GRAM STAIN RESULT: NORMAL
HCT VFR BLD AUTO: 52.4 % (ref 40–53)
HGB BLD-MCNC: 16.1 G/DL (ref 13.3–17.7)
IMM GRANULOCYTES # BLD: 0 10E3/UL
IMM GRANULOCYTES NFR BLD: 0 %
LYMPHOCYTES # BLD AUTO: 1.9 10E3/UL (ref 0.8–5.3)
LYMPHOCYTES NFR BLD AUTO: 22 %
MAGNESIUM SERPL-MCNC: 1.8 MG/DL (ref 1.7–2.3)
MAGNESIUM SERPL-MCNC: 1.9 MG/DL (ref 1.7–2.3)
MCH RBC QN AUTO: 28.4 PG (ref 26.5–33)
MCHC RBC AUTO-ENTMCNC: 30.7 G/DL (ref 31.5–36.5)
MCV RBC AUTO: 92 FL (ref 78–100)
MONOCYTES # BLD AUTO: 0.7 10E3/UL (ref 0–1.3)
MONOCYTES NFR BLD AUTO: 8 %
NEUTROPHILS # BLD AUTO: 6 10E3/UL (ref 1.6–8.3)
NEUTROPHILS NFR BLD AUTO: 69 %
NRBC # BLD AUTO: 0 10E3/UL
NRBC BLD AUTO-RTO: 0 /100
PLATELET # BLD AUTO: 153 10E3/UL (ref 150–450)
POTASSIUM SERPL-SCNC: 4.3 MMOL/L (ref 3.4–5.3)
RBC # BLD AUTO: 5.67 10E6/UL (ref 4.4–5.9)
SODIUM SERPL-SCNC: 141 MMOL/L (ref 136–145)
WBC # BLD AUTO: 8.7 10E3/UL (ref 4–11)

## 2023-02-22 PROCEDURE — 027037Z DILATION OF CORONARY ARTERY, ONE ARTERY WITH FOUR OR MORE DRUG-ELUTING INTRALUMINAL DEVICES, PERCUTANEOUS APPROACH: ICD-10-PCS | Performed by: INTERNAL MEDICINE

## 2023-02-22 PROCEDURE — 99232 SBSQ HOSP IP/OBS MODERATE 35: CPT | Performed by: INTERNAL MEDICINE

## 2023-02-22 PROCEDURE — 210N000001 HC R&B IMCU HEART CARE

## 2023-02-22 PROCEDURE — 250N000013 HC RX MED GY IP 250 OP 250 PS 637: Performed by: STUDENT IN AN ORGANIZED HEALTH CARE EDUCATION/TRAINING PROGRAM

## 2023-02-22 PROCEDURE — 02C03ZZ EXTIRPATION OF MATTER FROM CORONARY ARTERY, ONE ARTERY, PERCUTANEOUS APPROACH: ICD-10-PCS | Performed by: INTERNAL MEDICINE

## 2023-02-22 PROCEDURE — 36415 COLL VENOUS BLD VENIPUNCTURE: CPT | Performed by: INTERNAL MEDICINE

## 2023-02-22 PROCEDURE — 83735 ASSAY OF MAGNESIUM: CPT | Performed by: STUDENT IN AN ORGANIZED HEALTH CARE EDUCATION/TRAINING PROGRAM

## 2023-02-22 PROCEDURE — 99232 SBSQ HOSP IP/OBS MODERATE 35: CPT | Mod: FS | Performed by: INTERNAL MEDICINE

## 2023-02-22 PROCEDURE — 93010 ELECTROCARDIOGRAM REPORT: CPT | Performed by: INTERNAL MEDICINE

## 2023-02-22 PROCEDURE — 80048 BASIC METABOLIC PNL TOTAL CA: CPT | Performed by: INTERNAL MEDICINE

## 2023-02-22 PROCEDURE — 85025 COMPLETE CBC W/AUTO DIFF WBC: CPT | Performed by: INTERNAL MEDICINE

## 2023-02-22 PROCEDURE — 250N000013 HC RX MED GY IP 250 OP 250 PS 637: Performed by: INTERNAL MEDICINE

## 2023-02-22 PROCEDURE — 36415 COLL VENOUS BLD VENIPUNCTURE: CPT | Performed by: STUDENT IN AN ORGANIZED HEALTH CARE EDUCATION/TRAINING PROGRAM

## 2023-02-22 PROCEDURE — 02F03ZZ FRAGMENTATION IN CORONARY ARTERY, ONE ARTERY, PERCUTANEOUS APPROACH: ICD-10-PCS | Performed by: INTERNAL MEDICINE

## 2023-02-22 PROCEDURE — 4A023N6 MEASUREMENT OF CARDIAC SAMPLING AND PRESSURE, RIGHT HEART, PERCUTANEOUS APPROACH: ICD-10-PCS | Performed by: INTERNAL MEDICINE

## 2023-02-22 PROCEDURE — 97535 SELF CARE MNGMENT TRAINING: CPT | Mod: GO | Performed by: OCCUPATIONAL THERAPIST

## 2023-02-22 PROCEDURE — 97530 THERAPEUTIC ACTIVITIES: CPT | Mod: GP

## 2023-02-22 PROCEDURE — 97116 GAIT TRAINING THERAPY: CPT | Mod: GP

## 2023-02-22 PROCEDURE — 250N000013 HC RX MED GY IP 250 OP 250 PS 637: Performed by: NURSE PRACTITIONER

## 2023-02-22 PROCEDURE — 93005 ELECTROCARDIOGRAM TRACING: CPT

## 2023-02-22 PROCEDURE — 92526 ORAL FUNCTION THERAPY: CPT | Mod: GN

## 2023-02-22 RX ORDER — LISINOPRIL 2.5 MG/1
2.5 TABLET ORAL DAILY
Status: DISCONTINUED | OUTPATIENT
Start: 2023-02-22 | End: 2023-02-23 | Stop reason: HOSPADM

## 2023-02-22 RX ORDER — ROSUVASTATIN CALCIUM 20 MG/1
20 TABLET, COATED ORAL DAILY
Status: DISCONTINUED | OUTPATIENT
Start: 2023-02-22 | End: 2023-02-23 | Stop reason: HOSPADM

## 2023-02-22 RX ADMIN — POTASSIUM CHLORIDE 10 MEQ: 750 TABLET, EXTENDED RELEASE ORAL at 09:23

## 2023-02-22 RX ADMIN — ROSUVASTATIN CALCIUM 20 MG: 20 TABLET, FILM COATED ORAL at 16:22

## 2023-02-22 RX ADMIN — LEVOTHYROXINE SODIUM 75 MCG: 75 TABLET ORAL at 09:22

## 2023-02-22 RX ADMIN — PANTOPRAZOLE SODIUM 40 MG: 40 TABLET, DELAYED RELEASE ORAL at 09:22

## 2023-02-22 RX ADMIN — INSULIN ASPART 2 UNITS: 100 INJECTION, SOLUTION INTRAVENOUS; SUBCUTANEOUS at 09:25

## 2023-02-22 RX ADMIN — PANTOPRAZOLE SODIUM 40 MG: 40 TABLET, DELAYED RELEASE ORAL at 19:59

## 2023-02-22 RX ADMIN — AMIODARONE HYDROCHLORIDE 200 MG: 200 TABLET ORAL at 09:22

## 2023-02-22 RX ADMIN — RIVAROXABAN 20 MG: 10 TABLET, FILM COATED ORAL at 16:22

## 2023-02-22 RX ADMIN — INSULIN ASPART 6 UNITS: 100 INJECTION, SOLUTION INTRAVENOUS; SUBCUTANEOUS at 12:15

## 2023-02-22 RX ADMIN — ASPIRIN 81 MG: 81 TABLET, COATED ORAL at 09:22

## 2023-02-22 RX ADMIN — CLOPIDOGREL BISULFATE 75 MG: 75 TABLET ORAL at 09:22

## 2023-02-22 RX ADMIN — INSULIN ASPART 3 UNITS: 100 INJECTION, SOLUTION INTRAVENOUS; SUBCUTANEOUS at 17:32

## 2023-02-22 RX ADMIN — AMIODARONE HYDROCHLORIDE 200 MG: 200 TABLET ORAL at 20:00

## 2023-02-22 RX ADMIN — NEBIVOLOL HYDROCHLORIDE 2.5 MG: 2.5 TABLET ORAL at 20:00

## 2023-02-22 RX ADMIN — POTASSIUM CHLORIDE 10 MEQ: 750 TABLET, EXTENDED RELEASE ORAL at 19:59

## 2023-02-22 RX ADMIN — NEBIVOLOL HYDROCHLORIDE 2.5 MG: 2.5 TABLET ORAL at 09:22

## 2023-02-22 RX ADMIN — LISINOPRIL 2.5 MG: 2.5 TABLET ORAL at 13:12

## 2023-02-22 ASSESSMENT — ACTIVITIES OF DAILY LIVING (ADL)
ADLS_ACUITY_SCORE: 52
ADLS_ACUITY_SCORE: 52
ADLS_ACUITY_SCORE: 50
ADLS_ACUITY_SCORE: 52
ADLS_ACUITY_SCORE: 52
ADLS_ACUITY_SCORE: 51
ADLS_ACUITY_SCORE: 50
ADLS_ACUITY_SCORE: 52
ADLS_ACUITY_SCORE: 51

## 2023-02-22 NOTE — PROVIDER NOTIFICATION
MD Notification    Notified Person: MD    Notified Person Name: Dr. Guzmán     Notification Date/Time: 2/22/21 @ 0215    Notification Interaction: Amcom     Purpose of Notification: 262 SB  Gluc was 368, ate supper @ 2200 d/t line pull & strict bedrest. Please advise. Thanks! -NATALY Velasco *[id number]    Orders Received:   Brief update:     novolog 4 units x 1 for BG of 368     Ashkan Guzmán MD  2:45 AM    Comments: Novolog given per orders.

## 2023-02-22 NOTE — PLAN OF CARE
Patient is A&O x 4, VSS, on 3 L O2 via NC.SR, LS dimmish. Up to Chair with SBA and walker. Adequate I&O. Plan to be discharge to home, CM involved.    Care time 15:00 - 19:30

## 2023-02-22 NOTE — PROGRESS NOTES
[facility]  Cardiology Progress Note  Date of Service: 02/22/2023  Primary Cardiologist: Dr. Clay and Lety Dangelo PA-C    Assessment & Plan   Carl Vázquez is a 82 year old male who was admitted on 2/17/2023 with acute hypercapnic respiratory failure.     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   -LVEF 3/2022 55-60% (AFIB burden 4%)   -LVEF 2/2023 25-30% (Holter 1/2023 with 35% AFIB burden with avg rate 135 bpm)   -NT proBNP elevated ~3000 at time of admission; diuresed well with Bumex gtt; repeat NT proBNP 2/20 at 225   -150# on admission, 140# today   Acute on chronic hypoxic and hypercapnic respiratory failure  COPD   -multifactorial; untreated CALE, possible neuromuscular disease; acute CHF, right hemidiaphragm paralysis   -required BiPAP during the day, now on RA  Coronary artery disease with recent atherectomy and JESSE x 4 to LAD on 2/21/2023   -on nebivolol; lisinopril started 2/22   -statin not yet started - want to discuss with wife prior given hx of intolerance   Paroxysmal atrial fibrillation   -chronic AC with Xarelto    -PTA on rate control regimen with diltiazem 360 mg once daily    -most recent cardiac monitor completed 1/2023 revealed a 35% AFIB burden with an average rate of 135 bpm    -IV amiodarone load completed 2/19   -ongoing PAF, nebivolol 2.5 mg BID added for rate control with good result    *patient did not tolerate metoprolol in the past  Diabetes mellitus type 2, uncontrolled   -Hgb A1c 8.2%  Hypertension  Hypothyroidism   -on levothyroxine   CALE   -does not use CPAP at home  Right diaphragm paralysis    -occurred after spinal surgery for cervical spinal stenosis   Possible neuromuscular disease   -following with neurology at [place]      Plan:   1. Medications    -continue triple therapy with ASA 81 mg daily, Plavix 75 mg daily, and Xarelto 20 mg daily x 2 weeks; then  discontinue ASA and continue Plavix 75 mg daily and Xarelto 20 mg daily    -continue amiodarone 200 mg PO BID for a total of 10 days, then decrease to once daily    -continue nebivolol 2.5 mg PO BID for rate control    -start lisinopril 2.5 mg daily; will up-titrate as BP allows   -pt with history of statin intolerance although he does not recall this. Will discuss with wife and plan to start a statin pending her clearance.      *start rosuvastatin 20 mg PO once daily   -will not resume diuretics at this time given right heart cath yesterday indicating mild hypovolemia.  2. IP CORE consult   3. Activity restrictions as discussed  4. Cardiac rehab  5. Ongoing optimization of respiratory status; pulmonology following  6. Close CORE follow up 1-2 weeks after discharge   -Ideally 3/6/2023 with me in [place] if patient and family are okay with that location  7. Repeat outpatient echocardiogram   -hopeful to see improvement in LV function with the combination of recent revascularization and rate control of his atrial fibrillation    Tried calling patient's wife, Dorothy, at [phone] x 2 to discuss plan of care and updates, without response. Will [place] back this afternoon in attempt to reach her again.     Updated patient's wife this afternoon.     Mirna Rodriges PA-C  [facility]  Pager: [phone]      Interval History   Mr. Carl Vázquez presented to the hospital on 2/17/2023 after his wife noted his oxygen saturation to be ~70%. He was found to be in atrial fibrillation with poorly controlled ventricular rates. Diltiazem gtt was started initially which was transitioned to amiodarone gtt after echocardiogram revealed a depressed EF of 25-30%. Cardiology was consulted due to concern for STEMI on ECG while the patient was in atrial flutter with rates in the 140s. He was without chest pain and subsequent ECGs in SR had no significant ST elevations. Patient's primary team discussed  with the on call interventionalist and ultimately decision was made not to pursue emergent coronary angiography after reviewing his serial ECGs and the fact that his troponins remained flat.    Patient remains in SR. Denies chest pain, SOB, palpitations, lightheadedness, or dizziness. No pain at cor angio access site. Feeling much improved    Physical Exam   Temp: 97.3  F (36.3  C) Temp src: Axillary BP: 109/64 Pulse: 74   Resp: 16 SpO2: 95 % O2 Device: None (Room air) Oxygen Delivery: 1.5 LPM  Vitals:    02/20/23 0600 02/21/23 0600 02/22/23 0617   Weight: 67.6 kg (149 lb 1.6 oz) 65.3 kg (144 lb) 63.7 kg (140 lb 6.4 oz)     GEN: well nourished, in no acute distress.  HEENT:  Pupils equal, round. Sclerae nonicteric.   NECK: Supple, no masses appreciated. No JVD  C/V:  Regular rate and rhythm, no murmur   RESP: Respirations are unlabored. Clear to auscultation bilaterally without wheezing, rales, or rhonchi.  GI: Abdomen soft, nontender.  EXTREM: trace LE edema. Right groin access site stable. No ecchymosis, erythema, or edema. Non-tender to palpation.   NEURO: Alert and oriented, cooperative.  SKIN: Warm and dry.     Medications     - MEDICATION INSTRUCTIONS -       - MEDICATION INSTRUCTIONS -       - MEDICATION INSTRUCTIONS -       dextrose 10% Stopped (02/18/23 0333)     - MEDICATION INSTRUCTIONS -       - MEDICATION INSTRUCTIONS -       - MEDICATION INSTRUCTIONS -       Percutaneous Coronary Intervention orders placed (this is information for BPA alerting)       Percutaneous Coronary Intervention orders placed (this is information for BPA alerting)       ASPIRIN NOT PRESCRIBED       STATIN NOT PRESCRIBED       STATIN NOT PRESCRIBED         amiodarone  200 mg Oral BID     aspirin  81 mg Oral Daily     clopidogrel  75 mg Oral Daily     insulin aspart  1-7 Units Subcutaneous TID AC     insulin aspart  1-5 Units Subcutaneous At Bedtime     insulin glargine  10 Units Subcutaneous At Bedtime     levothyroxine  75 mcg  Oral Daily     nebivolol  2.5 mg Oral BID     pantoprazole  40 mg Oral BID     polyethylene glycol  17 g Oral Daily     potassium chloride ER  10 mEq Oral BID     rivaroxaban ANTICOAGULANT  20 mg Oral Daily with supper     [Held by provider] tamsulosin  0.4 mg Oral QPM       Data   Last 24 hours labs reviewed     Tele: Maintaining SR with VR 70s; no AFIB overnight; rare PACs    Echo 2/18/2023  The visual ejection fraction is 25-30%. There is severe global hypokinesis which is worse in the inferoseptal segments of the LV. Accurate wall motion analysis is very challenging on this study given HR and image quality.  Moderately decreased right ventricular systolic function. RV size is normal.  No hemodynamically significant valve disease.  There is no pericardial effusion.  Compared to the prior study from 2022, EF is much lower. Reassessment of EF and wall motion recommended when HR is better controlled.    14 Day Ziopatch Monitor 1/20/2023-2/3/2023: 35% burden AFIB with average  bpm

## 2023-02-22 NOTE — CONSULTS
"Care Management Initial Consult    General Information  Assessment completed with: Patient, Spouse or significant other,    Type of CM/SW Visit: Initial Assessment  Writer met with patient in his room and spoke with his wife Dorothy via phone.  Patient lives in a house with Dorothy with 1 stair to enter and all needs met on 1 level, although patient prefers to use shower in the basement which has 11 stairs w/ railing.  Main level bathroom has tub/shower combination.  Patient uses a front wheeled walker at baseline and Dorothy assists him with managing his medications. Dorothy does all of the household chores.  Patient is currently open to homecare services[facility]  Writer confirmed with intake. Dorothy states that patient's CPap machine has been on recall for the last 1-2 yrs.  She states that Cpap is supplied by [facility].  Writer attempted to contact [facility] but was unable to speak with \"live person\" or leave voice message.  Writer will attempt to contact again later today.    12:01 PM  Writer was able to speak with Jannet at [facility] customer service and per Jannet, [facility] has not had any contact with patient since 2018 and have no record of Dorothy calling since then.  Jannet stated that in order to assist with new Cpap the following info will need to be faxed:  Cpap prescription, Sleep Study results and supporting chart notes to [phone], Attn:  PAP Team.    Primary Care Provider verified and updated as needed: Yes   Readmission within the last 30 days: no previous admission in last 30 days      Reason for Consult: discharge planning  Advance Care Planning: Advance Care Planning Reviewed: no concerns identified     Communication Assessment  Patient's communication style: spoken language (English or Bilingual)    Hearing Difficulty or Deaf: yes   Wear Glasses or Blind: no    Cognitive  Cognitive/Neuro/Behavioral: WDL  " Level of Consciousness: alert  Arousal Level: opens eyes spontaneously  Orientation: oriented x 4  Mood/Behavior: calm, cooperative  Best Language: 0 - No aphasia  Speech: clear, spontaneous    Living Environment:   People in home: spouse     Current living Arrangements: house      Able to return to prior arrangements: yes  Family/Social Support:  Care provided by: spouse/significant other, self  Provides care for: no one, unable/limited ability to care for self  Marital Status:   Wife, Children          Description of Support System: Supportive, Involved         Current Resources:   Patient receiving home care services: Yes  Skilled Home Care Services: Physicial Therapy, Occupational Therapy, Speech Therapy ([facility]  Community Resources: None  Equipment currently used at home: walker, rolling, shower chair  Supplies currently used at home: Diabetic Supplies (cpap)    Employment/Financial:  Employment Status: retired        Financial Concerns: No concerns identified   Referral to Financial Worker: No  Lifestyle & Psychosocial Needs:  Social Determinants of Health     Tobacco Use: Low Risk      Smoking Tobacco Use: Never     Smokeless Tobacco Use: Never     Passive Exposure: Not on file   Alcohol Use: Not on file   Financial Resource Strain: Not on file   Food Insecurity: Not on file   Transportation Needs: Not on file   Physical Activity: Not on file   Stress: Not on file   Social Connections: Not on file   Intimate Partner Violence: Not on file   Depression: Not at risk     PHQ-2 Score: 0   Housing Stability: Not on file   Functional Status:  Prior to admission patient needed assistance:   Dependent ADLs:: Ambulation-walker  Dependent IADLs:: Medication Management, Money Management, Meal Preparation, Shopping, Laundry, Cooking, Cleaning  Mental Health Status:  Mental Health Status: No Current Concerns     Chemical Dependency Status:  Chemical Dependency Status: No Current Concerns        Values/Beliefs:  Spiritual, Cultural Beliefs, Mormon Practices, Values that affect care: satish Zarco RN  Care Coordinator  [facility]  [phone] (text or call)

## 2023-02-22 NOTE — CONSULTS
Consult received for standard post-op Heart Healthy Diet teaching (order set).   Please see RD note dated 2/20. Pt with moderate malnutrition due to taste changes, difficulty swallowing, limited appetite, and recent weight loss.   Defer restricted diet teaching at this time - he was instructed to consume small, calorie-dense meals as able in setting of current PO barriers.   Would recommend follow up in CR clinic for nutrition classes.     Will reassess as scheduled.     Dyana Victor RD, LD  [facility], [facility], Ortho, [facility] Sp[facility]  Pager: [phone]  Weekend Pager: [phone]

## 2023-02-22 NOTE — PROGRESS NOTES
Follow Up appointment scheduled with sleep medicine at [facility]:    **APPOINTMENT SCHEDULED WIT DR. LEE ON Friday, MARCH 10TH AT 2:30 PM AT:     [facility]   [address] [facility] [address], [place] [address] [phone]

## 2023-02-22 NOTE — PLAN OF CARE
"Neuro: A/Ox4  Most Recent Vitals: /55 (BP Location: Left arm)   Pulse 67   Temp 97.3  F (36.3  C) (Axillary)   Resp 16   Ht 1.702 m (5' 7\")   Wt 63.7 kg (140 lb 6.4 oz)   SpO2 96%   BMI 21.99 kg/m    Tele/Cardiac: SR  Cardiac Sx: denies CP, LH/D, palps; does have GARCIA   Resp: 1.5 NC during day, wears CPAP/Bi-PAP @ HS   Pain: denies   IV: RUE PIV SL x2   Skin: R groin site- C/D/I, WDL- venous sheath removed @ 2130   GI/: WDL, lazo catheter removed @ 0300- UOP following removal; last BM 2/20/23   Mobility: 1A w/ GB & FWW   Diet: Orders Placed This Encounter      Low Saturated Fat Na <2400 mg  Plan: Plan to have ALS work-up and clinic appt outpatient. Discharge pending to home w/ home care OT.                           "

## 2023-02-22 NOTE — PLAN OF CARE
Goal Outcome Evaluation:  Pt denies chest pain or SOB, Bedrest as venous line still in, A&O, Tele SR, 1 LPM O2.

## 2023-02-22 NOTE — PROVIDER NOTIFICATION
MD Notification    Notified Person: MD    Notified Person Name: Dr. Guzmán    Notification Date/Time: 2/21/23 @ 2122    Notification Interaction: [employer]    Purpose of Notification: [address]   Flomax was stopped yesterday, plan to pull lazo. Do you want to restart it tonight? Thanks! -NATALY Velasco *[id number]    Orders Received: Discussed pt w/ provider. Will not restart Flomax d/t pt being on beta-blocker.

## 2023-02-23 ENCOUNTER — APPOINTMENT (OUTPATIENT)
Dept: PHYSICAL THERAPY | Facility: CLINIC | Age: 83
DRG: 246 | End: 2023-02-23
Payer: COMMERCIAL

## 2023-02-23 VITALS
BODY MASS INDEX: 22.73 KG/M2 | RESPIRATION RATE: 16 BRPM | SYSTOLIC BLOOD PRESSURE: 91 MMHG | TEMPERATURE: 98.2 F | HEIGHT: 67 IN | OXYGEN SATURATION: 92 % | DIASTOLIC BLOOD PRESSURE: 52 MMHG | WEIGHT: 144.8 LBS | HEART RATE: 66 BPM

## 2023-02-23 DIAGNOSIS — I50.9 ACUTE ON CHRONIC CONGESTIVE HEART FAILURE, UNSPECIFIED HEART FAILURE TYPE (H): Primary | ICD-10-CM

## 2023-02-23 PROBLEM — E83.42 HYPOMAGNESEMIA: Status: ACTIVE | Noted: 2023-02-23

## 2023-02-23 PROBLEM — E87.6 HYPOKALEMIA: Status: ACTIVE | Noted: 2023-02-23

## 2023-02-23 PROBLEM — K21.9 GASTROESOPHAGEAL REFLUX DISEASE WITHOUT ESOPHAGITIS: Status: ACTIVE | Noted: 2023-02-23

## 2023-02-23 LAB
ATRIAL RATE - MUSE: 65 BPM
ATRIAL RATE - MUSE: 69 BPM
DIASTOLIC BLOOD PRESSURE - MUSE: NORMAL MMHG
DIASTOLIC BLOOD PRESSURE - MUSE: NORMAL MMHG
ERYTHROCYTE [DISTWIDTH] IN BLOOD BY AUTOMATED COUNT: 15.9 % (ref 10–15)
GLUCOSE BLDC GLUCOMTR-MCNC: 178 MG/DL (ref 70–99)
GLUCOSE BLDC GLUCOMTR-MCNC: 194 MG/DL (ref 70–99)
GLUCOSE BLDC GLUCOMTR-MCNC: 332 MG/DL (ref 70–99)
HCT VFR BLD AUTO: 49.2 % (ref 40–53)
HGB BLD-MCNC: 14.6 G/DL (ref 13.3–17.7)
HGB BLD-MCNC: 15 G/DL (ref 13.3–17.7)
INTERPRETATION ECG - MUSE: NORMAL
INTERPRETATION ECG - MUSE: NORMAL
MAGNESIUM SERPL-MCNC: 1.6 MG/DL (ref 1.7–2.3)
MAGNESIUM SERPL-MCNC: 1.6 MG/DL (ref 1.7–2.3)
MCH RBC QN AUTO: 28.8 PG (ref 26.5–33)
MCHC RBC AUTO-ENTMCNC: 30.5 G/DL (ref 31.5–36.5)
MCV RBC AUTO: 95 FL (ref 78–100)
P AXIS - MUSE: 49 DEGREES
P AXIS - MUSE: 50 DEGREES
PLATELET # BLD AUTO: 137 10E3/UL (ref 150–450)
POTASSIUM SERPL-SCNC: 3.9 MMOL/L (ref 3.4–5.3)
PR INTERVAL - MUSE: 128 MS
PR INTERVAL - MUSE: 128 MS
QRS DURATION - MUSE: 86 MS
QRS DURATION - MUSE: 90 MS
QT - MUSE: 458 MS
QT - MUSE: 462 MS
QTC - MUSE: 476 MS
QTC - MUSE: 495 MS
R AXIS - MUSE: 59 DEGREES
R AXIS - MUSE: 79 DEGREES
RBC # BLD AUTO: 5.2 10E6/UL (ref 4.4–5.9)
SYSTOLIC BLOOD PRESSURE - MUSE: NORMAL MMHG
SYSTOLIC BLOOD PRESSURE - MUSE: NORMAL MMHG
T AXIS - MUSE: 54 DEGREES
T AXIS - MUSE: 54 DEGREES
VENTRICULAR RATE- MUSE: 65 BPM
VENTRICULAR RATE- MUSE: 69 BPM
WBC # BLD AUTO: 6.7 10E3/UL (ref 4–11)

## 2023-02-23 PROCEDURE — 250N000013 HC RX MED GY IP 250 OP 250 PS 637: Performed by: STUDENT IN AN ORGANIZED HEALTH CARE EDUCATION/TRAINING PROGRAM

## 2023-02-23 PROCEDURE — 97530 THERAPEUTIC ACTIVITIES: CPT | Mod: GP

## 2023-02-23 PROCEDURE — 250N000013 HC RX MED GY IP 250 OP 250 PS 637: Performed by: NURSE PRACTITIONER

## 2023-02-23 PROCEDURE — 36415 COLL VENOUS BLD VENIPUNCTURE: CPT | Performed by: STUDENT IN AN ORGANIZED HEALTH CARE EDUCATION/TRAINING PROGRAM

## 2023-02-23 PROCEDURE — 97116 GAIT TRAINING THERAPY: CPT | Mod: GP

## 2023-02-23 PROCEDURE — 250N000013 HC RX MED GY IP 250 OP 250 PS 637: Performed by: HOSPITALIST

## 2023-02-23 PROCEDURE — 85018 HEMOGLOBIN: CPT | Performed by: HOSPITALIST

## 2023-02-23 PROCEDURE — 85027 COMPLETE CBC AUTOMATED: CPT | Performed by: INTERNAL MEDICINE

## 2023-02-23 PROCEDURE — 99239 HOSP IP/OBS DSCHRG MGMT >30: CPT | Performed by: HOSPITALIST

## 2023-02-23 PROCEDURE — 84132 ASSAY OF SERUM POTASSIUM: CPT | Performed by: HOSPITALIST

## 2023-02-23 PROCEDURE — 99232 SBSQ HOSP IP/OBS MODERATE 35: CPT | Mod: FS | Performed by: INTERNAL MEDICINE

## 2023-02-23 PROCEDURE — 83735 ASSAY OF MAGNESIUM: CPT | Performed by: STUDENT IN AN ORGANIZED HEALTH CARE EDUCATION/TRAINING PROGRAM

## 2023-02-23 PROCEDURE — 250N000013 HC RX MED GY IP 250 OP 250 PS 637: Performed by: INTERNAL MEDICINE

## 2023-02-23 RX ORDER — ROSUVASTATIN CALCIUM 20 MG/1
20 TABLET, COATED ORAL DAILY
Qty: 30 TABLET | Refills: 0 | Status: SHIPPED | OUTPATIENT
Start: 2023-02-24 | End: 2023-03-07

## 2023-02-23 RX ORDER — NEBIVOLOL 2.5 MG/1
2.5 TABLET ORAL 2 TIMES DAILY
Qty: 60 TABLET | Refills: 0 | Status: SHIPPED | OUTPATIENT
Start: 2023-02-23 | End: 2023-03-07

## 2023-02-23 RX ORDER — AMIODARONE HYDROCHLORIDE 200 MG/1
TABLET ORAL
Qty: 40 TABLET | Refills: 0 | Status: SHIPPED | OUTPATIENT
Start: 2023-02-23 | End: 2023-03-07

## 2023-02-23 RX ORDER — MAGNESIUM OXIDE 400 MG/1
400 TABLET ORAL DAILY
Qty: 30 TABLET | Refills: 0 | Status: SHIPPED | OUTPATIENT
Start: 2023-02-23 | End: 2023-03-20

## 2023-02-23 RX ORDER — NITROGLYCERIN 0.4 MG/1
TABLET SUBLINGUAL
Qty: 15 TABLET | Refills: 0 | Status: SHIPPED | OUTPATIENT
Start: 2023-02-23

## 2023-02-23 RX ORDER — LISINOPRIL 2.5 MG/1
2.5 TABLET ORAL DAILY
Qty: 30 TABLET | Refills: 0 | Status: SHIPPED | OUTPATIENT
Start: 2023-02-24 | End: 2023-03-07

## 2023-02-23 RX ORDER — POTASSIUM CHLORIDE 750 MG/1
10 TABLET, EXTENDED RELEASE ORAL 2 TIMES DAILY
Start: 2023-02-23 | End: 2023-05-17

## 2023-02-23 RX ORDER — PANTOPRAZOLE SODIUM 40 MG/1
40 TABLET, DELAYED RELEASE ORAL 2 TIMES DAILY
Qty: 60 TABLET | Refills: 0 | Status: SHIPPED | OUTPATIENT
Start: 2023-02-23 | End: 2023-03-25

## 2023-02-23 RX ORDER — CLOPIDOGREL BISULFATE 75 MG/1
75 TABLET ORAL DAILY
Qty: 30 TABLET | Refills: 0 | Status: SHIPPED | OUTPATIENT
Start: 2023-02-24 | End: 2023-03-07

## 2023-02-23 RX ORDER — MAGNESIUM OXIDE 400 MG/1
400 TABLET ORAL EVERY 4 HOURS
Status: COMPLETED | OUTPATIENT
Start: 2023-02-23 | End: 2023-02-23

## 2023-02-23 RX ADMIN — ROSUVASTATIN CALCIUM 20 MG: 20 TABLET, FILM COATED ORAL at 09:50

## 2023-02-23 RX ADMIN — NEBIVOLOL HYDROCHLORIDE 2.5 MG: 2.5 TABLET ORAL at 09:51

## 2023-02-23 RX ADMIN — ASPIRIN 81 MG: 81 TABLET, COATED ORAL at 09:50

## 2023-02-23 RX ADMIN — POTASSIUM CHLORIDE 10 MEQ: 750 TABLET, EXTENDED RELEASE ORAL at 09:50

## 2023-02-23 RX ADMIN — PANTOPRAZOLE SODIUM 40 MG: 40 TABLET, DELAYED RELEASE ORAL at 09:50

## 2023-02-23 RX ADMIN — LEVOTHYROXINE SODIUM 75 MCG: 75 TABLET ORAL at 09:50

## 2023-02-23 RX ADMIN — LISINOPRIL 2.5 MG: 2.5 TABLET ORAL at 09:50

## 2023-02-23 RX ADMIN — INSULIN ASPART 4 UNITS: 100 INJECTION, SOLUTION INTRAVENOUS; SUBCUTANEOUS at 12:08

## 2023-02-23 RX ADMIN — CLOPIDOGREL BISULFATE 75 MG: 75 TABLET ORAL at 09:50

## 2023-02-23 RX ADMIN — POLYETHYLENE GLYCOL 3350 17 G: 17 POWDER, FOR SOLUTION ORAL at 09:50

## 2023-02-23 RX ADMIN — Medication 400 MG: at 09:50

## 2023-02-23 RX ADMIN — Medication 400 MG: at 14:11

## 2023-02-23 RX ADMIN — INSULIN ASPART 1 UNITS: 100 INJECTION, SOLUTION INTRAVENOUS; SUBCUTANEOUS at 09:54

## 2023-02-23 RX ADMIN — AMIODARONE HYDROCHLORIDE 200 MG: 200 TABLET ORAL at 09:50

## 2023-02-23 ASSESSMENT — ACTIVITIES OF DAILY LIVING (ADL)
ADLS_ACUITY_SCORE: 52
ADLS_ACUITY_SCORE: 51
ADLS_ACUITY_SCORE: 52
ADLS_ACUITY_SCORE: 52

## 2023-02-23 NOTE — CONSULTS
[facility]- C.O.R.E[facility]    Received CORE Clinic Consult from Mirna Rodriges PA-C.    Per record review patient admitted w/acute on chronic respiratory failure. ECHO done on 2/17 showed newly decreased EF of 25-30%. He has a hx of PAF having been on a variety of rate/rhythm control strategies. This hospitalization he was again in rapid AF.   Eventually rate control achieved w/nebivolol. He underwent R/L HC w/stenting to his LAD.     Patient will be enrolled into CORE clinic. I have messaged our schedulers to contact patient and arrange.     CORE RN unavailable for inpatient education, bedside RN to provide.       Future Appointments   Date Time Provider Department Center   2/23/2023 10:00 PM Marisol Lara, OTR SHOT [facility]   2/24/2023  8:00 AM SH SLP 1 WAITLIST [facility]   2/24/2023 12:45 PM SHUS4 SHULT [facility]   3/1/2023  2:00 PM  PFL B [facility]CPFT [facility]   3/1/2023  3:00 PM Ramin Baez MD [facility]   3/8/2023  1:45 PM Gurmeet De MD [facility] PSA CLIN   3/13/2023  2:00 PM 3, Sh Cardiac Rehab [facility]       Please call with any further questions.    Suad Reid, RN BSN   [facility] [place], [place]  C.O.R.E. Clinic Care Coordinator  [phone]

## 2023-02-23 NOTE — DISCHARGE SUMMARY
[facility]  Hospitalist Discharge Summary      Date of Admission:  2/17/2023  Date of Discharge:  2/23/2023  6:05 PM  Discharging Provider: Tr Epstein MD  Discharge Service: Hospitalist Service    Discharge Diagnoses   See hospital course below        Follow-ups Needed After Discharge   Follow-up Appointments     Follow Up ([facility])      After discharge, follow up with [facility] for evaluation of your   sleep apnea and to get a new machine.     **APPOINTMENT SCHEDULED WIT DR. LEE ON Friday, MARCH 10TH AT 2:30 PM   AT:     [facility]  [address] [place] [address] [phone]    You may start Outpatient cardiac rehab once you have been discharged from   homecare services.         Follow-up and recommended labs and tests       Primary MD for hospitalization follow up:  Please call the clinic on   Friday, Feb. 24th to schedule appointment.    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     CXR 2/17 showed recurrent small-to-moderate right pleural effusion and slight increase in small left pleural effusion, mild bibasilar atelectasis.  Abdominal US 2/17 no  ascites. Underwent US guided right thoracentesis 2/17 at which time 1L removed.     Current problems include:     Acute hypoxic and hypercarbic respiratory failure, suspect multifactorial secondary to acute systolic CHF, pleural effusions, underlying untreated; improving.  CALE and possible neuromuscular disease.  * Initial presentation as above.  - Management of separate issues as below.  Acute biventricular systolic CHF exacerbation, new diagnosis.  Cardiomyopathy: Likely mixed ischemic and nonischemic (tachy mediated)   Atrial fib/flutter with RVR with h/o PAF.    Paroxysmal atrial fibrillation  - chronic AC with Xarelto   - PTA on rate control regimen with diltiazem 360 mg once daily   - most recent cardiac monitor completed 1/2023 revealed a 35% AFIB burden with an average rate of 135 bpm   - IV amiodarone load completed 2/19  - ongoing PAF, nebivolol 2.5 mg BID added for rate control with good result                          *patient did not tolerate metoprolol in the past     Troponin elevation, suspect demand ischemia (type 2 NSTEMI) related to above  Coronary artery disease with recent atherectomy and JESSE x 4 to LAD on 2/21/2023  Cath: Successful IVUS guided PCI of the LAD with rotational atherectomy and lithotripsy with DESx4 from ostial to distal LAD.  Normal biventricular filling pressures with normal cardiac output.  - on nebivolol; lisinopril low dose continued  - ASA stopped, hematuria improving and hgb relatively stable at discharge as below  - per cardiology, discharge on xarelto and plavix     Hypertension  (PTA: diltiazem  mg daily; torsemide 20 mg daily (however, wife reported pt was not taking).  * Pt with h/o PAF. On diltiazem and rivaroxaban PTA. Echo 6/2022 showed LVEF 55-60%, grade 1 diastolic dysfunction; RV OK.  * Initial presentation as above. On admit, was in NSR. Started on BiPAP and IV furosemide on admit. Cardiology consulted on admit. Wt 153 lbs on admit.  * After admission on  early am 2/18, developed afib/flutter with RVR. Started on diltiazem gtt. Echo showed LVEF 25-30%, severe global hypokinesis, worse in the inferoseptal segments; moderate decreased RV systolic function. Diltiazem gtt then changed to amiodarone gtt given acutely decreased to 25-30%. Noted ST changes while in rapid afib/flutter that resolved once rate was stable. Started on bumetanide gtt.  * On 2/19, back in NSR. Started on heparin gtt. Converted to PO amiodarone.  * On 2/20, bumetanide gtt stopped. Started nebivolol.  - Continue O2, wean as able.  - Continue amiodarone 200 mg BID.  - Continue nebivolol 2.5 mg BID.  - Continue to hold PTA diltiazem ER and torsemide  - Monitor i/o's, daily wts.  - Appreciate Cardiology help.     Chronic hypercapnic respiratory failure with acute decompensation related to above cardiac issues.  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  CALE not using CPAP at home.  * S/p hemidiaphragm plication 10/2022 through [facility] w/o significant relief.   * Initial presentation as above. Started on BiPAP and IV diuretics on admit as noted. Pulmonology consulted on admit.  * On 2/21, tolerating BiPAP with sleep and off during the day.  - Continue BiPAP while sleeping, wean off during the day as able; plan discharge with CPAP/BiPAP. Pt has a CPAP, but machine outdated/broken, needs a new device. Discussed with SW/CC regarding getting a new CPAP for pt. If unable, will ask Pulmonology to prescribe for discharge if felt appropriate.  - Continue to treat other acute issues as noted.  - Follow-up with [facility].     Bilateral pleural effusions, suspect related to CHF.  * Underwent R thoracentesis as above 2/17; fluid studies suggested transudative effusion; pleural fluid cultures NGTD.   - Continue to treat CHF as noted.      Abdominal pain on admit, subacute most pronounced in the lower quadrants but with increasing abdominal distention, intolerance of LE elevation.  Possible  chronic liver disease.  * Following with [facility] for possible chronic liver disease. On admit, noted that chronic liver disease labs sent in early 2/2023. He was to have a abdominal ultrasound and paracentesis on 2/24/2023 and was pending EGD on 2/28/2023 for ongoing dysphagia.  * CT on admit 2/17 as above showed hepatic steatosis and mildly nodular hepatic contour suggestive of  hepatic parenchymal disease, indeterminate linear hypodensity adjacent to the gallbladder fossa, may be a focus of fat deposition, nonemergent liver MRI was recommended for better characterization. Abdominal US 2/17 w/o ascites.  * On 2/19, abdominal pain better; tolerating diet.  - Continue diet as tolerated.  - Plan nonemergent MRI, likely outpatient.     DM2 last A1c was 8.2% in January 2023.  Hypoglycemia on admit suspect related to insulin with decreased PO/NPO.  [PTA: glipizide 10 mg qam; glargine 60U at bedtime.]  * Hgb A1C 8.2% 1/2023.  * Initial presentation as above. Initial glucose in the 50's.  - Continue moderate CHO diet.  - has beenon glargine 10U at bedtime --> increase to 14U at HS beginning tonight.  - Continue aspart ISS (medium).  - Continue to hold glipizide.  - Continue PRN hypoglycemia protocol.     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  * Noted to be longstanding and progressive for at least the 13 months PTA. Has ALS evaluation visit 2/28/2023.  * On admit, noted that pt now needed to ambulate with walker (in 1/2022 was using the treadmill daily).  * On 2/20, pt's wife called ALS clinic who advised Neurology consult here. Neurology consulted.  - Neurology recommends repeat c-spine, t-spine and l-spine MRI after cardiac workup.  - Patient and spouse should still keep 2/28/2023 ALS clinic visit.  - Appreciate Neurology help.     BPH  Possible urinary retention  * Possible urinary retention noted on admit.  * Power placed on admit 2/17.  - Remove Power for voiding trial after  procedure.  - Order bladder scans and PRN SIC.  - Continue tamsulosin after follow up if BP tolerates  - able to void on his own  - pcp follow up     Hypokalemia and hypomagnesemia  * Potassium and magnesium low at times this hospitalization.  - Continue PRN K and Mg replacement.     Thrombocytopenia, unclear etiology, possibly medication effect (question heparin)  * Plts normal on admit.  * Plts 145K on 2/20.  - Continue to monitor CBC  - Consider platelet transfusion if needs a procedure and less than 50,000; or if less than 10,000.  - PCP follow up     Hypothyroidism  * TSH normal 1/2023.  - Continue levothyroxine.      Consultations This Hospital Stay   PHYSICAL THERAPY ADULT IP CONSULT  OCCUPATIONAL THERAPY ADULT IP CONSULT  PULMONARY IP CONSULT  VASCULAR ACCESS ADULT IP CONSULT  VASCULAR ACCESS ADULT IP CONSULT  VASCULAR ACCESS ADULT IP CONSULT  CARDIOLOGY IP CONSULT  SPEECH LANGUAGE PATH ADULT IP CONSULT  PHARMACY IP CONSULT  PHARMACY IP CONSULT  CARE MANAGEMENT / SOCIAL WORK IP CONSULT  NEUROLOGY IP CONSULT  HOSPITALIST IP CONSULT  NUTRITION SERVICES ADULT IP CONSULT  CARDIAC REHAB IP CONSULT  PHARMACY IP CONSULT  CORE CLINIC EVALUATION IP CONSULT  SMOKING CESSATION PROGRAM IP CONSULT    Code Status   Prior    Time Spent on this Encounter   I, Tr Epstein MD, personally saw the patient today and spent greater than 30 minutes discharging this patient.       Tr Epstein MD  [facility]  [address] [place] [address], [address]  [facility] [address]  Phone: [phone]  ______________________________________________________________________    Physical Exam   Vital Signs: Temp: 98.2  F (36.8  C) Temp src: Oral BP: 91/52 Pulse: 66   Resp: 16 SpO2: 92 % O2 Device: None (Room air) Oxygen Delivery: 1.5 LPM  Weight: 144 lbs 12.8 oz    Gen: NAD, pleasant  HEENT: EOMI, MMM  Resp: no focal crackles,  no wheezes, no increased work of resp  CV: S1S2 heard, reg rhythm, reg rate  Abdo:  soft, nontender, nondistended, bowel sounds present  Ext: calves nontender, well perfused  Neuro: aa, conversing, moving ext, CN grossly intact, no facial asymmetry         Primary Care Physician   Kyler Mcwilliams    Discharge Orders      CARDIAC REHAB REFERRAL      Home Care Referral      Follow Up ([facility] [place])    After discharge, follow up with [facility] for evaluation of your sleep apnea and to get a new machine.     **APPOINTMENT SCHEDULED WIT DR. LEE ON Friday, MARCH 10TH AT 2:30 PM AT:     [facility]  [address], [place] [address]  [phone]    You may start Outpatient cardiac rehab once you have been discharged from homecare services.     Brief Discharge Instructions    Do NOT stop your aspirin or platelet inhibitor unless directed by your Cardiologist.  These medications help to prevent platelets in your blood from sticking together and forming a clot.  Examples of these medications are:  Ticagrelor (Brilinta), Clopidigrel (Plavix), Prasugrel (Effient)     When to call - Contact the Heart Clinic    You may experience symptoms that require follow-up before your scheduled appointment. Contact the Heart Clinic if you develop: Fever over 100.4o Fahrenheit, that lasts more than one day; Redness, heat, or pus at the puncture site; Change in color or temperature in your groin or leg.     When to call - Reasons to Call 911    If your groin starts to bleed or begins to swell suddenly after leaving the hospital, lie flat and apply firm pressure just above the puncture site for 15 minutes.  If bleeding continues, call 9-1-1.     Precautions - Lifting    NO lifting of more than 10 pounds for at least 3 days.  If you usually lift 50 pounds or more daily, talk with your Cardiologist.     Precautions - Household Activities    Avoid any hard work or tiring activities.  NO physical activity such as mowing the lawn, raking, vacuuming, changing sheets on your bed, snow  shoveling, or using a .     Precautions - Active Sports Activities    Avoid any tiring sports activities.  This includes, yard work, jogging, biking, bowling, swimming, tennis or golf, and sexual activity.     Precautions - Elective Dental Work    NO elective dental work for 6 weeks after receiving a stent.     Comfort and Pain Management - Pain after Surgery    Pain after surgery is normal and expected.  Your leg may be sore or stiff for a few days, and your pain will improve with time. You may take Tylenol or a pain medicine recommended by your Cardiologist.     Comfort and Pain Management - Bruising after Surgery    Bruising around the groin area is normal.  It may take 2-3 weeks for this to go away.  It is normal for the bruised area to turn green and/or yellow as it is healing.  A small lump may also be present and may last 2-3 months.     Activity - Daily Walking    During the day get up and walk around every 2 hours.     Activity - Light Household Activities    Light household activities are ok.     Activity - Elevate Legs    Elevate legs in between all activities.     Activity - Cardiac Rehab    You are encouraged to enroll in an Outpatient Cardiac Rehab program after discharge from the hospital.  Our Cardiac Rehab staff may visit briefly with you while you're in the hospital.  If they miss you, someone will contact you after you are home.     Return to Driving    Driving is NOT permitted for 24 hours after surgery     Return to work    You may return to work after 72 hours if you are feeling well and your job does not involve heavy lifting.     Dressing Removal    You may take off the dressing on your groin the day after your procedure.     Incision Care    Keep the incision area dry and clean.  You do not need to use a bandage on your incision.     Shower / Bathing    It is ok to shower with regular soap. Pat dry, do not rub. No tub bath for 3 days. No swimming in a pool or hot tub immersion for  1 week     Reason Lipid Lowering Medications not prescribed from this order set     Reason aspirin not prescribed from this order set     Reason Lipid Lowering Medications not prescribed from this order set     Reason for your hospital stay    Shortness of breath     Follow-up and recommended labs and tests     Primary MD for hospitalization follow up:  Please call the clinic on Friday, Feb. 24th to schedule appointment.    Cardiology as scheduled; See below    Pulmonary medicine; See below     Activity    Your activity upon discharge: activity as tolerated     Resume Home Care Services    Please resume PT, OT, ST     Resume Home Care Services    Resumption of homecare PT/OT/Speech thru [facility] ([facility]).     Walker Order for DME - ONLY FOR DME    I, the undersigned, certify that the above prescribed supplies are medically necessary for this patient and is both reasonable and necessary in reference to accepted standards of medical and necessary in reference to accepted standards of medical practice in the treatment of this patient's condition and is not prescribed as a convenience.      Diet    Follow this diet upon discharge: Orders Placed This Encounter      Room Service      Snacks/Supplements Adult: Magic Cup; With Meals      Combination Diet Easy to Chew (level 7); Thin Liquids (level 0); Low Saturated Fat Na <2400mg Diet       Significant Results and Procedures   Most Recent 3 CBC's:Recent Labs   Lab Test 02/23/23  1502 02/23/23  0701 02/22/23  0622 02/20/23  0500   WBC  --  6.7 8.7 7.4   HGB 14.6 15.0 16.1 15.4   MCV  --  95 92 93   PLT  --  137* 153 145*     Most Recent 3 BMP's:Recent Labs   Lab Test 02/23/23  1132 02/23/23  0733 02/23/23  0701 02/23/23  0205 02/22/23  0838 02/22/23  0622 02/21/23  0839 02/21/23  0634 02/20/23  1738 02/20/23  1620   NA  --   --   --   --   --  141  --  139  --  135*   POTASSIUM  --   --  3.9  --   --  4.3  --  3.5  --  4.5   CHLORIDE  --   --   --   --    --  94*  --  89*  --  84*   CO2  --   --   --   --   --  38*  --  46*  --  46*   BUN  --   --   --   --   --  19.3  --  15.5  --  14.6   CR  --   --   --   --   --  0.67  --  0.76  --  0.77   ANIONGAP  --   --   --   --   --  9  --  4*  --  5*   TATE  --   --   --   --   --  9.5  --  8.3*  --  8.3*   * 178*  --  194*   < > 259*   < > 127*   < > 313*    < > = values in this interval not displayed.     Most Recent 2 LFT's:Recent Labs   Lab Test 02/19/23  0607 02/18/23  1555   AST 15 22   ALT 13 14   ALKPHOS 81 81   BILITOTAL 0.6 0.6     Most Recent 3 Troponin's:No lab results found.  Most Recent D-dimer:Recent Labs   Lab Test 01/08/23  1627   DD 1.02*     Most Recent Urinalysis:Recent Labs   Lab Test 02/17/23  1155   COLOR Straw   APPEARANCE Clear   URINEGLC Negative   URINEBILI Negative   URINEKETONE Negative   SG 1.008   UBLD Negative   URINEPH 6.0   PROTEIN Negative   NITRITE Negative   LEUKEST Negative   RBCU 1   WBCU 2   ,   Results for orders placed or performed during the hospital encounter of 02/17/23   XR Chest 2 Views    Narrative    XR CHEST 2 VIEWS 2/17/2023 9:08 AM    HISTORY: COPD and CHF history, SOB    COMPARISON: 1/4/2023, 1/5/23      Impression    IMPRESSION:Recurrent small-to-moderate right pleural effusion and  slight increase in small left pleural effusion. Mild bibasilar  atelectasis. No pneumothorax. Normal heart size. Stable upper  abdominal surgical clips and metallic density in the anterior  abdominal wall in the epigastric region.    DEEPAK OLIVER MD         SYSTEM ID:  [id number]   US Thoracentesis    Narrative    ULTRASOUND GUIDED THORACENTESIS  2/17/2023 4:01 PM     HISTORY: Recurrent pleural effusion with respiratory failure on bipap.    FINDINGS: Ultrasound was used to evaluate for the presence and best  approach for drainage of a pleural effusion. Written and oral informed  consent was obtained. A pause for the cause procedure to verify the  correct patient and correct procedure.  The skin overlying the right  chest posteriorly was prepped and draped in the usual sterile fashion.  The subcutaneous tissues were anesthetized with 10 mL 1% lidocaine. A  catheter was advanced into the pleural space and 1000 mL of  delilah  colored fluid was drained. Patient was monitored by nurse under my  direct supervision throughout the exam. Ultrasound images were  permanently stored.  There were no immediate complications. Patient  left the ultrasound suite in satisfactory condition.      Impression    IMPRESSION: Technically successful thoracentesis without immediate  complications.    FIDEL DRAKE MD         SYSTEM ID:  [id number]   CT Abdomen Pelvis w Contrast    Narrative    CT ABDOMEN PELVIS W CONTRAST 2/17/2023 2:14 PM    CLINICAL HISTORY: abd pain mostly in bilat lower quadrants, ab  distention    TECHNIQUE: CT scan of the abdomen and pelvis was performed following  injection of IV contrast. Multiplanar reformats were obtained. Dose  reduction techniques were used.  CONTRAST: 75 mL Isovue-370    COMPARISON: 1/5/2023 and 9/25/2019    FINDINGS:   LOWER CHEST: Partly visualized moderate to large right pleural  effusion and right basilar atelectasis. Left basilar atelectasis.    HEPATOBILIARY: Hepatic steatosis. Slightly nodular hepatic contour  suggestive of hepatic parenchymal disease. Linear hypodense area  around the gallbladder fossa is indeterminate, may be due to focal fat  deposition. Cholecystectomy. No biliary ductal dilatation.    PANCREAS: Mild diffuse pancreatic parenchymal atrophy. No pancreatic  duct dilatation or peripancreatic inflammatory changes.    SPLEEN: Normal.    ADRENAL GLANDS: Stable diffuse mild thickening. No discrete masses.    KIDNEYS/BLADDER: Small nonobstructing right renal calculi measuring up  to 4 mm. No hydronephrosis, perinephric stranding or focal masses in  either kidney. Indeterminate hypodensity at the urinary bladder neck,  may be related to prior TURP.    BOWEL: No  small bowel or colonic obstruction or inflammatory changes.  Normal appendix. Extensive colonic diverticulosis.    PELVIC ORGANS: No pelvic masses.    ADDITIONAL FINDINGS: Trace ascites with small amount of fluid in the  lower quadrants. Ventral abdominal mesh. No lymphadenopathy. No  abscess or free air.    MUSCULOSKELETAL: Degenerative changes in the spine. No suspicious  lesions in the bones.      Impression    IMPRESSION:   1.  Partly visualized at least moderate right pleural effusion.  Bibasilar atelectasis.  2.  Trace ascites.  3.  Hepatic steatosis and mildly nodular hepatic contour suggestive of  hepatic parenchymal disease. Indeterminate linear hypodensity adjacent  to the gallbladder fossa, may be a focus of fat deposition.  Nonemergent liver MRI is recommended for better characterization.  4.  Extensive colonic diverticulosis without diverticulitis.  5.  Hypodense area in the urinary bladder neck, indeterminate and may  be related to prior TURP. Correlate with history.    DEEPAK OLIVER MD         SYSTEM ID:  [id number]   US Abdomen Limited    Narrative    US ABDOMEN LIMITED 2/17/2023 4:00 PM    CLINICAL HISTORY: HIGH VOLUME paracentesis with or without diagnostic  fluid analysis with labs to be drawn if ordered. Total paracentesis  volume as much as possible.  TECHNIQUE: Limited abdominal ultrasound.    COMPARISON: CT today.      Impression    IMPRESSION:  1.  No ascites.    DEEPAK OLIVER MD         SYSTEM ID:  [id number]   XR Chest Port 1 View    Narrative    EXAM: XR CHEST PORT 1 VIEW  LOCATION: [facility]  DATE/TIME: 2/18/2023 9:56 AM    INDICATION: RRT  COMPARISON: None available      Impression    IMPRESSION: Small bilateral pleural effusions and mild bibasilar atelectasis/consolidation. No pneumothorax. Normal heart size.   Echocardiogram Complete     Value    LVEF  25-30%    Narrative    [id number]  [id number]  [id number]  [id number]     [facility]  [facility]  Echocardiography Laboratory  [address] [place] [address]     Name: WYATT ROMERO  MRN: [id number]  : 1940  Study Date: 2023 10:46 AM  Age: 82 yrs  Gender: Male  Patient Location: [facility]  Reason For Study: Heart Failure  Ordering Physician: JAIME JORDAN  Referring Physician: Kyler Mcwilliams  Performed By: Yoselin Pimnetel RDCS     BSA: 1.8 m2  Height: 67 in  Weight: 152 lb  HR: 141  BP: 96/73 mmHg  ______________________________________________________________________________  Procedure  Complete Portable Echo Adult. Optison (NDC #[id number]) given intravenously.  Technically difficult study. Patient is very tachycardic.  ______________________________________________________________________________  Interpretation Summary     Technically difficult study. Patient is very tachycardic. Unclear rhythm  probably flutter.     The visual ejection fraction is 25-30%. There is severe global hypokinesis  which is worse in the inferoseptal segments of the LV. Accurate wall motion  analysis is very challenging on this study given HR and image quality.  Moderately decreased right ventricular systolic function. RV size is normal.  No hemodynamically significant valve disease.  There is no pericardial effusion.     Compared to the prior study from , EF is much lower. Reassessment of EF  and wall motion recommended when HR is better controlled.  ______________________________________________________________________________  Left Ventricle  The left ventricle is normal in size. The visual ejection fraction is 25-30%.  There is severe global hypokinesis which is worse in the inferoseptal segments  of the LV.     Right Ventricle  The right ventricle is normal size. Moderately decreased right ventricular  systolic function.     Atria  Normal left atrial size. Right atrial size is normal.     Mitral Valve  The mitral valve is normal in structure and function. There is trace  mitral  regurgitation. There is no mitral valve stenosis.     Tricuspid Valve  The tricuspid valve is not well visualized, but is grossly normal. The right  ventricular systolic pressure is approximated at 20.2 mmHg plus the right  atrial pressure. There is physiologic tricuspid regurgitation.     Aortic Valve  The aortic valve is trileaflet with aortic valve sclerosis. There is trace to  mild aortic regurgitation. No hemodynamically significant valvular aortic  stenosis.     Pulmonic Valve  The pulmonic valve is not well visualized.     Vessels  The aortic root is normal size. Normal size ascending aorta. The inferior vena  cava was normal in size with preserved respiratory variability.     Pericardium  There is no pericardial effusion.     ______________________________________________________________________________  MMode/2D Measurements & Calculations  IVSd: 1.1 cm  LVIDd: 4.3 cm  LVIDs: 3.7 cm  LVPWd: 1.0 cm  FS: 14.9 %  LV mass(C)d: 155.2 grams  LV mass(C)dI: 86.2 grams/m2     Ao root diam: 3.7 cm  asc Aorta Diam: 3.3 cm  LA Volume (BP): 29.7 ml  LA Volume Index (BP): 16.5 ml/m2  RWT: 0.48     Doppler Measurements & Calculations  MV E max cleve: 57.6 cm/sec  MV A max cleve: 69.2 cm/sec  MV E/A: 0.83  MV dec time: 0.09 sec  PA acc time: 0.11 sec     TR max cleve: 224.6 cm/sec  TR max P.2 mmHg  E/E' avg: 15.6  Lateral E/e': 12.9  Medial E/e': 18.3     ______________________________________________________________________________  Report approved by: Tonny Carvalho 2023 01:04 PM         Cardiac Catheterization    Narrative      Right sided filling pressures are normal.    Normal PA pressures.    Left sided filling pressures are normal.    Normal cardiac output level.    Dist Cx lesion is 20% stenosed.    Mid Cx lesion is 25% stenosed.    Ost LAD to Prox LAD lesion is 70% stenosed.    Mid LAD to Dist LAD lesion is 80% stenosed.    Mid LAD lesion is 75% stenosed.    1st Diag lesion is 50% stenosed.      Successful IVUS guided PCI of the LAD with rotational atherectomy and   lithotripsy with DESx4 from ostial to distal LAD.  Normal biventricular filling pressures with normal cardiac output.       Discharge Medications   Discharge Medication List as of 2/23/2023  5:23 PM      START taking these medications    Details   amiodarone (PACERONE) 200 MG tablet Take 1 tablet (200 mg) by mouth 2 times daily for 5 days, THEN 1 tablet (200 mg) daily for 30 days., Disp-40 tablet, R-0, E-Prescribe      clopidogrel (PLAVIX) 75 MG tablet Take 1 tablet (75 mg) by mouth daily for 30 days, Disp-30 tablet, R-0, E-Prescribe      lisinopril (ZESTRIL) 2.5 MG tablet Take 1 tablet (2.5 mg) by mouth daily for 30 days, Disp-30 tablet, R-0, E-Prescribe      magnesium oxide (MAG-OX) 400 MG tablet Take 1 tablet (400 mg) by mouth daily for 30 days, Disp-30 tablet, R-0, E-Prescribe      nebivolol (BYSTOLIC) 2.5 MG tablet Take 1 tablet (2.5 mg) by mouth 2 times daily for 30 days, Disp-60 tablet, R-0, E-Prescribe      nitroGLYcerin (NITROSTAT) 0.4 MG sublingual tablet For chest pain place 1 tablet under the tongue every 5 minutes for 3 doses. If symptoms persist 5 minutes after 1st dose call 911., Disp-15 tablet, R-0, E-Prescribe      pantoprazole (PROTONIX) 40 MG EC tablet Take 1 tablet (40 mg) by mouth 2 times daily for 30 days, Disp-60 tablet, R-0, E-Prescribe      rosuvastatin (CRESTOR) 20 MG tablet Take 1 tablet (20 mg) by mouth daily for 30 days, Disp-30 tablet, R-0, E-Prescribe         CONTINUE these medications which have CHANGED    Details   insulin glargine (LANTUS PEN) 100 UNIT/ML pen Inject 14 Units Subcutaneous At Bedtime, Disp-15 mL, No Print OutIf Lantus is not covered by insurance, may substitute Basaglar or Semglee or other insulin glargine product per insurance preference at same dose and frequency.        potassium chloride ER (KLOR-CON M) 10 MEQ CR tablet Take 1 tablet (10 mEq) by mouth 2 times daily, No Print Out          CONTINUE these medications which have NOT CHANGED    Details   glipiZIDE (GLUCOTROL) 5 MG tablet Take 10 mg by mouth every morning (before breakfast), Historical      levothyroxine (SYNTHROID/LEVOTHROID) 75 MCG tablet Take 75 mcg by mouth daily, Historical      oxyCODONE (ROXICODONE) 5 MG tablet Take 5 mg by mouth every 4 hours as needed for pain, Historical      polyethylene glycol (MIRALAX) 17 GM/Dose powder Take 17 g by mouth daily, Disp-510 g, R-0, E-Prescribe      rivaroxaban ANTICOAGULANT (XARELTO) 20 MG TABS tablet Take 1 tablet (20 mg) by mouth daily (with dinner), Disp-30 tablet, R-5, E-PrescribeReplaces Eliquis         STOP taking these medications       diltiazem ER COATED BEADS (CARDIZEM CD) 360 MG 24 hr capsule Comments:   Reason for Stopping:         omeprazole (PRILOSEC) 40 MG DR capsule Comments:   Reason for Stopping:         tamsulosin (FLOMAX) 0.4 MG capsule Comments:   Reason for Stopping:         torsemide (DEMADEX) 20 MG tablet Comments:   Reason for Stopping:             Allergies   Allergies   Allergen Reactions     Metoprolol Shortness Of Breath     Tried 1/2022, 1/2023, both time stopped for increased SOB     Statin Drugs [Hmg-Coa-R Inhibitors]

## 2023-02-23 NOTE — PROGRESS NOTES
[facility]  Cardiology Progress Note  Date of Service: 02/23/2023  Primary Cardiologist: Dr. Rehman    Assessment & Plan   Carl Vázquez is a 82 year old male who was admitted on 2/17/2023 with acute hypercapnic respiratory failure.     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   -LVEF 3/2022 55-60% (AFIB burden 4%)   -LVEF 2/2023 25-30% (Holter 1/2023 with 35% AFIB burden with avg rate 135 bpm)   -NT proBNP elevated ~3000 at time of admission; diuresed well with Bumex gtt; repeat NT proBNP 2/20 at 225   -150# on admission, 144# today   Acute on chronic hypoxic and hypercapnic respiratory failure  COPD   -multifactorial; untreated CALE, possible neuromuscular disease; acute CHF, right hemidiaphragm paralysis   -required BiPAP during the day initially, now on RA  Coronary artery disease with recent atherectomy and JESSE x 4 to LAD on 2/21/2023   -on nebivolol; lisinopril started 2/22   -statin not yet started - want to discuss with wife prior given hx of intolerance   Paroxysmal atrial fibrillation   -chronic AC with Xarelto    -PTA on rate control regimen with diltiazem 360 mg once daily    -most recent cardiac monitor completed 1/2023 revealed a 35% AFIB burden with an average rate of 135 bpm    -IV amiodarone load completed 2/19   -ongoing PAF, nebivolol 2.5 mg BID added for rate control with good result    *patient did not tolerate metoprolol in the past  Diabetes mellitus type 2, uncontrolled   -Hgb A1c 8.2%  Hypertension  Hypothyroidism   -on levothyroxine   CALE   -does not use CPAP at home  Right diaphragm paralysis    -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 Medications    -discontinue ASA given issues with hematuria or  epistaxis   -continue Plavix 75 mg daily and Xarelto 20 mg daily    -continue amiodarone 200 mg PO BID for a total of 10 days, then decrease to once daily    -continue nebivolol 2.5 mg PO BID for rate control    -continue lisinopril 2.5 mg daily   -continue rosuvastatin 20 mg PO once daily   -will not resume diuretics at this time given right heart cath 2/21 indicating mild hypovolemia.  2. IP CORE consult completed   3. Activity restrictions as discussed  4. Cardiac rehab  5. Close CORE follow up 1-2 weeks after discharge   -Scheduled for 3/7/2023 with me in [place]  7. Repeat outpatient echocardiogram in 3-4 months   -hopeful to see improvement in LV function with the combination of recent revascularization and rate control of his atrial fibrillation  8. Okay to discharge from cardiology perspective if recheck of hemoglobin remains stable    Mirna Rodriges PA-C  [facility] - [facility]  Pager: [phone]      Interval History   Mr. Carl Vázquez presented to the hospital on 2/17/2023 after his wife noted his oxygen saturation to be ~70%. He was found to be in atrial fibrillation with poorly controlled ventricular rates. Diltiazem gtt was started initially which was transitioned to amiodarone gtt after echocardiogram revealed a depressed EF of 25-30%. Cardiology was consulted due to concern for STEMI on ECG while the patient was in atrial flutter with rates in the 140s. He was without chest pain and subsequent ECGs in SR had no significant ST elevations. Patient's primary team discussed with the on call interventionalist and ultimately decision was made not to pursue emergent coronary angiography after reviewing his serial ECGs and the fact that his troponins remained flat.    Hematuria noted yesterday and this AM. He had his Power catheter removed yesterday evening. Additionally, noted epistaxis this AM when maneuvering his nasal cannula - resolved within minutes. No chest pain, SOB,  palpitations, lightheadedness, or dizziness. Maintaining SR.     Physical Exam   Temp: 97.7  F (36.5  C) Temp src: Oral BP: 100/63 Pulse: 76   Resp: 18 SpO2: 94 % O2 Device: Nasal cannula with humidification Oxygen Delivery: 1.5 LPM  Vitals:    02/21/23 0600 02/22/23 0617 02/23/23 0359   Weight: 65.3 kg (144 lb) 63.7 kg (140 lb 6.4 oz) 65.7 kg (144 lb 12.8 oz)     GEN: well nourished, in no acute distress.  HEENT:  Pupils equal, round. Sclerae nonicteric.   NECK: Supple, no masses appreciated. No JVD  C/V:  Regular rate and rhythm, no murmur   RESP: Respirations are unlabored. Clear to auscultation bilaterally without wheezing, rales, or rhonchi.  GI: Abdomen soft, nontender.  EXTREM: trace LE edema. Right groin access site stable. No ecchymosis, erythema, or edema. Non-tender to palpation.   NEURO: Alert and oriented, cooperative.  SKIN: Warm and dry.     Medications     - MEDICATION INSTRUCTIONS -       - MEDICATION INSTRUCTIONS -       - MEDICATION INSTRUCTIONS -       dextrose 10% Stopped (02/18/23 0333)     - MEDICATION INSTRUCTIONS -       - MEDICATION INSTRUCTIONS -       - MEDICATION INSTRUCTIONS -       Percutaneous Coronary Intervention orders placed (this is information for BPA alerting)       Percutaneous Coronary Intervention orders placed (this is information for BPA alerting)       ASPIRIN NOT PRESCRIBED       STATIN NOT PRESCRIBED       STATIN NOT PRESCRIBED         amiodarone  200 mg Oral BID     aspirin  81 mg Oral Daily     clopidogrel  75 mg Oral Daily     insulin aspart  1-7 Units Subcutaneous TID AC     insulin aspart  1-5 Units Subcutaneous At Bedtime     insulin glargine  14 Units Subcutaneous At Bedtime     levothyroxine  75 mcg Oral Daily     lisinopril  2.5 mg Oral Daily     magnesium oxide  400 mg Oral Q4H     nebivolol  2.5 mg Oral BID     pantoprazole  40 mg Oral BID     polyethylene glycol  17 g Oral Daily     potassium chloride ER  10 mEq Oral BID     [Held by provider] rivaroxaban  ANTICOAGULANT  20 mg Oral Daily with supper     rosuvastatin  20 mg Oral Daily     [Held by provider] tamsulosin  0.4 mg Oral QPM       Data   Last 24 hours labs reviewed     Tele: Maintaining SR with VR 70s    Echo 2/18/2023  The visual ejection fraction is 25-30%. There is severe global hypokinesis which is worse in the inferoseptal segments of the LV. Accurate wall motion analysis is very challenging on this study given HR and image quality.  Moderately decreased right ventricular systolic function. RV size is normal.  No hemodynamically significant valve disease.  There is no pericardial effusion.  Compared to the prior study from 2022, EF is much lower. Reassessment of EF and wall motion recommended when HR is better controlled.    14 Day Ziopatch Monitor 1/20/2023-2/3/2023: 35% burden AFIB with average  bpm    Right and left heart catheterization 2/21/2023      Right sided filling pressures are normal.    Normal PA pressures.    Left sided filling pressures are normal.    Normal cardiac output level.    Dist Cx lesion is 20% stenosed.    Mid Cx lesion is 25% stenosed.    Ost LAD to Prox LAD lesion is 70% stenosed.    Mid LAD to Dist LAD lesion is 80% stenosed.    Mid LAD lesion is 75% stenosed.    1st Diag lesion is 50% stenosed.     Successful IVUS guided PCI of the LAD with rotational atherectomy and lithotripsy with DESx4 from ostial to distal LAD.  Normal biventricular filling pressures with normal cardiac output.

## 2023-02-23 NOTE — PROVIDER NOTIFICATION
Paged as patient's urine has become cherry red, apparently pink earlier during stay. Nosebleed earlier as well.    On xarelto HS for afib (got evening dose)    On ASA and Plavix as well w/ Recent LAD stenting (due 9AM next)    As vitally stable, limited interventions available overnight. Will need to monitor to ensure no Bladder outlet obstruction; if unable to void, will place 3 way lazo for CBI.    Evening xarelto held; will need to discuss w/ cardiology in AM re; heparin +1 antiplatelet vs holding anticoag and continuing DAPT; remains anticoagulated currently from evening xarelto dose. No indication to reverse him at this time (risk>benefit).    Added CBC to AM.    If symptoms of volume loss, can obtain sooner. Discussed with nursing.    Ashkan Guzmán MD  11:48 PM

## 2023-02-23 NOTE — PROGRESS NOTES
MD Notification    Notified Person: MD Guzmán  Notification Date/Time: 2-22-23 9288  Notification Interaction: Page  Purpose of Notification:   [address]: dark cherry red urine, had pink urine charted @1000 2/22, on xarelto and Plavix, lazo pulled @0300 2/22, had c/o nosebleed now stopped, voiding, VSS, denies pain; Abdulaziz [phone]    Orders Received:  Comments:

## 2023-02-23 NOTE — PLAN OF CARE
Goal Outcome Evaluation:      Plan of Care Reviewed With: patient, spouse      A&O pleasant and cooperative. Up SBA walker gait belt. Voiding dark cherry red urine, more delilah this evening. Continuing Plavix and xeralto, ASA stopped.  R groin stable, no hematoma or bruit, CMS intact. Educated on post angio precautions, signs of bleeding/infection. Educated on CAD, CHF and follow up appts. Rx sent with pt, educated on follow appt for refills. 2 medium/small soft BM today- brown. Voiding small amounts, post void bladder scan was 0cc.He will follow up with primary and look for signs of more bleeding/light headedness etc, if things worsened he will return to ER. Discharging home with wife at 1800.

## 2023-02-23 NOTE — PROVIDER NOTIFICATION
MD Notification    Notified Person: MD    Notified Person Name: AUDREY Bradley    Notification Date/Time: 2/23/23 0930    Notification Interaction:talked to provider    Purpose of Notification: Pt had nose bleed last night, still some trickling this am. Also urine is dark cherry red.    Orders Received: give ASA and Plavix, will hold SEA cleaning to talk to Dr Swift    Comments:

## 2023-02-23 NOTE — PROGRESS NOTES
A&O pleasant and cooperative. Up SBA walker gait belt. Voiding dark cherry red urine, MD notified. Bloody nose, O2 placed on humidifier. R groin stable. Meds whole in apple sauce/pudding.

## 2023-02-23 NOTE — PROGRESS NOTES
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    CXR 2/17 showed recurrent small-to-moderate right pleural effusion and slight increase in small left pleural effusion, mild bibasilar atelectasis.  Abdominal US 2/17 no ascites. Underwent US guided right thoracentesis 2/17 at which time 1L removed.    Current problems include:     Acute hypoxic and hypercarbic respiratory failure, suspect multifactorial secondary to acute systolic CHF, pleural effusions, underlying untreated; improving.  CALE and possible neuromuscular disease.  * Initial presentation as above.  - Management of separate issues as below.  Acute biventricular systolic CHF exacerbation, new diagnosis.  Cardiomyopathy: Likely mixed ischemic and nonischemic (tachy mediated)   Atrial fib/flutter with RVR with h/o PAF.    Paroxysmal atrial fibrillation  - chronic AC with Xarelto   - PTA on rate control regimen with diltiazem 360 mg once daily   - most recent cardiac monitor completed 1/2023 revealed a 35% AFIB burden with an average rate of 135 bpm   - IV amiodarone load completed 2/19  - ongoing PAF, nebivolol 2.5 mg BID added for rate control with good result                          *patient did not tolerate metoprolol in the past    Troponin elevation, suspect demand ischemia (type 2 NSTEMI) related to above  Coronary artery disease with recent atherectomy and JESSE x 4 to LAD on 2/21/2023  Cath: Successful IVUS guided PCI of the LAD with  rotational atherectomy and lithotripsy with DESx4 from ostial to distal LAD.  Normal biventricular filling pressures with normal cardiac output.  - on nebivolol; lisinopril started today    Hypertension  (PTA: diltiazem  mg daily; torsemide 20 mg daily (however, wife reported pt was not taking).  * Pt with h/o PAF. On diltiazem and rivaroxaban PTA. Echo 6/2022 showed LVEF 55-60%, grade 1 diastolic dysfunction; RV OK.  * Initial presentation as above. On admit, was in NSR. Started on BiPAP and IV furosemide on admit. Cardiology consulted on admit. Wt 153 lbs on admit.  * After admission on early am 2/18, developed afib/flutter with RVR. Started on diltiazem gtt. Echo showed LVEF 25-30%, severe global hypokinesis, worse in the inferoseptal segments; moderate decreased RV systolic function. Diltiazem gtt then changed to amiodarone gtt given acutely decreased to 25-30%. Noted ST changes while in rapid afib/flutter that resolved once rate was stable. Started on bumetanide gtt.  * On 2/19, back in NSR. Started on heparin gtt. Converted to PO amiodarone.  * On 2/20, bumetanide gtt stopped. Started nebivolol.  - Continue O2, wean as able.  - Continue amiodarone 200 mg BID.  - Continue nebivolol 2.5 mg BID.  - Continue to hold PTA diltiazem ER and torsemide  - Monitor i/o's, daily wts.  - Appreciate Cardiology help.    Chronic hypercapnic respiratory failure with acute decompensation related to above cardiac issues.  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  CALE not using CPAP at home.  * S/p hemidiaphragm plication 10/2022 through [facility] w/o significant relief.   * Initial presentation as above. Started on BiPAP and IV diuretics on admit as noted. Pulmonology consulted on admit.  * On 2/21, tolerating BiPAP with sleep and off during the day.  - Continue BiPAP while sleeping, wean off during the day as able; plan discharge with CPAP/BiPAP. Pt has a CPAP, but machine outdated/broken, needs a new device.  Discussed with SW/CC regarding getting a new CPAP for pt. If unable, will ask Pulmonology to prescribe for discharge if felt appropriate.  - Continue to treat other acute issues as noted.  - Follow-up with [facility] outpatient.     Bilateral pleural effusions, suspect related to CHF.  * Underwent R thoracentesis as above 2/17; fluid studies suggested transudative effusion; pleural fluid cultures NGTD.   - Continue to treat CHF as noted.      Abdominal pain on admit, subacute most pronounced in the lower quadrants but with increasing abdominal distention, intolerance of LE elevation.  Possible chronic liver disease.  * Following with [facility] for possible chronic liver disease. On admit, noted that chronic liver disease labs sent in early 2/2023. He was to have a abdominal ultrasound and paracentesis on 2/24/2023 and was pending EGD on 2/28/2023 for ongoing dysphagia.  * CT on admit 2/17 as above showed hepatic steatosis and mildly nodular hepatic contour suggestive of  hepatic parenchymal disease, indeterminate linear hypodensity adjacent to the gallbladder fossa, may be a focus of fat deposition, nonemergent liver MRI was recommended for better characterization. Abdominal US 2/17 w/o ascites.  * On 2/19, abdominal pain better; tolerating diet.  - Continue diet as tolerated.  - Plan nonemergent MRI, likely outpatient.     DM2 last A1c was 8.2% in January 2023.  Hypoglycemia on admit suspect related to insulin with decreased PO/NPO.  [PTA: glipizide 10 mg qam; glargine 60U at bedtime.]  * Hgb A1C 8.2% 1/2023.  * Initial presentation as above. Initial glucose in the 50's.  - Continue moderate CHO diet.  - has beenon glargine 10U at bedtime --> increase to 14U at HS beginning tonight.  - Continue aspart ISS (medium).  - Continue to hold glipizide.  - Continue PRN hypoglycemia protocol.     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  * Noted to be longstanding and progressive for at least  the 13 months PTA. Has ALS evaluation visit 2/28/2023.  * On admit, noted that pt now needed to ambulate with walker (in 1/2022 was using the treadmill daily).  * On 2/20, pt's wife called ALS clinic who advised Neurology consult here. Neurology consulted.  - Neurology recommends repeat c-spine, t-spine and l-spine MRI after cardiac workup.  - Patient and spouse should still keep 2/28/2023 ALS clinic visit.  - Appreciate Neurology help.     BPH.  Possible urinary retention.  * Possible urinary retention noted on admit.  * Power placed on admit 2/17.  - Remove Power for voiding trial after procedure.  - Order bladder scans and PRN SIC.  - Continue tamsulosin.     Hypokalemia and hypomagnesemia.  * Potassium and magnesium low at times this hospitalization.  - Continue PRN K and Mg replacement.     Thrombocytopenia, unclear etiology, possibly medication effect (question heparin).  * Plts normal on admit.  * Plts 145K on 2/20.  - Continue to monitor CBC - repeat in am.  - Consider platelet transfusion if needs a procedure and less than 50,000; or if less than 10,000.     Hypothyroidism.  * TSH normal 1/2023.  - Continue levothyroxine.        Clinically Significant Risk Factors        Hypokalemia: Lowest K = 3.2 mmol/L in last 2 days, will replace as needed       Hypoalbuminemia: Lowest albumin = 2.7 g/dL at 2/18/2023  3:55 PM, will monitor as appropriate            Moderate Malnutrition: based on nutrition assessment, PRESENT ON ADMISSION         Diet: Room Service  Snacks/Supplements Adult: Magic Cup; With Meals  NPO per Anesthesia Guidelines for Procedure/Surgery Except for: Meds    Prophylaxis: PCD's, ambulation. On heparin gtt.  Power Catheter: PRESENT, indication: Strict 1-2 Hour I&O  Lines: None     Code Status: No CPR- Do NOT Intubate      Disposition: Expected discharge in 1-2 days.        BRAYDEN Rincon MD, [facility]  Text Page (7am - 6pm)    Interval History   No new issues today;  is feeling better overall.      Data reviewed today: I reviewed all new labs and imaging results over the last 24 hours.     Physical Exam   Temp: 97.5  F (36.4  C) Temp src: Oral BP: 124/70 Pulse: 74   Resp: 16 SpO2: 93 % O2 Device: None (Room air) Oxygen Delivery: 1.5 LPM  Vitals:    02/20/23 0600 02/21/23 0600 02/22/23 0617   Weight: 67.6 kg (149 lb 1.6 oz) 65.3 kg (144 lb) 63.7 kg (140 lb 6.4 oz)     Vital Signs with Ranges  Temp:  [97.3  F (36.3  C)-98.4  F (36.9  C)] 97.5  F (36.4  C)  Pulse:  [67-77] 74  Resp:  [16-18] 16  BP: (106-124)/(55-70) 124/70  SpO2:  [90 %-100 %] 93 %  I/O last 3 completed shifts:  In: 520 [P.O.:520]  Out: 1450 [Urine:1450]    Constitutional: awake, no apparent distress; lying in bed  HEENT: sclerae clear; MM's moist  Respiratory: good a/e bilaterally, no wheezing or rhonchi  Cardiovascular: Regular rate and rhythm, S1, S2 noted; no m/r/g  GI: abdomen flat, + bowel sounds; soft, non-tender, non-distended  Skin/Integumen: no rashes, no cyanosis, no jaundice  Musculoskeletal: no edema  Neurologic: follows directions well; no focal deficits      Medications     - MEDICATION INSTRUCTIONS -       - MEDICATION INSTRUCTIONS -       - MEDICATION INSTRUCTIONS -       dextrose 10% Stopped (02/18/23 0333)     - MEDICATION INSTRUCTIONS -       - MEDICATION INSTRUCTIONS -       - MEDICATION INSTRUCTIONS -       Percutaneous Coronary Intervention orders placed (this is information for BPA alerting)       Percutaneous Coronary Intervention orders placed (this is information for BPA alerting)       ASPIRIN NOT PRESCRIBED       STATIN NOT PRESCRIBED       STATIN NOT PRESCRIBED         amiodarone  200 mg Oral BID     aspirin  81 mg Oral Daily     clopidogrel  75 mg Oral Daily     insulin aspart  1-7 Units Subcutaneous TID AC     insulin aspart  1-5 Units Subcutaneous At Bedtime     insulin glargine  10 Units Subcutaneous At Bedtime     levothyroxine  75 mcg Oral Daily     lisinopril  2.5 mg Oral Daily      nebivolol  2.5 mg Oral BID     pantoprazole  40 mg Oral BID     polyethylene glycol  17 g Oral Daily     potassium chloride ER  10 mEq Oral BID     rivaroxaban ANTICOAGULANT  20 mg Oral Daily with supper     rosuvastatin  20 mg Oral Daily     [Held by provider] tamsulosin  0.4 mg Oral QPM       Data   Recent Labs   Lab 02/22/23  1700 02/22/23  1207 02/22/23  0838 02/22/23  0622 02/21/23  0839 02/21/23  0634 02/20/23  1738 02/20/23  1620 02/20/23  0852 02/20/23  0500 02/19/23  0812 02/19/23  0607 02/18/23  1701 02/18/23  1555   WBC  --   --   --  8.7  --   --   --   --   --  7.4  --  8.0  --  8.8   HGB  --   --   --  16.1  --   --   --   --   --  15.4  --  15.0  --  14.9   MCV  --   --   --  92  --   --   --   --   --  93  --  94  --  97   PLT  --   --   --  153  --   --   --   --   --  145*  --  157  --  161   NA  --   --   --  141  --  139  --  135*  --  142   < > 143  143  --  141   POTASSIUM  --   --   --  4.3  --  3.5  --  4.5  --  3.2*   < > 2.9*  2.9*  --  4.1   CHLORIDE  --   --   --  94*  --  89*  --  84*  --  88*   < > 91*  91*  --  93*   CO2  --   --   --  38*  --  46*  --  46*  --  >50*   < > 47*  47*  --  44*   BUN  --   --   --  19.3  --  15.5  --  14.6  --  13.1   < > 15.9  15.9  --  14.3   CR  --   --   --  0.67  --  0.76  --  0.77  --  0.71   < > 0.66*  0.66*  --  0.75   ANIONGAP  --   --   --  9  --  4*  --  5*  --   --    < > 5*  5*  --  4*   TATE  --   --   --  9.5  --  8.3*  --  8.3*  --  8.2*   < > 8.2*  8.2*  --  8.3*   * 419* 219* 259*   < > 127*   < > 313*   < > 160*   < > 145*  145*   < > 221*   ALBUMIN  --   --   --   --   --   --   --   --   --   --   --  2.8*  --  2.7*   PROTTOTAL  --   --   --   --   --   --   --   --   --   --   --  5.4*  --  5.1*   BILITOTAL  --   --   --   --   --   --   --   --   --   --   --  0.6  --  0.6   ALKPHOS  --   --   --   --   --   --   --   --   --   --   --  81  --  81   ALT  --   --   --   --   --   --   --   --   --   --   --  13  --   14   AST  --   --   --   --   --   --   --   --   --   --   --  15  --  22    < > = values in this interval not displayed.

## 2023-02-23 NOTE — PLAN OF CARE
"Occupational Therapy Discharge Summary    Reason for therapy discharge:    Discharged to home with home therapy.    Progress towards therapy goal(s). See goals on Care Plan in [facility] electronic health record for goal details.  Goals partially met.  Barriers to achieving goals:   limited tolerance for therapy and discharge from facility.    Therapy recommendation(s):    Continued therapy is recommended.  Rationale/Recommendations:  \"Pt. close to or at baseline LOF, aside from O2 needs. Currently recommend home with prior level of assist. Wife assists with all I/ADLs, and pt. reports some PCA assist as well. Home safety assessment and HH could be beneficial to ensure safety and adquate assistance at home.\".     Per chart pt is discharging home w/ home care therapy resumption.       "

## 2023-02-23 NOTE — PROGRESS NOTES
Care Management Discharge Note    Discharge Date: 02/23/2023       Discharge Disposition: Home Care    Discharge Services: None    Discharge DME: None    Discharge Transportation: family or friend will provide    Private pay costs discussed: Not applicable    PAS Confirmation Code:    Patient/family educated on Medicare website which has current facility and service quality ratings:      Education Provided on the Discharge Plan:    Persons Notified of Discharge Plans: [facility]  Patient/Family in Agreement with the Plan: yes    Handoff Referral Completed: Yes    Additional Information:  Patient to discharge home pending Hgb result with resumption of homecare PT/OT/Speech thru [facility] and adding skilled RN services.  Cardiology and sleep medicine follow up arranged.  Writer unable to schedule PCP follow up as [facility] clinic scheduling is closed for the day.  Discharge orders and chart notes faxed to [facility] at [phone] and writer notified intake of patient's discharge.    Carline Zarco RN  Care Coordinator  [facility]  [phone] (text or call)

## 2023-02-24 ENCOUNTER — PATIENT OUTREACH (OUTPATIENT)
Dept: CARE COORDINATION | Facility: CLINIC | Age: 83
End: 2023-02-24
Payer: COMMERCIAL

## 2023-02-24 DIAGNOSIS — M62.50 MUSCULAR ATROPHY, UNSPECIFIED SITE: Primary | ICD-10-CM

## 2023-02-24 NOTE — PROGRESS NOTES
Clinic Care Coordination Contact  [facility]: Post-Discharge Note  SITUATION                                                      Admission:    Admission Date: 02/17/23   Reason for Admission: dyspnea and was found to be in acute respiratory failure with signs of CHF.  Discharge:   Discharge Date: 02/23/23  Discharge Diagnosis: Elevated troponin    Acute respiratory failure with hypoxia (H)    Chronic obstructive pulmonary disease, unspecified COPD type (H)    Acute on chronic congestive heart failure, unspecified heart failure type (H)    BACKGROUND                                                      Per hospital discharge summary and inpatient provider notes:  Carl Vázquez is a 82 year old male with past medical history of atrial fibrillation on Xarelto, DM 2, hypothyroidism, CALE who presented to [facility] ED with dyspnea and hypoxia as well as 70% at home.     Patient has been having chronic dyspnea for the last 1 year.  He had an extensive work-up, I refer the interested reader to Dr. Nicky Mirza's H&P dated1/4/23 for very thorough summary.  In brief his dyspnea has been ongoing since January 2022, he had a negative cardiac work-up with negative stress test in March 2022, negative echo in June 2022, he was found to have atrial fibrillation and has been following with EP at [facility] cardiology.  He had a right diaphragm plication in September 2022 without significant relief.  There is concern that he may have concurrent chronic liver disease/cirrhosis for which he has been working with [facility]; chronic liver disease labs sent in early February 2023.  He was to have a abdominal ultrasound and paracentesis on 2/24/2023 and is pending EGD on 2/28/2023 for ongoing dysphagia.  Additionally he has had 13 months progressive weakness since January 2022, previously was able to walk daily on the treadmill now can barely lift arms beyond elbow flexion, feet only able to lift off the bed by inches, ambulating with  "a walker.  He has been following with [facility] neurology, there is concern for lower motor neuron disease and he is and is pending a coordinated visit at the ALS clinic and PFTs on February 23, 2023 at [facility].  Patient most recently was hospitalized from 1/4/2023 until 1/8/2023 for recurrent dyspnea.  He was COVID-positive at that time received remdesivir and Decadron and required NIPPV for hypercarbic respiratory failure.  Additionally he had a thoracentesis at that time.  He was discharged without oxygen and has been working with home PT.     Because of his dyspnea wife provides much of the history.  He has been having progressive dyspnea for the last 24 hours and when the SPO2 was in the 70s she sought treatment in the ED on the a.m. of 2/17/2022.  In the ED he was noted to be hypoxic at 88%.  He was started on 3 L nasal cannula.  Nursing assistant reported that with eating he desaturated down to 78% and so FiO2 was increased to 5-6 L.  He had chest x-ray imaging which showed bilateral right more than left pleural effusions and bilateral pulmonary edema.  He was felt to be in a degree of heart failure with elevated proBNP of 3084, troponin was 91.  He was diuresed with 40 mg IV Lasix.  Electrocardiogram was obtained, not visible in EMR.  Hospital service was asked to admit the patient for acute on chronic respiratory failure and management of the pleural effusion.     During my interview patient became acutely dyspneic with increased work of breathing, accessory muscle use just try to reposition himself in the bed and use the urinal.  I initiated NIPPV and obtained arterial blood gas.  Patient's been medication compliant with the exception of this morning.  No fevers chills ill contacts.  He does not use CPAP, wife thinks he has an old machine that may be recalled and that he \"chokes\" when using it.  Patient endorses exertional dyspnea even on flat surfaces and when doing stairs he has to take " frequent breaks.  He is dyspneic with minimal exertion.  Patient reportedly had a chest x-ray a few weeks ago at his primary provider's but there was not enough fluid for thoracentesis at that time.  His weight has increased slightly from 153 in January 20 23-1 57 today.  He has infrequent cough, follows a low-sodium diet.  Both patient and spouse endorse increasing abdominal girth, tightness of his pants and lower extremity edema.  They are unable to get a compression socks on and elevating his legs causes abdominal pain.  Patient reports food frequently gets stuck in his throat.  He had been fasting for abdominal ultrasound that was 2 occurred today.  He was subsequently hypoglycemic in the ED requiring D50 treatment.             ASSESSMENT           Discharge Assessment  How are you doing now that you are home?: fine  How are your symptoms? (Red Flag symptoms escalate to triage hotline per guidelines): Improved  Do you feel your condition is stable enough to be safe at home until your provider visit?: Yes  Does the patient have their discharge instructions? : Yes  Does the patient have questions regarding their discharge instructions? : No  Were you started on any new medications or were there changes to any of your previous medications? : Yes  Does the patient have all of their medications?: Yes  Do you have questions regarding any of your medications? : No  Do you have all of your needed medical supplies or equipment (DME)?  (i.e. oxygen tank, CPAP, cane, etc.): Yes  Discharge follow-up appointment scheduled within 14 calendar days? : Yes  Discharge Follow Up Appointment Date: 03/03/23  Discharge Follow Up Appointment Scheduled with?: Primary Care Provider                  PLAN                                                      Outpatient Plan:  Follow-up Appointments     Follow Up ([facility])      After discharge, follow up with [facility] for evaluation of your   sleep apnea and to get a new machine.       **APPOINTMENT SCHEDULED WIT DR. LEE ON Friday, MARCH 10TH AT 2:30 PM   AT:      [facility]  [address], [place] [address] [phone]     You may start Outpatient cardiac rehab once you have been discharged from   homecare services.         Follow-up and recommended labs and tests       Primary MD for hospitalization follow up:  Please call the clinic on   Friday, Feb. 24th to schedule appointment.    Future Appointments   Date Time Provider Department Center   3/1/2023  2:00 PM  PFL B PFT [facility]   3/1/2023  3:00 PM Ramin Baez MD [facility]   3/6/2023 11:30 AM RU LAB [facility]   3/7/2023  2:30 PM Mirna Rodriges PA-C [facility]   3/8/2023  1:45 PM Gurmeet De MD [facility]   3/13/2023  2:00 PM 3,  Cardiac Rehab [facility]         For any urgent concerns, please contact our 24 hour nurse triage line: [phone] ([phone])         Sharda Dalton MA

## 2023-02-24 NOTE — PLAN OF CARE
"  Speech Language Therapy Discharge Summary    Reason for therapy discharge:    Discharged to home with home therapy.    Progress towards therapy goal(s). See goals on Care Plan in [facility] electronic health record for goal details.  Goals not met.  Barriers to achieving goals:   discharge from facility.    Therapy recommendation(s):    Continued therapy is recommended.  Rationale/Recommendations:  Dysphagia tx for PO tolerance, strategies. At time of discharge \"Recommendations: easy to chew diet and thin liquids - when fully upright to chair, maintain upright positioning for a minimum of 60 minutes post PO, slow rate of eating with breathing breaks if SOB noted, small single bite with liquid wash following; Can try medications whole in applesauce vs crushed if difficulty.\"    Follow up with [facility], scheduled for 2/28/23      "

## 2023-02-24 NOTE — CARE PLAN
Physical Therapy Discharge Summary    Reason for therapy discharge:    Discharged to home with home therapy.    Progress towards therapy goal(s). See goals on Care Plan in [facility] electronic health record for goal details.  Goals not met.  Barriers to achieving goals:   discharge from facility.    Therapy recommendation(s):    Continued therapy is recommended.  Rationale/Recommendations:  Recommend home PT to further increase strength, balance, and endurance and maximize functional independene and safety in home environment.. **Pt not seen by discharging therapist on this date, note written based on previous treating therapist's notes and recommendations.

## 2023-02-25 LAB
A-TOCOPHEROL VIT E SERPL-MCNC: 10.5 MG/L
BETA+GAMMA TOCOPHEROL SERPL-MCNC: 1.5 MG/L

## 2023-02-27 ENCOUNTER — TELEPHONE (OUTPATIENT)
Dept: CARDIOLOGY | Facility: CLINIC | Age: 83
End: 2023-02-27
Payer: COMMERCIAL

## 2023-02-27 NOTE — TELEPHONE ENCOUNTER
Patient was admitted to [facility] on 2/17/23 with new HFrEF EF 25-30% with global HK.    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    Echo showed EF of 25-30%. There is severe global hypokinesis which is worse in the inferoseptal segments of the LV. Moderately decreased right ventricular systolic function. RV size is normal. No hemodynamically significant valve disease.    2/21/23: Coronary angiogram via RFA resulted in successful IVUS guided PCI of the LAD with rotational atherectomy and lithotripsy with DESx4 from ostial to distal LAD. Normal biventricular filling pressures with normal cardiac output.    IV Bumex diuresed 6 lbs.    Increased A. Fib burden with an average rate of 135 bpm. IV Amiodarone load completed 2/19/23 with ongoing PAF. Nebivolol 2.5 mg BID added for rate control with good result. Continue Amiodarone 200 mg PO BID for a total of 10 days, then decrease to once daily (3/1/23).    Pt was started on Amiodarone, Plavix, Zestril, Mg++, Bystolic, NTG, Protonix, Crestor. PTA Diltiazem, Prilosec, Flomax and Demadex were discontinued at time of discharge.    Called patient to discuss any post hospital d/c questions, review medication changes, and confirm f/u appts. Patient and wife on speaker phone and she denied any questions regarding new medications or changes to PTA medications.     RN confirmed with wife that patient was d/c with an adequate supply of the antiplatelet Plavix, and reminded of importance of taking without interruption.     Pt has an Rx for PRN SL Nitroglycerin.     Patient denied any chest pain or light headedness. States he still has some SOB. No increased edema, although keeps his legs dependent most days as when he reclines, causes abdominal fullness that rises up into his chest. Sleeps in as Sleep  Number bed and keeps HOB and legs elevated. No weight gain. Wife states she checks his 02 sats throughout the day and sats are 92-93% at rest and 88% with activity. Encouraged wife to discuss further with pt's PMD for possible need of home 02 with activity. States she has been preparing low Na+ meals. Reminded wife that Protonix should be taken 30-60 minutes prior to meals, which she did not know. Will start doing this which may help with abdominal fullness.    RFA cardiac cath site is without bleeding, swelling, redness or signs of infection.     RN confirmed with with that patient is scheduled for labs on 3/6/23 at 1130 and an OV on 3/7/23 at 1425 with OLIVE Mirna Rodriges both at our [place] [facility]. Scheduled for an OV on 3/8/23 at 1340 with Dr. De at our [place] [facility]. Dr. Rehman's Team RN phone number provided.    Reviewed that patient should weigh himself every AM, after waking and using the restroom, but before breakfast and medications. Call clinic for a weight gain of 2 lbs overnight or 5 lbs in a week. Low Na+ diet encouraged. Pt instructed to bring daily wt/BP diary and medications with to f/u OV. Dr. Rehman's Team RN phone number provided of any future cardiology questions or concerns, weight gain, etc.    Pt was discharged to home with [facility] services.    Pt is scheduled for cardiac rehab on 3/13/23 1345 in [place].    Advised to call clinic with any cardiac related questions or concerns prior to this olive't, they verbalized understanding and agreed with plan. BRAYDEN Britt RN.

## 2023-02-28 ENCOUNTER — TELEPHONE (OUTPATIENT)
Dept: CARDIOLOGY | Facility: CLINIC | Age: 83
End: 2023-02-28
Payer: COMMERCIAL

## 2023-02-28 NOTE — TELEPHONE ENCOUNTER
Pt is scheduled for a [facility] appt on 3/7/23 with HStebbing    Pt with a history of systolic heart failure..  Medication list reviewed and pt is not currently on Entresto, Jardiance, Farxiga and Ivokana.    Please initiate coverage check for the above medications.  Thank you!  ALESSIA Jerome([facility]) 2/28/23    Viridiana Nino Tonya, CMA  Caller: Unspecified (Yesterday,  4:47 PM)  Hi there,     Entresto $282 for 30 days   Jardiance $282   Invokana $282     Meeting a deductible and once that is met the cost will be $47 for 30 days supply.     Farxiga not covered and requires a PA.     Thank you for allowing me to help with your patient   Viridiana Trung [facility]   Pharmacy Discharge Liaison [facility]

## 2023-03-01 ENCOUNTER — OFFICE VISIT (OUTPATIENT)
Dept: NEUROLOGY | Facility: CLINIC | Age: 83
End: 2023-03-01
Payer: COMMERCIAL

## 2023-03-01 VITALS
WEIGHT: 150.6 LBS | BODY MASS INDEX: 23.59 KG/M2 | RESPIRATION RATE: 16 BRPM | HEART RATE: 66 BPM | OXYGEN SATURATION: 88 % | DIASTOLIC BLOOD PRESSURE: 64 MMHG | SYSTOLIC BLOOD PRESSURE: 122 MMHG

## 2023-03-01 DIAGNOSIS — M62.50 MUSCULAR ATROPHY, UNSPECIFIED SITE: ICD-10-CM

## 2023-03-01 DIAGNOSIS — R25.3 FASCICULATIONS: ICD-10-CM

## 2023-03-01 DIAGNOSIS — R13.10 DYSPHAGIA, UNSPECIFIED TYPE: ICD-10-CM

## 2023-03-01 DIAGNOSIS — Z87.898 HISTORY OF PROGRESSIVE WEAKNESS: ICD-10-CM

## 2023-03-01 PROCEDURE — 99215 OFFICE O/P EST HI 40 MIN: CPT | Performed by: PSYCHIATRY & NEUROLOGY

## 2023-03-01 PROCEDURE — 94375 RESPIRATORY FLOW VOLUME LOOP: CPT | Performed by: INTERNAL MEDICINE

## 2023-03-01 PROCEDURE — 99417 PROLNG OP E/M EACH 15 MIN: CPT | Performed by: PSYCHIATRY & NEUROLOGY

## 2023-03-01 ASSESSMENT — PAIN SCALES - GENERAL: PAINLEVEL: SEVERE PAIN (7)

## 2023-03-01 NOTE — LETTER
3/1/2023       RE: Carl Vázquez  [address] [address] [facility] [address]     Dear Colleague,    Thank you for referring your patient, Carl Vázquez, to the [facility] NEUROLOGY CLINIC [place] at [facility]. Please see a copy of my visit note below.    Service Date: 2023    Kyler Mcwilliams MD  [facility]  [address] [address]    Valdo Garcia MD  [facility]  [address] [place] [id number]    AUDREY Carpio/[facility]  [address] [place] [id number]    RE:     Carl Vázquez  MRN:  [id number]  :  1940    Dear Doctors and Ms. Rodriges:    I saw Carl Vázquez in neuromuscular consultation today for further evaluation of progressive weakness.  Mr. Vázquez is an 82-year-old man whose wife reports the following history:  Thirteen months ago in 2022, he noted difficulty breathing.  He was subsequently diagnosed as having an elevated right hemidiaphragm, suspected due to cervical radiculopathy.  He has since undergone a surgical procedure on the diaphragm with only transient benefit in his respiratory distress.  He has also subsequently been hospitalized 10/2022, 2023 and most recently mid 2023 because of acute on chronic worsening of dyspnea and a pleural effusion requiring drainage.  He also has a history of atrial fibrillation, for which he was treated with a beta blocker early last year, which in turn may have exacerbated his dyspnea.  Regardless, he underwent a cardiac catheterization in February, which demonstrated evidence of substantial coronary artery disease, for which he obtained 4 stents.  He has also been kept on anticoagulants chronically because of his atrial fibrillation.    Mr. Vázquez's past history is also notable for diabetes since .  He has bilateral lower limb edema,  which is chronic and presumably related to his cardiac disease.  He has had progressive bilateral upper extremity weakness over the past year, orthopnea and lower extremity weakness, such that he is now using a walker.  He denies falls.    The patient does not smoke or use alcohol.  He is not a  .  He lives with his wife of 61 years.  They have 2 children, both in [place].  He has one sister who is living and another who passed away from a blood disorder.  His mother  at age 34 of cancer, and his father  at a later age of cancer as well.  There is no family history of neuromuscular or neurodegenerative disease.    System review is also notable for occasional confusion, possibly with visual hallucinations, and for a perception that food gets stuck in his throat.    EXAMINATION:  The patient is a thin gentleman seated in a wheelchair.  Vital signs are notable for an oxygen saturation of 88%.  His wife indicates that he generally runs in the high 80s to low 90s at home and that the saturation consistently rises into the low 90s when she reminds him to take a deep breath.  He has moderate bilateral pitting edema in the lower limbs that is nearly symmetric.  Skin is intact.  There are bilateral shoulder surgical scars.  There is reduced passive range of motion, which is modest in degree at the left shoulder and no pain with passive range of motion.    NEUROLOGIC EXAMINATION:  The patient is alert. While his wife provides the majority of the history, he does respond appropriately to questions.  He does know the year, but does not know the month or date.  He has a 5 digit attention span.  He does recall 3 words after distraction.  He reports, somewhat appropriately, that he is at a hospital.  He reports that he is in [place].  There is no dysarthria.  There are no paraphasic errors.  There is no evident language dysfunction.     Pupils are equal, round and reactive to light.  Extraocular movements  are full.  Facial strength is normal.  Palate elevates in the midline.  Tongue bulk, coordination and strength are normal.  Pterygoid strength is normal.  Masseter reflex is not present.  A prior Neurology evaluation made reference to tongue fasciculations.  I do not see any on today's examination.  Sensory examination reveals variable responses to testing of vibration; specifically, there is not consistent perception of vibration at the toes, malleoli or patellae.  Position sense, however, is well preserved in the feet as is perception of light touch.  Pinprick perception is reduced distal to the mid foot on the right and is otherwise subjectively preserved.  Motor examination demonstrates moderate to marked symmetric atrophy of intrinsic hand muscles and relative atrophy of the left biceps.  Manual muscle testing demonstrates the following findings, right/left:  Shoulder abduction 2/3-, elbow flexion 4/4+, elbow extension 4/4, finger extension, 4-/4-, FDI 3/3, APB 0/3, hip flexion 4/4-, knee flexion 4+/5, ankle dorsiflexion 3/4+.  Hip abduction and adduction as well as knee extension are at full strength.  Reflexes are absent at the biceps and brachioradialis, 1+ at bilateral triceps, 2+ at bilateral patellae without crossed adductors and absent at the Achilles.  Plantar responses are mute.    The patient breathes comfortably at rest.    MEDICAL RECORD REVIEW:  I personally reviewed and interpreted an electrodiagnostic study performed in December of last year.  It does demonstrate marked attenuation of compound muscle action potential amplitudes in the hands with relative preservation of sensory responses.  Sural responses are absent.  Compound muscle action potentials are reduced in the lower limbs, I believe, although I am recalling this from memory, as the study is not currently available to me.  Needle electromyography demonstrated scattered, patchy neurogenic findings, perhaps less striking than might be  anticipated based upon his examination.  I personally reviewed and interpreted several cervical MRI scans.  These demonstrate moderately severe multilevel cervical canal stenosis without marked deformation of the cord and without either anterior myelopathy or gliosis of any sort.  There is a striking foraminal compression, greater on the right, and at a level consistent with his diaphragmatic paralysis.  I personally reviewed laboratory studies, including metabolic panels and venous blood gas.  These demonstrate a chronic hypercarbia, least severe at his last test a week ago before discharge from [facility].  A pH was 7.36 in his most recent venous blood gas, which is perhaps surprisingly in the alkalotic range.  His most recent echocardiogram demonstrates severe hypokinesis with an ejection fraction of about 35%.  Pulmonary function studies performed today demonstrate a marked restrictive pattern with a vital capacity in the 30s and a markedly reduced maximal inspiratory pressure.  FEV1/FVC % is normal, reducing the likelihood of a substantial obstructive component.    I explained the following to Mr. Vázquez and his wife:  Prior Neurology consultation suggests a concern for cervical radiculopathy causing diaphragmatic paralysis and, subsequently, concern for motor neuron disease.  I share these concerns.  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  Spinal fluid examination could be considered given the pure lower motor neuron pattern; however, it should be noted that there are no cranial nerve findings, reduced reflexes are symmetric, and his weakness is in a pattern more consistent with motor neuron disease than with a patchy process as might be expected in a systemic inflammatory infiltrate in the subarachnoid space.    I recommended  the following:  First, Mr. Vázquez clearly needs a followup electrodiagnostic study, ideally performed by a neuromuscular specialist, to better characterize the extent of the abnormalities and to make a firm distinction between a pure neurogenic process and mimics, including such things as inclusion body myopathy.  He does note dysphagia that by history sounds like it could be upper esophageal or less likely pharyngeal.  Second, it is urgent that he be supplied with noninvasive ventilation.  Specifically, I made it abundantly clear to Mr. Vázquez and his wife that should they feel as though there is an abrupt worsening of drowsiness or confusion, this could reflect a life-threatening level of hypercarbia, and he should return immediately to the hospital.  Neither felt that that was essential today, and they are well aware of his medical condition and its trajectory.  Therefore, I made arrangement for him to return tomorrow to our Multidisciplinary Clinic, where he will meet with Occupational Therapy, Physical Therapy, Speech Therapy, Social Work and myself.  We will arrange urgent home delivery of noninvasive ventilation, which I think will be beneficial regardless of the cause for his restrictive ventilatory disorder.  Clearly, his congestive heart failure is contributing to his considerable disability as well; fortunately, he has prompt followup both in General Cardiology and in Cardiac EP.  We will extend his laboratory studies when he returns tomorrow to include a vitamin B12 level, given his apparent large fiber sensory deficits in the lower limbs, and perhaps to include testing for recognized genetic causes of ALS.  If possible, we will arrange for urgent electrodiagnostic studies.  I do feel that Mr. Vázquez's arm and diaphragm weakness could theoretically be due in part to cervical radiculopathy; however, notable is the striking atrophy and weakness in intrinsic hand muscles, at a level at which  there is no meaningful structural abnormality on his cervical imaging.    Sincerely,    Ramin Baez MD    98 minutes spent on the date of the encounter on chart review, history and examination, documentation and further activities as noted above.    D: 2023   T: 2023   MT: [place]    Name:     WYATT ROMERO  MRN:      [id number]        Account:      [id number]   :      1940           Service Date: 2023       Document: [id number]

## 2023-03-01 NOTE — NURSING NOTE
Chief Complaint   Patient presents with     Consult     NEW ALS/MOTOR NEURON     Emiliano Mcadams

## 2023-03-02 ENCOUNTER — OFFICE VISIT (OUTPATIENT)
Dept: NEUROLOGY | Facility: CLINIC | Age: 83
End: 2023-03-02
Payer: COMMERCIAL

## 2023-03-02 ENCOUNTER — PATIENT OUTREACH (OUTPATIENT)
Dept: CARE COORDINATION | Facility: CLINIC | Age: 83
End: 2023-03-02

## 2023-03-02 ENCOUNTER — TELEPHONE (OUTPATIENT)
Dept: NEUROLOGY | Facility: CLINIC | Age: 83
End: 2023-03-02

## 2023-03-02 ENCOUNTER — LAB (OUTPATIENT)
Dept: LAB | Facility: CLINIC | Age: 83
End: 2023-03-02
Payer: COMMERCIAL

## 2023-03-02 ENCOUNTER — THERAPY VISIT (OUTPATIENT)
Dept: PHYSICAL THERAPY | Facility: CLINIC | Age: 83
End: 2023-03-02
Payer: COMMERCIAL

## 2023-03-02 ENCOUNTER — MEDICAL CORRESPONDENCE (OUTPATIENT)
Dept: HEALTH INFORMATION MANAGEMENT | Facility: CLINIC | Age: 83
End: 2023-03-02

## 2023-03-02 ENCOUNTER — THERAPY VISIT (OUTPATIENT)
Dept: SPEECH THERAPY | Facility: CLINIC | Age: 83
End: 2023-03-02
Payer: COMMERCIAL

## 2023-03-02 VITALS
DIASTOLIC BLOOD PRESSURE: 68 MMHG | BODY MASS INDEX: 23.49 KG/M2 | WEIGHT: 150 LBS | RESPIRATION RATE: 16 BRPM | HEART RATE: 73 BPM | SYSTOLIC BLOOD PRESSURE: 108 MMHG | OXYGEN SATURATION: 88 %

## 2023-03-02 DIAGNOSIS — R13.10 DYSPHAGIA, UNSPECIFIED TYPE: Primary | ICD-10-CM

## 2023-03-02 DIAGNOSIS — R25.3 FASCICULATIONS: ICD-10-CM

## 2023-03-02 DIAGNOSIS — M62.50 MUSCULAR ATROPHY, UNSPECIFIED SITE: ICD-10-CM

## 2023-03-02 DIAGNOSIS — Z87.898 HISTORY OF PROGRESSIVE WEAKNESS: ICD-10-CM

## 2023-03-02 DIAGNOSIS — G12.21 ALS (AMYOTROPHIC LATERAL SCLEROSIS) (H): Primary | ICD-10-CM

## 2023-03-02 DIAGNOSIS — R47.1 DYSARTHRIA: ICD-10-CM

## 2023-03-02 DIAGNOSIS — R13.12 OROPHARYNGEAL DYSPHAGIA: Primary | ICD-10-CM

## 2023-03-02 DIAGNOSIS — M62.81 MUSCLE WEAKNESS (GENERALIZED): Primary | ICD-10-CM

## 2023-03-02 DIAGNOSIS — I50.9 ACUTE ON CHRONIC CONGESTIVE HEART FAILURE, UNSPECIFIED HEART FAILURE TYPE (H): ICD-10-CM

## 2023-03-02 DIAGNOSIS — R13.10 DYSPHAGIA, UNSPECIFIED TYPE: ICD-10-CM

## 2023-03-02 LAB
ALBUMIN SERPL BCG-MCNC: 3.6 G/DL (ref 3.5–5.2)
ALP SERPL-CCNC: 64 U/L (ref 40–129)
ALT SERPL W P-5'-P-CCNC: 16 U/L (ref 10–50)
ANION GAP SERPL CALCULATED.3IONS-SCNC: 6 MMOL/L (ref 7–15)
AST SERPL W P-5'-P-CCNC: 20 U/L (ref 10–50)
BILIRUB SERPL-MCNC: 0.3 MG/DL
BUN SERPL-MCNC: 13.3 MG/DL (ref 8–23)
CALCIUM SERPL-MCNC: 9.5 MG/DL (ref 8.8–10.2)
CHLORIDE SERPL-SCNC: 102 MMOL/L (ref 98–107)
CREAT SERPL-MCNC: 0.66 MG/DL (ref 0.67–1.17)
DEPRECATED HCO3 PLAS-SCNC: 36 MMOL/L (ref 22–29)
GFR SERPL CREATININE-BSD FRML MDRD: >90 ML/MIN/1.73M2
GLUCOSE SERPL-MCNC: 267 MG/DL (ref 70–99)
NT-PROBNP SERPL-MCNC: 984 PG/ML (ref 0–1800)
POTASSIUM SERPL-SCNC: 5.1 MMOL/L (ref 3.4–5.3)
PROT SERPL-MCNC: 6.5 G/DL (ref 6.4–8.3)
SODIUM SERPL-SCNC: 144 MMOL/L (ref 136–145)
VIT B12 SERPL-MCNC: 865 PG/ML (ref 232–1245)

## 2023-03-02 PROCEDURE — 97162 PT EVAL MOD COMPLEX 30 MIN: CPT | Mod: GP | Performed by: PHYSICAL THERAPIST

## 2023-03-02 PROCEDURE — 83880 ASSAY OF NATRIURETIC PEPTIDE: CPT | Performed by: PATHOLOGY

## 2023-03-02 PROCEDURE — 92522 EVALUATE SPEECH PRODUCTION: CPT | Mod: GN | Performed by: SPEECH-LANGUAGE PATHOLOGIST

## 2023-03-02 PROCEDURE — 83921 ORGANIC ACID SINGLE QUANT: CPT | Performed by: PSYCHIATRY & NEUROLOGY

## 2023-03-02 PROCEDURE — 36415 COLL VENOUS BLD VENIPUNCTURE: CPT | Performed by: PATHOLOGY

## 2023-03-02 PROCEDURE — 99207 PR BUNDLED PROCEDURE IN GLOBAL PKG: CPT | Performed by: GENETIC COUNSELOR, MS

## 2023-03-02 PROCEDURE — 83520 IMMUNOASSAY QUANT NOS NONAB: CPT | Performed by: PSYCHIATRY & NEUROLOGY

## 2023-03-02 PROCEDURE — 80053 COMPREHEN METABOLIC PANEL: CPT | Performed by: PATHOLOGY

## 2023-03-02 PROCEDURE — 82607 VITAMIN B-12: CPT | Performed by: PSYCHIATRY & NEUROLOGY

## 2023-03-02 PROCEDURE — 92610 EVALUATE SWALLOWING FUNCTION: CPT | Mod: GN | Performed by: SPEECH-LANGUAGE PATHOLOGIST

## 2023-03-02 PROCEDURE — 99207 ZZC MISCELLANEOUS DME SUPPLY OR ACCESSORY, NOT OTHERWISE SPECIFIED: CPT

## 2023-03-02 PROCEDURE — 97535 SELF CARE MNGMENT TRAINING: CPT | Mod: GP | Performed by: PHYSICAL THERAPIST

## 2023-03-02 PROCEDURE — 99214 OFFICE O/P EST MOD 30 MIN: CPT | Performed by: PSYCHIATRY & NEUROLOGY

## 2023-03-02 ASSESSMENT — PAIN SCALES - GENERAL: PAINLEVEL: SEVERE PAIN (7)

## 2023-03-02 NOTE — LETTER
3/2/2023       RE: Carl Vázquez  [address] [facility] [address]     Dear Colleague,    Thank you for referring your patient, Carl Vázquez, to the [facility]UROLOG[facility] CLIN[facility]. Please see a copy of my visit note below.    Return visit for 82 year old man with progressive generalized weakness, heart failure, cervical spondylosis. He will meet with the neuromuscular team in our multidisciplinary clinic. Will arrange for EMG ASAP as well. Seen briefly to address any new questions.    Ramin Baez M.D.    30 minutes spent on the date of the encounter on chart review, history and examination, documentation and further activities as noted above.

## 2023-03-02 NOTE — DISCHARGE INSTRUCTIONS
Focus on the walking only right now. Every 2 hours get up and move (1 lap at least). Monitor signs after exercise: weakness, cramping. You should recover within 30 minutes  Walk slow, keep walker close to your body. Focus on walking!  Order the toilet riser with frame from [employer]  Call [facility] tomorrow to get an appointment for the neck collar [phone]). Dr. Baez will put in the order today    Jossy Cooney PT, DPT  Physical Therapist  [facility] [address]  [address] [place] [address]  [email or web address]  Appointments: [phone]         Cervical Collars  Soft collar-   Headmaster-  http://headmastercollar.com/  Ballert Big Stone Collar - http://www.[employer]/index.php/ybktseru-twvkbrenfi-upqslouu/ballert-oxford-collar    TOT Collar - http://www.[employer]/order-tot-collar.html  [place] J collar-    Non-covered ALS Head support (patient would purchase on his/her own)  Looking whoactually head support system - http://www.[employer]/index.php    Optimal Neck Brace: https://necksupportbrace.com/        https://www.inspireupliPresstler.com/Neck-Support-Collar/iu/4252?gcdbvvu=66955&[email or web address]=258321780&adg_id=7700387694827668&kwd=&device=c&kgkevwc=[email or web address]    https://www.amazon.com/Rolyan-Adjustable-Cervical-Collar-Circumferences/dp/V90JHT2BTG/ref=sr_1_52?keywords=neck+collars+like+headmaster&bxz=4179309240&sr=8-52

## 2023-03-02 NOTE — PROGRESS NOTES
Return visit for 82 year old man with progressive generalized weakness, heart failure, cervical spondylosis. He will meet with the neuromuscular team in our multidisciplinary clinic. Will arrange for EMG ASAP as well. Seen briefly to address any new questions.    Ramin Baez M.D.    30 minutes spent on the date of the encounter on chart review, history and examination, documentation and further activities as noted above.

## 2023-03-02 NOTE — NURSING NOTE
Chief Complaint   Patient presents with     RECHECK     RETURN ALS/MOTOR NEURON - meet the team     Emiliano Mcadams

## 2023-03-02 NOTE — PROGRESS NOTES
Patient seen for progressive weakness and neuromuscular ventilatory failure. He needs non-invasive ventilation immediately, EMG, and a multidisciplinary team visit tomorrow (2 March 2023). Full note has been dictated.    Ramin Baez M.D.

## 2023-03-02 NOTE — TELEPHONE ENCOUNTER
Spoke with Jenna at [employer].  [facility] able to see Carl today but Carl denied (stated they were too busy). [facility] will be delivering equipment on 3/3 at noon. Will notify Dr. Baez.    Keysha Soni RN

## 2023-03-02 NOTE — PROGRESS NOTES
"[facility]    OUTPATIENT PHYSICAL THERAPY CLINIC NOTE  Carl Vázquez  YOB: 1940  [id number]    Type of visit:         Evaluation     Date of service: 3/2/2023    Referring provider: Ramin Baez MD     present: No    Others present at visit:  Spouse / significant other- Dorothy    Medical diagnosis:   Ongoing    Date of diagnosis: ongoing    Pertinent history of current problem (include personal factors and/or comorbidities that impact the plan of care): Patient with multiple recent hospitalizations and ongoing diagnosis by Dr. Baez.  Patient seen in multidisciplinary clinic today due to multiple needs. PMH: COPD, sleep apnea, acute respiratory failure, CHF, diabetes, osteoarthritis    Cardio-respiratory status:  Forced vital capacity: 38 % of predicted   BiPAP ordered    Height/Weight: 5'7\" / 150lbs    Living environment:  House    Living environment barriers:  2 stairs to enter (0 railing present)   Having ramp installed this weekend  12 stairs with 1 HR down to basement- does not use     Current assistance/living environment:  Lives with wife      Current mobility equipment:  Front wheeled walker      Current ADL equipment:  Shower/tub chair  Extended tub bench  *Recommend:Toilet riser with arms    Technology used: NT    Patient concerns/goals: Patient reports significant decline in function- multiple hospitalizations    Evaluation   Interview completed.   Pain assessment:  Pain denied     Range of motion: ROM restrictions in BLE- most notable in B ankle gastrocs     Manual muscle testing:  R hip flexion 3-/5, L hip flexion 3/5, B knee extension 4/5, B ankle DF 3-/5   Gait:  Patient ambulates with WW modified independent- flexed forward posture, shuffling pattern   Cognition:  Unknown    Fall Risk Screen:   Has the patient fallen 2 or more times in the last year? " Yes      Has the patient fallen and had an injury in the past year? No       Timed Up and Go Score: NT    Is the patient a fall risk? Yes, department fall risk interventions implemented     Impairments:  Fatigue  Muscle atrophy  Coordination  Balance  Range of motion     Treatment diagnosis:  Impaired mobility  Impaired activities of daily living    Clinical Presentation: Evolving/Changing  Clinical Presentation Rationale: personal factors, body systems involved  Clinical Decision Making (Complexity): Moderate complexity     Recommendations/Plan of care:  1 session evaluation & treatment.  Equipment recommended: toilet riser and frame.     Goals:   Target date: 3/2/2023  Patient, family and/or caregiver will verbalize understanding of evaluation results and implications for functional performance.  Patient, family and/or caregiver will verbalize/demonstrate understanding of compensatory methods /equipment to enhance functional independence and safety.  Patient, family and/or caregiver will verbalize/demonstrate understanding of home program.  Patient, family and/or caregiver will verbalize/demonstrate understanding of positioning techniques/equipment.  Patient, family and/or caregiver will verbalize energy management techniques appropriate for status and setting.    Educational assessment/barriers to learning:   No barriers noted     Treatment provided this date:   ADL/ Self Care Management- 25 minutes  -Educated patient on energy conservation techniques including pacing of activities, frequent rest breaks, use of assistive device and monitoring signs of fatigue (increased muscle soreness, weakness, cramps, fasciculations) for safe functional mobility and performance of daily tasks  -Educated patient on exercise recommendations including focus on stretching and light cardiovascular conditioning as main modes. Educated on safe strengthening principles including endurance focus for muscle groups that have anti gravity  strength. Educated patient to monitor signs for red flags after exercise (increased weakness, cramping, fasciculations) that last >30 minutes.  -Educated patient on fall risk reduction techniques including energy conservations, monitoring signs of fatigue, performance of single tasks with rest breaks in between activities and use of assisted devices.  -Recommended equipment including toilet riser frame to improve safety with sit to stands. Also recommended wheelchair and rollator, however patient and wife wanting to wait at this time  -Recommended neck collar to improve support- will request orthotics order from primary MD    Response to treatment/recommendations: patient verbalizes understanding/agreement    Goal attainment:  All goals met     Risks and benefits of evaluation/treatment have been explained.  Patient, family and/or caregiver are in agreement with Plan of Care.     Timed Code Treatment Minutes: 25  Total Treatment Time (sum of timed and untimed services): 55    Signature: Ly Cooney, PT   Date: 3/2/2023

## 2023-03-02 NOTE — TELEPHONE ENCOUNTER
Urgent referral placed for NIV.  Order and PFT results faxed to [facility] (fax [phone]).  Called Jenna at [facility] and left [facility].    Keysha Soni RN

## 2023-03-02 NOTE — PROGRESS NOTES
Service Date: 2023    Kyler Mcwilliams MD  [facility]  [address] [address]    Valdo Garcia MD  [facility]  [address] [place] [address]    AUDREY Carpio/[facility]  [address] [place] [id number]    RE:     Carl Vázquez  MRN:  [id number]  :  1940    Dear Doctors and Ms. Rodriges:    I saw Carl Vázquez in neuromuscular consultation today for further evaluation of progressive weakness.  Mr. Vázquez is an 82-year-old man whose wife reports the following history:  Thirteen months ago in 2022, he noted difficulty breathing.  He was subsequently diagnosed as having an elevated right hemidiaphragm, suspected due to cervical radiculopathy.  He has since undergone a surgical procedure on the diaphragm with only transient benefit in his respiratory distress.  He has also subsequently been hospitalized 10/2022, 2023 and most recently mid 2023 because of acute on chronic worsening of dyspnea and a pleural effusion requiring drainage.  He also has a history of atrial fibrillation, for which he was treated with a beta blocker early last year, which in turn may have exacerbated his dyspnea.  Regardless, he underwent a cardiac catheterization in February, which demonstrated evidence of substantial coronary artery disease, for which he obtained 4 stents.  He has also been kept on anticoagulants chronically because of his atrial fibrillation.    Mr. Vázquez's past history is also notable for diabetes since .  He has bilateral lower limb edema, which is chronic and presumably related to his cardiac disease.  He has had progressive bilateral upper extremity weakness over the past year, orthopnea and lower extremity weakness, such that he is now using a walker.  He denies falls.    The patient does not smoke or use alcohol.  He is not a  .  He lives with  his wife of 61 years.  They have 2 children, both in [place].  He has one sister who is living and another who passed away from a blood disorder.  His mother  at age 34 of cancer, and his father  at a later age of cancer as well.  There is no family history of neuromuscular or neurodegenerative disease.    System review is also notable for occasional confusion, possibly with visual hallucinations, and for a perception that food gets stuck in his throat.    EXAMINATION:  The patient is a thin gentleman seated in a wheelchair.  Vital signs are notable for an oxygen saturation of 88%.  His wife indicates that he generally runs in the high 80s to low 90s at home and that the saturation consistently rises into the low 90s when she reminds him to take a deep breath.  He has moderate bilateral pitting edema in the lower limbs that is nearly symmetric.  Skin is intact.  There are bilateral shoulder surgical scars.  There is reduced passive range of motion, which is modest in degree at the left shoulder and no pain with passive range of motion.    NEUROLOGIC EXAMINATION:  The patient is alert. While his wife provides the majority of the history, he does respond appropriately to questions.  He does know the year, but does not know the month or date.  He has a 5 digit attention span.  He does recall 3 words after distraction.  He reports, somewhat appropriately, that he is at a hospital.  He reports that he is in [place].  There is no dysarthria.  There are no paraphasic errors.  There is no evident language dysfunction.     Pupils are equal, round and reactive to light.  Extraocular movements are full.  Facial strength is normal.  Palate elevates in the midline.  Tongue bulk, coordination and strength are normal.  Pterygoid strength is normal.  Masseter reflex is not present.  A prior Neurology evaluation made reference to tongue fasciculations.  I do not see any on today's examination.  Sensory examination reveals  variable responses to testing of vibration; specifically, there is not consistent perception of vibration at the toes, malleoli or patellae.  Position sense, however, is well preserved in the feet as is perception of light touch.  Pinprick perception is reduced distal to the mid foot on the right and is otherwise subjectively preserved.  Motor examination demonstrates moderate to marked symmetric atrophy of intrinsic hand muscles and relative atrophy of the left biceps.  Manual muscle testing demonstrates the following findings, right/left:  Shoulder abduction 2/3-, elbow flexion 4/4+, elbow extension 4/4, finger extension, 4-/4-, FDI 3/3, APB 0/3, hip flexion 4/4-, knee flexion 4+/5, ankle dorsiflexion 3/4+.  Hip abduction and adduction as well as knee extension are at full strength.  Reflexes are absent at the biceps and brachioradialis, 1+ at bilateral triceps, 2+ at bilateral patellae without crossed adductors and absent at the Achilles.  Plantar responses are mute.    The patient breathes comfortably at rest.    MEDICAL RECORD REVIEW:  I personally reviewed and interpreted an electrodiagnostic study performed in December of last year.  It does demonstrate marked attenuation of compound muscle action potential amplitudes in the hands with relative preservation of sensory responses.  Sural responses are absent.  Compound muscle action potentials are reduced in the lower limbs, I believe, although I am recalling this from memory, as the study is not currently available to me.  Needle electromyography demonstrated scattered, patchy neurogenic findings, perhaps less striking than might be anticipated based upon his examination.  I personally reviewed and interpreted several cervical MRI scans.  These demonstrate moderately severe multilevel cervical canal stenosis without marked deformation of the cord and without either anterior myelopathy or gliosis of any sort.  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  I personally reviewed laboratory studies, including metabolic panels and venous blood gas.  These demonstrate a chronic hypercarbia, least severe at his last test a week ago before discharge from [facility].  A pH was 7.36 in his most recent venous blood gas, which is perhaps surprisingly in the alkalotic range.  His most recent echocardiogram demonstrates severe hypokinesis with an ejection fraction of about 35%.  Pulmonary function studies performed today demonstrate a marked restrictive pattern with a vital capacity in the 30s and a markedly reduced maximal inspiratory pressure.  FEV1/FVC % is normal, reducing the likelihood of a substantial obstructive component.    I explained the following to Mr. Vázquez and his wife:  Prior Neurology consultation suggests a concern for cervical radiculopathy causing diaphragmatic paralysis and, subsequently, concern for motor neuron disease.  I share these concerns.  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  Spinal fluid examination could be considered given the pure lower motor neuron pattern; however, it should be noted that there are no cranial nerve findings, reduced reflexes are symmetric, and his weakness is in a pattern more consistent with motor neuron disease than with a patchy process as might be expected in a systemic inflammatory infiltrate in the subarachnoid space.    I recommended the following:  First, Mr. Vázquez clearly needs a followup electrodiagnostic study, ideally performed by a neuromuscular specialist, to better characterize the extent of the abnormalities and to make a firm distinction between a pure neurogenic process and mimics, including such things as inclusion body myopathy.  He does note  dysphagia that by history sounds like it could be upper esophageal or less likely pharyngeal.  Second, it is urgent that he be supplied with noninvasive ventilation.  Specifically, I made it abundantly clear to Mr. Vázquez and his wife that should they feel as though there is an abrupt worsening of drowsiness or confusion, this could reflect a life-threatening level of hypercarbia, and he should return immediately to the hospital.  Neither felt that that was essential today, and they are well aware of his medical condition and its trajectory.  Therefore, I made arrangement for him to return tomorrow to our Multidisciplinary Clinic, where he will meet with Occupational Therapy, Physical Therapy, Speech Therapy, Social Work and myself.  We will arrange urgent home delivery of noninvasive ventilation, which I think will be beneficial regardless of the cause for his restrictive ventilatory disorder.  Clearly, his congestive heart failure is contributing to his considerable disability as well; fortunately, he has prompt followup both in General Cardiology and in Cardiac EP.  We will extend his laboratory studies when he returns tomorrow to include a vitamin B12 level, given his apparent large fiber sensory deficits in the lower limbs, and perhaps to include testing for recognized genetic causes of ALS.  If possible, we will arrange for urgent electrodiagnostic studies.  I do feel that Mr. Vázquez's arm and diaphragm weakness could theoretically be due in part to cervical radiculopathy; however, notable is the striking atrophy and weakness in intrinsic hand muscles, at a level at which there is no meaningful structural abnormality on his cervical imaging.    Sincerely,    Ramin Baez MD    98 minutes spent on the date of the encounter on chart review, history and examination, documentation and further activities as noted above.    D: 03/01/2023   T: 03/01/2023   MT: [place]    Name:     WYATT VÁZQUEZ  MRN:       [id number]        Account:      [id number]   :      1940           Service Date: 2023       Document: [id number]

## 2023-03-02 NOTE — PATIENT INSTRUCTIONS
We are trying to arrange prompt EMG appointment, ideally with me (Dr Baez) so we can review results and next steps.  Please reschedule the test to evaluate your esophagus. Our speech therapist feels your swallowing difficulty is likely due to the esophagus problem and not a problem in the muscles of the throat.  Our dietician will call you about maintaining nutrition.  After the EMG we will make a decision about how best to get more therapy and about home care.   Please cancel your sleep study. We cannot do it for you as it is not through [facility].  Lab visit today.    Please call [facility] @ [phone] for questions or concerns during regular business hours. For a more efficient way to communicate, use [employer] and address the message to your physician. Remember, [employer] is only read during business hours. Do not leave urgent messages on voicemail or [employer]. If situation is urgent, contact the Neurology Clinic @ [phone] and ask to speak to a Triage Nurse or Call 911 or visit an Emergency Department.     Please call your pharmacy if you need a medication refill. They will send us an electronic message.

## 2023-03-03 ENCOUNTER — DOCUMENTATION ONLY (OUTPATIENT)
Dept: NEUROLOGY | Facility: CLINIC | Age: 83
End: 2023-03-03
Payer: COMMERCIAL

## 2023-03-03 ENCOUNTER — ALLIED HEALTH/NURSE VISIT (OUTPATIENT)
Dept: NEUROLOGY | Facility: CLINIC | Age: 83
End: 2023-03-03
Payer: COMMERCIAL

## 2023-03-03 LAB
EXPTIME-PRE: 6.49 SEC
FEF2575-%PRED-PRE: 56 %
FEF2575-PRE: 0.98 L/SEC
FEF2575-PRED: 1.73 L/SEC
FEFMAX-%PRED-PRE: 55 %
FEFMAX-PRE: 3.49 L/SEC
FEFMAX-PRED: 6.32 L/SEC
FEV1-%PRED-PRE: 41 %
FEV1-PRE: 0.99 L
FEV1FEV6-PRE: 82 %
FEV1FEV6-PRED: 76 %
FEV1FVC-PRE: 82 %
FEV1FVC-PRED: 76 %
FIFMAX-PRE: 1.98 L/SEC
FVC-%PRED-PRE: 38 %
FVC-PRE: 1.22 L
FVC-PRED: 3.17 L
MEP-PRE: 28 CMH2O
MIP-PRE: -22 CMH2O

## 2023-03-03 NOTE — PROGRESS NOTES
[facility]      Outpatient Speech Language Pathology Neurology Clinic Evaluation  Carl Vázquez YOB: 1940 [id number]    Visit Date: 3/2/2023    Age: 82 year old    Medical Diagnosis: Ongoing assessment for ALS    Date of Diagnosis: TBD    Referring MD: Dr Baez     present: No    PMH: Refer to Medical Chart    Fall Risk:Refer to physician report.    Others at Clinic Visit:Spouse    Living Situation: With others    Patient Concerns/Goals: Increased swallowing difficulty    Observations: French is seen at the interdisciplinary clinic for ongoing assessment of possible ALS. He reports significant dysphagia which has limited with intake significantly and cause substantial weight loss. He describes food gettting stuck in his throat and indicating area of pyriform sinus/UES and/or upper esophagus/chest.    Current Mode of Nutrition: Oral diet of regular solids and thin liquids    Weight: 150lbs (baseline 165-170)    Cardio-Respiratory Status: Forced vital capacity: 38%    Functional Rating Scale (ALS-FRS): not completed due to ongoing diagnostic process      Oral Motor/Swallowing  Oral Motor Function:  Generally intact    Volitional Abilities:  Cough- Weak  Throat Clear- Functional  Swallow- Present    Feeding Assistance: Set-up only required   Oral Cares: adequate reported, was working to get new partials but now on hold    Swallow Function:   Thin Liquid-  Mildly reduced laryngeal elevation and anterior movement  Solid-  Mildly reduced laryngeal elevation and anterior movement    Dysphagia Diagnosis: Mild oral pharyngeal dysphagia, suspect significant esophageal dysphagia     Dysphagia Intervention: SLP briefly explained swallowing anatomy. Through discussion it was determined that he had esophageal dilation 10 years ago when this same problem was occurring and it helped significantly.  He had scheduled a repeat dilation but this was cancelled as he would need to stop his medication prior to study. Given severity of dysphagia and impact on weight loss MD has reordered EGD. Recommend VFSS if dilation does not resolve dysphagia. Continues use of safe swallow strategies such as small amounts, slow rate, alternating consistencies, pills in puree.      Speech Intelligibility/Functional Communication   Methods of communication: Verbal    Dysarthria: Mild    Speech:   Deficits in phonation- Dysphonia, Breathy quality and Low pitch  Intelligibility- 100%   Grandfather Passage reading suggests speaking rate of 72 words per minute (<125wpm suggests need for AAC) however this was mostly impacted by need to stop for breath support and reduced reading ability.    Speech Intelligibility/Communication:  Communicates functionally    Augmentative and Alternative Communication (AAC):   Not addressed this visit, if ALS diagnosed will address      Clinical Impression/Plan of Care  Treatment Diagnosis: Oropharyngeal Dysphagia     Recommendations:  Oral diet of soft moist solids and thin liquids.   Safe swallow strategies listed above.  EGD with dilation if indicated.  VFSS if EGD does not resolve dysphagia.    Plan of Care:  Evaluation only.    Goals: Based on today's evaluation session patient and/or caregiver will have understanding of current communication and/or swallowing status and recommendations for management.    Educational Assessment:  Learners- Patient and Significant other  Barriers to Learning- No barriers.    Education provided/response:   Speech  Swallowing  Diet  Verbalized understanding    Treatment provided this date:   Swallow intervention, 5 minutes  Communication intervention, 0 minutes    Response to recommendations/treatment: verbalized understanding, agreed to recommendations    Goal attainment: All goals met    Risks and benefits of evaluation/treatment have been explained.  Patient, family  and/or caregiver are in agreement with Plan of Care.    Timed Code Treatment Minutes: 0  Total Treatment Time (sum of timed and untimed services): 33 minutes

## 2023-03-03 NOTE — PROGRESS NOTES
"CLINICAL NUTRITION SERVICES - ASSESSMENT NOTE    Carl Vázquez 82 year old referred for MNT related to ALS    Visit Type: Initial  Referring Physician: Dr. Baez  Pt accompanied by: spouse, Michael    NUTRITION HISTORY  Factors affecting nutrition intake include:decreased appetite, taste aversions and swallowing difficulties  Current diet: Regular diet, soft foods, shredded meat. Eats with a teaspoon.  Current appetite/intake: Poor appetite, pt states he can't taste anything, hasn't been tasting food since fall  PEG Tube: N/A    Diet Recall  Breakfast Eggs, toast with butter, oatmeal with cinnamon and milk,or cereal with bananas, hashbrowns, hot chocolate   Lunch Homeade soup, hamburger, mac n cheese, Goulash, chili dog   Dinner Beef stew, chicken in crockpot with stuffing, hamburger, chili, goulash, lasagne, roast beef, potatoes, salad with spinach   Snacks Chocolate chip cookie, peanut butter with crackers   Beverages Hot chocolate, koolaid, iced tea, lemonade, water, Boost, chocolate milk and white milk     ANTHROPOMETRICS  Height: 1.702 m (5' 7\")    Weight: 68 kg (150 lb)  BMI: 23.49 kg/m    Weight Status:  Normal BMI  IBW: 67.3 kg  % IBW: 101%  Weight History: 26# wt loss in 7 months  Wt Readings from Last 10 Encounters:   03/02/23 68 kg (150 lb)   03/01/23 68.3 kg (150 lb 9.6 oz)   02/23/23 65.7 kg (144 lb 12.8 oz)   01/07/23 69.1 kg (152 lb 4.8 oz)   07/25/22 77.1 kg (170 lb)   07/15/22 77.6 kg (171 lb)   06/20/22 76 kg (167 lb 9.6 oz)   09/27/19 79.6 kg (175 lb 6.4 oz)   08/22/17 80.7 kg (178 lb)   06/07/16 81.6 kg (180 lb)     Dosing Weight: 68 kg    Medications/vitamins/minerals/herbals:   Reviewed    LABS  Labs reviewed    ASSESSED NUTRITION NEEDS:  Estimated Energy Needs: 1502-7516 kcals (30-35 Kcal/Kg)  Justification: increased need associated with progression of ALS  Estimated Protein Needs: 70-80 grams protein (1-1.2 g pro/Kg)  Justification: increased need associated with progression of " ALS  Estimated Fluid Needs: 1700  mL   Justification: maintenance    NUTRITION DIAGNOSIS:  Inadequate protein-energy intake related to decrease in appetite and dysphagia as evidenced by pt report and     INTERVENTIONS  Recommendations / Nutrition Prescription  1. Continue Regular diet of soft/pureed/shredded foods  2. Drink one to two Glucerna or Premiere Protein    Nutrition Education     Provided written & verbal education on:   - Ways to maximize kcal and protein intake. Discussed calorie and protein needs for maintenance of weight and nutrition status.  Advised pt to aim for at least 2040 kcal and 70 g protein via 5-6 small frequent meals.  Discussed that as ALS progresses, eating may become more difficult and discussed ways to cope with this.   - ONS (Ensure Enlive, Ensure Plus/Boost Plus, CIB, Benecalorie, Scandi shake etc). Suggested ways to incorporate these supplements to avoid flavor fatigue. Encouraged pt to consume 2 ONS daily.       Provided pt with corresponding education materials/handouts on:  Six Small Meals a Day, High Calorie, High Protein Recipe booklet, Tips to Increase the Calories in Your Diet, Tips to Increase the Protein in Your Diet and Protein Sources.      Pt verbalize understanding of materials provided during consult.   Patient Understanding: Excellent  Expected Compliance: Excellent     Implementation  Composition of Meals and Snacks, General/healthful diet and Medical Food Supplement    Goals  1.  Aim for 5-6 small frequent meals  2.  Aim for 2040 kcal and 70 g protein/day  3. Weight maintenance    Follow-Up Plans: Pt has RD contact information for questions.    Pt encouraged to follow up with RD at next clinic visit in 3-6 months.    MONITORING AND EVALUATION:  -Food intake  -Fluid/beverage intake  -Liquid meal replacement or supplement  -Weight trends    Chloe Florentino RDN, LD

## 2023-03-03 NOTE — PROGRESS NOTES
Carl Vázquez was seen for a genetic counseling appointment at the request of Dr. Baez today given his suspected diagnosis of ALS. He was accompanied by his wife Dorothy.    Pertinent Medical History: Carl is a 82 year old male with a history of progressive generalized weakness, heart failure, cervical spondylosis. See Dr. Baez's note for additional details.     Family History: A three generation pedigree was obtained today and scanned into the EMR. This family history is by patient report only and has not been verified with medical records except where noted. The following information is significant:     French has two daughters. His daughter (age 61) is alive and well and has three healthy children. His daughter (age 58) has a thyroid problem and a history of a car accident that caused multiple injuries. She has one child with vitamin b12 deficiency and one children with type 1 diabetes who is in the hospital on dialysis.    French has two sisters. His sister (age 78) is alive and well and has one healthy son and one healthy daughter. His other sister  at age 85 due to a blood disorder after chemical exposure at a machine shop for many years. She had two sons who are alive and well.     French's father  in his 60s due to throat cancer. Paternal aunts, uncles and concerns have no history of neurologic concerns. Paternal ancestry is [place].    French's mother  at age 34 due to brain injury after head trauma. Maternal aunts, uncles and cousins have no history of neurologic concerns. Maternal ancestry is [place].     Family history is otherwise negative for ALS, dementia, Parkinson's disease, mental health concerns, none disease or genetic testing. Consanguinity was denied.    Discussion: Our genes are sequences of letters that provide instructions that help our body grow, develop and function. Sometimes a change occurs in one of our genes that causes the body to be unable to read these instructions. This  "results in a genetic condition. Given Carl's complex medical history, it is possible that his symptoms are caused by a multisystem proteinopathy or that he has a diagnosis of ALS.    Multisystem proteinopathies are dominantly inherited degenerative disorders that impact the muscle, bone and/or central nervous system. Individuals with a mutation or change in a gene related to multisystem proteinopathy may present with one or more conditions that impact these parts of the body including but not limited to inclusion body myopathy, Paget disease of the bone and frontotemporal dementia. A panel of genes is recommended related to these disorders    Amyotrophic lateral sclerosis (ALS) is a condition that affects the motor neurons.  Motor neurons are responsible for sending the necessary signals to the muscles, to allow for movement and speech. In ALS, these motor neurons break down and eventually lead to loss of speech and paralysis. Some individuals with ALS may also experience neuropsychological symptoms such as cognitive and/or behavioral dysfunction or frontotemporal dementia (FTD) in severe cases. The progression of this condition is approximately three to five years, although it can vary with age of onset and between and within families. Historically, ALS has been categorized into \"familial ALS\" when an individual has two or more close relatives with ALS and \"sporadic ALS\" when an individual with ALS has no family history of the condition. ALS is not generally thought of as a genetic or inherited condition; however recently several genetic factors have been found to be either causative or contributory. When a family history of ALS is identified then we have a strong suspicion that there is a causative genetic factor or genetic change (mutation) that predisposes members of the family to ALS. However, as more research and genetic testing has become available, individuals with seemingly sporadic ALS can have a gene " "mutation identified. This may be due to a new (de kera) mutation in that individual or reduced penetrance. Therefore, a negative family history cannot rule out a possible genetic form of ALS. About 10-15% of individuals with ALS are thought to have \"genetic ALS\" meaning a causative gene mutation has been identified.     There are several genes that have been found to be associated with ALS and/or FTD. The most common genetic cause of ALS is a hexanucleotide repeat expansion (GGGGCC) in the V4xwz51 gene. The number of hexanucleotide repeats in the S1ygu75 gene ranges from 2 to >4000. The precise cutoff of between normal and pathogenic (disease causing) is complicated by multiple factors, but generally <25 is considered normal and >60 is considered pathogenic. Repeat expansions in the R7caq94 gene account for approximately 39-45% of familial cases of ALS and about 3-7% of sporadic ALS. Other genes known to cause ALS include: SOD1, FUS, TARDBP, ANG, OPTN, FIG4, VAPB, UBQLN2, SETX, NEK1, VCP, ALS2, TDP43 (CHCHD10, DCTN1, MATR3, PFN1, TUBA4A, UNC13A, ATXN2).    Genetic forms of ALS are typically inherited in an autosomal dominant pattern, meaning a change on one copy of the gene is sufficient to predispose an individual to the condition. Someone who possesses an ALS gene mutation would have a 1 in 2 (50%) chance of passing the gene mutation associated with ALS on to their children. It is import to know that not all people who inherit an ALS gene mutation will develop ALS. This is called reduced penetrance. For example, the penetrance or percent of individuals who develop ALS who have a mutation in SOD1 ranges from 50% to 90% by age 70, depending on the specific genetic mutation. It is assumed that other genes will also show reduced penetrance. We discussed that if the gene associated with ALS is NOT passed on; that child is NOT at an increased risk to develop symptoms because of this genetic change and CANNOT pass it on " to their children. The only way to know if the genetic change is passed on is by genetic testing.    We discussed the availability of genetic testing for ALS. The gold standard of ALS genetic testing is to begin testing the S4onj04 gene to determine if an individual has a normal number of hexanucleotide repeats (<25) or an expanded number of hexanucleotide repeats (>60). If that testing identifies normal repeats on both copies of X7khl14, then testing can be expanded to a multi-gene panel to include the other genes associated with ALS and multisystem proteinopathy. We went on to discuss the details, limitations, and possible outcomes of these multi-gene panels. In particular, we discussed that there are three possible results:    Negative: meaning normal or no mutations are identified in the genes that were tested/sequenced    Positive: meaning a mutation that is known to be associated with a particular set of symptoms is identified    Variant of uncertain significance (VUS): meaning a change in the DNA sequence of a particular gene was seen but there is not enough information or data yet to know if it explains the symptoms. If a VUS is identified, testing of other relatives may be helpful to provide clarification.  In most cases, identification of a VUS does not confirm a diagnosis and does not result in any clinically actionable recommendations.    Even with the growing numbers of genes that have been identified, current clinically available genetic testing identifies a causative mutation in less than 40% families affected with familial ALS. Therefore, most of the time we cannot identify the genetic cause in individuals with ALS. If the results of genetic testing are negative, this cannot rule out the possibility that there may be an underlying genetic cause or component to an individual's ALS, as it is likely that the genetic cause of all forms of ALS have yet to be discovered. DNA banking is the process in which  we can store an individual's DNA almost indefinitely to test at a later time when new genetic tests are available. Additionally, the DNA could be used in research with the participant's consent. This can be done for any genetic condition. It can also be done for conditions which are not thought to be genetic or where there isn't a known family history. Saving the sample may allow for testing as advances are made in diagnosis and treatment.  DNA banking is available through many laboratories including a laboratory called [facility]. Additional information is available upon request.    Plan:  1. Sequencing and del/dup analysis of genes related to ALS and multisystem proteinopathy at [facility] pending insurance approval.  2. Return pending results of above testing  3. Contact information was provided should any questions arise in the future.     Kristnia Gallego MS [facility]  Genetic Counselor  Division of Genetics and Metabolism  (p) [phone]  (f) [phone]     Total time spent in consultation with the family was approximately 25 minutes    Cc: No Letter

## 2023-03-06 ENCOUNTER — LAB (OUTPATIENT)
Dept: LAB | Facility: CLINIC | Age: 83
End: 2023-03-06
Payer: COMMERCIAL

## 2023-03-06 ENCOUNTER — OFFICE VISIT (OUTPATIENT)
Dept: NEUROLOGY | Facility: CLINIC | Age: 83
End: 2023-03-06
Attending: PSYCHIATRY & NEUROLOGY
Payer: COMMERCIAL

## 2023-03-06 DIAGNOSIS — M62.50 MUSCULAR ATROPHY, UNSPECIFIED SITE: ICD-10-CM

## 2023-03-06 DIAGNOSIS — R13.10 DYSPHAGIA, UNSPECIFIED TYPE: ICD-10-CM

## 2023-03-06 DIAGNOSIS — Z87.898 HISTORY OF PROGRESSIVE WEAKNESS: ICD-10-CM

## 2023-03-06 DIAGNOSIS — J96.01 ACUTE RESPIRATORY FAILURE WITH HYPOXIA AND HYPERCAPNIA (H): ICD-10-CM

## 2023-03-06 DIAGNOSIS — G12.21 ALS (AMYOTROPHIC LATERAL SCLEROSIS) (H): Primary | ICD-10-CM

## 2023-03-06 DIAGNOSIS — I48.0 PAROXYSMAL ATRIAL FIBRILLATION (H): ICD-10-CM

## 2023-03-06 DIAGNOSIS — J96.02 ACUTE RESPIRATORY FAILURE WITH HYPOXIA AND HYPERCAPNIA (H): ICD-10-CM

## 2023-03-06 DIAGNOSIS — R06.02 SHORTNESS OF BREATH: ICD-10-CM

## 2023-03-06 DIAGNOSIS — R25.3 FASCICULATIONS: ICD-10-CM

## 2023-03-06 LAB
ANION GAP SERPL CALCULATED.3IONS-SCNC: <1 MMOL/L (ref 3–14)
BUN SERPL-MCNC: 15 MG/DL (ref 7–30)
CALCIUM SERPL-MCNC: 9.4 MG/DL (ref 8.5–10.1)
CHLORIDE BLD-SCNC: 107 MMOL/L (ref 94–109)
CK SERPL-CCNC: 75 U/L (ref 30–300)
CO2 SERPL-SCNC: 39 MMOL/L (ref 20–32)
CREAT SERPL-MCNC: 0.69 MG/DL (ref 0.66–1.25)
GFR SERPL CREATININE-BSD FRML MDRD: >90 ML/MIN/1.73M2
GLUCOSE BLD-MCNC: 81 MG/DL (ref 70–99)
HOLD SPECIMEN: NORMAL
HOLD SPECIMEN: NORMAL
POTASSIUM BLD-SCNC: 5.1 MMOL/L (ref 3.4–5.3)
SODIUM SERPL-SCNC: 142 MMOL/L (ref 133–144)
TSH SERPL DL<=0.005 MIU/L-ACNC: 2.39 MU/L (ref 0.4–4)

## 2023-03-06 PROCEDURE — 80048 BASIC METABOLIC PNL TOTAL CA: CPT

## 2023-03-06 PROCEDURE — 95886 MUSC TEST DONE W/N TEST COMP: CPT | Performed by: PSYCHIATRY & NEUROLOGY

## 2023-03-06 PROCEDURE — 95913 NRV CNDJ TEST 13/> STUDIES: CPT | Performed by: PSYCHIATRY & NEUROLOGY

## 2023-03-06 PROCEDURE — 82550 ASSAY OF CK (CPK): CPT

## 2023-03-06 PROCEDURE — 84443 ASSAY THYROID STIM HORMONE: CPT

## 2023-03-06 PROCEDURE — 36415 COLL VENOUS BLD VENIPUNCTURE: CPT

## 2023-03-06 PROCEDURE — 99213 OFFICE O/P EST LOW 20 MIN: CPT | Mod: 25 | Performed by: PSYCHIATRY & NEUROLOGY

## 2023-03-06 PROCEDURE — 86618 LYME DISEASE ANTIBODY: CPT

## 2023-03-06 RX ORDER — RILUZOLE 50 MG/1
50 TABLET, FILM COATED ORAL EVERY 12 HOURS
Qty: 60 TABLET | Refills: 3 | Status: SHIPPED | OUTPATIENT
Start: 2023-03-06 | End: 2023-06-20

## 2023-03-06 NOTE — PROGRESS NOTES
Reviewed results with patient and wife. Recommendations are as follows:    1. [place] with [employer].  2. Begin Riluzole; repeat hepatic panel monthly x 3.   3. Provide patient and wife with benefits investigation forms for Radicava ORS and Relyvrio.  4. Patient to re-schedule UGI with possible dilatation of esophageal stricture.  5. Patient to notify ALS nurse when it is OK for him to stop anticoagulation for lumbar puncture, and will schedule at that time.   6. Lyme titer per family request.  7. Follow up in ALS clinic in 2-3 months.  8. Patient will benefit from pulmonary follow up; nurse to inquire whether they choose their current provider or would like an appointment with our neuromuscular pulmonologist.     Ramin Baez M.D.    25 minutes spent on the date of the encounter on chart review, history and examination, documentation and further activities as noted above, separate from JACQUELIN/NCS procedure time.

## 2023-03-06 NOTE — PROGRESS NOTES
[facility]  Electrodiagnostic Laboratory                 Department of Neurology                                                                                                         Test Date:  3/6/2023    Patient: Carl Vázquez : 1940 Physician: Ramin Baez MD   Sex: Male AGE: 82 year Ref Phys:    ID#: [id number]   Technician: Mac Davis     History and Examination:  Carl Vázquez is an 82 year old diabetic man with four-limb weakness and respiratory failure. He is referred for evaluation of suspected motor neuron disease. A prior electrodiagnostic study demonstrated marked attenuation of compound muscle action potentials but limited findings on needle examination.     Techniques:  Motor conduction studies were done with surface recording electrodes. Sensory conduction studies were performed with surface electrodes, unless indicated otherwise by (n), designating the use of subdermal recording electrodes. Temperature was monitored and recorded throughout the study. EMG was done with a concentric needle electrode. Thoracic paraspinal and bulbar electromyography were deferred because the patient is on an anticoagulant.    Results:  Sural sensory nerve action potentials were absent bilaterally. Left ulnar sensory conduction studies demonstrated mild slowing of conduction. Left median and radial sensory conduction studies were normal. Bilateral fibular and left tibial motor conduction studies demonstrated marked attenuation of amplitude, mild slowing of conduction in the calves, and moderate slowing of right fibular conduction across the fibular head in the context of severe attenuation of amplitude. Bilateral median and ulnar motor conduction studies demonstrated moderate to severe attenuation of amplitude, mild prolongation of bilateral median distal latency, and moderate slowing of conduction. Electromyography demonstrated widespread fibrillation potentials,  fasciculation potentials, and evidence of severe motor unit remodeling as indicated in the table.     Interpretation:  This is an abnormal study, demonstrating electrophysiologic evidence of the followin. Widespread, severe pure motor neurogenic process as can occur in motor neuron disease, severe motor neuropathy, or polyradiculopathy.   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    _____________________________  Ramin Baez MD  Board Certified in Clinical Neurophysiology and Neuromuscular Medicine        Nerve Conduction Studies  Motor Sites      Latency Amplitude Neg. Amp Diff Segment Distance Velocity Neg. Dur Neg Area Diff Temperature Comment   Site (ms) Norm (mV) Norm %  cm m/s Norm ms %  C    Left Dp Branch Fibular (TA) Motor   Fib Head 4.1  < 6.0 0.62 -      9.9  32.2    Pop Fossa 5.9  < 5.7 0.66 - 6.5 Pop Fossa-Fib Head 7.1 39 - 7.8 4.0 32.2    Right Dp Branch Fibular (TA) Motor   Fib Head 4.1  < 6.0 0.22 -      7.3  31    Pop Fossa 6.0  < 5.7 0.23 - 4.5 Pop Fossa-Fib Head 7.9 42 - 7.3 8.8 31.1    Left Fibular (EDB) Motor   Ankle 5.3  < 6.0 0.55  > 2.0  Ankle-EDB 8   6.3  33.6    Bel Fib Head 13.0 - 0.57 - 3.6 Bel Fib Head-Ankle 30.3 39  > 38 6.2 -6.7 33.4    Pop Fossa 14.8 - 0.57 - 0 Pop Fossa-Bel Fib Head 7 39  > 38 6.3 2.2 33.4    Right Fibular (EDB) Motor   Ankle 7.3  < 6.0 0.03  > 2.0  Ankle-EDB 8   6.2  31    Bel Fib Head 15.8 - 0.04 - 33.3 Bel Fib Head-Ankle 30 35  > 38 5.6 71.4 31.2    Pop Fossa 18.9 - 0.03 - -25.0 Pop Fossa-Bel Fib Head 7.9 25  > 38 4.7 -25.0 32    Left Median (APB) Motor   Wrist 5.1  < 4.4 0.44  > 5.0  Wrist-APB 8   6.0  33.4    Elbow 10.6 - 0.38  > 5.0 -13.6 Elbow-Wrist 20.5 37  > 48 5.4 -12.1 33.5    Right Median (APB) Motor   Wrist 5.0  < 4.4 1.08  > 5.0  Wrist-APB 8   4.8  29.6    Elbow 9.9 - 0.65  > 5.0 -39.8 Elbow-Wrist 20.5 42  > 48 6.5 -15.4 30.8    Left Median/Ulnar (Lumb-Dors Int II) Motor        Median (Lumb I)    Wrist 3.8 - 0.14 -  Wrist-Lumb I 10   5.0  33.1         Ulnar (Dorsal Int (manus))   Wrist 4.3 - 0.54 -  Wrist-Dorsal Int (manus) 10   5.4  33.1    Left Tibial (AHB) Motor   Ankle 4.8  < 6.5 0.54  > 5.0  Ankle-AHB 8   5.7  32.3    Knee 15.1 - 0.51 - -5.6 Knee-Ankle 36 35  > 38 6.7 4.8 32    Right Tibial (AHB) Motor   Ankle 6.1  < 6.5 0.40  > 5.0  Ankle-AHB 8   7.0  31.1    Knee 17.1 - 0.30 - -25.0 Knee-Ankle 35.3 32  > 38 6.4 -3.7 31.4    Left Ulnar (ADM) Motor   Wrist 4.0  < 3.5 1.58  > 5.0  Wrist-ADM 8   5.8  33.2    Bel Elbow 8.4 - 1.34 - -15.2 Bel Elbow-Wrist 19.4 44  > 48 5.8 -10.2 33    Abv Elbow 11.1 - 1.33 - -0.75 Abv Elbow-Bel Elbow 12 44  > 48 5.8 -2.3 32.8    Right Ulnar (ADM) Motor   Wrist 3.6  < 3.5 1.76  > 5.0  Wrist-ADM 8   5.8  30.5    Bel Elbow 7.4 - 1.35 - -23.3 Bel Elbow-Wrist 17.5 46  > 48 6.4 -18.2 30.7      F-Wave Sites      Min F-Lat Max-Min F-Lat Mean F-Lat   Site (ms) ms ms   Left Fibular F-Wave   Ankle 63.4 12.0 -   Left Median F-Wave   Wrist NR NR NR   Right Tibial F-Wave   Ankle NR NR NR   Left Ulnar F-Wave   Wrist 34.9 1.40 35.6     Sensory Sites      Onset Lat Peak Lat Amp (O-P) Amp (P-P) Segment Distance Velocity Temperature Comment   Site ms ms  V Norm  V  cm m/s Norm  C    Left Median Sensory   Wrist-Dig II 2.9 3.8 12  > 10 18 Wrist-Dig II 14 48  > 48 32.3    Left Median (Ortho) Sensory   Palm-Wrist 1.50 2.1 11 - 16 Palm-Wrist 8 53 - 32.5    Left Median-Ulnar Palmar Sensory        Median   Palm-Wrist 1.50 2.1 11 - 16 Palm-Wrist 8 53 - 32.5         Ulnar   Palm-Wrist 1.88 2.5 2 - 6 Palm-Wrist 8 43 - 32    Left Radial Sensory   Forearm-Wrist 1.60 2.2 17  > 15 18 Forearm-Wrist 10 63 - 33.3    Left Sural Sensory   Calf-Lat Mall NR NR NR  > 5 NR Calf-Lat Mall 14 NR  > 38 31.8    Right Sural Sensory   Calf-Lat Mall NR NR NR  > 5 NR Calf-Lat Mall 14 NR  > 38 31.3    Left Ulnar Sensory   Wrist-Dig V 3.0 3.9 8  > 8 12 Wrist-Dig V 12.5 42  > 48 32.2    Left Ulnar (Ortho) Sensory   Palm-Wrist  1.88 2.5 2 - 6 Palm-Wrist 8 43 - 32      Inter-Nerve Comparisons     Nerve 1 Value 1 Nerve 2 Value 2 Parameter Result Normal   Sensory Sites   L Median Palm-Wrist 2.1 ms L Ulnar Palm-Wrist 2.5 ms Peak Lat Diff 0.40 ms <0.40       Electromyography     Side Muscle Ins Act Fibs/PSW Fasc HF Amp Dur Poly Recrt Int Pat   Right Pronator Teres Nml 2+ 2+ 0 2+ 2+ 0 ModRed Nml   Right EDC Nml 1+ 2+ 0 1+ 1+ 0 Josef Nml   Right FDI Nml None Nml 0 2+ 2+ 0 SevRed Nml   Right FDP Nml 2+ 2+ 0 2+ 2+ 0 SevRed Nml   Right Biceps Nml 1+ Nml 0 2+ 2+ 0 SevRed Nml   Right Triceps Nml 2+ Nml 0 1+ Nml 2+ SevRed Nml   Right Vastus Lat Decr 1+ 1+ 0 2+ 2+ 0 SevRed Nml   Right Tib Anterior Nml 2+ Nml 0 1+ 1+ 2+ SevRed Nml   Right Gastroc MH Nml 2+ Nml 0 1+ 1+ 2+ Discrete Nml   Right Rectus Fem Decr 2+ Nml 0 1+ 1+ 2+ SevRed Nml   Right TensFascLat Nml 2+ Nml 0 2+ 2+ 0 Discrete Nml         NCS Waveforms:    Motor                                    F-Wave                Sensory

## 2023-03-06 NOTE — LETTER
3/6/2023         RE: Carl Vázquez  [address] [facility] [address]        Dear Colleague,    Thank you for referring your patient, Carl Vázquez, to the [facility] NEUROLOGY [facility] [place]. Please see a copy of my visit note below.                        [facility]  Electrodiagnostic Laboratory                 Department of Neurology                                                                                                         Test Date:  3/6/2023    Patient: Carl Vázquez : 1940 Physician: Ramin Baez MD   Sex: Male AGE: 82 year Ref Phys:    ID#: [id number]   Technician: Mac Davis     History and Examination:  Carl Vázquez is an 82 year old diabetic man with four-limb weakness and respiratory failure. He is referred for evaluation of suspected motor neuron disease. A prior electrodiagnostic study demonstrated marked attenuation of compound muscle action potentials but limited findings on needle examination.     Techniques:  Motor conduction studies were done with surface recording electrodes. Sensory conduction studies were performed with surface electrodes, unless indicated otherwise by (n), designating the use of subdermal recording electrodes. Temperature was monitored and recorded throughout the study. EMG was done with a concentric needle electrode. Thoracic paraspinal and bulbar electromyography were deferred because the patient is on an anticoagulant.    Results:  Sural sensory nerve action potentials were absent bilaterally. Left ulnar sensory conduction studies demonstrated mild slowing of conduction. Left median and radial sensory conduction studies were normal. Bilateral fibular and left tibial motor conduction studies demonstrated marked attenuation of amplitude, mild slowing of conduction in the calves, and moderate slowing of right fibular conduction across the fibular head in the context of severe attenuation of  amplitude. Bilateral median and ulnar motor conduction studies demonstrated moderate to severe attenuation of amplitude, mild prolongation of bilateral median distal latency, and moderate slowing of conduction. Electromyography demonstrated widespread fibrillation potentials, fasciculation potentials, and evidence of severe motor unit remodeling as indicated in the table.     Interpretation:  This is an abnormal study, demonstrating electrophysiologic evidence of the followin. Widespread, severe pure motor neurogenic process as can occur in motor neuron disease, severe motor neuropathy, or polyradiculopathy.   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    _____________________________  Ramin Baez MD  Board Certified in Clinical Neurophysiology and Neuromuscular Medicine        Nerve Conduction Studies  Motor Sites      Latency Amplitude Neg. Amp Diff Segment Distance Velocity Neg. Dur Neg Area Diff Temperature Comment   Site (ms) Norm (mV) Norm %  cm m/s Norm ms %  C    Left Dp Branch Fibular (TA) Motor   Fib Head 4.1  < 6.0 0.62 -      9.9  32.2    Pop Fossa 5.9  < 5.7 0.66 - 6.5 Pop Fossa-Fib Head 7.1 39 - 7.8 4.0 32.2    Right Dp Branch Fibular (TA) Motor   Fib Head 4.1  < 6.0 0.22 -      7.3  31    Pop Fossa 6.0  < 5.7 0.23 - 4.5 Pop Fossa-Fib Head 7.9 42 - 7.3 8.8 31.1    Left Fibular (EDB) Motor   Ankle 5.3  < 6.0 0.55  > 2.0  Ankle-EDB 8   6.3  33.6    Bel Fib Head 13.0 - 0.57 - 3.6 Bel Fib Head-Ankle 30.3 39  > 38 6.2 -6.7 33.4    Pop Fossa 14.8 - 0.57 - 0 Pop Fossa-Bel Fib Head 7 39  > 38 6.3 2.2 33.4    Right Fibular (EDB) Motor   Ankle 7.3  < 6.0 0.03  > 2.0  Ankle-EDB 8   6.2  31    Bel Fib Head 15.8 - 0.04 - 33.3 Bel Fib Head-Ankle 30 35  > 38 5.6 71.4 31.2    Pop Fossa 18.9 - 0.03 - -25.0 Pop Fossa-Bel Fib Head 7.9 25  > 38 4.7 -25.0 32    Left Median (APB) Motor   Wrist 5.1  < 4.4 0.44  > 5.0  Wrist-APB 8   6.0  33.4    Elbow 10.6 - 0.38  > 5.0  -13.6 Elbow-Wrist 20.5 37  > 48 5.4 -12.1 33.5    Right Median (APB) Motor   Wrist 5.0  < 4.4 1.08  > 5.0  Wrist-APB 8   4.8  29.6    Elbow 9.9 - 0.65  > 5.0 -39.8 Elbow-Wrist 20.5 42  > 48 6.5 -15.4 30.8    Left Median/Ulnar (Lumb-Dors Int II) Motor        Median (Lumb I)   Wrist 3.8 - 0.14 -  Wrist-Lumb I 10   5.0  33.1         Ulnar (Dorsal Int (manus))   Wrist 4.3 - 0.54 -  Wrist-Dorsal Int (manus) 10   5.4  33.1    Left Tibial (AHB) Motor   Ankle 4.8  < 6.5 0.54  > 5.0  Ankle-AHB 8   5.7  32.3    Knee 15.1 - 0.51 - -5.6 Knee-Ankle 36 35  > 38 6.7 4.8 32    Right Tibial (AHB) Motor   Ankle 6.1  < 6.5 0.40  > 5.0  Ankle-AHB 8   7.0  31.1    Knee 17.1 - 0.30 - -25.0 Knee-Ankle 35.3 32  > 38 6.4 -3.7 31.4    Left Ulnar (ADM) Motor   Wrist 4.0  < 3.5 1.58  > 5.0  Wrist-ADM 8   5.8  33.2    Bel Elbow 8.4 - 1.34 - -15.2 Bel Elbow-Wrist 19.4 44  > 48 5.8 -10.2 33    Abv Elbow 11.1 - 1.33 - -0.75 Abv Elbow-Bel Elbow 12 44  > 48 5.8 -2.3 32.8    Right Ulnar (ADM) Motor   Wrist 3.6  < 3.5 1.76  > 5.0  Wrist-ADM 8   5.8  30.5    Bel Elbow 7.4 - 1.35 - -23.3 Bel Elbow-Wrist 17.5 46  > 48 6.4 -18.2 30.7      F-Wave Sites      Min F-Lat Max-Min F-Lat Mean F-Lat   Site (ms) ms ms   Left Fibular F-Wave   Ankle 63.4 12.0 -   Left Median F-Wave   Wrist NR NR NR   Right Tibial F-Wave   Ankle NR NR NR   Left Ulnar F-Wave   Wrist 34.9 1.40 35.6     Sensory Sites      Onset Lat Peak Lat Amp (O-P) Amp (P-P) Segment Distance Velocity Temperature Comment   Site ms ms  V Norm  V  cm m/s Norm  C    Left Median Sensory   Wrist-Dig II 2.9 3.8 12  > 10 18 Wrist-Dig II 14 48  > 48 32.3    Left Median (Ortho) Sensory   Palm-Wrist 1.50 2.1 11 - 16 Palm-Wrist 8 53 - 32.5    Left Median-Ulnar Palmar Sensory        Median   Palm-Wrist 1.50 2.1 11 - 16 Palm-Wrist 8 53 - 32.5         Ulnar   Palm-Wrist 1.88 2.5 2 - 6 Palm-Wrist 8 43 - 32    Left Radial Sensory   Forearm-Wrist 1.60 2.2 17  > 15 18 Forearm-Wrist 10 63 - 33.3    Left Sural Sensory    Calf-Lat Mall NR NR NR  > 5 NR Calf-Lat Mall 14 NR  > 38 31.8    Right Sural Sensory   Calf-Lat Mall NR NR NR  > 5 NR Calf-Lat Mall 14 NR  > 38 31.3    Left Ulnar Sensory   Wrist-Dig V 3.0 3.9 8  > 8 12 Wrist-Dig V 12.5 42  > 48 32.2    Left Ulnar (Ortho) Sensory   Palm-Wrist 1.88 2.5 2 - 6 Palm-Wrist 8 43 - 32      Inter-Nerve Comparisons     Nerve 1 Value 1 Nerve 2 Value 2 Parameter Result Normal   Sensory Sites   L Median Palm-Wrist 2.1 ms L Ulnar Palm-Wrist 2.5 ms Peak Lat Diff 0.40 ms <0.40       Electromyography     Side Muscle Ins Act Fibs/PSW Fasc HF Amp Dur Poly Recrt Int Pat   Right Pronator Teres Nml 2+ 2+ 0 2+ 2+ 0 ModRed Nml   Right EDC Nml 1+ 2+ 0 1+ 1+ 0 Josef Nml   Right FDI Nml None Nml 0 2+ 2+ 0 SevRed Nml   Right FDP Nml 2+ 2+ 0 2+ 2+ 0 SevRed Nml   Right Biceps Nml 1+ Nml 0 2+ 2+ 0 SevRed Nml   Right Triceps Nml 2+ Nml 0 1+ Nml 2+ SevRed Nml   Right Vastus Lat Decr 1+ 1+ 0 2+ 2+ 0 SevRed Nml   Right Tib Anterior Nml 2+ Nml 0 1+ 1+ 2+ SevRed Nml   Right Gastroc MH Nml 2+ Nml 0 1+ 1+ 2+ Discrete Nml   Right Rectus Fem Decr 2+ Nml 0 1+ 1+ 2+ SevRed Nml   Right TensFascLat Nml 2+ Nml 0 2+ 2+ 0 Discrete Nml         NCS Waveforms:    Motor                                    F-Wave                Sensory                                     Reviewed results with patient and wife. Recommendations are as follows:    1. [place] with [employer].  2. Begin Riluzole; repeat hepatic panel monthly x 3.   3. Provide patient and wife with benefits investigation forms for Radicava ORS and Relyvrio.  4. Patient to re-schedule UGI with possible dilatation of esophageal stricture.  5. Patient to notify ALS nurse when it is OK for him to stop anticoagulation for lumbar puncture, and will schedule at that time.   6. Lyme titer per family request.  7. Follow up in ALS clinic in 2-3 months.  8. Patient will benefit from pulmonary follow up; nurse to inquire whether they choose their current provider or would  like an appointment with our neuromuscular pulmonologist.     Ramin Baez M.D.    25 minutes spent on the date of the encounter on chart review, history and examination, documentation and further activities as noted above, separate from JACQUELIN/NCS procedure time.      Again, thank you for allowing me to participate in the care of your patient.        Sincerely,        Ramin Baez MD

## 2023-03-07 ENCOUNTER — HOSPITAL ENCOUNTER (OUTPATIENT)
Dept: CARDIOLOGY | Facility: CLINIC | Age: 83
Discharge: HOME | End: 2023-03-07
Attending: INTERNAL MEDICINE
Payer: COMMERCIAL

## 2023-03-07 ENCOUNTER — OFFICE VISIT (OUTPATIENT)
Dept: CARDIOLOGY | Facility: CLINIC | Age: 83
End: 2023-03-07
Payer: COMMERCIAL

## 2023-03-07 VITALS
SYSTOLIC BLOOD PRESSURE: 139 MMHG | BODY MASS INDEX: 22.91 KG/M2 | OXYGEN SATURATION: 90 % | DIASTOLIC BLOOD PRESSURE: 63 MMHG | WEIGHT: 146 LBS | HEIGHT: 67 IN | HEART RATE: 62 BPM

## 2023-03-07 DIAGNOSIS — J96.12 CHRONIC RESPIRATORY FAILURE WITH HYPOXIA AND HYPERCAPNIA (H): ICD-10-CM

## 2023-03-07 DIAGNOSIS — R79.89 ELEVATED TROPONIN: ICD-10-CM

## 2023-03-07 DIAGNOSIS — I25.10 CORONARY ARTERY DISEASE INVOLVING NATIVE CORONARY ARTERY OF NATIVE HEART WITHOUT ANGINA PECTORIS: ICD-10-CM

## 2023-03-07 DIAGNOSIS — I50.23 ACUTE ON CHRONIC SYSTOLIC CHF (CONGESTIVE HEART FAILURE) (H): ICD-10-CM

## 2023-03-07 DIAGNOSIS — I48.0 PAROXYSMAL ATRIAL FIBRILLATION (H): ICD-10-CM

## 2023-03-07 DIAGNOSIS — I50.9 ACUTE ON CHRONIC CONGESTIVE HEART FAILURE, UNSPECIFIED HEART FAILURE TYPE (H): Primary | ICD-10-CM

## 2023-03-07 DIAGNOSIS — J96.11 CHRONIC RESPIRATORY FAILURE WITH HYPOXIA AND HYPERCAPNIA (H): ICD-10-CM

## 2023-03-07 LAB
B BURGDOR IGG+IGM SER QL: 0.11
METHYLMALONATE SERPL-SCNC: 0.31 UMOL/L (ref 0–0.4)

## 2023-03-07 PROCEDURE — 93244 EXT ECG>48HR<7D REV&INTERPJ: CPT | Performed by: INTERNAL MEDICINE

## 2023-03-07 PROCEDURE — 93242 EXT ECG>48HR<7D RECORDING: CPT

## 2023-03-07 PROCEDURE — 99215 OFFICE O/P EST HI 40 MIN: CPT | Performed by: INTERNAL MEDICINE

## 2023-03-07 RX ORDER — NEBIVOLOL 2.5 MG/1
2.5 TABLET ORAL 2 TIMES DAILY
Qty: 180 TABLET | Refills: 1 | Status: SHIPPED | OUTPATIENT
Start: 2023-03-07

## 2023-03-07 RX ORDER — AMIODARONE HYDROCHLORIDE 200 MG/1
200 TABLET ORAL DAILY
Qty: 90 TABLET | Refills: 1 | Status: SHIPPED | OUTPATIENT
Start: 2023-03-07 | End: 2023-03-20

## 2023-03-07 RX ORDER — LISINOPRIL 2.5 MG/1
2.5 TABLET ORAL DAILY
Qty: 90 TABLET | Refills: 1 | Status: SHIPPED | OUTPATIENT
Start: 2023-03-07 | End: 2023-03-20

## 2023-03-07 RX ORDER — CLOPIDOGREL BISULFATE 75 MG/1
75 TABLET ORAL DAILY
Qty: 90 TABLET | Refills: 3 | Status: ON HOLD | OUTPATIENT
Start: 2023-03-07 | End: 2023-05-08

## 2023-03-07 RX ORDER — BUMETANIDE 2 MG/1
2 TABLET ORAL 2 TIMES DAILY
Qty: 60 TABLET | Refills: 1 | Status: SHIPPED | OUTPATIENT
Start: 2023-03-07 | End: 2023-03-15

## 2023-03-07 RX ORDER — ROSUVASTATIN CALCIUM 20 MG/1
20 TABLET, COATED ORAL DAILY
Qty: 90 TABLET | Refills: 3 | Status: SHIPPED | OUTPATIENT
Start: 2023-03-07 | End: 2023-03-20

## 2023-03-07 NOTE — LETTER
3/7/2023    Kyler Mcwilliams MD  [facility] [address] [facility] [address]    RE: Carl Vázquez       Dear Colleague,     I had the pleasure of seeing Carl Vázquez in the [facility].  Cardiology Clinic Progress Note  Carl Vázquez MRN# [id number]   YOB: 1940 Age: 82 year old   Primary Cardiologist: Dr. Rehman saw in the hospital Reason for visit: CORE enrollment            Assessment and Plan:   Carl Vázquez is a very pleasant 82 year old male who is here today for CORE enrollment and post hospital follow up.      Biventricular heart failure  - LVEF: 25-30% 2/2023  - NYHA class III, stage C  - Etiology: mixed ischemic and nonischemic (tachy mediated)   - Fluid status: volume up- 146# on home scale today; suspected dry weight: 140# on home scale    *diuretics not resumed during hospitalization after RHC indicated hypovolemia  - Diuretic regimen: START Bumex 2 mg BID. Prior to recent hospitalization, patient was on torsemide. Did not respond despite dose escalation to 40 mg BID. He responded well to high dose Bumex in the hospital.   - Ischemic evaluation: last cor angio 2/2023  - Guideline directed medical therapy:  - Beta blocker: nebivolol 2.5 mg BID  - ACEI/ARB/ARNI: lisinopril 2.5 mg daily  - Aldactone antagonist: none; will not start now given borderline high potassium today   - SGLT2 inhibitor: consider in the future; Rx price check pending  -Counseled patient on fluid and sodium restriction  Chronic hypoxic and hypercapnic respiratory failure  -multifactorial; untreated CALE, COPD, possible neuromuscular disease; acute CHF, right hemidiaphragm paralysis  -BiPAP delivered Friday; significant improvement in respiratory status since   Coronary artery disease   -s/p recent atherectomy and JESSE x 4 to LAD on 2/21/2023  -tried short term triple therapy but patient developed epistaxis and hematuria; ASA stopped 2/23  -on Plavix 75 mg daily and  Xarelto 20 mg daily    -tolerating well   -on nebivolol 2.5 mg BID, lisinopril 2.5 mg daily, rosuvastatin 20 mg daily   Paroxysmal atrial fibrillation  -prior to admission 2/2023, pt was on rate control regimen with diltiazem 360 mg once daily   -diltiazem was stopped and patient was loaded with IV amiodarone after new CM identified   -remains on amiodarone 200 mg daily, nebivolol 2.5 mg BID for rate control should patient have breakthrough PAF  -asymptomatic to arrhythmia; patient unsure if he is having breakthrough  Diabetes mellitus type 2, uncontrolled  -Hgb A1c 8.2%  Hypertension  -on lisinopril 2.5 mg daily, nebivolol 2.5 mg BID  -BP today 139/63  Hyperlipidemia   -on rosuvastatin 20 mg once daily  Hypothyroidism  -on levothyroxine   CALE  -BiPAP delivered Friday  -uses nightly   ALS  -diagnosed yesterday by [facility]  Right hemidiaphragm paralysis     Changes today: START Bumex. Take 4 mg once today. Starting tomorrow, take 2 mg BID.     Follow up plan:  1. RN phone call on Thursday 3/9/2023 to check on symptoms/weight response to Bumex.   2. Ongoing close follow up needed to prevent HF readmission. Scheduled for CORE follow up + labs 3/15/2023 with Katelyn Dominguez PA-C. Will likely need to be seen the following week as well.  3. Given patient is asymptomatic to his arrhythmia, will have patient complete 7 day Ziopatch monitor to evaluate for breakthrough atrial fibrillation/ventricular rates.   -ongoing AFIB suppression is important given his cardiomyopathy is in part tachy-mediated.   4. Will plan for repeat outpatient echocardiogram in early June 2023. Hopeful to see improvement in LV function with the combination of recent revascularization and suppression/rate control of his atrial fibrillation.    If patient develops significant shortness of breath prior to his RN phone call follow up/apt with CORE [facility], he has been instructed to present to the ED for evaluation.    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     Patient presented to [facility] ED on 2/17/2023 after his wife noted his oxygen saturation to be ~70%. He was admitted with acute on chronic hypoxic and hypercapnic respiratory failure. He was also found to be in persistent atrial fibrillation with poorly controlled ventricular rates. Diltiazem gtt was started initially which was transitioned to amiodarone gtt after echocardiogram revealed a depressed EF of 25-30%. Cardiology was consulted due to concern for STEMI on ECG while the patient was in atrial flutter with rates in the 140s. He was without chest pain and subsequent ECGs in SR had no significant ST elevations. Patient's primary team discussed with the on call interventionalist and ultimately decision was made not to pursue emergent coronary angiography after reviewing his serial ECGs and the fact that his troponins remained flat. NT proBNP was elevated at 3084 on admission. Patient was subsequently started on Bumex gtt at 1 mg/hr. This was stopped 2/20 as patient appeared euvolemic. A right and left heart catheterization was completed on 2/21/2023 as patient's respiratory status had been optimized. Right heart cath revealed an RA pressure of 4 and a wedge pressure of 4, indicating mild hypovolemia. Left heart cath revealed severe diffuse LAD disease treated with atherectomy and JESSE x 4.  Patient was treated with triple therapy for a short time but developed epistaxis and hematuria. Thus, his ASA was discontinued and he was advised to take Xarelto 20 mg daily and Plavix 75 mg daily. He was discharged in stable condition.     Patient is here today for CORE enrollement/post hospital follow up. His wife is with him in clinic today and provides most of the history. She reports that patient was diagnosed with ALS yesterday. Patient reports feeling okay since hospital discharge. He did well for several days, and then began experiencing weight gain and shortness of breath. His BiPAP machine was delivered on Friday which has helped tremendously. However, he is still not where he was at the time of hospital discharge. His weight has increased 6 pounds on his home scale. He also complains of edema in his feet. Denies orthopnea, PND, chest pain, palpitations, lightheadedness, dizziness, near syncope and syncope. He is taking all of his medications as prescribed without apparent side effects. Tolerating Xarelto and Plavix without bleeding concerns. No recurrent epistaxis or hematuria.     Labs completed yesterday show stable renal function and electrolytes. Potassium is borderline high at 5.1. Carbon dioxide is elevated - chronic for patient. Blood pressure 139/63 and HR 62 in clinic today.    Patient's wife cooks all of his meals and watches his sodium intake. Denies alcohol or tobacco use. He is not exercising right now. Hopeful to start cardiac rehab in the near future.       Social History      Social History     Socioeconomic History     Marital status:      Spouse name: Not on file     Number of children: Not on file     Years of education: Not on file     Highest education level: Not on file   Occupational History     Not on file   Tobacco Use     Smoking status: Never     Smokeless tobacco: Never   Substance and Sexual Activity     Alcohol use: No     Comment: drank many years ago     Drug use: No      Sexual activity: Not on file   Other Topics Concern     Parent/sibling w/ CABG, MI or angioplasty before 65F 55M? Not Asked   Social History Narrative     Not on file     Social Determinants of Health     Financial Resource Strain: Not on file   Food Insecurity: Not on file   Transportation Needs: Not on file   Physical Activity: Not on file   Stress: Not on file   Social Connections: Not on file   Intimate Partner Violence: Not on file   Housing Stability: Not on file            Review of Systems:   Please see HPI         Physical Exam:   Vitals: There were no vitals taken for this visit.   Wt Readings from Last 4 Encounters:   03/02/23 68 kg (150 lb)   03/01/23 68.3 kg (150 lb 9.6 oz)   02/23/23 65.7 kg (144 lb 12.8 oz)   01/07/23 69.1 kg (152 lb 4.8 oz)     GEN: well nourished, in no acute distress.  HEENT:  Pupils equal, round. Sclerae nonicteric. There appears to be a small collection of fluid below the patient's right eye.   NECK: Supple, no masses appreciated. No JVD with patient at 90 degrees.  C/V:  Regular rate and rhythm, no murmur, rub or gallop.  RESP: Respirations are unlabored. Clear to auscultation bilaterally without wheezing, rales, or rhonchi.  GI: Abdomen soft, nontender.  EXTREM: 2+ edema to bilateral feet. No edema elsewhere.   NEURO: Alert and oriented, cooperative.  SKIN: Warm and dry.        Data:   LIPID RESULTS:  No results found for: CHOL, HDL, LDL, TRIG, CHOLHDLRATIO  LIVER ENZYME RESULTS:  Lab Results   Component Value Date    AST 20 03/02/2023    AST 11 09/25/2019    ALT 16 03/02/2023    ALT 23 09/25/2019     CBC RESULTS:  Lab Results   Component Value Date    WBC 6.7 02/23/2023    WBC 8.0 09/26/2019    RBC 5.20 02/23/2023    RBC 4.70 09/26/2019    HGB 14.6 02/23/2023    HGB 14.5 09/26/2019    HCT 49.2 02/23/2023    HCT 41.7 09/26/2019    MCV 95 02/23/2023    MCV 89 09/26/2019    MCH 28.8 02/23/2023    MCH 30.9 09/26/2019    MCHC 30.5 (L) 02/23/2023    [facility] 34.8 09/26/2019    RDW  15.9 (H) 02/23/2023    RDW 13.7 09/26/2019     (L) 02/23/2023     (L) 09/26/2019     BMP RESULTS:  Lab Results   Component Value Date     03/06/2023     09/26/2019    POTASSIUM 5.1 03/06/2023    POTASSIUM 3.4 09/26/2019    CHLORIDE 107 03/06/2023    CHLORIDE 107 09/26/2019    CO2 39 (H) 03/06/2023    CO2 28 09/26/2019    ANIONGAP <1 (L) 03/06/2023    ANIONGAP 4 09/26/2019    GLC 81 03/06/2023     (H) 09/26/2019    BUN 15 03/06/2023    BUN 13 09/26/2019    CR 0.69 03/06/2023    CR 0.76 09/26/2019    GFRESTIMATED >90 03/06/2023    GFRESTIMATED 86 09/26/2019    GFRESTBLACK >90 09/26/2019    TATE 9.4 03/06/2023    TATE 8.4 (L) 09/26/2019      A1C RESULTS:  Lab Results   Component Value Date    A1C 8.2 (H) 01/04/2023     INR RESULTS:  No results found for: INR         Medications     Current Outpatient Medications   Medication Sig Dispense Refill     amiodarone (PACERONE) 200 MG tablet Take 1 tablet (200 mg) by mouth 2 times daily for 5 days, THEN 1 tablet (200 mg) daily for 30 days. 40 tablet 0     clopidogrel (PLAVIX) 75 MG tablet Take 1 tablet (75 mg) by mouth daily for 30 days 30 tablet 0     glipiZIDE (GLUCOTROL) 5 MG tablet Take 10 mg by mouth every morning (before breakfast)       insulin glargine (LANTUS PEN) 100 UNIT/ML pen Inject 14 Units Subcutaneous At Bedtime 15 mL      levothyroxine (SYNTHROID/LEVOTHROID) 75 MCG tablet Take 75 mcg by mouth daily       lisinopril (ZESTRIL) 2.5 MG tablet Take 1 tablet (2.5 mg) by mouth daily for 30 days 30 tablet 0     magnesium oxide (MAG-OX) 400 MG tablet Take 1 tablet (400 mg) by mouth daily for 30 days (Patient taking differently: Take 100 mg by mouth daily) 30 tablet 0     nebivolol (BYSTOLIC) 2.5 MG tablet Take 1 tablet (2.5 mg) by mouth 2 times daily for 30 days 60 tablet 0     nitroGLYcerin (NITROSTAT) 0.4 MG sublingual tablet For chest pain place 1 tablet under the tongue every 5 minutes for 3 doses. If symptoms persist 5 minutes after  1st dose call 911. 15 tablet 0     oxyCODONE (ROXICODONE) 5 MG tablet Take 5 mg by mouth every 4 hours as needed for pain       pantoprazole (PROTONIX) 40 MG EC tablet Take 1 tablet (40 mg) by mouth 2 times daily for 30 days 60 tablet 0     polyethylene glycol (MIRALAX) 17 GM/Dose powder Take 17 g by mouth daily 510 g 0     potassium chloride ER (KLOR-CON M) 10 MEQ CR tablet Take 1 tablet (10 mEq) by mouth 2 times daily       riluzole (RILUTEK) 50 MG tablet Take 1 tablet (50 mg) by mouth every 12 hours 60 tablet 3     rivaroxaban ANTICOAGULANT (XARELTO) 20 MG TABS tablet Take 1 tablet (20 mg) by mouth daily (with dinner) 30 tablet 5     rosuvastatin (CRESTOR) 20 MG tablet Take 1 tablet (20 mg) by mouth daily for 30 days 30 tablet 0          Past Medical History     Past Medical History:   Diagnosis Date     Diabetes (H)      Sleep apnea     uses CPAP machine     Thyroid disease      Past Surgical History:   Procedure Laterality Date     CHOLECYSTECTOMY      at [facility]     COLONOSCOPY      in [place], [place]     COLONOSCOPY  08/22/2017    Dr. Peres [facility]     CV CORONARY ANGIOGRAM N/A 2/21/2023    Procedure: Coronary Angiogram;  Surgeon: Andrey Watts MD;  Location: [facility] CARDIAC CATH LAB     CV CORONARY LITHOTRIPSY PCI N/A 2/21/2023    Procedure: Percutaneous Coronary Intervention - Lithotripsy;  Surgeon: Andrey Watts MD;  Location: [facility] CARDIAC CATH LAB     CV INTRAVASULAR ULTRASOUND N/A 2/21/2023    Procedure: Intravascular Ultrasound;  Surgeon: Andrey Watts MD;  Location: [facility] CARDIAC CATH LAB     CV PCI N/A 2/21/2023    Procedure: Percutaneous Coronary Intervention;  Surgeon: Andrey Watts MD;  Location: [facility] CARDIAC CATH LAB     CV PCI ATHERECTOMY ORBITAL N/A 2/21/2023    Procedure: Percutaneous Coronary Intervention - Atherectomy Rotational;  Surgeon: Andrey Watts MD;  Location: [facility] CARDIAC CATH LAB     CV RIGHT HEART CATH MEASUREMENTS  RECORDED N/A 2/21/2023    Procedure: Right Heart Catheterization;  Surgeon: Andrey Watts MD;  Location:  HEART CARDIAC CATH LAB     ESOPHAGOSCOPY, GASTROSCOPY, DUODENOSCOPY (EGD), COMBINED N/A 6/7/2016    Procedure: COMBINED ESOPHAGOSCOPY, GASTROSCOPY, DUODENOSCOPY (EGD);  Surgeon: Eneida Denton MD;  Location:  GI     Family History   Problem Relation Age of Onset     Colon Cancer No family hx of             Allergies   Metoprolol and Statin drugs [hmg-coa-r inhibitors]    60 minutes spent on the date of the encounter doing chart review, history and exam, documentation and further activities as noted above    AUDREY Carpio [facility]  Pager: [phone]    Thank you for allowing me to participate in the care of your patient.      Sincerely,     AUDREY Carpio [facility]  cc:   Mirna Rodriges PA-C  [address] [place] [address]

## 2023-03-07 NOTE — PATIENT INSTRUCTIONS
Today you had a visit in the CORE clinic. CORE stands for Cardiomyopathy Optimization Rehabilitation Education. The CORE clinic will teach and help you to manage your heart failure and keep you out of the hospital. Call the CORE nurse for any questions or concerns at [phone]      Plan:  1. Medication changes:     START taking Bumex. Today, take 2 tablets (4 mg total) one time. Starting tomorrow, take 1 tablet (2 mg) twice daily.     2. Follow up:     RN will call you on Thursday to check in.   Follow up with CATARINA + labs in 1 week.     3. Continue low sodium diet (less than 2000 mg daily). If you eat less salt, you will retain less fluid.    4. Continue to weigh yourself daily. Call the CORE nurse if you develop signs concerning for fluid retention, including weight gain of over 3 pounds in 1 night or over 5 pounds in 1 week, increasing shortness of breath, or increasing swelling in the legs or abdomen.    It was great seeing you today!    Mirna Rodriges PA-C  Physician Assistant  [facility]

## 2023-03-07 NOTE — PROGRESS NOTES
Cardiology Clinic Progress Note  Carl Vázquez MRN# [id number]   YOB: 1940 Age: 82 year old   Primary Cardiologist: Dr. Rehman saw in the hospital Reason for visit: CORE enrollment            Assessment and Plan:   Carl Vázquez is a very pleasant 82 year old male who is here today for CORE enrollment and post hospital follow up.      Biventricular heart failure  - LVEF: 25-30% 2/2023  - NYHA class III, stage C  - Etiology: mixed ischemic and nonischemic (tachy mediated)   - Fluid status: volume up- 146# on home scale today; suspected dry weight: 140# on home scale    *diuretics not resumed during hospitalization after RHC indicated hypovolemia  - Diuretic regimen: START Bumex 2 mg BID. Prior to recent hospitalization, patient was on torsemide. Did not respond despite dose escalation to 40 mg BID. He responded well to high dose Bumex in the hospital.   - Ischemic evaluation: last cor angio 2/2023  - Guideline directed medical therapy:  - Beta blocker: nebivolol 2.5 mg BID  - ACEI/ARB/ARNI: lisinopril 2.5 mg daily  - Aldactone antagonist: none; will not start now given borderline high potassium today   - SGLT2 inhibitor: consider in the future; Rx price check pending  -Counseled patient on fluid and sodium restriction  Chronic hypoxic and hypercapnic respiratory failure  -multifactorial; untreated CALE, COPD, possible neuromuscular disease; acute CHF, right hemidiaphragm paralysis  -BiPAP delivered Friday; significant improvement in respiratory status since   Coronary artery disease   -s/p recent atherectomy and JESSE x 4 to LAD on 2/21/2023  -tried short term triple therapy but patient developed epistaxis and hematuria; ASA stopped 2/23  -on Plavix 75 mg daily and Xarelto 20 mg daily    -tolerating well   -on nebivolol 2.5 mg BID, lisinopril 2.5 mg daily, rosuvastatin 20 mg daily   Paroxysmal atrial fibrillation  -prior to admission 2/2023, pt was on rate control regimen with diltiazem 360  mg once daily   -diltiazem was stopped and patient was loaded with IV amiodarone after new CM identified   -remains on amiodarone 200 mg daily, nebivolol 2.5 mg BID for rate control should patient have breakthrough PAF  -asymptomatic to arrhythmia; patient unsure if he is having breakthrough  Diabetes mellitus type 2, uncontrolled  -Hgb A1c 8.2%  Hypertension  -on lisinopril 2.5 mg daily, nebivolol 2.5 mg BID  -BP today 139/63  Hyperlipidemia   -on rosuvastatin 20 mg once daily  Hypothyroidism  -on levothyroxine   CALE  -BiPAP delivered Friday  -uses nightly   ALS  -diagnosed yesterday by [facility]  Right hemidiaphragm paralysis     Changes today: START Bumex. Take 4 mg once today. Starting tomorrow, take 2 mg BID.     Follow up plan:  1. RN phone call on Thursday 3/9/2023 to check on symptoms/weight response to Bumex.   2. Ongoing close follow up needed to prevent HF readmission. Scheduled for CORE follow up + labs 3/15/2023 with Katelyn Dominguez PA-C. Will likely need to be seen the following week as well.  3. Given patient is asymptomatic to his arrhythmia, will have patient complete 7 day Ziopatch monitor to evaluate for breakthrough atrial fibrillation/ventricular rates.   -ongoing AFIB suppression is important given his cardiomyopathy is in part tachy-mediated.   4. Will plan for repeat outpatient echocardiogram in early June 2023. Hopeful to see improvement in LV function with the combination of recent revascularization and suppression/rate control of his atrial fibrillation.    If patient develops significant shortness of breath prior to his RN phone call follow up/apt with CORE CATARINA, he has been instructed to present to the ED for evaluation.    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     Patient presented to [facility] ED on 2/17/2023 after his wife noted his oxygen saturation to be ~70%. He was admitted with acute on chronic hypoxic and hypercapnic respiratory failure. He was also found to be in persistent atrial fibrillation with poorly controlled ventricular rates. Diltiazem gtt was started initially which was transitioned to amiodarone gtt after echocardiogram revealed a depressed EF of 25-30%. Cardiology was consulted due to concern for STEMI on ECG while the patient was in atrial flutter with rates in the 140s. He was without chest pain and subsequent ECGs in SR had no significant ST elevations. Patient's primary team discussed with the on call interventionalist and ultimately decision was made not to pursue emergent coronary angiography after reviewing his serial ECGs and the fact that his troponins remained flat. NT proBNP was elevated at 3084 on admission. Patient was subsequently started on Bumex gtt at 1 mg/hr. This was stopped 2/20 as patient appeared euvolemic. A right and left heart catheterization was completed on 2/21/2023 as patient's respiratory status had been optimized. Right heart cath revealed an RA pressure of 4 and a wedge pressure of 4, indicating mild hypovolemia. Left heart cath revealed severe diffuse LAD disease treated with atherectomy and JESSE x 4. Patient was treated with triple therapy for a short time but developed epistaxis and hematuria. Thus, his ASA was discontinued and he was advised to take Xarelto 20 mg daily and Plavix 75 mg daily. He was discharged in stable  condition.     Patient is here today for CORE enrollement/post hospital follow up. His wife is with him in clinic today and provides most of the history. She reports that patient was diagnosed with ALS yesterday. Patient reports feeling okay since hospital discharge. He did well for several days, and then began experiencing weight gain and shortness of breath. His BiPAP machine was delivered on Friday which has helped tremendously. However, he is still not where he was at the time of hospital discharge. His weight has increased 6 pounds on his home scale. He also complains of edema in his feet. Denies orthopnea, PND, chest pain, palpitations, lightheadedness, dizziness, near syncope and syncope. He is taking all of his medications as prescribed without apparent side effects. Tolerating Xarelto and Plavix without bleeding concerns. No recurrent epistaxis or hematuria.     Labs completed yesterday show stable renal function and electrolytes. Potassium is borderline high at 5.1. Carbon dioxide is elevated - chronic for patient. Blood pressure 139/63 and HR 62 in clinic today.    Patient's wife cooks all of his meals and watches his sodium intake. Denies alcohol or tobacco use. He is not exercising right now. Hopeful to start cardiac rehab in the near future.       Social History      Social History     Socioeconomic History     Marital status:      Spouse name: Not on file     Number of children: Not on file     Years of education: Not on file     Highest education level: Not on file   Occupational History     Not on file   Tobacco Use     Smoking status: Never     Smokeless tobacco: Never   Substance and Sexual Activity     Alcohol use: No     Comment: drank many years ago     Drug use: No     Sexual activity: Not on file   Other Topics Concern     Parent/sibling w/ CABG, MI or angioplasty before 65F 55M? Not Asked   Social History Narrative     Not on file     Social Determinants of Health     Financial  Resource Strain: Not on file   Food Insecurity: Not on file   Transportation Needs: Not on file   Physical Activity: Not on file   Stress: Not on file   Social Connections: Not on file   Intimate Partner Violence: Not on file   Housing Stability: Not on file            Review of Systems:   Please see HPI         Physical Exam:   Vitals: There were no vitals taken for this visit.   Wt Readings from Last 4 Encounters:   03/02/23 68 kg (150 lb)   03/01/23 68.3 kg (150 lb 9.6 oz)   02/23/23 65.7 kg (144 lb 12.8 oz)   01/07/23 69.1 kg (152 lb 4.8 oz)     GEN: well nourished, in no acute distress.  HEENT:  Pupils equal, round. Sclerae nonicteric. There appears to be a small collection of fluid below the patient's right eye.   NECK: Supple, no masses appreciated. No JVD with patient at 90 degrees.  C/V:  Regular rate and rhythm, no murmur, rub or gallop.  RESP: Respirations are unlabored. Clear to auscultation bilaterally without wheezing, rales, or rhonchi.  GI: Abdomen soft, nontender.  EXTREM: 2+ edema to bilateral feet. No edema elsewhere.   NEURO: Alert and oriented, cooperative.  SKIN: Warm and dry.        Data:   LIPID RESULTS:  No results found for: CHOL, HDL, LDL, TRIG, CHOLHDLRATIO  LIVER ENZYME RESULTS:  Lab Results   Component Value Date    AST 20 03/02/2023    AST 11 09/25/2019    ALT 16 03/02/2023    ALT 23 09/25/2019     CBC RESULTS:  Lab Results   Component Value Date    WBC 6.7 02/23/2023    WBC 8.0 09/26/2019    RBC 5.20 02/23/2023    RBC 4.70 09/26/2019    HGB 14.6 02/23/2023    HGB 14.5 09/26/2019    HCT 49.2 02/23/2023    HCT 41.7 09/26/2019    MCV 95 02/23/2023    MCV 89 09/26/2019    MCH 28.8 02/23/2023    MCH 30.9 09/26/2019    MCHC 30.5 (L) 02/23/2023    MCHC 34.8 09/26/2019    RDW 15.9 (H) 02/23/2023    RDW 13.7 09/26/2019     (L) 02/23/2023     (L) 09/26/2019     BMP RESULTS:  Lab Results   Component Value Date     03/06/2023     09/26/2019    POTASSIUM 5.1 03/06/2023     POTASSIUM 3.4 09/26/2019    CHLORIDE 107 03/06/2023    CHLORIDE 107 09/26/2019    CO2 39 (H) 03/06/2023    CO2 28 09/26/2019    ANIONGAP <1 (L) 03/06/2023    ANIONGAP 4 09/26/2019    GLC 81 03/06/2023     (H) 09/26/2019    BUN 15 03/06/2023    BUN 13 09/26/2019    CR 0.69 03/06/2023    CR 0.76 09/26/2019    GFRESTIMATED >90 03/06/2023    GFRESTIMATED 86 09/26/2019    GFRESTBLACK >90 09/26/2019    TATE 9.4 03/06/2023    TATE 8.4 (L) 09/26/2019      A1C RESULTS:  Lab Results   Component Value Date    A1C 8.2 (H) 01/04/2023     INR RESULTS:  No results found for: INR         Medications     Current Outpatient Medications   Medication Sig Dispense Refill     amiodarone (PACERONE) 200 MG tablet Take 1 tablet (200 mg) by mouth 2 times daily for 5 days, THEN 1 tablet (200 mg) daily for 30 days. 40 tablet 0     clopidogrel (PLAVIX) 75 MG tablet Take 1 tablet (75 mg) by mouth daily for 30 days 30 tablet 0     glipiZIDE (GLUCOTROL) 5 MG tablet Take 10 mg by mouth every morning (before breakfast)       insulin glargine (LANTUS PEN) 100 UNIT/ML pen Inject 14 Units Subcutaneous At Bedtime 15 mL      levothyroxine (SYNTHROID/LEVOTHROID) 75 MCG tablet Take 75 mcg by mouth daily       lisinopril (ZESTRIL) 2.5 MG tablet Take 1 tablet (2.5 mg) by mouth daily for 30 days 30 tablet 0     magnesium oxide (MAG-OX) 400 MG tablet Take 1 tablet (400 mg) by mouth daily for 30 days (Patient taking differently: Take 100 mg by mouth daily) 30 tablet 0     nebivolol (BYSTOLIC) 2.5 MG tablet Take 1 tablet (2.5 mg) by mouth 2 times daily for 30 days 60 tablet 0     nitroGLYcerin (NITROSTAT) 0.4 MG sublingual tablet For chest pain place 1 tablet under the tongue every 5 minutes for 3 doses. If symptoms persist 5 minutes after 1st dose call 911. 15 tablet 0     oxyCODONE (ROXICODONE) 5 MG tablet Take 5 mg by mouth every 4 hours as needed for pain       pantoprazole (PROTONIX) 40 MG EC tablet Take 1 tablet (40 mg) by mouth 2 times daily for 30  days 60 tablet 0     polyethylene glycol (MIRALAX) 17 GM/Dose powder Take 17 g by mouth daily 510 g 0     potassium chloride ER (KLOR-CON M) 10 MEQ CR tablet Take 1 tablet (10 mEq) by mouth 2 times daily       riluzole (RILUTEK) 50 MG tablet Take 1 tablet (50 mg) by mouth every 12 hours 60 tablet 3     rivaroxaban ANTICOAGULANT (XARELTO) 20 MG TABS tablet Take 1 tablet (20 mg) by mouth daily (with dinner) 30 tablet 5     rosuvastatin (CRESTOR) 20 MG tablet Take 1 tablet (20 mg) by mouth daily for 30 days 30 tablet 0          Past Medical History     Past Medical History:   Diagnosis Date     Diabetes (H)      Sleep apnea     uses CPAP machine     Thyroid disease      Past Surgical History:   Procedure Laterality Date     CHOLE[facility]STECTOMY      at [facility]     COLONOSCOPY      in [place], [place]     COLONOSCOPY  08/22/2017    Dr. Pe[facility]     CV CORONARY ANGIOGRAM N/A 2/21/2023    Procedure: Coronary Angiogram;  Surgeon: Andrey Watts MD;  Location: [facility] CARDIAC CATH LAB     CV CORONARY LITHOTRIPSY PCI N/A 2/21/2023    Procedure: Percutaneous Coronary Intervention - Lithotripsy;  Surgeon: Andrey Watts MD;  Location: [facility] CARDIAC CATH LAB     CV INTRAVASULAR ULTRASOUND N/A 2/21/2023    Procedure: Intravascular Ultrasound;  Surgeon: Andrey Watts MD;  Location: [facility] CARDIAC CATH LAB     CV PCI N/A 2/21/2023    Procedure: Percutaneous Coronary Intervention;  Surgeon: Andrey Watts MD;  Location: [facility] CARDIAC CATH LAB     CV PCI ATHERECTOMY ORBITAL N/A 2/21/2023    Procedure: Percutaneous Coronary Intervention - Atherectomy Rotational;  Surgeon: Andrey Watts MD;  Location: [facility] CARDIAC CATH LAB     CV RIGHT HEART CATH MEASUREMENTS RECORDED N/A 2/21/2023    Procedure: Right Heart Catheterization;  Surgeon: Andrey Watts MD;  Location: [facility] CARDIAC CATH LAB     ESOPHAGOSCOPY, GASTROSCOPY, DUODENOSCOPY (EGD), COMBINED N/A 6/7/2016     Procedure: COMBINED ESOPHAGOSCOPY, GASTROSCOPY, DUODENOSCOPY (EGD);  Surgeon: Eneida Denton MD;  Location:  GI     Family History   Problem Relation Age of Onset     Colon Cancer No family hx of             Allergies   Metoprolol and Statin drugs [hmg-coa-r inhibitors]    60 minutes spent on the date of the encounter doing chart review, history and exam, documentation and further activities as noted above    Mirna Rodriges PA-C  [facility]  Pager: [phone]

## 2023-03-08 ENCOUNTER — TRANSFERRED RECORDS (OUTPATIENT)
Dept: HEALTH INFORMATION MANAGEMENT | Facility: CLINIC | Age: 83
End: 2023-03-08

## 2023-03-09 ENCOUNTER — DOCUMENTATION ONLY (OUTPATIENT)
Dept: OTHER | Facility: CLINIC | Age: 83
End: 2023-03-09
Payer: COMMERCIAL

## 2023-03-09 ENCOUNTER — TELEPHONE (OUTPATIENT)
Dept: CARDIOLOGY | Facility: CLINIC | Age: 83
End: 2023-03-09
Payer: COMMERCIAL

## 2023-03-09 NOTE — TELEPHONE ENCOUNTER
[facility]    Received Staff message from Mirna Rodriges PA-C with request to call pt on 3/9/23 for weight and sx update.    Hi,     If someone could please call patient/his wife and see how his weight and breathing is responding to Bumex I would so appreciate it!     I saw patient in clinic today - he is up 6 pounds (146#) from his discharge/dry weight (140#). He has some increased shortness of breath as well. On exam, he had 2+ edema in bilateral feet (and some evidence of fluid collection below his right eye?) but otherwise looked and sounded good.     They have been shy about calling in with weight and symptom changes and I want to keep close tabs on him to prevent readmission!!!     Thanks.   Mirna Rodriges PA-C on 3/7/2023 at 3:33 PM       Recent Weights:  2/23/23: 140 lbs (discharge weight)  3/7/23:   146 lbs  3/8/23:   143 lbs   3/10/23: 142 lbs     Recent Changes:     -- 3/7/23: START taking Bumex. Today, take 2 tablets (4 mg total) one time. Starting tomorrow, take 1 tablet (2 mg) twice daily per Mirna Rodriges PA-C.    Notes: Called and left VM with pt with request for call back 3/9/23. Called pt and wife again on 3/10/23 AM. Carl reports his SOB might be slightly better. He uses his BiPAP at night and will occasionally use it during the day briefly for some support. Carl and wife report his swelling has improved; noting his legs are okay and feet are better. They do still notice a little puffiness under his eye. His weights are listed above. We confirmed upcoming appointment with Katelyn Dominguez PA-C on 3/15/23.    I reviewed that I would only call back if Mirna recommended any changes prior to 3/15/23 appointment.    Future Appointments   Date Time Provider Department Center   3/15/2023 10:45 AM RU LAB [facility]   3/15/2023 12:30 PM Katelyn Dominguez PA-C [facility] CLIN   3/28/2023 10:15 AM RU LAB [facility]   3/28/2023 11:00 AM Mirna Rodriges,  AUDREY HUNTLEY [facility] CLIN   5/18/2023  9:00 AM Ramin Baez MD [facility]   5/18/2023 10:30 AM Becky Mahoney MD [facility]   5/18/2023 12:00 PM DERRICK FLEIPE [facility]     Marisela Peña RN, BSN  [place], [place]  C.[facility] Clinic Care Coordinator  March 10, 2023 9:38 AM

## 2023-03-10 LAB — SCANNED LAB RESULT: NORMAL

## 2023-03-10 NOTE — TELEPHONE ENCOUNTER
[facility]        Marisela Peña RN, BSN  [facility] [place] [place]  [facility] Clinic Care Coordinator  March 10, 2023 1:33 PM      Future Appointments   Date Time Provider Department Center   3/15/2023 10:45 AM RU LAB [facility] [place] [facility]   3/15/2023 12:30 PM K[facility]n Dominguez PA-C [facility]   3/28/2023 10:15 AM RU LAB [facility] [place] [facility]   3/28/2023 11:00 AM Mirna Rodriges PA-C [facility] PSA CLIN   5/18/2023  9:00 AM Ramin Baez MD [facility]   5/18/2023 10:30 AM Becky Mahoney MD [facility]   5/18/2023 12:00 PM DERRICK FELIPE [facility]

## 2023-03-15 ENCOUNTER — OFFICE VISIT (OUTPATIENT)
Dept: CARDIOLOGY | Facility: CLINIC | Age: 83
End: 2023-03-15
Attending: INTERNAL MEDICINE
Payer: COMMERCIAL

## 2023-03-15 ENCOUNTER — LAB (OUTPATIENT)
Dept: LAB | Facility: CLINIC | Age: 83
End: 2023-03-15
Payer: COMMERCIAL

## 2023-03-15 VITALS
BODY MASS INDEX: 23.04 KG/M2 | HEART RATE: 68 BPM | HEIGHT: 67 IN | WEIGHT: 146.8 LBS | DIASTOLIC BLOOD PRESSURE: 62 MMHG | SYSTOLIC BLOOD PRESSURE: 106 MMHG | OXYGEN SATURATION: 92 %

## 2023-03-15 DIAGNOSIS — I50.9 ACUTE ON CHRONIC CONGESTIVE HEART FAILURE, UNSPECIFIED HEART FAILURE TYPE (H): ICD-10-CM

## 2023-03-15 LAB
ANION GAP SERPL CALCULATED.3IONS-SCNC: 11 MMOL/L (ref 7–15)
BUN SERPL-MCNC: 33.1 MG/DL (ref 8–23)
CALCIUM SERPL-MCNC: 9.5 MG/DL (ref 8.8–10.2)
CHLORIDE SERPL-SCNC: 94 MMOL/L (ref 98–107)
CREAT SERPL-MCNC: 0.97 MG/DL (ref 0.67–1.17)
DEPRECATED HCO3 PLAS-SCNC: 36 MMOL/L (ref 22–29)
GFR SERPL CREATININE-BSD FRML MDRD: 78 ML/MIN/1.73M2
GLUCOSE SERPL-MCNC: 198 MG/DL (ref 70–99)
NT-PROBNP SERPL-MCNC: 265 PG/ML (ref 0–1800)
POTASSIUM SERPL-SCNC: 3.9 MMOL/L (ref 3.4–5.3)
SODIUM SERPL-SCNC: 141 MMOL/L (ref 136–145)

## 2023-03-15 PROCEDURE — 36415 COLL VENOUS BLD VENIPUNCTURE: CPT | Performed by: INTERNAL MEDICINE

## 2023-03-15 PROCEDURE — 99214 OFFICE O/P EST MOD 30 MIN: CPT | Performed by: PHYSICIAN ASSISTANT

## 2023-03-15 PROCEDURE — 83880 ASSAY OF NATRIURETIC PEPTIDE: CPT | Performed by: INTERNAL MEDICINE

## 2023-03-15 PROCEDURE — 80048 BASIC METABOLIC PNL TOTAL CA: CPT | Performed by: INTERNAL MEDICINE

## 2023-03-15 RX ORDER — BUMETANIDE 2 MG/1
TABLET ORAL
Qty: 45 TABLET | Refills: 3 | Status: SHIPPED | OUTPATIENT
Start: 2023-03-15 | End: 2023-03-28

## 2023-03-15 NOTE — LETTER
3/15/2023    Kyler Mcwilliams MD  [facility] [address] [facility] [address]    RE: Carl Vázquez       Dear Colleague,     I had the pleasure of seeing Carl Vázquez in the [facility].  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      Patient presented to [facility] ED on 2023 after his wife noted his oxygen saturation to be ~70%. He was admitted with acute on chronic hypoxic and hypercapnic respiratory failure. He was also found to be in persistent atrial fibrillation with poorly controlled ventricular rates. Diltiazem gtt was started initially which was transitioned to amiodarone gtt after echocardiogram revealed a depressed EF of 25-30%. Cardiology was consulted due to concern for STEMI on ECG while the patient was in atrial flutter with rates in the 140s. He was without chest pain and subsequent ECGs in SR had no significant ST elevations. Patient's primary team discussed with the on call interventionalist and ultimately decision was made not to pursue emergent coronary angiography after reviewing his serial ECGs and the fact that his troponins remained flat. NT proBNP was elevated at 3084 on admission. Patient was subsequently started on Bumex gtt at 1 mg/hr. This was stopped  as patient appeared euvolemic. A right and left heart catheterization was completed on 2023 as  patient's respiratory status had been optimized. Right heart cath revealed an RA pressure of 4 and a wedge pressure of 4, indicating mild hypovolemia. Left heart cath revealed severe diffuse LAD disease treated with atherectomy and JESSE x 4. Patient was treated with triple therapy for a short time but developed epistaxis and hematuria. Thus, his ASA was discontinued and he was advised to take Xarelto 20 mg daily and Plavix 75 mg daily. He was discharged in stable condition.        Patient reports he did well for several days after hospital discharge, and then began experiencing weight gain, edema, and shortness of breath. He did start BiPAP which helped some.     He saw Mirna Rodriges PA-C on 3/7/23. She started him on Bumex (switched from torsemide).         He presents today for follow up.    He is down 6 lbs on home scale.   Edema in legs is less.   But feet are still swollen.   Breathing is about the same.   He denies orthopnea, PND, chest pain, palpitations, lightheadedness, dizziness, near syncope and syncope.   He is taking all of his medications as prescribed without apparent side effects.   Tolerating Xarelto and Plavix without bleeding concerns. No recurrent epistaxis or hematuria.   They mailed in the monitor last night.   The labs show that BUN and creat are up, see results below.   BP ok.        ROS:  Skin:        Eyes:       ENT:       Respiratory:       Cardiovascular:       Gastroenterology:      Genitourinary:       Musculoskeletal:       Neurologic:       Psychiatric:       Heme/Lymph/Imm:       Endocrine:         PAST MEDICAL HISTORY:  Past Medical History:   Diagnosis Date     Diabetes (H)      Sleep apnea     uses CPAP machine     Thyroid disease        PAST SURGICAL HISTORY:  Past Surgical History:   Procedure Laterality Date     CHOLECYSTECTOMY      at [facility]     COLONOSCOPY      in [place], [place]     COLONOSCOPY  08/22/2017    Dr. Ja SHAHID     CV CORONARY ANGIOGRAM N/A 2/21/2023     Procedure: Coronary Angiogram;  Surgeon: Andrey Watts MD;  Location: [facility] CARDIAC CATH LAB     CV CORONARY LITHOTRIPSY PCI N/A 2/21/2023    Procedure: Percutaneous Coronary Intervention - Lithotripsy;  Surgeon: Andrey Watts MD;  Location: [facility] CARDIAC CATH LAB     CV INTRAVASULAR ULTRASOUND N/A 2/21/2023    Procedure: Intravascular Ultrasound;  Surgeon: Andrey Watts MD;  Location:  HEART CARDIAC CATH LAB     CV PCI N/A 2/21/2023    Procedure: Percutaneous Coronary Intervention;  Surgeon: Andrey Watts MD;  Location: [facility] CARDIAC CATH LAB     CV PCI ATHERECTOMY ORBITAL N/A 2/21/2023    Procedure: Percutaneous Coronary Intervention - Atherectomy Rotational;  Surgeon: Andrey Watts MD;  Location: [facility] CARDIAC CATH LAB     CV RIGHT HEART CATH MEASUREMENTS RECORDED N/A 2/21/2023    Procedure: Right Heart Catheterization;  Surgeon: Andrey Watts MD;  Location: [facility] CARDIAC CATH LAB     ESOPHAGOSCOPY, GASTROSCOPY, DUODENOSCOPY (EGD), COMBINED N/A 6/7/2016    Procedure: COMBINED ESOPHAGOSCOPY, GASTROSCOPY, DUODENOSCOPY (EGD);  Surgeon: Eneida Denton MD;  Location:  GI       SOCIAL HISTORY:  Social History     Socioeconomic History     Marital status:    Tobacco Use     Smoking status: Never     Smokeless tobacco: Never   Substance and Sexual Activity     Alcohol use: No     Comment: drank many years ago     Drug use: No       FAMILY HISTORY:  Family History   Problem Relation Age of Onset     Colon Cancer No family hx of        MEDS: amiodarone (PACERONE) 200 MG tablet, Take 1 tablet (200 mg) by mouth daily  clopidogrel (PLAVIX) 75 MG tablet, Take 1 tablet (75 mg) by mouth daily  glipiZIDE (GLUCOTROL) 5 MG tablet, 2 tabs/10 mg QAM (managed by PCP)  insulin glargine (LANTUS PEN) 100 UNIT/ML pen, Inject 14 Units Subcutaneous At Bedtime  levothyroxine (SYNTHROID/LEVOTHROID) 75 MCG tablet, Take 75 mcg by mouth every morning  "Managed by PCP  lisinopril (ZESTRIL) 2.5 MG tablet, Take 1 tablet (2.5 mg) by mouth daily  magnesium oxide (MAG-OX) 400 MG tablet, Take 1 tablet (400 mg) by mouth daily for 30 days (Patient taking differently: Take 100 mg by mouth daily)  nebivolol (BYSTOLIC) 2.5 MG tablet, Take 1 tablet (2.5 mg) by mouth 2 times daily  nitroGLYcerin (NITROSTAT) 0.4 MG sublingual tablet, For chest pain place 1 tablet under the tongue every 5 minutes for 3 doses. If symptoms persist 5 minutes after 1st dose call 911.  oxyCODONE (ROXICODONE) 5 MG tablet, Take 5 mg by mouth every 4 hours as needed for pain  pantoprazole (PROTONIX) 40 MG EC tablet, Take 1 tablet (40 mg) by mouth 2 times daily for 30 days  polyethylene glycol (MIRALAX) 17 GM/Dose powder, Take 17 g by mouth daily  potassium chloride ER (KLOR-CON M) 10 MEQ CR tablet, Take 1 tablet (10 mEq) by mouth 2 times daily  riluzole (RILUTEK) 50 MG tablet, Take 1 tablet (50 mg) by mouth every 12 hours  rivaroxaban ANTICOAGULANT (XARELTO) 20 MG TABS tablet, Take 1 tablet (20 mg) by mouth daily (with dinner)  rosuvastatin (CRESTOR) 20 MG tablet, Take 1 tablet (20 mg) by mouth daily    No current facility-administered medications on file prior to visit.      ALLERGIES:   Allergies   Allergen Reactions     Metoprolol Shortness Of Breath     Tried 1/2022, 1/2023, both time stopped for increased SOB     Statin Drugs [Hmg-Coa-R Inhibitors]        PHYSICAL EXAM:  Vitals: /62 (BP Location: Right arm, Patient Position: Sitting, Cuff Size: Adult Regular)   Pulse 68   Ht 1.702 m (5' 7\")   Wt 66.6 kg (146 lb 12.8 oz)   SpO2 92%   BMI 22.99 kg/m    Constitutional:  cooperative, alert and oriented, well developed, well nourished, in no acute distress        Skin:  warm and dry to the touch, no apparent skin lesions or masses noted        Head:  normocephalic, no masses or lesions        Eyes:  pupils equal and round;conjunctivae and lids unremarkable;sclera white        ENT:  no pallor " or cyanosis        Neck:  JVP normal        Respiratory:  clear to auscultation diminished breath sounds right sided      Cardiac: regular rhythm;no murmurs, gallops or rubs detected                  GI:  abdomen soft        Vascular: pulses full and equal                                      Extremities and Musculoskeletal:  no deformities, clubbing, cyanosis, erythema observed   trace LE edema, 1-2+ pedal edema    Neurological:  no gross motor deficits          LABS/DATA:  I reviewed the following:  Component      Latest Ref Rng & Units 3/6/2023 3/15/2023   Sodium      136 - 145 mmol/L 142 141   Potassium      3.4 - 5.3 mmol/L 5.1    Chloride      94 - 109 mmol/L 107    Carbon Dioxide      20 - 32 mmol/L 39 (H)    Anion Gap      7 - 15 mmol/L <1 (L) 11   Urea Nitrogen      7 - 30 mg/dL 15    Creatinine      0.67 - 1.17 mg/dL 0.69 0.97   Calcium      8.8 - 10.2 mg/dL 9.4 9.5   Glucose      70 - 99 mg/dL 81    GFR Estimate      >60 mL/min/1.73m2 >90 78   Potassium      3.4 - 5.3 mmol/L  3.9   Chloride      98 - 107 mmol/L  94 (L)   Carbon Dioxide (CO2)      22 - 29 mmol/L  36 (H)   Urea Nitrogen      8.0 - 23.0 mg/dL  33.1 (H)   Glucose      70 - 99 mg/dL  198 (H)   N-Terminal Pro Bnp      0 - 1,800 pg/mL  265             ASSESSMENT/PLAN:  Biventricular heart failure  - LVEF: 25-30% 2/2023  - NYHA class III, stage C  - Etiology: mixed ischemic and nonischemic (tachy-mediated)   - Fluid status/diuretic regimen:   Prior to recent hospitalization, patient was on torsemide. Did not respond despite dose escalation to 40 mg BID. He responded well to high dose Bumex in the hospital but went home on nothing as RHC showed mild hypovolemia.   After discharge he had weight gain and new edema.    Now on Bumex and volume status improved on Bumex, weight down 6 lbs at home, edema is less.  BUN and creat are trending up.  Will lower Bumex to 2 mg in AM and 1 mg in PM.   - Ischemic evaluation: last cor angio 2/2023  - Guideline  directed medical therapy:  - Beta blocker: nebivolol 2.5 mg BID  - ACEI/ARB/ARNI: lisinopril 2.5 mg daily  - Aldactone antagonist: none; will not start now given borderline BP  - SGLT2 inhibitor: consider in the future; Rx price check done (see below) and likely too costly   -Counseled patient on fluid and sodium restriction    Chronic hypoxic and hypercapnic respiratory failure  -multifactorial; untreated CALE, COPD, ALS; acute CHF, right hemidiaphragm paralysis  -BiPAP started; significant improvement in respiratory status since     Coronary artery disease   -s/p recent atherectomy and JESSE x 4 to LAD on 2/21/2023  -tried short term triple therapy but patient developed epistaxis and hematuria; ASA stopped 2/23  -on Plavix 75 mg daily and Xarelto 20 mg daily               -tolerating well   -on nebivolol 2.5 mg BID, lisinopril 2.5 mg daily, rosuvastatin 20 mg daily     Paroxysmal atrial fibrillation  -prior to admission 2/2023, pt was on rate control regimen with diltiazem 360 mg once daily   -diltiazem was stopped and patient was loaded with IV amiodarone after new CM identified   -remains on amiodarone 200 mg daily, nebivolol 2.5 mg BID for rate control should patient have breakthrough PAF  -asymptomatic to arrhythmia; patient unsure if he is having breakthrough, wore Zio which was just mailed in last night    Diabetes mellitus type 2, uncontrolled  -Hgb A1c 8.2%    Hypertension  -on lisinopril 2.5 mg daily, nebivolol 2.5 mg BID and Bumex  -BP controlled    Hyperlipidemia   -on rosuvastatin 20 mg once daily    Hypothyroidism  -on levothyroxine     CALE  -BiPAP started     ALS  -diagnosed just recently by [facility]    Right hemidiaphragm paralysis             Follow up plan:  March 28th with Mirna in [facility]  May 18 - pulmonology at the   May 18 - neurology at the  (Dr. Baez)  Repeat outpatient echocardiogram in early June 2023. Hopeful to see improvement in LV function with the combination of recent  revascularization and suppression/rate control of his atrial fibrillation.      RX price check 2023:  Entresto $282 for 30 days   Jardiance $282   Invokana $282     Meeting a deductible and once that is met the cost will be $47 for 30 days supply.     Farxiga not covered and requires a PA.     Katelyn Dominguez PA-C    Thank you for allowing me to participate in the care of your patient.      Sincerely,     Katelyn Dominguez PA-C     [facility]  cc:   Mirna Rodriges PA-C  [id number]  [place] [address]

## 2023-03-15 NOTE — PROGRESS NOTES
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      Patient presented to [facility] ED on 2023 after his wife noted his oxygen saturation to be ~70%. He was admitted with acute on chronic hypoxic and hypercapnic respiratory failure. He was also found to be in persistent atrial fibrillation with poorly controlled ventricular rates. Diltiazem gtt was started initially which was transitioned to amiodarone gtt after echocardiogram revealed a depressed EF of 25-30%. Cardiology was consulted due to concern for STEMI on ECG while the patient was in atrial flutter with rates in the 140s. He was without chest pain and subsequent ECGs in SR had no significant ST elevations. Patient's primary team discussed with the on call interventionalist and ultimately decision was made not to pursue emergent coronary angiography after reviewing his serial ECGs and the fact that his troponins remained flat. NT proBNP was elevated at 3084 on admission. Patient was subsequently started on Bumex gtt at 1 mg/hr. This was stopped  as patient appeared euvolemic. A right and left heart catheterization was completed on 2023 as patient's respiratory status had been optimized. Right heart cath revealed an RA pressure of 4 and a wedge pressure of 4, indicating mild hypovolemia. Left heart cath revealed severe diffuse LAD disease treated with atherectomy and  JESSE x 4. Patient was treated with triple therapy for a short time but developed epistaxis and hematuria. Thus, his ASA was discontinued and he was advised to take Xarelto 20 mg daily and Plavix 75 mg daily. He was discharged in stable condition.        Patient reports he did well for several days after hospital discharge, and then began experiencing weight gain, edema, and shortness of breath. He did start BiPAP which helped some.     He saw Mirna Rodriges PA-C on 3/7/23. She started him on Bumex (switched from torsemide).         He presents today for follow up.    He is down 6 lbs on home scale.   Edema in legs is less.   But feet are still swollen.   Breathing is about the same.   He denies orthopnea, PND, chest pain, palpitations, lightheadedness, dizziness, near syncope and syncope.   He is taking all of his medications as prescribed without apparent side effects.   Tolerating Xarelto and Plavix without bleeding concerns. No recurrent epistaxis or hematuria.   They mailed in the monitor last night.   The labs show that BUN and creat are up, see results below.   BP ok.        ROS:  Skin:        Eyes:       ENT:       Respiratory:       Cardiovascular:       Gastroenterology:      Genitourinary:       Musculoskeletal:       Neurologic:       Psychiatric:       Heme/Lymph/Imm:       Endocrine:         PAST MEDICAL HISTORY:  Past Medical History:   Diagnosis Date     Diabetes (H)      Sleep apnea     uses CPAP machine     Thyroid disease        PAST SURGICAL HISTORY:  Past Surgical History:   Procedure Laterality Date     CHOLECYSTECTOMY      at [facility]     COLONOSCOPY      in [place], [place]     COLONOSCOPY  08/22/2017    Dr. Ja LYNN     CV CORONARY ANGIOGRAM N/A 2/21/2023    Procedure: Coronary Angiogram;  Surgeon: Andrey Watts MD;  Location:  HEART CARDIAC CATH LAB     CV CORONARY LITHOTRIPSY PCI N/A 2/21/2023    Procedure: Percutaneous Coronary Intervention - Lithotripsy;  Surgeon: Mac  Andrey Bennett MD;  Location:  HEART CARDIAC CATH LAB     CV INTRAVASULAR ULTRASOUND N/A 2/21/2023    Procedure: Intravascular Ultrasound;  Surgeon: Andrey Watts MD;  Location:  HEART CARDIAC CATH LAB     CV PCI N/A 2/21/2023    Procedure: Percutaneous Coronary Intervention;  Surgeon: Andrey Watts MD;  Location:  HEART CARDIAC CATH LAB     CV PCI ATHERECTOMY ORBITAL N/A 2/21/2023    Procedure: Percutaneous Coronary Intervention - Atherectomy Rotational;  Surgeon: Andrey Watts MD;  Location:  HEART CARDIAC CATH LAB     CV RIGHT HEART CATH MEASUREMENTS RECORDED N/A 2/21/2023    Procedure: Right Heart Catheterization;  Surgeon: Andrey Watts MD;  Location:  HEART CARDIAC CATH LAB     ESOPHAGOSCOPY, GASTROSCOPY, DUODENOSCOPY (EGD), COMBINED N/A 6/7/2016    Procedure: COMBINED ESOPHAGOSCOPY, GASTROSCOPY, DUODENOSCOPY (EGD);  Surgeon: Eneida Denton MD;  Location:  GI       SOCIAL HISTORY:  Social History     Socioeconomic History     Marital status:    Tobacco Use     Smoking status: Never     Smokeless tobacco: Never   Substance and Sexual Activity     Alcohol use: No     Comment: drank many years ago     Drug use: No       FAMILY HISTORY:  Family History   Problem Relation Age of Onset     Colon Cancer No family hx of        MEDS: amiodarone (PACERONE) 200 MG tablet, Take 1 tablet (200 mg) by mouth daily  clopidogrel (PLAVIX) 75 MG tablet, Take 1 tablet (75 mg) by mouth daily  glipiZIDE (GLUCOTROL) 5 MG tablet, 2 tabs/10 mg QAM (managed by PCP)  insulin glargine (LANTUS PEN) 100 UNIT/ML pen, Inject 14 Units Subcutaneous At Bedtime  levothyroxine (SYNTHROID/LEVOTHROID) 75 MCG tablet, Take 75 mcg by mouth every morning Managed by PCP  lisinopril (ZESTRIL) 2.5 MG tablet, Take 1 tablet (2.5 mg) by mouth daily  magnesium oxide (MAG-OX) 400 MG tablet, Take 1 tablet (400 mg) by mouth daily for 30 days (Patient taking differently: Take 100 mg by mouth  "daily)  nebivolol (BYSTOLIC) 2.5 MG tablet, Take 1 tablet (2.5 mg) by mouth 2 times daily  nitroGLYcerin (NITROSTAT) 0.4 MG sublingual tablet, For chest pain place 1 tablet under the tongue every 5 minutes for 3 doses. If symptoms persist 5 minutes after 1st dose call 911.  oxyCODONE (ROXICODONE) 5 MG tablet, Take 5 mg by mouth every 4 hours as needed for pain  pantoprazole (PROTONIX) 40 MG EC tablet, Take 1 tablet (40 mg) by mouth 2 times daily for 30 days  polyethylene glycol (MIRALAX) 17 GM/Dose powder, Take 17 g by mouth daily  potassium chloride ER (KLOR-CON M) 10 MEQ CR tablet, Take 1 tablet (10 mEq) by mouth 2 times daily  riluzole (RILUTEK) 50 MG tablet, Take 1 tablet (50 mg) by mouth every 12 hours  rivaroxaban ANTICOAGULANT (XARELTO) 20 MG TABS tablet, Take 1 tablet (20 mg) by mouth daily (with dinner)  rosuvastatin (CRESTOR) 20 MG tablet, Take 1 tablet (20 mg) by mouth daily    No current facility-administered medications on file prior to visit.      ALLERGIES:   Allergies   Allergen Reactions     Metoprolol Shortness Of Breath     Tried 1/2022, 1/2023, both time stopped for increased SOB     Statin Drugs [Hmg-Coa-R Inhibitors]        PHYSICAL EXAM:  Vitals: /62 (BP Location: Right arm, Patient Position: Sitting, Cuff Size: Adult Regular)   Pulse 68   Ht 1.702 m (5' 7\")   Wt 66.6 kg (146 lb 12.8 oz)   SpO2 92%   BMI 22.99 kg/m    Constitutional:  cooperative, alert and oriented, well developed, well nourished, in no acute distress        Skin:  warm and dry to the touch, no apparent skin lesions or masses noted        Head:  normocephalic, no masses or lesions        Eyes:  pupils equal and round;conjunctivae and lids unremarkable;sclera white        ENT:  no pallor or cyanosis        Neck:  JVP normal        Respiratory:  clear to auscultation diminished breath sounds right sided      Cardiac: regular rhythm;no murmurs, gallops or rubs detected                  GI:  abdomen soft    "     Vascular: pulses full and equal                                      Extremities and Musculoskeletal:  no deformities, clubbing, cyanosis, erythema observed   trace LE edema, 1-2+ pedal edema    Neurological:  no gross motor deficits          LABS/DATA:  I reviewed the following:  Component      Latest Ref Rng & Units 3/6/2023 3/15/2023   Sodium      136 - 145 mmol/L 142 141   Potassium      3.4 - 5.3 mmol/L 5.1    Chloride      94 - 109 mmol/L 107    Carbon Dioxide      20 - 32 mmol/L 39 (H)    Anion Gap      7 - 15 mmol/L <1 (L) 11   Urea Nitrogen      7 - 30 mg/dL 15    Creatinine      0.67 - 1.17 mg/dL 0.69 0.97   Calcium      8.8 - 10.2 mg/dL 9.4 9.5   Glucose      70 - 99 mg/dL 81    GFR Estimate      >60 mL/min/1.73m2 >90 78   Potassium      3.4 - 5.3 mmol/L  3.9   Chloride      98 - 107 mmol/L  94 (L)   Carbon Dioxide (CO2)      22 - 29 mmol/L  36 (H)   Urea Nitrogen      8.0 - 23.0 mg/dL  33.1 (H)   Glucose      70 - 99 mg/dL  198 (H)   N-Terminal Pro Bnp      0 - 1,800 pg/mL  265             ASSESSMENT/PLAN:  Biventricular heart failure  - LVEF: 25-30% 2/2023  - NYHA class III, stage C  - Etiology: mixed ischemic and nonischemic (tachy-mediated)   - Fluid status/diuretic regimen:   Prior to recent hospitalization, patient was on torsemide. Did not respond despite dose escalation to 40 mg BID. He responded well to high dose Bumex in the hospital but went home on nothing as RHC showed mild hypovolemia.   After discharge he had weight gain and new edema.    Now on Bumex and volume status improved on Bumex, weight down 6 lbs at home, edema is less.  BUN and creat are trending up.  Will lower Bumex to 2 mg in AM and 1 mg in PM.   - Ischemic evaluation: last cor angio 2/2023  - Guideline directed medical therapy:  - Beta blocker: nebivolol 2.5 mg BID  - ACEI/ARB/ARNI: lisinopril 2.5 mg daily  - Aldactone antagonist: none; will not start now given borderline BP  - SGLT2 inhibitor: consider in the future; Rx  price check done (see below) and likely too costly   -Counseled patient on fluid and sodium restriction    Chronic hypoxic and hypercapnic respiratory failure  -multifactorial; untreated CALE, COPD, ALS; acute CHF, right hemidiaphragm paralysis  -BiPAP started; significant improvement in respiratory status since     Coronary artery disease   -s/p recent atherectomy and JESSE x 4 to LAD on 2/21/2023  -tried short term triple therapy but patient developed epistaxis and hematuria; ASA stopped 2/23  -on Plavix 75 mg daily and Xarelto 20 mg daily               -tolerating well   -on nebivolol 2.5 mg BID, lisinopril 2.5 mg daily, rosuvastatin 20 mg daily     Paroxysmal atrial fibrillation  -prior to admission 2/2023, pt was on rate control regimen with diltiazem 360 mg once daily   -diltiazem was stopped and patient was loaded with IV amiodarone after new CM identified   -remains on amiodarone 200 mg daily, nebivolol 2.5 mg BID for rate control should patient have breakthrough PAF  -asymptomatic to arrhythmia; patient unsure if he is having breakthrough, wore Zio which was just mailed in last night    Diabetes mellitus type 2, uncontrolled  -Hgb A1c 8.2%    Hypertension  -on lisinopril 2.5 mg daily, nebivolol 2.5 mg BID and Bumex  -BP controlled    Hyperlipidemia   -on rosuvastatin 20 mg once daily    Hypothyroidism  -on levothyroxine     CALE  -BiPAP started     ALS  -diagnosed just recently by [facility]    Right hemidiaphragm paralysis             Follow up plan:  March 28th with Mirna in [facility]  May 18 - pulmonology at the   May 18 - neurology at the  (Dr. Baez)  Repeat outpatient echocardiogram in early June 2023. Hopeful to see improvement in LV function with the combination of recent revascularization and suppression/rate control of his atrial fibrillation.      RX price check 2023:  Entresto $282 for 30 days   Jardiance $282   Invokana $282     Meeting a deductible and once that is met the cost will be  $47 for 30 days supply.     Farxiga not covered and requires a PA.     FLIP JamisonC

## 2023-03-16 ENCOUNTER — PRE VISIT (OUTPATIENT)
Dept: NEUROLOGY | Facility: CLINIC | Age: 83
End: 2023-03-16

## 2023-03-16 LAB — SCANNED LAB RESULT: NORMAL

## 2023-03-19 ENCOUNTER — MYC MEDICAL ADVICE (OUTPATIENT)
Dept: CARDIOLOGY | Facility: CLINIC | Age: 83
End: 2023-03-19
Payer: COMMERCIAL

## 2023-03-19 DIAGNOSIS — E83.42 HYPOMAGNESEMIA: ICD-10-CM

## 2023-03-19 DIAGNOSIS — I50.23 ACUTE ON CHRONIC SYSTOLIC CHF (CONGESTIVE HEART FAILURE) (H): ICD-10-CM

## 2023-03-19 DIAGNOSIS — R79.89 ELEVATED TROPONIN: ICD-10-CM

## 2023-03-20 RX ORDER — MAGNESIUM OXIDE 400 MG/1
400 TABLET ORAL DAILY
Qty: 90 TABLET | Refills: 3 | Status: SHIPPED | OUTPATIENT
Start: 2023-03-20 | End: 2023-06-12

## 2023-03-20 RX ORDER — LISINOPRIL 2.5 MG/1
2.5 TABLET ORAL DAILY
Qty: 90 TABLET | Refills: 3 | Status: SHIPPED | OUTPATIENT
Start: 2023-03-20 | End: 2023-06-12

## 2023-03-20 RX ORDER — AMIODARONE HYDROCHLORIDE 200 MG/1
200 TABLET ORAL DAILY
Qty: 90 TABLET | Refills: 3 | Status: SHIPPED | OUTPATIENT
Start: 2023-03-20

## 2023-03-20 RX ORDER — ROSUVASTATIN CALCIUM 20 MG/1
20 TABLET, COATED ORAL DAILY
Qty: 90 TABLET | Refills: 3 | Status: SHIPPED | OUTPATIENT
Start: 2023-03-20

## 2023-03-20 NOTE — TELEPHONE ENCOUNTER
[facility] - C.O.R.E. Clinic    Pt sent Locately message requesting refills for the following: Lisinopril 2.5 mg, Magnesium 400 mg, Rosuvastatin 20 mg,  Amoidarone 200 MG,  pantoprazole 40 mg.    [facility] Cardiology Refill Guideline reviewed.  Medication meets criteria for refill.     Routing to Mirna Rodriges PA-C to confirm if pt to continue on protonix and magnesium.        Chart Reviewed:    -- 3/15/23:  LARA chapman/ Katelyn Dominguez PAC.  Plan:  Will lower Bumex to 2 mg in AM and 1 mg in PM.  No further meds were changed.     Future Appointments   Date Time Provider Department Center   3/28/2023 10:15 AM RU LAB [facility] [place] [facility]   3/28/2023 11:00 AM Mirna Rodriges PA-C [facility] CLIN   3/31/2023 11:00 AM 2, Rh Cardiac Rehab [facility]CR [place] [facility]   5/18/2023  9:00 AM Ramin Baez MD [facility]   5/18/2023 10:30 AM Becky Mahoney MD [facility]   5/18/2023 12:00 PM  PFL [facility]       Janina Grant RN BSN  C.O.R.E. Clinic Care Coordinator  [facility]- [place], [place]  [phone]  03/20/23, 11:18 AM

## 2023-03-20 NOTE — TELEPHONE ENCOUNTER
Refills sent for lisinopril, magnesium, rosuvastatin, amiodarone and sent to preferred pharmacy [facility].      Replied to Mathew recommending her reach out to PCP to decide if needs to continue protonix.     NATALY SolanoN   [facility] [place], [place]  BLANCA. Clinic Care Coordinator  03/20/23, 11:38 AM

## 2023-03-20 NOTE — TELEPHONE ENCOUNTER
Okay to refill Magnesium supplement at current dose.     Not sure about the Protonix. Would defer to PCP for ongoing refills.     Mirna Rodriges PA-C on 3/20/2023 at 11:33 AM

## 2023-03-21 ENCOUNTER — TELEPHONE (OUTPATIENT)
Dept: NEUROLOGY | Facility: CLINIC | Age: 83
End: 2023-03-21

## 2023-03-21 NOTE — TELEPHONE ENCOUNTER
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     Dear French,    Thank you for allowing me to be a part of your healthcare at the [facility].  At your visit on 3/21/23 your genetic test results were discussed. As we discussed, your genetic test results did not find any disease causing or pathogenic variants. 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 This letter is a brief summary of our discussion and these genetic test results. I have also included a copy of the lab report for your records.     Our genes are sequences of letters that provide instructions that help our body grow, develop and function. We all have changes or variations in our genes that make us unique. Some variations cause the body to be unable to read the instructions. These variations are called pathogenic and can result in a genetic condition. Your genetic testing looked at many of your genes to determine if any variants or changes were present.     As we discussed by phone, this test found one variant of uncertain significance. This variant is technically called DCTN1 c.2214G>T. Variants of uncertain significance are changes in our genes that may or may not cause disease. Currently we have limited information regarding whether or not these variants will impact/is impacting your health. Disease causing changes in the DCTN1 gene cause an increased risk for a variety of genetic conditions. Your genetic testing does not confirm a diagnosis of these conditions. The following information is provided for informational purposes only.    Clinical Presentation of DCTN1  Disease causing changes in this gene are associated with one of three genetic conditions called distal hereditary motor neuronopathy type 7B (DHMN7B), David syndrome and ALS with or without frontotemporal dementia.    DHMN7B is a condition  that causes damage to a specific type of nerve called your lower motor nerves which can lead to muscle weakness especially in the face, hands and lower limbs. This condition can also cause difficulty breathing due to vocal cord paralysis. Symptoms of this condition typically begin in early adulthood and are slowly progressive.    David syndrome is a progressive brain disease that causes a pattern of abnormal muscle movement called parkinsonism, psychiatric changes, abnormally slow breathing and weight loss. Psychiatric changes associated with this condition may include but are not limited to depression, apathy, social withdrawal and inappropriate behavior. The symptoms of this condition typically begin around the 4th or 5th decade of life and progress quickly. Slow breathing is often a symptoms that begins later in the course of this condition and can be fatal in some cases.     Amyotrophic lateral sclerosis (ALS) is a condition that affects the motor neurons.  Motor neurons are responsible for sending the necessary signals to the muscles, to allow for movement and speech. In ALS, these motor neurons break down and eventually lead to loss of speech and muscle control. This is a progressive disorder, meaning that the symptoms of this condition worsen over time. However, the rate of progression and muscles groups that are impacted the most is different for different individuals. For example, the first symptom that some people may experience is weakness in their throat muscles while others may experience weakness in an arm or a leg at the onset of symptoms.    Frontotemporal dementia (FTD) is a condition that affects the frontal and temporal lobes of the brain.These areas of the brain are located on the front and sides of your brain and are generally associated with personality, behavior and language. FTD causes these portions of the brain to shrink which can lead to a wide variety of symptoms including changes in  personality, behavior, judgement, empathy, ability to understand the meaning of words, ability to give a name to people or objects and ability to pronounce certain words. People with FTD may also have slow or slurred speech and have difficulties putting sentences together.    All DCTN1 related disorders are inherited in an autosomal dominant manner, meaning that a person only needs one disease-causing change in the DCTN1 gene to show signs or symptoms of the conditions. Both males and females can be affected. If a parent has an autosomal dominant condition, there is a 50% chance with each pregnancy that they will have a child who also has this condition and a 50% chance that they will have a child who does not have this condition.    ALS due to mutations in this gene can be inherited in an autosomal recessive pattern.  This means that to be affected an individual may have  a mutation in both copies of this gene. Individuals with just one mutation in the gene are said to be carriers. Carriers do not have symptoms but can have an affected child if their partner is also a carrier.  When both parents are carriers, with each pregnancy there is a 25% chance for the child to be affected, a 50% chance to be a carrier, and a 25% chance to be unaffected and not a carrier.     Having a VUS in DCTN1 does not establish a diagnosis of any of the conditions described above.There may be additional information about your DCTN1 VUS in the future that would help us understand what health implications it has for you and your relatives, if any. In the meantime, it is important that you continue to follow with your care team for up to date medical management recommendations based on your symptoms.    Thank you again for allowing us to be a part of your care. Please do not hesitate to contact me with additional questions or concerns.    Sincerely,  Kristina Gallego MS [facility]  Genetic Counselor  Division of Genetics and Metabolism  (p)  [phone]

## 2023-03-21 NOTE — LETTER
March 23, 2023      TO: Carl Vázquez  [id number] [id number]     Dear French,    Thank you for allowing me to be a part of your healthcare at the [facility].  At your visit on 3/21/23 your genetic test results were discussed. As we discussed, your genetic test results did not find any disease causing or pathogenic variants. 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 This letter is a brief summary of our discussion and these genetic test results. I have also included a copy of the lab report for your records.     Our genes are sequences of letters that provide instructions that help our body grow, develop and function. We all have changes or variations in our genes that make us unique. Some variations cause the body to be unable to read the instructions. These variations are called pathogenic and can result in a genetic condition. Your genetic testing looked at many of your genes to determine if any variants or changes were present.     As we discussed by phone, this test found one variant of uncertain significance. This variant is technically called DCTN1 c.2214G>T. Variants of uncertain significance are changes in our genes that may or may not cause disease. Currently we have limited information regarding whether or not these variants will impact/is impacting your health. Disease causing changes in the DCTN1 gene cause an increased risk for a variety of genetic conditions. Your genetic testing does not confirm a diagnosis of these conditions. The following information is provided for informational purposes only.    Clinical Presentation of DCTN1  Disease causing changes in this gene are associated with one of three genetic conditions called distal hereditary motor neuronopathy type 7B (DHMN7B), David syndrome and ALS with or without frontotemporal dementia.    DHMN7B is a condition that causes damage  to a specific type of nerve called your lower motor nerves which can lead to muscle weakness especially in the face, hands and lower limbs. This condition can also cause difficulty breathing due to vocal cord paralysis. Symptoms of this condition typically begin in early adulthood and are slowly progressive.    David syndrome is a progressive brain disease that causes a pattern of abnormal muscle movement called parkinsonism, psychiatric changes, abnormally slow breathing and weight loss. Psychiatric changes associated with this condition may include but are not limited to depression, apathy, social withdrawal and inappropriate behavior. The symptoms of this condition typically begin around the 4th or 5th decade of life and progress quickly. Slow breathing is often a symptoms that begins later in the course of this condition and can be fatal in some cases.     Amyotrophic lateral sclerosis (ALS) is a condition that affects the motor neurons.  Motor neurons are responsible for sending the necessary signals to the muscles, to allow for movement and speech. In ALS, these motor neurons break down and eventually lead to loss of speech and muscle control. This is a progressive disorder, meaning that the symptoms of this condition worsen over time. However, the rate of progression and muscles groups that are impacted the most is different for different individuals. For example, the first symptom that some people may experience is weakness in their throat muscles while others may experience weakness in an arm or a leg at the onset of symptoms.    Frontotemporal dementia (FTD) is a condition that affects the frontal and temporal lobes of the brain.These areas of the brain are located on the front and sides of your brain and are generally associated with personality, behavior and language. FTD causes these portions of the brain to shrink which can lead to a wide variety of symptoms including changes in personality, behavior,  judgement, empathy, ability to understand the meaning of words, ability to give a name to people or objects and ability to pronounce certain words. People with FTD may also have slow or slurred speech and have difficulties putting sentences together.    All DCTN1 related disorders are inherited in an autosomal dominant manner, meaning that a person only needs one disease-causing change in the DCTN1 gene to show signs or symptoms of the conditions. Both males and females can be affected. If a parent has an autosomal dominant condition, there is a 50% chance with each pregnancy that they will have a child who also has this condition and a 50% chance that they will have a child who does not have this condition.    ALS due to mutations in this gene can be inherited in an autosomal recessive pattern.  This means that to be affected an individual may have  a mutation in both copies of this gene. Individuals with just one mutation in the gene are said to be carriers. Carriers do not have symptoms but can have an affected child if their partner is also a carrier.  When both parents are carriers, with each pregnancy there is a 25% chance for the child to be affected, a 50% chance to be a carrier, and a 25% chance to be unaffected and not a carrier.     Having a VUS in DCTN1 does not establish a diagnosis of any of the conditions described above.There may be additional information about your DCTN1 VUS in the future that would help us understand what health implications it has for you and your relatives, if any. In the meantime, it is important that you continue to follow with your care team for up to date medical management recommendations based on your symptoms.    Thank you again for allowing us to be a part of your care. Please do not hesitate to contact me with additional questions or concerns.    Sincerely,  Kristina Gallego MS [facility]  Genetic Counselor  Division of Genetics and Metabolism  (p)  [phone]

## 2023-03-22 ENCOUNTER — TELEPHONE (OUTPATIENT)
Dept: CARDIOLOGY | Facility: CLINIC | Age: 83
End: 2023-03-22
Payer: COMMERCIAL

## 2023-03-22 LAB — SCANNED LAB RESULT: NORMAL

## 2023-03-22 NOTE — TELEPHONE ENCOUNTER
----- Message from Mirna Rodriges PA-C sent at 3/22/2023 11:03 AM CDT -----  Can you please let patient know that I reviewed his recent cardiac monitor - he is not having breakthrough atrial fibrillation on current medication regimen. This is great news!

## 2023-03-22 NOTE — TELEPHONE ENCOUNTER
[facility]- C.O.R.E Clinic    Called pt and wife and reviewed Mirna Rodriges PA-C message below.    Can you please let patient know that I reviewed his recent cardiac monitor - he is not having breakthrough atrial fibrillation on current medication regimen. This is great news!    We confirmed upcoming follow up on 3/28/23 OV. They express no further questions at this time.    Marisela Peña, RN, BSN  [place], [place]  C.O.R.E. Clinic Care Coordinator  March 22, 2023 11:53 AM      Future Appointments   Date Time Provider Department Center   3/28/2023 10:15 AM RU LAB [facility]CL[facility] [place] RID   3/28/2023 11:00 AM Mirna Rodriges PA-C [place] [facility] CLIN   3/31/2023 11:00 AM 2, Rh Cardiac Rehab RHCR [place] RID   5/18/2023  9:00 AM Ramin Baez MD [facility]   5/18/2023 10:30 AM Becky Mahoney MD [facility]   5/18/2023 12:00 PM DERRICK FELIPE [facility]

## 2023-03-23 ASSESSMENT — REVISED AMYOTROPHIC LATERAL SCLEROSIS FUNCTIONAL RATING SCALE (ALSFRS-R)
CLIMBING_STAIRS: 0
HANDWRITING: 1
RESPIRATORY_SUBTOTAL_SCORE: 4
RESPIRATORY_INSUFFICIENCY: 2
SWALLOWING: 2
DYSPNEA: 1
ALSFRS_TOTAL_SCORE: 19
SALIVATION: SLIGHT BUT DEFINITE EXCESS OF SALIVA IN MOUTH; MAY HAVE NIGHTTIME DROOLING
WALKING: WALKS WITH ASSISTANCE
SALIVATION: 3
SIX_ITEM_SUBTOTAL: 10
DRESSING_AND_HYGEINE: NEEDS ATTENDANT FOR SELF-CARE
BULBAR_SUBTOTAL: 9
FINE_MOTOR_SUBTOTAL_SCORE: 3
SPEECH: 4
TURNING_IN_BED_AND_ADJUSTING_BED_CLOTHES: 1
DRESSING_AND_HYGEINE: 1
HANDWRITING: ABLE TO GRIP PEN BUT UNABLE TO WRITE
ORTHOPENA: CAN ONLY SLEEP SITTING UP
WALKING: 2
GROSS_MOTOR_SUBTOTAL_SCORE: 3
SPEECH: NORMAL SPEECH PROCESSES
CUTTING_FOOD_AND_HANDLING_UTENSILES: 1
DYSPNEA: OCCURS AT REST: DIFFICULTY BREATHING WHEN EITHER SITTING OR LYING
SWALLOWING: DIETARY CONSISTENCY CHANGES
CLIMBING_STAIRS: CANNOT DO
TURNING_IN_BED_AND_ADJUSTING_BED_CLOTHES: CAN INITIATE, BUT NOT TURN OR ADJUST SHEETS ALONE
RESPIRATORY_INSUFFICIENCY: CONTINUOUS USE OF NIPPV DURING THE NIGHT
ORTHOPENA: 1

## 2023-03-24 ENCOUNTER — TELEPHONE (OUTPATIENT)
Dept: NEUROLOGY | Facility: CLINIC | Age: 83
End: 2023-03-24
Payer: COMMERCIAL

## 2023-03-24 NOTE — TELEPHONE ENCOUNTER
PA Initiation    Medication: Radicava ORS 105MG/5ML starter kit suspension (PA PENDING)  Insurance Company: [email or web address] - Phone [phone] Fax [phone]  Pharmacy Filling the Rx: [place], [place] - [address] [place] [address]  Filling Pharmacy Phone:    Filling Pharmacy Fax:    Start Date: 3/24/2023        Thank you,    Chiquita Ferreira [facility]-T  Specialty Pharmacy Clinic Liaison - CardiologyNeurologyMultiple Sclerosis  [facility] [address]  [address] [place] [address]  Ph: [phone] Fax: [phone]  [email or web address]

## 2023-03-24 NOTE — TELEPHONE ENCOUNTER
PA Initiation    Medication: Relyvrio 3-1GM Packets (PA PENDING)  Insurance Company: [employer] - Phone [phone] Fax [phone]  Pharmacy Filling the Rx: [place], [place] - [address] [place] [address]  Filling Pharmacy Phone:    Filling Pharmacy Fax:    Start Date: 3/24/2023        Thank you,    Chiquita Ferreira [facility]-T  Specialty Pharmacy Clinic Liaison - [facility]Ne[facility]rologyMult[facility] [address]  [address]rd [place] [address]  Ph: [phone] Fax: [phone]  [email or web address]

## 2023-03-25 ENCOUNTER — MYC MEDICAL ADVICE (OUTPATIENT)
Dept: CARDIOLOGY | Facility: CLINIC | Age: 83
End: 2023-03-25
Payer: COMMERCIAL

## 2023-03-27 NOTE — TELEPHONE ENCOUNTER
Prior Authorization Approval    Authorization Effective Date: 2/22/2023  Authorization Expiration Date: 3/23/2024  Medication: Relyvrio 3-1GM Packets (PA APPROVED)  Approved Dose/Quantity: 56 packets / 28 day supply  Reference #:     Insurance Company: [employer] - Phone [phone] Fax [phone]  Expected CoPay: $1,840.08 - (#21 / 21 day supply - Starter dose)     CoPay Card Available: No    Foundation Assistance Needed: [employer]  Which Pharmacy is filling the prescription (Not needed for infusion/clinic administered): [place], [place] - [address] [place] [address]  Pharmacy Notified:    Patient Notified:        PA was approved for Relyvrio. Start form faxed to [employer] for review.         Thank you,    Chiquita Ferreira [facility]-T  Specialty Pharmacy Clinic Liaison - CardiologyNeurologyMultiple Sclerosis  [facility]  [address] [place] [address]  [address] [place] [address]  Ph: [phone] Fax: [phone]  [email or web address]

## 2023-03-27 NOTE — TELEPHONE ENCOUNTER
Prior Authorization Approval    Authorization Effective Date: 2/22/2023  Authorization Expiration Date: 3/23/2024  Medication: Radicava ORS 105MG/5ML starter kit suspension ([place] APPROVED)  Approved Dose/Quantity: 70mL (starter dose)  Reference #:     Insurance Company: [employer] - Phone [phone] Fax [phone]  Expected CoPay: $3,294 (#70mL / 28 days starter dose)     CoPay Card Available: No    Foundation Assistance Needed: [facility]  Which Pharmacy is filling the prescription (Not needed for infusion/clinic administered): [address]  Pharmacy Notified:    Patient Notified:      PA was approved for Radicava. Start form faxed to [facility] For review.         Thank you,    Chiquita Ferreira [facility]-T  Specialty Pharmacy Clinic Liaison - CardiologyNeurologyMultiple Sclerosis  [facility]  [address] [place] [address]  3rd [place] [address]  Ph: [phone] Fax: [phone]  [email or web address]

## 2023-03-28 ENCOUNTER — LAB (OUTPATIENT)
Dept: LAB | Facility: CLINIC | Age: 83
End: 2023-03-28
Payer: COMMERCIAL

## 2023-03-28 ENCOUNTER — OFFICE VISIT (OUTPATIENT)
Dept: CARDIOLOGY | Facility: CLINIC | Age: 83
End: 2023-03-28
Attending: INTERNAL MEDICINE
Payer: COMMERCIAL

## 2023-03-28 VITALS
SYSTOLIC BLOOD PRESSURE: 102 MMHG | WEIGHT: 144 LBS | DIASTOLIC BLOOD PRESSURE: 50 MMHG | BODY MASS INDEX: 22.55 KG/M2 | HEART RATE: 72 BPM

## 2023-03-28 DIAGNOSIS — I50.9 ACUTE ON CHRONIC CONGESTIVE HEART FAILURE, UNSPECIFIED HEART FAILURE TYPE (H): ICD-10-CM

## 2023-03-28 LAB
ANION GAP SERPL CALCULATED.3IONS-SCNC: 7 MMOL/L (ref 7–15)
BUN SERPL-MCNC: 33.9 MG/DL (ref 8–23)
CALCIUM SERPL-MCNC: 9.6 MG/DL (ref 8.8–10.2)
CHLORIDE SERPL-SCNC: 90 MMOL/L (ref 98–107)
CREAT SERPL-MCNC: 1 MG/DL (ref 0.67–1.17)
DEPRECATED HCO3 PLAS-SCNC: 40 MMOL/L (ref 22–29)
GFR SERPL CREATININE-BSD FRML MDRD: 75 ML/MIN/1.73M2
GLUCOSE SERPL-MCNC: 229 MG/DL (ref 70–99)
NT-PROBNP SERPL-MCNC: 242 PG/ML (ref 0–1800)
POTASSIUM SERPL-SCNC: 4.3 MMOL/L (ref 3.4–5.3)
SODIUM SERPL-SCNC: 137 MMOL/L (ref 136–145)

## 2023-03-28 PROCEDURE — 80048 BASIC METABOLIC PNL TOTAL CA: CPT | Performed by: INTERNAL MEDICINE

## 2023-03-28 PROCEDURE — 83880 ASSAY OF NATRIURETIC PEPTIDE: CPT | Performed by: INTERNAL MEDICINE

## 2023-03-28 PROCEDURE — 99215 OFFICE O/P EST HI 40 MIN: CPT | Performed by: INTERNAL MEDICINE

## 2023-03-28 PROCEDURE — 36415 COLL VENOUS BLD VENIPUNCTURE: CPT | Performed by: INTERNAL MEDICINE

## 2023-03-28 RX ORDER — PANTOPRAZOLE SODIUM 40 MG/1
40 TABLET, DELAYED RELEASE ORAL 2 TIMES DAILY
COMMUNITY

## 2023-03-28 RX ORDER — BUMETANIDE 2 MG/1
TABLET ORAL
Qty: 45 TABLET | Refills: 3 | COMMUNITY
Start: 2023-03-28 | End: 2023-04-03

## 2023-03-28 NOTE — LETTER
3/28/2023    Kyler Mcwilliams MD  [facility] [address] [facility] [address]    RE: Carl Vázquez       Dear Colleague,     I had the pleasure of seeing Carl Vázquez in the [facility].  Cardiology Clinic Progress Note  Carl Vázquez MRN# [id number]   YOB: 1940 Age: 82 year old   Primary Cardiologist: Will be Dr. Rehman Reason for visit: CORE follow-up            Assessment and Plan:   Carl Vázquez is a very pleasant 82 year old male who is here today for CORE follow up.      Biventricular heart failure  - LVEF: 25-30% 2/2023  - NYHA class III, stage C  - Etiology: mixed ischemic and nonischemic (tachy mediated)   - Fluid status:  Hypervolemic; 144# on home scale today; dry weight: 140# on home scale   - Diuretic regimen: Increase Bumex to 2 mg twice daily x1 week then decrease back to Bumex 2 mg in AM and 1 mg in afternoon  - Ischemic evaluation: last cor angio 2/2023  - Guideline directed medical therapy:  - Beta blocker: nebivolol 2.5 mg BID  - ACEI/ARB/ARNI: lisinopril 2.5 mg daily  - Aldactone antagonist: None will consider  - SGLT2 inhibitor: cost prohibitive  -Counseled patient on fluid and sodium restriction  Chronic hypoxic and hypercapnic respiratory failure  -multifactorial; untreated CALE, COPD, possible neuromuscular disease; acute CHF, right hemidiaphragm paralysis  -On BiPAP; however, has been having issues with mask.  Someone is coming out today to address this  Coronary artery disease   -s/p recent atherectomy and JESSE x 4 to LAD on 2/21/2023  -tried short term triple therapy but patient developed epistaxis and hematuria; ASA stopped 2/23  -on Plavix 75 mg daily and Xarelto 20 mg daily               -tolerating well   -on nebivolol 2.5 mg BID, lisinopril 2.5 mg daily, rosuvastatin 20 mg daily   Paroxysmal atrial fibrillation  -prior to admission 2/2023, pt was on rate control regimen with diltiazem 360 mg once daily   -diltiazem  was stopped and patient was loaded with IV amiodarone after new CM identified   -remains on amiodarone 200 mg daily, nebivolol 2.5 mg BID for rate control should patient have breakthrough PAF  -asymptomatic to arrhythmia; patient unsure if he is having breakthrough  -Zio patch 3/7-3/14 monitor without recurrent AFIB  Diabetes mellitus type 2, uncontrolled  -Hgb A1c 8.2%  Hypertension  -on lisinopril 2.5 mg daily, nebivolol 2.5 mg BID  -BP today 139/63  Hyperlipidemia   -on rosuvastatin 20 mg once daily  Hypothyroidism  -on levothyroxine   CALE  -BiPAP delivered Friday  -uses nightly   ALS  -Recently diagnosed  -esophageal dilation procedure initially planned for early March; had to be cancelled after stenting/pt was started on Plavix   Right hemidiaphragm paralysis     Changes today:   Increase Bumex to 2 mg BID x 1 week, then decrease back to 2 mg in the AM and 1 mg in the afternoon. Goal weight is 140# on home scale.      Follow up plan:   Ongoing sodium restriction and daily weights.  Contact the clinic with a weight gain of 2 to 3 pounds in 1 day or 5 pounds in 1 week.  CORE follow up in 1 month me me (labs prior)  May 18 - pulmonology at the   May 18 - neurology at the  (Dr. Baez)  Repeat outpatient echocardiogram in early June 2023 with Dr. Rehman follow up shortly after. Hopeful to see improvement in LV function with the combination of recent revascularization and suppression/rate control of his atrial fibrillation.    Long discussion had with patient and wife today regarding the desire for upcoming procedures including tooth extraction and esophageal dilation that would requiring holding of antiplatelet agent and anticoagulant. They are concerned about improving patient's quality of life, which is very reasonable. However, the risk of stent thrombosis would be high if decision was made to hold these agents given recent JESSE x4 to LAD on 2/21/2023. This was explained in detail to patient and wife. They  expressed understanding and will ultimately make the decision themselves whether or not to move forward with these procedures, holding Plavix and/or Xarelto against cardiology recommendations.     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     Patient presented to [facility] ED on 2/17/2023 after his wife noted his oxygen saturation to be ~70%. He was admitted with acute on chronic hypoxic and hypercapnic respiratory failure. He was also found to be in persistent atrial fibrillation with poorly controlled ventricular rates. Diltiazem gtt was started initially which was transitioned to amiodarone gtt after echocardiogram revealed a depressed EF of 25-30%. Cardiology was consulted due to concern for STEMI on ECG while the patient was in atrial flutter with rates in the 140s. He was without chest pain and subsequent ECGs in SR had no significant ST elevations. Patient's primary team discussed with the on call interventionalist and ultimately decision was made not to pursue emergent coronary angiography after reviewing his serial ECGs and the fact that his troponins remained flat. NT proBNP was elevated at 3084 on admission. Patient was subsequently started on Bumex gtt at 1 mg/hr. This was stopped 2/20 as patient appeared euvolemic. A right and left heart catheterization was completed on 2/21/2023 as  patient's respiratory status had been optimized. Right heart cath revealed an RA pressure of 4 and a wedge pressure of 4, indicating mild hypovolemia. Left heart cath revealed severe diffuse LAD disease treated with atherectomy and JESSE x 4. Patient was treated with triple therapy for a short time but developed epistaxis and hematuria. Thus, his ASA was discontinued and he was advised to take Xarelto 20 mg daily and Plavix 75 mg daily. He was discharged in stable condition.     At the time of his initial follow-up, he complained of 6 pound weight gain associated with shortness of breath and lower extremity edema.  He was started on Bumex 2 mg twice daily with good result.  This was later decreased to 2 mg in the AM and 1 mg in the afternoon as Cr was trending up.  Patient is asymptomatic to his atrial fibrillation, so Zio patch monitor was completed 3/7-3/14 to evaluate for breakthrough.  This revealed ongoing maintenance of sinus rhythm without evidence of recurrent atrial fibrillation.    Patient is here today for CORE follow up. Labs today show stable renal function with Cr 1.00, normal electrolytes. Glucose is elevated at 229. Carbon dioxide is chronically elevated 36-40. Blood pressure 102/50 and HR 72 in clinic today.     Overall, patient is stable. Denies chest pain, palpitations, lightheadedness, dizziness, near syncope and syncope. Weights have been trending up at home since decreasing Bumex a couple weeks ago. Home weight today was 144# (dry weight 140#). With this, he has noticed increased LE pedal/ankle edema. His breathing has been worsening lately due to struggles with his BiPAP machine and mask. Wife states someone is coming out to fix these issues in the near future.     Since diagnosis of ALS, patient does continue to struggle with food getting stuck in his esophagus. GI was planning for an esophageal dilation procedure but this had to be cancelled after patient underwent stenting to his LAD/was  "started on Plavix. Patient and wife are insistent that this procedure is done in the near future to improve patient's quality of life.         Social History      Social History     Socioeconomic History     Marital status:      Spouse name: Not on file     Number of children: Not on file     Years of education: Not on file     Highest education level: Not on file   Occupational History     Not on file   Tobacco Use     Smoking status: Never     Smokeless tobacco: Never   Substance and Sexual Activity     Alcohol use: No     Comment: drank many years ago     Drug use: No     Sexual activity: Not on file   Other Topics Concern     Parent/sibling w/ CABG, MI or angioplasty before 65F 55M? Not Asked   Social History Narrative     Not on file     Social Determinants of Health     Financial Resource Strain: Not on file   Food Insecurity: Not on file   Transportation Needs: Not on file   Physical Activity: Not on file   Stress: Not on file   Social Connections: Not on file   Intimate Partner Violence: Not on file   Housing Stability: Not on file            Review of Systems:   Please see HPI         Physical Exam:   Vitals: /50 (BP Location: Right arm, Patient Position: Sitting, Cuff Size: Adult Regular)   Pulse 72   Ht (P) 1.702 m (5' 7\")   Wt 65.3 kg (144 lb)   BMI (P) 22.55 kg/m     Wt Readings from Last 4 Encounters:   03/15/23 66.6 kg (146 lb 12.8 oz)   03/07/23 66.2 kg (146 lb)   03/02/23 68 kg (150 lb)   03/01/23 68.3 kg (150 lb 9.6 oz)     GEN: well nourished, in no acute distress.  HEENT:  Pupils equal, round. Sclerae nonicteric.   NECK: Supple, no masses appreciated. JVP normal  C/V:  Regular rate and rhythm, no murmur, rub or gallop.   RESP: Respirations are unlabored. Clear to auscultation bilaterally without wheezing, rales, or rhonchi.  GI: Abdomen soft, nontender.  EXTREM: 2+ bilateral edema to ankles   NEURO: Alert and oriented, cooperative.  SKIN: Warm and dry.        Data:   LIPID " RESULTS:  No results found for: CHOL, HDL, LDL, TRIG, CHOLHDLRATIO  LIVER ENZYME RESULTS:  Lab Results   Component Value Date    AST 20 03/02/2023    AST 11 09/25/2019    ALT 16 03/02/2023    ALT 23 09/25/2019     CBC RESULTS:  Lab Results   Component Value Date    WBC 6.7 02/23/2023    WBC 8.0 09/26/2019    RBC 5.20 02/23/2023    RBC 4.70 09/26/2019    HGB 14.6 02/23/2023    HGB 14.5 09/26/2019    HCT 49.2 02/23/2023    HCT 41.7 09/26/2019    MCV 95 02/23/2023    MCV 89 09/26/2019    MCH 28.8 02/23/2023    MCH 30.9 09/26/2019    MCHC 30.5 (L) 02/23/2023    MCHC 34.8 09/26/2019    RDW 15.9 (H) 02/23/2023    RDW 13.7 09/26/2019     (L) 02/23/2023     (L) 09/26/2019     BMP RESULTS:  Lab Results   Component Value Date     03/15/2023     09/26/2019    POTASSIUM 3.9 03/15/2023    POTASSIUM 5.1 03/06/2023    POTASSIUM 3.4 09/26/2019    CHLORIDE 94 (L) 03/15/2023    CHLORIDE 107 03/06/2023    CHLORIDE 107 09/26/2019    CO2 36 (H) 03/15/2023    CO2 39 (H) 03/06/2023    CO2 28 09/26/2019    ANIONGAP 11 03/15/2023    ANIONGAP <1 (L) 03/06/2023    ANIONGAP 4 09/26/2019     (H) 03/15/2023    GLC 81 03/06/2023     (H) 09/26/2019    BUN 33.1 (H) 03/15/2023    BUN 15 03/06/2023    BUN 13 09/26/2019    CR 0.97 03/15/2023    CR 0.76 09/26/2019    GFRESTIMATED 78 03/15/2023    GFRESTIMATED 86 09/26/2019    GFRESTBLACK >90 09/26/2019    TATE 9.5 03/15/2023    TATE 8.4 (L) 09/26/2019      A1C RESULTS:  Lab Results   Component Value Date    A1C 8.2 (H) 01/04/2023     INR RESULTS:  No results found for: INR         Medications     Current Outpatient Medications   Medication Sig Dispense Refill     amiodarone (PACERONE) 200 MG tablet Take 1 tablet (200 mg) by mouth daily 90 tablet 3     bumetanide (BUMEX) 2 MG tablet 2 mg (1 tab) in AM, 1 mg (1/2 tab) in afternoon 45 tablet 3     clopidogrel (PLAVIX) 75 MG tablet Take 1 tablet (75 mg) by mouth daily 90 tablet 3     glipiZIDE (GLUCOTROL) 5 MG tablet 2  tabs/10 mg QAM (managed by PCP)       insulin glargine (LANTUS PEN) 100 UNIT/ML pen Inject 14 Units Subcutaneous At Bedtime 15 mL      levothyroxine (SYNTHROID/LEVOTHROID) 75 MCG tablet Take 75 mcg by mouth every morning Managed by PCP       lisinopril (ZESTRIL) 2.5 MG tablet Take 1 tablet (2.5 mg) by mouth daily 90 tablet 3     magnesium oxide (MAG-OX) 400 MG tablet Take 1 tablet (400 mg) by mouth daily 90 tablet 3     nebivolol (BYSTOLIC) 2.5 MG tablet Take 1 tablet (2.5 mg) by mouth 2 times daily 180 tablet 1     nitroGLYcerin (NITROSTAT) 0.4 MG sublingual tablet For chest pain place 1 tablet under the tongue every 5 minutes for 3 doses. If symptoms persist 5 minutes after 1st dose call 911. 15 tablet 0     oxyCODONE (ROXICODONE) 5 MG tablet Take 5 mg by mouth every 4 hours as needed for pain       polyethylene glycol (MIRALAX) 17 GM/Dose powder Take 17 g by mouth daily 510 g 0     potassium chloride ER (KLOR-CON M) 10 MEQ CR tablet Take 1 tablet (10 mEq) by mouth 2 times daily       riluzole (RILUTEK) 50 MG tablet Take 1 tablet (50 mg) by mouth every 12 hours 60 tablet 3     rivaroxaban ANTICOAGULANT (XARELTO) 20 MG TABS tablet Take 1 tablet (20 mg) by mouth daily (with dinner) 30 tablet 5     rosuvastatin (CRESTOR) 20 MG tablet Take 1 tablet (20 mg) by mouth daily 90 tablet 3          Past Medical History     Past Medical History:   Diagnosis Date     Diabetes (H)      Sleep apnea     uses CPAP machine     Thyroid disease      Past Surgical History:   Procedure Laterality Date     CHOLE[facility]ST[facility]OMY      at [facility]     COLONOSCOPY      in [place], [place]     COLONOSCOPY  08/22/2017    Dr. Ja LYNN     CV CORONARY ANGIOGRAM N/A 2/21/2023    Procedure: Coronary Angiogram;  Surgeon: Andrey Watts MD;  Location: [facility] CARDIAC CATH LAB     CV CORONARY LITHOTRIPSY PCI N/A 2/21/2023    Procedure: Percutaneous Coronary Intervention - Lithotripsy;  Surgeon: Andrey Watts MD;  Location: [facility]  CARDIAC CATH LAB     CV INTRAVASULAR ULTRASOUND N/A 2/21/2023    Procedure: Intravascular Ultrasound;  Surgeon: Andrey Watts MD;  Location:  HEART CARDIAC CATH LAB     CV PCI N/A 2/21/2023    Procedure: Percutaneous Coronary Intervention;  Surgeon: Andrey Watts MD;  Location:  HEART CARDIAC CATH LAB     CV PCI ATHERECTOMY ORBITAL N/A 2/21/2023    Procedure: Percutaneous Coronary Intervention - Atherectomy Rotational;  Surgeon: Andrey Watts MD;  Location:  HEART CARDIAC CATH LAB     CV RIGHT HEART CATH MEASUREMENTS RECORDED N/A 2/21/2023    Procedure: Right Heart Catheterization;  Surgeon: Andrey Watts MD;  Location:  HEART CARDIAC CATH LAB     ESOPHAGOSCOPY, GASTROSCOPY, DUODENOSCOPY (EGD), COMBINED N/A 6/7/2016    Procedure: COMBINED ESOPHAGOSCOPY, GASTROSCOPY, DUODENOSCOPY (EGD);  Surgeon: Eneida Denton MD;  Location:  GI     Family History   Problem Relation Age of Onset     Colon Cancer No family hx of             Allergies   Metoprolol and Statin drugs [hmg-coa-r inhibitors]    50 minutes spent on the date of the encounter doing chart review, history and exam, documentation and further activities as noted above    AUDREY Carpio [facility]  Pager: [phone]    Thank you for allowing me to participate in the care of your patient.      Sincerely,     AUDREY Carpio [facility]  cc:   Mirna Rodriges PA-C  [address] [place] [address]

## 2023-03-28 NOTE — PATIENT INSTRUCTIONS
Today you had a visit in the CORE clinic. CORE stands for Cardiomyopathy Optimization Rehabilitation Education. The CORE clinic will teach and help you to manage your heart failure and keep you out of the hospital. Call the CORE nurse for any questions or concerns at [phone]      Plan:  1. Medication changes:   Increase Bumex to 2 mg twice daily for 1 week, then decrease back to 2 mg in the AM and 1 mg in the afternoon    2. Follow up:    Follow up with me 4/24/2023 with labs prior  Echocardiogram in early June 2023  Follow up with Dr. Rehman in 3 months with labs    3. Continue low sodium diet (less than 2000 mg daily). If you eat less salt, you will retain less fluid.    4. Continue to weigh yourself daily. Call the CORE nurse if you develop signs concerning for fluid retention, including weight gain of over 3 pounds in 1 night or over 5 pounds in 1 week, increasing shortness of breath, or increasing swelling in the legs or abdomen.    It was great seeing you today!    Mirna Rodriges PA-C  Physician Assistant  [facility]

## 2023-03-28 NOTE — PROGRESS NOTES
Cardiology Clinic Progress Note  Carl Vázquez MRN# [id number]   YOB: 1940 Age: 82 year old   Primary Cardiologist: Will be Dr. Rehman Reason for visit: CORE follow-up            Assessment and Plan:   Carl Vázquez is a very pleasant 82 year old male who is here today for CORE follow up.      Biventricular heart failure  - LVEF: 25-30% 2/2023  - NYHA class III, stage C  - Etiology: mixed ischemic and nonischemic (tachy mediated)   - Fluid status:  Hypervolemic; 144# on home scale today; dry weight: 140# on home scale   - Diuretic regimen: Increase Bumex to 2 mg twice daily x1 week then decrease back to Bumex 2 mg in AM and 1 mg in afternoon  - Ischemic evaluation: last cor angio 2/2023  - Guideline directed medical therapy:  - Beta blocker: nebivolol 2.5 mg BID  - ACEI/ARB/ARNI: lisinopril 2.5 mg daily  - Aldactone antagonist: None will consider  - SGLT2 inhibitor: cost prohibitive  -Counseled patient on fluid and sodium restriction  Chronic hypoxic and hypercapnic respiratory failure  -multifactorial; untreated CALE, COPD, possible neuromuscular disease; acute CHF, right hemidiaphragm paralysis  -On BiPAP; however, has been having issues with mask.  Someone is coming out today to address this  Coronary artery disease   -s/p recent atherectomy and JESSE x 4 to LAD on 2/21/2023  -tried short term triple therapy but patient developed epistaxis and hematuria; ASA stopped 2/23  -on Plavix 75 mg daily and Xarelto 20 mg daily               -tolerating well   -on nebivolol 2.5 mg BID, lisinopril 2.5 mg daily, rosuvastatin 20 mg daily   Paroxysmal atrial fibrillation  -prior to admission 2/2023, pt was on rate control regimen with diltiazem 360 mg once daily   -diltiazem was stopped and patient was loaded with IV amiodarone after new CM identified   -remains on amiodarone 200 mg daily, nebivolol 2.5 mg BID for rate control should patient have breakthrough PAF  -asymptomatic to arrhythmia; patient  unsure if he is having breakthrough  -Zio patch 3/7-3/14 monitor without recurrent AFIB  Diabetes mellitus type 2, uncontrolled  -Hgb A1c 8.2%  Hypertension  -on lisinopril 2.5 mg daily, nebivolol 2.5 mg BID  -BP today 139/63  Hyperlipidemia   -on rosuvastatin 20 mg once daily  Hypothyroidism  -on levothyroxine   CALE  -BiPAP delivered Friday  -uses nightly   ALS  -Recently diagnosed  -esophageal dilation procedure initially planned for early March; had to be cancelled after stenting/pt was started on Plavix   Right hemidiaphragm paralysis     Changes today:   Increase Bumex to 2 mg BID x 1 week, then decrease back to 2 mg in the AM and 1 mg in the afternoon. Goal weight is 140# on home scale.      Follow up plan:   Ongoing sodium restriction and daily weights.  Contact the clinic with a weight gain of 2 to 3 pounds in 1 day or 5 pounds in 1 week.  CORE follow up in 1 month me me (labs prior)  May 18 - pulmonology at the   May 18 - neurology at the  (Dr. Baez)  Repeat outpatient echocardiogram in early June 2023 with Dr. Rehman follow up shortly after. Hopeful to see improvement in LV function with the combination of recent revascularization and suppression/rate control of his atrial fibrillation.    Long discussion had with patient and wife today regarding the desire for upcoming procedures including tooth extraction and esophageal dilation that would requiring holding of antiplatelet agent and anticoagulant. They are concerned about improving patient's quality of life, which is very reasonable. However, the risk of stent thrombosis would be high if decision was made to hold these agents given recent JESSE x4 to LAD on 2/21/2023. This was explained in detail to patient and wife. They expressed understanding and will ultimately make the decision themselves whether or not to move forward with these procedures, holding Plavix and/or Xarelto against cardiology recommendations.     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     Patient presented to [facility] ED on 2/17/2023 after his wife noted his oxygen saturation to be ~70%. He was admitted with acute on chronic hypoxic and hypercapnic respiratory failure. He was also found to be in persistent atrial fibrillation with poorly controlled ventricular rates. Diltiazem gtt was started initially which was transitioned to amiodarone gtt after echocardiogram revealed a depressed EF of 25-30%. Cardiology was consulted due to concern for STEMI on ECG while the patient was in atrial flutter with rates in the 140s. He was without chest pain and subsequent ECGs in SR had no significant ST elevations. Patient's primary team discussed with the on call interventionalist and ultimately decision was made not to pursue emergent coronary angiography after reviewing his serial ECGs and the fact that his troponins remained flat. NT proBNP was elevated at 3084 on admission. Patient was subsequently started on Bumex gtt at 1 mg/hr. This was stopped 2/20 as patient appeared euvolemic. A right and left heart catheterization was completed on 2/21/2023 as patient's respiratory status had been optimized. Right heart cath revealed an RA pressure of 4 and a wedge pressure of 4, indicating mild hypovolemia. Left heart cath revealed severe diffuse LAD disease treated with atherectomy and JESSE x 4.  Patient was treated with triple therapy for a short time but developed epistaxis and hematuria. Thus, his ASA was discontinued and he was advised to take Xarelto 20 mg daily and Plavix 75 mg daily. He was discharged in stable condition.     At the time of his initial follow-up, he complained of 6 pound weight gain associated with shortness of breath and lower extremity edema.  He was started on Bumex 2 mg twice daily with good result.  This was later decreased to 2 mg in the AM and 1 mg in the afternoon as Cr was trending up.  Patient is asymptomatic to his atrial fibrillation, so Zio patch monitor was completed 3/7-3/14 to evaluate for breakthrough.  This revealed ongoing maintenance of sinus rhythm without evidence of recurrent atrial fibrillation.    Patient is here today for CORE follow up. Labs today show stable renal function with Cr 1.00, normal electrolytes. Glucose is elevated at 229. Carbon dioxide is chronically elevated 36-40. Blood pressure 102/50 and HR 72 in clinic today.     Overall, patient is stable. Denies chest pain, palpitations, lightheadedness, dizziness, near syncope and syncope. Weights have been trending up at home since decreasing Bumex a couple weeks ago. Home weight today was 144# (dry weight 140#). With this, he has noticed increased LE pedal/ankle edema. His breathing has been worsening lately due to struggles with his BiPAP machine and mask. Wife states someone is coming out to fix these issues in the near future.     Since diagnosis of ALS, patient does continue to struggle with food getting stuck in his esophagus. GI was planning for an esophageal dilation procedure but this had to be cancelled after patient underwent stenting to his LAD/was started on Plavix. Patient and wife are insistent that this procedure is done in the near future to improve patient's quality of life.         Social History      Social History     Socioeconomic History     Marital status:      Spouse  "name: Not on file     Number of children: Not on file     Years of education: Not on file     Highest education level: Not on file   Occupational History     Not on file   Tobacco Use     Smoking status: Never     Smokeless tobacco: Never   Substance and Sexual Activity     Alcohol use: No     Comment: drank many years ago     Drug use: No     Sexual activity: Not on file   Other Topics Concern     Parent/sibling w/ CABG, MI or angioplasty before 65F 55M? Not Asked   Social History Narrative     Not on file     Social Determinants of Health     Financial Resource Strain: Not on file   Food Insecurity: Not on file   Transportation Needs: Not on file   Physical Activity: Not on file   Stress: Not on file   Social Connections: Not on file   Intimate Partner Violence: Not on file   Housing Stability: Not on file            Review of Systems:   Please see HPI         Physical Exam:   Vitals: /50 (BP Location: Right arm, Patient Position: Sitting, Cuff Size: Adult Regular)   Pulse 72   Ht (P) 1.702 m (5' 7\")   Wt 65.3 kg (144 lb)   BMI (P) 22.55 kg/m     Wt Readings from Last 4 Encounters:   03/15/23 66.6 kg (146 lb 12.8 oz)   03/07/23 66.2 kg (146 lb)   03/02/23 68 kg (150 lb)   03/01/23 68.3 kg (150 lb 9.6 oz)     GEN: well nourished, in no acute distress.  HEENT:  Pupils equal, round. Sclerae nonicteric.   NECK: Supple, no masses appreciated. JVP normal  C/V:  Regular rate and rhythm, no murmur, rub or gallop.   RESP: Respirations are unlabored. Clear to auscultation bilaterally without wheezing, rales, or rhonchi.  GI: Abdomen soft, nontender.  EXTREM: 2+ bilateral edema to ankles   NEURO: Alert and oriented, cooperative.  SKIN: Warm and dry.        Data:   LIPID RESULTS:  No results found for: CHOL, HDL, LDL, TRIG, CHOLHDLRATIO  LIVER ENZYME RESULTS:  Lab Results   Component Value Date    AST 20 03/02/2023    AST 11 09/25/2019    ALT 16 03/02/2023    ALT 23 09/25/2019     CBC RESULTS:  Lab Results "   Component Value Date    WBC 6.7 02/23/2023    WBC 8.0 09/26/2019    RBC 5.20 02/23/2023    RBC 4.70 09/26/2019    HGB 14.6 02/23/2023    HGB 14.5 09/26/2019    HCT 49.2 02/23/2023    HCT 41.7 09/26/2019    MCV 95 02/23/2023    MCV 89 09/26/2019    MCH 28.8 02/23/2023    MCH 30.9 09/26/2019    MCHC 30.5 (L) 02/23/2023    MCHC 34.8 09/26/2019    RDW 15.9 (H) 02/23/2023    RDW 13.7 09/26/2019     (L) 02/23/2023     (L) 09/26/2019     BMP RESULTS:  Lab Results   Component Value Date     03/15/2023     09/26/2019    POTASSIUM 3.9 03/15/2023    POTASSIUM 5.1 03/06/2023    POTASSIUM 3.4 09/26/2019    CHLORIDE 94 (L) 03/15/2023    CHLORIDE 107 03/06/2023    CHLORIDE 107 09/26/2019    CO2 36 (H) 03/15/2023    CO2 39 (H) 03/06/2023    CO2 28 09/26/2019    ANIONGAP 11 03/15/2023    ANIONGAP <1 (L) 03/06/2023    ANIONGAP 4 09/26/2019     (H) 03/15/2023    GLC 81 03/06/2023     (H) 09/26/2019    BUN 33.1 (H) 03/15/2023    BUN 15 03/06/2023    BUN 13 09/26/2019    CR 0.97 03/15/2023    CR 0.76 09/26/2019    GFRESTIMATED 78 03/15/2023    GFRESTIMATED 86 09/26/2019    GFRESTBLACK >90 09/26/2019    TATE 9.5 03/15/2023    TATE 8.4 (L) 09/26/2019      A1C RESULTS:  Lab Results   Component Value Date    A1C 8.2 (H) 01/04/2023     INR RESULTS:  No results found for: INR         Medications     Current Outpatient Medications   Medication Sig Dispense Refill     amiodarone (PACERONE) 200 MG tablet Take 1 tablet (200 mg) by mouth daily 90 tablet 3     bumetanide (BUMEX) 2 MG tablet 2 mg (1 tab) in AM, 1 mg (1/2 tab) in afternoon 45 tablet 3     clopidogrel (PLAVIX) 75 MG tablet Take 1 tablet (75 mg) by mouth daily 90 tablet 3     glipiZIDE (GLUCOTROL) 5 MG tablet 2 tabs/10 mg QAM (managed by PCP)       insulin glargine (LANTUS PEN) 100 UNIT/ML pen Inject 14 Units Subcutaneous At Bedtime 15 mL      levothyroxine (SYNTHROID/LEVOTHROID) 75 MCG tablet Take 75 mcg by mouth every morning Managed by PCP        lisinopril (ZESTRIL) 2.5 MG tablet Take 1 tablet (2.5 mg) by mouth daily 90 tablet 3     magnesium oxide (MAG-OX) 400 MG tablet Take 1 tablet (400 mg) by mouth daily 90 tablet 3     nebivolol (BYSTOLIC) 2.5 MG tablet Take 1 tablet (2.5 mg) by mouth 2 times daily 180 tablet 1     nitroGLYcerin (NITROSTAT) 0.4 MG sublingual tablet For chest pain place 1 tablet under the tongue every 5 minutes for 3 doses. If symptoms persist 5 minutes after 1st dose call 911. 15 tablet 0     oxyCODONE (ROXICODONE) 5 MG tablet Take 5 mg by mouth every 4 hours as needed for pain       polyethylene glycol (MIRALAX) 17 GM/Dose powder Take 17 g by mouth daily 510 g 0     potassium chloride ER (KLOR-CON M) 10 MEQ CR tablet Take 1 tablet (10 mEq) by mouth 2 times daily       riluzole (RILUTEK) 50 MG tablet Take 1 tablet (50 mg) by mouth every 12 hours 60 tablet 3     rivaroxaban ANTICOAGULANT (XARELTO) 20 MG TABS tablet Take 1 tablet (20 mg) by mouth daily (with dinner) 30 tablet 5     rosuvastatin (CRESTOR) 20 MG tablet Take 1 tablet (20 mg) by mouth daily 90 tablet 3          Past Medical History     Past Medical History:   Diagnosis Date     Diabetes (H)      Sleep apnea     uses CPAP machine     Thyroid disease      Past Surgical History:   Procedure Laterality Date     CHOLECYSTECTOMY      at [facility]     COLONOSCOPY      in [place], [place]     COLONOSCOPY  08/22/2017    Dr. Ja LYNN     CV CORONARY ANGIOGRAM N/A 2/21/2023    Procedure: Coronary Angiogram;  Surgeon: Andrey Watts MD;  Location: [facility] CARDIAC CATH LAB     CV CORONARY LITHOTRIPSY PCI N/A 2/21/2023    Procedure: Percutaneous Coronary Intervention - Lithotripsy;  Surgeon: Andrey Watts MD;  Location:  HEART CARDIAC CATH LAB     CV INTRAVASULAR ULTRASOUND N/A 2/21/2023    Procedure: Intravascular Ultrasound;  Surgeon: Andrey Watts MD;  Location: [facility] CARDIAC CATH LAB     CV PCI N/A 2/21/2023    Procedure: Percutaneous Coronary  Intervention;  Surgeon: Andrey Watts MD;  Location:  HEART CARDIAC CATH LAB     CV PCI ATHERECTOMY ORBITAL N/A 2/21/2023    Procedure: Percutaneous Coronary Intervention - Atherectomy Rotational;  Surgeon: Andrey Watts MD;  Location:  HEART CARDIAC CATH LAB     CV RIGHT HEART CATH MEASUREMENTS RECORDED N/A 2/21/2023    Procedure: Right Heart Catheterization;  Surgeon: Andrey Watts MD;  Location:  HEART CARDIAC CATH LAB     ESOPHAGOSCOPY, GASTROSCOPY, DUODENOSCOPY (EGD), COMBINED N/A 6/7/2016    Procedure: COMBINED ESOPHAGOSCOPY, GASTROSCOPY, DUODENOSCOPY (EGD);  Surgeon: Eneida Denton MD;  Location:  GI     Family History   Problem Relation Age of Onset     Colon Cancer No family hx of             Allergies   Metoprolol and Statin drugs [hmg-coa-r inhibitors]    50 minutes spent on the date of the encounter doing chart review, history and exam, documentation and further activities as noted above    Mirna Rodriges PA-C  [facility]  Pager: [phone]

## 2023-03-31 ENCOUNTER — HOSPITAL ENCOUNTER (OUTPATIENT)
Dept: CARDIAC REHAB | Facility: CLINIC | Age: 83
Discharge: HOME | End: 2023-03-31
Attending: STUDENT IN AN ORGANIZED HEALTH CARE EDUCATION/TRAINING PROGRAM
Payer: COMMERCIAL

## 2023-03-31 DIAGNOSIS — I50.23 ACUTE ON CHRONIC SYSTOLIC CHF (CONGESTIVE HEART FAILURE) (H): ICD-10-CM

## 2023-03-31 PROCEDURE — 93797 PHYS/QHP OP CAR RHAB WO ECG: CPT | Mod: 59

## 2023-03-31 PROCEDURE — 93798 PHYS/QHP OP CAR RHAB W/ECG: CPT

## 2023-04-02 ENCOUNTER — MYC MEDICAL ADVICE (OUTPATIENT)
Dept: CARDIOLOGY | Facility: CLINIC | Age: 83
End: 2023-04-02
Payer: COMMERCIAL

## 2023-04-02 DIAGNOSIS — I50.9 ACUTE ON CHRONIC CONGESTIVE HEART FAILURE, UNSPECIFIED HEART FAILURE TYPE (H): ICD-10-CM

## 2023-04-03 RX ORDER — BUMETANIDE 2 MG/1
TABLET ORAL
Qty: 45 TABLET | Refills: 3 | Status: ON HOLD | COMMUNITY
Start: 2023-04-03 | End: 2023-04-06

## 2023-04-03 NOTE — TELEPHONE ENCOUNTER
[facility]    Received incoming message from Mirna Morales PA-C.    Our goal home dry weight for [place] is 140#.     Lets have him increase his Bumex to 3 mg in the AM and 2 mg in the PM x1 week.  At that time, he can reduce to 2 mg twice daily.  Follow-up phone call in 1 week please.     Thanks,   Mirna ORDOÑEZ RN to call back next week for weight and sx update.    Marisela Peña, RN, BSN  [facility] [place], [place]  [facility] Clinic Care Coordinator  April 3, 2023 4:51 PM      Future Appointments   Date Time Provider Department Center   4/5/2023  1:00 PM 1, Rh Cardiac Rehab [facility] [place] [facility]   4/12/2023  1:00 PM 1, [facility] Card[facility] [place] [facility]   4/19/2023  1:00 PM 1, [facility]   4/21/2023  1:00 PM 1, [facility]   4/24/2023 12:30 PM RU LAB [facility] [place] R[facility]   4/24/2023  1:10 PM Mirna Rodriges PA-C [facility]   5/18/2023  9:00 AM Ramin Baez MD [facility]   5/18/2023 10:30 AM Becky Mahoney MD [facility]   5/18/2023 12:00 PM UC PFL D PFT [facility]   6/5/2023 12:30 PM [id number] [facility]   6/9/2023 12:45 PM SPAN[facility]ER LAB [facility] [place] JENIFER   6/9/2023  1:45 PM Spencer Rehman MD [facility] PSA CLIN

## 2023-04-04 ENCOUNTER — HOSPITAL ENCOUNTER (INPATIENT)
Facility: CLINIC | Age: 83
LOS: 2 days | Discharge: HOSPICE | DRG: 056 | End: 2023-04-06
Attending: EMERGENCY MEDICINE | Admitting: HOSPITALIST
Payer: COMMERCIAL

## 2023-04-04 ENCOUNTER — APPOINTMENT (OUTPATIENT)
Dept: CT IMAGING | Facility: CLINIC | Age: 83
DRG: 056 | End: 2023-04-04
Attending: PHYSICIAN ASSISTANT
Payer: COMMERCIAL

## 2023-04-04 ENCOUNTER — APPOINTMENT (OUTPATIENT)
Dept: GENERAL RADIOLOGY | Facility: CLINIC | Age: 83
DRG: 056 | End: 2023-04-04
Attending: EMERGENCY MEDICINE
Payer: COMMERCIAL

## 2023-04-04 DIAGNOSIS — J96.02 ACUTE RESPIRATORY FAILURE WITH HYPOXIA AND HYPERCAPNIA (H): ICD-10-CM

## 2023-04-04 DIAGNOSIS — G12.21 ALS (AMYOTROPHIC LATERAL SCLEROSIS) (H): ICD-10-CM

## 2023-04-04 DIAGNOSIS — J96.01 ACUTE RESPIRATORY FAILURE WITH HYPOXIA AND HYPERCAPNIA (H): ICD-10-CM

## 2023-04-04 DIAGNOSIS — Z86.79 HISTORY OF HEART FAILURE: ICD-10-CM

## 2023-04-04 DIAGNOSIS — R06.89 CO2 NARCOSIS: Primary | ICD-10-CM

## 2023-04-04 LAB
ALBUMIN SERPL BCG-MCNC: 3.5 G/DL (ref 3.5–5.2)
ALP SERPL-CCNC: 61 U/L (ref 40–129)
ALT SERPL W P-5'-P-CCNC: 15 U/L (ref 10–50)
ANION GAP SERPL CALCULATED.3IONS-SCNC: 15 MMOL/L (ref 7–15)
AST SERPL W P-5'-P-CCNC: 29 U/L (ref 10–50)
BASOPHILS # BLD AUTO: 0 10E3/UL (ref 0–0.2)
BASOPHILS NFR BLD AUTO: 1 %
BILIRUB SERPL-MCNC: 0.6 MG/DL
BUN SERPL-MCNC: 25 MG/DL (ref 8–23)
CALCIUM SERPL-MCNC: 10 MG/DL (ref 8.8–10.2)
CHLORIDE SERPL-SCNC: 93 MMOL/L (ref 98–107)
CREAT SERPL-MCNC: 1.04 MG/DL (ref 0.67–1.17)
DEPRECATED HCO3 PLAS-SCNC: 33 MMOL/L (ref 22–29)
EOSINOPHIL # BLD AUTO: 0.2 10E3/UL (ref 0–0.7)
EOSINOPHIL NFR BLD AUTO: 3 %
ERYTHROCYTE [DISTWIDTH] IN BLOOD BY AUTOMATED COUNT: 15 % (ref 10–15)
FLUAV RNA SPEC QL NAA+PROBE: NEGATIVE
FLUBV RNA RESP QL NAA+PROBE: NEGATIVE
GFR SERPL CREATININE-BSD FRML MDRD: 72 ML/MIN/1.73M2
GLUCOSE BLDC GLUCOMTR-MCNC: 163 MG/DL (ref 70–99)
GLUCOSE BLDC GLUCOMTR-MCNC: 280 MG/DL (ref 70–99)
GLUCOSE SERPL-MCNC: 269 MG/DL (ref 70–99)
HCO3 BLDV-SCNC: 45 MMOL/L (ref 21–28)
HCT VFR BLD AUTO: 45 % (ref 40–53)
HGB BLD-MCNC: 14.5 G/DL (ref 13.3–17.7)
IMM GRANULOCYTES # BLD: 0 10E3/UL
IMM GRANULOCYTES NFR BLD: 0 %
LACTATE BLD-SCNC: 1.2 MMOL/L
LYMPHOCYTES # BLD AUTO: 1.2 10E3/UL (ref 0.8–5.3)
LYMPHOCYTES NFR BLD AUTO: 20 %
MCH RBC QN AUTO: 28 PG (ref 26.5–33)
MCHC RBC AUTO-ENTMCNC: 32.2 G/DL (ref 31.5–36.5)
MCV RBC AUTO: 87 FL (ref 78–100)
MONOCYTES # BLD AUTO: 0.5 10E3/UL (ref 0–1.3)
MONOCYTES NFR BLD AUTO: 8 %
NEUTROPHILS # BLD AUTO: 4.2 10E3/UL (ref 1.6–8.3)
NEUTROPHILS NFR BLD AUTO: 68 %
NRBC # BLD AUTO: 0 10E3/UL
NRBC BLD AUTO-RTO: 0 /100
NT-PROBNP SERPL-MCNC: 466 PG/ML (ref 0–1800)
PCO2 BLDV: 72 MM HG (ref 40–50)
PH BLDV: 7.4 [PH] (ref 7.32–7.43)
PLATELET # BLD AUTO: 123 10E3/UL (ref 150–450)
PO2 BLDV: 42 MM HG (ref 25–47)
POTASSIUM SERPL-SCNC: 4.5 MMOL/L (ref 3.4–5.3)
PROT SERPL-MCNC: 6.4 G/DL (ref 6.4–8.3)
RBC # BLD AUTO: 5.18 10E6/UL (ref 4.4–5.9)
RSV RNA SPEC NAA+PROBE: NEGATIVE
SAO2 % BLDV: 75 % (ref 94–100)
SARS-COV-2 RNA RESP QL NAA+PROBE: NEGATIVE
SODIUM SERPL-SCNC: 141 MMOL/L (ref 136–145)
TROPONIN T SERPL HS-MCNC: 94 NG/L
WBC # BLD AUTO: 6.1 10E3/UL (ref 4–11)

## 2023-04-04 PROCEDURE — 250N000012 HC RX MED GY IP 250 OP 636 PS 637: Performed by: PHYSICIAN ASSISTANT

## 2023-04-04 PROCEDURE — 84484 ASSAY OF TROPONIN QUANT: CPT | Performed by: EMERGENCY MEDICINE

## 2023-04-04 PROCEDURE — 250N000011 HC RX IP 250 OP 636: Performed by: HOSPITALIST

## 2023-04-04 PROCEDURE — 99223 1ST HOSP IP/OBS HIGH 75: CPT | Mod: FS | Performed by: PHYSICIAN ASSISTANT

## 2023-04-04 PROCEDURE — 85004 AUTOMATED DIFF WBC COUNT: CPT | Performed by: EMERGENCY MEDICINE

## 2023-04-04 PROCEDURE — 99207 PR APP CREDIT; MD BILLING SHARED VISIT: CPT | Performed by: PHYSICIAN ASSISTANT

## 2023-04-04 PROCEDURE — 87637 SARSCOV2&INF A&B&RSV AMP PRB: CPT | Performed by: PHYSICIAN ASSISTANT

## 2023-04-04 PROCEDURE — 250N000009 HC RX 250: Performed by: HOSPITALIST

## 2023-04-04 PROCEDURE — 999N000157 HC STATISTIC RCP TIME EA 10 MIN

## 2023-04-04 PROCEDURE — 250N000013 HC RX MED GY IP 250 OP 250 PS 637: Performed by: PHYSICIAN ASSISTANT

## 2023-04-04 PROCEDURE — 210N000002 HC R&B HEART CARE

## 2023-04-04 PROCEDURE — 85025 COMPLETE CBC W/AUTO DIFF WBC: CPT | Performed by: EMERGENCY MEDICINE

## 2023-04-04 PROCEDURE — 94150 VITAL CAPACITY TEST: CPT

## 2023-04-04 PROCEDURE — 36415 COLL VENOUS BLD VENIPUNCTURE: CPT | Performed by: EMERGENCY MEDICINE

## 2023-04-04 PROCEDURE — C9803 HOPD COVID-19 SPEC COLLECT: HCPCS

## 2023-04-04 PROCEDURE — 83880 ASSAY OF NATRIURETIC PEPTIDE: CPT | Performed by: EMERGENCY MEDICINE

## 2023-04-04 PROCEDURE — 71260 CT THORAX DX C+: CPT

## 2023-04-04 PROCEDURE — 94660 CPAP INITIATION&MGMT: CPT

## 2023-04-04 PROCEDURE — 99285 EMERGENCY DEPT VISIT HI MDM: CPT | Mod: 25

## 2023-04-04 PROCEDURE — 83605 ASSAY OF LACTIC ACID: CPT

## 2023-04-04 PROCEDURE — 93005 ELECTROCARDIOGRAM TRACING: CPT

## 2023-04-04 PROCEDURE — 99223 1ST HOSP IP/OBS HIGH 75: CPT | Mod: AI | Performed by: HOSPITALIST

## 2023-04-04 PROCEDURE — 80053 COMPREHEN METABOLIC PANEL: CPT | Performed by: EMERGENCY MEDICINE

## 2023-04-04 PROCEDURE — 71046 X-RAY EXAM CHEST 2 VIEWS: CPT

## 2023-04-04 RX ORDER — BUMETANIDE 1 MG/1
2 TABLET ORAL
Status: DISCONTINUED | OUTPATIENT
Start: 2023-04-04 | End: 2023-04-06 | Stop reason: HOSPADM

## 2023-04-04 RX ORDER — IOPAMIDOL 755 MG/ML
73 INJECTION, SOLUTION INTRAVASCULAR ONCE
Status: COMPLETED | OUTPATIENT
Start: 2023-04-04 | End: 2023-04-04

## 2023-04-04 RX ORDER — LEVOTHYROXINE SODIUM 75 UG/1
75 TABLET ORAL
Status: DISCONTINUED | OUTPATIENT
Start: 2023-04-05 | End: 2023-04-06 | Stop reason: HOSPADM

## 2023-04-04 RX ORDER — NEBIVOLOL 2.5 MG/1
2.5 TABLET ORAL 2 TIMES DAILY
Status: DISCONTINUED | OUTPATIENT
Start: 2023-04-04 | End: 2023-04-06 | Stop reason: HOSPADM

## 2023-04-04 RX ORDER — PANTOPRAZOLE SODIUM 40 MG/1
40 TABLET, DELAYED RELEASE ORAL 2 TIMES DAILY
Status: DISCONTINUED | OUTPATIENT
Start: 2023-04-04 | End: 2023-04-06 | Stop reason: HOSPADM

## 2023-04-04 RX ORDER — POLYETHYLENE GLYCOL 3350 17 G/17G
17 POWDER, FOR SOLUTION ORAL DAILY
Status: DISCONTINUED | OUTPATIENT
Start: 2023-04-05 | End: 2023-04-06 | Stop reason: HOSPADM

## 2023-04-04 RX ORDER — ROSUVASTATIN CALCIUM 20 MG/1
20 TABLET, COATED ORAL DAILY
Status: DISCONTINUED | OUTPATIENT
Start: 2023-04-05 | End: 2023-04-06 | Stop reason: HOSPADM

## 2023-04-04 RX ORDER — ACETAMINOPHEN 325 MG/1
650 TABLET ORAL EVERY 6 HOURS PRN
Status: DISCONTINUED | OUTPATIENT
Start: 2023-04-04 | End: 2023-04-06 | Stop reason: HOSPADM

## 2023-04-04 RX ORDER — DEXTROSE MONOHYDRATE 25 G/50ML
25-50 INJECTION, SOLUTION INTRAVENOUS
Status: DISCONTINUED | OUTPATIENT
Start: 2023-04-04 | End: 2023-04-06 | Stop reason: HOSPADM

## 2023-04-04 RX ORDER — ACETAMINOPHEN 650 MG/1
650 SUPPOSITORY RECTAL EVERY 6 HOURS PRN
Status: DISCONTINUED | OUTPATIENT
Start: 2023-04-04 | End: 2023-04-06 | Stop reason: HOSPADM

## 2023-04-04 RX ORDER — POTASSIUM CHLORIDE 750 MG/1
10 TABLET, EXTENDED RELEASE ORAL 2 TIMES DAILY
Status: DISCONTINUED | OUTPATIENT
Start: 2023-04-04 | End: 2023-04-06 | Stop reason: HOSPADM

## 2023-04-04 RX ORDER — CLOPIDOGREL BISULFATE 75 MG/1
75 TABLET ORAL DAILY
Status: DISCONTINUED | OUTPATIENT
Start: 2023-04-05 | End: 2023-04-06 | Stop reason: HOSPADM

## 2023-04-04 RX ORDER — LIDOCAINE 40 MG/G
CREAM TOPICAL
Status: DISCONTINUED | OUTPATIENT
Start: 2023-04-04 | End: 2023-04-06 | Stop reason: HOSPADM

## 2023-04-04 RX ORDER — RILUZOLE 50 MG/1
50 TABLET, FILM COATED ORAL EVERY 12 HOURS
Status: DISCONTINUED | OUTPATIENT
Start: 2023-04-04 | End: 2023-04-06 | Stop reason: HOSPADM

## 2023-04-04 RX ORDER — NICOTINE POLACRILEX 4 MG
15-30 LOZENGE BUCCAL
Status: DISCONTINUED | OUTPATIENT
Start: 2023-04-04 | End: 2023-04-06 | Stop reason: HOSPADM

## 2023-04-04 RX ORDER — LISINOPRIL 2.5 MG/1
2.5 TABLET ORAL DAILY
Status: DISCONTINUED | OUTPATIENT
Start: 2023-04-04 | End: 2023-04-06 | Stop reason: HOSPADM

## 2023-04-04 RX ORDER — AMIODARONE HYDROCHLORIDE 200 MG/1
200 TABLET ORAL DAILY
Status: DISCONTINUED | OUTPATIENT
Start: 2023-04-05 | End: 2023-04-06 | Stop reason: HOSPADM

## 2023-04-04 RX ADMIN — NEBIVOLOL HYDROCHLORIDE 2.5 MG: 2.5 TABLET ORAL at 21:00

## 2023-04-04 RX ADMIN — IOPAMIDOL 73 ML: 755 INJECTION, SOLUTION INTRAVENOUS at 16:36

## 2023-04-04 RX ADMIN — RIVAROXABAN 20 MG: 20 TABLET, FILM COATED ORAL at 18:40

## 2023-04-04 RX ADMIN — INSULIN GLARGINE 30 UNITS: 100 INJECTION, SOLUTION SUBCUTANEOUS at 21:00

## 2023-04-04 RX ADMIN — RILUZOLE 50 MG: 50 TABLET, FILM COATED ORAL at 21:11

## 2023-04-04 RX ADMIN — PANTOPRAZOLE SODIUM 40 MG: 40 TABLET, DELAYED RELEASE ORAL at 21:00

## 2023-04-04 RX ADMIN — INSULIN ASPART 2 UNITS: 100 INJECTION, SOLUTION INTRAVENOUS; SUBCUTANEOUS at 21:00

## 2023-04-04 RX ADMIN — SODIUM CHLORIDE 62 ML: 9 INJECTION, SOLUTION INTRAVENOUS at 16:36

## 2023-04-04 ASSESSMENT — ACTIVITIES OF DAILY LIVING (ADL)
DRESS: 0-->ASSISTANCE NEEDED (DEVELOPMENTALLY APPROPRIATE)
DRESSING/BATHING_DIFFICULTY: YES
DRESS: 1-->ASSISTANCE (EQUIPMENT/PERSON) NEEDED
TRANSFERRING: 1-->ASSISTANCE (EQUIPMENT/PERSON) NEEDED
TOILETING: 1-->ASSISTANCE (EQUIPMENT/PERSON) NEEDED (NOT DEVELOPMENTALLY APPROPRIATE)
CHANGE_IN_FUNCTIONAL_STATUS_SINCE_ONSET_OF_CURRENT_ILLNESS/INJURY: YES
WEAR_GLASSES_OR_BLIND: NO
ADLS_ACUITY_SCORE: 47
TOILETING_ASSISTANCE: TOILETING DIFFICULTY, ASSISTANCE 1 PERSON
DIFFICULTY_EATING/SWALLOWING: YES
CONCENTRATING,_REMEMBERING_OR_MAKING_DECISIONS_DIFFICULTY: NO
WALKING_OR_CLIMBING_STAIRS_DIFFICULTY: YES
DRESSING/BATHING_MANAGEMENT: NEEDS ASSIST
TRANSFERRING: 0-->ASSISTANCE NEEDED (DEVELOPMENTALLY APPROPRIATE)
EATING: 0-->INDEPENDENT
SWALLOWING: 2-->DIFFICULTY SWALLOWING FOODS
ADLS_ACUITY_SCORE: 43
ADLS_ACUITY_SCORE: 35
BATHING: 1-->ASSISTANCE NEEDED
SWALLOWING: 2-->DIFFICULTY SWALLOWING FOODS
EATING: 0-->INDEPENDENT
ADLS_ACUITY_SCORE: 35
ADLS_ACUITY_SCORE: 35
TOILETING_ISSUES: YES
DRESSING/BATHING: BATHING DIFFICULTY, ASSISTANCE 1 PERSON
ADLS_ACUITY_SCORE: 47
DOING_ERRANDS_INDEPENDENTLY_DIFFICULTY: YES
EQUIPMENT_CURRENTLY_USED_AT_HOME: WALKER, ROLLING
ADLS_ACUITY_SCORE: 35
FALL_HISTORY_WITHIN_LAST_SIX_MONTHS: NO
WALKING_OR_CLIMBING_STAIRS: AMBULATION DIFFICULTY, ASSISTANCE 1 PERSON
TOILETING: 1-->ASSISTANCE (EQUIPMENT/PERSON) NEEDED
EATING/SWALLOWING: SWALLOWING SOLID FOOD

## 2023-04-04 NOTE — CONSULTS
[facility]    Cardiology Consultation     Date of Admission:  4/4/2023    Assessment & Plan   Carl Vázquez is a 82 year old male who was admitted on 4/4/2023.    1.  Chronic heart failure with reduced LVEF. Echo in February of 2022 showed EF 25 - 30% and moderately decreased RV function in the setting of tachycardia. Biventricular dysfunction suspected to be tachycardia mediated and possibly ischemic.  -  NtproBNP 444 on admission.   -  On lisinopril 2.5 mg daily and Bystolic 2.5 mg BID.   -  On escalating doses of diuretics as an outpatient - just increased to 4 mg in the morning and 2 mg in the afternoon.   -  Had RHC on 2/21/23 showing he was hypovolemic with a mean RA 3 and mean PCP of 4 thus. Weight at that time was 144 lbs.   2.  Acute on chronic hypoxic respiratory failure.   3.  PAF. On amiodarone. In NSR on admission.   4.  CAD. S/p JESSE x 4 in the LAD in February of 2023.   -  On plavix, Xarelto, crestor 20 mg daily.   5.  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 On riluzole.  6.  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  Hypertension.  8.  DM type II.    Carl is admitted with progressive dyspnea and acute on chronic hypercapnic respiratory failure. He has been on escalating doses of Bumex as an outpatient with some improvement in peripheral edema symptoms however no change in his dyspnea. He has been using BiPAP at home prescribed by neurology which has been beneficial. On CXR he has small effusions, stable from February and his NTproBNP is nearly normal for his age at 466. His weight is essentially the same as when he had his RHC done in February at which point he was hypovolemic. At this point, do not suspect decompensated heart failure as the primary etiology of his dyspnea. Would consider neurology evaluation given recent diagnosis of likely ALS.    At this point, would not advise aggressive diuresis at this time. Would continue his prior  to admission bumex with monitor of his renal function. It would be reasonable to repeat a limited echocardiogram to reassess his LV function given he has been back in NSR for some time.     He is in NSR on presentation today. Continue amiodarone for PAF and Xarelto for anticogaulation. He is also on plavix given his recent stenting.     Thank you for the consult. Discussed with Dr. Sotelo. Please call with questions.    Allison Sherwood PA-C, MD    Primary Care Physician   Kyler Mcwilliams    Reason for Consult   Reason for consult: I was asked by Brenda Polk PA-C to evaluate this patient for worsening shortness of breath.     History of Present Illness   Carl Vázquez is a 82 year old male who presents with progressive shortness of breath.  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    He has had frequent hospitalizations over the last 6 months with issues with atrial fibrillation, pleural effusions requiring thoracentesis, congestive heart failure, COVID-19 infection in January.  He is also been seen by GI due to concerns for possible cirrhosis.    His last hospitalization was in February.  He was hospitalized for 6 days.  He presented with worsening shortness of breath and hypoxia.  His atrial arrhythmias were historically managed with a rate control strategy however he was started on amiodarone this admission.  He had an  echocardiogram that showed severe LV dysfunction with an EF of 25 to 30%.  Notably, he was quite tachycardic during the study.  His ejection fraction was normal on an echo in June 2022.  He underwent a left and right heart catheterization on 2/21/2023.  He was found to have significant disease of his LAD.  He underwent PCI of the LAD with rotational atherectomy and lithotripsy.  He received a total of 4 drug-eluting stents from the ostial to distal LAD.  His cardiac output was normal on right heart catheterization and filling pressures were low with a mean RA of 3. He was not discharged on diuretics.  He was discharged on Xarelto and Plavix given his stenting.    He has had several visits in the cardiology clinic since that time.  He was seen on 3/7/2023 was started on Bumex.  He lost 6 pounds with that and had improvement in his peripheral edema.  He had some worsening renal insufficiency however and Bumex was cut back to 2 mg in the morning and 1 mg in the afternoon on 3/15/2023.  Most recently, he was seen on 3/28.  He was felt to be hypervolemic and Bumex was increased to 2 mg twice daily for 1 week.  Weight at that time on his home scale was 144 pounds. He has some mild peripheral edema but this is much better than a few weeks ago. He has not had any improvement in his dyspnea with diuretic therapy.     Unfortunately, he has continued to have intermittent shortness of breath.  He recently received a BiPAP and has been using that with some improvement but today despite being on his BiPAP for an hour and a half he had profound dyspnea and thus he is on evaluation in the emergency room. In the emergency department, EKG showed normal sinus rhythm.  He does have some T wave inversions anteriorly which have been seen previously.  Troponin was mildly elevated at 96.  His troponin was mildly elevated but flat during his hospitalization in February. CXR showed small bilateral effusions similar to CXR on 2/17/23.  Creatinine is 1.04 with BUN of 25. Notably, NtproBNP level is normal in the 400s. Hemoglobin is normal. He is being admitted for further evaluation and concern for CHF.     Of note, he recently had an extensive neurologic evaluation with suspicion for underlying ALS.  He was recently started on riluzole. Additionally, he and his wife are somewhat frustrated as they note his pills have been getting stuck. He was scheduled to undergo esophageal dilation but this has had to be deferred due to his recent stenting and need for antiplatelet therapy. He has also had to cancel some dental work.      Past Medical History   Past Medical History:   Diagnosis Date     Diabetes (H)      Sleep apnea     uses CPAP machine     Thyroid disease        Past Surgical History   Past Surgical History:   Procedure Laterality Date     CHO[facility]STECTOMY      at [facility]     COLONOSCOPY      in [place], [place]     COLONOSCOPY  08/22/2017    Dr. Ja LYNN     CV CORONARY ANGIOGRAM N/A 2/21/2023    Procedure: Coronary Angiogram;  Surgeon: Andrey Watts MD;  Location: [facility] CARDIAC CATH LAB     CV CORONARY LITHOTRIPSY PCI N/A 2/21/2023    Procedure: Percutaneous Coronary Intervention - Lithotripsy;  Surgeon: Andrey Watts MD;  Location: [facility] CARDIAC CATH LAB     CV INTRAVASULAR ULTRASOUND N/A 2/21/2023    Procedure: Intravascular Ultrasound;  Surgeon: Andrey Watts MD;  Location: [facility] CARDIAC CATH LAB     CV PCI N/A 2/21/2023    Procedure: Percutaneous Coronary Intervention;  Surgeon: Andrey Watts MD;  Location: [facility] CARDIAC CATH LAB     CV PCI ATHERECTOMY ORBITAL N/A 2/21/2023    Procedure: Percutaneous Coronary Intervention - Atherectomy Rotational;  Surgeon: Andrey Watts MD;  Location: [facility] CARDIAC CATH LAB     CV RIGHT HEART CATH MEASUREMENTS RECORDED N/A 2/21/2023    Procedure: Right Heart Catheterization;  Surgeon: Andrey Watts MD;  Location: [facility]  CATH LAB     ESOPHAGOSCOPY, GASTROSCOPY, DUODENOSCOPY (EGD), COMBINED N/A 6/7/2016    Procedure: COMBINED ESOPHAGOSCOPY, GASTROSCOPY, DUODENOSCOPY (EGD);  Surgeon: Eneida Denton MD;  Location:  GI       Prior to Admission Medications   Prior to Admission Medications   Prescriptions Last Dose Informant Patient Reported? Taking?   amiodarone (PACERONE) 200 MG tablet 4/4/2023  No Yes   Sig: Take 1 tablet (200 mg) by mouth daily   bumetanide (BUMEX) 2 MG tablet 4/4/2023  Yes Yes   Sig: Take 3 mg in the AM and 2 mg at noon x 1 week. After that (4/11/23), he can reduce to 2 mg twice daily   clopidogrel (PLAVIX) 75 MG tablet 4/4/2023  No Yes   Sig: Take 1 tablet (75 mg) by mouth daily   glipiZIDE (GLUCOTROL) 5 MG tablet 4/4/2023 Spouse/Significant Other Yes Yes   Sig: Take 10 mg by mouth daily   insulin glargine (LANTUS PEN) 100 UNIT/ML pen 4/3/2023  Yes Yes   Sig: Inject 60 Units Subcutaneous At Bedtime   levothyroxine (SYNTHROID/LEVOTHROID) 75 MCG tablet 4/4/2023 Spouse/Significant Other Yes Yes   Sig: Take 75 mcg by mouth every morning Managed by PCP   lisinopril (ZESTRIL) 2.5 MG tablet 4/4/2023  No Yes   Sig: Take 1 tablet (2.5 mg) by mouth daily   magnesium oxide (MAG-OX) 400 MG tablet 4/4/2023  No Yes   Sig: Take 1 tablet (400 mg) by mouth daily   nebivolol (BYSTOLIC) 2.5 MG tablet 4/4/2023  No Yes   Sig: Take 1 tablet (2.5 mg) by mouth 2 times daily   nitroGLYcerin (NITROSTAT) 0.4 MG sublingual tablet   No No   Sig: For chest pain place 1 tablet under the tongue every 5 minutes for 3 doses. If symptoms persist 5 minutes after 1st dose call 911.   oxyCODONE (ROXICODONE) 5 MG tablet  at PRN Spouse/Significant Other Yes Yes   Sig: Take 5 mg by mouth every 4 hours as needed for pain   pantoprazole (PROTONIX) 40 MG EC tablet 4/4/2023  Yes Yes   Sig: Take 40 mg by mouth 2 times daily   polyethylene glycol (MIRALAX) 17 GM/Dose powder 4/4/2023 Spouse/Significant Other No Yes   Sig: Take 17 g by mouth daily    potassium chloride ER (KLOR-CON M) 10 MEQ CR tablet 4/4/2023  No Yes   Sig: Take 1 tablet (10 mEq) by mouth 2 times daily   riluzole (RILUTEK) 50 MG tablet 4/4/2023  No Yes   Sig: Take 1 tablet (50 mg) by mouth every 12 hours   rivaroxaban ANTICOAGULANT (XARELTO) 20 MG TABS tablet 4/3/2023 Spouse/Significant Other No Yes   Sig: Take 1 tablet (20 mg) by mouth daily (with dinner)   rosuvastatin (CRESTOR) 20 MG tablet 4/4/2023  No Yes   Sig: Take 1 tablet (20 mg) by mouth daily      Facility-Administered Medications: None         Allergies   Allergies   Allergen Reactions     Metoprolol Shortness Of Breath     Tried 1/2022, 1/2023, both time stopped for increased SOB     Statin Drugs [Hmg-Coa-R Inhibitors]        Social History    reports that he has never smoked. He has never used smokeless tobacco. He reports that he does not drink alcohol and does not use drugs.    Family History     Family history noncontributory     Review of Systems   A comprehensive review of system was performed and is negative other than that noted in the HPI or here.     Physical Exam   Vital Signs with Ranges  Temp:  [98.2  F (36.8  C)] 98.2  F (36.8  C)  Pulse:  [64-73] 69  Resp:  [12-31] 24  BP: ()/(59-75) 142/75  SpO2:  [86 %-99 %] 96 %  Wt Readings from Last 4 Encounters:   03/28/23 65.3 kg (144 lb)   03/15/23 66.6 kg (146 lb 12.8 oz)   03/07/23 66.2 kg (146 lb)   03/02/23 68 kg (150 lb)     No intake/output data recorded.      Vitals: BP (!) 142/75   Pulse 69   Temp 98.2  F (36.8  C) (Temporal)   Resp 24   SpO2 96%     Physical Exam:   General - Alert and oriented to time place and person in no acute distress  Eyes - No scleral icterus  HEENT - Neck supple, moist mucous membranes  Cardiovascular - RRR, S1, S2. No murmur  Extremities - There is 1+ pitting edema bilaterally to the mid shin.   Respiratory - breath sounds mildly decreased at the bases but otherwise clear.   Skin - No pallor or cyanosis  Gastrointestinal - Non  tender and non distended without rebound or guarding  Psych - Appropriate affect   Neurological - No gross motor neurological focal deficits    No lab results found in last 7 days.    Invalid input(s): TROPONINIES    Recent Labs   Lab 04/04/23  0936   WBC 6.1   HGB 14.5   MCV 87   *      POTASSIUM 4.5   CHLORIDE 93*   CO2 33*   BUN 25.0*   CR 1.04   GFRESTIMATED 72   ANIONGAP 15   TATE 10.0   *   ALBUMIN 3.5   PROTTOTAL 6.4   BILITOTAL 0.6   ALKPHOS 61   ALT 15   AST 29     No results for input(s): CHOL, HDL, LDL, TRIG, CHOLHDLRATIO in the last 20651 hours.  Recent Labs   Lab 04/04/23  0936   WBC 6.1   HGB 14.5   HCT 45.0   MCV 87   *     Recent Labs   Lab 04/04/23  1106   PH 7.40   HCO3V 45*     Recent Labs   Lab 04/04/23  0936   NTBNPI 466     No results for input(s): DD in the last 168 hours.  No results for input(s): SED, CRP in the last 168 hours.  Recent Labs   Lab 04/04/23  0936   *     No results for input(s): TSH in the last 168 hours.  No results for input(s): COLOR, APPEARANCE, URINEGLC, URINEBILI, URINEKETONE, SG, UBLD, URINEPH, PROTEIN, UROBILINOGEN, NITRITE, LEUKEST, RBCU, WBCU in the last 168 hours.    Imaging:  Recent Results (from the past 48 hour(s))   Chest XR,  PA & LAT    Narrative    XR CHEST 2 VIEWS 4/4/2023 11:19 AM    HISTORY: shortness of breath    COMPARISON: 2/18/2023      Impression    IMPRESSION: Small bilateral pleural effusions and mild bibasilar  atelectasis, stable. No pneumothorax. Normal heart size. Coronary  stent. Cholecystectomy.    DEEPAK OLIVER MD         SYSTEM ID:  [id number]       Echo:  No results found for this or any previous visit (from the past 4320 hour(s)).    Clinically Significant Risk Factors Present on Admission               # Drug Induced Coagulation Defect: home medication list includes an anticoagulant medication  # Drug Induced Platelet Defect: home medication list includes an antiplatelet medication   # DMII: A1C = N/A within  past 6 months    Fluid & Electrolyte Disorders: Fluid overload, unspecified    Gastroenterology: Chronic Liver Disease: Other cirrhosis of liver    Hematology/Oncology: Thrombocytopenia Including Purpuric, HIT, & Other Platelet Defects: Thrombocytopenia, unspecified    Limitations of activities/Fatigue (optional): Chronic Fatigue and Other Debilities: Age-related physical debility  Chronic fatigue, unspecified  Limitation of activities due to disability

## 2023-04-04 NOTE — ED NOTES
Ate lunch, sitting up in chair.  Spouse at bedside.  RR 22-24.  On 2L NC.  States not much change to SOB.

## 2023-04-04 NOTE — ED PROVIDER NOTES
History     Chief Complaint:  Shortness of Breath       HPI   Carl Vázquez is a 82 year old male with a history of congestive heart failure, atrial fibrillation/flutter, and an undetermined lower motor neuron disease presents to us with progressive shortness of breath.  The patient was admitted to this hospital in December, January, and February for shortness of breath, undergoing a thoracentesis and diuresis.  He notes in the past 6 weeks, he has been compliant with his medications.  However, he has felt more short of breath over the past week.  This is present at all times, both sitting and with any exertion.  He has been weighing himself regularly and has had an elevation of his weight from his dry weight of 140 pounds up to 144 before the weekend.  He was advised to increase his Bumex, but despite this, his weight continues to be elevated and his shortness of breath is not improved.  He is concerned that he may have return of his pleural effusions.  He denies any associated chest pain.  He denies fevers.  He denies cough.  He denies other complaints at this juncture.      Independent Historian:   Spouse/Partner - They report That the patient is clearly more short of breath than typical and at times seems more confused.    Review of External Notes: Yes-I reviewed his admission to the hospital for 5 days in February including his echocardiogram.  I also reviewed his visit to his neurologist recently for his lower motor neuron disease.    ROS:  Review of Systems  Pertinent positives and negatives as above.  A 14-point review of systems was performed with all other systems reviewed as negative.    Allergies:  Metoprolol  Statin Drugs [Hmg-Coa-R Inhibitors]     Medications:    amiodarone (PACERONE) 200 MG tablet  bumetanide (BUMEX) 2 MG tablet  clopidogrel (PLAVIX) 75 MG tablet  glipiZIDE (GLUCOTROL) 5 MG tablet  insulin glargine (LANTUS PEN) 100 UNIT/ML pen  levothyroxine (SYNTHROID/LEVOTHROID) 75 MCG  tablet  lisinopril (ZESTRIL) 2.5 MG tablet  magnesium oxide (MAG-OX) 400 MG tablet  nebivolol (BYSTOLIC) 2.5 MG tablet  nitroGLYcerin (NITROSTAT) 0.4 MG sublingual tablet  oxyCODONE (ROXICODONE) 5 MG tablet  pantoprazole (PROTONIX) 40 MG EC tablet  polyethylene glycol (MIRALAX) 17 GM/Dose powder  potassium chloride ER (KLOR-CON M) 10 MEQ CR tablet  riluzole (RILUTEK) 50 MG tablet  rivaroxaban ANTICOAGULANT (XARELTO) 20 MG TABS tablet  rosuvastatin (CRESTOR) 20 MG tablet        Past Medical History:    Past Medical History:   Diagnosis Date     Diabetes (H)      Sleep apnea      Thyroid disease        Past Surgical History:    Past Surgical History:   Procedure Laterality Date     CHOLECYSTECTOMY      at [facility]     COLONOSCOPY      in [place], [place]     COLONOSCOPY  08/22/2017    Dr. Peres [facility]     CV CORONARY ANGIOGRAM N/A 2/21/2023    Procedure: Coronary Angiogram;  Surgeon: Andrey Watts MD;  Location: [facility] CARDIAC CATH LAB     CV CORONARY LITHOTRIPSY PCI N/A 2/21/2023    Procedure: Percutaneous Coronary Intervention - Lithotripsy;  Surgeon: Andrey Watts MD;  Location: [facility] CARDIAC CATH LAB     CV INTRAVASULAR ULTRASOUND N/A 2/21/2023    Procedure: Intravascular Ultrasound;  Surgeon: Andrey Watts MD;  Location: [facility] CARDIAC CATH LAB     CV PCI N/A 2/21/2023    Procedure: Percutaneous Coronary Intervention;  Surgeon: Andrey Watts MD;  Location: [facility] CARDIAC CATH LAB     CV PCI ATHERECTOMY ORBITAL N/A 2/21/2023    Procedure: Percutaneous Coronary Intervention - Atherectomy Rotational;  Surgeon: Andrey Watts MD;  Location: [facility] CARDIAC CATH LAB     CV RIGHT HEART CATH MEASUREMENTS RECORDED N/A 2/21/2023    Procedure: Right Heart Catheterization;  Surgeon: Andrey Watts MD;  Location: [facility] CARDIAC CATH LAB     ESOPHAGOSCOPY, GASTROSCOPY, DUODENOSCOPY (EGD), COMBINED N/A 6/7/2016    Procedure: COMBINED ESOPHAGOSCOPY, GASTROSCOPY,  DUODENOSCOPY (EGD);  Surgeon: Eneida Denton MD;  Location:  GI        Family History:    family history is not on file.    Social History:   reports that he has never smoked. He has never used smokeless tobacco. He reports that he does not drink alcohol and does not use drugs.  PCP: Kyler Mcwilliams     Physical Exam     Patient Vitals for the past 24 hrs:   BP Temp Temp src Pulse Resp SpO2   04/04/23 1037 -- -- -- 68 12 97 %   04/04/23 1010 -- -- -- 71 (!) 31 (!) 86 %   04/04/23 0926 -- -- -- -- -- 92 %   04/04/23 0925 129/59 98.2  F (36.8  C) Temporal 69 14 --        Physical Exam   General: Fatigued but alert elderly man providing an appropriate history.    Eye:  Pupils are equal, round, and reactive.  Extraocular movements intact.    ENT:  No rhinorrhea.  Moist mucus membranes.  Normal tongue and tonsil.    Cardiac:  Regular rate and rhythm.  No murmurs, gallops, or rubs.    Pulmonary:  Clear to auscultation bilaterally.  No wheezes, rales, or rhonchi.    Abdomen:  Positive bowel sounds.  Abdomen is soft and non-distended, without focal tenderness.    Musculoskeletal:  Normal movement of all extremities without evidence for deficit.  There is 1+ edema bilaterally and symmetrically.    Skin:  Warm and dry without rashes.    Neurologic:  Non-focal exam without asymmetric weakness or numbness.     Psychiatric:  Normal affect with appropriate interaction with examiner.    Emergency Department Course   EKG was obtained at 9:27 AM.  This shows a normal sinus rhythm with a rate of 75.  There is a normal axis and normal intervals.  There is significant artifact from tremors.  Otherwise, there is no clear ST elevation, depression, or new T wave inversion that is concerning for acute ischemia.  This was read by myself at 10:20 AM.    Imaging:  Chest XR,  PA & LAT   Final Result   IMPRESSION: Small bilateral pleural effusions and mild bibasilar   atelectasis, stable. No pneumothorax. Normal heart size. Coronary    stent. Cholecystectomy.      DEEPAK OLIVER MD            SYSTEM ID:  [id number]         Report per radiology    Laboratory:  Labs Ordered and Resulted from Time of ED Arrival to Time of ED Departure   CBC WITH PLATELETS AND DIFFERENTIAL - Abnormal       Result Value    WBC Count 6.1      RBC Count 5.18      Hemoglobin 14.5      Hematocrit 45.0      MCV 87      MCH 28.0      MCHC 32.2      RDW 15.0      Platelet Count 123 (*)     % Neutrophils 68      % Lymphocytes 20      % Monocytes 8      % Eosinophils 3      % Basophils 1      % Immature Granulocytes 0      NRBCs per 100 WBC 0      Absolute Neutrophils 4.2      Absolute Lymphocytes 1.2      Absolute Monocytes 0.5      Absolute Eosinophils 0.2      Absolute Basophils 0.0      Absolute Immature Granulocytes 0.0      Absolute NRBCs 0.0     COMPREHENSIVE METABOLIC PANEL   TROPONIN T, HIGH SENSITIVITY          Emergency Department Course & Assessments:             Interventions:  Medications - No data to display     Independent Interpretation (X-rays, CTs, rhythm strip):  Yes-I reviewed his chest x-ray.    Consultations/Discussion of Management or Tests:  None        Social Determinants of Health affecting care:   None    Disposition:  The patient was admitted to the hospital under the care of Dr. Martinez     Impression & Plan    CMS Diagnoses: None      Medical Decision Making:  This unfortunate 82-year-old man with multiple frequent admissions for respiratory issues returns to us again with shortness of breath over the past week.  He is hypoxic to 86% and VBG shows a compensated respiratory acidosis with hypercapnia.  He is oxygenating well on 2 L by supplemental oxygen.  As reviewed in his last admission and discharge summary, this respiratory issue is likely multifactorial, associated with paralyzed hemidiaphragm, heart failure, and lower motor neuron disease.    On my exam, his weight has increased to 145 with his dry weight goal of 140.  He has lower extremity  edema.  Chest x-ray shows return of small effusions bilaterally.  Otherwise, I have limited concerns for infection.  I considered pulmonary embolism though this seems less likely.  Plan will be for admission to the hospital for diuresis and further work-up along with possible need for chronic oxygen at home.  I spoke with our admitting hospitalist who agrees accept care of the patient.  The patient is also in agreement for admission as he states that he is now struggling to perform his activities of daily living with his degree of daily dyspnea.        Diagnosis:    ICD-10-CM    1. Acute respiratory failure with hypoxia and hypercapnia (H)  J96.01     J96.02       2. History of heart failure  Z86.79              4/4/2023   Trierweiler, Chad A, MD Trierweiler, Chad A, MD  04/04/23 1442

## 2023-04-04 NOTE — PROGRESS NOTES
RECEIVING UNIT ED HANDOFF REVIEW    ED Nurse Handoff Report was reviewed by: Yanna Warner RN on April 4, 2023 at 5:02 PM

## 2023-04-04 NOTE — ED NOTES
[facility]  ED Nurse Handoff Report    ED Chief complaint: Shortness of Breath      ED Diagnosis:   Final diagnoses:   Acute respiratory failure with hypoxia and hypercapnia (H)   History of heart failure       Code Status: To be addressed by admitting provider.     Allergies:   Allergies   Allergen Reactions     Metoprolol Shortness Of Breath     Tried 1/2022, 1/2023, both time stopped for increased SOB     Statin Drugs [Hmg-Coa-R Inhibitors]        Patient Story: Patient arrived to ED via triage for evaluation of SOB. Patient is on room air at home, uses a walker at baseline. Hx of CHF.   Focused Assessment:  SOB, O2 sat 86% on room air, on 2L nasal cannula in ED sat 98%.     Treatments and/or interventions provided: None  Patient's response to treatments and/or interventions: N/A    To be done/followed up on inpatient unit:  N/A    Does this patient have any cognitive concerns?: None    Activity level - Baseline/Home:  Walker  Activity Level - Current:   Stand with Assist and Walker    Patient's Preferred language: English   Needed?: No    Isolation: None  Infection: Not Applicable  Patient tested for COVID 19 prior to admission: NO  Bariatric?: No    Vital Signs:   Vitals:    04/04/23 1037 04/04/23 1130 04/04/23 1148 04/04/23 1149   BP:  93/61 (!) 142/75    BP Location:       Patient Position:       Pulse: 68 64 73 69   Resp: 12 22 23 24   Temp:       TempSrc:       SpO2: 97% 99%  96%       Cardiac Rhythm:     Was the PSS-3 completed:   Yes  What interventions are required if any?               Family Comments: Wife at bedside in ED.   OBS brochure/video discussed/provided to patient/family: N/A              Name of person given brochure if not patient: n/a              Relationship to patient: n/a    For the majority of the shift this patient's behavior was Green.   Behavioral interventions performed were frequent rounding, call light within reach.    ED NURSE PHONE NUMBER:  [phone]

## 2023-04-04 NOTE — CONSULTS
[facility]    Neurology Consultation    Carl Vázquez MRN# [id number]   YOB: 1940 Age: 82 year old    Code Status:No CPR- Do NOT Intubate   Date of Admission: 4/4/2023  Date of Consult:04/04/2023                                                                                       Assessment and Plan:                                         #.  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  This certainly could be a cause of neuromuscular weakness of the diaphragm/pulmonary musculature.  -- Findings were discussed with the patient.  Unfortunately this is a condition which does not have a cure.  Progression will be expected particularly given his age and medical comorbidities.  Continue supportive care.  Attempted to reach wife-she was unavailable.  I will make rounds tomorrow.        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   Patient and his wife reports he has been dealing with shortness of breath for the past 15 months or so has undergone extensive work-up for this.  Most recently he was hospitalized 2/17/2023 through 2/23/2023 after he presented  with acute respiratory failure.  During this admission he was diagnosed with new biventricular heart failure, NSTEMI, and bilateral pleural effusions requiring thoracentesis.  Since discharge he has started on regular BiPAP at night which she was tolerating well until about a week ago at which time he reports he has had to take frequent breaks due to shortness of breath despite use.  He has been following with pulmonology and had PFTs performed in March  Cardiology: having shortness of breath for more than a year.  In fact he was feeling similar shortness of breath prior to February 2023 admission when he underwent LAD PCI.  In fact both right and left-sided filling pressure at the time of cholangiogram were within normal limits.  He did have significant pedal edema that responded to switching torsemide to Bumex however even after resolution of pedal edema he did not notice any change in his breathing.  The intervention which helped his breathing was CPAP BiPAP which was prescribed by his neurologist in the setting of suspected ALS.  Overall on examination I do not see any elevated JVP or hepatojugular reflux, lungs clear to auscultation, minimal pitting pedal edema, according to patient much improved from before.  NT-proBNP within normal limits.     Longstanding dyspnea and dyspnea on exertion.  Minimal impact on symptoms despite aggressive diuresis in the recent weeks.  Overall I do not suspect symptoms to be due to underlying congestive heart failure.  He appears essentially euvolemic other than minimal pitting pedal edema bilaterally.  NT proBNP within normal limits.  JVP appears normal, lungs clear to auscultation.  I suspect that longstanding dyspnea and dyspnea on exertion could be due to neuromuscular weakness in the setting of suspected ALS along with underlying William diaphragm paralysis.  Recommend review with neurology to consider NIF testing  Neuromuscular specialist notes Dr. Baez:  Thirteen months ago  in 01/2022, he noted difficulty breathing.  He was subsequently diagnosed as having an elevated right hemidiaphragm, suspected due to cervical radiculopathy.  He has since undergone a surgical procedure on the diaphragm with only transient benefit in his respiratory distress.  He has also subsequently been hospitalized 10/2022, 01/2023 and most recently mid 02/2023 because of acute on chronic worsening of dyspnea and a pleural effusion requiring drainage.  He also has a history of atrial fibrillation, for which he was treated with a beta blocker early last year, which in turn may have exacerbated his dyspnea.  Regardless, he underwent a cardiac catheterization in February, which demonstrated evidence of substantial coronary artery disease, for which he obtained 4 stents.  He has also been kept on anticoagulants chronically because of his atrial fibrillation     3/6/23Recommendations are as follows:     1. [place] with [employer].  2. Begin Riluzole; repeat hepatic panel monthly x 3.   3. Provide patient and wife with benefits investigation forms for Radicava ORS and Relyvrio.  4. Patient to re-schedule UGI with possible dilatation of esophageal stricture.  5. Patient to notify ALS nurse when it is OK for him to stop anticoagulation for lumbar puncture, and will schedule at that time.   6. Lyme titer per family request.  7. Follow up in ALS clinic in 2-3 months.  8. Patient will benefit from pulmonary follow up; nurse to inquire whether they choose their current provider or would like an appointment with our neuromuscular pulmonologist         Past Medical History:     Past Medical History:   Diagnosis Date     Diabetes (H)      Sleep apnea     uses CPAP machine     Thyroid disease          Past Surgical History:     Past Surgical History:   Procedure Laterality Date     CHOLECYSTECTOMY      at [facility]     COLONOSCOPY      in [place], [place]     [facility]  ANGIOGRAM N/A 2/21/2023    Procedure: Coronary Angiogram;  Surgeon: Andrey Watts MD;  Location:  HEART CARDIAC CATH LAB     CV CORONARY LITHOTRIPSY PCI N/A 2/21/2023    Procedure: Percutaneous Coronary Intervention - Lithotripsy;  Surgeon: Andrey Watts MD;  Location: [facility] CARDIAC CATH LAB     CV INTRAVASULAR ULTRASOUND N/A 2/21/2023    Procedure: Intravascular Ultrasound;  Surgeon: Andrey Watts MD;  Location:  HEART CARDIAC CATH LAB     CV PCI N/A 2/21/2023    Procedure: Percutaneous Coronary Intervention;  Surgeon: Andrey Watts MD;  Location:  HEART CARDIAC CATH LAB     CV PCI ATHERECTOMY ORBITAL N/A 2/21/2023    Procedure: Percutaneous Coronary Intervention - Atherectomy Rotational;  Surgeon: Andrey Watts MD;  Location:  HEART CARDIAC CATH LAB     CV RIGHT HEART CATH MEASUREMENTS RECORDED N/A 2/21/2023    Procedure: Right Heart Catheterization;  Surgeon: Andrey Watts MD;  Location: [facility] CARDIAC CATH LAB     ESOPHAGOSCOPY, GASTROSCOPY, DUODENOSCOPY (EGD), COMBINED N/A 6/7/2016    Procedure: COMBINED ESOPHAGOSCOPY, GASTROSCOPY, DUODENOSCOPY (EGD);  Surgeon: Eneida Denton MD;  Location:  GI          Social History:     Social History     Socioeconomic History     Marital status:    Tobacco Use     Smoking status: Never     Smokeless tobacco: Never   Substance and Sexual Activity     Alcohol use: No     Comment: drank many years ago     Drug use: No           Family History:     Family History   Problem Relation Age of Onset     Colon Cancer No family hx of      Reviewed and not felt to be contributory.        Home Medications:     Prior to Admission Medications   Prescriptions Last Dose Informant Patient Reported? Taking?   amiodarone (PACERONE) 200 MG tablet 4/4/2023  No Yes   Sig: Take 1 tablet (200 mg) by mouth daily   bumetanide (BUMEX) 2 MG tablet 4/4/2023  Yes Yes   Sig: Take 3 mg in the AM and 2 mg at noon x 1  week. After that (4/11/23), he can reduce to 2 mg twice daily   clopidogrel (PLAVIX) 75 MG tablet 4/4/2023  No Yes   Sig: Take 1 tablet (75 mg) by mouth daily   glipiZIDE (GLUCOTROL) 5 MG tablet 4/4/2023 Spouse/Significant Other Yes Yes   Sig: Take 10 mg by mouth daily   insulin glargine (LANTUS PEN) 100 UNIT/ML pen 4/3/2023  Yes Yes   Sig: Inject 60 Units Subcutaneous At Bedtime   levothyroxine (SYNTHROID/LEVOTHROID) 75 MCG tablet 4/4/2023 Spouse/Significant Other Yes Yes   Sig: Take 75 mcg by mouth every morning Managed by PCP   lisinopril (ZESTRIL) 2.5 MG tablet 4/4/2023  No Yes   Sig: Take 1 tablet (2.5 mg) by mouth daily   magnesium oxide (MAG-OX) 400 MG tablet 4/4/2023  No Yes   Sig: Take 1 tablet (400 mg) by mouth daily   nebivolol (BYSTOLIC) 2.5 MG tablet 4/4/2023  No Yes   Sig: Take 1 tablet (2.5 mg) by mouth 2 times daily   nitroGLYcerin (NITROSTAT) 0.4 MG sublingual tablet   No No   Sig: For chest pain place 1 tablet under the tongue every 5 minutes for 3 doses. If symptoms persist 5 minutes after 1st dose call 911.   oxyCODONE (ROXICODONE) 5 MG tablet  at PRN Spouse/Significant Other Yes Yes   Sig: Take 5 mg by mouth every 4 hours as needed for pain   pantoprazole (PROTONIX) 40 MG EC tablet 4/4/2023  Yes Yes   Sig: Take 40 mg by mouth 2 times daily   polyethylene glycol (MIRALAX) 17 GM/Dose powder 4/4/2023 Spouse/Significant Other No Yes   Sig: Take 17 g by mouth daily   potassium chloride ER (KLOR-CON M) 10 MEQ CR tablet 4/4/2023  No Yes   Sig: Take 1 tablet (10 mEq) by mouth 2 times daily   riluzole (RILUTEK) 50 MG tablet 4/4/2023  No Yes   Sig: Take 1 tablet (50 mg) by mouth every 12 hours   rivaroxaban ANTICOAGULANT (XARELTO) 20 MG TABS tablet 4/3/2023 Spouse/Significant Other No Yes   Sig: Take 1 tablet (20 mg) by mouth daily (with dinner)   rosuvastatin (CRESTOR) 20 MG tablet 4/4/2023  No Yes   Sig: Take 1 tablet (20 mg) by mouth daily      Facility-Administered Medications: None          Allergy:      Allergies   Allergen Reactions     Metoprolol Shortness Of Breath     Tried 1/2022, 1/2023, both time stopped for increased SOB     Statin Drugs [Hmg-Coa-R Inhibitors]           Inpatient Medications:   Scheduled Meds:    [START ON 4/5/2023] amiodarone  200 mg Oral Daily     [Held by provider] bumetanide  2 mg Oral BID     [START ON 4/5/2023] clopidogrel  75 mg Oral Daily     insulin aspart  1-7 Units Subcutaneous TID AC     insulin aspart  1-5 Units Subcutaneous At Bedtime     insulin glargine  30 Units Subcutaneous At Bedtime     [START ON 4/5/2023] levothyroxine  75 mcg Oral QAM AC     [Held by provider] lisinopril  2.5 mg Oral Daily     nebivolol  2.5 mg Oral BID     pantoprazole  40 mg Oral BID     [START ON 4/5/2023] polyethylene glycol  17 g Oral Daily     [Held by provider] potassium chloride ER  10 mEq Oral BID     riluzole  50 mg Oral Q12H     rivaroxaban ANTICOAGULANT  20 mg Oral Daily with supper     [START ON 4/5/2023] rosuvastatin  20 mg Oral Daily     sodium chloride (PF)  3 mL Intracatheter Q8H     PRN Meds: acetaminophen **OR** acetaminophen, glucose **OR** dextrose **OR** glucagon, lidocaine 4%, lidocaine (buffered or not buffered), - MEDICATION INSTRUCTIONS -, sodium chloride (PF)          Physical Exam:   Physical Exam   Vitals:  Height:Data Unavailable  Weight:145 lbs 0 oz   Temp: 98.2  F (36.8  C) Temp src: Temporal BP: 106/69 Pulse: 66   Resp: 23 SpO2: 96 % O2 Device: Nasal cannula (2L)    General Appearance: Mildly short of breath  Neuro:       Mental Status Exam:   Alert and oriented to place and time.  Defers to his wife who is not present regarding his medical history       Cranial Nerves:   Vision/visual fields intact.  Some swelling of the left eyelid.  Extraocular movements intact.  Facial strength and sensation intact.  Mild tongue atrophy but strength is intact.  Hearing intact          Motor:   Atrophy and flaccid tone in all 4 extremities.  Deltoid 2/2, triceps 4/3, biceps 4/3,  digit extensors 3/3, interossei 2/2, hip flexion 3/3+, quadriceps 3/3, dorsiflexion 3 -/3 -, hip AB duction 4/4          Reflexes: Areflexic in all 4 extremities.  Plantar signs neutral.  No clonus       Sensory: Vibration and pinprick intact x4                 Coordination: Consistent with weakness       Gait: Not able to be tested  Neck: no nuchal rigidity,    Extremities: No clubbing, no cyanosis, no edema       Data:   ROUTINE IP LABS   CBC RESULTS:     Recent Labs   Lab 23  0936   WBC 6.1   RBC 5.18   HGB 14.5   HCT 45.0   *     Basic Metabolic Panel:   Recent Labs   Lab Test 23  0936 23  1011 03/15/23  1057    137 141   POTASSIUM 4.5 4.3 3.9   CHLORIDE 93* 90* 94*   CO2 33* 40* 36*   BUN 25.0* 33.9* 33.1*   CR 1.04 1.00 0.97   * 229* 198*   TATE 10.0 9.6 9.5     Liver panel:  Recent Labs   Lab Test 23  0936 23  1720 23  0607 23  1555 23  1006   PROTTOTAL 6.4 6.5 5.4* 5.1* 5.1*   ALBUMIN 3.5 3.6 2.8* 2.7* 2.7*   BILITOTAL 0.6 0.3 0.6 0.6 0.4   ALKPHOS 61 64 81 81 78   AST 29 20 15 22 25   ALT 15 16 13 14 13     Thyroid Panel:  Recent Labs   Lab Test 23  1034 23  1141 22  2056   TSH 2.39 1.64 1.49      Vitamin B12:   Recent Labs   Lab Test 23  1720 23  1959   B12 865 863      EMG 3/6/23  Interpretation:  This is an abnormal study, demonstrating electrophysiologic evidence of the followin. Widespread, severe pure motor neurogenic process as can occur in motor neuron disease, severe motor neuropathy, or polyradiculopathy.   2. 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  [facility]  Office [phone]

## 2023-04-04 NOTE — PHARMACY-ADMISSION MEDICATION HISTORY
Pharmacy Medication History  Admission medication history interview status for the 4/4/2023  admission is complete. See EPIC admission navigator for prior to admission medications     Location of Interview: Patient room  Medication history sources: Patient, Patient's family/friend (wife) and Surescripts    Significant changes made to the medication list:  None    In the past week, patient estimated taking medication this percent of the time: greater than 90%    Medication Affordability:  Not including over the counter (OTC) medications, was there a time in the past 12 months when you did not take your medications as prescribed because of cost?: Yes (Patient has been unable to be started on new ALS meds due to cost (unknown name))  Has this happened in the past 3 months?: Yes    Additional medication history information:   On 4/2 cardiology increased Bumex to 3 mg in the AM and 2 mg at noon x 1 week. After that (4/11/23), he can reduce to 2 mg twice daily    Medication reconciliation completed by provider prior to medication history? No    Time spent in this activity: 20 minutes    Prior to Admission medications    Medication Sig Last Dose Taking? Auth Provider Long Term End Date   amiodarone (PACERONE) 200 MG tablet Take 1 tablet (200 mg) by mouth daily 4/4/2023 Yes Mirna Rodriges PA-C Yes    bumetanide (BUMEX) 2 MG tablet Take 3 mg in the AM and 2 mg at noon x 1 week. After that (4/11/23), he can reduce to 2 mg twice daily 4/4/2023 Yes Mirna Rodriges PA-C Yes    clopidogrel (PLAVIX) 75 MG tablet Take 1 tablet (75 mg) by mouth daily 4/4/2023 Yes Mirna Rodriges PA-C Yes    glipiZIDE (GLUCOTROL) 5 MG tablet Take 10 mg by mouth daily 4/4/2023 Yes Unknown, Entered By History No    insulin glargine (LANTUS PEN) 100 UNIT/ML pen Inject 60 Units Subcutaneous At Bedtime 4/3/2023 Yes Unknown, Entered By History No    levothyroxine (SYNTHROID/LEVOTHROID) 75 MCG tablet Take 75 mcg by mouth every morning Managed by PCP  4/4/2023 Yes Unknown, Entered By History No    lisinopril (ZESTRIL) 2.5 MG tablet Take 1 tablet (2.5 mg) by mouth daily 4/4/2023 Yes Mirna Rodriges PA-C Yes    magnesium oxide (MAG-OX) 400 MG tablet Take 1 tablet (400 mg) by mouth daily 4/4/2023 Yes Mirna Rodriges PA-C     nebivolol (BYSTOLIC) 2.5 MG tablet Take 1 tablet (2.5 mg) by mouth 2 times daily 4/4/2023 Yes Mirna Rodriges PA-C Yes    oxyCODONE (ROXICODONE) 5 MG tablet Take 5 mg by mouth every 4 hours as needed for pain  at PRN Yes Unknown, Entered By History     pantoprazole (PROTONIX) 40 MG EC tablet Take 40 mg by mouth 2 times daily 4/4/2023 Yes Reported, Patient     polyethylene glycol (MIRALAX) 17 GM/Dose powder Take 17 g by mouth daily 4/4/2023 Yes Nichelle Meredith MD     potassium chloride ER (KLOR-CON M) 10 MEQ CR tablet Take 1 tablet (10 mEq) by mouth 2 times daily 4/4/2023 Yes Tr Epstein MD No    riluzole (RILUTEK) 50 MG tablet Take 1 tablet (50 mg) by mouth every 12 hours 4/4/2023 Yes Ramin Baez MD     rivaroxaban ANTICOAGULANT (XARELTO) 20 MG TABS tablet Take 1 tablet (20 mg) by mouth daily (with dinner) 4/3/2023 Yes Lety Dangelo PA-C Yes    rosuvastatin (CRESTOR) 20 MG tablet Take 1 tablet (20 mg) by mouth daily 4/4/2023 Yes Mirna Rodriges PA-C Yes    nitroGLYcerin (NITROSTAT) 0.4 MG sublingual tablet For chest pain place 1 tablet under the tongue every 5 minutes for 3 doses. If symptoms persist 5 minutes after 1st dose call 911.   Tr Epstein MD Yes        The information provided in this note is only as accurate as the sources available at the time of update(s)

## 2023-04-04 NOTE — H&P
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  CALE on home BiPAP   Bilateral pleural effusions  Presents with worsening dyspnea x1 week as well as orthopnea. Recently has followed closely with Cardiology/[facility] clinic due to increasing edema and weight gain. Weight at home has been around 144  Despite increasing diuretic symptoms have continued to worsen though he does report robust response to diuretic at home. He has used BiPAP @ hs (though has not tolerated well due to increased WOB despite use) and during the day at times with no improvement. No cough, fever, leukocytosis or CXR findings to suggest infection. He is on DOAC making PE less likely.   Pleural effusions seen during prior admissions, underwent thoracentesis in February (transudative).  He has been followed by Pulmonology and Neurology for restrictive lung disease in the setting of non functional R hemidiaphragm and suspected ALS. Also kyphotic with restrictive posture due to chronic neck pain.    *S/p hemidiaphragm plication 10/2022 through [facility] w/o significant relief.   * most recent PFTs 3/1/2023 show possible restriction, MIP and MEP reduced- may be suggestive of neuromuscular weakness contributing to hypoventilation- as well as normal FEV1/FVC ratio  *swtiched from torsemide to bumex 3/7, 2mg bid. Decreased to 2mg am 1mg pm 3/15 due to worsening renal function. Back up to 2mg bid on 3/28 due to weight gain (144lb), increased further 4/2 to 3mg qam 2mg pm  *hypoxic to 86% in the ED, on 2L NC. He does not use oxygen at baseline with home sats 92-93% RA  *VBG shows PCO2 72 (around baseline), compensated with pH 7.4  *CXR shows small bilateral pleural effusions and mild bibasilar atelectasis, stable.   * (225 2/20/23), weight on admission 145lb  --Cardiology consulted, appreciate assistance   -- continue PTA bumex for now   --plan for repeat ECHO 6/2023- will defer to Cardiology whether to repeat during this admission  --Pulmonology consulted, appreciate assistance  --Neurology consulted, appreciate assistance   --RCAT for NIF testing   --CT chest if he can tolerate   --BiPAP @hs and prn   --low Na diet  --patient expresses strong desire to limit hospital stay and return home ASAP- broached subject of palliative care given significant decline in functional status, high burden of medical care and overall quality of life. Wife reports she has thought about this but has yet to schedule anything. Their goal at this time is to get him walking and breathing comfortably to spend time at their property [place]. 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  * Noted to be longstanding and progressive for at least the 15 months  * recently seen by Neuromuscular specialist [facility] 3/1/23 with plan for EMG and LP outpatient. Follow up scheduled for May  * EMG shows widespread, severe pure motor neurogenic process    * recently started on riluzole   --given concern for role of neuromuscular disorder in worsening respiratory status will ask Neurology to see   -- continue riluzole- can take home supply when verified     Coronary artery disease with recent atherectomy and JESSE x 4 to LAD on 2/21/2023  Troponin elevation, suspect demand ischemia in the setting of CHF   During admission in February diagnosed with new CHF with ECHO showing LVEF 25-30%, severe global hypokinesis, worse in the inferoseptal segments; moderate decreased RV systolic function.  *also with NSTEMI at the time, underwent procedure as above, now on Plavix and xarelto (failed triple therapy due to epistaxis and hematuria)  *troponin this admission 94 (stable from values most recent admission)  *EKG on admission shows NSR with TWI (seen previously)  Denies chest pain   PTA: plavix 75mg, lisinopril 2.5mg, nebivolol 2.5mg bid, crestor 20mg  --Cardiology following, appreciate assistance   --trend troponin  --continue PTA BB with hold parameters, statin, plavix  --hold lisinopril to ensure stable renal function in am     Paroxysmal atrial fibrillation  Prior to admission 2/2023, pt was on rate control regimen with diltiazem 360 mg once daily. Diltiazem was stopped and patient was loaded with IV amiodarone after new CM identified   *Zio patch 3/7-3/14 monitor without recurrent AFIB  PTA:  amiodarone 200 mg daily, nebivolol 2.5 mg BID, Xarelto 20mg @hs  --telemetry  --continue PTA amiodarone, nebivolol   --continue Xarelto     DM2 last A1c was 8.2% in January 2023.   PTA: glipizide 10mg daily, lantus 60 units @hs (last dose evening prior to admission)  BG have been labile at home, po intake variable given issues with dysphagia.   --continue PTA lantus at reduced dose 30u for now (reports he does not eat much in hospital)  --hold glipizide  --medium sliding scale    Possible chronic liver disease.  * Following with [facility] for possible chronic liver disease. Plan for EGD  in ~6 months given recent stenting. Had paracentesis 2/24/23. Question had been raised about potential hepatopulmonary syndrome with shunt physiology.   *CT 2/17 showed hepatic steatosis and mildly nodular hepatic contour suggestive of  hepatic parenchymal disease, indeterminate linear hypodensity adjacent to the gallbladder fossa, may be a focus of fat deposition, nonemergent liver MRI was recommended for better characterization.  *LFT WNL on admission  --outpatient GI follow up as planned   --will get bubble study with ECHO     Dysphagia  Reports frequent dysphagia with pills/foods. EGD had been planned for potential dilatation but unfortunately has been delayed due to recent stenting and need for plavix  --SLP consult   --soft diet  --aspiration precautions  --pills crushed   --outpatient GI follow up     BPH  Hx urinary retention. Continue flomax and monitor UOP    Hypothyroid continue PTA levothyroxine when verified    GERD continue PPI bid     HLD continues statin        Diet: Low Saturated Fat Na <2400 mgsoft diet, aspiration precautions   DVT Prophylaxis: Xarelto   Power Catheter: Not present  Lines: None     Cardiac Monitoring: None  Code Status:   DNR/DNI- confirmed with patient and his wife on admission     Clinically Significant Risk Factors Present on Admission               # Drug Induced Coagulation Defect: home medication list includes an anticoagulant medication  # Drug Induced Platelet Defect: home medication list includes an antiplatelet medication   # Hypertension: home medication list includes antihypertensive(s)  # Chronic systolic heart failure: echo within the past year with EF <40%     # DMII: A1C = N/A within past 6 months           Disposition Plan      Expected Discharge Date: 04/06/2023              anticipate 2+ midnight stay, admit to inpatient    The patient's care was discussed with Dr. Martinez who agrees with the above plan     Brenda Polk PA-C  Hospitalist Service  M  [facility]  Securely message with [employer] (more info)  Text page via [facility] Paging/Directory     ______________________________________________________________________    Chief Complaint   Dyspnea     History is obtained from the patient and his wife who is present at bedside     History of Present Illness   Carl Vázquez is a 82 year old male with a past medical history significant for chronic dyspnea with prior extensive workup over the past year, chronic hypercapnic respiratory failure, recurrent pleural effusions, 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   Patient and his wife reports he has been dealing with shortness of breath for the past 15 months or so has undergone extensive work-up for this.  Most recently he was hospitalized 2/17/2023 through 2/23/2023 after he presented with acute respiratory failure.  During this admission he was diagnosed with new biventricular heart failure, NSTEMI, and bilateral pleural effusions requiring thoracentesis.  Since discharge he has started on regular BiPAP at night which she was tolerating well until about a week ago at which time he reports he has had to take frequent breaks due to shortness of breath despite use.  He has been following with pulmonology and had PFTs performed in March.  He is also followed very closely with core clinic.  Over the past few weeks he has reported increased weight gain and edema and his prior torsemide was transitioned to Bumex with escalating dosing.  Dose increase has resulted in robust urine output but not much improvement in his symptoms per patient report.  His edema has fluctuated over the past few weeks.  He has been using his BiPAP both  at night and as needed during the day but has not tolerated it well.  He denies any infectious symptoms including fever, cough, chills.  He deals with chronic dysphagia and had been due for an esophageal dilatation which unfortunately was delayed due to his NSTEMI and need for uninterrupted Plavix.  He reports frequent pill dysphagia and trouble with foods as well.  This has resulted in variable p.o. intake.  In addition to pulmonary work-up he has been followed by neurology for ongoing evaluation of suspected neuromuscular disorder and was recently told he likely has ALS.  He has follow-up appointments coming up with both neurology and pulmonology in May.   Patient and his wife report a significant acute worsening of his breathing over the past week or so.  He cannot pinpoint any specific provoking factors as sometimes the dyspnea is worse with exertion other times he tolerates minimal exertion fine.  He describes some degree of orthopnea and has been sleeping in his chair that with little reprieve.  Oftentimes any minor position change can cause significant increased work of breathing.  He denies any chest pain or palpitations.  He has some degree of abdominal distention which him and his wife report has been unchanged for the past few months.  He has chronic neck pain which is also unchanged but restricts him to a hunched forward posture.  He reports new left lateral rib pain for the past few weeks and denies any preceding trauma or injury.  He denies recent bleeding.      Past Medical History    Past Medical History:   Diagnosis Date     Diabetes (H)      Sleep apnea     uses CPAP machine     Thyroid disease        Past Surgical History   Past Surgical History:   Procedure Laterality Date     CHOLECYSTECTOMY      at [facility]     COLONOSCOPY      in [place], [place]     COLONOSCOPY  08/22/2017    Dr. Ja LYNN     CV CORONARY ANGIOGRAM N/A 2/21/2023    Procedure: Coronary Angiogram;  Surgeon: Andrey Watts  MD Donald;  Location: [facility] CARDIAC CATH LAB     CV CORONARY LITHOTRIPSY PCI N/A 2/21/2023    Procedure: Percutaneous Coronary Intervention - Lithotripsy;  Surgeon: Andrey Watts MD;  Location: [facility] CARDIAC CATH LAB     CV INTRAVASULAR ULTRASOUND N/A 2/21/2023    Procedure: Intravascular Ultrasound;  Surgeon: Andrey Watts MD;  Location:  HEART CARDIAC CATH LAB     CV PCI N/A 2/21/2023    Procedure: Percutaneous Coronary Intervention;  Surgeon: Andrey Watts MD;  Location: [facility] CARDIAC CATH LAB     CV PCI ATHERECTOMY ORBITAL N/A 2/21/2023    Procedure: Percutaneous Coronary Intervention - Atherectomy Rotational;  Surgeon: Andrey Watts MD;  Location: [facility] CARDIAC CATH LAB     CV RIGHT HEART CATH MEASUREMENTS RECORDED N/A 2/21/2023    Procedure: Right Heart Catheterization;  Surgeon: Andrey Watts MD;  Location: [facility] CARDIAC CATH LAB     ESOPHAGOSCOPY, GASTROSCOPY, DUODENOSCOPY (EGD), COMBINED N/A 6/7/2016    Procedure: COMBINED ESOPHAGOSCOPY, GASTROSCOPY, DUODENOSCOPY (EGD);  Surgeon: Eneida Denton MD;  Location:  GI       Prior to Admission Medications   Prior to Admission Medications   Prescriptions Last Dose Informant Patient Reported? Taking?   amiodarone (PACERONE) 200 MG tablet 4/4/2023  No Yes   Sig: Take 1 tablet (200 mg) by mouth daily   bumetanide (BUMEX) 2 MG tablet 4/4/2023  Yes Yes   Sig: Take 3 mg in the AM and 2 mg at noon x 1 week. After that (4/11/23), he can reduce to 2 mg twice daily   clopidogrel (PLAVIX) 75 MG tablet 4/4/2023  No Yes   Sig: Take 1 tablet (75 mg) by mouth daily   glipiZIDE (GLUCOTROL) 5 MG tablet 4/4/2023 Spouse/Significant Other Yes Yes   Sig: Take 10 mg by mouth daily   insulin glargine (LANTUS PEN) 100 UNIT/ML pen 4/3/2023  Yes Yes   Sig: Inject 60 Units Subcutaneous At Bedtime   levothyroxine (SYNTHROID/LEVOTHROID) 75 MCG tablet 4/4/2023 Spouse/Significant Other Yes Yes   Sig: Take 75 mcg by mouth  every morning Managed by PCP   lisinopril (ZESTRIL) 2.5 MG tablet 4/4/2023  No Yes   Sig: Take 1 tablet (2.5 mg) by mouth daily   magnesium oxide (MAG-OX) 400 MG tablet 4/4/2023  No Yes   Sig: Take 1 tablet (400 mg) by mouth daily   nebivolol (BYSTOLIC) 2.5 MG tablet 4/4/2023  No Yes   Sig: Take 1 tablet (2.5 mg) by mouth 2 times daily   nitroGLYcerin (NITROSTAT) 0.4 MG sublingual tablet   No No   Sig: For chest pain place 1 tablet under the tongue every 5 minutes for 3 doses. If symptoms persist 5 minutes after 1st dose call 911.   oxyCODONE (ROXICODONE) 5 MG tablet  at PRN Spouse/Significant Other Yes Yes   Sig: Take 5 mg by mouth every 4 hours as needed for pain   pantoprazole (PROTONIX) 40 MG EC tablet 4/4/2023  Yes Yes   Sig: Take 40 mg by mouth 2 times daily   polyethylene glycol (MIRALAX) 17 GM/Dose powder 4/4/2023 Spouse/Significant Other No Yes   Sig: Take 17 g by mouth daily   potassium chloride ER (KLOR-CON M) 10 MEQ CR tablet 4/4/2023  No Yes   Sig: Take 1 tablet (10 mEq) by mouth 2 times daily   riluzole (RILUTEK) 50 MG tablet 4/4/2023  No Yes   Sig: Take 1 tablet (50 mg) by mouth every 12 hours   rivaroxaban ANTICOAGULANT (XARELTO) 20 MG TABS tablet 4/3/2023 Spouse/Significant Other No Yes   Sig: Take 1 tablet (20 mg) by mouth daily (with dinner)   rosuvastatin (CRESTOR) 20 MG tablet 4/4/2023  No Yes   Sig: Take 1 tablet (20 mg) by mouth daily      Facility-Administered Medications: None        Social History   I have reviewed this patient's social history and updated it with pertinent information if needed.  Social History     Tobacco Use     Smoking status: Never     Smokeless tobacco: Never   Substance Use Topics     Alcohol use: No     Comment: drank many years ago     Drug use: No        Physical Exam   Temp: 98.2  F (36.8  C) Temp src: Temporal BP: (!) 86/62 Pulse: 67   Resp: 20 SpO2: 97 % O2 Device: Nasal cannula (2L)    Vitals:    04/04/23 1257   Weight: 65.8 kg (145 lb)     Vital Signs with  Ranges  Temp:  [98.2  F (36.8  C)] 98.2  F (36.8  C)  Pulse:  [64-73] 67  Resp:  [10-31] 20  BP: ()/(59-82) 86/62  SpO2:  [86 %-99 %] 97 %  No intake/output data recorded.    Constitutional: Alert and oriented, sitting up on edge of bed. Appropriately conversant. No distress, appears generally comfortable though some conversational dyspnea with extended talking   ENT:  moist mucous membranes. JVP is normal   Respiratory: Lungs clear to auscultation bilaterally, breathing comfortably though some conversational dyspnea with prolonged talking.   MSK:  Kyphotic. Tenderness with palpation L lateral ribs  Cardiovascular: Regular rate and rhythm  Extremities:  1+ bilateral LE pitting edema   GI: positive bowel sounds, abdomen soft, mildly distended with mild diffuse tenderness to palpation   Neuro:  Speech is clear. Face symmetric. Follows commands       Medical Decision Making       75 MINUTES SPENT BY ME on the date of service doing chart review, history, exam, documentation & further activities per the note.      Data     I have personally reviewed the following data over the past 24 hrs:    6.1  \   14.5   / 123 (L)     141 93 (L) 25.0 (H) /  269 (H)   4.5 33 (H) 1.04 \       ALT: 15 AST: 29 AP: 61 TBILI: 0.6   ALB: 3.5 TOT PROTEIN: 6.4 LIPASE: N/A       Trop: 94 (H) BNP: 466       Procal: N/A CRP: N/A Lactic Acid: 1.2         Imaging results reviewed over the past 24 hrs:   Recent Results (from the past 24 hour(s))   Chest XR,  PA & LAT    Narrative    XR CHEST 2 VIEWS 4/4/2023 11:19 AM    HISTORY: shortness of breath    COMPARISON: 2/18/2023      Impression    IMPRESSION: Small bilateral pleural effusions and mild bibasilar  atelectasis, stable. No pneumothorax. Normal heart size. Coronary  stent. Cholecystectomy.    DEEPAK OLIVER MD         SYSTEM ID:  [id number]

## 2023-04-05 ENCOUNTER — APPOINTMENT (OUTPATIENT)
Dept: SPEECH THERAPY | Facility: CLINIC | Age: 83
DRG: 056 | End: 2023-04-05
Attending: PHYSICIAN ASSISTANT
Payer: COMMERCIAL

## 2023-04-05 ENCOUNTER — APPOINTMENT (OUTPATIENT)
Dept: OCCUPATIONAL THERAPY | Facility: CLINIC | Age: 83
DRG: 056 | End: 2023-04-05
Attending: PHYSICIAN ASSISTANT
Payer: COMMERCIAL

## 2023-04-05 LAB
ANION GAP SERPL CALCULATED.3IONS-SCNC: 7 MMOL/L (ref 7–15)
ATRIAL RATE - MUSE: 75 BPM
BUN SERPL-MCNC: 21.1 MG/DL (ref 8–23)
CALCIUM SERPL-MCNC: 9.1 MG/DL (ref 8.8–10.2)
CHLORIDE SERPL-SCNC: 91 MMOL/L (ref 98–107)
CREAT SERPL-MCNC: 0.9 MG/DL (ref 0.67–1.17)
DEPRECATED HCO3 PLAS-SCNC: 40 MMOL/L (ref 22–29)
DIASTOLIC BLOOD PRESSURE - MUSE: NORMAL MMHG
ERYTHROCYTE [DISTWIDTH] IN BLOOD BY AUTOMATED COUNT: 14.8 % (ref 10–15)
GFR SERPL CREATININE-BSD FRML MDRD: 85 ML/MIN/1.73M2
GLUCOSE BLDC GLUCOMTR-MCNC: 171 MG/DL (ref 70–99)
GLUCOSE BLDC GLUCOMTR-MCNC: 232 MG/DL (ref 70–99)
GLUCOSE BLDC GLUCOMTR-MCNC: 249 MG/DL (ref 70–99)
GLUCOSE BLDC GLUCOMTR-MCNC: 74 MG/DL (ref 70–99)
GLUCOSE SERPL-MCNC: 88 MG/DL (ref 70–99)
HCT VFR BLD AUTO: 42.6 % (ref 40–53)
HGB BLD-MCNC: 13.9 G/DL (ref 13.3–17.7)
INTERPRETATION ECG - MUSE: NORMAL
MCH RBC QN AUTO: 28.4 PG (ref 26.5–33)
MCHC RBC AUTO-ENTMCNC: 32.6 G/DL (ref 31.5–36.5)
MCV RBC AUTO: 87 FL (ref 78–100)
P AXIS - MUSE: 76 DEGREES
PLATELET # BLD AUTO: 118 10E3/UL (ref 150–450)
POTASSIUM SERPL-SCNC: 3.6 MMOL/L (ref 3.4–5.3)
PR INTERVAL - MUSE: 138 MS
QRS DURATION - MUSE: 82 MS
QT - MUSE: 392 MS
QTC - MUSE: 437 MS
R AXIS - MUSE: 71 DEGREES
RBC # BLD AUTO: 4.89 10E6/UL (ref 4.4–5.9)
SODIUM SERPL-SCNC: 138 MMOL/L (ref 136–145)
SYSTOLIC BLOOD PRESSURE - MUSE: NORMAL MMHG
T AXIS - MUSE: 16 DEGREES
VENTRICULAR RATE- MUSE: 75 BPM
WBC # BLD AUTO: 5.5 10E3/UL (ref 4–11)

## 2023-04-05 PROCEDURE — 92610 EVALUATE SWALLOWING FUNCTION: CPT | Mod: GN

## 2023-04-05 PROCEDURE — 97165 OT EVAL LOW COMPLEX 30 MIN: CPT | Mod: GO

## 2023-04-05 PROCEDURE — 99497 ADVNCD CARE PLAN 30 MIN: CPT | Mod: 25 | Performed by: NURSE PRACTITIONER

## 2023-04-05 PROCEDURE — 99223 1ST HOSP IP/OBS HIGH 75: CPT | Performed by: NURSE PRACTITIONER

## 2023-04-05 PROCEDURE — 210N000002 HC R&B HEART CARE

## 2023-04-05 PROCEDURE — 85027 COMPLETE CBC AUTOMATED: CPT | Performed by: PHYSICIAN ASSISTANT

## 2023-04-05 PROCEDURE — 99232 SBSQ HOSP IP/OBS MODERATE 35: CPT | Mod: FS | Performed by: PHYSICIAN ASSISTANT

## 2023-04-05 PROCEDURE — 94660 CPAP INITIATION&MGMT: CPT

## 2023-04-05 PROCEDURE — 99498 ADVNCD CARE PLAN ADDL 30 MIN: CPT | Performed by: NURSE PRACTITIONER

## 2023-04-05 PROCEDURE — 80048 BASIC METABOLIC PNL TOTAL CA: CPT | Performed by: PHYSICIAN ASSISTANT

## 2023-04-05 PROCEDURE — 250N000013 HC RX MED GY IP 250 OP 250 PS 637: Performed by: INTERNAL MEDICINE

## 2023-04-05 PROCEDURE — 94640 AIRWAY INHALATION TREATMENT: CPT | Mod: 76

## 2023-04-05 PROCEDURE — 94640 AIRWAY INHALATION TREATMENT: CPT

## 2023-04-05 PROCEDURE — 99233 SBSQ HOSP IP/OBS HIGH 50: CPT | Performed by: HOSPITALIST

## 2023-04-05 PROCEDURE — 94150 VITAL CAPACITY TEST: CPT

## 2023-04-05 PROCEDURE — 999N000157 HC STATISTIC RCP TIME EA 10 MIN

## 2023-04-05 PROCEDURE — 250N000013 HC RX MED GY IP 250 OP 250 PS 637: Performed by: PHYSICIAN ASSISTANT

## 2023-04-05 PROCEDURE — 36415 COLL VENOUS BLD VENIPUNCTURE: CPT | Performed by: PHYSICIAN ASSISTANT

## 2023-04-05 PROCEDURE — 97110 THERAPEUTIC EXERCISES: CPT | Mod: GO

## 2023-04-05 PROCEDURE — 250N000012 HC RX MED GY IP 250 OP 636 PS 637: Performed by: PHYSICIAN ASSISTANT

## 2023-04-05 PROCEDURE — 250N000009 HC RX 250: Performed by: HOSPITALIST

## 2023-04-05 RX ORDER — NALOXONE HYDROCHLORIDE 0.4 MG/ML
0.4 INJECTION, SOLUTION INTRAMUSCULAR; INTRAVENOUS; SUBCUTANEOUS
Status: DISCONTINUED | OUTPATIENT
Start: 2023-04-05 | End: 2023-04-06 | Stop reason: HOSPADM

## 2023-04-05 RX ORDER — NALOXONE HYDROCHLORIDE 0.4 MG/ML
0.2 INJECTION, SOLUTION INTRAMUSCULAR; INTRAVENOUS; SUBCUTANEOUS
Status: DISCONTINUED | OUTPATIENT
Start: 2023-04-05 | End: 2023-04-06 | Stop reason: HOSPADM

## 2023-04-05 RX ORDER — ACETYLCYSTEINE 200 MG/ML
2 SOLUTION ORAL; RESPIRATORY (INHALATION) EVERY 4 HOURS
Status: DISCONTINUED | OUTPATIENT
Start: 2023-04-05 | End: 2023-04-06 | Stop reason: HOSPADM

## 2023-04-05 RX ORDER — LEVALBUTEROL INHALATION SOLUTION 1.25 MG/3ML
1.25 SOLUTION RESPIRATORY (INHALATION)
Status: DISCONTINUED | OUTPATIENT
Start: 2023-04-05 | End: 2023-04-06 | Stop reason: HOSPADM

## 2023-04-05 RX ORDER — LIDOCAINE 4 G/G
2 PATCH TOPICAL
Status: DISCONTINUED | OUTPATIENT
Start: 2023-04-05 | End: 2023-04-06 | Stop reason: HOSPADM

## 2023-04-05 RX ORDER — IPRATROPIUM BROMIDE AND ALBUTEROL SULFATE 2.5; .5 MG/3ML; MG/3ML
3 SOLUTION RESPIRATORY (INHALATION) EVERY 4 HOURS
Status: DISCONTINUED | OUTPATIENT
Start: 2023-04-05 | End: 2023-04-05

## 2023-04-05 RX ORDER — OXYCODONE HYDROCHLORIDE 5 MG/1
5 TABLET ORAL EVERY 4 HOURS PRN
Status: DISCONTINUED | OUTPATIENT
Start: 2023-04-05 | End: 2023-04-06 | Stop reason: HOSPADM

## 2023-04-05 RX ADMIN — LEVALBUTEROL HYDROCHLORIDE 1.25 MG: 1.25 SOLUTION RESPIRATORY (INHALATION) at 19:36

## 2023-04-05 RX ADMIN — INSULIN GLARGINE 30 UNITS: 100 INJECTION, SOLUTION SUBCUTANEOUS at 22:50

## 2023-04-05 RX ADMIN — NEBIVOLOL HYDROCHLORIDE 2.5 MG: 2.5 TABLET ORAL at 20:41

## 2023-04-05 RX ADMIN — INSULIN ASPART 1 UNITS: 100 INJECTION, SOLUTION INTRAVENOUS; SUBCUTANEOUS at 19:02

## 2023-04-05 RX ADMIN — AMIODARONE HYDROCHLORIDE 200 MG: 200 TABLET ORAL at 09:00

## 2023-04-05 RX ADMIN — LEVOTHYROXINE SODIUM 75 MCG: 75 TABLET ORAL at 09:01

## 2023-04-05 RX ADMIN — OXYCODONE HYDROCHLORIDE 5 MG: 5 TABLET ORAL at 09:16

## 2023-04-05 RX ADMIN — NEBIVOLOL HYDROCHLORIDE 2.5 MG: 2.5 TABLET ORAL at 09:00

## 2023-04-05 RX ADMIN — RILUZOLE 50 MG: 50 TABLET, FILM COATED ORAL at 20:43

## 2023-04-05 RX ADMIN — RILUZOLE 50 MG: 50 TABLET, FILM COATED ORAL at 09:04

## 2023-04-05 RX ADMIN — LEVALBUTEROL HYDROCHLORIDE 1.25 MG: 1.25 SOLUTION RESPIRATORY (INHALATION) at 15:30

## 2023-04-05 RX ADMIN — PANTOPRAZOLE SODIUM 40 MG: 40 TABLET, DELAYED RELEASE ORAL at 09:00

## 2023-04-05 RX ADMIN — LEVALBUTEROL HYDROCHLORIDE 1.25 MG: 1.25 SOLUTION RESPIRATORY (INHALATION) at 10:42

## 2023-04-05 RX ADMIN — OXYCODONE HYDROCHLORIDE 5 MG: 5 TABLET ORAL at 01:04

## 2023-04-05 RX ADMIN — LEVALBUTEROL HYDROCHLORIDE 1.25 MG: 1.25 SOLUTION RESPIRATORY (INHALATION) at 23:38

## 2023-04-05 RX ADMIN — LIDOCAINE 2 PATCH: 560 PATCH PERCUTANEOUS; TOPICAL; TRANSDERMAL at 20:36

## 2023-04-05 RX ADMIN — PANTOPRAZOLE SODIUM 40 MG: 40 TABLET, DELAYED RELEASE ORAL at 20:41

## 2023-04-05 RX ADMIN — LIDOCAINE 2 PATCH: 560 PATCH PERCUTANEOUS; TOPICAL; TRANSDERMAL at 01:03

## 2023-04-05 RX ADMIN — OXYCODONE HYDROCHLORIDE 5 MG: 5 TABLET ORAL at 20:42

## 2023-04-05 RX ADMIN — BUMETANIDE 2 MG: 1 TABLET ORAL at 17:50

## 2023-04-05 RX ADMIN — INSULIN ASPART 1 UNITS: 100 INJECTION, SOLUTION INTRAVENOUS; SUBCUTANEOUS at 22:49

## 2023-04-05 RX ADMIN — POLYETHYLENE GLYCOL 3350 17 G: 17 POWDER, FOR SOLUTION ORAL at 09:01

## 2023-04-05 RX ADMIN — RIVAROXABAN 20 MG: 20 TABLET, FILM COATED ORAL at 17:50

## 2023-04-05 RX ADMIN — CLOPIDOGREL BISULFATE 75 MG: 75 TABLET ORAL at 09:00

## 2023-04-05 RX ADMIN — ROSUVASTATIN CALCIUM 20 MG: 20 TABLET, FILM COATED ORAL at 09:00

## 2023-04-05 ASSESSMENT — ACTIVITIES OF DAILY LIVING (ADL)
ADLS_ACUITY_SCORE: 47
ADLS_ACUITY_SCORE: 47
ADLS_ACUITY_SCORE: 48
ADLS_ACUITY_SCORE: 48
ADLS_ACUITY_SCORE: 47
ADLS_ACUITY_SCORE: 47
ADLS_ACUITY_SCORE: 48
ADLS_ACUITY_SCORE: 48
ADLS_ACUITY_SCORE: 47
ADLS_ACUITY_SCORE: 48
ADLS_ACUITY_SCORE: 48
ADLS_ACUITY_SCORE: 47

## 2023-04-05 NOTE — PROGRESS NOTES
NIF: -30 with good effort  VC: 1000ml with good effort    RT will repeat BID.    CHRISSY ANDERSON, RRT

## 2023-04-05 NOTE — PLAN OF CARE
Neuro: A&O x4  Tele/cardiac: SR  Respiration: GARCIA, SOB, 2L-3L NC, unable to tolerate Bipap overnight  Activity: A1 GBW  Pain: low back and abdominal pain oxycodone and lidocain patch administered   Drips: PIV SL  Drains/tubes: None  Skin: intact, BLE edema  GI/: continent, tolerating diet  Aggression color: green  Isolation: none
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
## 2023-04-05 NOTE — CONSULTS
PULMONOLOGY CONSULTATION  Date of service: 4/5/2023    [facility]  ____________________________________________________    Primary Care Provider:  Kyler Mcwilliams  Admission Date: 4/4/2023  Hospital Attending Physician:  Laine Martinez MD  ________________________________________    CHIEF COMPLAINT: SOB    ASSESSMENT / PLAN      Pulmonary diagnoses:  Abnormal Chest Imaging R91.8  CHF I50.9  Hypoxemia R09.02  Shortness of Breath R06.02    ASSESSMENT:  81 yo male with PMH ALS, HFrEF, spinal stenosis s/p surgery c/b right hemidiaphragm paralysis s/p plication, chronic dysphagia, CALE on BiPAP, DM, Afib who presents with acute on chronic dypsnea.    Multifactorial respiratory failure 2/2 to neuromuscular weakness (ALS), right hemidiaphragm paralysis s/p plication, HFrEF, CALE. I would agree with cardiology that the progressiveness in his symptoms is likely 2/2 to progressive neuromuscular weakness. In his case, I do not believe there is any treatment as he has already had surgery for his diaphragm. The pulmonary function testing done on 3/1/23 has an FVC low enough that tracheostomy and a chronic ventilator could be considered, however this decision is usually considered longitudinally with repeat spirometry and input from the patient with respect to goals of care.    PLAN:     BiPAP intermittently throughout day and with sleep    Diuresis per cardiology/primary    Follow up with ALS clinic; he may benefit from a dedicated neuromuscular pulmonary disease specialist which the ALS should be able to help him arrange    Bucky Jerez MD  Interventional Pulmonology  [facility]        HISTORY OF PRESENT ILLNESS     81 yo male with PMH likely ALS, HFrEF, spinal stenosis s/p surgery c/b right hemidiaphragm paralysis s/p plication, CALE on BiPAP, DM, Afib who presents with acute on chronic dypsnea.    Recently admitted for fluid overload and diuresed with improvement. In interim continues to have  decline in SOB symptoms despite home intermittent BiPAP use. Thus re-presented to ED. Seen by cardiology who feel he's been aggressively diuresed without improvement in his symptoms. Denied fevers, chills, night sweats.    Today notes he is SOB. About to have TTE with bubble.    HOME MEDICATIONS     Medications Prior to Admission   Medication Sig Dispense Refill Last Dose     amiodarone (PACERONE) 200 MG tablet Take 1 tablet (200 mg) by mouth daily 90 tablet 3 4/4/2023     bumetanide (BUMEX) 2 MG tablet Take 3 mg in the AM and 2 mg at noon x 1 week. After that (4/11/23), he can reduce to 2 mg twice daily 45 tablet 3 4/4/2023     clopidogrel (PLAVIX) 75 MG tablet Take 1 tablet (75 mg) by mouth daily 90 tablet 3 4/4/2023     glipiZIDE (GLUCOTROL) 5 MG tablet Take 10 mg by mouth daily   4/4/2023     insulin glargine (LANTUS PEN) 100 UNIT/ML pen Inject 60 Units Subcutaneous At Bedtime   4/3/2023     levothyroxine (SYNTHROID/LEVOTHROID) 75 MCG tablet Take 75 mcg by mouth every morning Managed by PCP   4/4/2023     lisinopril (ZESTRIL) 2.5 MG tablet Take 1 tablet (2.5 mg) by mouth daily 90 tablet 3 4/4/2023     magnesium oxide (MAG-OX) 400 MG tablet Take 1 tablet (400 mg) by mouth daily 90 tablet 3 4/4/2023     nebivolol (BYSTOLIC) 2.5 MG tablet Take 1 tablet (2.5 mg) by mouth 2 times daily 180 tablet 1 4/4/2023     oxyCODONE (ROXICODONE) 5 MG tablet Take 5 mg by mouth every 4 hours as needed for pain    at PRN     pantoprazole (PROTONIX) 40 MG EC tablet Take 40 mg by mouth 2 times daily   4/4/2023     polyethylene glycol (MIRALAX) 17 GM/Dose powder Take 17 g by mouth daily 510 g 0 4/4/2023     potassium chloride ER (KLOR-CON M) 10 MEQ CR tablet Take 1 tablet (10 mEq) by mouth 2 times daily   4/4/2023     riluzole (RILUTEK) 50 MG tablet Take 1 tablet (50 mg) by mouth every 12 hours 60 tablet 3 4/4/2023     rivaroxaban ANTICOAGULANT (XARELTO) 20 MG TABS tablet Take 1 tablet (20 mg) by mouth daily (with dinner) 30 tablet 5  4/3/2023     rosuvastatin (CRESTOR) 20 MG tablet Take 1 tablet (20 mg) by mouth daily 90 tablet 3 4/4/2023     nitroGLYcerin (NITROSTAT) 0.4 MG sublingual tablet For chest pain place 1 tablet under the tongue every 5 minutes for 3 doses. If symptoms persist 5 minutes after 1st dose call 911. 15 tablet 0        PAST MEDICAL HISTORY      Past Medical History:   Diagnosis Date     Diabetes (H)      Sleep apnea     uses CPAP machine     Thyroid disease      Past Surgical History:   Procedure Laterality Date     CHOLECYSTECTOMY      at [facility]     COLONOSCOPY      in [place], [place]     COLONOSCOPY  08/22/2017    Dr. Peres [facility]     CV CORONARY ANGIOGRAM N/A 2/21/2023    Procedure: Coronary Angiogram;  Surgeon: Andrey Watts MD;  Location: [facility] CARDIAC CATH LAB     CV CORONARY LITHOTRIPSY PCI N/A 2/21/2023    Procedure: Percutaneous Coronary Intervention - Lithotripsy;  Surgeon: Andrey Watts MD;  Location: [facility] CARDIAC CATH LAB     CV INTRAVASULAR ULTRASOUND N/A 2/21/2023    Procedure: Intravascular Ultrasound;  Surgeon: Andrey Watts MD;  Location: [facility] CARDIAC CATH LAB     CV PCI N/A 2/21/2023    Procedure: Percutaneous Coronary Intervention;  Surgeon: Andrey Watts MD;  Location: [facility] CARDIAC CATH LAB     CV PCI ATHERECTOMY ORBITAL N/A 2/21/2023    Procedure: Percutaneous Coronary Intervention - Atherectomy Rotational;  Surgeon: Andrey Watts MD;  Location: [facility] CARDIAC CATH LAB     CV RIGHT HEART CATH MEASUREMENTS RECORDED N/A 2/21/2023    Procedure: Right Heart Catheterization;  Surgeon: Andrey Watts MD;  Location: [facility] CARDIAC CATH LAB     ESOPHAGOSCOPY, GASTROSCOPY, DUODENOSCOPY (EGD), COMBINED N/A 6/7/2016    Procedure: COMBINED ESOPHAGOSCOPY, GASTROSCOPY, DUODENOSCOPY (EGD);  Surgeon: Eneida Denton MD;  Location:  GI       ALLERGIES     Allergies   Allergen Reactions     Metoprolol Shortness Of Breath     Tried  "2022, 2023, both time stopped for increased SOB     Statin Drugs [Hmg-Coa-R Inhibitors]        SOCIAL / SUBSTANCE HISTORY     Social History     Socioeconomic History     Marital status:      Spouse name: Not on file     Number of children: Not on file     Years of education: Not on file     Highest education level: Not on file   Occupational History     Not on file   Tobacco Use     Smoking status: Never     Smokeless tobacco: Never   Vaping Use     Vaping status: Not on file   Substance and Sexual Activity     Alcohol use: No     Comment: drank many years ago     Drug use: No     Sexual activity: Not on file   Other Topics Concern     Parent/sibling w/ CABG, MI or angioplasty before 65F 55M? Not Asked   Social History Narrative     Not on file     Social Determinants of Health     Financial Resource Strain: Not on file   Food Insecurity: Not on file   Transportation Needs: Not on file   Physical Activity: Not on file   Stress: Not on file   Social Connections: Not on file   Intimate Partner Violence: Not on file   Housing Stability: Not on file       FAMILY HISTORY     Family History   Problem Relation Age of Onset     Colon Cancer No family hx of        REVIEW OF SYSTEMS   A comprehensive review of systems was negative except for items noted in HPI/Subjective.    PHYSICAL EXAMINATION   Temp (24hrs), Av.9  F (36.6  C), Min:97.6  F (36.4  C), Max:98.4  F (36.9  C)    Vital signs:  Temp: 97.7  F (36.5  C) Temp src: Oral BP: 132/69 Pulse: 61   Resp: 16 SpO2: 96 % O2 Device: Nasal cannula Oxygen Delivery: 3 LPM   Weight: 67 kg (147 lb 12.8 oz)  Estimated body mass index is 23.15 kg/m  (pended) as calculated from the following:    Height as of 3/28/23: (P) 1.702 m (5' 7\").    Weight as of this encounter: 67 kg (147 lb 12.8 oz).        I/O last 3 completed shifts:  In: -   Out: 350 [Urine:350]    CONSTITUTIONAL/GENERAL APPEARANCE: Alert. No Apparent Distress.  PSYCHIATRIC: Pleasant and appropriate mood " and affect. Oriented x 3.  EARS, NOSE,THROAT,MOUTH: External ears and nose overall normal. Normal oral mucosa.   NECK: Neck appearance normal. No neck masses and the thyroid not enlarged.   RESPIRATORY: Non-labored effort.   CARDIOVASCULAR: S1, S2, regular rate and rhythm. Extremities with 1+ edema.  ABDOMEN: round, soft, non-tender, non-distended, no palpable masses. No presence of hernia.  LYMPHATIC: no cervical or axillary lymphadenopathy.  SKIN: Skin color was normal. No clubbing or cyanosis. No palpable skin abnormalities.    LABORATORY ASSESSMENT    Arterial Blood Gas  Recent Labs   Lab 04/04/23  1106   PH 7.40     CBC  Recent Labs   Lab 04/05/23  0558 04/04/23  0936   WBC 5.5 6.1   RBC 4.89 5.18   HGB 13.9 14.5   HCT 42.6 45.0   MCV 87 87   MCH 28.4 28.0   MCHC 32.6 32.2   RDW 14.8 15.0   * 123*     BMP  Recent Labs   Lab 04/05/23  0759 04/05/23  0558 04/04/23  2058 04/04/23  1801 04/04/23  0936   NA  --  138  --   --  141   POTASSIUM  --  3.6  --   --  4.5   CHLORIDE  --  91*  --   --  93*   TATE  --  9.1  --   --  10.0   CO2  --  40*  --   --  33*   BUN  --  21.1  --   --  25.0*   CR  --  0.90  --   --  1.04   GLC 74 88 280* 163* 269*     INRNo lab results found in last 7 days.   BNPNo lab results found in last 7 days.  VENOUS BLOOD GASES  Recent Labs   Lab 04/04/23  1106   HCO3V 45*       IMAGING      Results for orders placed or performed during the hospital encounter of 04/04/23   Chest XR,  PA & LAT    Impression    IMPRESSION: Small bilateral pleural effusions and mild bibasilar  atelectasis, stable. No pneumothorax. Normal heart size. Coronary  stent. Cholecystectomy.    DEEPAK OLIVER MD         SYSTEM ID:  [id number]   CT Chest w Contrast    Impression    IMPRESSION:   1.  Small right pleural effusion.  2.  Moderate atelectasis of the right middle and bilateral lower lobes.       PFT & OTHER TESTING         Latest Ref Rng & Units 3/1/2023     2:00 PM   PFT   FVC L 1.22     FEV1 L 0.99     FVC% %  38     FEV1% % 41

## 2023-04-05 NOTE — PLAN OF CARE
PT: Orders received. Chart reviewed and discussed with care team.  PT not indicated due to therapy needs being addressed by OT and patient near baseline mobility.  Defer discharge recommendations to OT.  Will complete orders.

## 2023-04-05 NOTE — PROVIDER NOTIFICATION
Brief update:    Paged re: abdominal pain, back pain.    Not acute issues for patient.    Added lidocaine patches for back discomfort.    Added low dose oxycodone. Caution w/ sedation or respiratory suppression; may need to discontinue pending pt response to med.    Ashkan Guzmán MD  12:47 AM

## 2023-04-05 NOTE — PROGRESS NOTES
[facility]    Hospitalist Progress Note    Interval History   Patient is awake and alert.  Did not tolerate BiPAP overnight.  Continues to endorse shortness of breath at rest and with activity.    -Data reviewed today: I reviewed all new labs and imaging results over the last 24 hours. I personally reviewed the chest CT image(s) showing Small right pleural effusion with moderate atelectasis of right middle and bilateral lower lobe.    Physical Exam   Temp: 97.7  F (36.5  C) Temp src: Oral BP: 132/69 Pulse: 61   Resp: 16 SpO2: 96 % O2 Device: Nasal cannula Oxygen Delivery: 3 LPM  Vitals:    04/04/23 1257 04/05/23 0517   Weight: 65.8 kg (145 lb) 67 kg (147 lb 12.8 oz)     Vital Signs with Ranges  Temp:  [97.6  F (36.4  C)-98.4  F (36.9  C)] 97.7  F (36.5  C)  Pulse:  [61-76] 61  Resp:  [13-25] 16  BP: ()/(53-87) 132/69  SpO2:  [94 %-97 %] 96 %  I/O last 3 completed shifts:  In: -   Out: 350 [Urine:350]    Physical Exam  Constitutional:       Appearance: He is ill-appearing.   HENT:      Head: Normocephalic.   Eyes:      Pupils: Pupils are equal, round, and reactive to light.   Cardiovascular:      Rate and Rhythm: Normal rate and regular rhythm.      Pulses: Normal pulses.      Heart sounds: Normal heart sounds.   Pulmonary:      Effort: Pulmonary effort is normal. No respiratory distress.      Breath sounds: Normal breath sounds.      Comments: Reduced effort with bilateral basal crackles present  Abdominal:      General: Abdomen is flat. Bowel sounds are normal. There is no distension.      Tenderness: There is no abdominal tenderness. There is no guarding.   Musculoskeletal:         General: Normal range of motion.      Cervical back: Normal range of motion.      Right lower leg: Edema present.      Left lower leg: Edema present.   Skin:     General: Skin is warm and dry.   Neurological:      General: No focal deficit present.   Psychiatric:         Mood and Affect: Mood normal.            Medications     - MEDICATION INSTRUCTIONS -         acetylcysteine  2 mL Nebulization Q4H     amiodarone  200 mg Oral Daily     bumetanide  2 mg Oral BID     clopidogrel  75 mg Oral Daily     insulin aspart  1-7 Units Subcutaneous TID AC     insulin aspart  1-5 Units Subcutaneous At Bedtime     insulin glargine  30 Units Subcutaneous At Bedtime     ipratropium  0.5 mg Nebulization Q4H    And     levalbuterol  1.25 mg Nebulization Q4H DEDRICK     levothyroxine  75 mcg Oral QAM AC     lidocaine  2 patch Transdermal Q24h    And     lidocaine   Transdermal Q8H DEDRICK     [Held by provider] lisinopril  2.5 mg Oral Daily     nebivolol  2.5 mg Oral BID     pantoprazole  40 mg Oral BID     polyethylene glycol  17 g Oral Daily     [Held by provider] potassium chloride ER  10 mEq Oral BID     riluzole  50 mg Oral Q12H     rivaroxaban ANTICOAGULANT  20 mg Oral Daily with supper     rosuvastatin  20 mg Oral Daily     sodium chloride (PF)  3 mL Intracatheter Q8H       Data   Recent Labs   Lab 04/05/23  1156 04/05/23  0759 04/05/23  0558 04/04/23  1801 04/04/23  0936   WBC  --   --  5.5  --  6.1   HGB  --   --  13.9  --  14.5   MCV  --   --  87  --  87   PLT  --   --  118*  --  123*   NA  --   --  138  --  141   POTASSIUM  --   --  3.6  --  4.5   CHLORIDE  --   --  91*  --  93*   CO2  --   --  40*  --  33*   BUN  --   --  21.1  --  25.0*   CR  --   --  0.90  --  1.04   ANIONGAP  --   --  7  --  15   TATE  --   --  9.1  --  10.0   * 74 88   < > 269*   ALBUMIN  --   --   --   --  3.5   PROTTOTAL  --   --   --   --  6.4   BILITOTAL  --   --   --   --  0.6   ALKPHOS  --   --   --   --  61   ALT  --   --   --   --  15   AST  --   --   --   --  29    < > = values in this interval not displayed.       Recent Results (from the past 24 hour(s))   CT Chest w Contrast    Narrative    EXAM: CT CHEST W CONTRAST  LOCATION: [facility]  DATE/TIME: 4/4/2023 5:08 PM    INDICATION: further eval pleural effusions,  worsening respiratory symptoms  COMPARISON: CT pulmonary angiogram 08/26/2022. Chest x-ray 04/04/2023  TECHNIQUE: CT chest with IV contrast. Multiplanar reformats were obtained. Dose reduction techniques were used.    CONTRAST:  73 mL isovue 370    FINDINGS:   LUNGS AND PLEURA: Small right pleural effusion. Moderate atelectatic changes within the right middle and bilateral lower lobes.    MEDIASTINUM/AXILLAE: No lymphadenopathy. Mild calcified atherosclerotic changes of a normal caliber thoracic aorta. Normal caliber central pulmonary arteries. Mild cardiomegaly.    CORONARY ARTERY CALCIFICATION: Previous intervention (stents ).    UPPER ABDOMEN: Focal fatty infiltration of liver near the falciform ligament. Cholecystectomy.    MUSCULOSKELETAL: Degenerative changes visualized spine.      Impression    IMPRESSION:   1.  Small right pleural effusion.  2.  Moderate atelectasis of the right middle and bilateral lower lobes.         Assessment & Plan     Carl Vázquez is a 82 year old male with a past medical history significant for chronic dyspnea with prior extensive workup over the past year, chronic hypercapnic respiratory failure, recurrent pleural effusions, 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  CALE on home BiPAP   Bilateral pleural effusions  Presents with worsening dyspnea x1 week as well as  orthopnea. Recently has followed closely with Cardiology/[facility] clinic due to increasing edema and weight gain. Weight at home has been around 144  Despite increasing diuretic symptoms have continued to worsen though he does report robust response to diuretic at home. He has used BiPAP @ hs (though has not tolerated well due to increased WOB despite use) and during the day at times with no improvement. No cough, fever, leukocytosis or CXR findings to suggest infection. He is on DOAC making PE less likely.   Pleural effusions seen during prior admissions, underwent thoracentesis in February (transudative).  He has been followed by Pulmonology and Neurology for restrictive lung disease in the setting of non functional R hemidiaphragm and suspected ALS. Also kyphotic with restrictive posture due to chronic neck pain.   *S/p hemidiaphragm plication 10/2022 through [facility] w/o significant relief.   * most recent PFTs 3/1/2023 show possible restriction, MIP and MEP reduced- may be suggestive of neuromuscular weakness contributing to hypoventilation- as well as normal FEV1/FVC ratio  *swtiched from torsemide to bumex 3/7, 2mg bid. Decreased to 2mg am 1mg pm 3/15 due to worsening renal function. Back up to 2mg bid on 3/28 due to weight gain (144lb), increased further 4/2 to 3mg qam 2mg pm  *hypoxic to 86% in the ED, on 2L NC. He does not use oxygen at baseline with home sats 92-93% RA  *VBG shows PCO2 72 (around baseline), compensated with pH 7.4  *CXR shows small bilateral pleural effusions and mild bibasilar atelectasis, stable.   * (225 2/20/23), weight on admission 145lb  --Cardiology consulted, appreciate assistance   -- continue PTA bumex for now   --plan for repeat ECHO 6/2023  --Pulmonology consulted, based on their assessment it is felt that his current presentation is secondary to progressive neuromuscular weakness which is very difficult to treat.  Did recommend placing patient on AVAPS positive pressure  ventilation which he might be able to tolerate better than his regular BiPAP.  --Canceled NIF testing since pulmonary felt that this would be low anyway and would not be useful in regards to management.  --Neurology consulted, appreciate assistance   --CT chest showed small pleural effusion with minimal basal atelectasis  --BiPAP @hs and prn   --low Na diet  -- Multiple discussions were had with the patient and his wife regarding his underlying progressive suspected ALS with limited treatment options.  They are open to palliative care and hospice evaluation.  Palliative care has been consulted.        Suspected ALS with progressive generalized weakness.  * Noted to be longstanding and progressive for at least the 15 months  * recently seen by Neuromuscular specialist [facility] 3/1/23 with plan for EMG and LP outpatient. Follow up scheduled for May  * EMG shows widespread, severe pure motor neurogenic process   * recently started on riluzole   --given concern for role of neuromuscular disorder in worsening respiratory status will ask Neurology to see   -- continue riluzole- can take home supply when verified      Coronary artery disease with recent atherectomy and JESSE x 4 to LAD on 2/21/2023  Troponin elevation, suspect demand ischemia in the setting of CHF   During admission in February diagnosed with new CHF with ECHO showing LVEF 25-30%, severe global hypokinesis, worse in the inferoseptal segments; moderate decreased RV systolic function.  *also with NSTEMI at the time, underwent procedure as above, now on Plavix and xarelto (failed triple therapy due to epistaxis and hematuria)  *troponin this admission 94 (stable from values most recent admission)  *EKG on admission shows NSR with TWI (seen previously)  Denies chest pain   PTA: plavix 75mg, lisinopril 2.5mg, nebivolol 2.5mg bid, crestor 20mg  --Cardiology following, appreciate assistance   --trend troponin  --continue PTA BB with hold parameters, statin,  plavix       Paroxysmal atrial fibrillation  Prior to admission 2/2023, pt was on rate control regimen with diltiazem 360 mg once daily. Diltiazem was stopped and patient was loaded with IV amiodarone after new CM identified   *Zio patch 3/7-3/14 monitor without recurrent AFIB  PTA:  amiodarone 200 mg daily, nebivolol 2.5 mg BID, Xarelto 20mg @hs  --telemetry  --continue PTA amiodarone, nebivolol   --continue Xarelto      DM2 last A1c was 8.2% in January 2023.   PTA: glipizide 10mg daily, lantus 60 units @hs (last dose evening prior to admission)  BG have been labile at home, po intake variable given issues with dysphagia.   --continue PTA lantus at reduced dose 30u for now (reports he does not eat much in hospital)  --hold glipizide  --medium sliding scale     Possible chronic liver disease.  * Following with [facility] for possible chronic liver disease. Plan for EGD in ~6 months given recent stenting. Had paracentesis 2/24/23. Question had been raised about potential hepatopulmonary syndrome with shunt physiology.   *CT 2/17 showed hepatic steatosis and mildly nodular hepatic contour suggestive of  hepatic parenchymal disease, indeterminate linear hypodensity adjacent to the gallbladder fossa, may be a focus of fat deposition, nonemergent liver MRI was recommended for better characterization.  *LFT WNL on admission  --outpatient GI follow up as planned   --will get bubble study with ECHO      Dysphagia  Reports frequent dysphagia with pills/foods. EGD had been planned for potential dilatation but unfortunately has been delayed due to recent stenting and need for plavix  --SLP consult   --soft diet  --aspiration precautions  --pills crushed   --outpatient GI follow up      BPH  Hx urinary retention. Continue flomax and monitor UOP     Hypothyroid continue PTA levothyroxine when verified     GERD continue PPI bid      HLD continues statin            Diet: Low Saturated Fat Na <2400 mgsoft diet, aspiration precautions    DVT Prophylaxis: Xarelto   Power Catheter: Not present  Lines: None     Cardiac Monitoring: None  Code Status:   DNR/DNI- confirmed with patient and his wife on admission         Clinically Significant Risk Factors Present on Admission               # Drug Induced Coagulation Defect: home medication list includes an anticoagulant medication  # Drug Induced Platelet Defect: home medication list includes an antiplatelet medication   # Hypertension: home medication list includes antihypertensive(s)  # Chronic systolic heart failure: echo within the past year with EF <40%     # DMII: A1C = N/A within past 6 months            Laine Martinez MD, MD  [phone](p)

## 2023-04-05 NOTE — PROGRESS NOTES
"   04/05/23 1119   Appointment Info   Signing Clinician's Name / Credentials (OT) Bettie Mendez OTR/L   Living Environment   People in Home spouse   Current Living Arrangements house   Home Accessibility stairs within home   Living Environment Comments Reports can have 24 hr support from spouse. Has ramp to enter. Has walk out basement with 10 stairs to access to step in shower. Recently has been doing sponge bathes   Self-Care   Usual Activity Tolerance good   Current Activity Tolerance moderate   Equipment Currently Used at Home grab bar, toilet;hospital bed;walker, rolling;wheelchair, manual;shower chair  (lift chair. urinal. pulse ox. BP cuff)   Fall history within last six months no   Activity/Exercise/Self-Care Comment At baseline is independent in short distant ambulation using FWW, toileting. Spouse helps with bathing and dressing. Wheelchair for longer distances. Recently finished home therapy and starting outpatient physical therapy   Instrumental Activities of Daily Living (IADL)   IADL Comments Spouse does IADLs   General Information   Onset of Illness/Injury or Date of Surgery 04/04/23   Referring Physician Brenda Polk PA-C   Patient/Family Therapy Goal Statement (OT) Return home   Additional Occupational Profile Info/Pertinent History of Current Problem Per Palliative consult- \"82 year old male with a past medical history significant for chronic dyspnea with prior extensive workup over the past year, chronic hypercapnic respiratory failure, recurrent pleural effusions, 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   Initial thoughts that worsening dyspnea could be secondary to worsening heart failure however after evaluation " "from cardiology and pulmonary medicine it is felt that this is likely a worsening progression of his ALS.  The patient expressed wishes to limit hospital stays and to return home as soon as possible\"   Existing Precautions/Restrictions fall;oxygen therapy device and L/min  (3L)   Cognitive Status Examination   Orientation Status orientation to person, place and time   Follows Commands follows multi-step commands   Pain Assessment   Patient Currently in Pain Yes, see Vital Sign flowsheet   Strength Comprehensive (MMT)   Comment, General Manual Muscle Testing (MMT) Assessment Generalized weakness   Bed Mobility   Bed Mobility supine-sit   Supine-Sit [employer] (Bed Mobility) supervision   Transfers   Transfers sit-stand transfer;toilet transfer   Sit-Stand Transfer   Sit-Stand [employer] (Transfers) supervision   Toilet Transfer   Type (Toilet Transfer) sit-stand;stand-sit   [employer] Level (Toilet Transfer) supervision   Activities of Daily Living   BADL Assessment/Intervention toileting   Toileting   [employer] Level (Toileting) contact guard assist   Clinical Impression   Criteria for Skilled Therapeutic Interventions Met (OT) Yes, treatment indicated   OT Diagnosis Decline function   OT Problem List-Impairments impacting ADL problems related to;activity tolerance impaired;mobility;strength   Assessment of Occupational Performance 1-3 Performance Deficits   Identified Performance Deficits functional mobility, toileting   Planned Therapy Interventions (OT) ADL retraining;transfer training;home program guidelines;progressive activity/exercise   Clinical Decision Making Complexity (OT) low complexity   Anticipated Equipment Needs Upon Discharge (OT)   (Pt has)   Risk & Benefits of therapy have been explained evaluation/treatment results reviewed;care plan/treatment goals reviewed;risks/benefits reviewed;current/potential barriers reviewed;participants voiced agreement with care plan;participants " included;patient   OT Total Evaluation Time   OT Eval, Low Complexity Minutes (79526) 9   OT Goals   Therapy Frequency (OT) Daily   OT Predicted Duration/Target Date for Goal Attainment 04/10/23   OT Goals OT Goal 1;OT Goal 2;Toilet Transfer/Toileting   OT: Toilet Transfer/Toileting Modified independent;toilet transfer;cleaning and garment management;using adaptive equipment   OT: Goal 1 Pt will verbalize understanding of fall prevention strategies, how to progress activity tolerance, safety with managing O2 cord   OT: Goal 2 Pt will ambulate at least 150 ft using FWW with no more than SBA in order to progress activity tolerance to baseline level   Interventions   Interventions Quick Adds Therapeutic Procedures/Exercise   Therapeutic Procedures/Exercise   Therapeutic Procedure: strength, endurance, ROM, flexibillity minutes (64484) 11   Symptoms Noted During/After Treatment fatigue   Treatment Detail/Skilled Intervention Upon arrival pt seated in chair and agreeable to therapy. Pt reporting nausea from recent oxy dose. RN aware and addressed. BP WNL. Pt seen for a focus on enhancing activity tolerance for future self-cares. Sit to stand with SBA for balance safety. Pt engaged in 15 reps of standing marching in place, large shoulder circles, toe taps, knee squats. Pt declined ambulation due to nausea. Pt maintained O2 sat at 94-95% throughout on 3L via nasal cannula. Pt left seated in chair with nursing assistant   OT Discharge Planning   OT Plan Ambulate in maldonado; Toileting; safe O2 cord management during daily tasks   OT Discharge Recommendation (DC Rec) home with assist  (Resumed outpatient PT)   OT Rationale for DC Rec Pt feeling unwell due to recent oxy dose so session reduced in intensity. Overall pt is mobilizing with SBA. Maintained O2 sat at 94-95% on 3L via nasal cannula. Pt reports desire to resume outpatient physical therapy. Reports he recently finished home therapy. Pt mildly below baseline in activity  tolerance and will benefit from continued skilled therapy to progress activity tolerance. Pt has sufficient support at home per his report and he has no additional adaptive equipment needs.   OT Brief overview of current status Maintained on 3L   Total Session Time   Timed Code Treatment Minutes 11   Total Session Time (sum of timed and untimed services) 20

## 2023-04-05 NOTE — PLAN OF CARE
[id number] A&Ox4. Pain in lower back/abdomen treated with oxycodone. Tele SR, VSS on 3L NC. Echo and pulmonary consult complete. Palliative consult pending.

## 2023-04-05 NOTE — PROGRESS NOTES
[facility] [place]  Neurology Daily Note      Admission Date:4/4/2023   Date of service: 04/05/2023   Hospital Day: 2                                                   Assessment and Plan:   #.  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  This certainly could be a cause of neuromuscular weakness of the diaphragm/pulmonary musculature.  -- Findings were discussed with patient wife by phone  Unfortunately this is a condition which does not have a cure.  Progression will be expected particularly given his age and medical comorbidities (CHF/CAD/DM/cervical stenosis.  Agree with plan for palliative care.      Will sign off.  Please contact General Neurology service if questions related to neurologic status or follow up needed during this admission.       Interval History:   D/w patient wife.  She reports Dr. Baez dx ALS.  LP was recommended but not recommended to go off antiplatelet rx because of stents.  Result would mainly confirm already established dx.    Wife report she is ready to speak with palliative care and hospice as she realizes she needs help when things continue to progress with weakness/breathing.          Medications:   Scheduled Meds:    acetylcysteine  2 mL Nebulization Q4H     amiodarone  200 mg Oral Daily     bumetanide  2 mg Oral BID     clopidogrel  75 mg Oral Daily     insulin aspart  1-7 Units Subcutaneous TID AC     insulin aspart  1-5 Units Subcutaneous At Bedtime     insulin glargine  30 Units Subcutaneous At Bedtime     ipratropium  0.5 mg Nebulization Q4H    And     levalbuterol  1.25 mg Nebulization Q4H DEDRICK     levothyroxine  75 mcg Oral QAM AC     lidocaine  2 patch Transdermal Q24h    And     lidocaine   Transdermal Q8H DEDRICK     lisinopril  2.5 mg Oral Daily     nebivolol  2.5 mg Oral BID     pantoprazole  40 mg Oral BID     polyethylene glycol  17 g Oral Daily     [Held by provider] potassium chloride ER  10 mEq Oral BID      riluzole  50 mg Oral Q12H     rivaroxaban ANTICOAGULANT  20 mg Oral Daily with supper     rosuvastatin  20 mg Oral Daily     sodium chloride (PF)  3 mL Intracatheter Q8H     PRN Meds: acetaminophen **OR** acetaminophen, glucose **OR** dextrose **OR** glucagon, lidocaine 4%, lidocaine (buffered or not buffered), naloxone **OR** naloxone **OR** naloxone **OR** naloxone, oxyCODONE, - MEDICATION INSTRUCTIONS -, sodium chloride (PF)        Physical Exam:   Vitals: Temp: 98.1  F (36.7  C) Temp src: Oral BP: 127/68 Pulse: 65   Resp: 19 SpO2: 96 % O2 Device: Nasal cannula Oxygen Delivery: 3 LPM  Vital Signs with Ranges: Temp:  [97.6  F (36.4  C)-98.4  F (36.9  C)] 98.1  F (36.7  C)  Pulse:  [61-76] 65  Resp:  [14-19] 19  BP: (100-132)/(53-87) 127/68  SpO2:  [94 %-96 %] 96 %    General Appearance:  No acute distress  Neuro:       Mental Status Exam:   Sleeping.  Wished me to speak to wife.                      TIME     25minutes Evaluation/management time       Orly Dennis M.D.  Neurologist  [facility]  Office [phone]

## 2023-04-05 NOTE — PROGRESS NOTES
[facility]   Clinical Swallow Evaluation    04/05/23 0908   Appointment Info   Signing Clinician's Name / Credentials (SLP) Deanna Hdz MS CCC SLP   General Information   Onset of Illness/Injury or Date of Surgery 04/04/23   Referring Physician Brenda Polk PA-C   Patient/Family Therapy Goal Statement (SLP) Wants to get better   Pertinent History of Current Problem Per provider documentation - Carl Vázquez is a 82 year old male with a past medical history significant for chronic dyspnea with prior extensive workup over the past year, chronic hypercapnic respiratory failure, recurrent pleural effusions, 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   General Observations Pt is alert and agreeable to evaluation. No family present   Type of Evaluation   Type of Evaluation Swallow Evaluation   Oral Motor   Oral Musculature generally intact   Structural Abnormalities none present   Mucosal Quality adequate   Dentition (Oral Motor)   Dentition (Oral Motor) adequate dentition;dental appliance/dentures   Lip Function (Oral Motor)   Lip Range of Motion (Oral Motor) WNL   Tongue Function (Oral Motor)   Tongue ROM (Oral Motor) WNL   Vocal Quality/Secretion Management (Oral Motor)   Vocal Quality (Oral Motor) hoarse  (pt reports it has worsened in the last couple of weeks)   Secretion Management (Oral Motor) WNL   General Swallowing Observations   Past History of Dysphagia Pt has been evaluated by SLP dept during previous admission in March and at OP clinic for concerns of ALS. Pt reports his primary concern is globus sensation with solids and pills. He is aware that he requires an EGD and needs his throat stretched however  procedure had to be put on hold given other medical issues.   Respiratory Support (General Swallowing Observations) supplemental oxygen;nasal cannula   Current Diet/Method of Nutritional Intake (General Swallowing Observations, NIS) easy to chew (level 7);thin liquids (level 0)   Swallowing Evaluation Clinical swallow evaluation   Clinical Swallow Evaluation   Feeding Assistance set up only required   Clinical Swallow Evaluation Textures Trialed thin liquids;pureed;solid foods   Clinical Swallow Eval: Thin Liquid Texture Trial   Mode of Presentation, Thin Liquids cup;self-fed;straw   Volume of Liquid or Food Presented 4 oz   Oral Phase of Swallow WFL   Pharyngeal Phase of Swallow intact   Diagnostic Statement No overt s/sx of aspiration, no changes in breath sounds or vocal quality   Clinical Swallow Evaluation: Puree Solid Texture Trial   Mode of Presentation, Puree spoon;self-fed   Volume of Puree Presented 2 oz   Oral Phase, Puree WFL   Pharyngeal Phase, Puree intact   Diagnostic Statement No overt s/sx of aspiration   Clinical Swallow Evaluation: Solid Food Texture Trial   Mode of Presentation self-fed   Volume Presented 1 cracker   Oral Phase WFL   Pharyngeal Phase intact   Diagnostic Statement Adequate oral manipulation, no overt s/sx of aspiration. Pt using liquid washes independently to reduce globus sensation   Esophageal Phase of Swallow   Patient reports or presents with symptoms of esophageal dysphagia Yes   Esophageal comments Pt reports globus sensation with pills and solids foods. He is suppose to have an EGD for dilation. However, had to be postponed given other medical issues.   Swallowing Recommendations   Diet Consistency Recommendations easy to chew (level 7);thin liquids (level 0)   Supervision Level for Intake distant supervision needed   Mode of Delivery Recommendations bolus size, small;food moistened;slow rate of intake   Swallowing Maneuver Recommendations alternate food and liquid intake    Monitoring/Assistance Required (Eating/Swallowing) stop eating activities when fatigue is present;monitor for cough or change in vocal quality with intake   Recommended Feeding/Eating Techniques (Swallow Eval) maintain upright sitting position for eating;maintain upright posture during/after eating for 30 minutes;minimize distractions during oral intake;provide 6 smaller meals throughout day   Medication Administration Recommendations, Swallowing (SLP) Crush or break meds as able and provide in puree   Instrumental Assessment Recommendations instrumental evaluation not recommended at this time   Clinical Impression   Criteria for Skilled Therapeutic Interventions Met (SLP Eval) Evaluation only;Current level of function same as previous level of function   SLP Diagnosis Functional oropharyngeal swallow. Esophageal dysphagia - pt awaiting EGD   Risks & Benefits of therapy have been explained evaluation/treatment results reviewed;care plan/treatment goals reviewed;participants voiced agreement with care plan;participants included;patient   Clinical Impression Comments Pt seen for clinical swallow evaluation. He consumed all PO trials without overt s/sx of aspiration. He reports pills are the most difficult thing to swallow and certain solid foods, occasionally liquids as well. Pt has a known hx of esophageal dysphagia which is evident during evaluation. He indepedently verbalizes and uses swallowing strategies of small bites, thorough mastication, and liquid washes to alleviate some of the difficulty.     Pt's  oropharyngeal swallow is felt to be WFL. Known esophageal dysfunction with need for EGD. Pt is appropriate for easy to chew diet and thin liquids with use of strategies - sitting at 90 degrees for PO, stay sitting up for 60 mins after PO, small bite/ssips, slow rate. Pt's swallow function is felt to be at baseline, awaiting plan for EGD. Evaluation only and no further SLP services warranted at this time.   SLP  Total Evaluation Time   Eval: oral/pharyngeal swallow function, clinical swallow Minutes (14276) 13   SLP Discharge Planning   SLP Plan Eval only

## 2023-04-05 NOTE — PROVIDER NOTIFICATION
MD Notification    Notified Person: MD    Notified Person Name: Juan    Notification Date/Time: 04/05/23 10:52 AM    Notification Interaction: Vocera text    Purpose of Notification:  Can we transfer him to a medical floor?  Orders Received:    Comments:

## 2023-04-06 VITALS
SYSTOLIC BLOOD PRESSURE: 95 MMHG | BODY MASS INDEX: 22.88 KG/M2 | HEART RATE: 73 BPM | DIASTOLIC BLOOD PRESSURE: 55 MMHG | RESPIRATION RATE: 20 BRPM | OXYGEN SATURATION: 90 % | TEMPERATURE: 97.9 F | WEIGHT: 146.1 LBS

## 2023-04-06 LAB
GLUCOSE BLDC GLUCOMTR-MCNC: 120 MG/DL (ref 70–99)
GLUCOSE BLDC GLUCOMTR-MCNC: 219 MG/DL (ref 70–99)
GLUCOSE BLDC GLUCOMTR-MCNC: 327 MG/DL (ref 70–99)
GLUCOSE BLDC GLUCOMTR-MCNC: 390 MG/DL (ref 70–99)

## 2023-04-06 PROCEDURE — 94640 AIRWAY INHALATION TREATMENT: CPT

## 2023-04-06 PROCEDURE — 99239 HOSP IP/OBS DSCHRG MGMT >30: CPT | Performed by: HOSPITALIST

## 2023-04-06 PROCEDURE — 999N000157 HC STATISTIC RCP TIME EA 10 MIN

## 2023-04-06 PROCEDURE — 250N000013 HC RX MED GY IP 250 OP 250 PS 637: Performed by: PHYSICIAN ASSISTANT

## 2023-04-06 PROCEDURE — 250N000009 HC RX 250: Performed by: HOSPITALIST

## 2023-04-06 PROCEDURE — 99233 SBSQ HOSP IP/OBS HIGH 50: CPT | Performed by: NURSE PRACTITIONER

## 2023-04-06 PROCEDURE — 94640 AIRWAY INHALATION TREATMENT: CPT | Mod: 76

## 2023-04-06 PROCEDURE — 94660 CPAP INITIATION&MGMT: CPT

## 2023-04-06 RX ORDER — BUMETANIDE 2 MG/1
2 TABLET ORAL
Qty: 60 TABLET | Refills: 1 | Status: SHIPPED | OUTPATIENT
Start: 2023-04-06 | End: 2023-04-24

## 2023-04-06 RX ADMIN — IPRATROPIUM BROMIDE 0.5 MG: 0.5 SOLUTION RESPIRATORY (INHALATION) at 00:14

## 2023-04-06 RX ADMIN — ROSUVASTATIN CALCIUM 20 MG: 20 TABLET, FILM COATED ORAL at 09:29

## 2023-04-06 RX ADMIN — ACETYLCYSTEINE 2 ML: 200 INHALANT RESPIRATORY (INHALATION) at 11:02

## 2023-04-06 RX ADMIN — CLOPIDOGREL BISULFATE 75 MG: 75 TABLET ORAL at 09:29

## 2023-04-06 RX ADMIN — ACETYLCYSTEINE 2 ML: 200 INHALANT RESPIRATORY (INHALATION) at 00:14

## 2023-04-06 RX ADMIN — BUMETANIDE 2 MG: 1 TABLET ORAL at 09:31

## 2023-04-06 RX ADMIN — IPRATROPIUM BROMIDE 0.5 MG: 0.5 SOLUTION RESPIRATORY (INHALATION) at 11:02

## 2023-04-06 RX ADMIN — LEVALBUTEROL HYDROCHLORIDE 1.25 MG: 1.25 SOLUTION RESPIRATORY (INHALATION) at 11:02

## 2023-04-06 RX ADMIN — IPRATROPIUM BROMIDE 0.5 MG: 0.5 SOLUTION RESPIRATORY (INHALATION) at 03:37

## 2023-04-06 RX ADMIN — INSULIN ASPART 5 UNITS: 100 INJECTION, SOLUTION INTRAVENOUS; SUBCUTANEOUS at 13:39

## 2023-04-06 RX ADMIN — RILUZOLE 50 MG: 50 TABLET, FILM COATED ORAL at 09:32

## 2023-04-06 RX ADMIN — NEBIVOLOL HYDROCHLORIDE 2.5 MG: 2.5 TABLET ORAL at 09:29

## 2023-04-06 RX ADMIN — ACETYLCYSTEINE 2 ML: 200 INHALANT RESPIRATORY (INHALATION) at 03:37

## 2023-04-06 RX ADMIN — LEVALBUTEROL HYDROCHLORIDE 1.25 MG: 1.25 SOLUTION RESPIRATORY (INHALATION) at 03:37

## 2023-04-06 RX ADMIN — LEVOTHYROXINE SODIUM 75 MCG: 75 TABLET ORAL at 09:28

## 2023-04-06 RX ADMIN — PANTOPRAZOLE SODIUM 40 MG: 40 TABLET, DELAYED RELEASE ORAL at 09:29

## 2023-04-06 RX ADMIN — AMIODARONE HYDROCHLORIDE 200 MG: 200 TABLET ORAL at 09:29

## 2023-04-06 RX ADMIN — LISINOPRIL 2.5 MG: 2.5 TABLET ORAL at 09:29

## 2023-04-06 RX ADMIN — POLYETHYLENE GLYCOL 3350 17 G: 17 POWDER, FOR SOLUTION ORAL at 09:28

## 2023-04-06 ASSESSMENT — ACTIVITIES OF DAILY LIVING (ADL)
ADLS_ACUITY_SCORE: 48

## 2023-04-06 NOTE — PROGRESS NOTES
Patient/wife discharge paperwork reviewed. Talked with social work they will continue working of Avaps from their end. Patient sent with all belongings home. Transport done with wife

## 2023-04-06 NOTE — PROGRESS NOTES
"Writer contacted [employer] regarding patient's settings to his home respiratory equipment and spoke with Jenna who faxed writer settings of patient's AVAPS.  Writer then contacted RT to compare settings patient using in hospital to home settings.  Recommendation per RT was to change home setting of \"max P\"  To 35 (down from 40).  Patient expressed increase comfort/rest while in the hospital and preferred the mask used over his home mask.  Per verbal order from Dr. Her, writer entered order to change home setting of \"max P\" to 35.  Writer left voice message for Jenna with [employer] regarding change and faxed orders to [employer] at:  [phone].    Carline Zarco RN  Care Coordinator  [facility]  [phone] (text or call)    "

## 2023-04-06 NOTE — PROVIDER NOTIFICATION
Message:  248SB; Good am! Informing you of patient's CBG of 327 at 2am. Has received bedtime doses of Novolog and Lantus last night and not on 4-hourly insulin cover; asymptomatic to this. next check pre-breakfast. Pls advise [id number]        Provider notified: Pattie     Via:   Content[employer] web       At: [id number]            Resolution:    Brief update:    BG of 327     5 units novlog x 1 now     Ashkan Guzmán MD  2:24 AM

## 2023-04-06 NOTE — DISCHARGE SUMMARY
[facility]    Discharge Summary  Hospitalist    Date of Admission:  4/4/2023  Date of Discharge:  4/6/2023    Discharge Diagnoses      Acute respiratory failure with hypoxia and hypercapnia (H)  History of heart failure  CO2 narcosis  ALS (amyotrophic lateral sclerosis) (H)    History of Present Illness   Carl Vázquez is an 82 year old male who presented with     Hospital Course   Carl Vázquez was admitted on 4/4/2023.  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  CALE on home BiPAP   Bilateral pleural effusions  Presents with worsening dyspnea x1 week as well as orthopnea. Recently has followed closely with Cardiology/[facility] due to increasing edema and weight gain. Weight at home has been around 144  Despite increasing diuretic symptoms have continued to worsen though he does report robust response to diuretic at home. He has used BiPAP @ hs (though has not tolerated well due to increased WOB despite use) and during the day at times with no improvement. No cough, fever,  leukocytosis or CXR findings to suggest infection. He is on DOAC making PE less likely.   Pleural effusions seen during prior admissions, underwent thoracentesis in February (transudative).  He has been followed by Pulmonology and Neurology for restrictive lung disease in the setting of non functional R hemidiaphragm and suspected ALS. Also kyphotic with restrictive posture due to chronic neck pain.   *S/p hemidiaphragm plication 10/2022 through [facility] w/o significant relief.   * most recent PFTs 3/1/2023 show possible restriction, MIP and MEP reduced- may be suggestive of neuromuscular weakness contributing to hypoventilation- as well as normal FEV1/FVC ratio  *swtiched from torsemide to bumex 3/7, 2mg bid. Decreased to 2mg am 1mg pm 3/15 due to worsening renal function. Back up to 2mg bid on 3/28 due to weight gain (144lb), increased further 4/2 to 3mg qam 2mg pm  *hypoxic to 86% in the ED, on 2L NC. He does not use oxygen at baseline with home sats 92-93% RA  *VBG shows PCO2 72 (around baseline), compensated with pH 7.4  *CXR shows small bilateral pleural effusions and mild bibasilar atelectasis, stable.   * (225 2/20/23), weight on admission 145lb  --Cardiology consulted, appreciate assistance   -- continue PTA bumex for now   --plan for repeat ECHO 6/2023  --Pulmonology consulted, based on their assessment it is felt that his current presentation is secondary to progressive neuromuscular weakness which is very difficult to treat.  Did recommend placing patient on AVAPS positive pressure ventilation which he might be able to tolerate better than his regular BiPAP.  --Canceled NIF testing since pulmonary felt that this would be low anyway and would not be useful in regards to management.  --Neurology consulted, appreciate assistance .  Since he had prior known ALS they do not have any more recommendations.  --CT chest showed small pleural effusion with minimal basal atelectasis  --BiPAP @hs and prn   --low  Na diet  -- Multiple discussions were had with the patient and his wife regarding his underlying progressive suspected ALS with limited treatment options.  They are open to palliative care and hospice evaluation.  Palliative care has been consulted.  -After multiple discussions with palliative care patient decided to continue with current level of care which includes DNR/DNI with limited interventions.  Since he is stable he does not want to pursue hospice at this time.  He would like to continue to use his BiPAP at home.  -Patient felt significantly better on the day of discharge and was back to baseline with his respiratory status.  Was discharged in stable condition back on his home Bumex regimen.        Suspected ALS with progressive generalized weakness.  * Noted to be longstanding and progressive for at least the 15 months  * recently seen by Neuromuscular specialist [facility] 3/1/23 with plan for EMG and LP outpatient. Follow up scheduled for May  * EMG shows widespread, severe pure motor neurogenic process   * recently started on riluzole   --given concern for role of neuromuscular disorder in worsening respiratory status will ask Neurology to see   -- continue riluzole- can take home supply when verified      Coronary artery disease with recent atherectomy and JESSE x 4 to LAD on 2/21/2023  Troponin elevation, suspect demand ischemia in the setting of CHF   During admission in February diagnosed with new CHF with ECHO showing LVEF 25-30%, severe global hypokinesis, worse in the inferoseptal segments; moderate decreased RV systolic function.  *also with NSTEMI at the time, underwent procedure as above, now on Plavix and xarelto (failed triple therapy due to epistaxis and hematuria)  *troponin this admission 94 (stable from values most recent admission)  *EKG on admission shows NSR with TWI (seen previously)  Denies chest pain   PTA: plavix 75mg, lisinopril 2.5mg, nebivolol 2.5mg bid, crestor 20mg  --Cardiology  following, appreciate assistance   --trend troponin  --continue PTA BB with hold parameters, statin, plavix       Paroxysmal atrial fibrillation  Prior to admission 2/2023, pt was on rate control regimen with diltiazem 360 mg once daily. Diltiazem was stopped and patient was loaded with IV amiodarone after new CM identified   *Zio patch 3/7-3/14 monitor without recurrent AFIB  PTA:  amiodarone 200 mg daily, nebivolol 2.5 mg BID, Xarelto 20mg @hs  --telemetry  --continue PTA amiodarone, nebivolol   --continue Xarelto      DM2 last A1c was 8.2% in January 2023.   PTA: glipizide 10mg daily, lantus 60 units @hs (last dose evening prior to admission)  BG have been labile at home, po intake variable given issues with dysphagia.   --continue PTA lantus at reduced dose 30u for now (reports he does not eat much in hospital)  --hold glipizide  --medium sliding scale     Possible chronic liver disease.  * Following with [facility] for possible chronic liver disease. Plan for EGD in ~6 months given recent stenting. Had paracentesis 2/24/23. Question had been raised about potential hepatopulmonary syndrome with shunt physiology.   *CT 2/17 showed hepatic steatosis and mildly nodular hepatic contour suggestive of  hepatic parenchymal disease, indeterminate linear hypodensity adjacent to the gallbladder fossa, may be a focus of fat deposition, nonemergent liver MRI was recommended for better characterization.  *LFT WNL on admission  --outpatient GI follow up as planned   -- Initially considered echocardiogram but since patient is NT proBNP was within normal limits and JVP was normal and lungs were clear it was felt that there was no cardiac etiology for his shortness of breath.     Dysphagia  Reports frequent dysphagia with pills/foods. EGD had been planned for potential dilatation but unfortunately has been delayed due to recent stenting and need for plavix  --SLP consult   --soft diet  --aspiration precautions  --pills crushed    --outpatient GI follow up      BPH  Hx urinary retention. Continue flomax and monitor UOP     Hypothyroid continue PTA levothyroxine when verified     GERD continue PPI bid      HLD continues statin            Diet: Low Saturated Fat Na <2400 mgsoft diet, aspiration precautions   DVT Prophylaxis: Xarelto   Power Catheter: Not present  Lines: None     Cardiac Monitoring: None  Code Status:   DNR/DNI- confirmed with patient and his wife on admission         Clinically Significant Risk Factors Present on Admission               # Drug Induced Coagulation Defect: home medication list includes an anticoagulant medication  # Drug Induced Platelet Defect: home medication list includes an antiplatelet medication   # Hypertension: home medication list includes antihypertensive(s)  # Chronic systolic heart failure: echo within the past year with EF <40%     # DMII: A1C = N/A within past 6 months            Laine Martinez MD, MD  [phone](p)     Clinically Significant Risk Factors                # Thrombocytopenia: Lowest platelets = 118 in last 2 days, will monitor for bleeding        # DMII: A1C = N/A within past 6 months            Laine Martinez MD, MD    Code Status   Full Code       Primary Care Physician   Kyler Mcwilliams    Physical Exam   Temp: 97.9  F (36.6  C) Temp src: Oral BP: 95/55 Pulse: 73   Resp: 20 SpO2: 90 % O2 Device: None (Room air) Oxygen Delivery: 3 LPM  Vitals:    04/04/23 1257 04/05/23 0517 04/06/23 0632   Weight: 65.8 kg (145 lb) 67 kg (147 lb 12.8 oz) 66.3 kg (146 lb 1.6 oz)     Vital Signs with Ranges  Temp:  [97.5  F (36.4  C)-98.8  F (37.1  C)] 97.9  F (36.6  C)  Pulse:  [63-83] 73  Resp:  [16-23] 20  BP: ()/(52-70) 95/55  FiO2 (%):  [35 %] 35 %  SpO2:  [90 %-98 %] 90 %  I/O last 3 completed shifts:  In: -   Out: 525 [Urine:525]    Physical Exam  Constitutional:       Appearance: Normal appearance.   HENT:      Head: Normocephalic.   Eyes:      Pupils: Pupils are equal, round, and  reactive to light.   Cardiovascular:      Rate and Rhythm: Normal rate and regular rhythm.      Pulses: Normal pulses.      Heart sounds: Normal heart sounds.   Pulmonary:      Effort: Pulmonary effort is normal. No respiratory distress.      Breath sounds: Normal breath sounds.   Abdominal:      General: Abdomen is flat. Bowel sounds are normal. There is no distension.      Tenderness: There is no abdominal tenderness. There is no guarding.   Musculoskeletal:         General: Normal range of motion.      Cervical back: Normal range of motion.   Skin:     General: Skin is warm and dry.   Neurological:      General: No focal deficit present.      Comments: Diffuse generalized weakness . 3/5 strength all over    Psychiatric:         Mood and Affect: Mood normal.           Discharge Disposition   Discharged to home  Condition at discharge: Stable    Consultations This Hospital Stay   CARDIOLOGY IP CONSULT  PULMONARY IP CONSULT  NEUROLOGY IP CONSULT  PHYSICAL THERAPY ADULT IP CONSULT  OCCUPATIONAL THERAPY ADULT IP CONSULT  SPEECH LANGUAGE PATH ADULT IP CONSULT  PALLIATIVE CARE ADULT IP CONSULT  SOCIAL WORK IP CONSULT    Time Spent on this Encounter   I, Laine Martinez MD, personally saw the patient today and spent greater than 30 minutes discharging this patient.    Discharge Orders      SLP COMPREHENSIVE DME    If providing a ResMed device for this [place] patient, please add [facility] as an Integrator in [facility] along with patient's MRN: [id number] and CSN: [id number].     Reason for your hospital stay    Respiratory distress     Follow-up and recommended labs and tests     Follow up with primary care provider, Kyler Mcwilliams, within 7 days for hospital follow- up.  The following labs/tests are recommended: cbc, bmp in 1 week.     Activity    Your activity upon discharge: activity as tolerated     Diet    Follow this diet upon discharge: Orders Placed This Encounter      Combination Diet  Mechanical/Dental Soft Diet; 2 gm NA Diet     Discharge Medications   Current Discharge Medication List      CONTINUE these medications which have CHANGED    Details   bumetanide (BUMEX) 2 MG tablet Take 1 tablet (2 mg) by mouth 2 times daily  Qty: 60 tablet, Refills: 1    Associated Diagnoses: CO2 narcosis         CONTINUE these medications which have NOT CHANGED    Details   amiodarone (PACERONE) 200 MG tablet Take 1 tablet (200 mg) by mouth daily  Qty: 90 tablet, Refills: 3    Associated Diagnoses: Acute on chronic systolic CHF (congestive heart failure) (H)      clopidogrel (PLAVIX) 75 MG tablet Take 1 tablet (75 mg) by mouth daily  Qty: 90 tablet, Refills: 3    Associated Diagnoses: Elevated troponin      glipiZIDE (GLUCOTROL) 5 MG tablet Take 10 mg by mouth daily      insulin glargine (LANTUS PEN) 100 UNIT/ML pen Inject 60 Units Subcutaneous At Bedtime      levothyroxine (SYNTHROID/LEVOTHROID) 75 MCG tablet Take 75 mcg by mouth every morning Managed by PCP      lisinopril (ZESTRIL) 2.5 MG tablet Take 1 tablet (2.5 mg) by mouth daily  Qty: 90 tablet, Refills: 3    Associated Diagnoses: Acute on chronic systolic CHF (congestive heart failure) (H)      magnesium oxide (MAG-OX) 400 MG tablet Take 1 tablet (400 mg) by mouth daily  Qty: 90 tablet, Refills: 3    Associated Diagnoses: Hypomagnesemia      nebivolol (BYSTOLIC) 2.5 MG tablet Take 1 tablet (2.5 mg) by mouth 2 times daily  Qty: 180 tablet, Refills: 1    Associated Diagnoses: Acute on chronic systolic CHF (congestive heart failure) (H)      oxyCODONE (ROXICODONE) 5 MG tablet Take 5 mg by mouth every 4 hours as needed for pain      pantoprazole (PROTONIX) 40 MG EC tablet Take 40 mg by mouth 2 times daily      polyethylene glycol (MIRALAX) 17 GM/Dose powder Take 17 g by mouth daily  Qty: 510 g, Refills: 0    Associated Diagnoses: Acute respiratory failure with hypoxia and hypercapnia (H)      potassium chloride ER (KLOR-CON M) 10 MEQ CR tablet Take 1 tablet  (10 mEq) by mouth 2 times daily    Associated Diagnoses: Hypokalemia      riluzole (RILUTEK) 50 MG tablet Take 1 tablet (50 mg) by mouth every 12 hours  Qty: 60 tablet, Refills: 3    Associated Diagnoses: ALS (amyotrophic lateral sclerosis) (H)      rivaroxaban ANTICOAGULANT (XARELTO) 20 MG TABS tablet Take 1 tablet (20 mg) by mouth daily (with dinner)  Qty: 30 tablet, Refills: 5    Comments: Replaces Eliquis  Associated Diagnoses: Paroxysmal atrial fibrillation (H)      rosuvastatin (CRESTOR) 20 MG tablet Take 1 tablet (20 mg) by mouth daily  Qty: 90 tablet, Refills: 3    Associated Diagnoses: Acute on chronic systolic CHF (congestive heart failure) (H); Elevated troponin      nitroGLYcerin (NITROSTAT) 0.4 MG sublingual tablet For chest pain place 1 tablet under the tongue every 5 minutes for 3 doses. If symptoms persist 5 minutes after 1st dose call 911.  Qty: 15 tablet, Refills: 0    Associated Diagnoses: Elevated troponin           Allergies   Allergies   Allergen Reactions     Metoprolol Shortness Of Breath     Tried 1/2022, 1/2023, both time stopped for increased SOB     Statin Drugs [Hmg-Coa-R Inhibitors]      Data   Recent Labs   Lab Test 04/05/23  0558 04/04/23  0936 02/23/23  1502 02/23/23  0701   WBC 5.5 6.1  --  6.7   HGB 13.9 14.5 14.6 15.0   MCV 87 87  --  95   * 123*  --  137*      Recent Labs   Lab Test 04/06/23  1330 04/06/23  0903 04/06/23  0417 04/05/23  0759 04/05/23  0558 04/04/23  1801 04/04/23  0936 03/28/23  1011   NA  --   --   --   --  138  --  141 137   POTASSIUM  --   --   --   --  3.6  --  4.5 4.3   CHLORIDE  --   --   --   --  91*  --  93* 90*   CO2  --   --   --   --  40*  --  33* 40*   BUN  --   --   --   --  21.1  --  25.0* 33.9*   CR  --   --   --   --  0.90  --  1.04 1.00   ANIONGAP  --   --   --   --  7  --  15 7   TATE  --   --   --   --  9.1  --  10.0 9.6   * 120* 219*   < > 88   < > 269* 229*    < > = values in this interval not displayed.         Results for  orders placed or performed during the hospital encounter of 04/04/23   Chest XR,  PA & LAT    Narrative    XR CHEST 2 VIEWS 4/4/2023 11:19 AM    HISTORY: shortness of breath    COMPARISON: 2/18/2023      Impression    IMPRESSION: Small bilateral pleural effusions and mild bibasilar  atelectasis, stable. No pneumothorax. Normal heart size. Coronary  stent. Cholecystectomy.    DEEPAK OLIVER MD         SYSTEM ID:  [id number]   CT Chest w Contrast    Narrative    EXAM: CT CHEST W CONTRAST  LOCATION: [facility]  DATE/TIME: 4/4/2023 5:08 PM    INDICATION: further eval pleural effusions, worsening respiratory symptoms  COMPARISON: CT pulmonary angiogram 08/26/2022. Chest x-ray 04/04/2023  TECHNIQUE: CT chest with IV contrast. Multiplanar reformats were obtained. Dose reduction techniques were used.    CONTRAST:  73 mL isovue 370    FINDINGS:   LUNGS AND PLEURA: Small right pleural effusion. Moderate atelectatic changes within the right middle and bilateral lower lobes.    MEDIASTINUM/AXILLAE: No lymphadenopathy. Mild calcified atherosclerotic changes of a normal caliber thoracic aorta. Normal caliber central pulmonary arteries. Mild cardiomegaly.    CORONARY ARTERY CALCIFICATION: Previous intervention (stents ).    UPPER ABDOMEN: Focal fatty infiltration of liver near the falciform ligament. Cholecystectomy.    MUSCULOSKELETAL: Degenerative changes visualized spine.      Impression    IMPRESSION:   1.  Small right pleural effusion.  2.  Moderate atelectasis of the right middle and bilateral lower lobes.

## 2023-04-06 NOTE — PROGRESS NOTES
Care plan note:      Recent Vitals:  Temp: 97.9  F (36.6  C) Temp src: Oral BP: 95/55 Pulse: 73   Resp: 20 SpO2: 90 % O2 Device: None (Room air)      Orientation/Neuro: Alert and Oriented x 4  Pain: The patient is not having any pain.   Tele: Sinus rhythm    IV medications: None   Mobility: St. by assist  GB and wlaker   Skin: With in normal limits   GI: WDL  : WDL     Diet: Tolerating diet:   Well  Orders Placed This Encounter      Combination Diet Mechanical/Dental Soft Diet; 2 gm NA Diet      Safety/Concerns:  Fall Risk and Aspiration precautions  Aggression Color: Green    Plan: awaiting palliative family care conference. Then plan to discharge today. Pt reports feeling better, hoping to have AVAPS at home.    Continue to monitor.      Cheryl Lunsford RN

## 2023-04-06 NOTE — PROGRESS NOTES
PALLIATIVE CARE PROGRESS NOTE  [facility]     Patient Name: Carl Vázquez  Date of Admission: 4/4/2023   Today the patient was seen for: GOC follow-up      Recommendations & Counseling       Impression & Recommendations:  GOALS OF CARE:   -Selective treatment with emphasis on promoting comfort   -Not interested in TCU/LTC. He is not interested in staying the hospital. Goal is to return home.  -SW consult for hospice information. They are not ready to sign onto hospice yet but open to learning more information .        ADVANCE CARE PLANNING:   Advance directive: yes, on file and reviewed today   POLST: Not on file  Surrogate decision maker: Dorothy (wife)  Code status: DNR/DNI     SYMPTOM MANAGEMENT: We are not actively managing symptoms. Assessment reveals the following     #Dyspnea, multifactorial (HFrEF, recent CAD with stents, ALS, hepatopulmonary syndrome?)  -Continue with nebulizer treatments   -Maintain euvolemia as able  -Consider utilizing oxycodone to aid in air hunger. Can increase frequency to q 3 hours, can also consider increasing to 7.5 to 10 mg give worsening dyspnea despite medical intervention. Discussed with pt today, not interested in increasing the dose.       #Chronic pain, cervical spine.  -Scheduled Lidocaine patches  -PRN Tylenol. Consider scheduling, Hepatic panel appears stable. Limit to 3g given concern for chronic liver dysfunction   -PRN oxycodone 5 mg  q 4 hours      #Dyspahgia  -SLP following   -Diet modification as appropriate      PSYCHOSOCIAL/SPIRITUAL:   - Strong family support  -Dejan, jonathan [facility] today      Palliative Care will continue to follow. Thank you for the consult and allowing us to aid in the care of Carl Vázquez.    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   Initial thoughts that worsening dyspnea could be secondary to worsening heart failure however after evaluation from cardiology and pulmonary medicine it is felt that this is likely a worsening progression of his ALS.  The patient expressed wishes to limit hospital stays and to return home as soon as possible.  The palliative care team was consulted to discuss ongoing treatment options as well as goals of care  Prognosis, Goals, or Advance Care Planning was addressed today with: Yes.  Mood, coping, and/or meaning in the context of serious illness were addressed today: Yes.              Interval History:     Chart review/discussion with unit or clinical team members:   Course reviewed. No significant events overnight and no new medical concerns today.      Per patient or family/caregivers today:  Met with Tomer in his room. Denies  chest pain, palpitations, dizziness, vomiting. Planning on possible discharge today.,     Key Palliative Symptoms:  # Pain severity the last 12 hours: moderate  # Dyspnea severity the last 12 hours: moderate  # Nausea severity the last 12 hours: none  # Anxiety severity the last 12 hours: none  #Bowels: assessed and bowels ok/no needed changes to plan of care           Review of Systems:     Besides above, an additional  system ROS was reviewed and is unremarkable               Physical Exam:   Temp: 98.8  F (37.1  C) Temp src: Oral Temp  Min: 97.5  F (36.4  C)  Max: 98.8  F (37.1  C) BP: 120/52 Pulse: 70   Resp: 20 SpO2:  98 % O2 Device: None (Room air) Oxygen Delivery: 3 LPM  Vital Signs with Ranges  Temp:  [97.5  F (36.4  C)-98.8  F (37.1  C)] 98.8  F (37.1  C)  Pulse:  [63-83] 70  Resp:  [16-23] 20  BP: ()/(52-70) 120/52  FiO2 (%):  [35 %] 35 %  SpO2:  [91 %-98 %] 98 %  146 lbs 1.6 oz    Physical Exam  Vitals and nursing note reviewed.   Constitutional:       Appearance: Normal appearance.   HENT:      Head: Normocephalic.   Eyes:      Pupils: Pupils are equal, round, and reactive to light.   Cardiovascular:      Pulses: Normal pulses.      Heart sounds: Normal heart sounds.   Pulmonary:      Effort: Pulmonary effort is normal. No respiratory distress.      Breath sounds: No wheezing.   Abdominal:      General: Abdomen is flat. Bowel sounds are normal. There is no distension.   Skin:     General: Skin is warm and dry.      Capillary Refill: Capillary refill takes less than 2 seconds.   Neurological:      General: No focal deficit present.      Mental Status: He is alert. Mental status is at baseline.   Psychiatric:         Mood and Affect: Mood normal.         Thought Content: Thought content normal.                  Current Problem List:   Principal Problem:    History of heart failure  Active Problems:    Acute respiratory failure with hypoxia and hypercapnia (H)      Allergies   Allergen Reactions     Metoprolol Shortness Of Breath     Tried 1/2022, 1/2023, both time stopped for increased SOB     Statin Drugs [Hmg-Coa-R Inhibitors]             Data Reviewed:       Data   Results for orders placed or performed during the hospital encounter of 04/04/23 (from the past 24 hour(s))   Glucose by meter   Result Value Ref Range    GLUCOSE BY METER POCT 232 (H) 70 - 99 mg/dL   Glucose by meter   Result Value Ref Range    GLUCOSE BY METER POCT 171 (H) 70 - 99 mg/dL   Glucose by meter   Result Value Ref Range    GLUCOSE BY METER POCT 249 (H) 70 - 99 mg/dL   Glucose by meter   Result Value Ref Range    GLUCOSE BY METER POCT 327 (H)  70 - 99 mg/dL   Glucose by meter   Result Value Ref Range    GLUCOSE BY METER POCT 219 (H) 70 - 99 mg/dL   Glucose by meter   Result Value Ref Range    GLUCOSE BY METER POCT 120 (H) 70 - 99 mg/dL   *      -----------------------------------------------------------------------------------------------------------------          ====================================================  TT: I have personally spent a total of 55 minutes on the date of the encounter,  on the unit in review of medical record, consultation with the medical providers and assessment of Carl Vázquez  today, with more than 50% of this time spent in counseling, coordination of care, and discussion re: diagnostic results, prognosis, symptom management, risks and benefits of management options, and development of plan of care as noted above.      ====================================================

## 2023-04-06 NOTE — PLAN OF CARE
Occupational Therapy Discharge Summary    Reason for therapy discharge:    Discharged to home.    Progress towards therapy goal(s). See goals on Care Plan in [facility] electronic health record for goal details.  Goals partially met.  Barriers to achieving goals:   discharge from facility.    Therapy recommendation(s):    Continued therapy is recommended.  Rationale/Recommendations:  Recommend pt have assist with bathing, cooking, cleaning, laundry, transportation, supplemental O2 cord management during daily activities. Recommend pt resume outpatient physical therapy to progress activity tolerance.     Of note, pt not seen by therapy today. Note based on recommendations from 4/5/23

## 2023-04-06 NOTE — PROGRESS NOTES
Pt denied pain. vitals stable. IV removed w/o s/s of infection. Reviewed/gave d/c instructions including medications and follow ups. Pt verbalized understanding. =Leaving unit with all belongings. Pt and spouse refused to have Bumex filled here as already have some at home. Transport  Will be provided by spouse, awaiting follow up regarding obtaining AVAPS at home.

## 2023-04-06 NOTE — PLAN OF CARE
VS: BP stable; Elevated sugars overnight with stat corrections recieved  Assist by: 1; GB and walker    Cardiac: WDL; RRR; CP free overnight  Neuro: A&O;4  CMS: Denies paresthesias  Respi: SOBOE; on 3lpm via mask as desats on NC; Bipap at night til 6:30am  : Continent  GI: Mild tenderness reported to abdo; same soft, hypoactive BS on auscultation    Strip/Tele: NSR    Pressure areas/Skin: Edema +3 from knees down on both legs; PA's intact    LDA's: PIVC to left ACF SL      Plans:  > plan for repeat ECHO 6/2023  > continue PTA amiodarone, nebivolol, bumex for now   > SLP consult    -  Pt is appropriate for easy to chew diet and thin liquids with use of strategies   > PT - mobilizing with SBA.  >Pulmo   -BiPAP intermittently throughout day and with sleep  >Cardio   - Cardiology will sign off.   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   >Palliative:   - Home vs hospice; meeting 4/6 at 1pm      Problem: Plan of Care - These are the overarching goals to be used throughout the patient stay.    Goal: Absence of Hospital-Acquired Illness or Injury  Intervention: Identify and Manage Fall Risk  Recent Flowsheet Documentation  Taken 4/6/2023 0000 by Zari Fletcher RN  Safety Promotion/Fall Prevention:    activity supervised    clutter free environment maintained    lighting adjusted    increase visualization of patient    mobility aid in reach    nonskid shoes/slippers when out of bed    room organization consistent    safety round/check completed  Intervention: Prevent Skin Injury  Recent Flowsheet Documentation  Taken 4/6/2023 0000 by Zari Fletcher RN  Body Position:    right    side-lying     Problem: Heart Failure  Goal: Optimal Functional Ability  Intervention: Optimize Functional Ability  Recent Flowsheet Documentation  Taken 4/6/2023 0000 by Zari Fletcher RN  Activity Management: back to bed  Goal: Effective Oxygenation and  Ventilation  Intervention: Promote Airway Secretion Clearance  Recent Flowsheet Documentation  Taken 4/6/2023 0000 by Zari Fletcher, RN  Cough And Deep Breathing: done with encouragement  Activity Management: back to bed  Intervention: Optimize Oxygenation and Ventilation  Recent Flowsheet Documentation  Taken 4/6/2023 0000 by Zari Fletcher, RN  Head of Bed (HOB) Positioning: HOB at 20 degrees  Goal: Effective Breathing Pattern During Sleep  Intervention: Monitor and Manage Obstructive Sleep Apnea  Recent Flowsheet Documentation  Taken 4/6/2023 0000 by Zari Fletcher, RN  Medication Review/Management: medications reviewed

## 2023-04-07 ENCOUNTER — PATIENT OUTREACH (OUTPATIENT)
Dept: CARE COORDINATION | Facility: CLINIC | Age: 83
End: 2023-04-07
Payer: COMMERCIAL

## 2023-04-07 NOTE — PROGRESS NOTES
Clinic Care Coordination Contact  [facility]: Post-Discharge Note  SITUATION                                                      Admission:    Admission Date: 04/04/23   Reason for Admission: Respiratory distress  Discharge:   Discharge Date: 04/06/23  Discharge Diagnosis: History of heart failure    Acute respiratory failure with hypoxia and hypercapnia (H)    BACKGROUND                                                      Per hospital discharge summary and inpatient provider notes:  Carl Vázquez is a 82 year old male with a past medical history significant for chronic dyspnea with prior extensive workup over the past year, chronic hypercapnic respiratory failure, recurrent pleural effusions, 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   Patient and his wife reports he has been dealing with shortness of breath for the past 15 months or so has undergone extensive work-up for this.  Most recently he was hospitalized 2/17/2023 through 2/23/2023 after he presented with acute respiratory failure.  During this admission he was diagnosed with new biventricular heart failure, NSTEMI, and bilateral pleural effusions requiring thoracentesis.  Since discharge he has started on regular BiPAP at night which she was tolerating well until about a week ago at which time he reports he has had to take frequent breaks due to shortness of breath despite use.  He has been following with pulmonology and had PFTs performed in March.  He is also followed very closely with core clinic.  Over the past few weeks he has reported increased weight gain and edema and his prior torsemide was transitioned to Bumex with escalating dosing.  Dose increase has resulted in  robust urine output but not much improvement in his symptoms per patient report.  His edema has fluctuated over the past few weeks.  He has been using his BiPAP both at night and as needed during the day but has not tolerated it well.  He denies any infectious symptoms including fever, cough, chills.  He deals with chronic dysphagia and had been due for an esophageal dilatation which unfortunately was delayed due to his NSTEMI and need for uninterrupted Plavix.  He reports frequent pill dysphagia and trouble with foods as well.  This has resulted in variable p.o. intake.  In addition to pulmonary work-up he has been followed by neurology for ongoing evaluation of suspected neuromuscular disorder and was recently told he likely has ALS.  He has follow-up appointments coming up with both neurology and pulmonology in May.   Patient and his wife report a significant acute worsening of his breathing over the past week or so.  He cannot pinpoint any specific provoking factors as sometimes the dyspnea is worse with exertion other times he tolerates minimal exertion fine.  He describes some degree of orthopnea and has been sleeping in his chair that with little reprieve.  Oftentimes any minor position change can cause significant increased work of breathing.  He denies any chest pain or palpitations.  He has some degree of abdominal distention which him and his wife report has been unchanged for the past few months.  He has chronic neck pain which is also unchanged but restricts him to a hunched forward posture.  He reports new left lateral rib pain for the past few weeks and denies any preceding trauma or injury.  He denies recent bleeding.          ASSESSMENT           Discharge Assessment  How are you doing now that you are home?: pretty good  How are your symptoms? (Red Flag symptoms escalate to triage hotline per guidelines): Improved  Do you feel your condition is stable enough to be safe at home until your provider  visit?: Yes  Does the patient have their discharge instructions? : Yes  Does the patient have questions regarding their discharge instructions? : No  Were you started on any new medications or were there changes to any of your previous medications? : Yes  Does the patient have all of their medications?: Yes  Do you have questions regarding any of your medications? : No  Do you have all of your needed medical supplies or equipment (DME)?  (i.e. oxygen tank, CPAP, cane, etc.): Yes  Discharge follow-up appointment scheduled within 14 calendar days? : Yes                  PLAN                                                      Outpatient Plan:  Follow up with primary care provider, Kyler Mcwilliams, within 7 days for  hospital follow- up. The following labs/tests are recommended: cbc,  bmp in 1 week.  **Supplier for respiratory equipment is [employer]: [phone]    Future Appointments   Date Time Provider Department Center   4/12/2023  1:00 PM 1, Rh Cardiac Rehab [facility]   4/19/2023  1:00 PM 1, [facility] Cardiac Rehab [facility]   4/21/2023  1:00 PM 1, [facility] Cardiac Rehab [facility]   4/24/2023 12:30 PM RU LAB [facility]   4/24/2023  1:10 PM Mirna Rodriges PA-C [facility]   5/18/2023  9:00 AM Ramin Baez MD [facility]   5/18/2023 10:30 AM Becky Mahoney MD [facility]   5/18/2023 12:00 PM UC PFL D [facility]CP[facility]   6/5/2023 12:30 PM RSCCECHO2 [facility]CV[facility]   6/9/2023 12:45 PM SPANGLER LAB [facility]   6/9/2023  1:45 PM Spencer Rehman MD [facility] PSA CL[facility]         For any urgent concerns, please contact our 24 hour nurse triage line: [phone] ([phone])         Sharda Dalton MA

## 2023-04-07 NOTE — PROGRESS NOTES
Care Management Follow Up    Length of Stay (days): 2    Expected Discharge Date: 04/06/2023     Concerns to be Addressed:    Discharge planning, hospice referral   Patient plan of care discussed at interdisciplinary rounds: Yes    Anticipated Discharge Disposition:  home     Anticipated Discharge Services:  N/A  Anticipated Discharge DME:  N/A    Patient/family educated on Medicare website which has current facility and service quality ratings:  no  Education Provided on the Discharge Plan:  yes  Patient/Family in Agreement with the Plan:  yes    Referrals Placed by CM/SW:  [facility]  Private pay costs discussed: Not applicable    Additional Information:  Spoke with patient and  regarding discharge plans.  Patient and  are interested in an informational hospice meeting at home upon discharge.  Patient and wife have no preference as to hospice agencies.  Explained to patient and wife that I will make a referral to [facility] and I will have them follow up with them at home.  Patient and wife are in agreement.  Call placed to Pola from [facility].  He is asking for a referral to be sent and states he will follow up directly with patient and wife home in the next day or two.      ELIER Rojas, [facility]    [phone]  [facility]       LUCAS Lai

## 2023-04-08 LAB — GLUCOSE BLDC GLUCOMTR-MCNC: 352 MG/DL (ref 70–99)

## 2023-04-10 NOTE — TELEPHONE ENCOUNTER
"Called Carl for an update as he was recently admitted for respiratory failure, hypercapnia, Biventricular HF, Co2 narcosis and ALS at [facility] from 4/4-4/6/23.  Home weights were ~144 lbs. Admit weight was 145 lbs. Discharge weight was 146 lbs (unure if accurate)  Received IVP diuretics, IV Amio for paroxsymal Afib. Cardiology, pulmonary and Palliative consulted.  Needs to FU with GI for possible liver disease and dysphagia.    At discharge, Bumex back to 2 mg twice daily    -  4/3/23 - Bumex increased from 2 mg twice daily to 3 mg in the morning and 2 mg in the evening for one week. At that time, he can reduce to 2 mg twice daily.  Follow-up phone call in 1 week please. Reported robust urine output to MD at [facility] ON 4/4)  Our goal home dry weight for French is 140#.    Carl sent My Chart reporting swelling in feet, ankles and leg starting to swell \"Weight 146 today up 1 lb from yesterday - 146, 145, 145, 145, 145, 146, and on  3/28 144 lbs\"    Left voice mail requesting a call back for an update. Also sent a My Chart message.     Future Appointments   Date Time Provider Department Center   4/12/2023  1:00 PM 1, Rh Cardiac Rehab [facility]   4/19/2023  1:00 PM 1, Rh Cardiac Rehab [facility]   4/21/2023  1:00 PM 1, Rh Cardiac Rehab [facility]   4/24/2023 12:30 PM RU LAB [facility]   4/24/2023  1:10 PM Mirna Rodriges PA-C [facility] PSA CLIN   5/18/2023  9:00 AM Ramin Baez MD [facility]   5/18/2023 10:30 AM Becky Mahoney MD [facility]   5/18/2023 12:00 PM UC PFL D PFT [facility]   6/5/2023 12:30 PM RSCCECHO2 [facility]CVCC [facility]   6/9/2023 12:45 PM SPANGLER LAB [facility]   6/9/2023  1:45 PM Spencer Rehman MD [facility] PSA CLIN       CLEMENTINA HoytN, RN 11:31 AM 04/10/23      "

## 2023-04-10 NOTE — TELEPHONE ENCOUNTER
My Chart response received:          He has cardiac rehab on 4/12. Next OV 4/24 with Mirna.    Update to Mirna.    CLEMENTINA HoytN, RN 2:54 PM 04/10/23

## 2023-04-10 NOTE — TELEPHONE ENCOUNTER
Reviewed with Mirna. Dyspnea was thought to be more related to his ALS/chronic resp failure and not secondary to any acute CHF.     Updated Carl and asked him to continue to monitor weights and update CORE as needed.     Lisa Streeter, CLEMENTINAN, RN 3:57 PM 04/10/23

## 2023-04-12 ENCOUNTER — HOSPITAL ENCOUNTER (OUTPATIENT)
Dept: CARDIAC REHAB | Facility: CLINIC | Age: 83
Discharge: HOME | End: 2023-04-12
Attending: STUDENT IN AN ORGANIZED HEALTH CARE EDUCATION/TRAINING PROGRAM
Payer: COMMERCIAL

## 2023-04-12 PROCEDURE — 93798 PHYS/QHP OP CAR RHAB W/ECG: CPT

## 2023-04-16 ASSESSMENT — ENCOUNTER SYMPTOMS
WHEEZING: 0
ALTERED TEMPERATURE REGULATION: 0
POLYDIPSIA: 0
HYPERTENSION: 0
MYALGIAS: 1
SNORES LOUDLY: 0
FLANK PAIN: 0
SINUS CONGESTION: 0
NIGHT SWEATS: 0
COUGH: 0
BOWEL INCONTINENCE: 0
POLYPHAGIA: 1
BLOATING: 1
FATIGUE: 1
ABDOMINAL PAIN: 0
STIFFNESS: 0
EYE PAIN: 0
DIARRHEA: 0
LIGHT-HEADEDNESS: 0
HEMATURIA: 0
HOARSE VOICE: 0
VOMITING: 0
WEIGHT LOSS: 0
LEG PAIN: 0
SORE THROAT: 0
COUGH DISTURBING SLEEP: 0
SHORTNESS OF BREATH: 1
DIFFICULTY URINATING: 0
EYE WATERING: 0
NECK PAIN: 1
WEIGHT GAIN: 0
DYSURIA: 0
INCREASED ENERGY: 1
MUSCLE CRAMPS: 0
CHILLS: 0
DECREASED APPETITE: 1
POSTURAL DYSPNEA: 1
HEMOPTYSIS: 0
DYSPNEA ON EXERTION: 1
CONSTIPATION: 0
SYNCOPE: 0
SINUS PAIN: 0
EXERCISE INTOLERANCE: 0
HYPOTENSION: 0
BACK PAIN: 1
JOINT SWELLING: 0
ARTHRALGIAS: 0
TROUBLE SWALLOWING: 1
MUSCLE WEAKNESS: 1
SMELL DISTURBANCE: 0
EYE REDNESS: 0
RECTAL PAIN: 0
SPUTUM PRODUCTION: 0
PALPITATIONS: 0
TASTE DISTURBANCE: 0
FEVER: 0
HALLUCINATIONS: 1
NECK MASS: 0
ORTHOPNEA: 1
SLEEP DISTURBANCES DUE TO BREATHING: 1
JAUNDICE: 0
BLOOD IN STOOL: 0
HEARTBURN: 0
EYE IRRITATION: 1
NAUSEA: 0
DOUBLE VISION: 0

## 2023-04-18 ENCOUNTER — PATIENT OUTREACH (OUTPATIENT)
Dept: CARE COORDINATION | Facility: CLINIC | Age: 83
End: 2023-04-18
Payer: COMMERCIAL

## 2023-04-19 ENCOUNTER — HOSPITAL ENCOUNTER (OUTPATIENT)
Dept: CARDIAC REHAB | Facility: CLINIC | Age: 83
Discharge: HOME | End: 2023-04-19
Attending: STUDENT IN AN ORGANIZED HEALTH CARE EDUCATION/TRAINING PROGRAM
Payer: COMMERCIAL

## 2023-04-19 PROCEDURE — 93798 PHYS/QHP OP CAR RHAB W/ECG: CPT

## 2023-04-20 ENCOUNTER — OFFICE VISIT (OUTPATIENT)
Dept: PULMONOLOGY | Facility: CLINIC | Age: 83
End: 2023-04-20
Payer: COMMERCIAL

## 2023-04-20 VITALS
OXYGEN SATURATION: 89 % | HEART RATE: 67 BPM | SYSTOLIC BLOOD PRESSURE: 96 MMHG | DIASTOLIC BLOOD PRESSURE: 60 MMHG | RESPIRATION RATE: 16 BRPM

## 2023-04-20 DIAGNOSIS — J96.12 CHRONIC RESPIRATORY FAILURE WITH HYPERCAPNIA (H): Primary | ICD-10-CM

## 2023-04-20 PROCEDURE — 99204 OFFICE O/P NEW MOD 45 MIN: CPT

## 2023-04-20 ASSESSMENT — PAIN SCALES - GENERAL: PAINLEVEL: EXTREME PAIN (8)

## 2023-04-20 NOTE — PROGRESS NOTES
"Pulmonary outpatient visit    April 20, 2023    S: French Vázquez is being seen today at the request of Dr. Baez for dyspnea/hypercarbia in the setting of diaphragm dysfunction and recent diagnosis ALS. Started noticing dyspnea in about Jan 2022, evaluation revealed right hemidiaphragm elevation and he had surgical plication (no improvement). He was hospitalized with dyspnea 10/22, 1/23, 2/23, and most recently 4/4-4/6/23 - also noted to be hypercarbic and has been started on NIV ([facility]). He unfortunately has not tolerated this. He and his wife describe multiple different masks and machine settings, none of which have overcome his discomfort. He will breathe with the machine for up to a few hours, but he ultimately \"panics\" and needs to have the mask removed. When he has tried to use NIV at night, this is made worse by him \"getting tangled in\" the tubing.    He has had right pleural effusion, drained 2/17/23 (1000 ml, transudate). He has known LV dysfunction, with EF only 20-25% on echocardiogram 2/18/23. He had coronary angiography in Feb 2023 - found to have significant CAD and stents were placed. Unfortunately, none of this has really helped.    French's main complaint to me today is the feeling of something \"stuck\" in his throat. He has previous history of esophageal strictures, and he has had EGD with dilation in the past with good results. Unfortunately, it sounds like he can't have repeat procedure until at least August (6 months from his stents, when he can be off anticoagulation). He believes that this sensation is not from strictures but from having \"inhaled a filter\" from his chainsaw. His wife notes that this has been a persistent hallucination, and that he has been intermittently confused for the past few months. He is less confused if he has succeeded in wearing BiPAP for more than a few hours.    He has modest LE edema. Ongoing weakness. Modest chronic arthralgias, unchanged. No " emesis  Answers for HPI/ROS submitted by the patient on 4/16/2023  General Symptoms: Yes  Skin Symptoms: No  HENT Symptoms: Yes  EYE SYMPTOMS: Yes  HEART SYMPTOMS: Yes  LUNG SYMPTOMS: Yes  INTESTINAL SYMPTOMS: Yes  URINARY SYMPTOMS: Yes  REPRODUCTIVE SYMPTOMS: No  SKELETAL SYMPTOMS: Yes  BLOOD SYMPTOMS: No  NERVOUS SYSTEM SYMPTOMS: No  MENTAL HEALTH SYMPTOMS: No  Ear pain: No  Ear discharge: No  Hearing loss: No  Tinnitus: No  Nosebleeds: No  Congestion: No  Sinus pain: No  Trouble swallowing: Yes   Voice hoarseness: No  Mouth sores: No  Sore throat: No  Tooth pain: Yes  Gum tenderness: No  Bleeding gums: No  Change in taste: No  Change in sense of smell: No  Dry mouth: Yes  Hearing aid used: No  Neck lump: No  Fever: No  Loss of appetite: Yes  Weight loss: No  Weight gain: No  Fatigue: Yes  Night sweats: No  Chills: No  Increased stress: No  Excessive hunger: Yes  Excessive thirst: No  Feeling hot or cold when others believe the temperature is normal: No  Loss of height: Yes  Post-operative complications: No  Surgical site pain: No  Hallucinations: Yes  Change in or Loss of Energy: Yes  Hyperactivity: No  Confusion: No  Eye pain: No  Vision loss: No  Dry eyes: No  Watery eyes: No  Eye bulging: No  Double vision: No  Flashing of lights: No  Spots: No  Floaters: No  Redness: No  Crossed eyes: No  Tunnel Vision: No  Yellowing of eyes: No  Eye irritation: Yes  Chest pain or pressure: No  Fast or irregular heartbeat: No  Pain in legs with walking: No  Trouble breathing while lying down: Yes  Fingers or toes appear blue: No  High blood pressure: No  Low blood pressure: No  Fainting: No  Murmurs: No  Pacemaker: No  Varicose veins: No  Edema or swelling: No  Wake up at night with shortness of breath: Yes  Light-headedness: No  Exercise intolerance: No  Cough: No  Sputum or phlegm: No  Coughing up blood: No  Difficulty breating or shortness of breath: Yes  Snoring: No  Wheezing: No  Difficulty breathing on exertion:  Yes  Nighttime Cough: No  Difficulty breathing when lying flat: Yes  Heart burn or indigestion: No  Nausea: No  Vomiting: No  Abdominal pain: No  Bloating: Yes  Constipation: No  Diarrhea: No  Blood in stool: No  Black stools: No  Rectal or Anal pain: No  Fecal incontinence: No  Yellowing of skin or eyes: No  Vomit with blood: No  Change in stools: No  Trouble holding urine or incontinence: No  Pain or burning: No  Trouble starting or stopping: No  Increased frequency of urination: Yes  Blood in urine: No  Decreased frequency of urination: No  Frequent nighttime urination: No  Flank pain: No  Difficulty emptying bladder: No  Back pain: Yes  Muscle aches: Yes  Neck pain: Yes  Swollen joints: No  Joint pain: No  Bone pain: No  Muscle cramps: No  Muscle weakness: Yes  Joint stiffness: No  Bone fracture: No        Current Outpatient Medications   Medication Sig Dispense Refill     amiodarone (PACERONE) 200 MG tablet Take 1 tablet (200 mg) by mouth daily 90 tablet 3     bumetanide (BUMEX) 2 MG tablet Take 1 tablet (2 mg) by mouth 2 times daily 60 tablet 1     clopidogrel (PLAVIX) 75 MG tablet Take 1 tablet (75 mg) by mouth daily 90 tablet 3     glipiZIDE (GLUCOTROL) 5 MG tablet Take 10 mg by mouth daily       insulin glargine (LANTUS PEN) 100 UNIT/ML pen Inject 60 Units Subcutaneous At Bedtime       levothyroxine (SYNTHROID/LEVOTHROID) 75 MCG tablet Take 75 mcg by mouth every morning Managed by PCP       lisinopril (ZESTRIL) 2.5 MG tablet Take 1 tablet (2.5 mg) by mouth daily 90 tablet 3     magnesium oxide (MAG-OX) 400 MG tablet Take 1 tablet (400 mg) by mouth daily 90 tablet 3     nebivolol (BYSTOLIC) 2.5 MG tablet Take 1 tablet (2.5 mg) by mouth 2 times daily 180 tablet 1     oxyCODONE (ROXICODONE) 5 MG tablet Take 5 mg by mouth every 4 hours as needed for pain       pantoprazole (PROTONIX) 40 MG EC tablet Take 40 mg by mouth 2 times daily       polyethylene glycol (MIRALAX) 17 GM/Dose powder Take 17 g by mouth daily  510 g 0     potassium chloride ER (KLOR-CON M) 10 MEQ CR tablet Take 1 tablet (10 mEq) by mouth 2 times daily       riluzole (RILUTEK) 50 MG tablet Take 1 tablet (50 mg) by mouth every 12 hours 60 tablet 3     rivaroxaban ANTICOAGULANT (XARELTO) 20 MG TABS tablet Take 1 tablet (20 mg) by mouth daily (with dinner) 30 tablet 5     rosuvastatin (CRESTOR) 20 MG tablet Take 1 tablet (20 mg) by mouth daily 90 tablet 3       Social Hx:  Retired , here today with his wife. Nonsmoker    Family Hx:   Family History   Problem Relation Age of Onset     Colon Cancer No family hx of        O: Alert, appears comfortable without resp acc muscle use  VS: BP 96/60   Pulse 67   Resp 16   SpO2 (!) 89%   HEENT: NC/AT, no icterus  Neck: No MARGARITA  Lungs: Diminished right base, no wheezing  Cor: RRR S1S2, no murmurs  Abd: BS present, soft, no masses  Extr: 1-2+ LE edema bilaterally  Neuro: Alert, speech fluent, no facial droop. Ambulator with a walker    Spirometry was performed 3/1/23 and is personally reviewed: FEV1 was 0.99 L or 41% predicted. FVC was 1.22 L or 38 % predicted. Consistent with severe restriction      A/P:     1) Restrictive lung disease with hypoventilation:  - He has documented hypercarbia  - Unfortunately, also having great difficulty tolerating NIV. Long discussion today - I think he'd do much better if we could ventilate him, the problem is that BiPAP (or trach) would be the only way to do that.  - I'll be in touch with his RT from [employer] [employer] to see if any other adjustments might help  - He'll keep working with the machine - starting during the day, with the goal of using at night  - We also discussed the use of sedatives to help him tolerate BiPAP. I'm worried, however, that this would exacerbate his confusion. Of note, he's already taking 5 mg oxycodone before bed  - Prognosis overall is very guarded if he can't tolerate BiPAP. We also discussed the possibility of hospice. They are considering this, and he  "says \"I know I'm not going to live forever\". However, he wants to have appointments with Cardiology and with Dr. Baez, and he'd like \"to have one more summer\"  - Confusion is very likely due to hypercarbia - needs BiPAP    2) Dysphagia: We talked about the possibility of getting CXR to \"convince\" him that he doesn't have a chain saw filter in his chest. However, it's not clear that even a negative CXR would serve this purpose, due to his ongoing confusion  - He has appointment next week with Cardiology, and he'll discuss whether there is any chance of shortening the 6-month moratorium on EGD with stricture dilation.    3) Pleural effusion: S/p thoracentesis in Feb - transudate, likely related to CHF  - Recent CT reviewed, I don't think there is enough fluid to think that repeat thora would be of clinical benefit.      He has appointment with me scheduled for next month; as above I'll be in touch with him sooner if any different recommendations regarding NIV      Becky Mahoney MD    Dept of Pulmonary, Sleep and Critical Care Medicine  "

## 2023-04-20 NOTE — PATIENT INSTRUCTIONS
Keep working on using the BiPAP as much as you can. I'll talk to Teja and see if any setting changes make sense    We'll keep your appointment scheduled for 5/18 and hopefully things will be better

## 2023-04-20 NOTE — LETTER
"4/20/2023       RE: Carl Vázquez  [address] [facility] [id number]     Dear Colleague,    Thank you for referring your patient, Carl Vázquez, to the [facility] [place] [facility]. Please see a copy of my visit note below.    Pulmonary outpatient visit    April 20, 2023    S: French Vázquez is being seen today at the request of Dr. Baez for dyspnea/hypercarbia in the setting of diaphragm dysfunction and recent diagnosis ALS. Started noticing dyspnea in about Jan 2022, evaluation revealed right hemidiaphragm elevation and he had surgical plication (no improvement). He was hospitalized with dyspnea 10/22, 1/23, 2/23, and most recently 4/4-4/6/23 - also noted to be hypercarbic and has been started on NIV ([facility]). He unfortunately has not tolerated this. He and his wife describe multiple different masks and machine settings, none of which have overcome his discomfort. He will breathe with the machine for up to a few hours, but he ultimately \"panics\" and needs to have the mask removed. When he has tried to use NIV at night, this is made worse by him \"getting tangled in\" the tubing.    He has had right pleural effusion, drained 2/17/23 (1000 ml, transudate). He has known LV dysfunction, with EF only 20-25% on echocardiogram 2/18/23. He had coronary angiography in Feb 2023 - found to have significant CAD and stents were placed. Unfortunately, none of this has really helped.    French's main complaint to me today is the feeling of something \"stuck\" in his throat. He has previous history of esophageal strictures, and he has had EGD with dilation in the past with good results. Unfortunately, it sounds like he can't have repeat procedure until at least August (6 months from his stents, when he can be off anticoagulation). He believes that this sensation is not from strictures but from having \"inhaled a filter\" from " his chainsaw. His wife notes that this has been a persistent hallucination, and that he has been intermittently confused for the past few months. He is less confused if he has succeeded in wearing BiPAP for more than a few hours.    He has modest LE edema. Ongoing weakness. Modest chronic arthralgias, unchanged. No emesis  Answers for HPI/ROS submitted by the patient on 4/16/2023  General Symptoms: Yes  Skin Symptoms: No  HENT Symptoms: Yes  EYE SYMPTOMS: Yes  HEART SYMPTOMS: Yes  LUNG SYMPTOMS: Yes  INTESTINAL SYMPTOMS: Yes  URINARY SYMPTOMS: Yes  REPRODUCTIVE SYMPTOMS: No  SKELETAL SYMPTOMS: Yes  BLOOD SYMPTOMS: No  NERVOUS SYSTEM SYMPTOMS: No  MENTAL HEALTH SYMPTOMS: No  Ear pain: No  Ear discharge: No  Hearing loss: No  Tinnitus: No  Nosebleeds: No  Congestion: No  Sinus pain: No  Trouble swallowing: Yes   Voice hoarseness: No  Mouth sores: No  Sore throat: No  Tooth pain: Yes  Gum tenderness: No  Bleeding gums: No  Change in taste: No  Change in sense of smell: No  Dry mouth: Yes  Hearing aid used: No  Neck lump: No  Fever: No  Loss of appetite: Yes  Weight loss: No  Weight gain: No  Fatigue: Yes  Night sweats: No  Chills: No  Increased stress: No  Excessive hunger: Yes  Excessive thirst: No  Feeling hot or cold when others believe the temperature is normal: No  Loss of height: Yes  Post-operative complications: No  Surgical site pain: No  Hallucinations: Yes  Change in or Loss of Energy: Yes  Hyperactivity: No  Confusion: No  Eye pain: No  Vision loss: No  Dry eyes: No  Watery eyes: No  Eye bulging: No  Double vision: No  Flashing of lights: No  Spots: No  Floaters: No  Redness: No  Crossed eyes: No  Tunnel Vision: No  Yellowing of eyes: No  Eye irritation: Yes  Chest pain or pressure: No  Fast or irregular heartbeat: No  Pain in legs with walking: No  Trouble breathing while lying down: Yes  Fingers or toes appear blue: No  High blood pressure: No  Low blood pressure: No  Fainting: No  Murmurs: No  Pacemaker:  No  Varicose veins: No  Edema or swelling: No  Wake up at night with shortness of breath: Yes  Light-headedness: No  Exercise intolerance: No  Cough: No  Sputum or phlegm: No  Coughing up blood: No  Difficulty breating or shortness of breath: Yes  Snoring: No  Wheezing: No  Difficulty breathing on exertion: Yes  Nighttime Cough: No  Difficulty breathing when lying flat: Yes  Heart burn or indigestion: No  Nausea: No  Vomiting: No  Abdominal pain: No  Bloating: Yes  Constipation: No  Diarrhea: No  Blood in stool: No  Black stools: No  Rectal or Anal pain: No  Fecal incontinence: No  Yellowing of skin or eyes: No  Vomit with blood: No  Change in stools: No  Trouble holding urine or incontinence: No  Pain or burning: No  Trouble starting or stopping: No  Increased frequency of urination: Yes  Blood in urine: No  Decreased frequency of urination: No  Frequent nighttime urination: No  Flank pain: No  Difficulty emptying bladder: No  Back pain: Yes  Muscle aches: Yes  Neck pain: Yes  Swollen joints: No  Joint pain: No  Bone pain: No  Muscle cramps: No  Muscle weakness: Yes  Joint stiffness: No  Bone fracture: No        Current Outpatient Medications   Medication Sig Dispense Refill    amiodarone (PACERONE) 200 MG tablet Take 1 tablet (200 mg) by mouth daily 90 tablet 3    bumetanide (BUMEX) 2 MG tablet Take 1 tablet (2 mg) by mouth 2 times daily 60 tablet 1    clopidogrel (PLAVIX) 75 MG tablet Take 1 tablet (75 mg) by mouth daily 90 tablet 3    glipiZIDE (GLUCOTROL) 5 MG tablet Take 10 mg by mouth daily      insulin glargine (LANTUS PEN) 100 UNIT/ML pen Inject 60 Units Subcutaneous At Bedtime      levothyroxine (SYNTHROID/LEVOTHROID) 75 MCG tablet Take 75 mcg by mouth every morning Managed by PCP      lisinopril (ZESTRIL) 2.5 MG tablet Take 1 tablet (2.5 mg) by mouth daily 90 tablet 3    magnesium oxide (MAG-OX) 400 MG tablet Take 1 tablet (400 mg) by mouth daily 90 tablet 3    nebivolol (BYSTOLIC) 2.5 MG tablet Take 1  tablet (2.5 mg) by mouth 2 times daily 180 tablet 1    oxyCODONE (ROXICODONE) 5 MG tablet Take 5 mg by mouth every 4 hours as needed for pain      pantoprazole (PROTONIX) 40 MG EC tablet Take 40 mg by mouth 2 times daily      polyethylene glycol (MIRALAX) 17 GM/Dose powder Take 17 g by mouth daily 510 g 0    potassium chloride ER (KLOR-CON M) 10 MEQ CR tablet Take 1 tablet (10 mEq) by mouth 2 times daily      riluzole (RILUTEK) 50 MG tablet Take 1 tablet (50 mg) by mouth every 12 hours 60 tablet 3    rivaroxaban ANTICOAGULANT (XARELTO) 20 MG TABS tablet Take 1 tablet (20 mg) by mouth daily (with dinner) 30 tablet 5    rosuvastatin (CRESTOR) 20 MG tablet Take 1 tablet (20 mg) by mouth daily 90 tablet 3       Social Hx:  Retired , here today with his wife. Nonsmoker    Family Hx:   Family History   Problem Relation Age of Onset    Colon Cancer No family hx of        O: Alert, appears comfortable without resp acc muscle use  VS: BP 96/60   Pulse 67   Resp 16   SpO2 (!) 89%   HEENT: NC/AT, no icterus  Neck: No MARGARITA  Lungs: Diminished right base, no wheezing  Cor: RRR S1S2, no murmurs  Abd: BS present, soft, no masses  Extr: 1-2+ LE edema bilaterally  Neuro: Alert, speech fluent, no facial droop. Ambulator with a walker    Spirometry was performed 3/1/23 and is personally reviewed: FEV1 was 0.99 L or 41% predicted. FVC was 1.22 L or 38 % predicted. Consistent with severe restriction      A/P:     1) Restrictive lung disease with hypoventilation:  - He has documented hypercarbia  - Unfortunately, also having great difficulty tolerating NIV. Long discussion today - I think he'd do much better if we could ventilate him, the problem is that BiPAP (or trach) would be the only way to do that.  - I'll be in touch with his RT from [facility] to see if any other adjustments might help  - He'll keep working with the machine - starting during the day, with the goal of using at night  - We also discussed the use of sedatives  "to help him tolerate BiPAP. I'm worried, however, that this would exacerbate his confusion. Of note, he's already taking 5 mg oxycodone before bed  - Prognosis overall is very guarded if he can't tolerate BiPAP. We also discussed the possibility of hospice. They are considering this, and he says \"I know I'm not going to live forever\". However, he wants to have appointments with Cardiology and with Dr. Baez, and he'd like \"to have one more summer\"  - Confusion is very likely due to hypercarbia - needs BiPAP    2) Dysphagia: We talked about the possibility of getting CXR to \"convince\" him that he doesn't have a chain saw filter in his chest. However, it's not clear that even a negative CXR would serve this purpose, due to his ongoing confusion  - He has appointment next week with Cardiology, and he'll discuss whether there is any chance of shortening the 6-month moratorium on EGD with stricture dilation.    3) Pleural effusion: S/p thoracentesis in Feb - transudate, likely related to CHF  - Recent CT reviewed, I don't think there is enough fluid to think that repeat thora would be of clinical benefit.      He has appointment with me scheduled for next month; as above I'll be in touch with him sooner if any different recommendations regarding NIV          Again, thank you for allowing me to participate in the care of your patient.      Sincerely,    Becky Mahoney MD      "

## 2023-04-21 ENCOUNTER — PATIENT OUTREACH (OUTPATIENT)
Dept: CARE COORDINATION | Facility: CLINIC | Age: 83
End: 2023-04-21
Payer: COMMERCIAL

## 2023-04-21 NOTE — PROGRESS NOTES
Cardiology Clinic Progress Note  Carl Vázquez MRN# [id number]   YOB: 1940 Age: 83 year old   Primary Cardiologist: will be Dr. Rehman Reason for visit: CORE follow up            Assessment and Plan:   Carl Vázquez is a very pleasant 83 year old male who is here today for CORE follow up.       Biventricular heart failure  - LVEF: 25-30% 2/2023  - NYHA class III, stage C  - Etiology: mixed ischemic and nonischemic (tachy mediated)   - Fluid status:  euvolemic  - Diuretic regimen: DECREASE Bumex to 2 mg in AM and 1 mg in afternoon  - Ischemic evaluation: last cor angio 2/2023  - Guideline directed medical therapy:  - Beta blocker: nebivolol 2.5 mg BID  - ACEI/ARB/ARNI: lisinopril 2.5 mg daily  - Aldactone antagonist: None  - SGLT2 inhibitor: cost prohibitive  -Counseled patient on fluid and sodium restriction  Chronic hypoxic and hypercapnic respiratory failure  -multifactorial; untreated CALE, COPD, possible neuromuscular disease; acute CHF, right hemidiaphragm paralysis  -On BiPAP; however, has been having ongoing issues with his mask resulting in recurrent hospitalizations for CO2 narcosis  Coronary artery disease   -s/p recent atherectomy and JESSE x 4 to LAD on 2/21/2023  -tried short term triple therapy but patient developed epistaxis and hematuria; ASA stopped 2/23  -on Plavix 75 mg daily and Xarelto 20 mg daily               -tolerating well   -on nebivolol 2.5 mg BID, lisinopril 2.5 mg daily, rosuvastatin 20 mg daily   Paroxysmal atrial fibrillation  -prior to admission 2/2023, pt was on rate control regimen with diltiazem 360 mg once daily   -diltiazem was stopped and patient was loaded with IV amiodarone after new CM identified   -remains on amiodarone 200 mg daily, nebivolol 2.5 mg BID for rate control should patient have breakthrough PAF  -asymptomatic to arrhythmia; patient unsure if he is having breakthrough  -Zio patch 3/7-3/14 monitor without recurrent AFIB  Diabetes mellitus  type 2, uncontrolled  -Hgb A1c 8.2%  Hypertension  -on lisinopril 2.5 mg daily, nebivolol 2.5 mg BID  -BP today 139/63  Hyperlipidemia   -on rosuvastatin 20 mg once daily  Hypothyroidism  -on levothyroxine   CALE  -BiPAP delivered Friday  -uses nightly   ALS  -Recently diagnosed  -esophageal dilation procedure initially planned for early March; had to be cancelled after stenting/pt was started on Plavix   Right hemidiaphragm paralysis     Changes today: DECREASE Bumex to 2 mg in AM and 1 mg ~noon given elevated creatinine today at 1.21 (up from 0.90).    Follow up plan:    Long discussion had with patient and his wife today regarding the possibility of hospice care. They prefer to proceed with esophageal dilation first in order to determine if patient improves symptomatically.  If he does not, they plan to pursue hospice at the time.    Risks of holding Plavix and/or Xarelto for esophageal dilation procedure given history of recent JESSE x4 to LAD on 2/21/2023 discussed at length with patient and his wife.  At this time, they both strongly favor proceeding with esophageal dilation as a palliative measure despite these risks.  They are primarily concerned preserving patient's quality of life is much as possible which is very reasonable.     Continue cardiac medications with plan for follow up in 2-3 weeks with labs prior. Should Mr. Vázquez make the decision to transition to hospice care prior to this appointment, would not recommend repeating labs prior to next appointment.    Currently has repeat echocardiogram and Dr. Rehman follow-up set up for June 2023.  This can be altered pending patient's course.    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     Patient presented to [facility] ED on 2/17/2023 after his wife noted his oxygen saturation to be ~70%. He was admitted with acute on chronic hypoxic and hypercapnic respiratory failure. He was also found to be in persistent atrial fibrillation with poorly controlled ventricular rates. Diltiazem gtt was started initially which was transitioned to amiodarone gtt after echocardiogram revealed a depressed EF of 25-30%. Cardiology was consulted due to concern for STEMI on ECG while the patient was in atrial flutter with rates in the 140s. He was without chest pain and subsequent ECGs in SR had no significant ST elevations. Patient's primary team discussed with the on call interventionalist and ultimately decision was made not to pursue emergent coronary angiography after reviewing his serial ECGs and the fact that his troponins remained flat. NT proBNP was elevated at 3084 on admission. Patient was subsequently started on Bumex gtt at 1 mg/hr. This was stopped 2/20 as patient appeared euvolemic. A right and left heart catheterization was completed on 2/21/2023 as patient's respiratory status had been optimized. Right heart cath revealed an RA pressure of 4 and a wedge pressure of 4, indicating mild hypovolemia. Left heart cath revealed severe diffuse LAD disease treated with atherectomy and JESSE x 4. Patient was treated with triple therapy for a short time but developed epistaxis and hematuria. Thus, his ASA was discontinued and he was advised to take Xarelto 20 mg daily and Plavix 75 mg daily. He was discharged in stable condition.     At the time of his  "initial follow-up, he complained of 6 pound weight gain associated with shortness of breath and lower extremity edema.  He was started on Bumex 2 mg twice daily with good result.  This was later decreased to 2 mg in the AM and 1 mg in the afternoon as Cr was trending up.  Patient is asymptomatic to his atrial fibrillation, so Zio patch monitor was completed 3/7-3/14 to evaluate for breakthrough.  This revealed ongoing maintenance of sinus rhythm without evidence of recurrent atrial fibrillation.    Since his last appointment, patient has been hospitalized twice with acute on chronic respiratory failure and CO2 narcosis given inability to tolerate BiPAP at home.  Also evaluated by cardiology during these admissions who did not feel patient's clinical picture was consistent with heart failure exacerbation.    Patient is here today for CORE follow up.  Patient continues to struggle with a sensation that he has something stuck in his throat. At this point, he feels the benefit of proceeding with esophageal dilation significantly outweighs the risk that comes with holding his Plavix/Xarelto for this procedure.  He tells me that he \"cannot go on like this for much longer\".  He and his wife have met with hospice team but are not ready to transition to hospice just yet.  They prefer to proceed with esophageal dilation first in order to determine if patient improves symptomatically.  If he does not, they plan to pursue hospice at the time.    Taking all medications daily as prescribed.  Ongoing struggles with use of BiPAP machine.  He develops a choking sensation while wearing the mask causes him to rip it off after only 15 minutes to 3 hours of use.    No current concerns with volume overload.  Weight is at baseline.  Ongoing lower extremity edema in bilateral feet which is chronic for him.    Labs today reveal elevated creatinine at 1.21 (up from 0.90), elevated CO2 at 42, and normal liver function.  Blood pressure 102/62 " "and heart rate 64 in clinic today.        Social History      Social History     Socioeconomic History     Marital status:      Spouse name: Not on file     Number of children: Not on file     Years of education: Not on file     Highest education level: Not on file   Occupational History     Not on file   Tobacco Use     Smoking status: Never     Smokeless tobacco: Never   Vaping Use     Vaping status: Not on file   Substance and Sexual Activity     Alcohol use: No     Comment: drank many years ago     Drug use: No     Sexual activity: Not on file   Other Topics Concern     Parent/sibling w/ CABG, MI or angioplasty before 65F 55M? Not Asked   Social History Narrative     Not on file     Social Determinants of Health     Financial Resource Strain: Not on file   Food Insecurity: Not on file   Transportation Needs: Not on file   Physical Activity: Not on file   Stress: Not on file   Social Connections: Not on file   Intimate Partner Violence: Not on file   Housing Stability: Not on file            Review of Systems:   Please see HPI         Physical Exam:   Vitals: /62 (BP Location: Right arm, Patient Position: Sitting, Cuff Size: Adult Regular)   Pulse 64   Ht 1.702 m (5' 7\")   Wt 67.2 kg (148 lb 1.6 oz)   SpO2 93%   BMI 23.20 kg/m     Wt Readings from Last 4 Encounters:   04/06/23 66.3 kg (146 lb 1.6 oz)   03/28/23 65.3 kg (144 lb)   03/15/23 66.6 kg (146 lb 12.8 oz)   03/07/23 66.2 kg (146 lb)     GEN: well nourished, in no acute distress.  HEENT:  Pupils equal, round. Sclerae nonicteric.   NECK: Supple, no masses appreciated. No JVD with patient at 90 degrees  C/V:  Regular rate and rhythm, no murmur, rub or gallop.  RESP: Respirations are unlabored. Clear to auscultation bilaterally without wheezing, rales, or rhonchi.  GI: Abdomen soft, nontender.  EXTREM: 1+ LE edema in feet.   NEURO: Alert and oriented, cooperative.  SKIN: Warm and dry.        Data:   LIPID RESULTS:  No results found for: " CHOL, HDL, LDL, TRIG, CHOLHDLRATIO  LIVER ENZYME RESULTS:  Lab Results   Component Value Date    AST 29 04/04/2023    AST 11 09/25/2019    ALT 15 04/04/2023    ALT 23 09/25/2019     CBC RESULTS:  Lab Results   Component Value Date    WBC 5.5 04/05/2023    WBC 8.0 09/26/2019    RBC 4.89 04/05/2023    RBC 4.70 09/26/2019    HGB 13.9 04/05/2023    HGB 14.5 09/26/2019    HCT 42.6 04/05/2023    HCT 41.7 09/26/2019    MCV 87 04/05/2023    MCV 89 09/26/2019    MCH 28.4 04/05/2023    MCH 30.9 09/26/2019    MCHC 32.6 04/05/2023    MCHC 34.8 09/26/2019    RDW 14.8 04/05/2023    RDW 13.7 09/26/2019     (L) 04/05/2023     (L) 09/26/2019     BMP RESULTS:  Lab Results   Component Value Date     04/05/2023     09/26/2019    POTASSIUM 3.6 04/05/2023    POTASSIUM 5.1 03/06/2023    POTASSIUM 3.4 09/26/2019    CHLORIDE 91 (L) 04/05/2023    CHLORIDE 107 03/06/2023    CHLORIDE 107 09/26/2019    CO2 40 (H) 04/05/2023    CO2 39 (H) 03/06/2023    CO2 28 09/26/2019    ANIONGAP 7 04/05/2023    ANIONGAP <1 (L) 03/06/2023    ANIONGAP 4 09/26/2019     (H) 04/06/2023    GLC 81 03/06/2023     (H) 09/26/2019    BUN 21.1 04/05/2023    BUN 15 03/06/2023    BUN 13 09/26/2019    CR 0.90 04/05/2023    CR 0.76 09/26/2019    GFRESTIMATED 85 04/05/2023    GFRESTIMATED 86 09/26/2019    GFRESTBLACK >90 09/26/2019    TATE 9.1 04/05/2023    TATE 8.4 (L) 09/26/2019      A1C RESULTS:  Lab Results   Component Value Date    A1C 8.2 (H) 01/04/2023     INR RESULTS:  No results found for: INR         Medications     Current Outpatient Medications   Medication Sig Dispense Refill     amiodarone (PACERONE) 200 MG tablet Take 1 tablet (200 mg) by mouth daily 90 tablet 3     bumetanide (BUMEX) 2 MG tablet Take 1 tablet (2 mg) by mouth 2 times daily 60 tablet 1     clopidogrel (PLAVIX) 75 MG tablet Take 1 tablet (75 mg) by mouth daily 90 tablet 3     glipiZIDE (GLUCOTROL) 5 MG tablet Take 10 mg by mouth daily       insulin glargine  (LANTUS PEN) 100 UNIT/ML pen Inject 60 Units Subcutaneous At Bedtime       levothyroxine (SYNTHROID/LEVOTHROID) 75 MCG tablet Take 75 mcg by mouth every morning Managed by PCP       lisinopril (ZESTRIL) 2.5 MG tablet Take 1 tablet (2.5 mg) by mouth daily 90 tablet 3     magnesium oxide (MAG-OX) 400 MG tablet Take 1 tablet (400 mg) by mouth daily 90 tablet 3     nebivolol (BYSTOLIC) 2.5 MG tablet Take 1 tablet (2.5 mg) by mouth 2 times daily 180 tablet 1     nitroGLYcerin (NITROSTAT) 0.4 MG sublingual tablet For chest pain place 1 tablet under the tongue every 5 minutes for 3 doses. If symptoms persist 5 minutes after 1st dose call 911. 15 tablet 0     oxyCODONE (ROXICODONE) 5 MG tablet Take 5 mg by mouth every 4 hours as needed for pain       pantoprazole (PROTONIX) 40 MG EC tablet Take 40 mg by mouth 2 times daily       polyethylene glycol (MIRALAX) 17 GM/Dose powder Take 17 g by mouth daily 510 g 0     potassium chloride ER (KLOR-CON M) 10 MEQ CR tablet Take 1 tablet (10 mEq) by mouth 2 times daily       riluzole (RILUTEK) 50 MG tablet Take 1 tablet (50 mg) by mouth every 12 hours 60 tablet 3     rivaroxaban ANTICOAGULANT (XARELTO) 20 MG TABS tablet Take 1 tablet (20 mg) by mouth daily (with dinner) 30 tablet 5     rosuvastatin (CRESTOR) 20 MG tablet Take 1 tablet (20 mg) by mouth daily 90 tablet 3          Past Medical History     Past Medical History:   Diagnosis Date     Diabetes (H)      Sleep apnea     uses CPAP machine     Thyroid disease      Past Surgical History:   Procedure Laterality Date     CHO[facility]ST[facility]OMY      at [facility]     COLONOSCOPY      in [employer]     COLONOSCOPY  08/22/2017    Dr. Ja LYNN     CV CORONARY ANGIOGRAM N/A 2/21/2023    Procedure: Coronary Angiogram;  Surgeon: Andrey Watts MD;  Location: [facility] CARDIAC CATH LAB     CV CORONARY LITHOTRIPSY PCI N/A 2/21/2023    Procedure: Percutaneous Coronary Intervention - Lithotripsy;  Surgeon: Andrey Watts MD;   Location:  HEART CARDIAC CATH LAB     CV INTRAVASULAR ULTRASOUND N/A 2/21/2023    Procedure: Intravascular Ultrasound;  Surgeon: Andrey Watts MD;  Location:  HEART CARDIAC CATH LAB     CV PCI N/A 2/21/2023    Procedure: Percutaneous Coronary Intervention;  Surgeon: Andrey Watts MD;  Location:  HEART CARDIAC CATH LAB     CV PCI ATHERECTOMY ORBITAL N/A 2/21/2023    Procedure: Percutaneous Coronary Intervention - Atherectomy Rotational;  Surgeon: Andrey Watts MD;  Location:  HEART CARDIAC CATH LAB     CV RIGHT HEART CATH MEASUREMENTS RECORDED N/A 2/21/2023    Procedure: Right Heart Catheterization;  Surgeon: Andrey Watts MD;  Location:  HEART CARDIAC CATH LAB     ESOPHAGOSCOPY, GASTROSCOPY, DUODENOSCOPY (EGD), COMBINED N/A 6/7/2016    Procedure: COMBINED ESOPHAGOSCOPY, GASTROSCOPY, DUODENOSCOPY (EGD);  Surgeon: Eneida Denton MD;  Location:  GI     Family History   Problem Relation Age of Onset     Colon Cancer No family hx of             Allergies   Metoprolol and Statin drugs [hmg-coa-r inhibitors]      40 minutes spent on the date of the encounter doing chart review, history and exam, documentation and further activities as noted above    Mirna Rodriges PA-C  [facility]  Pager: [phone]

## 2023-04-21 NOTE — PROGRESS NOTES
Social Work -ALS Clinic Progress Note  [facility]    Data/Intervention:    Patient Name:  Carl Vázquez  /Age:  1940 (83 year old)    Visit Type: telephone    Collaborated With:    -Michael, Pt's spouse and caregiver  -Dr Baez and members of the ALS interdisciplinary team    Psychosocial info/Concerns:   Request from Nu Kay LPN for ALS clinic to check in with Pt/spouse to discuss his plan of care due to recent struggles at home. They had visit yesterday from Dr Mahoney and Michael indicated that what she took away from the visit is that it's up to French re his use of the bipap and that it certainly would help him if he can manage to use it consistently.   They met with [facility] and have them ready to go when after French is done with his outpt PT and after he can get the esophageal dilatation completed as the latter is significantly interfering with his quality of life and she indicated that he is willing to take some medical risks to get it done. (in light of his terminal illness). He has 2 upcoming visit this week to discuss.  He is walking with a walker but cannot do stairs. Their neighbor a small ramp for their home [place]      Intervention/Education/Resources Provided:  Discussed the [facility] respite care program, the $1000 rambo program and the ramp program. Will ask [employer] rep to reach out to her for more info. They could use the rambo for the cost of the bipap co pay monthly. I sent the link to the respite program application to Michael via email.  She indicates that the last few weeks have been hard. She is trying to take one day at a time and she is aware that she can't make him use the bipap. She indicated that all of the family is on the same page re hospice care for the future and accepting of his terminal dx.   She is going to look for the [employer] mobility application I mailed previously and I will help her with  some of the questions she struggled answering.     Assessment/Plan:  Provided support and information today. Will ask [employer] to f/u.    Provided patient/family with contact information and encouraged them to contact me between clinic visits as needed.     ELIER Alves, [facility]    [facility]  [phone]

## 2023-04-24 ENCOUNTER — OFFICE VISIT (OUTPATIENT)
Dept: CARDIOLOGY | Facility: CLINIC | Age: 83
End: 2023-04-24
Payer: COMMERCIAL

## 2023-04-24 ENCOUNTER — LAB (OUTPATIENT)
Dept: LAB | Facility: CLINIC | Age: 83
End: 2023-04-24
Payer: COMMERCIAL

## 2023-04-24 VITALS
DIASTOLIC BLOOD PRESSURE: 62 MMHG | HEART RATE: 64 BPM | OXYGEN SATURATION: 93 % | SYSTOLIC BLOOD PRESSURE: 102 MMHG | HEIGHT: 67 IN | BODY MASS INDEX: 23.25 KG/M2 | WEIGHT: 148.1 LBS

## 2023-04-24 DIAGNOSIS — I50.9 ACUTE ON CHRONIC CONGESTIVE HEART FAILURE, UNSPECIFIED HEART FAILURE TYPE (H): ICD-10-CM

## 2023-04-24 DIAGNOSIS — R06.89 CO2 NARCOSIS: ICD-10-CM

## 2023-04-24 DIAGNOSIS — G12.21 ALS (AMYOTROPHIC LATERAL SCLEROSIS) (H): ICD-10-CM

## 2023-04-24 LAB
ALBUMIN SERPL BCG-MCNC: 3.8 G/DL (ref 3.5–5.2)
ALP SERPL-CCNC: 61 U/L (ref 40–129)
ALT SERPL W P-5'-P-CCNC: 15 U/L (ref 10–50)
ANION GAP SERPL CALCULATED.3IONS-SCNC: 6 MMOL/L (ref 7–15)
AST SERPL W P-5'-P-CCNC: 21 U/L (ref 10–50)
BILIRUB DIRECT SERPL-MCNC: <0.2 MG/DL (ref 0–0.3)
BILIRUB SERPL-MCNC: 0.4 MG/DL
BUN SERPL-MCNC: 38.4 MG/DL (ref 8–23)
CALCIUM SERPL-MCNC: 9.7 MG/DL (ref 8.8–10.2)
CHLORIDE SERPL-SCNC: 92 MMOL/L (ref 98–107)
CREAT SERPL-MCNC: 1.21 MG/DL (ref 0.67–1.17)
DEPRECATED HCO3 PLAS-SCNC: 42 MMOL/L (ref 22–29)
GFR SERPL CREATININE-BSD FRML MDRD: 59 ML/MIN/1.73M2
GLUCOSE SERPL-MCNC: 240 MG/DL (ref 70–99)
POTASSIUM SERPL-SCNC: 4.8 MMOL/L (ref 3.4–5.3)
PROT SERPL-MCNC: 6.5 G/DL (ref 6.4–8.3)
SODIUM SERPL-SCNC: 140 MMOL/L (ref 136–145)

## 2023-04-24 PROCEDURE — 80053 COMPREHEN METABOLIC PANEL: CPT | Performed by: INTERNAL MEDICINE

## 2023-04-24 PROCEDURE — 36415 COLL VENOUS BLD VENIPUNCTURE: CPT | Performed by: INTERNAL MEDICINE

## 2023-04-24 PROCEDURE — 82248 BILIRUBIN DIRECT: CPT | Performed by: INTERNAL MEDICINE

## 2023-04-24 PROCEDURE — 99215 OFFICE O/P EST HI 40 MIN: CPT | Performed by: INTERNAL MEDICINE

## 2023-04-24 RX ORDER — BUMETANIDE 2 MG/1
TABLET ORAL
Qty: 60 TABLET | Refills: 1 | Status: SHIPPED | OUTPATIENT
Start: 2023-04-24 | End: 2023-05-17

## 2023-04-24 NOTE — LETTER
4/24/2023    Kyler Mcwilliams MD  [facility] [address] [facility] [address]    RE: Carl Vázquez       Dear Colleague,     I had the pleasure of seeing Carl Vázquez in the [facility].  Cardiology Clinic Progress Note  Carl Vázquez MRN# [id number]   YOB: 1940 Age: 83 year old   Primary Cardiologist: will be Dr. Rehman Reason for visit: CORE follow up            Assessment and Plan:   Carl Vázquez is a very pleasant 83 year old male who is here today for CORE follow up.       Biventricular heart failure  - LVEF: 25-30% 2/2023  - NYHA class III, stage C  - Etiology: mixed ischemic and nonischemic (tachy mediated)   - Fluid status:  euvolemic  - Diuretic regimen: DECREASE Bumex to 2 mg in AM and 1 mg in afternoon  - Ischemic evaluation: last cor angio 2/2023  - Guideline directed medical therapy:  - Beta blocker: nebivolol 2.5 mg BID  - ACEI/ARB/ARNI: lisinopril 2.5 mg daily  - Aldactone antagonist: None  - SGLT2 inhibitor: cost prohibitive  -Counseled patient on fluid and sodium restriction  Chronic hypoxic and hypercapnic respiratory failure  -multifactorial; untreated CALE, COPD, possible neuromuscular disease; acute CHF, right hemidiaphragm paralysis  -On BiPAP; however, has been having ongoing issues with his mask resulting in recurrent hospitalizations for CO2 narcosis  Coronary artery disease   -s/p recent atherectomy and JESSE x 4 to LAD on 2/21/2023  -tried short term triple therapy but patient developed epistaxis and hematuria; ASA stopped 2/23  -on Plavix 75 mg daily and Xarelto 20 mg daily               -tolerating well   -on nebivolol 2.5 mg BID, lisinopril 2.5 mg daily, rosuvastatin 20 mg daily   Paroxysmal atrial fibrillation  -prior to admission 2/2023, pt was on rate control regimen with diltiazem 360 mg once daily   -diltiazem was stopped and patient was loaded with IV amiodarone after new CM identified   -remains on amiodarone  200 mg daily, nebivolol 2.5 mg BID for rate control should patient have breakthrough PAF  -asymptomatic to arrhythmia; patient unsure if he is having breakthrough  -Zio patch 3/7-3/14 monitor without recurrent AFIB  Diabetes mellitus type 2, uncontrolled  -Hgb A1c 8.2%  Hypertension  -on lisinopril 2.5 mg daily, nebivolol 2.5 mg BID  -BP today 139/63  Hyperlipidemia   -on rosuvastatin 20 mg once daily  Hypothyroidism  -on levothyroxine   CALE  -BiPAP delivered Friday  -uses nightly   ALS  -Recently diagnosed  -esophageal dilation procedure initially planned for early March; had to be cancelled after stenting/pt was started on Plavix   Right hemidiaphragm paralysis     Changes today: DECREASE Bumex to 2 mg in AM and 1 mg ~noon given elevated creatinine today at 1.21 (up from 0.90).    Follow up plan:    Long discussion had with patient and his wife today regarding the possibility of hospice care. They prefer to proceed with esophageal dilation first in order to determine if patient improves symptomatically.  If he does not, they plan to pursue hospice at the time.    Risks of holding Plavix and/or Xarelto for esophageal dilation procedure given history of recent JESSE x4 to LAD on 2/21/2023 discussed at length with patient and his wife.  At this time, they both strongly favor proceeding with esophageal dilation as a palliative measure despite these risks.  They are primarily concerned preserving patient's quality of life is much as possible which is very reasonable.     Continue cardiac medications with plan for follow up in 2-3 weeks with labs prior. Should Mr. Vázquez make the decision to transition to hospice care prior to this appointment, would not recommend repeating labs prior to next appointment.    Currently has repeat echocardiogram and Dr. Rehman follow-up set up for June 2023.  This can be altered pending patient's course.    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     Patient presented to [facility] ED on 2/17/2023 after his wife noted his oxygen saturation to be ~70%. He was admitted with acute on chronic hypoxic and hypercapnic respiratory failure. He was also found to be in persistent atrial fibrillation with poorly controlled ventricular rates. Diltiazem gtt was started initially which was transitioned to amiodarone gtt after echocardiogram revealed a depressed EF of 25-30%. Cardiology was consulted due to concern for STEMI on ECG while the patient was in atrial flutter with rates in the 140s. He was without chest pain and subsequent ECGs in SR had no significant ST elevations. Patient's primary team discussed with the on call interventionalist and ultimately decision was made not to pursue emergent coronary angiography after reviewing his serial ECGs and the fact that his troponins remained flat. NT proBNP was elevated at 3084 on admission. Patient was subsequently started on Bumex gtt at 1 mg/hr. This was stopped 2/20 as patient appeared euvolemic. A right and left heart catheterization was completed on 2/21/2023 as patient's respiratory status had been optimized. Right heart cath revealed an RA pressure of 4 and a wedge pressure of 4, indicating mild hypovolemia. Left heart cath revealed severe diffuse LAD disease treated with atherectomy and JESSE x 4. Patient was treated with triple  "therapy for a short time but developed epistaxis and hematuria. Thus, his ASA was discontinued and he was advised to take Xarelto 20 mg daily and Plavix 75 mg daily. He was discharged in stable condition.     At the time of his initial follow-up, he complained of 6 pound weight gain associated with shortness of breath and lower extremity edema.  He was started on Bumex 2 mg twice daily with good result.  This was later decreased to 2 mg in the AM and 1 mg in the afternoon as Cr was trending up.  Patient is asymptomatic to his atrial fibrillation, so Zio patch monitor was completed 3/7-3/14 to evaluate for breakthrough.  This revealed ongoing maintenance of sinus rhythm without evidence of recurrent atrial fibrillation.    Since his last appointment, patient has been hospitalized twice with acute on chronic respiratory failure and CO2 narcosis given inability to tolerate BiPAP at home.  Also evaluated by cardiology during these admissions who did not feel patient's clinical picture was consistent with heart failure exacerbation.    Patient is here today for CORE follow up.  Patient continues to struggle with a sensation that he has something stuck in his throat. At this point, he feels the benefit of proceeding with esophageal dilation significantly outweighs the risk that comes with holding his Plavix/Xarelto for this procedure.  He tells me that he \"cannot go on like this for much longer\".  He and his wife have met with hospice team but are not ready to transition to hospice just yet.  They prefer to proceed with esophageal dilation first in order to determine if patient improves symptomatically.  If he does not, they plan to pursue hospice at the time.    Taking all medications daily as prescribed.  Ongoing struggles with use of BiPAP machine.  He develops a choking sensation while wearing the mask causes him to rip it off after only 15 minutes to 3 hours of use.    No current concerns with volume overload.  " "Weight is at baseline.  Ongoing lower extremity edema in bilateral feet which is chronic for him.    Labs today reveal elevated creatinine at 1.21 (up from 0.90), elevated CO2 at 42, and normal liver function.  Blood pressure 102/62 and heart rate 64 in clinic today.        Social History      Social History     Socioeconomic History    Marital status:      Spouse name: Not on file    Number of children: Not on file    Years of education: Not on file    Highest education level: Not on file   Occupational History    Not on file   Tobacco Use    Smoking status: Never    Smokeless tobacco: Never   Vaping Use    Vaping status: Not on file   Substance and Sexual Activity    Alcohol use: No     Comment: drank many years ago    Drug use: No    Sexual activity: Not on file   Other Topics Concern    Parent/sibling w/ CABG, MI or angioplasty before 65F 55M? Not Asked   Social History Narrative    Not on file     Social Determinants of Health     Financial Resource Strain: Not on file   Food Insecurity: Not on file   Transportation Needs: Not on file   Physical Activity: Not on file   Stress: Not on file   Social Connections: Not on file   Intimate Partner Violence: Not on file   Housing Stability: Not on file            Review of Systems:   Please see HPI         Physical Exam:   Vitals: /62 (BP Location: Right arm, Patient Position: Sitting, Cuff Size: Adult Regular)   Pulse 64   Ht 1.702 m (5' 7\")   Wt 67.2 kg (148 lb 1.6 oz)   SpO2 93%   BMI 23.20 kg/m     Wt Readings from Last 4 Encounters:   04/06/23 66.3 kg (146 lb 1.6 oz)   03/28/23 65.3 kg (144 lb)   03/15/23 66.6 kg (146 lb 12.8 oz)   03/07/23 66.2 kg (146 lb)     GEN: well nourished, in no acute distress.  HEENT:  Pupils equal, round. Sclerae nonicteric.   NECK: Supple, no masses appreciated. No JVD with patient at 90 degrees  C/V:  Regular rate and rhythm, no murmur, rub or gallop.  RESP: Respirations are unlabored. Clear to auscultation " bilaterally without wheezing, rales, or rhonchi.  GI: Abdomen soft, nontender.  EXTREM: 1+ LE edema in feet.   NEURO: Alert and oriented, cooperative.  SKIN: Warm and dry.        Data:   LIPID RESULTS:  No results found for: CHOL, HDL, LDL, TRIG, CHOLHDLRATIO  LIVER ENZYME RESULTS:  Lab Results   Component Value Date    AST 29 04/04/2023    AST 11 09/25/2019    ALT 15 04/04/2023    ALT 23 09/25/2019     CBC RESULTS:  Lab Results   Component Value Date    WBC 5.5 04/05/2023    WBC 8.0 09/26/2019    RBC 4.89 04/05/2023    RBC 4.70 09/26/2019    HGB 13.9 04/05/2023    HGB 14.5 09/26/2019    HCT 42.6 04/05/2023    HCT 41.7 09/26/2019    MCV 87 04/05/2023    MCV 89 09/26/2019    MCH 28.4 04/05/2023    MCH 30.9 09/26/2019    MCHC 32.6 04/05/2023    MCHC 34.8 09/26/2019    RDW 14.8 04/05/2023    RDW 13.7 09/26/2019     (L) 04/05/2023     (L) 09/26/2019     BMP RESULTS:  Lab Results   Component Value Date     04/05/2023     09/26/2019    POTASSIUM 3.6 04/05/2023    POTASSIUM 5.1 03/06/2023    POTASSIUM 3.4 09/26/2019    CHLORIDE 91 (L) 04/05/2023    CHLORIDE 107 03/06/2023    CHLORIDE 107 09/26/2019    CO2 40 (H) 04/05/2023    CO2 39 (H) 03/06/2023    CO2 28 09/26/2019    ANIONGAP 7 04/05/2023    ANIONGAP <1 (L) 03/06/2023    ANIONGAP 4 09/26/2019     (H) 04/06/2023    GLC 81 03/06/2023     (H) 09/26/2019    BUN 21.1 04/05/2023    BUN 15 03/06/2023    BUN 13 09/26/2019    CR 0.90 04/05/2023    CR 0.76 09/26/2019    GFRESTIMATED 85 04/05/2023    GFRESTIMATED 86 09/26/2019    GFRESTBLACK >90 09/26/2019    TATE 9.1 04/05/2023    TATE 8.4 (L) 09/26/2019      A1C RESULTS:  Lab Results   Component Value Date    A1C 8.2 (H) 01/04/2023     INR RESULTS:  No results found for: INR         Medications     Current Outpatient Medications   Medication Sig Dispense Refill    amiodarone (PACERONE) 200 MG tablet Take 1 tablet (200 mg) by mouth daily 90 tablet 3    bumetanide (BUMEX) 2 MG tablet Take 1  tablet (2 mg) by mouth 2 times daily 60 tablet 1    clopidogrel (PLAVIX) 75 MG tablet Take 1 tablet (75 mg) by mouth daily 90 tablet 3    glipiZIDE (GLUCOTROL) 5 MG tablet Take 10 mg by mouth daily      insulin glargine (LANTUS PEN) 100 UNIT/ML pen Inject 60 Units Subcutaneous At Bedtime      levothyroxine (SYNTHROID/LEVOTHROID) 75 MCG tablet Take 75 mcg by mouth every morning Managed by PCP      lisinopril (ZESTRIL) 2.5 MG tablet Take 1 tablet (2.5 mg) by mouth daily 90 tablet 3    magnesium oxide (MAG-OX) 400 MG tablet Take 1 tablet (400 mg) by mouth daily 90 tablet 3    nebivolol (BYSTOLIC) 2.5 MG tablet Take 1 tablet (2.5 mg) by mouth 2 times daily 180 tablet 1    nitroGLYcerin (NITROSTAT) 0.4 MG sublingual tablet For chest pain place 1 tablet under the tongue every 5 minutes for 3 doses. If symptoms persist 5 minutes after 1st dose call 911. 15 tablet 0    oxyCODONE (ROXICODONE) 5 MG tablet Take 5 mg by mouth every 4 hours as needed for pain      pantoprazole (PROTONIX) 40 MG EC tablet Take 40 mg by mouth 2 times daily      polyethylene glycol (MIRALAX) 17 GM/Dose powder Take 17 g by mouth daily 510 g 0    potassium chloride ER (KLOR-CON M) 10 MEQ CR tablet Take 1 tablet (10 mEq) by mouth 2 times daily      riluzole (RILUTEK) 50 MG tablet Take 1 tablet (50 mg) by mouth every 12 hours 60 tablet 3    rivaroxaban ANTICOAGULANT (XARELTO) 20 MG TABS tablet Take 1 tablet (20 mg) by mouth daily (with dinner) 30 tablet 5    rosuvastatin (CRESTOR) 20 MG tablet Take 1 tablet (20 mg) by mouth daily 90 tablet 3          Past Medical History     Past Medical History:   Diagnosis Date    Diabetes (H)     Sleep apnea     uses CPAP machine    Thyroid disease      Past Surgical History:   Procedure Laterality Date    CHOLECYSTECTOMY      at [facility]    COLONOSCOPY      in [place], [place]    COLONOSCOPY  08/22/2017    Dr. Ja SHAHID    CV CORONARY ANGIOGRAM N/A 2/21/2023    Procedure: Coronary Angiogram;  Surgeon: Mac  Andrey Bennett MD;  Location:  HEART CARDIAC CATH LAB    CV CORONARY LITHOTRIPSY PCI N/A 2/21/2023    Procedure: Percutaneous Coronary Intervention - Lithotripsy;  Surgeon: Andrey Watts MD;  Location:  HEART CARDIAC CATH LAB    CV INTRAVASULAR ULTRASOUND N/A 2/21/2023    Procedure: Intravascular Ultrasound;  Surgeon: Andrey Watts MD;  Location:  HEART CARDIAC CATH LAB    CV PCI N/A 2/21/2023    Procedure: Percutaneous Coronary Intervention;  Surgeon: Andrey Watts MD;  Location:  HEART CARDIAC CATH LAB    CV PCI ATHERECTOMY ORBITAL N/A 2/21/2023    Procedure: Percutaneous Coronary Intervention - Atherectomy Rotational;  Surgeon: Andrey Watts MD;  Location:  HEART CARDIAC CATH LAB    CV RIGHT HEART CATH MEASUREMENTS RECORDED N/A 2/21/2023    Procedure: Right Heart Catheterization;  Surgeon: Andrey Watts MD;  Location:  HEART CARDIAC CATH LAB    ESOPHAGOSCOPY, GASTROSCOPY, DUODENOSCOPY (EGD), COMBINED N/A 6/7/2016    Procedure: COMBINED ESOPHAGOSCOPY, GASTROSCOPY, DUODENOSCOPY (EGD);  Surgeon: Eneida Denton MD;  Location:  GI     Family History   Problem Relation Age of Onset    Colon Cancer No family hx of             Allergies   Metoprolol and Statin drugs [hmg-coa-r inhibitors]      40 minutes spent on the date of the encounter doing chart review, history and exam, documentation and further activities as noted above    AUDREY Carpio [facility]  Pager: [phone]          Thank you for allowing me to participate in the care of your patient.      Sincerely,     AUDREY Carpio [facility]  cc:   Mirna Rodriges PA-C  [address] [id number]

## 2023-04-24 NOTE — PATIENT INSTRUCTIONS
Call CORE nurse for any questions or concerns Mon-Fri 8am-4pm:                                                 #[phone]                                       For concerns after hours:                                               #[phone]      Medication changes:     Continue Bumex: 2 mg (1 tab) in the AM and 1 mg (0.5 tab) at noon    Plan from today:     Follow up with me in 2-3 weeks with labs prior.     Lab results: see attached:   Component      Latest Ref Rng 4/5/2023  5:58 AM 4/24/2023  12:25 PM   Sodium      136 - 145 mmol/L 138  140    Potassium      3.4 - 5.3 mmol/L 3.6  4.8    Chloride      98 - 107 mmol/L 91 (L)  92 (L)    Carbon Dioxide (CO2)      22 - 29 mmol/L 40 (H)  42 (H)    Anion Gap      7 - 15 mmol/L 7  6 (L)    Urea Nitrogen      8.0 - 23.0 mg/dL 21.1  38.4 (H)    Creatinine      0.67 - 1.17 mg/dL 0.90  1.21 (H)    Calcium      8.8 - 10.2 mg/dL 9.1  9.7    Glucose      70 - 99 mg/dL 88  240 (H)    GFR Estimate      >60 mL/min/1.73m2 85  59 (L)       Legend:  (L) Low  (H) High

## 2023-05-01 ENCOUNTER — DOCUMENTATION ONLY (OUTPATIENT)
Dept: CARDIOLOGY | Facility: CLINIC | Age: 83
End: 2023-05-01
Payer: COMMERCIAL

## 2023-05-01 ENCOUNTER — HOSPITAL ENCOUNTER (OUTPATIENT)
Facility: CLINIC | Age: 83
End: 2023-05-01
Attending: INTERNAL MEDICINE | Admitting: INTERNAL MEDICINE
Payer: COMMERCIAL

## 2023-05-01 NOTE — PROGRESS NOTES
Received page from Lian @ [facility] requesting urgent orders on Carl.  Called back and had to leave message.    Reviewed that I had not seen Carl in the clinic since 7/2022 however he had recently seen SEA Carpio on 4/24 at which time she reviewed plans for upcoming esophageal dilatation.    I contacted Mirna and forwarded her the page.  She will contact [facility] per request    KHARI Timmons

## 2023-05-02 ENCOUNTER — DOCUMENTATION ONLY (OUTPATIENT)
Dept: CARDIOLOGY | Facility: CLINIC | Age: 83
End: 2023-05-02
Payer: COMMERCIAL

## 2023-05-02 NOTE — PROGRESS NOTES
Received call from [facility] regarding hold orders for Plavix and Xarelto given plan for EGD with esophageal dilation on 5/9/2023.    Multiple discussions have been had with Carl Vázquez and his wife regarding the risks of holding his Plavix and/or Xarelto for this procedure in light of recent JESSE x4 to LAD 2/2023.  They have verbalized understanding on multiple occasions and are deciding to proceed with EGD + esophageal dilation as a palliative measure despite these risks.     Hold approved for Plavix x7 days and Xarelto x3 days.  Resume both agents ASAP postprocedure.  No bridging recommended.  Discussed these recommendations  with Dr. Emery.    Mirna Rodriges PA-C on 5/2/2023 at 10:27 AM

## 2023-05-06 ENCOUNTER — HOSPITAL ENCOUNTER (INPATIENT)
Facility: CLINIC | Age: 83
LOS: 2 days | Discharge: HOME HEALTH | DRG: 190 | End: 2023-05-08
Attending: EMERGENCY MEDICINE | Admitting: INTERNAL MEDICINE
Payer: COMMERCIAL

## 2023-05-06 ENCOUNTER — APPOINTMENT (OUTPATIENT)
Dept: GENERAL RADIOLOGY | Facility: CLINIC | Age: 83
DRG: 190 | End: 2023-05-06
Attending: EMERGENCY MEDICINE
Payer: COMMERCIAL

## 2023-05-06 DIAGNOSIS — J96.02 ACUTE RESPIRATORY FAILURE WITH HYPOXIA AND HYPERCAPNIA (H): ICD-10-CM

## 2023-05-06 DIAGNOSIS — R79.89 ELEVATED TROPONIN: ICD-10-CM

## 2023-05-06 DIAGNOSIS — J96.11 CHRONIC RESPIRATORY FAILURE WITH HYPOXIA AND HYPERCAPNIA (H): Primary | ICD-10-CM

## 2023-05-06 DIAGNOSIS — I48.0 PAROXYSMAL ATRIAL FIBRILLATION (H): ICD-10-CM

## 2023-05-06 DIAGNOSIS — J96.12 CHRONIC RESPIRATORY FAILURE WITH HYPOXIA AND HYPERCAPNIA (H): Primary | ICD-10-CM

## 2023-05-06 DIAGNOSIS — J44.1 COPD EXACERBATION (H): ICD-10-CM

## 2023-05-06 DIAGNOSIS — J96.01 ACUTE RESPIRATORY FAILURE WITH HYPOXIA AND HYPERCAPNIA (H): ICD-10-CM

## 2023-05-06 LAB
ALBUMIN SERPL BCG-MCNC: 3.6 G/DL (ref 3.5–5.2)
ALLEN'S TEST: YES
ALP SERPL-CCNC: 58 U/L (ref 40–129)
ALT SERPL W P-5'-P-CCNC: 14 U/L (ref 10–50)
ANION GAP SERPL CALCULATED.3IONS-SCNC: 6 MMOL/L (ref 7–15)
AST SERPL W P-5'-P-CCNC: 24 U/L (ref 10–50)
ATRIAL RATE - MUSE: 63 BPM
BASE EXCESS BLDA CALC-SCNC: 14.4 MMOL/L (ref -9–1.8)
BASOPHILS # BLD AUTO: 0 10E3/UL (ref 0–0.2)
BASOPHILS NFR BLD AUTO: 0 %
BILIRUB SERPL-MCNC: 0.3 MG/DL
BUN SERPL-MCNC: 34.7 MG/DL (ref 8–23)
CALCIUM SERPL-MCNC: 9.2 MG/DL (ref 8.8–10.2)
CHLORIDE SERPL-SCNC: 94 MMOL/L (ref 98–107)
CREAT SERPL-MCNC: 1.21 MG/DL (ref 0.67–1.17)
DEPRECATED HCO3 PLAS-SCNC: 42 MMOL/L (ref 22–29)
DIASTOLIC BLOOD PRESSURE - MUSE: NORMAL MMHG
EOSINOPHIL # BLD AUTO: 0.2 10E3/UL (ref 0–0.7)
EOSINOPHIL NFR BLD AUTO: 3 %
ERYTHROCYTE [DISTWIDTH] IN BLOOD BY AUTOMATED COUNT: 17 % (ref 10–15)
GFR SERPL CREATININE-BSD FRML MDRD: 59 ML/MIN/1.73M2
GLUCOSE BLDC GLUCOMTR-MCNC: 164 MG/DL (ref 70–99)
GLUCOSE BLDC GLUCOMTR-MCNC: 176 MG/DL (ref 70–99)
GLUCOSE SERPL-MCNC: 229 MG/DL (ref 70–99)
HCO3 BLD-SCNC: 43 MMOL/L (ref 21–28)
HCO3 BLDV-SCNC: 46 MMOL/L (ref 21–28)
HCT VFR BLD AUTO: 45.5 % (ref 40–53)
HGB BLD-MCNC: 14.4 G/DL (ref 13.3–17.7)
IMM GRANULOCYTES # BLD: 0 10E3/UL
IMM GRANULOCYTES NFR BLD: 0 %
INTERPRETATION ECG - MUSE: NORMAL
LACTATE BLD-SCNC: 0.8 MMOL/L
LIPASE SERPL-CCNC: 19 U/L (ref 13–60)
LYMPHOCYTES # BLD AUTO: 1.5 10E3/UL (ref 0.8–5.3)
LYMPHOCYTES NFR BLD AUTO: 21 %
MCH RBC QN AUTO: 28.7 PG (ref 26.5–33)
MCHC RBC AUTO-ENTMCNC: 31.6 G/DL (ref 31.5–36.5)
MCV RBC AUTO: 91 FL (ref 78–100)
MONOCYTES # BLD AUTO: 0.5 10E3/UL (ref 0–1.3)
MONOCYTES NFR BLD AUTO: 7 %
NEUTROPHILS # BLD AUTO: 4.9 10E3/UL (ref 1.6–8.3)
NEUTROPHILS NFR BLD AUTO: 69 %
NRBC # BLD AUTO: 0 10E3/UL
NRBC BLD AUTO-RTO: 0 /100
NT-PROBNP SERPL-MCNC: 442 PG/ML (ref 0–1800)
O2/TOTAL GAS SETTING VFR VENT: 35 %
OXYHGB MFR BLD: 96 % (ref 92–100)
P AXIS - MUSE: 22 DEGREES
PCO2 BLD: 73 MM HG (ref 35–45)
PCO2 BLDV: 87 MM HG (ref 40–50)
PH BLD: 7.38 [PH] (ref 7.35–7.45)
PH BLDV: 7.33 [PH] (ref 7.32–7.43)
PLATELET # BLD AUTO: 149 10E3/UL (ref 150–450)
PO2 BLD: 104 MM HG (ref 80–105)
PO2 BLDV: 18 MM HG (ref 25–47)
POTASSIUM SERPL-SCNC: 4.5 MMOL/L (ref 3.4–5.3)
PR INTERVAL - MUSE: 138 MS
PROT SERPL-MCNC: 6.5 G/DL (ref 6.4–8.3)
QRS DURATION - MUSE: 82 MS
QT - MUSE: 440 MS
QTC - MUSE: 450 MS
R AXIS - MUSE: 91 DEGREES
RBC # BLD AUTO: 5.02 10E6/UL (ref 4.4–5.9)
SAO2 % BLDV: 21 % (ref 94–100)
SARS-COV-2 RNA RESP QL NAA+PROBE: NEGATIVE
SODIUM SERPL-SCNC: 142 MMOL/L (ref 136–145)
SYSTOLIC BLOOD PRESSURE - MUSE: NORMAL MMHG
T AXIS - MUSE: 41 DEGREES
TROPONIN T SERPL HS-MCNC: 102 NG/L
VENTRICULAR RATE- MUSE: 63 BPM
WBC # BLD AUTO: 7.2 10E3/UL (ref 4–11)

## 2023-05-06 PROCEDURE — 84484 ASSAY OF TROPONIN QUANT: CPT | Performed by: EMERGENCY MEDICINE

## 2023-05-06 PROCEDURE — 94640 AIRWAY INHALATION TREATMENT: CPT | Mod: 76

## 2023-05-06 PROCEDURE — 99223 1ST HOSP IP/OBS HIGH 75: CPT | Performed by: INTERNAL MEDICINE

## 2023-05-06 PROCEDURE — 5A09457 ASSISTANCE WITH RESPIRATORY VENTILATION, 24-96 CONSECUTIVE HOURS, CONTINUOUS POSITIVE AIRWAY PRESSURE: ICD-10-PCS | Performed by: EMERGENCY MEDICINE

## 2023-05-06 PROCEDURE — C9113 INJ PANTOPRAZOLE SODIUM, VIA: HCPCS | Performed by: INTERNAL MEDICINE

## 2023-05-06 PROCEDURE — 120N000013 HC R&B IMCU

## 2023-05-06 PROCEDURE — C9803 HOPD COVID-19 SPEC COLLECT: HCPCS

## 2023-05-06 PROCEDURE — 82803 BLOOD GASES ANY COMBINATION: CPT

## 2023-05-06 PROCEDURE — 250N000011 HC RX IP 250 OP 636: Performed by: INTERNAL MEDICINE

## 2023-05-06 PROCEDURE — 83880 ASSAY OF NATRIURETIC PEPTIDE: CPT | Performed by: EMERGENCY MEDICINE

## 2023-05-06 PROCEDURE — 250N000011 HC RX IP 250 OP 636: Performed by: EMERGENCY MEDICINE

## 2023-05-06 PROCEDURE — 250N000012 HC RX MED GY IP 250 OP 636 PS 637: Performed by: INTERNAL MEDICINE

## 2023-05-06 PROCEDURE — 94640 AIRWAY INHALATION TREATMENT: CPT

## 2023-05-06 PROCEDURE — 71046 X-RAY EXAM CHEST 2 VIEWS: CPT

## 2023-05-06 PROCEDURE — 82805 BLOOD GASES W/O2 SATURATION: CPT | Performed by: INTERNAL MEDICINE

## 2023-05-06 PROCEDURE — 96374 THER/PROPH/DIAG INJ IV PUSH: CPT

## 2023-05-06 PROCEDURE — 999N000157 HC STATISTIC RCP TIME EA 10 MIN

## 2023-05-06 PROCEDURE — 80053 COMPREHEN METABOLIC PANEL: CPT | Performed by: EMERGENCY MEDICINE

## 2023-05-06 PROCEDURE — U0003 INFECTIOUS AGENT DETECTION BY NUCLEIC ACID (DNA OR RNA); SEVERE ACUTE RESPIRATORY SYNDROME CORONAVIRUS 2 (SARS-COV-2) (CORONAVIRUS DISEASE [COVID-19]), AMPLIFIED PROBE TECHNIQUE, MAKING USE OF HIGH THROUGHPUT TECHNOLOGIES AS DESCRIBED BY CMS-2020-01-R: HCPCS | Performed by: EMERGENCY MEDICINE

## 2023-05-06 PROCEDURE — 83690 ASSAY OF LIPASE: CPT | Performed by: EMERGENCY MEDICINE

## 2023-05-06 PROCEDURE — 94660 CPAP INITIATION&MGMT: CPT

## 2023-05-06 PROCEDURE — 36415 COLL VENOUS BLD VENIPUNCTURE: CPT | Performed by: EMERGENCY MEDICINE

## 2023-05-06 PROCEDURE — 93005 ELECTROCARDIOGRAM TRACING: CPT

## 2023-05-06 PROCEDURE — 250N000009 HC RX 250: Performed by: INTERNAL MEDICINE

## 2023-05-06 PROCEDURE — 99291 CRITICAL CARE FIRST HOUR: CPT | Mod: 25,CS

## 2023-05-06 PROCEDURE — 999N000105 HC STATISTIC NO DOCUMENTATION TO SUPPORT CHARGE

## 2023-05-06 PROCEDURE — 83605 ASSAY OF LACTIC ACID: CPT

## 2023-05-06 PROCEDURE — 250N000009 HC RX 250: Performed by: EMERGENCY MEDICINE

## 2023-05-06 PROCEDURE — 85025 COMPLETE CBC W/AUTO DIFF WBC: CPT | Performed by: EMERGENCY MEDICINE

## 2023-05-06 RX ORDER — LEVOTHYROXINE SODIUM 75 UG/1
75 TABLET ORAL EVERY MORNING
Status: DISCONTINUED | OUTPATIENT
Start: 2023-05-07 | End: 2023-05-08 | Stop reason: HOSPADM

## 2023-05-06 RX ORDER — ACETAMINOPHEN 325 MG/1
650 TABLET ORAL EVERY 6 HOURS PRN
Status: DISCONTINUED | OUTPATIENT
Start: 2023-05-06 | End: 2023-05-08 | Stop reason: HOSPADM

## 2023-05-06 RX ORDER — ONDANSETRON 2 MG/ML
4 INJECTION INTRAMUSCULAR; INTRAVENOUS EVERY 6 HOURS PRN
Status: DISCONTINUED | OUTPATIENT
Start: 2023-05-06 | End: 2023-05-08 | Stop reason: HOSPADM

## 2023-05-06 RX ORDER — IPRATROPIUM BROMIDE AND ALBUTEROL SULFATE 2.5; .5 MG/3ML; MG/3ML
3 SOLUTION RESPIRATORY (INHALATION)
Status: DISCONTINUED | OUTPATIENT
Start: 2023-05-06 | End: 2023-05-08 | Stop reason: HOSPADM

## 2023-05-06 RX ORDER — LIDOCAINE 40 MG/G
CREAM TOPICAL
Status: DISCONTINUED | OUTPATIENT
Start: 2023-05-06 | End: 2023-05-08 | Stop reason: HOSPADM

## 2023-05-06 RX ORDER — ENOXAPARIN SODIUM 100 MG/ML
40 INJECTION SUBCUTANEOUS EVERY 24 HOURS
Status: DISCONTINUED | OUTPATIENT
Start: 2023-05-06 | End: 2023-05-08 | Stop reason: HOSPADM

## 2023-05-06 RX ORDER — CARBOXYMETHYLCELLULOSE SODIUM 5 MG/ML
1 SOLUTION/ DROPS OPHTHALMIC
Status: DISCONTINUED | OUTPATIENT
Start: 2023-05-06 | End: 2023-05-08 | Stop reason: HOSPADM

## 2023-05-06 RX ORDER — BUMETANIDE 1 MG/1
1 TABLET ORAL
Status: DISCONTINUED | OUTPATIENT
Start: 2023-05-07 | End: 2023-05-08 | Stop reason: HOSPADM

## 2023-05-06 RX ORDER — MAGNESIUM OXIDE 400 MG/1
400 TABLET ORAL DAILY
Status: DISCONTINUED | OUTPATIENT
Start: 2023-05-07 | End: 2023-05-08 | Stop reason: HOSPADM

## 2023-05-06 RX ORDER — METHYLPREDNISOLONE SODIUM SUCCINATE 40 MG/ML
40 INJECTION, POWDER, LYOPHILIZED, FOR SOLUTION INTRAMUSCULAR; INTRAVENOUS EVERY 8 HOURS
Status: DISCONTINUED | OUTPATIENT
Start: 2023-05-07 | End: 2023-05-08 | Stop reason: HOSPADM

## 2023-05-06 RX ORDER — AZITHROMYCIN 500 MG/1
500 INJECTION, POWDER, LYOPHILIZED, FOR SOLUTION INTRAVENOUS EVERY 24 HOURS
Status: DISCONTINUED | OUTPATIENT
Start: 2023-05-06 | End: 2023-05-08 | Stop reason: HOSPADM

## 2023-05-06 RX ORDER — NITROGLYCERIN 0.4 MG/1
0.4 TABLET SUBLINGUAL EVERY 5 MIN PRN
Status: DISCONTINUED | OUTPATIENT
Start: 2023-05-06 | End: 2023-05-08

## 2023-05-06 RX ORDER — NITROGLYCERIN 0.4 MG/1
0.4 TABLET SUBLINGUAL EVERY 5 MIN PRN
Status: DISCONTINUED | OUTPATIENT
Start: 2023-05-06 | End: 2023-05-08 | Stop reason: HOSPADM

## 2023-05-06 RX ORDER — ROSUVASTATIN CALCIUM 20 MG/1
20 TABLET, COATED ORAL DAILY
Status: DISCONTINUED | OUTPATIENT
Start: 2023-05-07 | End: 2023-05-08 | Stop reason: HOSPADM

## 2023-05-06 RX ORDER — DEXTROSE MONOHYDRATE 25 G/50ML
25-50 INJECTION, SOLUTION INTRAVENOUS
Status: DISCONTINUED | OUTPATIENT
Start: 2023-05-06 | End: 2023-05-08 | Stop reason: HOSPADM

## 2023-05-06 RX ORDER — IPRATROPIUM BROMIDE AND ALBUTEROL SULFATE 2.5; .5 MG/3ML; MG/3ML
3 SOLUTION RESPIRATORY (INHALATION) ONCE
Status: COMPLETED | OUTPATIENT
Start: 2023-05-06 | End: 2023-05-06

## 2023-05-06 RX ORDER — AMOXICILLIN 250 MG
2 CAPSULE ORAL 2 TIMES DAILY PRN
Status: DISCONTINUED | OUTPATIENT
Start: 2023-05-06 | End: 2023-05-08 | Stop reason: HOSPADM

## 2023-05-06 RX ORDER — BUMETANIDE 2 MG/1
2 TABLET ORAL DAILY
Status: DISCONTINUED | OUTPATIENT
Start: 2023-05-07 | End: 2023-05-08 | Stop reason: HOSPADM

## 2023-05-06 RX ORDER — ACETAMINOPHEN 650 MG/1
650 SUPPOSITORY RECTAL EVERY 6 HOURS PRN
Status: DISCONTINUED | OUTPATIENT
Start: 2023-05-06 | End: 2023-05-08 | Stop reason: HOSPADM

## 2023-05-06 RX ORDER — NEBIVOLOL 2.5 MG/1
2.5 TABLET ORAL 2 TIMES DAILY
Status: DISCONTINUED | OUTPATIENT
Start: 2023-05-06 | End: 2023-05-08 | Stop reason: HOSPADM

## 2023-05-06 RX ORDER — OXYCODONE HYDROCHLORIDE 5 MG/1
5 TABLET ORAL EVERY 4 HOURS PRN
Status: DISCONTINUED | OUTPATIENT
Start: 2023-05-06 | End: 2023-05-08 | Stop reason: HOSPADM

## 2023-05-06 RX ORDER — ONDANSETRON 4 MG/1
4 TABLET, ORALLY DISINTEGRATING ORAL EVERY 6 HOURS PRN
Status: DISCONTINUED | OUTPATIENT
Start: 2023-05-06 | End: 2023-05-08 | Stop reason: HOSPADM

## 2023-05-06 RX ORDER — RILUZOLE 50 MG/1
50 TABLET, FILM COATED ORAL EVERY 12 HOURS
Status: DISCONTINUED | OUTPATIENT
Start: 2023-05-06 | End: 2023-05-08 | Stop reason: HOSPADM

## 2023-05-06 RX ORDER — PROCHLORPERAZINE 25 MG
12.5 SUPPOSITORY, RECTAL RECTAL EVERY 12 HOURS PRN
Status: DISCONTINUED | OUTPATIENT
Start: 2023-05-06 | End: 2023-05-08 | Stop reason: HOSPADM

## 2023-05-06 RX ORDER — METHYLPREDNISOLONE SODIUM SUCCINATE 125 MG/2ML
60 INJECTION, POWDER, LYOPHILIZED, FOR SOLUTION INTRAMUSCULAR; INTRAVENOUS ONCE
Status: COMPLETED | OUTPATIENT
Start: 2023-05-06 | End: 2023-05-06

## 2023-05-06 RX ORDER — LIDOCAINE 40 MG/G
CREAM TOPICAL
Status: DISCONTINUED | OUTPATIENT
Start: 2023-05-06 | End: 2023-05-06

## 2023-05-06 RX ORDER — LISINOPRIL 2.5 MG/1
2.5 TABLET ORAL DAILY
Status: DISCONTINUED | OUTPATIENT
Start: 2023-05-07 | End: 2023-05-08 | Stop reason: HOSPADM

## 2023-05-06 RX ORDER — AMOXICILLIN 250 MG
1 CAPSULE ORAL 2 TIMES DAILY PRN
Status: DISCONTINUED | OUTPATIENT
Start: 2023-05-06 | End: 2023-05-08 | Stop reason: HOSPADM

## 2023-05-06 RX ORDER — POLYETHYLENE GLYCOL 3350 17 G/17G
17 POWDER, FOR SOLUTION ORAL DAILY PRN
Status: DISCONTINUED | OUTPATIENT
Start: 2023-05-06 | End: 2023-05-08 | Stop reason: HOSPADM

## 2023-05-06 RX ORDER — NICOTINE POLACRILEX 4 MG
15-30 LOZENGE BUCCAL
Status: DISCONTINUED | OUTPATIENT
Start: 2023-05-06 | End: 2023-05-08 | Stop reason: HOSPADM

## 2023-05-06 RX ORDER — AMIODARONE HYDROCHLORIDE 200 MG/1
200 TABLET ORAL DAILY
Status: DISCONTINUED | OUTPATIENT
Start: 2023-05-07 | End: 2023-05-08 | Stop reason: HOSPADM

## 2023-05-06 RX ORDER — BUDESONIDE 0.5 MG/2ML
0.5 INHALANT ORAL 2 TIMES DAILY
Status: DISCONTINUED | OUTPATIENT
Start: 2023-05-06 | End: 2023-05-08 | Stop reason: HOSPADM

## 2023-05-06 RX ORDER — PROCHLORPERAZINE MALEATE 5 MG
5 TABLET ORAL EVERY 6 HOURS PRN
Status: DISCONTINUED | OUTPATIENT
Start: 2023-05-06 | End: 2023-05-08 | Stop reason: HOSPADM

## 2023-05-06 RX ADMIN — METHYLPREDNISOLONE SODIUM SUCCINATE 62.5 MG: 125 INJECTION, POWDER, FOR SOLUTION INTRAMUSCULAR; INTRAVENOUS at 19:28

## 2023-05-06 RX ADMIN — IPRATROPIUM BROMIDE AND ALBUTEROL SULFATE 3 ML: .5; 3 SOLUTION RESPIRATORY (INHALATION) at 19:29

## 2023-05-06 RX ADMIN — IPRATROPIUM BROMIDE AND ALBUTEROL SULFATE 3 ML: .5; 3 SOLUTION RESPIRATORY (INHALATION) at 22:41

## 2023-05-06 RX ADMIN — BUDESONIDE 0.5 MG: 0.5 INHALANT RESPIRATORY (INHALATION) at 22:41

## 2023-05-06 RX ADMIN — INSULIN GLARGINE 30 UNITS: 100 INJECTION, SOLUTION SUBCUTANEOUS at 23:11

## 2023-05-06 RX ADMIN — PANTOPRAZOLE SODIUM 40 MG: 40 INJECTION, POWDER, FOR SOLUTION INTRAVENOUS at 22:44

## 2023-05-06 RX ADMIN — ENOXAPARIN SODIUM 40 MG: 40 INJECTION SUBCUTANEOUS at 22:44

## 2023-05-06 RX ADMIN — AZITHROMYCIN MONOHYDRATE 500 MG: 500 INJECTION, POWDER, LYOPHILIZED, FOR SOLUTION INTRAVENOUS at 22:46

## 2023-05-06 ASSESSMENT — ACTIVITIES OF DAILY LIVING (ADL)
EQUIPMENT_CURRENTLY_USED_AT_HOME: GRAB BAR, TOILET
WALKING_OR_CLIMBING_STAIRS: AMBULATION DIFFICULTY, ASSISTANCE 1 PERSON
WALKING_OR_CLIMBING_STAIRS_DIFFICULTY: YES
EATING: 0-->INDEPENDENT
WEAR_GLASSES_OR_BLIND: NO
EATING/SWALLOWING: SWALLOWING SOLID FOOD
DRESS: 1-->ASSISTANCE (EQUIPMENT/PERSON) NEEDED
TOILETING_ISSUES: YES
CHANGE_IN_FUNCTIONAL_STATUS_SINCE_ONSET_OF_CURRENT_ILLNESS/INJURY: YES
TRANSFERRING: 1-->ASSISTANCE (EQUIPMENT/PERSON) NEEDED
ADLS_ACUITY_SCORE: 35
DRESSING/BATHING: BATHING DIFFICULTY, ASSISTANCE 1 PERSON
FALL_HISTORY_WITHIN_LAST_SIX_MONTHS: NO
SWALLOWING: 2-->DIFFICULTY SWALLOWING FOODS
BATHING: 1-->ASSISTANCE NEEDED
TOILETING: 1-->ASSISTANCE (EQUIPMENT/PERSON) NEEDED (NOT DEVELOPMENTALLY APPROPRIATE)
TOILETING_ASSISTANCE: TOILETING DIFFICULTY, ASSISTANCE 1 PERSON
TOILETING: 1-->ASSISTANCE (EQUIPMENT/PERSON) NEEDED
DOING_ERRANDS_INDEPENDENTLY_DIFFICULTY: YES
ADLS_ACUITY_SCORE: 35
DRESSING/BATHING_DIFFICULTY: YES
EATING: 0-->INDEPENDENT
TRANSFERRING: 0-->ASSISTANCE NEEDED (DEVELOPMENTALLY APPROPRIATE)
CONCENTRATING,_REMEMBERING_OR_MAKING_DECISIONS_DIFFICULTY: NO
SWALLOWING: 2-->DIFFICULTY SWALLOWING FOODS
DRESS: 0-->ASSISTANCE NEEDED (DEVELOPMENTALLY APPROPRIATE)
DIFFICULTY_EATING/SWALLOWING: YES
ADLS_ACUITY_SCORE: 35

## 2023-05-06 NOTE — ED PROVIDER NOTES
History     Chief Complaint:  Shortness of Breath       HPI   Carl Vázquez is a 83 year old male with a history of COPD, A-fib, CHF, diabetes mellitus, and dysphagia, on Xarelto, who presents via EMS with shortness of breath. He explains that he has felt worsening shortness of breath for the past few days, today his wife measured his O2 saturation at 77%. He has a CPAP at home but reports not using it over the last several days because it makes him feel as if he's choking and is uncomfortable due to recent dental procedure. He is set to have a procedure for his dysphagia in two days; has been holding Xarelto and Plavix for the last 6 days in anticipation. He reports that both his leg swelling and abdominal pain are not new for him. He denies chest pain, abnormal bowel movements, vomiting, fever, cough. He denies any tobacco use. After oxygen given both by EMS and in the ED he reports feeling better.     Independent Historian:   EMS - They report patients oxygen sats en route to ED    Review of External Notes: N/A    ROS:  Review of Systems   See HPI    Allergies:  Metoprolol  Statin Drugs [Statins]   Metoprolol  Ezetimibe    Medications:    Pacerone  Bumex  Plavix  Glucotrol  Lantus  Synthroid  Zestril  Mag-ox  Bystolic  Nitrostat  Roxicodone  Protonix  Miralax  Klor-con  Rilutek  Xarelto  Crestor    Past Medical History:    Diabetes  Sleep apnea  Thyroid disease  Bladder calculi  Diabetes mellitus  Dysphagia  PSA  High cholesterol  Inguinal hernia  Osteoarthritis  Sleep apnea  Urinary tract infection  CO2 narcosis  Covid-19 infection  Muscular atrophy  Fasciculations  CHF  COPD  Depression  Eosinophilic  Esophagitis  Gastritis  Atrial fibrillation  Pleural effusion  Polyneuropathy  Acute respiratory failure with hypoxia  Hypokalemia  Gastroesophageal reflux disease without esophagitis    Past Surgical History:    Cholecystectomy  Colonoscopy x2  Coronary angiogram  Coronary lithotripsy  Intravascular  "ultrasound  PCI  PCI Atherectomy orbital  EGD     Family History:    Father: Cancer  Mother: Brain cancer  Sister: Blood disorder    Social History:  Patient is accompanied in the ED by his spouse.  PCP: Kyler Mcwilliams     Physical Exam     Patient Vitals for the past 24 hrs:   BP Temp Temp src Pulse Resp SpO2 Height Weight   05/06/23 1837 -- -- -- 65 10 96 % -- --   05/06/23 1832 111/72 -- -- 66 13 96 % -- --   05/06/23 1827 -- -- -- 66 12 90 % -- --   05/06/23 1825 -- -- -- 67 20 (!) 86 % -- --   05/06/23 1820 121/74 98  F (36.7  C) Oral 66 20 93 % 1.702 m (5' 7\") 64.9 kg (143 lb)        Physical Exam  General: Alert and cooperative with exam. Patient in mild distress. Normal mentation.  Head:  Scalp is NC/AT  Eyes:  No scleral icterus, PERRL  ENT:  The external nose and ears are normal. The oropharynx is normal and without erythema; mucus membranes are moist. Uvula midline, no evidence of deep space infection.  Neck:  Normal range of motion without rigidity.  CV:  Regular rate and rhythm    No pathologic murmur   Resp:  Significantly diminished air movement throughout all lung fields. Nasal cannula in place.    Non-labored, no retractions or accessory muscle use  GI:  Abdomen is soft, no distension, no tenderness. No peritoneal signs  MS:  Mild lower extremity edema.  Skin:  Warm and dry, No rash or lesions noted.  Neuro: Oriented x 3. No gross motor deficits.        Emergency Department Course     ECG results from 05/06/23   EKG 12 lead     Value    Systolic Blood Pressure     Diastolic Blood Pressure     Ventricular Rate 63    Atrial Rate 63    TN Interval 138    QRS Duration 82        QTc 450    P Axis 22    R AXIS 91    T Axis 41    Interpretation ECG      Sinus rhythm  Rightward axis  Cannot rule out Anterior infarct (cited on or before 04-APR-2023)  Abnormal ECG  When compared with ECG of 04-APR-2023 09:27,  Serial changes of Anterior infarct Present  Confirmed by GENERATED REPORT, COMPUTER ([id number]), "  Aasen, Bradley ([id number]) on 5/6/2023 7:16:31 PM         Imaging:  Chest XR,  PA & LAT   Final Result   IMPRESSION: Low lung volumes with bibasilar airspace disease likely due to atelectasis. No pleural effusion or pneumothorax evident. Heart size and mediastinum are stable with coronary artery stents visualized.         Report per radiology    Laboratory:  Labs Ordered and Resulted from Time of ED Arrival to Time of ED Departure   TROPONIN T, HIGH SENSITIVITY - Abnormal       Result Value    Troponin T, High Sensitivity 102 (*)    COMPREHENSIVE METABOLIC PANEL - Abnormal    Sodium 142      Potassium 4.5      Chloride 94 (*)     Carbon Dioxide (CO2) 42 (*)     Anion Gap 6 (*)     Urea Nitrogen 34.7 (*)     Creatinine 1.21 (*)     Calcium 9.2      Glucose 229 (*)     Alkaline Phosphatase 58      AST 24      ALT 14      Protein Total 6.5      Albumin 3.6      Bilirubin Total 0.3      GFR Estimate 59 (*)    CBC WITH PLATELETS AND DIFFERENTIAL - Abnormal    WBC Count 7.2      RBC Count 5.02      Hemoglobin 14.4      Hematocrit 45.5      MCV 91      MCH 28.7      MCHC 31.6      RDW 17.0 (*)     Platelet Count 149 (*)     % Neutrophils 69      % Lymphocytes 21      % Monocytes 7      % Eosinophils 3      % Basophils 0      % Immature Granulocytes 0      NRBCs per 100 WBC 0      Absolute Neutrophils 4.9      Absolute Lymphocytes 1.5      Absolute Monocytes 0.5      Absolute Eosinophils 0.2      Absolute Basophils 0.0      Absolute Immature Granulocytes 0.0      Absolute NRBCs 0.0     ISTAT GASES LACTATE VENOUS POCT - Abnormal    Lactic Acid POCT 0.8      Bicarbonate Venous POCT 46 (*)     O2 Sat, Venous POCT 21 (*)     pCO2V Venous POCT 87 (*)     pH Venous POCT 7.33      pO2 Venous POCT 18 (*)    NT PROBNP INPATIENT - Normal    N terminal Pro BNP Inpatient 442     LIPASE - Normal    Lipase 19     COVID-19 VIRUS (CORONAVIRUS) BY PCR - Normal    SARS CoV2 PCR Negative        Emergency Department Course &  Assessments:    Interventions:  Medications   ipratropium - albuterol 0.5 mg/2.5 mg/3 mL (DUONEB) neb solution 3 mL (3 mLs Nebulization $Given 5/6/23 1929)   methylPREDNISolone sodium succinate (solu-MEDROL) injection 62.5 mg (62.5 mg Intravenous $Given 5/6/23 1928)      Assessments:  1828 I obtained history and examined the patient as noted above.  1929 I rechecked the patient and explained findings.  2006 I rechecked the patient to see their status after breathing treatment. I further discussed their treatment plan    Independent Interpretation (X-rays, CTs, rhythm strip):  I reviewed the patient's chest x-ray; no evidence of significant infiltrate, pneumothorax, or effusion    Consultations/Discussion of Management or Tests:  2014 I consulted with Dr. Taylor of the hospitalist service and discussed patient admission. They accepted care of the patient.    Social Determinants of Health affecting care:   None    Disposition:  The patient was admitted to the hospital under the care of Dr. Taylor.     Impression & Plan      Medical Decision Making:  Carl Vázquez is a 83 year old male who presents for evaluation of shortness of breath.  Signs and symptoms are consistent with COPD exacerbation with associated hypercarbic and hypoxic respiratory failure.  A broad differential was considered including foreign body, reactive airway disease, pneumothorax, cardiac equivalent/ACS, viral induced wheezing, allergic phenomena, pneumonia, etc. Patient was initially placed on nasal cannula oxygen for hypoxia though VBG demonstrated significant elevated PCO2; transitioned to BiPAP; patient tolerating well.  Provided DuoNeb and Solu-Medrol in the ED.  Patient's symptoms likely exacerbated by not using his home CPAP/BiPAP for the last several days due to recent dental procedure.  No evidence of bacterial infection.  EKG demonstrates sinus rhythm without evidence of acute ischemia, infarction, or significant arrhythmia.  Troponin  mildly elevated; likely secondary to demand ischemia; patient without chest pain.  Chest x-ray shows low lung volumes with likely atelectasis.  Patient was admitted to [facility] with Dr. Taylor of the hospitalist service.  He was stable at time of admission.      Critical Care time:  was 35 minutes for this patient excluding procedures.    Diagnosis:    ICD-10-CM    1. COPD exacerbation (H)  J44.1       2. Acute respiratory failure with hypoxia and hypercapnia (H)  J96.01     J96.02            Scribe Disclosure:  ROSANNA PERKINS, am serving as a scribe at 7:05 PM on 5/6/2023 to document services personally performed by Buddy Dixon based on my observations and the provider's statements to me.        Buddy Dixon,   05/07/23 9634

## 2023-05-06 NOTE — ED NOTES
Bed: ED01  Expected date: 5/6/23  Expected time: 6:10 PM  Means of arrival: Ambulance  Comments:  540 83m resp distress, hi flow ETA 1811

## 2023-05-06 NOTE — ED TRIAGE NOTES
Pt presents by EMS- pt reports on Monday he is having a surgical procedure to help stretch his esophagus as he has been having difficulty swallowing and feels like there is stuff stuck in his throat. He is supposed to use a bipap mask at night but states he hasnt been using it because he feels like he cant breath because of his throat issues. Pt states he has been more SOB the past few days and wife checked his o2 today and it was 77%. Pt has copd. For EMS, pt was 85% on RA and perked up after NC was applied. Here, on room air for arrival and sats 91%. Pt has bilateral low leg swelling  Glucose 244     Triage Assessment     Row Name 05/06/23 1822       Triage Assessment (Adult)    Airway WDL WDL       Respiratory WDL    Respiratory WDL X  sob, hypoxia for EMS       Skin Circulation/Temperature WDL    Skin Circulation/Temperature WDL WDL       Cardiac WDL    Cardiac WDL WDL       Peripheral/Neurovascular WDL    Peripheral Neurovascular WDL WDL       Cognitive/Neuro/Behavioral WDL    Cognitive/Neuro/Behavioral WDL WDL

## 2023-05-07 LAB
ALBUMIN SERPL BCG-MCNC: 3.4 G/DL (ref 3.5–5.2)
ANION GAP SERPL CALCULATED.3IONS-SCNC: 12 MMOL/L (ref 7–15)
BASE EXCESS BLDV CALC-SCNC: 12.8 MMOL/L (ref -7.7–1.9)
BASE EXCESS BLDV CALC-SCNC: 14.3 MMOL/L (ref -7.7–1.9)
BASE EXCESS BLDV CALC-SCNC: 14.6 MMOL/L (ref -7.7–1.9)
BASE EXCESS BLDV CALC-SCNC: 17.3 MMOL/L (ref -7.7–1.9)
BASOPHILS # BLD MANUAL: 0 10E3/UL (ref 0–0.2)
BASOPHILS NFR BLD MANUAL: 1 %
BUN SERPL-MCNC: 35.1 MG/DL (ref 8–23)
CALCIUM SERPL-MCNC: 9.6 MG/DL (ref 8.8–10.2)
CHLORIDE SERPL-SCNC: 98 MMOL/L (ref 98–107)
CREAT SERPL-MCNC: 1.09 MG/DL (ref 0.67–1.17)
CREAT SERPL-MCNC: 1.13 MG/DL (ref 0.67–1.17)
DEPRECATED HCO3 PLAS-SCNC: 33 MMOL/L (ref 22–29)
EOSINOPHIL # BLD MANUAL: 0 10E3/UL (ref 0–0.7)
EOSINOPHIL NFR BLD MANUAL: 0 %
ERYTHROCYTE [DISTWIDTH] IN BLOOD BY AUTOMATED COUNT: 16.7 % (ref 10–15)
GFR SERPL CREATININE-BSD FRML MDRD: 64 ML/MIN/1.73M2
GFR SERPL CREATININE-BSD FRML MDRD: 67 ML/MIN/1.73M2
GLUCOSE BLDC GLUCOMTR-MCNC: 186 MG/DL (ref 70–99)
GLUCOSE BLDC GLUCOMTR-MCNC: 203 MG/DL (ref 70–99)
GLUCOSE BLDC GLUCOMTR-MCNC: 212 MG/DL (ref 70–99)
GLUCOSE BLDC GLUCOMTR-MCNC: 218 MG/DL (ref 70–99)
GLUCOSE BLDC GLUCOMTR-MCNC: 229 MG/DL (ref 70–99)
GLUCOSE BLDC GLUCOMTR-MCNC: 246 MG/DL (ref 70–99)
GLUCOSE BLDC GLUCOMTR-MCNC: 251 MG/DL (ref 70–99)
GLUCOSE SERPL-MCNC: 241 MG/DL (ref 70–99)
HCO3 BLDV-SCNC: 44 MMOL/L (ref 21–28)
HCO3 BLDV-SCNC: 46 MMOL/L (ref 21–28)
HCT VFR BLD AUTO: 46.9 % (ref 40–53)
HGB BLD-MCNC: 14.7 G/DL (ref 13.3–17.7)
HOLD SPECIMEN: NORMAL
LYMPHOCYTES # BLD MANUAL: 0.5 10E3/UL (ref 0.8–5.3)
LYMPHOCYTES NFR BLD MANUAL: 10 %
MAGNESIUM SERPL-MCNC: 1.8 MG/DL (ref 1.7–2.3)
MCH RBC QN AUTO: 28.3 PG (ref 26.5–33)
MCHC RBC AUTO-ENTMCNC: 31.3 G/DL (ref 31.5–36.5)
MCV RBC AUTO: 90 FL (ref 78–100)
MONOCYTES # BLD MANUAL: 0 10E3/UL (ref 0–1.3)
MONOCYTES NFR BLD MANUAL: 1 %
NEUTROPHILS # BLD MANUAL: 4.1 10E3/UL (ref 1.6–8.3)
NEUTROPHILS NFR BLD MANUAL: 88 %
O2/TOTAL GAS SETTING VFR VENT: 0 %
O2/TOTAL GAS SETTING VFR VENT: 2 %
O2/TOTAL GAS SETTING VFR VENT: 30 %
O2/TOTAL GAS SETTING VFR VENT: 35 %
OXYHGB MFR BLDV: 24 % (ref 70–75)
OXYHGB MFR BLDV: 42 % (ref 70–75)
OXYHGB MFR BLDV: 62 % (ref 70–75)
PCO2 BLDV: 67 MM HG (ref 40–50)
PCO2 BLDV: 74 MM HG (ref 40–50)
PCO2 BLDV: 80 MM HG (ref 40–50)
PCO2 BLDV: 92 MM HG (ref 40–50)
PH BLDV: 7.29 [PH] (ref 7.32–7.43)
PH BLDV: 7.35 [PH] (ref 7.32–7.43)
PH BLDV: 7.38 [PH] (ref 7.32–7.43)
PH BLDV: 7.44 [PH] (ref 7.32–7.43)
PHOSPHATE SERPL-MCNC: 5.3 MG/DL (ref 2.5–4.5)
PLAT MORPH BLD: ABNORMAL
PLATELET # BLD AUTO: 137 10E3/UL (ref 150–450)
PO2 BLDV: 17 MM HG (ref 25–47)
PO2 BLDV: 25 MM HG (ref 25–47)
PO2 BLDV: 30 MM HG (ref 25–47)
PO2 BLDV: 40 MM HG (ref 25–47)
POTASSIUM SERPL-SCNC: 4.9 MMOL/L (ref 3.4–5.3)
POTASSIUM SERPL-SCNC: 5.6 MMOL/L (ref 3.4–5.3)
RBC # BLD AUTO: 5.19 10E6/UL (ref 4.4–5.9)
RBC MORPH BLD: ABNORMAL
SODIUM SERPL-SCNC: 143 MMOL/L (ref 136–145)
TROPONIN T SERPL HS-MCNC: 78 NG/L
WBC # BLD AUTO: 4.7 10E3/UL (ref 4–11)

## 2023-05-07 PROCEDURE — 85027 COMPLETE CBC AUTOMATED: CPT | Performed by: INTERNAL MEDICINE

## 2023-05-07 PROCEDURE — 258N000001 HC RX 258: Performed by: HOSPITALIST

## 2023-05-07 PROCEDURE — 36415 COLL VENOUS BLD VENIPUNCTURE: CPT | Performed by: HOSPITALIST

## 2023-05-07 PROCEDURE — 84132 ASSAY OF SERUM POTASSIUM: CPT | Performed by: HOSPITALIST

## 2023-05-07 PROCEDURE — 36415 COLL VENOUS BLD VENIPUNCTURE: CPT | Performed by: INTERNAL MEDICINE

## 2023-05-07 PROCEDURE — 250N000013 HC RX MED GY IP 250 OP 250 PS 637: Performed by: HOSPITALIST

## 2023-05-07 PROCEDURE — 250N000012 HC RX MED GY IP 250 OP 636 PS 637: Performed by: HOSPITALIST

## 2023-05-07 PROCEDURE — 250N000013 HC RX MED GY IP 250 OP 250 PS 637: Performed by: INTERNAL MEDICINE

## 2023-05-07 PROCEDURE — 82040 ASSAY OF SERUM ALBUMIN: CPT | Performed by: INTERNAL MEDICINE

## 2023-05-07 PROCEDURE — 83735 ASSAY OF MAGNESIUM: CPT | Performed by: HOSPITALIST

## 2023-05-07 PROCEDURE — C9113 INJ PANTOPRAZOLE SODIUM, VIA: HCPCS | Performed by: INTERNAL MEDICINE

## 2023-05-07 PROCEDURE — 82805 BLOOD GASES W/O2 SATURATION: CPT | Performed by: INTERNAL MEDICINE

## 2023-05-07 PROCEDURE — 80069 RENAL FUNCTION PANEL: CPT | Performed by: INTERNAL MEDICINE

## 2023-05-07 PROCEDURE — 94640 AIRWAY INHALATION TREATMENT: CPT

## 2023-05-07 PROCEDURE — 84484 ASSAY OF TROPONIN QUANT: CPT | Performed by: HOSPITALIST

## 2023-05-07 PROCEDURE — 85007 BL SMEAR W/DIFF WBC COUNT: CPT | Performed by: INTERNAL MEDICINE

## 2023-05-07 PROCEDURE — 99232 SBSQ HOSP IP/OBS MODERATE 35: CPT | Performed by: HOSPITALIST

## 2023-05-07 PROCEDURE — 94640 AIRWAY INHALATION TREATMENT: CPT | Mod: 76

## 2023-05-07 PROCEDURE — 999N000157 HC STATISTIC RCP TIME EA 10 MIN

## 2023-05-07 PROCEDURE — 258N000003 HC RX IP 258 OP 636: Performed by: HOSPITALIST

## 2023-05-07 PROCEDURE — 250N000012 HC RX MED GY IP 250 OP 636 PS 637: Performed by: INTERNAL MEDICINE

## 2023-05-07 PROCEDURE — 120N000013 HC R&B IMCU

## 2023-05-07 PROCEDURE — 250N000011 HC RX IP 250 OP 636: Performed by: INTERNAL MEDICINE

## 2023-05-07 PROCEDURE — 82803 BLOOD GASES ANY COMBINATION: CPT | Performed by: INTERNAL MEDICINE

## 2023-05-07 PROCEDURE — 250N000009 HC RX 250: Performed by: INTERNAL MEDICINE

## 2023-05-07 PROCEDURE — 94660 CPAP INITIATION&MGMT: CPT

## 2023-05-07 RX ORDER — NALOXONE HYDROCHLORIDE 0.4 MG/ML
0.4 INJECTION, SOLUTION INTRAMUSCULAR; INTRAVENOUS; SUBCUTANEOUS
Status: DISCONTINUED | OUTPATIENT
Start: 2023-05-07 | End: 2023-05-08 | Stop reason: HOSPADM

## 2023-05-07 RX ORDER — DEXTROSE MONOHYDRATE 25 G/50ML
50 INJECTION, SOLUTION INTRAVENOUS ONCE
Status: COMPLETED | OUTPATIENT
Start: 2023-05-07 | End: 2023-05-07

## 2023-05-07 RX ORDER — NALOXONE HYDROCHLORIDE 0.4 MG/ML
0.2 INJECTION, SOLUTION INTRAMUSCULAR; INTRAVENOUS; SUBCUTANEOUS
Status: DISCONTINUED | OUTPATIENT
Start: 2023-05-07 | End: 2023-05-08 | Stop reason: HOSPADM

## 2023-05-07 RX ORDER — ALBUTEROL SULFATE 0.83 MG/ML
2.5 SOLUTION RESPIRATORY (INHALATION)
Status: DISCONTINUED | OUTPATIENT
Start: 2023-05-07 | End: 2023-05-08 | Stop reason: HOSPADM

## 2023-05-07 RX ADMIN — METHYLPREDNISOLONE SODIUM SUCCINATE 40 MG: 40 INJECTION, POWDER, FOR SOLUTION INTRAMUSCULAR; INTRAVENOUS at 14:45

## 2023-05-07 RX ADMIN — NEBIVOLOL HYDROCHLORIDE 2.5 MG: 2.5 TABLET ORAL at 20:28

## 2023-05-07 RX ADMIN — IPRATROPIUM BROMIDE AND ALBUTEROL SULFATE 3 ML: .5; 3 SOLUTION RESPIRATORY (INHALATION) at 08:02

## 2023-05-07 RX ADMIN — INSULIN ASPART 2 UNITS: 100 INJECTION, SOLUTION INTRAVENOUS; SUBCUTANEOUS at 18:13

## 2023-05-07 RX ADMIN — INSULIN ASPART 2 UNITS: 100 INJECTION, SOLUTION INTRAVENOUS; SUBCUTANEOUS at 03:15

## 2023-05-07 RX ADMIN — IPRATROPIUM BROMIDE AND ALBUTEROL SULFATE 3 ML: .5; 3 SOLUTION RESPIRATORY (INHALATION) at 23:57

## 2023-05-07 RX ADMIN — ACETAMINOPHEN 650 MG: 325 TABLET ORAL at 20:28

## 2023-05-07 RX ADMIN — BUDESONIDE 0.5 MG: 0.5 INHALANT RESPIRATORY (INHALATION) at 08:00

## 2023-05-07 RX ADMIN — IPRATROPIUM BROMIDE AND ALBUTEROL SULFATE 3 ML: .5; 3 SOLUTION RESPIRATORY (INHALATION) at 20:04

## 2023-05-07 RX ADMIN — PANTOPRAZOLE SODIUM 40 MG: 40 INJECTION, POWDER, FOR SOLUTION INTRAVENOUS at 08:20

## 2023-05-07 RX ADMIN — ENOXAPARIN SODIUM 40 MG: 40 INJECTION SUBCUTANEOUS at 20:27

## 2023-05-07 RX ADMIN — RILUZOLE 50 MG: 50 TABLET, FILM COATED ORAL at 20:28

## 2023-05-07 RX ADMIN — INSULIN ASPART 1 UNITS: 100 INJECTION, SOLUTION INTRAVENOUS; SUBCUTANEOUS at 14:46

## 2023-05-07 RX ADMIN — INSULIN ASPART 3 UNITS: 100 INJECTION, SOLUTION INTRAVENOUS; SUBCUTANEOUS at 10:29

## 2023-05-07 RX ADMIN — Medication 1 MG: at 20:28

## 2023-05-07 RX ADMIN — BUDESONIDE 0.5 MG: 0.5 INHALANT RESPIRATORY (INHALATION) at 20:05

## 2023-05-07 RX ADMIN — SODIUM CHLORIDE 10 UNITS: 9 INJECTION, SOLUTION INTRAVENOUS at 06:37

## 2023-05-07 RX ADMIN — METHYLPREDNISOLONE SODIUM SUCCINATE 40 MG: 40 INJECTION, POWDER, FOR SOLUTION INTRAMUSCULAR; INTRAVENOUS at 21:43

## 2023-05-07 RX ADMIN — INSULIN ASPART 2 UNITS: 100 INJECTION, SOLUTION INTRAVENOUS; SUBCUTANEOUS at 06:48

## 2023-05-07 RX ADMIN — IPRATROPIUM BROMIDE AND ALBUTEROL SULFATE 3 ML: .5; 3 SOLUTION RESPIRATORY (INHALATION) at 14:57

## 2023-05-07 RX ADMIN — PANTOPRAZOLE SODIUM 40 MG: 40 INJECTION, POWDER, FOR SOLUTION INTRAVENOUS at 20:27

## 2023-05-07 RX ADMIN — IPRATROPIUM BROMIDE AND ALBUTEROL SULFATE 3 ML: .5; 3 SOLUTION RESPIRATORY (INHALATION) at 12:03

## 2023-05-07 RX ADMIN — DEXTROSE MONOHYDRATE 50 ML: 25 INJECTION, SOLUTION INTRAVENOUS at 06:38

## 2023-05-07 RX ADMIN — INSULIN ASPART 2 UNITS: 100 INJECTION, SOLUTION INTRAVENOUS; SUBCUTANEOUS at 22:30

## 2023-05-07 RX ADMIN — IPRATROPIUM BROMIDE AND ALBUTEROL SULFATE 3 ML: .5; 3 SOLUTION RESPIRATORY (INHALATION) at 03:50

## 2023-05-07 RX ADMIN — METHYLPREDNISOLONE SODIUM SUCCINATE 40 MG: 40 INJECTION, POWDER, FOR SOLUTION INTRAMUSCULAR; INTRAVENOUS at 06:07

## 2023-05-07 RX ADMIN — INSULIN GLARGINE 30 UNITS: 100 INJECTION, SOLUTION SUBCUTANEOUS at 21:44

## 2023-05-07 RX ADMIN — AZITHROMYCIN MONOHYDRATE 500 MG: 500 INJECTION, POWDER, LYOPHILIZED, FOR SOLUTION INTRAVENOUS at 20:27

## 2023-05-07 ASSESSMENT — ACTIVITIES OF DAILY LIVING (ADL)
ADLS_ACUITY_SCORE: 43
ADLS_ACUITY_SCORE: 46
ADLS_ACUITY_SCORE: 46
ADLS_ACUITY_SCORE: 43
ADLS_ACUITY_SCORE: 46
ADLS_ACUITY_SCORE: 43
ADLS_ACUITY_SCORE: 46

## 2023-05-07 NOTE — PLAN OF CARE
Shift Summary ([id number]):     A&Ox3-4. Intermittent lethargy throughout the day. Blood gases improving. Weaned from BIPAP to now 2 LPM nasal cannula at 1600. Remains NPO. LS diminished, wheezy at times. IV zithromax, IV solumedrol. VSS ex soft BP - hold parameters to BP meds. External cath in place. Pt has not voided this shift. Largest bladder scan volume = 257 ml. Orders to straight cath for scan > 400. Not OOB this shift. Tele shows SR (occasional napoleon down filemon with sleep, as low as 49 bpm). BLE edema. K and Mg protocol, AM redraws. PIV SL. VBG pending.     Addendum: Bladder scanned for 386 ml at 1645.

## 2023-05-07 NOTE — H&P
Admitted: 05/06/2023    HPI:  This is an 83-year-old male with history of diabetes mellitus type 2; obstructive sleep apnea, on CPAP; hypothyroidism; dysphagia requiring dilatation; hyperlipidemia; COPD, not on any home oxygen; history of CO2 narcosis in the past; depression; GERD; atrial fibrillation, on chronic anticoagulation with Xarelto, currently on hold for the procedure next week; CHF; coronary artery disease status post PCI to LAD with drug-eluting stent on 02/21/2023 from ostial to distal, requiring 4 stents came to the ED with complaint of shortness of breath and hypoxia.    The patient is very sleepy at the time, most of the history is obtained from the patient's wife and some from the patient.  He recently had a tooth extracted and for that reason, he was not using the BiPAP machine for the last few days from Wednesday.  Has been getting short of breath for the last 2 days.  This morning, the patient's wife did put him on BiPAP.  When they took the BiPAP off this afternoon, he became hypoxic and was short of breath.  His shortness of breath has been worsening since the last 2 days.  He had no fever; no chills; no chest pain; no headache, dizziness, lightheadedness; no dysuria, hematuria, constipation, diarrhea.  He has chronic abdominal pain for which he has been followed by [facility].  He has been scheduled to have esophageal dilatation next Tuesday.  For that reason, he has been holding Xarelto and Plavix, after discussion with his pulmonologist as well as cardiologist.    When he came to the ED, he was hypoxic and requiring 2 L of oxygen.  His CO2 came back on the VBG as elevated .  Hospitalist service was consulted to admit the patient.  At this time, patient has offered no other complaints.  Rest of the review of systems is negative.    The patient has been compliant with his medication, has been taking his Bumex which is keeping his weight very stable around 142 pounds.  His lower extremity swelling is  much improved as compared to before on Bumex and the patient and patient's wife are happy about that.    ASSESSMENT AND PLAN:    1.  Acute hypoxic hypercapnic respiratory failure secondary to chronic obstructive pulmonary disease (COPD) exacerbation:  This is an 83-year-old male with multiple comorbidities as mentioned above on not using bilevel positive airways pressure (BiPAP) for the last 3 days and now with worsening shortness of breath, hypoxia, and found to be hypercapnic.  I think his symptoms are multifactorial secondary to not using bilevel positive airways pressure (BiPAP) causing hypercapnia and chronic obstructive pulmonary disease (COPD) exacerbation.  At this time, I will admit him; put him on bilevel positive airways pressure (BiPAP), inspiratory positive airway pressure (IPAP) of 14 and expiratory positive airway pressure (EPAP) of 8; start him on intravenous (IV) Solu-Medrol 40 mg every 8 hours, DuoNeb nebulization every 4 hours, Pulmicort nebulization b.i.d., azithromycin for chronic obstructive pulmonary disease (COPD) exacerbation.  We will recheck venous blood gas (VBG) in the morning.  I will try to wean him off from the oxygen.  Chest x-ray negative for any infiltrate or congestive changes.  No significant pleural effusion noted; although, low lung volumes with bibasilar airspace disease likely due to atelectasis.  Brain natriuretic peptide (BNP) normal.  Troponin mildly elevated, is most likely demand ischemia.  We will keep him n.p.o. overnight while he is on continuous bilevel positive airways pressure (BiPAP).  2.  History of coronary artery disease status post percutaneous coronary intervention (PCI) to LAD from ostial to distal with 4 drug-eluting stents recently in 02/2023:  His Xarelto and Plavix is on hold for the procedure next week, we will continue to hold it.  He is not complaining of any chest pain at this point.  His troponin is mildly elevated.  We will do serial troponins to  make sure no delta, if a significant increase in troponin then we will consult cardiology.  3.  Diabetes mellitus type 2:  He is on Lantus and glipizide.  We will hold the glipizide, continue with his Lantus, and keep him on sliding scale insulin for correction and hypoglycemia protocol.  4.  History of atrial fibrillation:  On amiodarone, I will continue with that.  5.  History of chronic systolic congestive heart failure:  With last ejection fraction (EF) of 25%-30% and that was in 2023.  His weight is stable, is not in acute exacerbation at this time.  Has 1+ lower extremity edema, I will continue with Bumex 2 mg in the morning and 1 mg in the evening.  Continue with low-dose lisinopril 2.5 mg daily.  His weight is stable around 142 pounds.  Brain natriuretic peptide (BNP) normal.  6.  Hyperlipidemia:  On a statin, so we will continue with that.  7.  History of dysphagia:  Most likely secondary to esophageal stenosis.  He has been followed by [facility] and is scheduled to have esophagogastroduodenoscopy (EGD) with dilatation on next Tuesday.  We will keep holding his Xarelto and Plavix for that reason.  Keep him on the intravenous (IV) Protonix 40 b.i.d.  He is n.p.o. overnight.  Once he is able to eat tomorrow, he should be on soft diet as he has been using at home and medications may need to be crushed.  8.  Hypothyroidism:  On levothyroxine, we will continue with that.  9.  DVT prophylaxis:  With Lovenox.    CODE STATUS:  I had a detailed discussion with the patient and the patient's wife.  He wanted to be DNR; but, okay to intubate if needed for a short time.    The case was discussed with the ED physician and the nursing staff taking care of the patient.    Arcenio Taylor MD        D: 2023   T: 2023   MT: VICENTE    Name:     WYATT ROMERO  MRN:      [id number]        Account:     [id number]   :      1940           Admitted:    2023       Document: [id number]    cc:  Kyler NGO  MD Oj

## 2023-05-07 NOTE — PROGRESS NOTES
[facility]    Medicine Progress Note - Hospitalist Service    Date of Admission:  5/6/2023    Assessment & Plan   Carl Vázquez is an 83-year-old male with history of diabetes mellitus type 2; obstructive sleep apnea, on CPAP; hypothyroidism; dysphagia requiring dilatation; hyperlipidemia; COPD, not on any home oxygen; history of CO2 narcosis in the past; depression; GERD; atrial fibrillation, on chronic anticoagulation with Xarelto, currently on hold for the procedure next week; CHF; coronary artery disease status post PCI to LAD with drug-eluting stent on 02/21/2023 from ostial to distal, requiring 4 stents came to the ED with complaint of shortness of breath and hypoxia.    Acute hypoxic hypercapnic respiratory failure secondary to chronic obstructive pulmonary disease (COPD) exacerbation  Carl Vázquez is an 83-year-old male with multiple comorbidities as mentioned above on not using bilevel positive airways pressure (BiPAP) for the last 3 days and now with worsening shortness of breath, hypoxia, and found to be hypercapnic. CXR negative for infiltrate, did show atelectasis. BNP within normal limits.    Admitted to inpatient under IMC status.    Continue BiPAP for now.    Continue IV solumedrol, consider tapering when respiratory status improved.    Continue Duonebs every 4 hours.    Continue Pulmicort BID.    Started on azithromycin upon admission, continue the same.    Coronary artery disease  Status post percutaneous coronary intervention (PCI) to LAD from ostial to distal with 4 drug-eluting stents recently in 02/2023    PTA Xarelto and Plavix have been on hold in anticipation of GI procedure on Tuesday, will continue to hold for now.    Delta troponin was not drawn, will add on to most recent labs.    If significant increase, consider cardiology consultation.    Diabetes mellitus type 2  He is on Lantus and glipizide at home.  Hemoglobin A1c 8.2 on 1/4/23.    Hold PTA  glipizide.    Continue Lantus at half PTA dose while NPO on BiPAP.    Monitor blood sugars and use sliding scale NovoLog as indicated.    Monitor for hypoglycemia.    Atrial fibrillation    Continue PTA amiodarone as able.    PTA Xarelto on hold for now as noted below.    Chronic systolic congestive heart failure  Last ejection fraction (EF) of 25%-30% and that was in 02/2023.  His weight is stable, is not in acute exacerbation at this time.    Hold PTA bumex while NPO on BiPAP.    Monitor intake and output and daily weights.    Continue PTA lisinopril and nebivolol as able.    Hypertension    Continue PTA lisinopril and nebivolol as able.    Hyperlipidemia    Continue PTA rosuvastatin as able.    Hyperkalemia  Resolved with treatment.    Recheck in AM.    Dysphagia  Esophageal stenosis  He has been followed by [facility] and is scheduled to have esophagogastroduodenoscopy (EGD) with dilatation on Tuesday.    Continue to hold PTA Xarelto and Plavix.    Switched PPI to IV for now.    Will need soft diet when able to eat.    Will touch base with [facility] tomorrow regarding timing of his procedure.    Hypothyroidism    Continue PTA levothyroxine as able.       Diet: NPO for Medical/Clinical Reasons Except for: Meds, Ice Chips    DVT Prophylaxis: Enoxaparin (Lovenox) SQ  Power Catheter: Not present  Lines: None     Cardiac Monitoring: ACTIVE order. Indication: respiratory failure  Code Status: No CPR- Pre-arrest intubation OK      Clinically Significant Risk Factors Present on Admission        # Hyperkalemia: Highest K = 5.6 mmol/L in last 2 days, will monitor as appropriate       # Hypoalbuminemia: Lowest albumin = 3.4 g/dL at 5/7/2023  5:19 AM, will monitor as appropriate  # Drug Induced Coagulation Defect: home medication list includes an anticoagulant medication  # Drug Induced Platelet Defect: home medication list includes an antiplatelet medication   # Hypertension: home medication list includes antihypertensive(s)  #  Chronic systolic heart failure: echo within the past year with EF <40%   # Non-Invasive mechanical ventilation: current O2 Device: BiPAP/CPAP  # Acute hypoxic respiratory failure: continue supplemental O2 as needed    # DMII: A1C = N/A within past 6 months           Disposition Plan     Expected Discharge Date: 05/08/2023                  Nikhil Kruger MD  Hospitalist Service  [facility]  Securely message with [employer] (more info)  Text page via [employer] Paging/Directory   ______________________________________________________________________    Interval History   Carl Vázquez was seen this afternoon. Remains on BiPAP. Feels about the same as he did when he was admitted, although seems more alert. Shortness of breath about the same. Denies fevers, chest pain, cough, nausea, abdominal pain.    Physical Exam   Vital Signs: Temp: 98  F (36.7  C) Temp src: Axillary BP: 100/52 Pulse: 62   Resp: (!) 0 SpO2: 98 % O2 Device: BiPAP/CPAP Oxygen Delivery: 2 LPM  Weight: 148 lbs 9.6 oz    Constitutional: awake, alert, cooperative, no apparent distress, laying in the hospital bed  Respiratory: BiPAP on, no increased work of breathing, lung sounds diminished bilaterally  Cardiovascular: regular rate and rhythm, normal S1 and S2, no murmur noted  GI: normal bowel sounds, soft, non-distended, non-tender  Skin: warm, dryh  Musculoskeletal: trace lower extremity pitting edema present  Neurologic: awake, alert, answers the questions appropriately, moves all extremities    Medical Decision Making       45 MINUTES SPENT BY ME on the date of service doing chart review, history, exam, documentation & further activities per the note.      Data     I have personally reviewed the following data over the past 24 hrs:    4.7  \   14.7   / 137 (L)     143 98 35.1 (H) /  186 (H)   4.9 33 (H) 1.09 \       ALT: 14 AST: 24 AP: 58 TBILI: 0.3   ALB: 3.4 (L) TOT PROTEIN: 6.5 LIPASE: 19       Trop: 102 () BNP: 442        Procal: N/A CRP: N/A Lactic Acid: 0.8         Imaging results reviewed over the past 24 hrs:   Recent Results (from the past 24 hour(s))   Chest XR,  PA & LAT    Narrative    EXAM: XR CHEST 2 VIEWS  LOCATION: [facility]  DATE/TIME: 5/6/2023 7:16 PM CDT    INDICATION: Shortness of breath  COMPARISON: 04/04/2023      Impression    IMPRESSION: Low lung volumes with bibasilar airspace disease likely due to atelectasis. No pleural effusion or pneumothorax evident. Heart size and mediastinum are stable with coronary artery stents visualized.

## 2023-05-07 NOTE — PHARMACY-ADMISSION MEDICATION HISTORY
Pharmacist Admission Medication History    Admission medication history is complete. The information provided in this note is only as accurate as the sources available at the time of the update.    Medication reconciliation/reorder completed by provider prior to medication history? Yes    Information Source(s): Family member via in-person. Wife had a very thorough spreadsheet of his medications.     Pertinent Information: Pt HOLDING plavix & xarelto 5/2-5/9 (xarelto plan for 3 days but held longer due to dental work done earlier this week).  Also planned to take only 45 units of lantus Monday the evening before his procedure.     Pt wife aware she will need to bring in his Rilutek as we do not stock this.     Changes made to PTA medication list:    Added: None    Deleted: None    Changed: None    Medication Affordability:  Not including over the counter (OTC) medications, was there a time in the past 12 months when you did not take your medications as prescribed because of cost?: No  Has this happened in the past 3 months?: No    Allergies reviewed with patient and updates made in EHR: yes - added digoxin and specifics on statins (pt started on Rosuvastatin - so far has been ok)    Medication History Completed By: Michelle Mayfield [facility] 5/6/2023 9:04 PM    Prior to Admission medications    Medication Sig Last Dose Taking? Auth Provider Long Term End Date   amiodarone (PACERONE) 200 MG tablet Take 1 tablet (200 mg) by mouth daily 5/6/2023 Yes Mirna Rodriges PA-C Yes    bumetanide (BUMEX) 2 MG tablet Take 2 mg (1 tablet) by mouth once in the morning and 1 mg (0.5 tablet) by mouth once around noon. 5/6/2023 at x2 Yes Mirna Rodriges PA-C Yes    clopidogrel (PLAVIX) 75 MG tablet Take 1 tablet (75 mg) by mouth daily 5/1/2023 at HOLDING for procedure Yes Mirna Rodriges PA-C Yes    glipiZIDE (GLUCOTROL) 5 MG tablet Take 10 mg by mouth every morning 5/6/2023 Yes Unknown, Entered By History No    insulin glargine  (LANTUS PEN) 100 UNIT/ML pen Inject 60 Units Subcutaneous At Bedtime 5/5/2023 Yes Unknown, Entered By History No    levothyroxine (SYNTHROID/LEVOTHROID) 75 MCG tablet Take 75 mcg by mouth every morning Managed by PCP 5/6/2023 Yes Unknown, Entered By History No    lisinopril (ZESTRIL) 2.5 MG tablet Take 1 tablet (2.5 mg) by mouth daily 5/6/2023 Yes Mirna Rodriges PA-C Yes    magnesium oxide (MAG-OX) 400 MG tablet Take 1 tablet (400 mg) by mouth daily 5/6/2023 Yes Mirna Rodriges PA-C     nebivolol (BYSTOLIC) 2.5 MG tablet Take 1 tablet (2.5 mg) by mouth 2 times daily 5/6/2023 at x1 Yes Mirna Rodriges PA-C Yes    nitroGLYcerin (NITROSTAT) 0.4 MG sublingual tablet For chest pain place 1 tablet under the tongue every 5 minutes for 3 doses. If symptoms persist 5 minutes after 1st dose call 911. prn Yes Tr Epstein MD Yes    oxyCODONE (ROXICODONE) 5 MG tablet Take 5 mg by mouth every 4 hours as needed for pain prn Yes Unknown, Entered By History     pantoprazole (PROTONIX) 40 MG EC tablet Take 40 mg by mouth 2 times daily 5/6/2023 at x1 Yes Reported, Patient     polyethylene glycol (MIRALAX) 17 GM/Dose powder Take 17 g by mouth daily 5/6/2023 Yes Nichelle Meredith MD     potassium chloride ER (KLOR-CON M) 10 MEQ CR tablet Take 1 tablet (10 mEq) by mouth 2 times daily 5/6/2023 at x1 Yes Tr Epstein MD No    riluzole (RILUTEK) 50 MG tablet Take 1 tablet (50 mg) by mouth every 12 hours 5/6/2023 at x1 Yes Ramin Baez MD     rivaroxaban ANTICOAGULANT (XARELTO) 20 MG TABS tablet Take 1 tablet (20 mg) by mouth daily (with dinner) 5/1/2023 at Holding for procedure Yes Lety Dangelo PA-C Yes    rosuvastatin (CRESTOR) 20 MG tablet Take 1 tablet (20 mg) by mouth daily 5/6/2023 Yes Mirna Rodriges PA-C Yes

## 2023-05-07 NOTE — PROVIDER NOTIFICATION
MD Notification    Notified Person: MD    Notified Person Name: Alesk Curtis    Notification Date/Time: May 7,2023    Notification Interaction: Amcom    Purpose of Notification:  Patient didn't void since admission last night and bladder scan done with 270ml urine, can I have a order for Bladder management protocol.    Orders Received:    Comments:

## 2023-05-07 NOTE — SIGNIFICANT EVENT
Significant Event Note    Time of event: 6:10 AM May 7, 2023    Description of event:  Paged for K 5.6.    Plan:    10 units regular insulin IV to be chased by 50 ml D50 ordered with STAT repeat K.    AM Lisinopril held for now.    Discussed with: bedside nurse    Aleks Curtis MD

## 2023-05-07 NOTE — PROVIDER NOTIFICATION
MD Notification    Notified Person: MD    Notified Person Name: Ernesto Ruiz    Notification Date/Time: May 6,2023    Notification Interaction: AMCOM    Purpose of Notification:  Patient has Lantus ordered 60 units at HS, and his NPO due to BIPAP usage, latest Blood sugar was 164 mg/gl. Do you want still to administer it?      Orders Received:    Comments:

## 2023-05-07 NOTE — H&P
[facility]    History and Physical  Hospitalist       Date of Admission:  5/6/2023    Assessment & Plan     This is an 83-year-old male with history of diabetes mellitus type 2; obstructive sleep apnea, on CPAP; hypothyroidism; dysphagia requiring dilatation; hyperlipidemia; COPD, not on any home oxygen; history of CO2 narcosis in the past; depression; GERD; atrial fibrillation, on chronic anticoagulation with Xarelto, currently on hold for the procedure next week; CHF; coronary artery disease status post PCI to LAD with drug-eluting stent on 02/21/2023 from ostial to distal, requiring 4 stents came to the ED with complaint of shortness of breath and hypoxia.    ASSESSMENT AND PLAN:    1.  Acute hypoxic hypercapnic respiratory failure secondary to chronic obstructive pulmonary disease (COPD) exacerbation:  This is an 83-year-old male with multiple comorbidities as mentioned above on not using bilevel positive airways pressure (BiPAP) for the last 3 days and now with worsening shortness of breath, hypoxia, and found to be hypercapnic.  I think his symptoms are multifactorial secondary to not using bilevel positive airways pressure (BiPAP) causing hypercapnia and chronic obstructive pulmonary disease (COPD) exacerbation.  At this time, I will admit him; put him on bilevel positive airways pressure (BiPAP), inspiratory positive airway pressure (IPAP) of 14 and expiratory positive airway pressure (EPAP) of 8; start him on intravenous (IV) Solu-Medrol 40 mg every 8 hours, DuoNeb nebulization every 4 hours, Pulmicort nebulization b.i.d., azithromycin for chronic obstructive pulmonary disease (COPD) exacerbation.  We will recheck venous blood gas (VBG) in the morning.  I will try to wean him off from the oxygen.  Chest x-ray negative for any infiltrate or congestive changes.  No significant pleural effusion noted; although, low lung volumes with bibasilar airspace disease likely due to  atelectasis.  Brain natriuretic peptide (BNP) normal.  Troponin mildly elevated, is most likely demand ischemia.  We will keep him n.p.o. overnight while he is on continuous bilevel positive airways pressure (BiPAP).  2.  History of coronary artery disease status post percutaneous coronary intervention (PCI) to LAD from ostial to distal with 4 drug-eluting stents recently in 02/2023:  His Xarelto and Plavix is on hold for the procedure next week, we will continue to hold it.  He is not complaining of any chest pain at this point.  His troponin is mildly elevated.  We will do serial troponins to make sure no delta, if a significant increase in troponin then we will consult cardiology.  3.  Diabetes mellitus type 2:  He is on Lantus and glipizide.  We will hold the glipizide, continue with his Lantus, and keep him on sliding scale insulin for correction and hypoglycemia protocol.  4.  History of atrial fibrillation:  On amiodarone, I will continue with that.  5.  History of chronic systolic congestive heart failure:  With last ejection fraction (EF) of 25%-30% and that was in 02/2023.  His weight is stable, is not in acute exacerbation at this time.  Has 1+ lower extremity edema, I will continue with Bumex 2 mg in the morning and 1 mg in the evening.  Continue with low-dose lisinopril 2.5 mg daily.  His weight is stable around 142 pounds.  Brain natriuretic peptide (BNP) normal.  6.  Hyperlipidemia:  On a statin, so we will continue with that.  7.  History of dysphagia:  Most likely secondary to esophageal stenosis.  He has been followed by [facility] and is scheduled to have esophagogastroduodenoscopy (EGD) with dilatation on next Tuesday.  We will keep holding his Xarelto and Plavix for that reason.  Keep him on the intravenous (IV) Protonix 40 b.i.d.  He is n.p.o. overnight.  Once he is able to eat tomorrow, he should be on soft diet as he has been using at home and medications may need to be crushed.  8.   Hypothyroidism:  On levothyroxine, we will continue with that.  9.  DVT prophylaxis:  With Lovenox.    CODE STATUS:  I had a detailed discussion with the patient and the patient's wife.  He wanted to be DNR; but, okay to intubate if needed for a short time.    The case was discussed with the ED physician and the nursing staff taking care of the patient.    Arcenio Taylor MD      DVT Prophylaxis: Enoxaparin (Lovenox) SQ  Code Status: DNR    Disposition: Expected discharge in 2 days once stable.    Arcenio Taylor MD, MD    Primary Care Physician   Kyler Mcwilliams    Chief Complaint   SOB, hypoxic     History is obtained from the patient and his wife     History of Present Illness   Admitted: 05/06/2023    HPI:  This is an 83-year-old male with history of diabetes mellitus type 2; obstructive sleep apnea, on CPAP; hypothyroidism; dysphagia requiring dilatation; hyperlipidemia; COPD, not on any home oxygen; history of CO2 narcosis in the past; depression; GERD; atrial fibrillation, on chronic anticoagulation with Xarelto, currently on hold for the procedure next week; CHF; coronary artery disease status post PCI to LAD with drug-eluting stent on 02/21/2023 from ostial to distal, requiring 4 stents came to the ED with complaint of shortness of breath and hypoxia.    The patient is very sleepy at the time, most of the history is obtained from the patient's wife and some from the patient.  He recently had a tooth extracted and for that reason, he was not using the BiPAP machine for the last few days from Wednesday.  Has been getting short of breath for the last 2 days.  This morning, the patient's wife did put him on BiPAP.  When they took the BiPAP off this afternoon, he became hypoxic and was short of breath.  His shortness of breath has been worsening since the last 2 days.  He had no fever; no chills; no chest pain; no headache, dizziness, lightheadedness; no dysuria, hematuria, constipation, diarrhea.  He has chronic  abdominal pain for which he has been followed by [facility].  He has been scheduled to have esophageal dilatation next Tuesday.  For that reason, he has been holding Xarelto and Plavix, after discussion with his pulmonologist as well as cardiologist.    When he came to the ED, he was hypoxic and requiring 2 L of oxygen.  His CO2 came back on the VBG as elevated .  Hospitalist service was consulted to admit the patient.  At this time, patient has offered no other complaints.  Rest of the review of systems is negative.    The patient has been compliant with his medication, has been taking his Bumex which is keeping his weight very stable around 142 pounds.  His lower extremity swelling is much improved as compared to before on Bumex and the patient and patient's wife are happy about that.    Past Medical History    I have reviewed this patient's medical history and updated it with pertinent information if needed.   Past Medical History:   Diagnosis Date     Diabetes (H)      Sleep apnea     uses CPAP machine     Thyroid disease        Past Surgical History   I have reviewed this patient's surgical history and updated it with pertinent information if needed.  Past Surgical History:   Procedure Laterality Date     CHOLECYSTECTOMY      at [facility]     COLONOSCOPY      in [place], [place]     COLONOSCOPY  08/22/2017    Dr. Ja SHAHID     CV CORONARY ANGIOGRAM N/A 2/21/2023    Procedure: Coronary Angiogram;  Surgeon: Andrey Watts MD;  Location: [facility] CARDIAC CATH LAB     CV CORONARY LITHOTRIPSY PCI N/A 2/21/2023    Procedure: Percutaneous Coronary Intervention - Lithotripsy;  Surgeon: Andrey Watts MD;  Location: [facility] CARDIAC CATH LAB     CV INTRAVASULAR ULTRASOUND N/A 2/21/2023    Procedure: Intravascular Ultrasound;  Surgeon: Andrey Watts MD;  Location: [facility] CARDIAC CATH LAB     CV PCI N/A 2/21/2023    Procedure: Percutaneous Coronary Intervention;  Surgeon: Andrey Watts MD;   Location:  HEART CARDIAC CATH LAB     CV PCI ATHERECTOMY ORBITAL N/A 2/21/2023    Procedure: Percutaneous Coronary Intervention - Atherectomy Rotational;  Surgeon: Andrey Watts MD;  Location:  HEART CARDIAC CATH LAB     CV RIGHT HEART CATH MEASUREMENTS RECORDED N/A 2/21/2023    Procedure: Right Heart Catheterization;  Surgeon: Andrey Watts MD;  Location:  HEART CARDIAC CATH LAB     ESOPHAGOSCOPY, GASTROSCOPY, DUODENOSCOPY (EGD), COMBINED N/A 6/7/2016    Procedure: COMBINED ESOPHAGOSCOPY, GASTROSCOPY, DUODENOSCOPY (EGD);  Surgeon: Eneida Denton MD;  Location:  GI       Prior to Admission Medications   Prior to Admission Medications   Prescriptions Last Dose Informant Patient Reported? Taking?   amiodarone (PACERONE) 200 MG tablet   No No   Sig: Take 1 tablet (200 mg) by mouth daily   bumetanide (BUMEX) 2 MG tablet   No No   Sig: Take 2 mg (1 tablet) by mouth once in the morning and 1 mg (0.5 tablet) by mouth once around noon.   clopidogrel (PLAVIX) 75 MG tablet   No No   Sig: Take 1 tablet (75 mg) by mouth daily   glipiZIDE (GLUCOTROL) 5 MG tablet  Spouse/Significant Other Yes No   Sig: Take 10 mg by mouth every morning   insulin glargine (LANTUS PEN) 100 UNIT/ML pen   Yes No   Sig: Inject 60 Units Subcutaneous At Bedtime   levothyroxine (SYNTHROID/LEVOTHROID) 75 MCG tablet  Spouse/Significant Other Yes No   Sig: Take 75 mcg by mouth every morning Managed by PCP   lisinopril (ZESTRIL) 2.5 MG tablet   No No   Sig: Take 1 tablet (2.5 mg) by mouth daily   magnesium oxide (MAG-OX) 400 MG tablet   No No   Sig: Take 1 tablet (400 mg) by mouth daily   nebivolol (BYSTOLIC) 2.5 MG tablet   No No   Sig: Take 1 tablet (2.5 mg) by mouth 2 times daily   nitroGLYcerin (NITROSTAT) 0.4 MG sublingual tablet   No No   Sig: For chest pain place 1 tablet under the tongue every 5 minutes for 3 doses. If symptoms persist 5 minutes after 1st dose call 911.   oxyCODONE (ROXICODONE) 5 MG tablet   Spouse/Significant Other Yes No   Sig: Take 5 mg by mouth every 4 hours as needed for pain   pantoprazole (PROTONIX) 40 MG EC tablet   Yes No   Sig: Take 40 mg by mouth 2 times daily   polyethylene glycol (MIRALAX) 17 GM/Dose powder  Spouse/Significant Other No No   Sig: Take 17 g by mouth daily   potassium chloride ER (KLOR-CON M) 10 MEQ CR tablet   No No   Sig: Take 1 tablet (10 mEq) by mouth 2 times daily   riluzole (RILUTEK) 50 MG tablet   No No   Sig: Take 1 tablet (50 mg) by mouth every 12 hours   rivaroxaban ANTICOAGULANT (XARELTO) 20 MG TABS tablet  Spouse/Significant Other No No   Sig: Take 1 tablet (20 mg) by mouth daily (with dinner)   rosuvastatin (CRESTOR) 20 MG tablet   No No   Sig: Take 1 tablet (20 mg) by mouth daily      Facility-Administered Medications: None     Allergies   Allergies   Allergen Reactions     Metoprolol Shortness Of Breath     Tried 1/2022, 1/2023, both time stopped for increased SOB     Statin Drugs [Statins]        Social History   I have reviewed this patient's social history and updated it with pertinent information if needed. Carl DORSEY Kathie  reports that he has never smoked. He has never used smokeless tobacco. He reports that he does not drink alcohol and does not use drugs.    Family History   I have reviewed this patient's family history and updated it with pertinent information if needed.   Family History   Problem Relation Age of Onset     Colon Cancer No family hx of        Review of Systems   Review of systems is limited by patient factors - sleepy   CONSTITUTIONAL:  positive for  fatigue and malaise  EYES:  negative  HEENT:  negative  RESPIRATORY:  positive for  dyspnea  CARDIOVASCULAR:  negative  GASTROINTESTINAL:  positive for abdominal pain and reflux  GENITOURINARY:  negative  INTEGUMENT/BREAST:  negative  HEMATOLOGIC/LYMPHATIC:  negative  ALLERGIC/IMMUNOLOGIC:  negative  ENDOCRINE:  negative  MUSCULOSKELETAL:  negative  NEUROLOGICAL:   negative  BEHAVIOR/PSYCH:  negative    Physical Exam   Temp: 98  F (36.7  C) Temp src: Oral BP: 111/72 Pulse: 65   Resp: 10 SpO2: 96 % O2 Device: Nasal cannula Oxygen Delivery: 2 LPM  Vital Signs with Ranges  Temp:  [98  F (36.7  C)] 98  F (36.7  C)  Pulse:  [65-67] 65  Resp:  [10-20] 10  BP: (111-121)/(72-74) 111/72  SpO2:  [86 %-96 %] 96 %  143 lbs 0 oz    Constitutional: Fatigue, some what lethargic but able to wake up and  Answer few questions, cooperative, no apparent distress.  Eyes: Conjunctiva and pupils examined and normal.  HEENT: Moist mucous membranes, normal dentition.  Respiratory: Diminished air entry bilaterally, no crackles or wheezing.  Cardiovascular: Regular rate and rhythm, normal S1 and S2, and no murmur noted.  GI: Soft, non-distended, mild diffuse tenderness, normal bowel sounds.  Lymph/Hematologic: No anterior cervical or supraclavicular adenopathy.  Skin: No rashes, no cyanosis, no edema.  Musculoskeletal: No joint swelling, erythema or tenderness.  Neurologic: Cranial nerves 2-12 intact, normal strength and sensation.  Psychiatric: Alert, oriented to person, place and time, no obvious anxiety or depression.    Data   Data reviewed today:  I personally reviewed the chest x-ray image(s) showing Low lung volumes with bibasilar airspace disease likely due to atelectasis. No pleural effusion or pneumothorax evident. Heart size and mediastinum are stable with coronary artery stents visualized  Recent Labs   Lab 05/06/23  1847   WBC 7.2   HGB 14.4   MCV 91   *      POTASSIUM 4.5   CHLORIDE 94*   CO2 42*   BUN 34.7*   CR 1.21*   ANIONGAP 6*   TATE 9.2   *   ALBUMIN 3.6   PROTTOTAL 6.5   BILITOTAL 0.3   ALKPHOS 58   ALT 14   AST 24   LIPASE 19       Recent Results (from the past 24 hour(s))   Chest XR,  PA & LAT    Narrative    EXAM: XR CHEST 2 VIEWS  LOCATION: [facility]  DATE/TIME: 5/6/2023 7:16 PM CDT    INDICATION: Shortness of breath  COMPARISON:  04/04/2023      Impression    IMPRESSION: Low lung volumes with bibasilar airspace disease likely due to atelectasis. No pleural effusion or pneumothorax evident. Heart size and mediastinum are stable with coronary artery stents visualized.

## 2023-05-07 NOTE — PROVIDER NOTIFICATION
MD Notification    Notified Person: MD    Notified Person Name: Aleks Curtis    Notification Date/Time: May 07,2023 @ 05:58    Notification Interaction: Amcom    Purpose of Notification:  Fyi,Latest potassium was 5.6.  Orders Received:    Comments:

## 2023-05-07 NOTE — PLAN OF CARE
Goal Outcome Evaluation:      21:45: Received patient from ER,transferred to bed and hooked to monitor.BIPAP in placed.    Orientations:Lethargic but oriented.Able to follow commands.  Vitals/Pain:Vital signs stable.On BIPAP at 35 % FIO2.Denies pain.  Tele: NSR  Diet: NPO except med  Lungs: Diminished  Lines/Drains: R PIV SL  Skin/Wounds: WDL except edema on BLE  GI/: No Bm on this shift,audible bowel sounds.No urine output for 4 hours,bladder scan done with 270 ml urine residual at 2 am.Repeat at 4 am,317 ml.Repeat at 06:30 with 419 urine residual.Straight catheterization done.  Labs: Abnormal/Trends, Electrolyte Replacement- on K+,Mg protocol.Blood sugar monitoring every 4 hours with insulin sliding scale.Latest K- 5.6,stat Insulin Rapid acting  10 units+ D 50 given.  Ambulation/Assist:Assist x 2,lift  Sleep Quality:Fair  Plan: TO wean off from BIPAP,plan for esophageal dilatation on Tuesday.Continue plan of care.

## 2023-05-07 NOTE — PROGRESS NOTES
BiPAP Settings  IPAP: 12  EPAP: 6  Rate: 14  FiO2: 35  Timed Inspiration (sec):     BiPAP/ Alarms  High Pressure: 25  Low Pressure: 5  Low Pressure Delay: 20  High Rate (breaths/min): 45  Low Rate (breaths/min): 5  Alarm Volume Level: 10    CPAP/BiPAP/AVAPS/AVAPS AE Patient Assessment  Skin Assessment: clean, dry, intact  Barriers Applied: liquicell applied  Lung Sounds: diminished bilaterally    Plan: cont to monitor and assess effect     RT Lidia 5/6/2023

## 2023-05-07 NOTE — PROVIDER NOTIFICATION
DATE:  5/7/2023   TIME OF RECEIPT FROM LAB:  15:05   LAB TEST:  Bicarb   LAB VALUE:  46  RESULTS GIVEN WITH READ-BACK TO (PROVIDER):  Dr. Kruger    TIME LAB VALUE REPORTED TO PROVIDER:   15:12

## 2023-05-07 NOTE — PROGRESS NOTES
.  Pt stable on Bipap overnight made Bipap changes reflective of the blood gases. Skin protective barriers in place, Bipap alarm level set at 10. Will continue to follow.

## 2023-05-08 ENCOUNTER — APPOINTMENT (OUTPATIENT)
Dept: OCCUPATIONAL THERAPY | Facility: CLINIC | Age: 83
DRG: 190 | End: 2023-05-08
Attending: INTERNAL MEDICINE
Payer: COMMERCIAL

## 2023-05-08 ENCOUNTER — APPOINTMENT (OUTPATIENT)
Dept: PHYSICAL THERAPY | Facility: CLINIC | Age: 83
DRG: 190 | End: 2023-05-08
Attending: INTERNAL MEDICINE
Payer: COMMERCIAL

## 2023-05-08 VITALS
OXYGEN SATURATION: 94 % | HEIGHT: 67 IN | DIASTOLIC BLOOD PRESSURE: 56 MMHG | BODY MASS INDEX: 23.32 KG/M2 | HEART RATE: 86 BPM | WEIGHT: 148.6 LBS | SYSTOLIC BLOOD PRESSURE: 112 MMHG | RESPIRATION RATE: 22 BRPM | TEMPERATURE: 98.1 F

## 2023-05-08 LAB
ANION GAP SERPL CALCULATED.3IONS-SCNC: 14 MMOL/L (ref 7–15)
BASE EXCESS BLDV CALC-SCNC: 16.8 MMOL/L (ref -7.7–1.9)
BUN SERPL-MCNC: 52.3 MG/DL (ref 8–23)
CALCIUM SERPL-MCNC: 10 MG/DL (ref 8.8–10.2)
CHLORIDE SERPL-SCNC: 97 MMOL/L (ref 98–107)
CREAT SERPL-MCNC: 1.27 MG/DL (ref 0.67–1.17)
DEPRECATED HCO3 PLAS-SCNC: 33 MMOL/L (ref 22–29)
ERYTHROCYTE [DISTWIDTH] IN BLOOD BY AUTOMATED COUNT: 17 % (ref 10–15)
GFR SERPL CREATININE-BSD FRML MDRD: 56 ML/MIN/1.73M2
GLUCOSE BLDC GLUCOMTR-MCNC: 230 MG/DL (ref 70–99)
GLUCOSE BLDC GLUCOMTR-MCNC: 234 MG/DL (ref 70–99)
GLUCOSE BLDC GLUCOMTR-MCNC: 270 MG/DL (ref 70–99)
GLUCOSE BLDC GLUCOMTR-MCNC: 317 MG/DL (ref 70–99)
GLUCOSE SERPL-MCNC: 264 MG/DL (ref 70–99)
HCO3 BLDV-SCNC: 38 MMOL/L (ref 21–28)
HCT VFR BLD AUTO: 42 % (ref 40–53)
HGB BLD-MCNC: 13.6 G/DL (ref 13.3–17.7)
LACTATE SERPL-SCNC: 2 MMOL/L (ref 0.7–2)
LACTATE SERPL-SCNC: 4.1 MMOL/L (ref 0.7–2)
MAGNESIUM SERPL-MCNC: 1.8 MG/DL (ref 1.7–2.3)
MCH RBC QN AUTO: 28.8 PG (ref 26.5–33)
MCHC RBC AUTO-ENTMCNC: 32.4 G/DL (ref 31.5–36.5)
MCV RBC AUTO: 89 FL (ref 78–100)
NT-PROBNP SERPL-MCNC: 373 PG/ML (ref 0–1800)
O2/TOTAL GAS SETTING VFR VENT: 25 %
PCO2 BLDV: 32 MM HG (ref 40–50)
PH BLDV: 7.68 [PH] (ref 7.32–7.43)
PLATELET # BLD AUTO: 153 10E3/UL (ref 150–450)
PO2 BLDV: 34 MM HG (ref 25–47)
POTASSIUM SERPL-SCNC: 4.2 MMOL/L (ref 3.4–5.3)
RBC # BLD AUTO: 4.73 10E6/UL (ref 4.4–5.9)
SODIUM SERPL-SCNC: 144 MMOL/L (ref 136–145)
WBC # BLD AUTO: 10.8 10E3/UL (ref 4–11)

## 2023-05-08 PROCEDURE — 94640 AIRWAY INHALATION TREATMENT: CPT

## 2023-05-08 PROCEDURE — 85027 COMPLETE CBC AUTOMATED: CPT | Performed by: HOSPITALIST

## 2023-05-08 PROCEDURE — 83605 ASSAY OF LACTIC ACID: CPT | Performed by: INTERNAL MEDICINE

## 2023-05-08 PROCEDURE — 97165 OT EVAL LOW COMPLEX 30 MIN: CPT | Mod: GO

## 2023-05-08 PROCEDURE — 99233 SBSQ HOSP IP/OBS HIGH 50: CPT

## 2023-05-08 PROCEDURE — 97116 GAIT TRAINING THERAPY: CPT | Mod: GP | Performed by: PHYSICAL THERAPIST

## 2023-05-08 PROCEDURE — 250N000013 HC RX MED GY IP 250 OP 250 PS 637: Performed by: HOSPITALIST

## 2023-05-08 PROCEDURE — 94660 CPAP INITIATION&MGMT: CPT

## 2023-05-08 PROCEDURE — 36415 COLL VENOUS BLD VENIPUNCTURE: CPT | Performed by: HOSPITALIST

## 2023-05-08 PROCEDURE — 250N000009 HC RX 250: Performed by: INTERNAL MEDICINE

## 2023-05-08 PROCEDURE — 258N000003 HC RX IP 258 OP 636

## 2023-05-08 PROCEDURE — 83605 ASSAY OF LACTIC ACID: CPT

## 2023-05-08 PROCEDURE — 97530 THERAPEUTIC ACTIVITIES: CPT | Mod: GP | Performed by: PHYSICAL THERAPIST

## 2023-05-08 PROCEDURE — 83735 ASSAY OF MAGNESIUM: CPT | Performed by: HOSPITALIST

## 2023-05-08 PROCEDURE — 250N000011 HC RX IP 250 OP 636: Performed by: INTERNAL MEDICINE

## 2023-05-08 PROCEDURE — 82803 BLOOD GASES ANY COMBINATION: CPT | Performed by: HOSPITALIST

## 2023-05-08 PROCEDURE — 97535 SELF CARE MNGMENT TRAINING: CPT | Mod: GO

## 2023-05-08 PROCEDURE — 80048 BASIC METABOLIC PNL TOTAL CA: CPT | Performed by: HOSPITALIST

## 2023-05-08 PROCEDURE — 36415 COLL VENOUS BLD VENIPUNCTURE: CPT

## 2023-05-08 PROCEDURE — 97162 PT EVAL MOD COMPLEX 30 MIN: CPT | Mod: GP | Performed by: PHYSICAL THERAPIST

## 2023-05-08 PROCEDURE — 250N000013 HC RX MED GY IP 250 OP 250 PS 637: Performed by: INTERNAL MEDICINE

## 2023-05-08 PROCEDURE — C9113 INJ PANTOPRAZOLE SODIUM, VIA: HCPCS | Performed by: INTERNAL MEDICINE

## 2023-05-08 PROCEDURE — 94640 AIRWAY INHALATION TREATMENT: CPT | Mod: 76

## 2023-05-08 PROCEDURE — 83880 ASSAY OF NATRIURETIC PEPTIDE: CPT

## 2023-05-08 PROCEDURE — 99239 HOSP IP/OBS DSCHRG MGMT >30: CPT | Performed by: HOSPITALIST

## 2023-05-08 PROCEDURE — 999N000157 HC STATISTIC RCP TIME EA 10 MIN

## 2023-05-08 RX ORDER — AZITHROMYCIN 250 MG/1
250 TABLET, FILM COATED ORAL DAILY
Qty: 3 TABLET | Refills: 0 | Status: SHIPPED | OUTPATIENT
Start: 2023-05-08 | End: 2023-05-11

## 2023-05-08 RX ORDER — CLOPIDOGREL BISULFATE 75 MG/1
75 TABLET ORAL DAILY
COMMUNITY
Start: 2023-05-08

## 2023-05-08 RX ORDER — PREDNISONE 20 MG/1
TABLET ORAL
Qty: 12 TABLET | Refills: 0 | Status: SHIPPED | OUTPATIENT
Start: 2023-05-08 | End: 2023-05-17

## 2023-05-08 RX ORDER — ALBUTEROL SULFATE 90 UG/1
2 AEROSOL, METERED RESPIRATORY (INHALATION) EVERY 6 HOURS PRN
Qty: 18 G | Refills: 0 | Status: SHIPPED | OUTPATIENT
Start: 2023-05-08

## 2023-05-08 RX ORDER — FLUTICASONE PROPIONATE AND SALMETEROL 250; 50 UG/1; UG/1
1 POWDER RESPIRATORY (INHALATION) EVERY 12 HOURS
Qty: 1 EACH | Refills: 0 | Status: SHIPPED | OUTPATIENT
Start: 2023-05-08

## 2023-05-08 RX ADMIN — IPRATROPIUM BROMIDE AND ALBUTEROL SULFATE 3 ML: .5; 3 SOLUTION RESPIRATORY (INHALATION) at 08:42

## 2023-05-08 RX ADMIN — PANTOPRAZOLE SODIUM 40 MG: 40 INJECTION, POWDER, FOR SOLUTION INTRAVENOUS at 08:09

## 2023-05-08 RX ADMIN — INSULIN ASPART 2 UNITS: 100 INJECTION, SOLUTION INTRAVENOUS; SUBCUTANEOUS at 11:39

## 2023-05-08 RX ADMIN — INSULIN ASPART 2 UNITS: 100 INJECTION, SOLUTION INTRAVENOUS; SUBCUTANEOUS at 06:50

## 2023-05-08 RX ADMIN — METHYLPREDNISOLONE SODIUM SUCCINATE 40 MG: 40 INJECTION, POWDER, FOR SOLUTION INTRAMUSCULAR; INTRAVENOUS at 14:33

## 2023-05-08 RX ADMIN — LEVOTHYROXINE SODIUM 75 MCG: 75 TABLET ORAL at 08:16

## 2023-05-08 RX ADMIN — IPRATROPIUM BROMIDE AND ALBUTEROL SULFATE 3 ML: .5; 3 SOLUTION RESPIRATORY (INHALATION) at 11:11

## 2023-05-08 RX ADMIN — NEBIVOLOL HYDROCHLORIDE 2.5 MG: 2.5 TABLET ORAL at 08:17

## 2023-05-08 RX ADMIN — INSULIN ASPART 4 UNITS: 100 INJECTION, SOLUTION INTRAVENOUS; SUBCUTANEOUS at 14:34

## 2023-05-08 RX ADMIN — IPRATROPIUM BROMIDE AND ALBUTEROL SULFATE 3 ML: .5; 3 SOLUTION RESPIRATORY (INHALATION) at 03:12

## 2023-05-08 RX ADMIN — AMIODARONE HYDROCHLORIDE 200 MG: 200 TABLET ORAL at 08:16

## 2023-05-08 RX ADMIN — METHYLPREDNISOLONE SODIUM SUCCINATE 40 MG: 40 INJECTION, POWDER, FOR SOLUTION INTRAMUSCULAR; INTRAVENOUS at 06:16

## 2023-05-08 RX ADMIN — RILUZOLE 50 MG: 50 TABLET, FILM COATED ORAL at 08:25

## 2023-05-08 RX ADMIN — ROSUVASTATIN CALCIUM 20 MG: 20 TABLET, FILM COATED ORAL at 08:16

## 2023-05-08 RX ADMIN — Medication 400 MG: at 08:17

## 2023-05-08 RX ADMIN — INSULIN ASPART 3 UNITS: 100 INJECTION, SOLUTION INTRAVENOUS; SUBCUTANEOUS at 01:45

## 2023-05-08 RX ADMIN — SODIUM CHLORIDE 500 ML: 9 INJECTION, SOLUTION INTRAVENOUS at 06:34

## 2023-05-08 RX ADMIN — BUDESONIDE 0.5 MG: 0.5 INHALANT RESPIRATORY (INHALATION) at 08:42

## 2023-05-08 ASSESSMENT — ACTIVITIES OF DAILY LIVING (ADL)
ADLS_ACUITY_SCORE: 46
ADLS_ACUITY_SCORE: 46
ADLS_ACUITY_SCORE: 50
ADLS_ACUITY_SCORE: 46
DEPENDENT_IADLS:: TRANSPORTATION;MONEY MANAGEMENT;MEDICATION MANAGEMENT;MEAL PREPARATION;SHOPPING;LAUNDRY;COOKING;CLEANING
ADLS_ACUITY_SCORE: 50

## 2023-05-08 NOTE — PROGRESS NOTES
Patient have respiratory alkalosis per this morning's VBG with Pco2 32 and PH 7.68. in discussion with covering NP, Pt was taken off the Bipap to 3L NC. Will continue to monitor.

## 2023-05-08 NOTE — CODE/RAPID RESPONSE
Sepsis Evaluation     I was called to see Carl Vázquez due to lactic acid of 4.1. He is suspected to have an infection, on azithromycin.    PHYSICAL EXAM  Vital Signs:  Temp: 98.3  F (36.8  C) Temp src: Oral BP: 101/57 Pulse: 73   Resp: 20 SpO2: 96 % O2 Device: BiPAP/CPAP Oxygen Delivery: 2 LPM    General: in no acute distress  Mental Status: AAOx4.     Remainder of physical exam is significant for diminished lung sounds, cachexia. Patient was warm and dry, 2+ pulses in extremities, moves all extremities.    Patient denied chest pain, dizziness, headache, dyspnea, abdominal pain.    DATA  Lactic Acid   Date Value Ref Range Status   05/08/2023 4.1 (HH) 0.7 - 2.0 mmol/L Final   09/25/2019 1.3 0.7 - 2.1 mmol/L Final     Lactic Acid POCT   Date Value Ref Range Status   05/06/2023 0.8 <=2.0 mmol/L Final   04/04/2023 1.2 <=2.0 mmol/L Final       ASSESSMENT AND PLAN  Carl Vázquez meets SIRS criteria AND has a lactate >2 or other evidence of acute organ damage.  These vital signs, lab and physical exam findings constitute a diagnosis of SEVERE SEPSIS, based on: SBP <90 or MAP <65, Lactate resulted, and the level was > 2.0, Acute Resp Failure requiring BiPAP or Mechanical Vent and Lab results revealing acute organ dysfunction due to infection (Cr, Bili, Plt)          Anti-infectives (From now, onward)    Start     Dose/Rate Route Frequency Ordered Stop    05/06/23 2125  azithromycin (ZITHROMAX) 500 mg vial to attach to  mL bag         500 mg  250 mL/hr over 1 Hours Intravenous EVERY 24 HOURS 05/06/23 2124          Current antibiotic coverage is appropriate for source of infection.    3 Hour Severe Sepsis Bundle Completion:  1. Initial Lactic Acid result shown above. Repeat lactic acid ordered by reflex for 3 hours from initial collection.  2. Blood Cultures before Antibiotics: Yes  3. Broad Spectrum Antibiotics Administered: yes  4. Is hypotension present? No (IV fluid bolus NOT required). IV Fluid  volume administered: 500 mL     Mixed metabolic and respiratory alkalosis  Venous Blood Gas  Recent Labs   Lab 05/08/23  0513 05/07/23  1903 05/07/23  1451 05/07/23  0820   PHV 7.68* 7.35 7.44* 7.38   PCO2V 32* 80* 67* 74*   PO2V 34 40 17* 25   HCO3V 38* 44* 46* 44*   ASPEN 16.8* 14.3* 17.3* 14.6*   O2PER 25 2 0 30     - BiPAP removed, patient appeared to be doing well. See RT documentation. Can repeat VBG later today if indicated.      Disposition: The patient will remain on the current unit. We will continue to monitor this patient closely..  SE Malcolm CNP  05/08/23, 6:09 AM    Sepsis Criteria   Sepsis: The body's generalized inflammatory state as a response to an infection. Sepsis Predictive Model includes >80 variable to alert to potential sepsis.  Severe Sepsis: Sepsis plus one or more variables of acute organ dysfunction (Note: lactic acid >2 or acute encephalopathy each qualify as organ dysfunction)  Septic Shock: Sepsis AND hypotension despite adequate volume resuscitation with crystalloid or lactic acid >=4  Note: HYPOTENSION is defined as 2 BP readings measured 3 hrs apart that have a SBP <90, MAP <65, or decrease >40 mmHg, occurring 6 hrs before or after t-zero    SE Carolina, CNP  Hospitalist - [facility]  Text me on the [employer] [facility] for a textback  Text page with web based paging for a callback

## 2023-05-08 NOTE — PROGRESS NOTES
05/08/23 0523   Appointment Info   Signing Clinician's Name / Credentials (OT) Bubba Varma OTR/L   Living Environment   People in Home spouse   Current Living Arrangements house   Home Accessibility stairs within home   Number of Stairs, Within Home, Primary greater than 10 stairs   Stair Railings, Within Home, Primary railings safe and in good condition   Transportation Anticipated family or friend will provide   Living Environment Comments Wife available to help 24/7 and can provide physical assistance. Has shower in basement but uses sponge bath. Raised toilet seat with saftey frame.   Self-Care   Usual Activity Tolerance good   Current Activity Tolerance moderate   Equipment Currently Used at Home raised toilet seat;grab bar, toilet;walker, standard   Fall history within last six months yes   Number of times patient has fallen within last six months 1   Activity/Exercise/Self-Care Comment Spouse provides assistance with all self cares. Pt ambulates short distances independently at baseline.   Instrumental Activities of Daily Living (IADL)   IADL Comments Spouse does IADLs   General Information   Onset of Illness/Injury or Date of Surgery 05/06/23   Referring Physician Arcenio Taylor MD   Patient/Family Therapy Goal Statement (OT) To return home   Additional Occupational Profile Info/Pertinent History of Current Problem Carl Vázquez is an 83-year-old male with history of diabetes mellitus type 2; obstructive sleep apnea, on CPAP; hypothyroidism; dysphagia requiring dilatation; hyperlipidemia; COPD, not on any home oxygen; history of CO2 narcosis in the past; depression; GERD; atrial fibrillation, on chronic anticoagulation with Xarelto, currently on hold for the procedure next week; CHF; coronary artery disease status post PCI to LAD with drug-eluting stent on 02/21/2023 from ostial to distal, requiring 4 stents came to the ED with complaint of shortness of breath and hypoxia.   Existing  Precautions/Restrictions fall   Cognitive Status Examination   Orientation Status orientation to person, place and time   Follows Commands does not follow one-step commands;over 90% accuracy   Cognitive Status Comments Partly oriented to time, confused on year but accurate with date.   Visual Perception   Visual Impairment/Limitations WFL   Sensory   Sensory Quick Adds UE sensation intact   Posture   Posture forward head position;protracted shoulders;kyphosis   Range of Motion Comprehensive   General Range of Motion upper extremity range of motion deficits identified   Comment, General Range of Motion Bilateral shoulder ROM severely limited.   Strength Comprehensive (MMT)   General Manual Muscle Testing (MMT) Assessment upper extremity strength deficits identified   Comment, General Manual Muscle Testing (MMT) Assessment Severe strength defecits at B shoulders. Moderate strength deficits at B biceps and hands. Mild strenth defecits at B triceps.   Muscle Tone Assessment   Muscle Tone Comments Increased tone at B shoulders.   Coordination   Upper Extremity Coordination Left UE impaired;Right UE impaired   Bed Mobility   Comment (Bed Mobility) Patient OOB entire session   Transfers   Transfers sit-stand transfer;toilet transfer   Sit-Stand Transfer   Sit-Stand [place] (Transfers) minimum assist (75% patient effort)   Assistive Device (Sit-Stand Transfers) walker, front-wheeled   Toilet Transfer   [place] Level (Toilet Transfer) minimum assist (75% patient effort)   Assistive Device (Toilet Transfer) grab bars/safety frame   Activities of Daily Living   BADL Assessment/Intervention toileting;upper body dressing;lower body dressing   Upper Body Dressing Assessment/Training   [place] Level (Upper Body Dressing) moderate assist (50% patient effort)   Lower Body Dressing Assessment/Training   [place] Level (Lower Body Dressing) maximum assist (25% patient effort)   Toileting   [place] Level  (Toileting) maximum assist (25% patient effort)   Clinical Impression   Criteria for Skilled Therapeutic Interventions Met (OT) Yes, treatment indicated   OT Diagnosis Decreased independence with ADLs.   OT Problem List-Impairments impacting ADL problems related to;activity tolerance impaired;cognition;coordination;muscle tone;range of motion (ROM);strength;postural control   Assessment of Occupational Performance 5 or more Performance Deficits   Identified Performance Deficits Dressing, bathing, home managment, toileting, functional mobility, community mobility, med mgmgt, shopping, meal prep   Planned Therapy Interventions (OT) ADL retraining;transfer training   Clinical Decision Making Complexity (OT) low complexity   Risk & Benefits of therapy have been explained evaluation/treatment results reviewed;care plan/treatment goals reviewed;risks/benefits reviewed;current/potential barriers reviewed;participants voiced agreement with care plan;participants included;patient   OT Total Evaluation Time   OT Eval, Low Complexity Minutes (72465) 15   OT Goals   Therapy Frequency (OT) Daily   OT Predicted Duration/Target Date for Goal Attainment 05/12/23   OT Goals Toilet Transfer/Toileting;Lower Body Dressing;Upper Body Dressing;Hygiene/Grooming   OT: Hygiene/Grooming supervision/stand-by assist   OT: Upper Body Dressing Minimal assist   OT: Lower Body Dressing Minimal assist   OT: Toilet Transfer/Toileting Minimal assist;cleaning and garment management;using adaptive equipment   Interventions   Interventions Quick Adds Self-Care/Home Management   Self-Care/Home Management   Self-Care/Home Mgmt/ADL, Compensatory, Meal Prep Minutes (73410) 10   Treatment Detail/Skilled Intervention Eval complete and treatment indicated. Pt requiring 1 verbal cue for hand placement to complete toilet transfer and min A for controlled decent and stand. Cueing pt to assist with garment managment and pt able to pull brief from knees to thighs,  requiring A for the rest. Cueing pt x2 during toilet transfer for feet placement. Pt requiring 1 vc for hand placment when sitting in recliner. Cueing pt to lean forward and push up when standing from low sit. Mod A for stand. Providing pt with compensatory techniques to promote increased independence with ADLs. Providing complensatory strategies for dressing and eating. Pt left in recliner with alarm on and all needs in reach.   OT Discharge Planning   OT Plan LB dressing with AE, Toileting, upper body dressing   OT Discharge Recommendation (DC Rec) home with assist;home with home care occupational therapy   OT Rationale for DC Rec Patient presenting below basline, requiring Ax1 for all functional mobilty and self cares. Patients wife is available to provide physical assist upon discharge. Recommend 24/7 assistance for all self cares and functional mobility including dressing, bathing, toileting, eating, grooming, etc. Recommed assist with all IADLs including med mgmt, home mgmt, meal prep, etc. Recommend HHOT to address saftey and independence w/in th home environment.   OT Brief overview of current status Mod-max A for dressing, max A toileting, Min A for functional transfers.   Total Session Time   Timed Code Treatment Minutes 10   Total Session Time (sum of timed and untimed services) 25

## 2023-05-08 NOTE — PLAN OF CARE
Goal Outcome Evaluation:      Orientations:Alert and oriented x4  Vitals/Pain:Vital signs stable except on soft side BP.BIPAP OFF,On NC at 3 LPM.Denies pain.  Tele: NSR  Diet: NPO except med  Lungs: Diminished with wheezes  Lines/Drains: R PIV SL  Skin/Wounds: WDL except edema on BLE  GI/: No Bm on this shift,audible bowel sounds.No urine output since morning,straight catheterization done at 20:00 H with 500 ml urine.Encourage to void.Repeat bladder scan at 4 am with 145 ml urine residual.  Labs: Abnormal/Trends, Electrolyte Replacement- on K+,Mg protocol.Blood sugar monitoring every 4 hours with insulin sliding scale.  Ambulation/Assist:Assist x 2,lift  Sleep Quality:Fair  Plan: TO wean off from BIPAP,plan for esophageal dilatation on Tuesday.Continue plan of care.    06:00- RRT called due lactic of 4.1. ml bolus given.Repeat lactic acid at 8 am.

## 2023-05-08 NOTE — PROGRESS NOTES
Pt stable on Bipap overnight with under the nose mask. Bipap alarm level set at 10. Will continue to follow.

## 2023-05-08 NOTE — PROGRESS NOTES
Care Management Discharge Note    Discharge Date: 05/08/2023       Discharge Disposition: Home    Discharge Services: [facility] RN/PT/OT/ST    Discharge DME: None    Discharge Transportation: family or friend will provide    Private pay costs discussed: Not applicable      PAS Confirmation Code:  NA  Patient/family educated on Medicare website which has current facility and service quality ratings: no    Education Provided on the Discharge Plan: yes   Persons Notified of Discharge Plans: Patient, Nsg  Patient/Family in Agreement with the Plan:  yes    Handoff Referral Completed: No    Additional Information:  Patient is discharging home today.  Reportedly, his spouse wanted patient to discharge due to a prescheduled esophageal dilation at [facility] tomorrow.  Patient has orders for RN/PT/OT and ST.  His Spouse, Dorothy, voiced frustration with his last home care agency as she felt they really did not benefit him.  [facility] has accepted and will plan to see patient starting on 5/11/23.  Dorothy has a good working relationship with patient's PCP and has his cell phone number if she needs to call more urgently.  She prefers to schedule his follow-up appointments.  Patient is currently being worked up for ALS and has the following appointments scheduled:    May18 Return ALS/[place] with Ramin Baez MD  Thursday May 18, 2023 8:45 AM [facility] [address] [id number] [phone]          PFT VISIT  Thursday May 18, 2023 11:45 AM [facility] Pulmonary Function Testing [address] [id number] [phone]          New Patient Visit with Becky Mahoney MD  Thursday May 18, 2023 3:15 PM [facility] [address] [id number] [phone]   Jun5 ECHO  COMPLETE  Monday Jun 5, 2023 12:30 PM   [facility]  [address]   [place] [id number] [phone]   Jun9 LAB  Friday Jun 9, 2023 12:45 PM   [facility]  [address]   [place] [id number] [phone]          NEW HEART FAILURE with ZAY NORRIS MD  Friday Jun 9, 2023 1:40 PM [facility] [place]  [address]   [place] [id number] [phone]           Delia Patterson RN   Inpatient Care Management  [phone]

## 2023-05-08 NOTE — DISCHARGE SUMMARY
[facility]  Hospitalist Discharge Summary      Date of Admission:  5/6/2023  Date of Discharge:  5/8/2023  Discharging Provider: Nikhil Kruger MD  Discharge Service: Hospitalist Service    Discharge Diagnoses   Acute hypoxic hypercapnic respiratory failure secondary to chronic obstructive pulmonary disease (COPD) exacerbation  Sepsis considered and ruled out  Coronary artery disease  Diabetes mellitus type 2  Atrial fibrillation  Chronic systolic congestive heart failure  Hypertension  Hyperlipidemia  Hyperkalemia  Dysphagia  Esophageal stenosis  Hypothyroidism    Follow-ups Needed After Discharge   Follow-up Appointments     Follow-up and recommended labs and tests       Follow up with primary care provider, Kyler Mcwilliams, within 7 days for   hospital follow- up.  The following labs/tests are recommended: CBC and   BMP.              Discharge Disposition   Discharged to home  Condition at discharge: Stable    Hospital Course   Carl Vázquez is an 83-year-old male with history of diabetes mellitus type 2; obstructive sleep apnea, on CPAP; hypothyroidism; dysphagia requiring dilatation; hyperlipidemia; COPD, not on any home oxygen; history of CO2 narcosis in the past; depression; GERD; atrial fibrillation, on chronic anticoagulation with Xarelto, currently on hold for the procedure next week; CHF; coronary artery disease status post PCI to LAD with drug-eluting stent on 02/21/2023 from ostial to distal, requiring 4 stents came to the ED with complaint of shortness of breath and hypoxia.    Acute hypoxic hypercapnic respiratory failure secondary to chronic obstructive pulmonary disease (COPD) exacerbation  Sepsis considered and ruled out  Carl Vázquez is an 83-year-old male with multiple comorbidities as mentioned above on not using bilevel positive airways pressure (BiPAP) for the last 3 days due to a recent dental procedure and now presented with worsening shortness of  breath, hypoxia, and hypercapnia. CXR negative for infiltrate, did show atelectasis. BNP within normal limits.    Admitted to inpatient under IMC status.    Initially required BiPAP, weaned off in AM on 5/8/23, then subsequently weaned to room air prior to discharge.    Treated with IV solumedrol in the hospital, will transition to prednisone taper as noted below upon discharge.    He was given Duonebs every 4 hours and Pulmicort BID in the hospital. Will start Advair and PRN albuterol inhalers upon discharge.    Started on azithromycin upon admission for COPD exacerbation, no definite pneumonia noted, continue the same upon discharge to complete a 5 day course.  Lactic acid up to 4.1 early AM on 5/8/23, resolved with small amount of IV fluids. Suspect this was due to hypoxia and respiratory issues rather than sepsis.    Patient requesting to leave on 5/8/23. He has an EGD with dilation scheduled for tomorrow morning. Discussed that I would like to see how he does with transition off IV steroids, supplemental oxygen, and nebulizers in the hospital prior to discharging him. Would ideally watch him in the hospital at least one more night as he just came off BiPAP this morning. Discussed concern for potential worsening of symptoms if he discharges today. These concerns were discussed with the patient and his wife. They both seemed to understand my concerns, but still request discharge home this evening.    Discharge home this evening.    Recommend close follow-up with PCP with repeat labs. Recommend monitoring signs/symptoms closely and seeking medical attention if he shows any signs/symptoms of deterioration.    Coronary artery disease  Status post percutaneous coronary intervention (PCI) to LAD from ostial to distal with 4 drug-eluting stents recently in 02/2023    PTA Xarelto and Plavix have been on hold in anticipation of GI procedure on Tuesday, will continue to hold for now.    Delta troponin trended down  slightly, no further evaluation was pursued in the hospital.    Diabetes mellitus type 2  He is on Lantus and glipizide at home.  Hemoglobin A1c 8.2 on 1/4/23.    Held PTA glipizide in the hospital, will resume upon discharge.    Gave Lantus at half PTA dose while NPO on BiPAP, resume PTA dose upon discharge.    Blood sugars elevated due to solumedrol, should improved as he is tapered off steroids.    Atrial fibrillation    Continue PTA amiodarone as able.    PTA Xarelto on hold for now for GI procedure as noted below.    Chronic systolic congestive heart failure  Last ejection fraction (EF) of 25%-30% and that was in 02/2023.  His weight is stable, is not in acute exacerbation at this time.    Held PTA bumex while NPO on BiPAP, resume upon discharge.    Continue PTA lisinopril and nebivolol.    Hypertension    Continue PTA lisinopril and nebivolol.    Hyperlipidemia    Continue PTA rosuvastatin.    Hyperkalemia  Resolved with treatment.    Potassium 4.2 on the day of discharge.    Dysphagia  Esophageal stenosis  He has been followed by [facility] and is scheduled to have esophagogastroduodenoscopy (EGD) with dilatation on Tuesday.    Continue to hold PTA Xarelto and Plavix.    Resume oral PPI upon discharge.    Will need soft diet when able to eat.    Patient and his wife are both quite adamant about discharging home today so that they can keep his appointment for an esophageal dilation tomorrow. Offered to discuss with [facility] and see if the procedure could be rescheduled and done in the hospital prior to discharge, however they prefer discharge today as noted above.    Hypothyroidism    Continue PTA levothyroxine.    Consultations This Hospital Stay   CARE MANAGEMENT / SOCIAL WORK IP CONSULT  PHYSICAL THERAPY ADULT IP CONSULT  OCCUPATIONAL THERAPY ADULT IP CONSULT  SPEECH LANGUAGE PATH ADULT IP CONSULT    Code Status   No CPR- Pre-arrest intubation OK    Time Spent on this Encounter   I, Nikhil Kruger MD, personally  saw the patient today and spent greater than 30 minutes discharging this patient.       Nikhil Kruger MD  [place] [address] [place] [address]  Phone: [phone]  ______________________________________________________________________    Physical Exam   Vital Signs: Temp: 98.1  F (36.7  C) Temp src: Oral BP: 112/56 Pulse: 86   Resp: 22 SpO2: 94 % O2 Device: None (Room air) Oxygen Delivery: 2 LPM  Weight: 148 lbs 9.6 oz  Constitutional: awake, alert, cooperative, no apparent distress, sitting up in a chair  Respiratory: no increased work of breathing, diminished at the bases  Cardiovascular: regular rate and rhythm, normal S1 and S2, no murmur noted  GI: normal bowel sounds, soft, non-distended, non-tender  Skin: warm, dry  Musculoskeletal: no lower extremity pitting edema present  Neurologic: awake, alert, answers questions appropriately, moves all extremities       Primary Care Physician   Kyler Mcwilliams    Discharge Orders      Medication Therapy Management Referral      Home Care Referral      Reason for your hospital stay    COPD exacerbation     Follow-up and recommended labs and tests     Follow up with primary care provider, Kyler Mcwilliams, within 7 days for hospital follow- up.  The following labs/tests are recommended: CBC and BMP.     Activity    Your activity upon discharge: activity as tolerated with walker and assistance     Diet    Follow this diet upon discharge: regular diet as tolerated     Significant Results and Procedures   Most Recent 3 CBC's:  Recent Labs   Lab Test 05/08/23  0513 05/07/23  0441 05/06/23  1847   WBC 10.8 4.7 7.2   HGB 13.6 14.7 14.4   MCV 89 90 91    137* 149*     Most Recent 3 BMP's:  Recent Labs   Lab Test 05/08/23  1432 05/08/23  1039 05/08/23  0646 05/08/23  0513 05/07/23  0947 05/07/23  0820 05/07/23  0634 05/07/23  0519 05/07/23  0441 05/06/23  2202 05/06/23  1847   NA  --   --   --  144  --   --   --  143  --   --   142   POTASSIUM  --   --   --  4.2  --  4.9  --  5.6*  --   --  4.5   CHLORIDE  --   --   --  97*  --   --   --  98  --   --  94*   CO2  --   --   --  33*  --   --   --  33*  --   --  42*   BUN  --   --   --  52.3*  --   --   --  35.1*  --   --  34.7*   CR  --   --   --  1.27*  --   --   --  1.09 1.13  --  1.21*   ANIONGAP  --   --   --  14  --   --   --  12  --   --  6*   TATE  --   --   --  10.0  --   --   --  9.6  --   --  9.2   * 234* 230* 264*   < >  --    < > 241*  --    < > 229*    < > = values in this interval not displayed.     Most Recent 2 LFT's:  Recent Labs   Lab Test 05/06/23  1847 04/24/23  1225   AST 24 21   ALT 14 15   ALKPHOS 58 61   BILITOTAL 0.3 0.4     Results for orders placed or performed during the hospital encounter of 05/06/23   Chest XR,  PA & LAT    Narrative    EXAM: XR CHEST 2 VIEWS  LOCATION: [facility]  DATE/TIME: 5/6/2023 7:16 PM CDT    INDICATION: Shortness of breath  COMPARISON: 04/04/2023      Impression    IMPRESSION: Low lung volumes with bibasilar airspace disease likely due to atelectasis. No pleural effusion or pneumothorax evident. Heart size and mediastinum are stable with coronary artery stents visualized.     Discharge Medications   Current Discharge Medication List      START taking these medications    Details   albuterol (PROAIR HFA/PROVENTIL HFA/VENTOLIN HFA) 108 (90 Base) MCG/ACT inhaler Inhale 2 puffs into the lungs every 6 hours as needed for shortness of breath, wheezing or cough  Qty: 18 g, Refills: 0    Comments: Pharmacy may dispense brand covered by insurance (Proair, or proventil or ventolin or generic albuterol inhaler)  Associated Diagnoses: COPD exacerbation (H)      azithromycin (ZITHROMAX) 250 MG tablet Take 1 tablet (250 mg) by mouth daily for 3 days  Qty: 3 tablet, Refills: 0    Associated Diagnoses: COPD exacerbation (H)      fluticasone-salmeterol (ADVAIR) 250-50 MCG/ACT inhaler Inhale 1 puff into the lungs every 12  hours  Qty: 1 each, Refills: 0    Associated Diagnoses: COPD exacerbation (H)      predniSONE (DELTASONE) 20 MG tablet Take 2 tablets (40 mg) by mouth daily for 4 days, THEN 1 tablet (20 mg) daily for 4 days.  Qty: 12 tablet, Refills: 0    Associated Diagnoses: COPD exacerbation (H)         CONTINUE these medications which have CHANGED    Details   clopidogrel (PLAVIX) 75 MG tablet Take 1 tablet (75 mg) by mouth daily --- 5/8/23 --- HOLD until after GI procedure, your gastroenterologist will let you know when you can restart it ---    Associated Diagnoses: Elevated troponin      rivaroxaban ANTICOAGULANT (XARELTO) 20 MG TABS tablet Take 1 tablet (20 mg) by mouth daily (with dinner) --- 5/8/23 --- HOLD until after GI procedure, your gastroenterologist will let you know when you can restart it ---    Associated Diagnoses: Paroxysmal atrial fibrillation (H)         CONTINUE these medications which have NOT CHANGED    Details   amiodarone (PACERONE) 200 MG tablet Take 1 tablet (200 mg) by mouth daily  Qty: 90 tablet, Refills: 3    Associated Diagnoses: Acute on chronic systolic CHF (congestive heart failure) (H)      bumetanide (BUMEX) 2 MG tablet Take 2 mg (1 tablet) by mouth once in the morning and 1 mg (0.5 tablet) by mouth once around noon.  Qty: 60 tablet, Refills: 1    Associated Diagnoses: CO2 narcosis      glipiZIDE (GLUCOTROL) 5 MG tablet Take 10 mg by mouth every morning      insulin glargine (LANTUS PEN) 100 UNIT/ML pen Inject 60 Units Subcutaneous At Bedtime      levothyroxine (SYNTHROID/LEVOTHROID) 75 MCG tablet Take 75 mcg by mouth every morning Managed by PCP      lisinopril (ZESTRIL) 2.5 MG tablet Take 1 tablet (2.5 mg) by mouth daily  Qty: 90 tablet, Refills: 3    Associated Diagnoses: Acute on chronic systolic CHF (congestive heart failure) (H)      magnesium oxide (MAG-OX) 400 MG tablet Take 1 tablet (400 mg) by mouth daily  Qty: 90 tablet, Refills: 3    Associated Diagnoses: Hypomagnesemia       nebivolol (BYSTOLIC) 2.5 MG tablet Take 1 tablet (2.5 mg) by mouth 2 times daily  Qty: 180 tablet, Refills: 1    Associated Diagnoses: Acute on chronic systolic CHF (congestive heart failure) (H)      nitroGLYcerin (NITROSTAT) 0.4 MG sublingual tablet For chest pain place 1 tablet under the tongue every 5 minutes for 3 doses. If symptoms persist 5 minutes after 1st dose call 911.  Qty: 15 tablet, Refills: 0    Associated Diagnoses: Elevated troponin      oxyCODONE (ROXICODONE) 5 MG tablet Take 5 mg by mouth every 4 hours as needed for pain      pantoprazole (PROTONIX) 40 MG EC tablet Take 40 mg by mouth 2 times daily      polyethylene glycol (MIRALAX) 17 GM/Dose powder Take 17 g by mouth daily  Qty: 510 g, Refills: 0    Associated Diagnoses: Acute respiratory failure with hypoxia and hypercapnia (H)      potassium chloride ER (KLOR-CON M) 10 MEQ CR tablet Take 1 tablet (10 mEq) by mouth 2 times daily    Associated Diagnoses: Hypokalemia      riluzole (RILUTEK) 50 MG tablet Take 1 tablet (50 mg) by mouth every 12 hours  Qty: 60 tablet, Refills: 3    Associated Diagnoses: ALS (amyotrophic lateral sclerosis) (H)      rosuvastatin (CRESTOR) 20 MG tablet Take 1 tablet (20 mg) by mouth daily  Qty: 90 tablet, Refills: 3    Associated Diagnoses: Acute on chronic systolic CHF (congestive heart failure) (H); Elevated troponin           Allergies   Allergies   Allergen Reactions     Metoprolol Shortness Of Breath     Tried 1/2022, 1/2023, both time stopped for increased SOB     Digoxin      Hallucinations     Statin Drugs [Statins]      Stomach upset with simvastatin & pravastatin, myalgias with atorvastatin

## 2023-05-08 NOTE — PLAN OF CARE
Goal Outcome Evaluation:  met    Pt alert and oriented to baseline of self, place, time and intermittent confusion to situation.  Pt up with mild assist of 1.  Pt tolerating regular diet, no cough noted.  Pt tolerates well.  Pt voiding in urinal and toilet.  BM x1.  Pt uses IS independently and o2 sats 94-95% on room air.  Wife here to  pt and pt wife given discharge instructions along with pt.  Both state understanding all discharge instructions and ready for discharge.  Pt and wife states understanding all follow up care.  Pt and pt wife accompanied to parking spot by NA and safely loaded up in vehicle.  Pt ambulated to wheelchair with walker and sba.

## 2023-05-08 NOTE — PROGRESS NOTES
[place] [facility] result follow up    I was asked by my colleague Niko Lo CNP to follow up on lactic acid. Lactic acid decreased from 4.1 to 2.0 after 500 ml fluid bolus. Lactic acidosis resolved.    Sebastian Figueroa NP  [facility]  Securely message with the Vocera Web Console (learn more here)  Text page via [employer] Paging/Directory      No Charge

## 2023-05-08 NOTE — CONSULTS
Care Management Initial Consult    General Information  Assessment completed with: Spouse or significant other, Dorothy  Type of CM/SW Visit: Initial Assessment    Primary Care Provider verified and updated as needed:     Readmission within the last 30 days:        Reason for Consult: discharge planning  Advance Care Planning: Advance Care Planning Reviewed: present on chart, no concerns identified          Communication Assessment  Patient's communication style: spoken language (English or Bilingual)    Hearing Difficulty or Deaf: no   Wear Glasses or Blind: no    Cognitive  Cognitive/Neuro/Behavioral: .WDL except  Level of Consciousness: alert  Arousal Level: arouses to voice, arouses to touch/gentle shaking  Orientation: disoriented to, time  Mood/Behavior: calm, cooperative  Best Language: 0 - No aphasia  Speech: logical, hoarse    Living Environment:   People in home: spouse  Dorothy  Current living Arrangements: house      Able to return to prior arrangements: yes       Family/Social Support:  Care provided by: spouse/significant other, self  Provides care for: no one, unable/limited ability to care for self  Marital Status:   Wife, Children          Description of Support System: Supportive    Support Assessment: Adequate family and caregiver support    Current Resources:   Patient receiving home care services: No (Has previously)  Skilled Home Care Services: Skilled Nursing, Physical Therapy  Community Resources:    Equipment currently used at home: walker, standard  Supplies currently used at home:      Employment/Financial:  Employment Status: retired        Financial Concerns:          Lifestyle & Psychosocial Needs:  Social Determinants of Health     Tobacco Use: Low Risk  (5/6/2023)    Patient History      Smoking Tobacco Use: Never      Smokeless Tobacco Use: Never      Passive Exposure: Not on file   Alcohol Use: Not on file   Financial Resource Strain: Not on file   Food Insecurity: Not on  "file   Transportation Needs: Not on file   Physical Activity: Not on file   Stress: Not on file   Social Connections: Not on file   Intimate Partner Violence: Not on file   Depression: Not at risk (1/23/2023)    PHQ-2      PHQ-2 Score: 0   Housing Stability: Not on file       Functional Status:  Prior to admission patient needed assistance:   Dependent ADLs:: Bathing, Dressing, Grooming, Ambulation-walker  Dependent IADLs:: Transportation, Money Management, Medication Management, Meal Preparation, Shopping, Laundry, Cooking, Cleaning       Mental Health Status:          Chemical Dependency Status:                Values/Beliefs:  Spiritual, Cultural Beliefs, Confucianist Practices, Values that affect care:                 Additional Information:  PT informed that pt is able to return home if spouse can provide assistance with transfers and gait. Spouse deferred discussion to spouse. SW spoke with spouse Dorothy for initial consult and discharge planning. Pt was admitted on 5/6/23 for shortness of breath and hypoxia. Pt lives in a house with spouse, supports include children and five grandchildren. Spouse Dorothy reports helping with \"everything\" and then stated that pt is independent with eating and mobility with a walker. Pt received home health care in February, including PT and RN services. Also received OP PT previously. SW informed of PT assessment and recommendations for home with assist. Spouse confirmed they are able to provide this and stated \"I've already been doing that the last three months.\" Dorothy was adamant that pt discharge tonight due to having throat surgery tomorrow morning. [facility] informed they do not determine discharge timeline in their role, but will relay to MD. Dorothy also stated \"I haven't received an update from anyone since yesterday.\" SW paged MD to request a call to spouse. No needs identified from CC/SW team.           "

## 2023-05-08 NOTE — DISCHARGE INSTRUCTIONS
[facility] has accepted you for home care services of RN, Physical, Occupational and Speech therapies.  You will be called for scheduling.  The nurse will plan to see you on 5/11/23

## 2023-05-08 NOTE — PROGRESS NOTES
05/08/23 1000   Appointment Info   Signing Clinician's Name / Credentials (PT) Soni Ku PT   Rehab Comments (PT) EGD 5/9   Living Environment   People in Home spouse   Current Living Arrangements house   Home Accessibility stairs within home   Number of Stairs, Within Home, Primary greater than 10 stairs   Stair Railings, Within Home, Primary railings safe and in good condition   Living Environment Comments pt chart review has 24 hr support from spouse, 10 steps to access shower, utilizing sponge baths at home   Self-Care   Usual Activity Tolerance good   Current Activity Tolerance moderate   Equipment Currently Used at Home grab bar, toilet   General Information   Onset of Illness/Injury or Date of Surgery 05/05/23   Referring Physician Kirsten   Pertinent History of Current Problem (include personal factors and/or comorbidities that impact the POC) 83-year-old male with history of diabetes mellitus type 2; obstructive sleep apnea, on CPAP; hypothyroidism; dysphagia requiring dilatation; hyperlipidemia; COPD, not on any home oxygen; history of CO2 narcosis in the past; depression; GERD; atrial fibrillation, on chronic anticoagulation with Xarelto, currently on hold for the procedure next week; CHF; coronary artery disease status post PCI to LAD with drug-eluting stent on 02/21/2023 from ostial to distal, requiring 4 stents came to the ED with complaint of shortness of breath and hypoxia.   Existing Precautions/Restrictions fall;NPO;oxygen therapy device and L/min   Cognition   Affect/Mental Status (Cognition) WFL   Orientation Status (Cognition) oriented to;person;place   Follows Commands (Cognition) WFL   Safety Deficit (Cognition) safety precautions awareness;safety precautions follow-through/compliance   Posture    Posture Kyphosis   Range of Motion (ROM)   Range of Motion ROM is WFL   Strength (Manual Muscle Testing)   Strength (Manual Muscle Testing) Deficits observed during functional mobility    Bed Mobility   Comment, (Bed Mobility) pt moving LE to EOB and able to prop onto elbow unable to press trunk up into sitting   Transfers   Comment, (Transfers) Sit to stand w/ CGA and WW   Gait/Stairs (Locomotion)   Deviations/Abnormal Patterns (Gait) steppage;gait speed decreased;stride length decreased   Comment, (Gait/Stairs) amb 5 steps fwd and back with min A for balance and WW mgmt   Balance   Balance Comments good in sitting, req UE support of AD in standing and walking   Clinical Impression   Criteria for Skilled Therapeutic Intervention Yes, treatment indicated   PT Diagnosis (PT) Weakness   Influenced by the following impairments dec strength impaired gait dec indep transfers   Functional limitations due to impairments impaired mobility   Clinical Presentation (PT Evaluation Complexity) Evolving/Changing   Clinical Presentation Rationale Clinical assessment   Clinical Decision Making (Complexity) moderate complexity   Planned Therapy Interventions (PT) balance training;gait training;home exercise program;patient/family education;stair training;strengthening;transfer training   Risk & Benefits of therapy have been explained evaluation/treatment results reviewed;care plan/treatment goals reviewed;risks/benefits reviewed;current/potential barriers reviewed;participants voiced agreement with care plan;participants included;patient     PT Discharge Planning   PT Discharge Recommendation (DC Rec) home with assist;home with home care physical therapy   PT Rationale for DC Rec Pt below his mod indep baseline requiring inc A for transfers and gait anticipate improvement with daily activity during stay will need SW to confirm spouse remains able to provide assist pt will need to return home, will benefit from OT and SLP during stay.

## 2023-05-08 NOTE — PROGRESS NOTES
"Brief Edgar NP Note    Carl Vázquez is an 83-year-old male with history of diabetes mellitus type 2; obstructive sleep apnea, on CPAP; hypothyroidism; dysphagia requiring dilatation; hyperlipidemia; COPD, not on any home oxygen; history of CO2 narcosis in the past; depression; GERD; atrial fibrillation, on chronic anticoagulation with Xarelto, currently on hold for EGD with dilation on Tuesday; CHF; coronary artery disease status post PCI to LAD with drug-eluting stent on 02/21/2023 from ostial to distal, requiring 4 stents came to the ED with complaint of shortness of breath and hypoxia.    I was asked by RN to assess patient for hypotension with MAPs dipping below 65 mmHg. I presented to patient's bedside with RN and assessed the patient who was resting but woke easily. Patient was on BiPAP and appeared to be in no distress.     BP 97/55 (BP Location: Right arm, Patient Position: Semi-Jacobs's, Cuff Size: Adult Regular)   Pulse 67   Temp 98  F (36.7  C) (Oral)   Resp 14   Ht 1.702 m (5' 7\")   Wt 67.4 kg (148 lb 9.6 oz)   SpO2 98%   BMI 23.27 kg/m       Patient was A/Ox4, he had no complaints at that time. Patient was warm and dry but had 3+ pitting edema to BLE. I reviewed patient's chart.    HFrEF 25-30%  - Bumex on hold currently while NPO on BiPAP    Hypertension  - Lisinopril currently on hold    Plan regarding hypotension  - Patient appears to be in no distress, his BP dropping slightly correlates with NIPPV placement.  - No intervention at this time; patient has significant HF and fluids could exacerbate his hypotension if it is cardiogenic in nature. Patient's diuretic has been held so I am less suspicious of hypovolemia and would be more suspicious of this being cardiogenic, however with a borderline pressure that he has I would also use caution with diuretics as his hypotension could worsen.  - Overnight maintain MAP of 60-65 mmHg    No Charge note.    SE Carolina, CNP  Hospitalist " - [facility]  Text me on the [employer] [employer] for a textback  Text page with web based paging for a callback

## 2023-05-09 ENCOUNTER — TELEPHONE (OUTPATIENT)
Dept: CARDIOLOGY | Facility: CLINIC | Age: 83
End: 2023-05-09
Payer: COMMERCIAL

## 2023-05-09 ENCOUNTER — PATIENT OUTREACH (OUTPATIENT)
Dept: CARE COORDINATION | Facility: CLINIC | Age: 83
End: 2023-05-09
Payer: COMMERCIAL

## 2023-05-09 NOTE — TELEPHONE ENCOUNTER
[facility] - C.O.R.E[facility] Clinic    Pt is scheduled w/ Betty Alvarez PA-C per pt's wife request.     Future Appointments   Date Time Provider Department [place]   5/10/2023  2:30 PM Kev Sahni, [facility]   5/17/2023 10:10 AM [facility]   5/18/2023  9:00 AM Ramin Baez MD [facility]   5/18/2023 12:00 PM UC PFL D [facility]   5/18/2023  3:30 PM Becky Mahoney MD [facility]   6/5/2023 12:30 PM RSCCECHO2 RHCVCC [facility]   6/9/2023 12:45 PM SPANGLER LAB SHCLB [facility]   6/9/2023  1:45 PM Spencer Rehman MD [facility] PSA CLIN     Janina Grant RN BSN   [facility] [place], [place]  C.O.R.E. Clinic Care Coordinator  05/09/23, 10:23 AM

## 2023-05-09 NOTE — PLAN OF CARE
Occupational Therapy Discharge Summary    Reason for therapy discharge:    Discharged to home with home therapy.    Progress towards therapy goal(s). See goals on Care Plan in [facility] electronic health record for goal details.  Goals not met.  Barriers to achieving goals:   discharge from facility.    Therapy recommendation(s):    Continued therapy is recommended.  Rationale/Recommendations:  Home health OT to address saftey and independence with self cares and functional mobility in the home environment. .

## 2023-05-09 NOTE — TELEPHONE ENCOUNTER
"----- Message from Amberly Vinicio sent at 5/9/2023 10:17 AM CDT -----  Regarding: RE: due for CORE follow up w/ Mirna  He will come on 5/17 and see Antonio- she speficially wanted BV and someone next week      He is having surgery tomorrow, and will be too weak to come for 5/11 appt     He has lots of drs visits, so we had to work around them  Spouse says he had various labs drawn in the hospital between 5/6 and today  So, she didn't want to schedule anything for next week  Apparently he will be getting more labs tomorrow?     Thanks!   Amberly   ----- Message -----  From: Janina Grant RN  Sent: 5/9/2023  10:06 AM CDT  To: Janina Grant RN; #  Subject: due for CORE follow up ari/ Mirna                  Noted this pt is due for CORE follow up popeye Bradley w/ [facility] for ~ 5/15/23.  He's not scheduled and doesn't appear anyone has attempted to contact him.    LOV w/ Mirna was 4/24, she notes, \"Continue cardiac medications with plan for follow up in 2-3 weeks with labs prior. Should Mr. Vázquez make the decision to transition to hospice care prior to this appointment, would not recommend repeating labs prior to next appointment.     Currently has repeat echocardiogram and Dr. Rehman follow-up set up for June 2023.  This can be altered pending patient's course.\"    I've placed a hold on Mirna's 1:10 pm in BV CORE slot on 5/11. Does need BMP prior too.        "

## 2023-05-09 NOTE — PROGRESS NOTES
Clinic Care Coordination Contact  [facility]: Post-Discharge Note  SITUATION                                                      Admission:    Admission Date: 05/06/23   Reason for Admission: COPD exacerbation  Discharge:   Discharge Date: 05/08/23  Discharge Diagnosis: Acute hypoxic hypercapnic respiratory failure secondary to chronic obstructive pulmonary disease (COPD) exacerbation    BACKGROUND                                                      Per hospital discharge summary and inpatient provider notes:   This is an 83-year-old male with history of diabetes mellitus type 2; obstructive sleep apnea, on CPAP; hypothyroidism; dysphagia requiring dilatation; hyperlipidemia; COPD, not on any home oxygen; history of CO2 narcosis in the past; depression; GERD; atrial fibrillation, on chronic anticoagulation with Xarelto, currently on hold for the procedure next week; CHF; coronary artery disease status post PCI to LAD with drug-eluting stent on 02/21/2023 from ostial to distal, requiring 4 stents came to the ED with complaint of shortness of breath and hypoxia.     The patient is very sleepy at the time, most of the history is obtained from the patient's wife and some from the patient.  He recently had a tooth extracted and for that reason, he was not using the BiPAP machine for the last few days from Wednesday.  Has been getting short of breath for the last 2 days.  This morning, the patient's wife did put him on BiPAP.  When they took the BiPAP off this afternoon, he became hypoxic and was short of breath.  His shortness of breath has been worsening since the last 2 days.  He had no fever; no chills; no chest pain; no headache, dizziness, lightheadedness; no dysuria, hematuria, constipation, diarrhea.  He has chronic abdominal pain for which he has been followed by [facility].  He has been scheduled to have esophageal dilatation next Tuesday.  For that reason, he has been holding Xarelto and Plavix, after  discussion with his pulmonologist as well as cardiologist.     When he came to the ED, he was hypoxic and requiring 2 L of oxygen.  His CO2 came back on the VBG as elevated .  Hospitalist service was consulted to admit the patient.  At this time, patient has offered no other complaints.  Rest of the review of systems is negative.     The patient has been compliant with his medication, has been taking his Bumex which is keeping his weight very stable around 142 pounds.  His lower extremity swelling is much improved as compared to before on Bumex and the patient and patient's wife are happy about that.    ASSESSMENT           Discharge Assessment  How are you doing now that you are home?: resting  How are your symptoms? (Red Flag symptoms escalate to triage hotline per guidelines): Improved  Do you feel your condition is stable enough to be safe at home until your provider visit?: Yes  Does the patient have their discharge instructions? : Yes  Does the patient have questions regarding their discharge instructions? : No  Were you started on any new medications or were there changes to any of your previous medications? : Yes  Does the patient have all of their medications?: Yes  Do you have questions regarding any of your medications? : No  Do you have all of your needed medical supplies or equipment (DME)?  (i.e. oxygen tank, CPAP, cane, etc.): Yes  Discharge follow-up appointment scheduled within 14 calendar days? : Yes                  PLAN                                                      Outpatient Plan:  Follow-up Appointments     Follow-up and recommended labs and tests       Follow up with primary care provider, Kyler Mcwilliams, within 7 days for   hospital follow- up.  The following labs/tests are recommended: CBC and   BMP.     Future Appointments   Date Time Provider Department [place]   5/10/2023  2:30 PM Kev Sahni, [facility]   5/17/2023 10:10 AM Betty Alvarez PA-C RUUMHT [facility]  PSA CLIN   5/18/2023  9:00 AM Ramin Baez MD [facility]   5/18/2023 12:00 PM UC PFL D PFT [facility]   5/18/2023  3:30 PM Becky Mahoney MD [facility]   6/5/2023 12:30 PM RSCCECHO2 RHCVCC [facility]   6/9/2023 12:45 PM [facility] LAB [facility]   6/9/2023  1:45 PM Spencer Rehman MD [facility] PSA CLIN         For any urgent concerns, please contact our 24 hour nurse triage line: [phone] ([phone])         Sharda Dalton MA

## 2023-05-09 NOTE — PLAN OF CARE
Physical Therapy Discharge Summary    Reason for therapy discharge:    Discharged to home with home therapy.    Progress towards therapy goal(s). See goals on Care Plan in [facility] electronic health record for goal details.  Goals not met.  Barriers to achieving goals:   discharge on same date as initial evaluation.    Therapy recommendation(s):    Continued therapy is recommended.  Rationale/Recommendations:  to address patients functional mobility and endurance deficits.

## 2023-05-10 ENCOUNTER — VIRTUAL VISIT (OUTPATIENT)
Dept: PHARMACY | Facility: CLINIC | Age: 83
End: 2023-05-10
Attending: INTERNAL MEDICINE
Payer: COMMERCIAL

## 2023-05-10 DIAGNOSIS — E11.9 DIABETES MELLITUS WITHOUT COMPLICATION (H): ICD-10-CM

## 2023-05-10 DIAGNOSIS — I48.91 NEW ONSET ATRIAL FIBRILLATION (H): ICD-10-CM

## 2023-05-10 DIAGNOSIS — J44.9 CHRONIC OBSTRUCTIVE PULMONARY DISEASE, UNSPECIFIED COPD TYPE (H): Primary | ICD-10-CM

## 2023-05-10 DIAGNOSIS — E03.9 HYPOTHYROIDISM, UNSPECIFIED TYPE: ICD-10-CM

## 2023-05-10 DIAGNOSIS — I50.23 ACUTE ON CHRONIC SYSTOLIC CHF (CONGESTIVE HEART FAILURE) (H): ICD-10-CM

## 2023-05-10 DIAGNOSIS — G12.21 ALS (AMYOTROPHIC LATERAL SCLEROSIS) (H): ICD-10-CM

## 2023-05-10 PROCEDURE — 99605 MTMS BY PHARM NP 15 MIN: CPT | Performed by: PHARMACIST

## 2023-05-10 NOTE — LETTER
_  Medication List        Prepared on: May 10, 2023     Bring your Medication List when you go to the doctor, hospital, or   emergency room. And, share it with your family or caregivers.     Note any changes to how you take your medications.  Cross out medications when you no longer use them.    Medication How I take it Why I use it Prescriber   albuterol (PROAIR HFA/PROVENTIL HFA/VENTOLIN HFA) 108 (90 Base) MCG/ACT inhaler Inhale 2 puffs into the lungs every 6 hours as needed for shortness of breath, wheezing or cough COPD Exacerbation (H) Nikhil Kruger MD   amiodarone (PACERONE) 200 MG tablet Take 1 tablet (200 mg) by mouth daily Acute on chronic systolic CHF (congestive heart failure) (H) Mirna Rodriges PA-C   azithromycin (ZITHROMAX) 250 MG tablet Take 1 tablet (250 mg) by mouth daily for 3 days COPD Exacerbation (H) Nikhil Kruger MD   bumetanide (BUMEX) 2 MG tablet Take 2 mg (1 tablet) by mouth once in the morning and 1 mg (0.5 tablet) by mouth once around noon. Fluid retention Mirna Rodriges PA-C   clopidogrel (PLAVIX) 75 MG tablet Take 75 mg by mouth daily Heart Disease Nikhil Kruger MD   fluticasone-salmeterol (ADVAIR) 250-50 MCG/ACT inhaler Inhale 1 puff into the lungs every 12 hours COPD Exacerbation Nikhil Kruger MD   glipiZIDE (GLUCOTROL) 5 MG tablet Take 10 mg by mouth every morning Type 2 Diabetes Kyler Mcwilliams MD   insulin glargine (LANTUS PEN) 100 UNIT/ML pen Inject 60 Units Subcutaneous At Bedtime Type 2 Diabetes Kyler Mcwilliams MD   levothyroxine (SYNTHROID/LEVOTHROID) 75 MCG tablet Take 75 mcg by mouth every morning  Thyroid Kyler Mcwilliams MD   lisinopril (ZESTRIL) 2.5 MG tablet Take 1 tablet (2.5 mg) by mouth daily Acute on chronic systolic CHF (congestive heart failure) (H) Mirna Rodriges PA-C   magnesium oxide (MAG-OX) 400 MG tablet Take 1 tablet (400 mg) by mouth daily Hypomagnesemia Mirna Rodriges PA-C   nebivolol (BYSTOLIC) 2.5 MG tablet Take 1  tablet (2.5 mg) by mouth 2 times daily Acute on chronic systolic CHF (congestive heart failure) (H) Mirna Rodriges PA-C   nitroGLYcerin (NITROSTAT) 0.4 MG sublingual tablet For chest pain place 1 tablet under the tongue every 5 minutes for 3 doses. If symptoms persist 5 minutes after 1st dose call 911. Chest Pain Tr Epstein MD   oxyCODONE (ROXICODONE) 5 MG tablet Take 5 mg by mouth every 4 hours as needed for pain Pain Kyler Mcwilliams MD   pantoprazole (PROTONIX) 40 MG EC tablet Take 40 mg by mouth 2 times daily Acid Reflux Kyler Mcwilliams MD   polyethylene glycol (MIRALAX) 17 GM/Dose powder Take 17 g by mouth daily Constipation Nichelle Meredith MD   potassium chloride ER (KLOR-CON M) 10 MEQ CR tablet Take 1 tablet (10 mEq) by mouth 2 times daily Hypokalemia Tr Epstein MD   predniSONE (DELTASONE) 20 MG tablet Take 2 tablets (40 mg) by mouth daily for 4 days, THEN 1 tablet (20 mg) daily for 4 days. COPD Exacerbation  Nikhil Kruger MD   riluzole (RILUTEK) 50 MG tablet Take 1 tablet (50 mg) by mouth every 12 hours ALS (Amyotrophic Lateral Sclerosis) (H) Ramin Baez MD   rivaroxaban ANTICOAGULANT (XARELTO) 20 MG TABS tablet Take 20 mg by mouth daily (with dinner) Paroxysmal Atrial Fibrillation (H) Nikhil Kruger MD   rosuvastatin (CRESTOR) 20 MG tablet Take 1 tablet (20 mg) by mouth daily Acute on chronic systolic CHF (congestive heart failure) (H); Elevated Troponin Mirna Rodriges PA-C         Add new medications, over-the-counter drugs, herbals, vitamins, or  minerals in the blank rows below.    Medication How I take it Why I use it Prescriber                                      Allergies:      metoprolol; digoxin; statin drugs [statins]        Side effects I have had:               Other Information:              My notes and questions:

## 2023-05-10 NOTE — LETTER
May 11, 2023  Carl Vázquez  [id number] [address] [address]    Dear Mr. Vázquez, [facility]     Thank you for talking with me on May 10, 2023 about your health and medications. As a follow-up to our conversation, I have included two documents:      1. Your Recommended To-Do List has steps you should take to get the best results from your medications.  2. Your Medication List will help you keep track of your medications and how to take them.    If you want to talk about these documents, please call Kev Sahni RPH at phone: [phone], Monday-Friday 8-4:30pm.    I look forward to working with you and your doctors to make sure your medications work well for you.    Sincerely,  Kev Sahni RPH  [facility] Pharmacist, [facility]

## 2023-05-10 NOTE — PROGRESS NOTES
Medication Therapy Management (MTM) Encounter    ASSESSMENT:                            Medication Adherence/Access: No issues identified    COPD: Improved back on BiPAP. Visit upcoming with pulmonology.     HFrEF/Atrial Fibrillation: Improving. Plan in place for follow-up with cardiology.     Type 2 Diabetes:  Improved per latest blood sugars.     ALS: Plan in place for neurology follow-up.    Hypothyroidism: Stable. Last TSH is within normal limits.     PLAN:                            1. Continue to take your medications as prescribed.     2. Carl's new inhaler likely has a Tier 2 option through [facility] that would keep the cost down as long as he is out of the donut hole, but is generally much lower cost than other inhalers.     Follow-up: As Needed    SUBJECTIVE/OBJECTIVE:                          Carl Vázquez is a 83 year old male called for a transitions of care visit. He was discharged from [facility] on 5/8/2023 for COPD Exacerbation. Joined on call today by wife, Dorothy, who helps manages his medications.      Reason for visit: Hospital Follow-Up.    Allergies/ADRs: Reviewed in chart  Past Medical History: Reviewed in chart  Tobacco: He reports that he has never smoked. He has never used smokeless tobacco.  Alcohol: none    Medication Adherence/Access: Patient uses pill box(es).  Patient takes medications 3 time(s) per day.   Per patient, misses medication 0 times per week.   Medication barriers: affording medications.   The patient fills medications at [place]: NO, fills medications at [facility].    COPD: Current medications: albuterol HFA every 6 hours as needed (has but not really using). Just to begin starting Advair 250-50 mcg twice daily. Finishing course of azithromycin and prednisone tapering down. Main issue was patient was off BiPAP following dental procedure (worried that it would impact clotting/stitches) and too much CO2 built up per patient/wife. Has been back on  BiPAP as previously was and doing okay. Patient rinses their mouth after using steroid inhaler.    Patient is not experiencing side effects.   Patient reports the following symptoms: increased shortness of breath at rest.  Has spirometry been completed: Yes     HFrEF/Atrial Fibrillation: Current medications include amiodarone 200 mg every morning, bumetanide 2 mg every morning/1 mg every afternoon (no longer swelling in the legs, but still some in feet/ankles), clopidogrel 75 mg daily (restarting after procedure today), lisinopril 2.5 mg daily, nebivolol 2.5 mg twice daily, magnesium 400 mg daily, potassium chloride 10 mEq twice daily, rosuvastatin 20 mg daily, and Xarelto 20 mg every evening (starting again tonight after EGD). And nitroglycerin SL 0.4 mg daily as needed. Patient reports no current medication side effects.     ECHO:  Date 2023, EF 25-30%  Patient is not complaining of HF symptoms.  Patient is measuring daily weights/working with CORE clinic.   Patient does not self-monitor blood pressure.   Patient is following a low sodium diet, is avoiding EtOH.    Type 2 Diabetes:  Currently taking Lantus 60 units every evening and glipizide IR 10 mg daily. Patient is not experiencing side effects.  Blood sugar monitorin-3 time(s) daily. Ranges (patient reported): 123, 102, 122, 157, 88, 125, 186  Symptoms of low blood sugar? none  Symptoms of high blood sugar? none  Eye exam: due  Foot exam: due  Diet/Exercise: back to baseline  Aspirin: Not taking due to clopidogrel/Xarelto  Statin: Yes: rosuvastatin   ACEi/ARB: Yes: lisinopril.   Urine Albumin: No results found for: UMALCR   Lab Results   Component Value Date    A1C 8.2 2023    A1C 8.3 06/15/2022       ALS: Patient is receiving Rilutek 50 mg every 12 hours. Has receiving letters about changing therapies per patient's wife, but cost is high. They have follow-up in plan place with neurology to discuss this and trajectory of patient's  health.    Hypothyroidism: Patient is taking Levothyroxine 75 mcg daily. Patient is having the following symptoms: none.   TSH   Date Value Ref Range Status   03/06/2023 2.39 0.40 - 4.00 mU/L Final     Today's Vitals: There were no vitals taken for this visit.     Wt Readings from Last 5 Encounters:   05/07/23 148 lb 9.6 oz (67.4 kg)   04/24/23 148 lb 1.6 oz (67.2 kg)   04/06/23 146 lb 1.6 oz (66.3 kg)   03/28/23 144 lb (65.3 kg)   03/15/23 146 lb 12.8 oz (66.6 kg)     ----------------  Post Discharge Medication Reconciliation Status: discharge medications reconciled, continue medications without change.    I spent 25 minutes with this patient today. A copy of the visit note was provided to the patient's provider(s).    A summary of these recommendations was sent via [employer].    Raffy Sahni, PharmD, BCACP  Medication Therapy Management Pharmacist  Pager: [phone]    Telemedicine Visit Details  Type of service:  Telephone visit  Start Time: 2:30 PM  End Time: 2:55 PM     Medication Therapy Recommendations  No medication therapy recommendations to display

## 2023-05-10 NOTE — PATIENT INSTRUCTIONS
"Recommendations from today's MTM visit:                                                    MTM (medication therapy management) is a service provided by a clinical pharmacist designed to help you get the most of out of your medicines.   Today we reviewed what your medicines are for, how to know if they are working, that your medicines are safe and how to make your medicine regimen as easy as possible.      1. Continue to take your medications as prescribed.     2. Carl's new inhaler likely has a Tier 2 option through [facility] that would keep the cost down as long as he is out of the donut hole, but is generally much lower cost than other inhalers.     Follow-up: As Needed    It was great speaking with you today.  I value your experience and would be very thankful for your time in providing feedback in our clinic survey. In the next few days, you may receive an email or text message from [employer] with a link to a survey related to your  clinical pharmacist.\"     To schedule another MTM appointment, please call the clinic directly or you may call the MTM scheduling line at [phone] or toll-free at [phone].     My Clinical Pharmacist's contact information:                                                      Please feel free to contact me with any questions or concerns you have.      Raffy Sahni, [place], [facility]  Medication Therapy Management Pharmacist  Pager: [phone]    "

## 2023-05-10 NOTE — LETTER
"Recommended To-Do List      Prepared on: May 10, 2023       You can get the best results from your medications by completing the items on this \"To-Do List.\"      Bring your To-Do List when you go to your doctor. And, share it with your family or caregivers.    My To-Do List:  What we talked about: What I should do:    What my medicines are for, how to know if my medicines are working, made sure my medicines are safe for me and reviewed how to take my medicines.      Take my medicines every day              "

## 2023-05-12 ENCOUNTER — MYC MEDICAL ADVICE (OUTPATIENT)
Dept: PULMONOLOGY | Facility: CLINIC | Age: 83
End: 2023-05-12
Payer: COMMERCIAL

## 2023-05-12 DIAGNOSIS — R09.02 HYPOXIA: Primary | ICD-10-CM

## 2023-05-15 ENCOUNTER — MYC MEDICAL ADVICE (OUTPATIENT)
Dept: CARDIOLOGY | Facility: CLINIC | Age: 83
End: 2023-05-15
Payer: COMMERCIAL

## 2023-05-15 DIAGNOSIS — I48.0 PAROXYSMAL ATRIAL FIBRILLATION (H): ICD-10-CM

## 2023-05-15 NOTE — TELEPHONE ENCOUNTER
[facility] Cardiology Refill Guideline reviewed.  Medication meets criteria for refill.       Per 5/10/23 MTM note:              Confirmed with Wife dilation done      LOV 4/24/23 with Mirna Rodriges, PAC  Next OV 5/17/23 with Betty Alvarez, PAC      Future Appointments   Date Time Provider Department Center   5/17/2023 10:10 AM Betty Alvarez, [facility] PSA CLIN   5/18/2023  9:00 AM Ramin Baez MD [facility]   5/18/2023  9:30 AM [facility]   5/18/2023  9:30 AM Ly Cooney, PT [facility]   5/18/2023  9:30 AM Audrey Molina, SLP [facility]   5/18/2023 11:00 AM Becky Mahoney MD [facility]   5/18/2023 12:00 PM UC PFL D UCPFT [facility]   6/5/2023 12:30 PM RSCCECHO2 RHCVCC [facility]   6/9/2023 12:45 PM SPANGLER LAB SHCLB [facility]   6/9/2023  1:45 PM Spencer Rehman MD [facility] PSA CLIN         Lisa Streeter, CLEMENTINAN, RN 2:41 PM 05/15/23

## 2023-05-17 ENCOUNTER — OFFICE VISIT (OUTPATIENT)
Dept: CARDIOLOGY | Facility: CLINIC | Age: 83
End: 2023-05-17
Payer: COMMERCIAL

## 2023-05-17 VITALS
SYSTOLIC BLOOD PRESSURE: 102 MMHG | BODY MASS INDEX: 23.27 KG/M2 | DIASTOLIC BLOOD PRESSURE: 54 MMHG | HEIGHT: 67 IN | OXYGEN SATURATION: 96 % | HEART RATE: 63 BPM

## 2023-05-17 DIAGNOSIS — G12.21 ALS (AMYOTROPHIC LATERAL SCLEROSIS) (H): Primary | ICD-10-CM

## 2023-05-17 DIAGNOSIS — R06.89 CO2 NARCOSIS: ICD-10-CM

## 2023-05-17 DIAGNOSIS — I50.9 ACUTE ON CHRONIC CONGESTIVE HEART FAILURE, UNSPECIFIED HEART FAILURE TYPE (H): ICD-10-CM

## 2023-05-17 PROCEDURE — 99215 OFFICE O/P EST HI 40 MIN: CPT | Performed by: PHYSICIAN ASSISTANT

## 2023-05-17 RX ORDER — BUMETANIDE 1 MG/1
TABLET ORAL
Qty: 60 TABLET | Refills: 5 | Status: SHIPPED | OUTPATIENT
Start: 2023-05-17 | End: 2023-06-02

## 2023-05-17 ASSESSMENT — ENCOUNTER SYMPTOMS
WEIGHT LOSS: 0
SHORTNESS OF BREATH: 1
INCREASED ENERGY: 0
HEMOPTYSIS: 0
WEIGHT GAIN: 0
TINGLING: 0
MYALGIAS: 0
ARTHRALGIAS: 0
MUSCLE WEAKNESS: 1
SNORES LOUDLY: 0
POSTURAL DYSPNEA: 1
TREMORS: 0
NIGHT SWEATS: 0
HEADACHES: 0
PARALYSIS: 0
HALLUCINATIONS: 1
COUGH DISTURBING SLEEP: 0
NUMBNESS: 1
MEMORY LOSS: 0
POLYDIPSIA: 0
CHILLS: 0
DECREASED APPETITE: 0
JOINT SWELLING: 0
STIFFNESS: 0
ALTERED TEMPERATURE REGULATION: 0
DIZZINESS: 1
WHEEZING: 0
POLYPHAGIA: 0
MUSCLE CRAMPS: 0
LOSS OF CONSCIOUSNESS: 0
COUGH: 0
FATIGUE: 1
DYSPNEA ON EXERTION: 0
DISTURBANCES IN COORDINATION: 0
NECK PAIN: 1
SPUTUM PRODUCTION: 1
FEVER: 0
SPEECH CHANGE: 0
SEIZURES: 0
BACK PAIN: 0
WEAKNESS: 1

## 2023-05-17 NOTE — LETTER
5/17/2023    Kyler Mcwilliams MD  [facility] [address] [place] [address]    RE: Carl Vázquez       Dear Colleague,     I had the pleasure of seeing Carl Vázquez in the [facility].    Cardiology Clinic - C.O.R.E (Heart Failure Specialty)     Carl Vázquez MRN# [id number]   YOB: 1940 Age: 83 year old     Primary cardiology team: Dr. Rehman / Mirna Rodriges PA-C          Assessment and Plan     In summary, Carl Vázquez presents today for CORE follow up visit.     HFrEF  EF: 25-30%, moderately reduced RV fn  ACC/AHA stage C; FC III  Etiology: Ischemic (recent PCI) + tachy mediated  Valves: OK  Volume: Appears dehydrated. Poor intake d/t swallowing issues w/ALS.  Current Wt: 140 (home).  Diuretics: Bumex 2 mg AM, 1 mg PM.  ProBNP: ~300  GDMT: nebivolol 2.5 BID, lisinopril 2.5 daily. SGLT2i cost prohibitive.  Rhythm: SR, PAF (on amio, anticoagulated).  QRS: Narrow  Device? None  Code status: DNR. Considering hospice care.   Creatinine: 1.3  CHF admissions: 2/2023    Marginal BP.     Severe chronic multifactorial hypoxic and hypercapneic respiratory failure, with recent admission for the same.  Has close follow up with pulmonology arranged.     Plan:  REDUCE bumex to 1 mg daily. Take an extra 1 tab as needed for rapid wt gain/swelling. Stop potassium.  I'm going to avoid up-titrating his GDMT at this time - managing this pt conservatively, marginal BP, swallowing concerns.  Repeat BMP in 1 week.     Follow-up:  Has repeat echocardiogram and Dr. Rehman follow-up set up for June. They are aware to contact us in the interim with development of any CHF sxs.     Betty Alvarez PA-C  [facility]         History of Presenting Illness     Carl Vázquez is a pleasant 83 year old patient with a pertinent history of the following -   CAD, status post atherectomy and JESSE x4 to the LAD on 2/21/2023.  Maintained on Plavix alone due  to systemic anticoagulation.  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  Has been maintained on minimal GDMT I believe due to swallowing and cost issues.    PAF.  Prior to recent admission 2/2023, we are pursuing rate control regimen.  However once new cardiomyopathy identified, he was loaded with IV --> oral amiodarone.  7-day ZIO monitor in March without recurrence.  Anticoagulated with Xarelto.  Chronic hypoxic and hypercapnic respiratory failure.  Multifactorial related to COPD, untreated CALE, ALS, and right hemidiaphragm paralysis.  He has had multiple hospitalizations for acute respiratory failure and CO2 narcosis largely related to issues with BiPAP mask.  His most recent admission was 5/2023.  Recently diagnosed ALS with swallowing issues, undergoing recent esophageal dilation.  Other issues including type 2 diabetes, hypertension, hyperlipidemia, hypothyroidism.  Goals of care are unclear at this point.  The patient is considering palliative/hospice care, but has not committed to this yet.     In brief, this patient was recently added to my schedule per request after a readmission for acute COPD exacerbation.  Per notes, he is working with his pulmonologist to obtain oxygen for home.  He was last seen by my colleague Mirna vieyra about 3 weeks ago, at which time his Bumex was reduced to 3 mg daily due to evidence of some prerenal azotemia.    Today, I am meeting Carl who presents to clinic in a wheelchair with his wife. Shortness of breath has improved from hospital discharge. Minimal ankle edema. Chronic abdominal discomfort. Patient denies chest pain, palpitations, near syncope or syncope. Home wt down about 5 lbs from last visit (unable to stand on scale today d/t weakness). BP marginal.   Had the esophageal dilation; unsure if this has helped his swallowing. Also just had lower teeth pulled and doesn't get new teeth until next week. Understandably his intake has been affected. Taking  "protein shakes, continues to follow low sodium diet with aid of his wife. Still working at BiPAP use, tolerating about 4 hours/night.   5/8 labs showed a creatinine that is stable from his last visit at 1.27, with an elevated BUN of 52.  proBNP was within normal limits at 373.         Review of Systems     12-pt ROS is negative except for as noted in the HPI.          Physical Exam     Vitals: /54 (BP Location: Right arm, Patient Position: Sitting, Cuff Size: Adult Regular)   Pulse 63   Ht 1.702 m (5' 7\")   SpO2 96%   BMI 23.27 kg/m    Wt Readings from Last 10 Encounters:   05/07/23 67.4 kg (148 lb 9.6 oz)   04/24/23 67.2 kg (148 lb 1.6 oz)   04/06/23 66.3 kg (146 lb 1.6 oz)   03/28/23 65.3 kg (144 lb)   03/15/23 66.6 kg (146 lb 12.8 oz)   03/07/23 66.2 kg (146 lb)   03/02/23 68 kg (150 lb)   03/01/23 68.3 kg (150 lb 9.6 oz)   02/23/23 65.7 kg (144 lb 12.8 oz)   01/07/23 69.1 kg (152 lb 4.8 oz)       Constitutional:  Patient is pleasant, alert, cooperative, and in NAD.  HEENT:  NCAT. PERRLA. EOM's intact.   Neck:  CVP appears normal. No carotid bruits.   Pulmonary: Normal respiratory effort. CTAB.   Cardiac: RRR, normal S1/S2, no S3/S4, no murmur or rub.   Abdomen:  Non-tender abdomen, no hepatosplenomegaly appreciated.   Vascular: Pulses in the upper and lower extremities are 2+ and equal bilaterally.  Extremities: No edema, erythema, cyanosis or tenderness appreciated.  Skin:  No rashes or lesions appreciated.   Neurological:  No gross motor or sensory deficits.   Psych: Appropriate affect.          Data   Labs reviewed:  Recent Labs   Lab Test 03/28/23  1011 03/15/23  1057 03/06/23  1034 03/02/23  1720 01/04/23  1141 07/15/22  0810 06/14/22  2056   TSH  --   --  2.39  --  1.64  --  1.49   NTBNP 242 265  --  984  --    < >  --     < > = values in this interval not displayed.       Lab Results   Component Value Date    WBC 10.8 05/08/2023    WBC 8.0 09/26/2019    RBC 4.73 05/08/2023    RBC 4.70 09/26/2019 "    HGB 13.6 05/08/2023    HGB 14.5 09/26/2019    HCT 42.0 05/08/2023    HCT 41.7 09/26/2019    MCV 89 05/08/2023    MCV 89 09/26/2019    MCH 28.8 05/08/2023    MCH 30.9 09/26/2019    MCHC 32.4 05/08/2023    MCHC 34.8 09/26/2019    RDW 17.0 (H) 05/08/2023    RDW 13.7 09/26/2019     05/08/2023     (L) 09/26/2019       Lab Results   Component Value Date     05/08/2023     09/26/2019    POTASSIUM 4.2 05/08/2023    POTASSIUM 5.1 03/06/2023    POTASSIUM 3.4 09/26/2019    CHLORIDE 97 (L) 05/08/2023    CHLORIDE 107 03/06/2023    CHLORIDE 107 09/26/2019    CO2 33 (H) 05/08/2023    CO2 39 (H) 03/06/2023    CO2 28 09/26/2019    ANIONGAP 14 05/08/2023    ANIONGAP <1 (L) 03/06/2023    ANIONGAP 4 09/26/2019     (H) 05/08/2023    GLC 81 03/06/2023     (H) 09/26/2019    BUN 52.3 (H) 05/08/2023    BUN 15 03/06/2023    BUN 13 09/26/2019    CR 1.27 (H) 05/08/2023    CR 0.76 09/26/2019    GFRESTIMATED 56 (L) 05/08/2023    GFRESTIMATED 86 09/26/2019    GFRESTBLACK >90 09/26/2019    TATE 10.0 05/08/2023    TATE 8.4 (L) 09/26/2019      Lab Results   Component Value Date    AST 24 05/06/2023    AST 11 09/25/2019    ALT 14 05/06/2023    ALT 23 09/25/2019       Lab Results   Component Value Date    A1C 8.2 (H) 01/04/2023       No results found for: INR         Problem List     Patient Active Problem List   Diagnosis    Bladder calculi    Diabetes mellitus without complication (H)    Dysphagia    Elevated prostate specific antigen (PSA)    High cholesterol    Inguinal hernia    Osteoarthrosis    Sleep apnea    Urinary tract infection    UTI (urinary tract infection)    Shortness of breath    Abnormal CT scan of lung    CO2 narcosis    Infection due to 2019 novel coronavirus    History of progressive weakness    Muscular atrophy, unspecified site    Fasciculations    Acute on chronic systolic CHF (congestive heart failure) (H)    COPD exacerbation (H)    Depression, recurrent (H)    Eosinophilic  esophagitis    Gastritis    Generalized weakness    New onset atrial fibrillation (H)    Pleural effusion    Polyneuropathy    Chronic respiratory failure with hypoxia and hypercapnia (H)    Elevated troponin    Acute respiratory failure with hypoxia (H)    Chronic obstructive pulmonary disease, unspecified COPD type (H)    Acute on chronic congestive heart failure, unspecified heart failure type (H)    Gastroesophageal reflux disease without esophagitis    Hypokalemia    Hypomagnesemia    History of heart failure    Acute respiratory failure with hypoxia and hypercapnia (H)            Medications     Current Outpatient Medications   Medication Sig Dispense Refill    albuterol (PROAIR HFA/PROVENTIL HFA/VENTOLIN HFA) 108 (90 Base) MCG/ACT inhaler Inhale 2 puffs into the lungs every 6 hours as needed for shortness of breath, wheezing or cough 18 g 0    amiodarone (PACERONE) 200 MG tablet Take 1 tablet (200 mg) by mouth daily 90 tablet 3    bumetanide (BUMEX) 1 MG tablet Take 1 tab daily. Take an extra tab as needed for rapid wt gain or leg swelling. 60 tablet 5    clopidogrel (PLAVIX) 75 MG tablet Take 75 mg by mouth daily      fluticasone-salmeterol (ADVAIR) 250-50 MCG/ACT inhaler Inhale 1 puff into the lungs every 12 hours 1 each 0    glipiZIDE (GLUCOTROL) 5 MG tablet Take 10 mg by mouth every morning      insulin glargine (LANTUS PEN) 100 UNIT/ML pen Inject 60 Units Subcutaneous At Bedtime      levothyroxine (SYNTHROID/LEVOTHROID) 75 MCG tablet Take 75 mcg by mouth every morning Managed by PCP      lisinopril (ZESTRIL) 2.5 MG tablet Take 1 tablet (2.5 mg) by mouth daily 90 tablet 3    magnesium oxide (MAG-OX) 400 MG tablet Take 1 tablet (400 mg) by mouth daily 90 tablet 3    nebivolol (BYSTOLIC) 2.5 MG tablet Take 1 tablet (2.5 mg) by mouth 2 times daily 180 tablet 1    nitroGLYcerin (NITROSTAT) 0.4 MG sublingual tablet For chest pain place 1 tablet under the tongue every 5 minutes for 3 doses. If symptoms persist  5 minutes after 1st dose call 911. 15 tablet 0    oxyCODONE (ROXICODONE) 5 MG tablet Take 5 mg by mouth every 4 hours as needed for pain      pantoprazole (PROTONIX) 40 MG EC tablet Take 40 mg by mouth 2 times daily      polyethylene glycol (MIRALAX) 17 GM/Dose powder Take 17 g by mouth daily 510 g 0    riluzole (RILUTEK) 50 MG tablet Take 1 tablet (50 mg) by mouth every 12 hours 60 tablet 3    rivaroxaban ANTICOAGULANT (XARELTO) 20 MG TABS tablet Take 1 tablet (20 mg) by mouth daily (with dinner) 90 tablet 1    rosuvastatin (CRESTOR) 20 MG tablet Take 1 tablet (20 mg) by mouth daily 90 tablet 3            Past Medical History     Past Medical History:   Diagnosis Date    Diabetes (H)     Sleep apnea     uses CPAP machine    Thyroid disease      Past Surgical History:   Procedure Laterality Date    CHOLECYSTECTOMY      at [facility]    COLONOSCOPY      in [place], [place]    COLONOSCOPY  08/22/2017    Dr. Peres [facility]    CV CORONARY ANGIOGRAM N/A 2/21/2023    Procedure: Coronary Angiogram;  Surgeon: Andrey Watts MD;  Location: [facility] CARDIAC CATH LAB    CV CORONARY LITHOTRIPSY PCI N/A 2/21/2023    Procedure: Percutaneous Coronary Intervention - Lithotripsy;  Surgeon: Andrey Watts MD;  Location: [facility] CARDIAC CATH LAB    CV INTRAVASULAR ULTRASOUND N/A 2/21/2023    Procedure: Intravascular Ultrasound;  Surgeon: Andrey Watts MD;  Location: [facility] CARDIAC CATH LAB    CV PCI N/A 2/21/2023    Procedure: Percutaneous Coronary Intervention;  Surgeon: Andrey Watts MD;  Location: [facility] CARDIAC CATH LAB    CV PCI ATHERECTOMY ORBITAL N/A 2/21/2023    Procedure: Percutaneous Coronary Intervention - Atherectomy Rotational;  Surgeon: Andrey Watts MD;  Location: [facility] CARDIAC CATH LAB    CV RIGHT HEART CATH MEASUREMENTS RECORDED N/A 2/21/2023    Procedure: Right Heart Catheterization;  Surgeon: Andrey Watts MD;  Location: [facility] CARDIAC CATH LAB     ESOPHAGOSCOPY, GASTROSCOPY, DUODENOSCOPY (EGD), COMBINED N/A 6/7/2016    Procedure: COMBINED ESOPHAGOSCOPY, GASTROSCOPY, DUODENOSCOPY (EGD);  Surgeon: Eneida Denton MD;  Location:  GI     Family History   Problem Relation Age of Onset    Colon Cancer No family hx of      Social History     Socioeconomic History    Marital status:      Spouse name: Not on file    Number of children: Not on file    Years of education: Not on file    Highest education level: Not on file   Occupational History    Not on file   Tobacco Use    Smoking status: Never    Smokeless tobacco: Never   Vaping Use    Vaping status: Not on file   Substance and Sexual Activity    Alcohol use: No     Comment: drank many years ago    Drug use: No    Sexual activity: Not on file   Other Topics Concern    Parent/sibling w/ CABG, MI or angioplasty before 65F 55M? Not Asked   Social History Narrative    Not on file     Social Determinants of Health     Financial Resource Strain: Not on file   Food Insecurity: Not on file   Transportation Needs: Not on file   Physical Activity: Not on file   Stress: Not on file   Social Connections: Not on file   Intimate Partner Violence: Not on file   Housing Stability: Not on file            Allergies   Metoprolol, Digoxin, and Statin drugs [statins]    Today's clinic visit entailed:  Review of the result(s) of each unique test - echocardiogram, EKG, BMP, proBNP  Ordering of each unique test  Prescription drug management  I spent a total of 50 minutes on the day of the visit.   Time spent by me doing chart review, history and exam, documentation and further activities per the note  Provider  Link to [facility] Help Grid     The level of medical decision making during this visit was of moderate complexity.        Thank you for allowing me to participate in the care of your patient.      Sincerely,     Betty Alvarez PA-C     [facility]  cc:   Mirna  [address], [address]  [address] [place] [address]

## 2023-05-17 NOTE — PROGRESS NOTES
Cardiology Clinic - C.O.R.E (Heart Failure Specialty)     Carl Vázquez MRN# [id number]   YOB: 1940 Age: 83 year old     Primary cardiology team: Dr. Rehman / Mirna Rodriges PA-C          Assessment and Plan     In summary, Carl Vázquez presents today for CORE follow up visit.     1. HFrEF    EF: 25-30%, moderately reduced RV fn    ACC/AHA stage C; FC III    Etiology: Ischemic (recent PCI) + tachy mediated    Valves: OK    Volume: Appears dehydrated. Poor intake d/t swallowing issues w/ALS.    Current Wt: 140 (home).    Diuretics: Bumex 2 mg AM, 1 mg PM.    ProBNP: ~300    GDMT: nebivolol 2.5 BID, lisinopril 2.5 daily. SGLT2i cost prohibitive.    Rhythm: SR, PAF (on amio, anticoagulated).    QRS: Narrow    Device? None    Code status: DNR. Considering hospice care.     Creatinine: 1.3    CHF admissions: 2/2023    2. Marginal BP.     3. Severe chronic multifactorial hypoxic and hypercapneic respiratory failure, with recent admission for the same.    Has close follow up with pulmonology arranged.     Plan:    REDUCE bumex to 1 mg daily. Take an extra 1 tab as needed for rapid wt gain/swelling. Stop potassium.    I'm going to avoid up-titrating his GDMT at this time - managing this pt conservatively, marginal BP, swallowing concerns.    Repeat BMP in 1 week.     Follow-up:    Has repeat echocardiogram and Dr. Rehman follow-up set up for June. They are aware to contact us in the interim with development of any CHF sxs.     Betty Alvarez PA-C  [facility]         History of Presenting Illness     Carl Vázquez is a pleasant 83 year old patient with a pertinent history of the following -     CAD, status post atherectomy and JESSE x4 to the LAD on 2/21/2023.  Maintained on Plavix alone due to systemic anticoagulation.    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  Has been maintained on minimal GDMT I believe due to swallowing and cost issues.      PAF.   Prior to recent admission 2/2023, we are pursuing rate control regimen.  However once new cardiomyopathy identified, he was loaded with IV --> oral amiodarone.  7-day ZIO monitor in March without recurrence.  Anticoagulated with Xarelto.    Chronic hypoxic and hypercapnic respiratory failure.  Multifactorial related to COPD, untreated CALE, ALS, and right hemidiaphragm paralysis.  He has had multiple hospitalizations for acute respiratory failure and CO2 narcosis largely related to issues with BiPAP mask.  His most recent admission was 5/2023.    Recently diagnosed ALS with swallowing issues, undergoing recent esophageal dilation.    Other issues including type 2 diabetes, hypertension, hyperlipidemia, hypothyroidism.    Goals of care are unclear at this point.  The patient is considering palliative/hospice care, but has not committed to this yet.     In brief, this patient was recently added to my schedule per request after a readmission for acute COPD exacerbation.  Per notes, he is working with his pulmonologist to obtain oxygen for home.  He was last seen by my colleague Mirna vieyra about 3 weeks ago, at which time his Bumex was reduced to 3 mg daily due to evidence of some prerenal azotemia.    Today, I am meeting Carl who presents to clinic in a wheelchair with his wife. Shortness of breath has improved from hospital discharge. Minimal ankle edema. Chronic abdominal discomfort. Patient denies chest pain, palpitations, near syncope or syncope. Home wt down about 5 lbs from last visit (unable to stand on scale today d/t weakness). BP marginal.   Had the esophageal dilation; unsure if this has helped his swallowing. Also just had lower teeth pulled and doesn't get new teeth until next week. Understandably his intake has been affected. Taking protein shakes, continues to follow low sodium diet with aid of his wife. Still working at BiPAP use, tolerating about 4 hours/night.   5/8 labs showed a creatinine that  "is stable from his last visit at 1.27, with an elevated BUN of 52.  proBNP was within normal limits at 373.         Review of Systems     12-pt ROS is negative except for as noted in the HPI.          Physical Exam     Vitals: /54 (BP Location: Right arm, Patient Position: Sitting, Cuff Size: Adult Regular)   Pulse 63   Ht 1.702 m (5' 7\")   SpO2 96%   BMI 23.27 kg/m    Wt Readings from Last 10 Encounters:   05/07/23 67.4 kg (148 lb 9.6 oz)   04/24/23 67.2 kg (148 lb 1.6 oz)   04/06/23 66.3 kg (146 lb 1.6 oz)   03/28/23 65.3 kg (144 lb)   03/15/23 66.6 kg (146 lb 12.8 oz)   03/07/23 66.2 kg (146 lb)   03/02/23 68 kg (150 lb)   03/01/23 68.3 kg (150 lb 9.6 oz)   02/23/23 65.7 kg (144 lb 12.8 oz)   01/07/23 69.1 kg (152 lb 4.8 oz)       Constitutional:  Patient is pleasant, alert, cooperative, and in NAD.  HEENT:  NCAT. PERRLA. EOM's intact.   Neck:  CVP appears normal. No carotid bruits.   Pulmonary: Normal respiratory effort. CTAB.   Cardiac: RRR, normal S1/S2, no S3/S4, no murmur or rub.   Abdomen:  Non-tender abdomen, no hepatosplenomegaly appreciated.   Vascular: Pulses in the upper and lower extremities are 2+ and equal bilaterally.  Extremities: No edema, erythema, cyanosis or tenderness appreciated.  Skin:  No rashes or lesions appreciated.   Neurological:  No gross motor or sensory deficits.   Psych: Appropriate affect.          Data   Labs reviewed:  Recent Labs   Lab Test 03/28/23  1011 03/15/23  1057 03/06/23  1034 03/02/23  1720 01/04/23  1141 07/15/22  0810 06/14/22  2056   TSH  --   --  2.39  --  1.64  --  1.49   NTBNP 242 265  --  984  --    < >  --     < > = values in this interval not displayed.       Lab Results   Component Value Date    WBC 10.8 05/08/2023    WBC 8.0 09/26/2019    RBC 4.73 05/08/2023    RBC 4.70 09/26/2019    HGB 13.6 05/08/2023    HGB 14.5 09/26/2019    HCT 42.0 05/08/2023    HCT 41.7 09/26/2019    MCV 89 05/08/2023    MCV 89 09/26/2019    MCH 28.8 05/08/2023    MCH 30.9 " 09/26/2019    MCHC 32.4 05/08/2023    MCHC 34.8 09/26/2019    RDW 17.0 (H) 05/08/2023    RDW 13.7 09/26/2019     05/08/2023     (L) 09/26/2019       Lab Results   Component Value Date     05/08/2023     09/26/2019    POTASSIUM 4.2 05/08/2023    POTASSIUM 5.1 03/06/2023    POTASSIUM 3.4 09/26/2019    CHLORIDE 97 (L) 05/08/2023    CHLORIDE 107 03/06/2023    CHLORIDE 107 09/26/2019    CO2 33 (H) 05/08/2023    CO2 39 (H) 03/06/2023    CO2 28 09/26/2019    ANIONGAP 14 05/08/2023    ANIONGAP <1 (L) 03/06/2023    ANIONGAP 4 09/26/2019     (H) 05/08/2023    GLC 81 03/06/2023     (H) 09/26/2019    BUN 52.3 (H) 05/08/2023    BUN 15 03/06/2023    BUN 13 09/26/2019    CR 1.27 (H) 05/08/2023    CR 0.76 09/26/2019    GFRESTIMATED 56 (L) 05/08/2023    GFRESTIMATED 86 09/26/2019    GFRESTBLACK >90 09/26/2019    TTAE 10.0 05/08/2023    TATE 8.4 (L) 09/26/2019      Lab Results   Component Value Date    AST 24 05/06/2023    AST 11 09/25/2019    ALT 14 05/06/2023    ALT 23 09/25/2019       Lab Results   Component Value Date    A1C 8.2 (H) 01/04/2023       No results found for: INR         Problem List     Patient Active Problem List   Diagnosis     Bladder calculi     Diabetes mellitus without complication (H)     Dysphagia     Elevated prostate specific antigen (PSA)     High cholesterol     Inguinal hernia     Osteoarthrosis     Sleep apnea     Urinary tract infection     UTI (urinary tract infection)     Shortness of breath     Abnormal CT scan of lung     CO2 narcosis     Infection due to 2019 novel coronavirus     History of progressive weakness     Muscular atrophy, unspecified site     Fasciculations     Acute on chronic systolic CHF (congestive heart failure) (H)     COPD exacerbation (H)     Depression, recurrent (H)     Eosinophilic esophagitis     Gastritis     Generalized weakness     New onset atrial fibrillation (H)     Pleural effusion     Polyneuropathy     Chronic respiratory  failure with hypoxia and hypercapnia (H)     Elevated troponin     Acute respiratory failure with hypoxia (H)     Chronic obstructive pulmonary disease, unspecified COPD type (H)     Acute on chronic congestive heart failure, unspecified heart failure type (H)     Gastroesophageal reflux disease without esophagitis     Hypokalemia     Hypomagnesemia     History of heart failure     Acute respiratory failure with hypoxia and hypercapnia (H)            Medications     Current Outpatient Medications   Medication Sig Dispense Refill     albuterol (PROAIR HFA/PROVENTIL HFA/VENTOLIN HFA) 108 (90 Base) MCG/ACT inhaler Inhale 2 puffs into the lungs every 6 hours as needed for shortness of breath, wheezing or cough 18 g 0     amiodarone (PACERONE) 200 MG tablet Take 1 tablet (200 mg) by mouth daily 90 tablet 3     bumetanide (BUMEX) 1 MG tablet Take 1 tab daily. Take an extra tab as needed for rapid wt gain or leg swelling. 60 tablet 5     clopidogrel (PLAVIX) 75 MG tablet Take 75 mg by mouth daily       fluticasone-salmeterol (ADVAIR) 250-50 MCG/ACT inhaler Inhale 1 puff into the lungs every 12 hours 1 each 0     glipiZIDE (GLUCOTROL) 5 MG tablet Take 10 mg by mouth every morning       insulin glargine (LANTUS PEN) 100 UNIT/ML pen Inject 60 Units Subcutaneous At Bedtime       levothyroxine (SYNTHROID/LEVOTHROID) 75 MCG tablet Take 75 mcg by mouth every morning Managed by PCP       lisinopril (ZESTRIL) 2.5 MG tablet Take 1 tablet (2.5 mg) by mouth daily 90 tablet 3     magnesium oxide (MAG-OX) 400 MG tablet Take 1 tablet (400 mg) by mouth daily 90 tablet 3     nebivolol (BYSTOLIC) 2.5 MG tablet Take 1 tablet (2.5 mg) by mouth 2 times daily 180 tablet 1     nitroGLYcerin (NITROSTAT) 0.4 MG sublingual tablet For chest pain place 1 tablet under the tongue every 5 minutes for 3 doses. If symptoms persist 5 minutes after 1st dose call 911. 15 tablet 0     oxyCODONE (ROXICODONE) 5 MG tablet Take 5 mg by mouth every 4 hours as  needed for pain       pantoprazole (PROTONIX) 40 MG EC tablet Take 40 mg by mouth 2 times daily       polyethylene glycol (MIRALAX) 17 GM/Dose powder Take 17 g by mouth daily 510 g 0     riluzole (RILUTEK) 50 MG tablet Take 1 tablet (50 mg) by mouth every 12 hours 60 tablet 3     rivaroxaban ANTICOAGULANT (XARELTO) 20 MG TABS tablet Take 1 tablet (20 mg) by mouth daily (with dinner) 90 tablet 1     rosuvastatin (CRESTOR) 20 MG tablet Take 1 tablet (20 mg) by mouth daily 90 tablet 3            Past Medical History     Past Medical History:   Diagnosis Date     Diabetes (H)      Sleep apnea     uses CPAP machine     Thyroid disease      Past Surgical History:   Procedure Laterality Date     CHOLECYSTECTOMY      at [facility]     COLONOSCOPY      in [place], [place]     COLONOSCOPY  08/22/2017    Dr. Peres [facility]     CV CORONARY ANGIOGRAM N/A 2/21/2023    Procedure: Coronary Angiogram;  Surgeon: Andrey Watts MD;  Location: [facility] CARDIAC CATH LAB     CV CORONARY LITHOTRIPSY PCI N/A 2/21/2023    Procedure: Percutaneous Coronary Intervention - Lithotripsy;  Surgeon: Andrey Watts MD;  Location: [facility] CARDIAC CATH LAB     CV INTRAVASULAR ULTRASOUND N/A 2/21/2023    Procedure: Intravascular Ultrasound;  Surgeon: Andrey Watts MD;  Location: [facility] CARDIAC CATH LAB     CV PCI N/A 2/21/2023    Procedure: Percutaneous Coronary Intervention;  Surgeon: Andrey Watts MD;  Location: [facility] CARDIAC CATH LAB     CV PCI ATHERECTOMY ORBITAL N/A 2/21/2023    Procedure: Percutaneous Coronary Intervention - Atherectomy Rotational;  Surgeon: Andrey Watts MD;  Location: [facility] CARDIAC CATH LAB     CV RIGHT HEART CATH MEASUREMENTS RECORDED N/A 2/21/2023    Procedure: Right Heart Catheterization;  Surgeon: Andrey Watts MD;  Location: [facility] CARDIAC CATH LAB     ESOPHAGOSCOPY, GASTROSCOPY, DUODENOSCOPY (EGD), COMBINED N/A 6/7/2016    Procedure: COMBINED ESOPHAGOSCOPY,  GASTROSCOPY, DUODENOSCOPY (EGD);  Surgeon: Eneida Denton MD;  Location:  GI     Family History   Problem Relation Age of Onset     Colon Cancer No family hx of      Social History     Socioeconomic History     Marital status:      Spouse name: Not on file     Number of children: Not on file     Years of education: Not on file     Highest education level: Not on file   Occupational History     Not on file   Tobacco Use     Smoking status: Never     Smokeless tobacco: Never   Vaping Use     Vaping status: Not on file   Substance and Sexual Activity     Alcohol use: No     Comment: drank many years ago     Drug use: No     Sexual activity: Not on file   Other Topics Concern     Parent/sibling w/ CABG, MI or angioplasty before 65F 55M? Not Asked   Social History Narrative     Not on file     Social Determinants of Health     Financial Resource Strain: Not on file   Food Insecurity: Not on file   Transportation Needs: Not on file   Physical Activity: Not on file   Stress: Not on file   Social Connections: Not on file   Intimate Partner Violence: Not on file   Housing Stability: Not on file            Allergies   Metoprolol, Digoxin, and Statin drugs [statins]    Today's clinic visit entailed:  Review of the result(s) of each unique test - echocardiogram, EKG, BMP, proBNP  Ordering of each unique test  Prescription drug management  I spent a total of 50 minutes on the day of the visit.   Time spent by me doing chart review, history and exam, documentation and further activities per the note  Provider  Link to [facility] Help Grid     The level of medical decision making during this visit was of moderate complexity.

## 2023-05-17 NOTE — PATIENT INSTRUCTIONS
Today, we discussed the following:  Medication changes:   REDUCE bumex to 1 mg daily.   Take an extra 1 mg as needed for rapid wt gain or worsening leg swelling.   STOP potassium.  Follow up:   Blood draw (non-fasting) in one week.   With Dr. Rehman as scheduled in . CALL us with any concerns in the interim.    Please, remember to continue the followin.  Weigh yourself daily. Call if your weight is up > than 2 pounds in one day, or 5 pounds in one week; if you feel more short of breath or have worsening swelling in your legs or abdomen.  2.  Eat a low sodium diet (less than 2,000mg or 2g daily). If you eat less salt, you will retain less fluid.   3.  Avoid alcohol, as this can worsen heart failure.   4.  Avoid NSAIDs as able (For example, Ibuprofen / aleve / advil / naprosen / diclofenac).    Thank you for your visit with the C.O.R.E. Clinic today.   CORE stands for Cardiomyopathy Optimization Rehabilitation and Education. The CORE clinic will teach and help you to manage your heart failure and keep you out of the hospital.    Call C.O.R.E. nurse for any questions or concerns Mon-Fri 8am-4pm  [phone]: Nurse number   [phone]: After Hours Phone Number

## 2023-05-18 ENCOUNTER — THERAPY VISIT (OUTPATIENT)
Dept: PHYSICAL THERAPY | Facility: CLINIC | Age: 83
End: 2023-05-18
Payer: COMMERCIAL

## 2023-05-18 ENCOUNTER — PATIENT OUTREACH (OUTPATIENT)
Dept: CARE COORDINATION | Facility: CLINIC | Age: 83
End: 2023-05-18

## 2023-05-18 ENCOUNTER — OFFICE VISIT (OUTPATIENT)
Dept: PULMONOLOGY | Facility: CLINIC | Age: 83
End: 2023-05-18
Payer: COMMERCIAL

## 2023-05-18 ENCOUNTER — OFFICE VISIT (OUTPATIENT)
Dept: NEUROLOGY | Facility: CLINIC | Age: 83
End: 2023-05-18
Payer: COMMERCIAL

## 2023-05-18 ENCOUNTER — THERAPY VISIT (OUTPATIENT)
Dept: SPEECH THERAPY | Facility: CLINIC | Age: 83
End: 2023-05-18
Payer: COMMERCIAL

## 2023-05-18 VITALS
OXYGEN SATURATION: 90 % | DIASTOLIC BLOOD PRESSURE: 53 MMHG | HEART RATE: 60 BPM | RESPIRATION RATE: 16 BRPM | TEMPERATURE: 97.8 F | SYSTOLIC BLOOD PRESSURE: 97 MMHG

## 2023-05-18 VITALS
TEMPERATURE: 97.8 F | OXYGEN SATURATION: 90 % | DIASTOLIC BLOOD PRESSURE: 53 MMHG | SYSTOLIC BLOOD PRESSURE: 97 MMHG | HEART RATE: 60 BPM | RESPIRATION RATE: 16 BRPM

## 2023-05-18 DIAGNOSIS — G12.21 ALS (AMYOTROPHIC LATERAL SCLEROSIS) (H): Primary | ICD-10-CM

## 2023-05-18 DIAGNOSIS — M62.81 MUSCLE WEAKNESS (GENERALIZED): ICD-10-CM

## 2023-05-18 DIAGNOSIS — J96.12 CHRONIC RESPIRATORY FAILURE WITH HYPERCAPNIA (H): Primary | ICD-10-CM

## 2023-05-18 DIAGNOSIS — R13.12 OROPHARYNGEAL DYSPHAGIA: ICD-10-CM

## 2023-05-18 DIAGNOSIS — R47.1 DYSARTHRIA: Primary | ICD-10-CM

## 2023-05-18 PROCEDURE — 97162 PT EVAL MOD COMPLEX 30 MIN: CPT | Mod: GP | Performed by: PHYSICAL THERAPIST

## 2023-05-18 PROCEDURE — 97535 SELF CARE MNGMENT TRAINING: CPT | Mod: GP | Performed by: PHYSICAL THERAPIST

## 2023-05-18 PROCEDURE — 92610 EVALUATE SWALLOWING FUNCTION: CPT | Mod: GN | Performed by: SPEECH-LANGUAGE PATHOLOGIST

## 2023-05-18 PROCEDURE — 92526 ORAL FUNCTION THERAPY: CPT | Mod: GN | Performed by: SPEECH-LANGUAGE PATHOLOGIST

## 2023-05-18 PROCEDURE — 99215 OFFICE O/P EST HI 40 MIN: CPT | Mod: GC | Performed by: PSYCHIATRY & NEUROLOGY

## 2023-05-18 PROCEDURE — 99214 OFFICE O/P EST MOD 30 MIN: CPT

## 2023-05-18 PROCEDURE — 92507 TX SP LANG VOICE COMM INDIV: CPT | Mod: GN | Performed by: SPEECH-LANGUAGE PATHOLOGIST

## 2023-05-18 ASSESSMENT — REVISED AMYOTROPHIC LATERAL SCLEROSIS FUNCTIONAL RATING SCALE (ALSFRS-R)
CUTTING_FOOD_AND_HANDLING_UTENSILES_ALTERNATE_WITH_GASTROSTOMY: 0
HANDWRITING: 1
DYSPNEA: 1
WALKING: 1
ALSFRS_TOTAL_SCORE: 13
RESPIRATORY_INSUFFICIENCY: 1
TURNING_IN_BED_AND_ADJUSTING_BED_CLOTHES: 0
ORTHOPENA: 0
DRESSING_AND_HYGEINE: 0
SALIVATION: 2
CLIMBING_STAIRS: 0
SPEECH: 4
SWALLOWING: 2
CUTTING_FOOD_AND_HANDLING_UTENSILES: 1

## 2023-05-18 NOTE — LETTER
5/18/2023       RE: Carl Vázquez  [address] [address] [facility] [id number]       Dear Colleague,    Thank you for referring your patient, Carl Vázquez, to the [facility] NEUROLOGY CL[facility]IC [place] at [facility]. Please see a copy of my visit note below.    [facility]   ALS Clinic Progress Note  Carl Vázquez  [id number]  05/18/2023    Brief Summary:   Carl Vázquez is a 83 year old man with past medical history including diabetes who presents for follow up to ALS clinic.  In hindsight, he had difficulty picking up objects with his fingers in 2019.  He started experiencing difficulty breathing in 1/2022, with initial diagnosis of cervical radiculopathy leading to diagnosis of elevated right hemidiaphragm s/p surgical intervention with limited benefit.  He continued to require hospitalizations for respiratory distress in late 2022 and early 2023.  He was last seen in ALS multidisciplinary clinic on 3/2/2023.    Subjective:   Patient presented with his wife who assisted with providing history.  Since last clinic visit,  An EMG was performed on 3/6/2023 that was consistent with diagnosis of ALS. He had additional hospitalizations 4/4/23-4/6/23 and 5/6/23-5/8/23 due to respiratory failure.  In the latest hospitalization occurred after he had not used BiPAP for several days following a dental procedure.    From a respiratory standpoint, they had a respiratory therapist visit his home on more than 5 occasions to optimize BiPAP.  After adjustment, this device is now better tolerated and he typically uses this 4 hours during the daytime.  BiPAP is not used at nighttime due to concern about tangled cords and need to move the BiPAP machine for nighttime.  After discussion, he expressed willingness to try overnight BiPAP.  He continues to experience orthopnea and cannot lay flat at nighttime.    A major  change is that he is no longer able to ambulate with a walker as of May 2023.  He was able to ambulate brief distances with a walker prior to hospitalization 5/5/2023.  They have a lift chair at home to assist with reaching a standing position.  There is a walker at home with a seat obtained from a friend.  He has been sitting on the seat and pushing himself using his legs to move around in the house.  A neighbor is needed to assist with transfers in and out of the car.    He has developed change in the volume of his voice.  On several occasions, his voice was too soft for his wife to understand.  They also endorse change in quality of voice.  He did not express concerns with the mechanics of eating or changes in weight.  He is weighed on a daily basis at home (associated with comorbid heart failure) with no significant change in weight.    He continues to experience weakness in both arms.  At home, he is barely able to lift utensils to feed himself.  He has chronic neck weakness and prior poor tolerance to soft neck collar.    He does not have pseudobulbar affect or changes in behavior.  He and his wife deny cognitive impairment.  He did have visual hallucinations in the past that resolved with BiPAP use.    He has started riluzole twice daily without side effects.  Relyvrio and Radicava were approved by insurance but the co-pays were prohibitively expensive and he has not started these medications.    He has met with a hospice agency.  He is not yet ready to sign up for hospice as he is interested in living through the summer and he would like to pursue medical therapy to prolong life.  Depending on events this summer, he may be interested in hospice later.  He reiterated his preference for not using a feeding tube, no intubation, and no CPR.  A POLST form was filled out today to reflect these preferences.    He is starting engagement with a home care service.  He has not yet seen any therapists affiliated with  home care.  The initial visit by this service was last Saturday.  They indicate a nurse might visit tomorrow.    Objective   BP 97/53   Pulse 60   Temp 97.8  F (36.6  C)   Resp 16   SpO2 90%   General: pt sitting comfortably in wheelchair, initially with eyes closed  Ext: Bilateral pitting edema in lower extremities  Neuro:   General Neuro Exam      suspected: Yes    Comment: + cognitive impairment, unable to state year  Sensation intact to light touch       NM Exam: Cranial        Atrophy Fasciculations   Upper face:     Lower face:  Negative   Tongue:  Negative        Facial weakness: no facial weakness     Tongue movements: normal     Tongue weakness: none     Jaw jerk: present     Speech: dysarthria     Comment: Mild dysarthria          NM Exam: Axial    Neck flexion weakness: moderate  Neck extension weakness: moderate  Trunk LMN findings       NM Exam: Upper Extremities        Right Left   Atrophy: Positive Positive   Fasciculations:     Muscle tone: normal normal   Rapid alt. movements: normal slow     Reflexes Right Left   Biceps: reduced reduced   Brachioradialis: reduced reduced   Triceps: normal normal   Peña:       Strength Right Left   * Indicates a recommended field     Shoulder abduction*: 2 2   Elbow flexion: 4- 4-   Elbow extension*: 4 4-   Wrist extension*: 4 4-   Finger extension: 4 3   Finger flexion: 4+ 4   Abductor pollicis brevis: 2 2   First dorsal interosseous*: 2 2   Abductor digiti minimi:     Comment:  Peña not clearly present       NM Exam: Lower Extremities        Right Left   Atrophy: Positive Positive   Fasciculations:     Muscle tone: hypotonic hypotonic   Rapid alt. movements: unable to perform unable to perform     Reflexes Right Left   Patellar: reduced normal   Achilles: absent absent   Plantar: mute mute     Strength Right Left   * Indicates a recommended field     Hip flexion*: 4- 4   Hip extension:     Hip adduction:     Hip abduction:     Knee extension*: 4 4   Knee  "flexion: 4 4+   Ankle dorsiflexion*: 3 0   Ankle plantar flexion: 4+ 4   Ankle inversion:     Ankle eversion:     Toe extension:     Toe flexion:       Normal gait: no   Comment:           Investigations   Lyme negative 3/6/2023  TSH 2.39 on 3/6/23  CK 75 on 3/6/23  Vitamin B12 and methylmalonic acid normal on 3/2/2023  Liver function tests unremarkable 3/2/2023  NT5C1A antibody negative on 3/2/2023    EMG 3/6/2023 by Dr. Baez:  \"1. Widespread, severe pure motor neurogenic process as can occur in motor neuron disease, severe motor neuropathy, or polyradiculopathy.   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  Unfortunately, there has been progression in his weakness since last appointment and had two hospitalizations for respiratory failure.  Regarding respiratory status, we reiterated today that noninvasive ventilation will help quality of life as well as quantity of life for patients with ALS.  He was agreeable to utilizing noninvasive ventilation more often.   Due to progression in weakness, he is no longer able to ambulate with a walker and has impairment in activities of daily living.  He will meet with members of her multiples of her multidisciplinary team to discuss supportive equipment.   Regarding goals of care, he has a longstanding preference for avoiding mechanical ventilation and not using a feeding tube.  POLST form was completed today to reflect these wishes.  He is aware that he would qualify for hospice from a medical standpoint.  Discussed that he can continue to be seen in ALS clinic even after he starts hospice care.    Plan:   -Patient was encouraged to use noninvasive ventilation overnight in addition to current use during the daytime.  - He will continue riluzole 50 mg twice daily.  - We will look into affordable options for Radicava and " Relyvrio.  - POLST form filled out today, to be scanned into our chart  - Meet with multidisciplinary team members today  - He will follow-up after a shorter interval (3 to 4 weeks) to discuss further needs    Patient was seen and discussed with attending neurologist, Dr. Baez, who agrees with above.    Megan Kim MD  CNP Fellow    I personally examined the patient and concur with the resident's note. We also discussed his condition and management with Dr Mahoney, pulmonologist, and discussed considerations for hospice referral at length.      Addendum: a semi-electric, adjustable height hospital bed is medically necessary to allow for safe transfers from bed to chair and wheelchair and to allow for frequent repositioning to avoid pressure sores and raised HOB due to aspiration risk. Patient is able to operate controls. Lifetime length of need.         Again, thank you for allowing me to participate in the care of your patient.      Sincerely,    Ramin Baez MD

## 2023-05-18 NOTE — PROGRESS NOTES
"[facility]    OUTPATIENT PHYSICAL THERAPY CLINIC NOTE  Carl Vázquez  YOB: 1940  [id number]    Type of visit:         Evaluation     Date of service: 5/18/2023    Referring provider: Ramin Baez MD     present: No    Others present at visit:  Spouse / significant other- Dorothy    Medical diagnosis:   ALS    Date of diagnosis: 3/2/2023    Pertinent history of current problem (include personal factors and/or comorbidities that impact the plan of care): PMH: COPD, sleep apnea, acute respiratory failure, CHF, diabetes, osteoarthritis    Cardio-respiratory status:  Forced vital capacity: 38 % of predicted   BiPAP ordered    Height/Weight: 5'7\" / 148lbs    Living environment:  House    Living environment barriers:  2 stairs to enter (0 railing present)   Partial ramp present for the 1 step, but 2-3\" threshold step into kitchen  12 stairs with 1 HR down to basement- does not use     Current assistance/living environment:  Lives with wife      Current mobility equipment:  Front wheeled walker- does not use  4WW- uses at transport w/c  *Recommend: hospital bed, transport wheelchair and cushion, ramp     Current ADL equipment:  Shower/tub chair- does not use  Toilet riser with arms    Technology used: NT    Patient concerns/goals: Significant decline in function from multiple hospitalizations. Patient is unable to ambulate anymore- can do SPT to toilet/bed. Patient is sponge bathing due to inability to get in/out of tub.     Evaluation   Interview completed.   Pain assessment:  Pain denied     Range of motion: ROM restrictions in BLE- most notable in B ankle gastrocs     Manual muscle testing:  See MD note   Gait:  Non-ambulatory   Cognition:  Unknown   FRS: 13    Fall Risk Screen:   Has the patient fallen 2 or more times in the last year? Yes      Has the patient fallen and had an " injury in the past year? No       Timed Up and Go Score: NT    Is the patient a fall risk? Yes, department fall risk interventions implemented     Impairments:  Fatigue  Muscle atrophy  Coordination  Balance  Range of motion     Treatment diagnosis:  Impaired mobility  Impaired activities of daily living    Clinical Presentation: Evolving/Changing  Clinical Presentation Rationale: personal factors, body systems involved  Clinical Decision Making (Complexity): Moderate complexity     Recommendations/Plan of care:  1 session evaluation & treatment.  Equipment recommended: transport wheelchair, hospital bed and wheelchair cushion, ramp     Goals:   Target date: 5/18/2023  Patient, family and/or caregiver will verbalize understanding of evaluation results and implications for functional performance.  Patient, family and/or caregiver will verbalize/demonstrate understanding of compensatory methods /equipment to enhance functional independence and safety.  Patient, family and/or caregiver will verbalize/demonstrate understanding of home program.  Patient, family and/or caregiver will verbalize/demonstrate understanding of positioning techniques/equipment.  Patient, family and/or caregiver will verbalize energy management techniques appropriate for status and setting.    Educational assessment/barriers to learning:   No barriers noted     Treatment provided this date:   ADL/ Self Care Management- 15 minutes  -Collaborated with patient and wife regarding equipment management due to significant decline in functional mobility.  Parent and wife report this was not addressed during inpatient visit, and no therapy available through home care in order for this to be addressed.  PT recommended hospital bed, transport wheelchair and cushion, ramp and modified  handle for utensils.  Collaborated with both patient and wife regarding utilization of these devices in order to ensure safety at home.  Briefly discussed power  wheelchair mobility, however patient not wanting this at this time.  Suggested patient complete home care therapy and follow-up for outpatient power wheelchair assessment/evaluation once this is completed.    Response to treatment/recommendations: patient verbalizes understanding/agreement    Goal attainment:  All goals met     Risks and benefits of evaluation/treatment have been explained.  Patient, family and/or caregiver are in agreement with Plan of Care.     Timed Code Treatment Minutes: 15  Total Treatment Time (sum of timed and untimed services): 25    Signature: Ly Cooney, PT   Date: 5/18/2023

## 2023-05-18 NOTE — PROGRESS NOTES
"Pulmonary outpatient visit    May 18, 2023    S: French Vázquez is being seen today for followup of restrictive lung disease and nocturnal hypoventilation in the setting of probable ALS. Since his initial visit with me last month, he had EGD (done 5/10 at [facility]). This showed a mild Schatzki ring that was dilated, he also had biopsies taken (results pending). He still has intermittent difficulty swallowing, although the \"sticking point\" seems to have moved from his lower chest to higher up. He also had some dental surgery, and he has increased secretions because of this - thinks that is contributing to the \"plugging\" sensation in his throat. Modest cough weakness, so he has difficulty expectorating his oral secretions    He was also hospitalized with acute respiratory failure 5/6-5/8. He was treated with NIV, also empiric Azithromycin (5 days) and steroids for possible contribution from COPD exacerbation. Continues on ICS and PRN albuterol (MDI - doesn't have or want neb machine). No ongoing coughing or wheezing.     He has done a bit better in terms of tolerating NIV. He is still not using BiPAP at night, but he's able to tolerate for up to 3 hours at a time in the afternoon, and he's generally resting/sleeping when he is doing this. He has OMV available - hasn't found this at all helpful. Frustrated by his lack of mobility    Answers for HPI/ROS submitted by the patient on 5/17/2023  General Symptoms: Yes  Skin Symptoms: No  HENT Symptoms: No  EYE SYMPTOMS: No  HEART SYMPTOMS: No  LUNG SYMPTOMS: Yes  INTESTINAL SYMPTOMS: No  URINARY SYMPTOMS: No  REPRODUCTIVE SYMPTOMS: No  SKELETAL SYMPTOMS: Yes  BLOOD SYMPTOMS: No  NERVOUS SYSTEM SYMPTOMS: Yes  MENTAL HEALTH SYMPTOMS: No  Fever: No  Loss of appetite: No  Weight loss: No  Weight gain: No  Fatigue: Yes  Night sweats: No  Chills: No  Increased stress: No  Excessive hunger: No  Excessive thirst: No  Feeling hot or cold when others believe the temperature is normal: " No  Loss of height: No  Post-operative complications: No  Surgical site pain: No  Hallucinations: Yes  Change in or Loss of Energy: No  Hyperactivity: No  Confusion: No  Cough: No  Sputum or phlegm: Yes  Coughing up blood: No  Difficulty breating or shortness of breath: Yes  Snoring: No  Wheezing: No  Difficulty breathing on exertion: No  Nighttime Cough: No  Difficulty breathing when lying flat: Yes  Back pain: No  Muscle aches: No  Neck pain: Yes  Swollen joints: No  Joint pain: No  Bone pain: No  Muscle cramps: No  Muscle weakness: Yes  Joint stiffness: No  Bone fracture: No  Trouble with coordination: No  Dizziness or trouble with balance: Yes  Fainting or black-out spells: No  Memory loss: No  Headache: No  Seizures: No  Speech problems: No  Tingling: No  Tremor: No  Weakness: Yes  Difficulty walking: Yes  Paralysis: No  Numbness: Yes      Current Outpatient Medications   Medication Sig Dispense Refill     albuterol (PROAIR HFA/PROVENTIL HFA/VENTOLIN HFA) 108 (90 Base) MCG/ACT inhaler Inhale 2 puffs into the lungs every 6 hours as needed  18 g 0     amiodarone (PACERONE) 200 MG tablet Take 1 tablet (200 mg) by mouth daily 90 tablet 3     bumetanide (BUMEX) 1 MG tablet Take 1 tab daily 60 tablet 5     clopidogrel (PLAVIX) 75 MG tablet Take 75 mg by mouth daily       fluticasone-salmeterol (ADVAIR) 250-50 MCG/ACT inhaler Inhale 1 puff into the lungs every 12 hours 1 each 0     glipiZIDE (GLUCOTROL) 5 MG tablet Take 10 mg by mouth every morning       insulin glargine (LANTUS PEN) 100 UNIT/ML pen Inject 60 Units Subcutaneous At Bedtime       levothyroxine (SYNTHROID/LEVOTHROID) 75 MCG tablet Take 75 mcg by mouth every morning       lisinopril (ZESTRIL) 2.5 MG tablet Take 1 tablet (2.5 mg) by mouth daily 90 tablet 3     magnesium oxide (MAG-OX) 400 MG tablet Take 1 tablet (400 mg) by mouth daily 90 tablet 3     nebivolol (BYSTOLIC) 2.5 MG tablet Take 1 tablet (2.5 mg) by mouth 2 times daily 180 tablet 1      "oxyCODONE (ROXICODONE) 5 MG tablet Take 5 mg by mouth every 4 hours as needed for pain       pantoprazole (PROTONIX) 40 MG EC tablet Take 40 mg by mouth 2 times daily       polyethylene glycol (MIRALAX) 17 GM/Dose powder Take 17 g by mouth daily 510 g 0     riluzole (RILUTEK) 50 MG tablet Take 1 tablet (50 mg) by mouth every 12 hours 60 tablet 3     rivaroxaban ANTICOAGULANT (XARELTO) 20 MG TABS tablet Take 1 tablet (20 mg) by mouth daily (with dinner) 90 tablet 1     rosuvastatin (CRESTOR) 20 MG tablet Take 1 tablet (20 mg) by mouth daily 90 tablet 3       Social Hx:  Here today with his wife. [place]    Family Hx:   Family History   Problem Relation Age of Onset     Colon Cancer No family hx of        O: Alert, appears comfortable without resp acc muscle use  VS: BP 97/53   Pulse 60   Temp 97.8  F (36.6  C)   Resp 16   SpO2 90%   HEENT: NC/AT, no icterus  Neck: No MARGARITA  Lungs: CTA bilaterally, no wheezes or crackles  Cor: RRR S1S2, no murmurs  Extr: Minimal peripheral edema  Neuro: Alert, speech fluent, no facial droop    NIV download: 5/11-5/17. Compliance 100%, with 14% of days >4h. Avg use 2.4 hrs/day. Settings 12/6 cwp, with RR 14/min, avg delivered  ml. Also has OMV, set at 14 cwp    CXR 5/6: Personally reviewed image: Low lung volumes with bibasilar airspace disease likely due to atelectasis    CT chest 4/4: Small right pleural effusion. Moderate atelectatic changes within the right middle and bilateral lower lobes.       A/P:   1) Restrictive lung disease with hypoventilation and documented hypercarbia  - Some improvement in NIV tolerance - encouraged him to keep using more and more. He thinks current settings are \"pretty good\"    2) Probable COPD: No wheezing on exam today. PFTs are more restrictive, but he's on ICS so COPD might be adequately treated  - No change to meds today. Discussed the possibility of nebs - he worries that he isn't using the " Albuterol MDI correctly (due to his neuromuscular weakness). Will order neb machine and albuterol nebs.     3) Dysphagia: s/p EGD with dilation of Schatzki's ring  - Unfortunately still has some symptoms  - Difficulty clearing oral secretions, portable suction machine ordered    4) Pleural effusion: S/p thoracentesis in Feb - transudate, likely related to CHF  - Based on CT ches 4/4/23, I don't think there is enough fluid to think that repeat thora would be of clinical benefit.      Becky Mahoney MD    Dept of Pulmonary, Sleep and Critical Care Medicine

## 2023-05-18 NOTE — LETTER
"5/18/2023       RE: Carl Vázquez  [address] [facility] [place] [address]       Dear Colleague,    Thank you for referring your patient, Carl Vázquez, to the [facility] [place] at [facility]. Please see a copy of my visit note below.    Pulmonary outpatient visit    May 18, 2023    S: French Vázquez is being seen today for followup of restrictive lung disease and nocturnal hypoventilation in the setting of probable ALS. Since his initial visit with me last month, he had EGD (done 5/10 at [facility]). This showed a mild Schatzki ring that was dilated, he also had biopsies taken (results pending). He still has intermittent difficulty swallowing, although the \"sticking point\" seems to have moved from his lower chest to higher up. He also had some dental surgery, and he has increased secretions because of this - thinks that is contributing to the \"plugging\" sensation in his throat. Modest cough weakness, so he has difficulty expectorating his oral secretions    He was also hospitalized with acute respiratory failure 5/6-5/8. He was treated with NIV, also empiric Azithromycin (5 days) and steroids for possible contribution from COPD exacerbation. Continues on ICS and PRN albuterol (MDI - doesn't have or want neb machine). No ongoing coughing or wheezing.     He has done a bit better in terms of tolerating NIV. He is still not using BiPAP at night, but he's able to tolerate for up to 3 hours at a time in the afternoon, and he's generally resting/sleeping when he is doing this. He has OMV available - hasn't found this at all helpful. Frustrated by his lack of mobility    Answers for HPI/ROS submitted by the patient on 5/17/2023  General Symptoms: Yes  Skin Symptoms: No  HENT Symptoms: No  EYE SYMPTOMS: No  HEART SYMPTOMS: No  LUNG SYMPTOMS: Yes  INTESTINAL SYMPTOMS: No  URINARY SYMPTOMS: No  REPRODUCTIVE SYMPTOMS: No  SKELETAL " SYMPTOMS: Yes  BLOOD SYMPTOMS: No  NERVOUS SYSTEM SYMPTOMS: Yes  MENTAL HEALTH SYMPTOMS: No  Fever: No  Loss of appetite: No  Weight loss: No  Weight gain: No  Fatigue: Yes  Night sweats: No  Chills: No  Increased stress: No  Excessive hunger: No  Excessive thirst: No  Feeling hot or cold when others believe the temperature is normal: No  Loss of height: No  Post-operative complications: No  Surgical site pain: No  Hallucinations: Yes  Change in or Loss of Energy: No  Hyperactivity: No  Confusion: No  Cough: No  Sputum or phlegm: Yes  Coughing up blood: No  Difficulty breating or shortness of breath: Yes  Snoring: No  Wheezing: No  Difficulty breathing on exertion: No  Nighttime Cough: No  Difficulty breathing when lying flat: Yes  Back pain: No  Muscle aches: No  Neck pain: Yes  Swollen joints: No  Joint pain: No  Bone pain: No  Muscle cramps: No  Muscle weakness: Yes  Joint stiffness: No  Bone fracture: No  Trouble with coordination: No  Dizziness or trouble with balance: Yes  Fainting or black-out spells: No  Memory loss: No  Headache: No  Seizures: No  Speech problems: No  Tingling: No  Tremor: No  Weakness: Yes  Difficulty walking: Yes  Paralysis: No  Numbness: Yes      Current Outpatient Medications   Medication Sig Dispense Refill    albuterol (PROAIR HFA/PROVENTIL HFA/VENTOLIN HFA) 108 (90 Base) MCG/ACT inhaler Inhale 2 puffs into the lungs every 6 hours as needed  18 g 0    amiodarone (PACERONE) 200 MG tablet Take 1 tablet (200 mg) by mouth daily 90 tablet 3    bumetanide (BUMEX) 1 MG tablet Take 1 tab daily 60 tablet 5    clopidogrel (PLAVIX) 75 MG tablet Take 75 mg by mouth daily      fluticasone-salmeterol (ADVAIR) 250-50 MCG/ACT inhaler Inhale 1 puff into the lungs every 12 hours 1 each 0    glipiZIDE (GLUCOTROL) 5 MG tablet Take 10 mg by mouth every morning      insulin glargine (LANTUS PEN) 100 UNIT/ML pen Inject 60 Units Subcutaneous At Bedtime      levothyroxine (SYNTHROID/LEVOTHROID) 75 MCG tablet  Take 75 mcg by mouth every morning      lisinopril (ZESTRIL) 2.5 MG tablet Take 1 tablet (2.5 mg) by mouth daily 90 tablet 3    magnesium oxide (MAG-OX) 400 MG tablet Take 1 tablet (400 mg) by mouth daily 90 tablet 3    nebivolol (BYSTOLIC) 2.5 MG tablet Take 1 tablet (2.5 mg) by mouth 2 times daily 180 tablet 1    oxyCODONE (ROXICODONE) 5 MG tablet Take 5 mg by mouth every 4 hours as needed for pain      pantoprazole (PROTONIX) 40 MG EC tablet Take 40 mg by mouth 2 times daily      polyethylene glycol (MIRALAX) 17 GM/Dose powder Take 17 g by mouth daily 510 g 0    riluzole (RILUTEK) 50 MG tablet Take 1 tablet (50 mg) by mouth every 12 hours 60 tablet 3    rivaroxaban ANTICOAGULANT (XARELTO) 20 MG TABS tablet Take 1 tablet (20 mg) by mouth daily (with dinner) 90 tablet 1    rosuvastatin (CRESTOR) 20 MG tablet Take 1 tablet (20 mg) by mouth daily 90 tablet 3       Social Hx:  Here today with his wife. [place]    Family Hx:   Family History   Problem Relation Age of Onset    Colon Cancer No family hx of        O: Alert, appears comfortable without resp acc muscle use  VS: BP 97/53   Pulse 60   Temp 97.8  F (36.6  C)   Resp 16   SpO2 90%   HEENT: NC/AT, no icterus  Neck: No MARGARITA  Lungs: CTA bilaterally, no wheezes or crackles  Cor: RRR S1S2, no murmurs  Extr: Minimal peripheral edema  Neuro: Alert, speech fluent, no facial droop    NIV download: 5/11-5/17. Compliance 100%, with 14% of days >4h. Avg use 2.4 hrs/day. Settings 12/6 cwp, with RR 14/min, avg delivered  ml. Also has OMV, set at 14 cwp    CXR 5/6: Personally reviewed image: Low lung volumes with bibasilar airspace disease likely due to atelectasis    CT chest 4/4: Small right pleural effusion. Moderate atelectatic changes within the right middle and bilateral lower lobes.       A/P:   1) Restrictive lung disease with hypoventilation and documented hypercarbia  - Some improvement in NIV tolerance -  "encouraged him to keep using more and more. He thinks current settings are \"pretty good\"    2) Probable COPD: No wheezing on exam today. PFTs are more restrictive, but he's on ICS so COPD might be adequately treated  - No change to meds today. Discussed the possibility of nebs - he doesn't feel that he needs and that is fine. He uses Albuterol only very rarely     3) Dysphagia: s/p EGD with dilation of Schatzki's ring  - Unfortunately still has some symptoms  - Difficulty clearing oral secretions, portable suction machine ordered    4) Pleural effusion: S/p thoracentesis in Feb - transudate, likely related to CHF  - Based on CT ches 4/4/23, I don't think there is enough fluid to think that repeat thora would be of clinical benefit.              Again, thank you for allowing me to participate in the care of your patient.      Sincerely,    Becky Mahoney MD      "

## 2023-05-18 NOTE — PROGRESS NOTES
[facility], [place]   ALS Clinic Progress Note  Carl Vázquez  [id number]  05/18/2023    Brief Summary:   Carl Vázquez is a 83 year old man with past medical history including diabetes who presents for follow up to ALS clinic.  In hindsight, he had difficulty picking up objects with his fingers in 2019.  He started experiencing difficulty breathing in 1/2022, with initial diagnosis of cervical radiculopathy leading to diagnosis of elevated right hemidiaphragm s/p surgical intervention with limited benefit.  He continued to require hospitalizations for respiratory distress in late 2022 and early 2023.  He was last seen in ALS multidisciplinary clinic on 3/2/2023.    Subjective:   Patient presented with his wife who assisted with providing history.  Since last clinic visit,  An EMG was performed on 3/6/2023 that was consistent with diagnosis of ALS. He had additional hospitalizations 4/4/23-4/6/23 and 5/6/23-5/8/23 due to respiratory failure.  In the latest hospitalization occurred after he had not used BiPAP for several days following a dental procedure.    From a respiratory standpoint, they had a respiratory therapist visit his home on more than 5 occasions to optimize BiPAP.  After adjustment, this device is now better tolerated and he typically uses this 4 hours during the daytime.  BiPAP is not used at nighttime due to concern about tangled cords and need to move the BiPAP machine for nighttime.  After discussion, he expressed willingness to try overnight BiPAP.  He continues to experience orthopnea and cannot lay flat at nighttime.    A major change is that he is no longer able to ambulate with a walker as of May 2023.  He was able to ambulate brief distances with a walker prior to hospitalization 5/5/2023.  They have a lift chair at home to assist with reaching a standing position.  There is a walker at home with a seat obtained from a friend.  He has been sitting on  the seat and pushing himself using his legs to move around in the house.  A neighbor is needed to assist with transfers in and out of the car.    He has developed change in the volume of his voice.  On several occasions, his voice was too soft for his wife to understand.  They also endorse change in quality of voice.  He did not express concerns with the mechanics of eating or changes in weight.  He is weighed on a daily basis at home (associated with comorbid heart failure) with no significant change in weight.    He continues to experience weakness in both arms.  At home, he is barely able to lift utensils to feed himself.  He has chronic neck weakness and prior poor tolerance to soft neck collar.    He does not have pseudobulbar affect or changes in behavior.  He and his wife deny cognitive impairment.  He did have visual hallucinations in the past that resolved with BiPAP use.    He has started riluzole twice daily without side effects.  Relyvrio and Radicava were approved by insurance but the co-pays were prohibitively expensive and he has not started these medications.    He has met with a hospice agency.  He is not yet ready to sign up for hospice as he is interested in living through the summer and he would like to pursue medical therapy to prolong life.  Depending on events this summer, he may be interested in hospice later.  He reiterated his preference for not using a feeding tube, no intubation, and no CPR.  A POLST form was filled out today to reflect these preferences.    He is starting engagement with a home care service.  He has not yet seen any therapists affiliated with home care.  The initial visit by this service was last Saturday.  They indicate a nurse might visit tomorrow.    Objective   BP 97/53   Pulse 60   Temp 97.8  F (36.6  C)   Resp 16   SpO2 90%   General: pt sitting comfortably in wheelchair, initially with eyes closed  Ext: Bilateral pitting edema in lower extremities  Neuro:    General Neuro Exam    suspected: Yes    Comment: + cognitive impairment, unable to state year  Sensation intact to light touch       NM Exam: Cranial      Atrophy Fasciculations   Upper face:     Lower face:  Negative   Tongue:  Negative      Facial weakness: no facial weakness   Tongue movements: normal   Tongue weakness: none   Jaw jerk: present   Speech: dysarthria   Comment: Mild dysarthria        NM Exam: Axial    Neck flexion weakness: moderate  Neck extension weakness: moderate  Trunk LMN findings       NM Exam: Upper Extremities      Right Left   Atrophy: Positive Positive   Fasciculations:     Muscle tone: normal normal   Rapid alt. movements: normal slow   Reflexes Right Left   Biceps: reduced reduced   Brachioradialis: reduced reduced   Triceps: normal normal   Peña:     Strength Right Left   * Indicates a recommended field     Shoulder abduction*: 2 2   Elbow flexion: 4- 4-   Elbow extension*: 4 4-   Wrist extension*: 4 4-   Finger extension: 4 3   Finger flexion: 4+ 4   Abductor pollicis brevis: 2 2   First dorsal interosseous*: 2 2   Abductor digiti minimi:     Comment:  Peña not clearly present       NM Exam: Lower Extremities      Right Left   Atrophy: Positive Positive   Fasciculations:     Muscle tone: hypotonic hypotonic   Rapid alt. movements: unable to perform unable to perform   Reflexes Right Left   Patellar: reduced normal   Achilles: absent absent   Plantar: mute mute   Strength Right Left   * Indicates a recommended field     Hip flexion*: 4- 4   Hip extension:     Hip adduction:     Hip abduction:     Knee extension*: 4 4   Knee flexion: 4 4+   Ankle dorsiflexion*: 3 0   Ankle plantar flexion: 4+ 4   Ankle inversion:     Ankle eversion:     Toe extension:     Toe flexion:     Normal gait: no   Comment:           Investigations   Lyme negative 3/6/2023  TSH 2.39 on 3/6/23  CK 75 on 3/6/23  Vitamin B12 and methylmalonic acid normal on 3/2/2023  Liver function tests unremarkable  "3/2/2023  NT5C1A antibody negative on 3/2/2023    EMG 3/6/2023 by Dr. Baez:  \"1. Widespread, severe pure motor neurogenic process as can occur in motor neuron disease, severe motor neuropathy, or polyradiculopathy.   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  Unfortunately, there has been progression in his weakness since last appointment and had two hospitalizations for respiratory failure.  Regarding respiratory status, we reiterated today that noninvasive ventilation will help quality of life as well as quantity of life for patients with ALS.  He was agreeable to utilizing noninvasive ventilation more often.   Due to progression in weakness, he is no longer able to ambulate with a walker and has impairment in activities of daily living.  He will meet with members of her multiples of her multidisciplinary team to discuss supportive equipment.   Regarding goals of care, he has a longstanding preference for avoiding mechanical ventilation and not using a feeding tube.  POLST form was completed today to reflect these wishes.  He is aware that he would qualify for hospice from a medical standpoint.  Discussed that he can continue to be seen in ALS clinic even after he starts hospice care.    Plan:   -Patient was encouraged to use noninvasive ventilation overnight in addition to current use during the daytime.  - He will continue riluzole 50 mg twice daily.  - We will look into affordable options for Radicava and Relyvrio.  - POLST form filled out today, to be scanned into our chart  - Meet with multidisciplinary team members today  - He will follow-up after a shorter interval (3 to 4 weeks) to discuss further needs    Patient was seen and discussed with attending neurologist, Dr. Baez, who agrees with above.    Megan Kim MD  CNP Fellow    I personally examined the " patient and concur with the resident's note. We also discussed his condition and management with Dr Mahoney, pulmonologist, and discussed considerations for hospice referral at length.    Ramin Baez M.D.    Addendum: a semi-electric, adjustable height hospital bed is medically necessary to allow for safe transfers from bed to chair and wheelchair and to allow for frequent repositioning to avoid pressure sores and raised HOB due to aspiration risk. Patient is able to operate controls. Lifetime length of need.

## 2023-05-18 NOTE — PROGRESS NOTES
Social Work -ALS Clinic Progress Note  [facility]    Data/Intervention:    Patient Name:  Carl Vázquez  /Age:  1940 (83 year old)    Visit Type: in person    Collaborated With:    -French and his wife Michael  -Dr Baez and members of the ALS interdisciplinary team    Psychosocial info/Concerns:   Met with French and Michael due to request to help with paperwork.  Michael had difficulty making sense of the [employer] application. (lots of obscure questions)    Intervention/Education/Resources Provided:  Assisted in completing the Pt portion of the application. Michael will mail it in to [employer].  They would also like a hospital bed to facilitate bed transfers and mobility in bed. Will forward request to Nu Kay LPN.   They are applying for the Respite program thru [facility] and the financial rambo. They are managing but respite is needed for Michael.     Assessment/Plan:  Remain available.     Provided patient/family with contact information and encouraged them to contact me between clinic visits as needed.     Joann Guillermo, ELIER, [facility]    [facility]  [phone]

## 2023-05-18 NOTE — PROGRESS NOTES
SPEECH LANGUAGE PATHOLOGY EVALUATION    See electronic medical record for Abuse and Falls Screening details.    Subjective      Presenting condition or subjective complaint:  Increased difficulty with voice, speech, and swallowing.  Pt has a history of esophageal stenosis and is followed by [facility].  Pt recently underwent a esophageal dilation.  In addition, pt recently had lower teeth pulled and will be getting dentures next week.    Date of onset: 03/02/23    Relevant medical history:   Acute hypoxic hypercapnic respiratory failure secondary to chronic obstructive pulmonary disease (COPD) exacerbation, CAD, DMT2, a-fib, heart failure, HTN, HLD    Prior therapy history for the same diagnosis, illness or injury:    None    Living Environment  Social support: With a significant other or spouse   Help at home:    Equipment owned:  NA    Employment:    NA    Patient goals for therapy:  Goals not formally stated at the time of evaluation.       Objective     Others at Clinic Visit: Spouse / significant other  Patient Concerns/Goals: Increased swallowing difficulty  Increased speech deficits  Weight loss    Cardio-Respiratory Status: BiPAP  Height/Weight: Data Unavailable/0 lbs 0 oz      Oral Motor/Swallowing  Current Diet/Method of Nutritional Intake:  Soft solids and thin liquids      Level of assist required for feeding: frequent cues/help required  Oral Motor Function: Anomalies present:  Labial anomaly- WFL  Lingual anomaly- Reduced strength, ROM, and coodrination   Volitional Cough: Weak  Volitional Throat Clear: Not functional  Volitional Swallow: Present    Swallow Function: Thin Liquid-  Elevated aspiration risk and concern for pharyngeal phase of swallow.  Pt independently taking small, single sips and holding bolus prior to eliciting an intention, audible swallow.  Burping noted x2 though pt denied excessive buring in conjunction with eating and drinking.    Solid-  Increased time for mastication and oral phase.   Minimal oral residue; pt aware of residue and clearing with a liquid wash.  Pt denied a sense of pharyngeal residue with cookie trial but reported that he is compensating by taking chewing thoroughly.        Dysphagia Diagnosis: Mild dysphagia  Elevated aspiration risk  Diet Texture Recommendations: thin liquids (level 0), minced & moist (level 5)  Recommended Feeding/Eating Techniques: assistance needed for feeding, small bolus size, slow rate of intake, alternate food and liquid intake, check mouth for oral residue/pocketing, minimize distractions during oral intake  Medication Administration Recommendations: Pills crushed in pureed textures    Instrumental Assessment Recommendations: VFSS (videofluoroscopic swallowing study)    Speech Intelligibility/Functional Communication   Methods of communication: Verbal and Gesture    Speech Intelligibility: Overall intelligibility rated at 100% to a skilled listener in a quiet, distraction-free environment.  Pt's wife reports that she is able to understand him most of the time.  When she is not able to understand him, he does become frustrated.  He describes his speech as softer and 'blurry.'   Pt reports that he compensates by using gestures.  He is unable to write.       Word level speech intelligibility:  Mild impairment      Phrase/sentence level speech intelligibility:  Mild impairment       Conversation level speech intelligibility:  Mild impairment    Respiration Observations: shallow breathing, Sustained 'ah' held for 10 seconds; vocal quality changes noted as task progressed.    Phonation: reduced loudness, breathy quality, rough voice  Resonance: WNL  Articulation: WNL    Motor speech level of impairment: mild impairment  Speech Intelligibility/Communication: Communicates functionally  Augmentative and Alternative Communication (AAC): Does not have an AAC device      Recommendations: Oral diet.  Continue use of compensatory strategies.  Continue compensatory  speech strategies.  Initiate use of AAC device.  Soft solids and thin liquids .    Education provided/response: Speech  Swallowing  AAC  Diet  Verbalized understanding  Demonstrated understanding    Treatment provided this date:    Swallow intervention, 12 minutes  Communication intervention, 10 minutes   Swallow- Pt trained on current level of functioning and risks related to aspiration.  Pt trained on protective mechanism of the VF.  Further educated on self selecting softer, preferred items and using sauces/syrups/gravies to compensate.  Pt additionally trained on taking small bites/sips, pacing self, and alternating consistencies.    Communication-  Pt currently using gestures and ringing a bell to indicate that he needs help should his wife be in an alternate room.  Pt and wife trained on expectation of worsening communication and need to compensate with AAC strategies.  Wife initially insistent that pt would be unable to use olive for communication but when educated on iPad, pt and wife were open to trial.  Pt trained on use of compensatory strategies including pacing and over-articulation while speech.    Response to recommendations/treatment: Pt and wife reported understanding and agreement.      Goal attainment: All goals met    Total Evaluation Time (minutes): 15 minutes   Timed Code Treatment (minutes): None   Total Treatment Time (sum of timed and untimed services): 22 minutes       Assessment & Plan   CLINICAL IMPRESSIONS   Medical Diagnosis: Dysphagia, dysarthria    Treatment Diagnosis: Dysphagia , dysarthria   Impression/Assessment: Pt is a 83 year old male with speech, voice, and swallowing complaints. The following significant findings have been identified: impaired communication, impaired speech intelligibility, impaired feeding and impaired swallowing. Identified deficits interfere with their ability to communicate within the home or community and consume an oral diet as compared to previous level  of function.    PLAN OF CARE  Treatment Interventions:  Swallowing dysfunction and/or oral function for feeding, Speech    Prognosis to achieve stated therapy goals is fair   Rehab potential is impacted by: comorbidities, current level of function, family/caregiver support, patient awareness of deficits    Long Term Goals   SLP Goal 1  Goal Identifier: 1  Goal Description: Pt will demonstrate understanding and implementation of information provided this date related to functions of speech and swallowing in the setting of ALS diagnosis as determined by the treating SLP.  Rationale: To maximize safety, ease and/or independence of oral intake;To maximize the ability to communicate wants and needs within the home or community  Goal Progress: Goal met.  Target Date: 05/18/23  Date Met: 05/18/23      Frequency of Treatment: 1x follow up  Duration of Treatment: 1 session     Recommended Referrals to Other Professionals:  ALS Clinic team   Education Assessment:        Risks and benefits of evaluation/treatment have been explained.   Patient/Family/caregiver agrees with Plan of Care.     Evaluation Time:         Signing Clinician: BROWN Miller        [facility]                                                                                   OUTPATIENT SPEECH LANGUAGE PATHOLOGY      PLAN OF TREATMENT FOR OUTPATIENT REHABILITATION   Patient's Last Name, First Name, Carl Hwang YOB: 1940   Provider's Name   [facility]   Medical Record No.  [id number]     Onset Date: 03/02/23 Start of Care Date:       Medical Diagnosis:  Dysphagia, dysarthria      SLP Treatment Diagnosis: Dysphagia , dysarthria  Plan of Treatment  Frequency/Duration: 1x follow up  / 1 session     Certification date from     To            See note for plan of treatment details and functional goals     BROWN Miller                         I  CERTIFY THE NEED FOR THESE SERVICES FURNISHED UNDER        THIS PLAN OF TREATMENT AND WHILE UNDER MY CARE     (Physician attestation of this document indicates review and certification of the therapy plan).                  Referring Provider:  Dr. Ramin Baez      Initial Assessment  See Epic Evaluation-

## 2023-05-18 NOTE — PATIENT INSTRUCTIONS
Use BiPAP overnight as much as possible.  OK to use BiPAP during the daytime too.  If you choose to join hospice, we will still be able to keep in touch.  We filled out a POLST form today, this will be scanned into our system.  Continue riluzole twice a day.  Meet with social work to discuss affordable medication options.  You will visit with multiple members of our team today.  They will help you with equipment.    Follow up around 6/8/2023, telephone or in-person.

## 2023-05-18 NOTE — Clinical Note
Nu - would it be possible to add a 1 PM telephone visit with me on June 7 and a 3 month in-person clinic followup?

## 2023-05-19 ENCOUNTER — TELEPHONE (OUTPATIENT)
Dept: CARDIOLOGY | Facility: CLINIC | Age: 83
End: 2023-05-19
Payer: COMMERCIAL

## 2023-05-19 DIAGNOSIS — G12.21 ALS (AMYOTROPHIC LATERAL SCLEROSIS) (H): Primary | ICD-10-CM

## 2023-05-19 NOTE — TELEPHONE ENCOUNTER
Health Call Center    Phone Message    May a detailed message be left on voicemail: yes     Reason for Call: Other: Lovely from [facility] called requesting that Betty Antonio send the lab orders for Carl to be done at his home care facility so he doesn't need to be taken in to the clinic to have his labs drawn. Please reach out to Lovely to coordinate. Thank you!     Action Taken: Other: Cardiology    Travel Screening: Not Applicable     Thank you!  [facility]

## 2023-05-22 NOTE — TELEPHONE ENCOUNTER
[facility] - C.O.R.E. Clinic    [facility] updating her that lab appt on 6/9 can be cancelled if she is able to draw labs prior to 6/9/23 for Dr. Remhan appt.  Reviewed chart and note pt also needs repeat BMP and Mag drawn 5/24/23 per Betty Alvarez after bumex dose changed.  Will fax orders.  Requested call back if she needs anything further or if pt needs to be updated.     Lab appt cancelled.     BMP, Mag order faxed to [facility] attn: Lovely to be drawn on 5/24/23 for 1 week recheck after bumex dose changed- per Betty Alvarez PA-C.  Requested results be faxed back.     BMP, NT pro BNP orders faxed to [facility] attn: Lovely to be drawn 6/7/23 per Mirna Rodriges PA-C prior to Dr. Rehman OV on 6/9/23.  Requested results be faxed back.     Chart Reviewed:   -- 5/17/23:  LOV w/ Betty Alvarez, PAC in CORE.  Appears dehydrated, poor intake d/t swallowing issues w/ ALS.  Home wt: 140 lbs.  Plan: Decrease bumex to 1 mg daily (from 2mg in AM and 1 mg in PM).     Future Appointments   Date Time Provider Department Center   6/5/2023 12:30 PM [facility]CCECHO2 [facility]   6/9/2023  1:45 PM Spencer Rehman MD [facility] PSA CLIN       Janina Grant RN BSN   [facility] [place], [place]  C.O.R.E. Clinic Care Coordinator  05/22/23, 9:01 AM

## 2023-05-24 ENCOUNTER — LAB REQUISITION (OUTPATIENT)
Dept: LAB | Facility: CLINIC | Age: 83
End: 2023-05-24
Payer: COMMERCIAL

## 2023-05-24 ENCOUNTER — CARE COORDINATION (OUTPATIENT)
Dept: CARDIOLOGY | Facility: CLINIC | Age: 83
End: 2023-05-24

## 2023-05-24 DIAGNOSIS — I50.22 CHRONIC SYSTOLIC (CONGESTIVE) HEART FAILURE (H): ICD-10-CM

## 2023-05-24 LAB
ALBUMIN SERPL BCG-MCNC: 3.4 G/DL (ref 3.5–5.2)
ALP SERPL-CCNC: 48 U/L (ref 40–129)
ALT SERPL W P-5'-P-CCNC: 22 U/L (ref 10–50)
ANION GAP SERPL CALCULATED.3IONS-SCNC: 10 MMOL/L (ref 7–15)
AST SERPL W P-5'-P-CCNC: 24 U/L (ref 10–50)
BILIRUB DIRECT SERPL-MCNC: <0.2 MG/DL (ref 0–0.3)
BILIRUB SERPL-MCNC: 0.5 MG/DL
BUN SERPL-MCNC: 30 MG/DL (ref 8–23)
CALCIUM SERPL-MCNC: 9.7 MG/DL (ref 8.8–10.2)
CHLORIDE SERPL-SCNC: 96 MMOL/L (ref 98–107)
CREAT SERPL-MCNC: 1.11 MG/DL (ref 0.67–1.17)
DEPRECATED HCO3 PLAS-SCNC: 38 MMOL/L (ref 22–29)
GFR SERPL CREATININE-BSD FRML MDRD: 66 ML/MIN/1.73M2
GLUCOSE SERPL-MCNC: 85 MG/DL (ref 70–99)
HOLD SPECIMEN: NORMAL
MAGNESIUM SERPL-MCNC: 1.8 MG/DL (ref 1.7–2.3)
POTASSIUM SERPL-SCNC: 4.1 MMOL/L (ref 3.4–5.3)
PROT SERPL-MCNC: 6.2 G/DL (ref 6.4–8.3)
SODIUM SERPL-SCNC: 144 MMOL/L (ref 136–145)

## 2023-05-24 PROCEDURE — 80053 COMPREHEN METABOLIC PANEL: CPT | Mod: ORL | Performed by: INTERNAL MEDICINE

## 2023-05-24 PROCEDURE — 83735 ASSAY OF MAGNESIUM: CPT | Mod: ORL | Performed by: INTERNAL MEDICINE

## 2023-05-24 PROCEDURE — 82248 BILIRUBIN DIRECT: CPT | Mod: ORL | Performed by: INTERNAL MEDICINE

## 2023-05-24 NOTE — PROGRESS NOTES
"Spoke to Dorothy.   Carl is doing \"pretty good. He did get new teeth at the beginning of the week so he is eating more. His left foot is more swollen than the right foot today. \" He is not able to elevate due to \"stomach issues. But he tries.\" She denies shortness of breath. \"He forgets to breath so I have to remind him to take nu breaths. The other day his oxygen level was down to 82%. I had to put his oxygen on him and it came up right away.\" O2 was at 2 lpm. She does put his CPAP on him during the day and BiPAP at night.\"     He is also exercising more.     Weight is 141 lbs today, He was 140 lbs on 5/17.     Update to Mirna.    Dorothy prefers to communicate through My Chart as it is easier for her.     Future Appointments   Date Time Provider Department Center   6/5/2023 12:30 PM [facility]ECHO2 [facility] RSCC   6/7/2023  1:00 PM Ramin Baez MD [facility]   6/9/2023  1:45 PM Spencer Rehman MD [facility] PSA CLIN   8/10/2023  9:00 AM Ramin Baez MD [facility]     DAVID Hoyt, RN 5:15 PM 05/24/23          "

## 2023-05-24 NOTE — PROGRESS NOTES
Let voice mail for Dorothy for an update.     BMP, hepatic panel and Mag drawn 5/24 as Bumex was decreased at LOV.      Chart Reviewed:   -- 5/17/23:  LOV w/ KELLIE Jolley in CORE.  Appears dehydrated, poor intake d/t swallowing issues w/ ALS.  Home wt: 140 lbs.  Plan: Decrease bumex to 1 mg daily (from 2mg in AM and 1 mg in PM). STOP potassium.     Future Appointments   Date Time Provider Department Center   6/5/2023 12:30 PM [facility]ECHO2 [facility] RSCC   6/7/2023  1:00 PM Ramin Baez MD [facility]   6/9/2023  1:45 PM Spencer Rehman MD [facility] PSA CLIN   8/10/2023  9:00 AM Ramin Baez MD [facility]     CLEMENTINA HoytN, RN 2:33 PM 05/24/23

## 2023-05-25 NOTE — PROGRESS NOTES
Dorothy called [facility] and left  asking for a call back.     Returned call. She was going through her caller ID and thought [facility] called this morning. No call made today.      Future Appointments   Date Time Provider Department Center   6/5/2023 12:30 PM [facility]ECHO2 [facility]   6/7/2023  1:00 PM Ramin Baez MD [facility]   6/9/2023  1:45 PM Spencer Rehman MD [facility] PSA CLIN   8/10/2023  9:00 AM Ramin Baez MD [facility]       DAVID Hoyt, RN 10:08 AM 05/25/23

## 2023-05-30 ENCOUNTER — DOCUMENTATION ONLY (OUTPATIENT)
Dept: OTHER | Facility: CLINIC | Age: 83
End: 2023-05-30
Payer: COMMERCIAL

## 2023-06-01 ENCOUNTER — HEALTH MAINTENANCE LETTER (OUTPATIENT)
Age: 83
End: 2023-06-01

## 2023-06-05 ENCOUNTER — HOSPITAL ENCOUNTER (OUTPATIENT)
Dept: CARDIOLOGY | Facility: CLINIC | Age: 83
Discharge: HOME | End: 2023-06-05
Attending: INTERNAL MEDICINE | Admitting: INTERNAL MEDICINE
Payer: COMMERCIAL

## 2023-06-05 DIAGNOSIS — I50.9 ACUTE ON CHRONIC CONGESTIVE HEART FAILURE, UNSPECIFIED HEART FAILURE TYPE (H): ICD-10-CM

## 2023-06-05 LAB — LVEF ECHO: NORMAL

## 2023-06-05 PROCEDURE — 255N000002 HC RX 255 OP 636: Performed by: INTERNAL MEDICINE

## 2023-06-05 PROCEDURE — 93306 TTE W/DOPPLER COMPLETE: CPT | Mod: 26 | Performed by: INTERNAL MEDICINE

## 2023-06-05 PROCEDURE — 999N000208 ECHOCARDIOGRAM COMPLETE

## 2023-06-05 RX ADMIN — HUMAN ALBUMIN MICROSPHERES AND PERFLUTREN 6 ML: 10; .22 INJECTION, SOLUTION INTRAVENOUS at 12:51

## 2023-06-07 ENCOUNTER — VIRTUAL VISIT (OUTPATIENT)
Dept: NEUROLOGY | Facility: CLINIC | Age: 83
End: 2023-06-07
Payer: COMMERCIAL

## 2023-06-07 ENCOUNTER — MYC MEDICAL ADVICE (OUTPATIENT)
Dept: NEUROLOGY | Facility: CLINIC | Age: 83
End: 2023-06-07

## 2023-06-07 DIAGNOSIS — G12.21 ALS (AMYOTROPHIC LATERAL SCLEROSIS) (H): Primary | ICD-10-CM

## 2023-06-07 PROCEDURE — 99443 PR PHYSICIAN TELEPHONE EVALUATION 21-30 MIN: CPT | Mod: 95 | Performed by: PSYCHIATRY & NEUROLOGY

## 2023-06-07 NOTE — PROGRESS NOTES
Carl is a 83 year old who is being evaluated via a billable telephone visit.      What phone number would you like to be contacted at?     How would you like to obtain your AVS? Mathew    Distant Location (provider location):  On-site  Phone call duration:  minutes

## 2023-06-07 NOTE — TELEPHONE ENCOUNTER
"I called and spoke with wife Dorothy. French had been doing fairly well on NIV - he was using it 4-5 hours per night, sleeping well and feeling pretty good. They have been able to drive to their land in [place] the past few weekends, which he has really enjoyed.    The past few days have been very difficult. He can't tolerate BiPAP for more than an hour or so - \"suffocating\". He also has intermittent dyspnea without the BiPAP, along with desaturations into the 70s. He recently had an increase in his Bumex; his weight is stable and his edema is a bit better than it had been. He has not had fevers. No cough/sputum, no wheezing noted. He continues to use Advair BID, hasn't used Albuterol because he hasn't had wheezing. He has longstanding intermittent epigastric pain, this is worse in the past few days. His appetite is also quite poor and food tastes \"like  water\".    I told her that I am very worried about this, and I really think he should go to ER. He doesn't want to go, and she's going to see how it goes for one more night. They could try Albuterol MDI - if this helps his symptoms, we could get him a neb machine and/or a systemic steroid burst tomorrow. I told her that if he gets much worse, she may need to call medics to bring him to ER even if he doesn't want to go.    She will update us in AM    Becky Mahoney MD    Dept of Pulmonary, Sleep and Critical Care Medicine    "

## 2023-06-07 NOTE — LETTER
2023     RE: Carl Vázquez  [address] [facility] [address]     Dear Colleague,    Thank you for referring your patient, Carl Vázquez, to the [facility] [place] at [facility]. Please see a copy of my visit note below.    Carl is a 83 year old who is being evaluated via a billable telephone visit.      What phone number would you like to be contacted at?     How would you like to obtain your AVS? MyChart    Distant Location (provider location):  On-site  Phone call duration:  minutes      Service Date: 2023    Kyler Mcwilliams MD  [address]  [address]    RE:  Carl Vázquez  MRN:  [id number]  :  1940    Dear Doctors and Ms. Rodriges:    I completed a telephone virtual visit with Carl Vázquez and his wife, Dorothy, today.  This was set up in advance after a complex care visit several weeks ago and was intended to confirm progress in compliance and review his condition given his complex and tenuous health status.   and Mrs. Vázquez report that over the past week he has had an acceleration of a problem which has occurred intermittently for the past 18 months.  Specifically, and particularly at night, he will awaken with abrupt shortness of breath.  This is associated with some chest tightness and sometimes with oxygen desaturation into the 70s.  He tends to improve upon sitting up, often with removing his NIV mask, which he is using at night now.  Similar spells occur on occasion during the day but less frequently.  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  He is not continuously short of breath, however.    Perhaps in part because of such spells, his Bumex was recently increased.  This has alleviated his peripheral edema, but these spells continue despite increasing from  1 mg 1-2 times a day to a total of 5 mg distributed t.i.d.  He denies choking on secretions or mucus or feeling as though there is phlegm that he cannot mobilize.  He denies fever.    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  As you know, I encouraged him to present to the Emergency Department, as did the Cardiology service, but he chooses not to do so.  He recognizes that these are potentially urgent and medically critical symptoms.  Short of an Emergency Department evaluation, I indicated that I would continue to share his symptoms with his cardiology and pulmonology providers.  We also had an extensive discussion of the indications for hospice referral and the ways in which that might influence his care.    Sincerely,    Ramin Baez MD    cc:  Becky Mahoney MD  [facility]  [address]  [place]  [address]    Spencer Rehman MD  [facility]  [address], [address]  [place]  [address]    Mirna Rodriges PA-C  [facility]  [address] [place]  [address]    Ramin Baez MD    40 minutes spent on the date of the encounter on chart review, telephone call, documentation and further activities as noted above.    D: 2023   T: 2023   JUAREZ fermin    Name:     WYATT ROMEROSaeed  MRN:      [id number]        Account:      [id number]   :      1940           Service Date: 2023     Document: [id number]

## 2023-06-07 NOTE — PROGRESS NOTES
Service Date: 2023    Kyler Mcwilliams MD  [facility]  [address] [place]  [address]    RE:  Carl Vázquez  MRN:  [id number]  :  1940    Dear Doctors and Ms. Jaminvaishnavidaiana:    I completed a telephone virtual visit with Carl Vázquez and his wife, Dorothy, today.  This was set up in advance after a complex care visit several weeks ago and was intended to confirm progress in compliance and review his condition given his complex and tenuous health status.   and Mrs. Vázquez report that over the past week he has had an acceleration of a problem which has occurred intermittently for the past 18 months.  Specifically, and particularly at night, he will awaken with abrupt shortness of breath.  This is associated with some chest tightness and sometimes with oxygen desaturation into the 70s.  He tends to improve upon sitting up, often with removing his NIV mask, which he is using at night now.  Similar spells occur on occasion during the day but less frequently.  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  He is not continuously short of breath, however.    Perhaps in part because of such spells, his Bumex was recently increased.  This has alleviated his peripheral edema, but these spells continue despite increasing from 1 mg 1-2 times a day to a total of 5 mg distributed t.i.d.  He denies choking on secretions or mucus or feeling as though there is phlegm that he cannot mobilize.  He denies fever.    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  As you know, I encouraged him to present to the Emergency Department, as did the Cardiology service, but he chooses not to do so.  He recognizes that these are potentially urgent and medically critical symptoms.  Short of an Emergency  Department evaluation, I indicated that I would continue to share his symptoms with his cardiology and pulmonology providers.  We also had an extensive discussion of the indications for hospice referral and the ways in which that might influence his care.    Sincerely,    Ramin Baez MD    cc:  Becky Mahoney MD  [facility]  [address], [address]  [place]  [address]    Spencer Rehman MD  [facility]  [address], [address]  [place]  [address]    Mirna Rodriges PA-C  [facility]  [address] [place]  [address]    Ramin Baez MD    40 minutes spent on the date of the encounter on chart review, telephone call, documentation and further activities as noted above.    D: 2023   T: 2023   MT: zander    Name:     WYATT ROMEROSaeed  MRN:      [id number]        Account:      [id number]   :      1940           Service Date: 2023       Document: [id number]

## 2023-06-07 NOTE — TELEPHONE ENCOUNTER
I completed a telehealth visit by phone and my report is dictated. Briefly, he reports an acceleration of dyspnea at rest, often awakening him from sleep, that resolves after 15 20 minutes after sitting up and removing NIV. He has perceived chest tightness and desaturations to the 70s during these spells.    I explained that continuous dyspnea, GARCIA, and orthopnea are common symptoms of neuromuscular ventilatory failure, but that intermittent symptoms with desaturations may reflect his cardiac or pulmonary conditions as well. It is a complex situation and I encouraged them to report these to his cardiology and pulmonary providers. I will forward this summary now as well. As indicated in prior communications, he declined to seek care in the ED today.

## 2023-06-08 ENCOUNTER — MYC MEDICAL ADVICE (OUTPATIENT)
Dept: PULMONOLOGY | Facility: CLINIC | Age: 83
End: 2023-06-08
Payer: COMMERCIAL

## 2023-06-08 DIAGNOSIS — J45.909 ASTHMA, UNSPECIFIED ASTHMA SEVERITY, UNSPECIFIED WHETHER COMPLICATED, UNSPECIFIED WHETHER PERSISTENT: Primary | ICD-10-CM

## 2023-06-09 RX ORDER — ALBUTEROL SULFATE 0.83 MG/ML
2.5 SOLUTION RESPIRATORY (INHALATION) EVERY 6 HOURS PRN
Qty: 180 ML | Refills: 4 | Status: SHIPPED | OUTPATIENT
Start: 2023-06-09

## 2023-06-09 RX ORDER — PREDNISONE 20 MG/1
40 TABLET ORAL DAILY
Qty: 10 TABLET | Refills: 0 | Status: SHIPPED | OUTPATIENT
Start: 2023-06-09 | End: 2023-06-14

## 2023-06-12 ENCOUNTER — TELEPHONE (OUTPATIENT)
Dept: CARDIOLOGY | Facility: CLINIC | Age: 83
End: 2023-06-12

## 2023-06-12 ENCOUNTER — OFFICE VISIT (OUTPATIENT)
Dept: CARDIOLOGY | Facility: CLINIC | Age: 83
End: 2023-06-12
Payer: COMMERCIAL

## 2023-06-12 VITALS
HEIGHT: 67 IN | DIASTOLIC BLOOD PRESSURE: 60 MMHG | OXYGEN SATURATION: 90 % | HEART RATE: 70 BPM | SYSTOLIC BLOOD PRESSURE: 102 MMHG | WEIGHT: 139 LBS | BODY MASS INDEX: 21.82 KG/M2

## 2023-06-12 DIAGNOSIS — R06.02 SHORTNESS OF BREATH: ICD-10-CM

## 2023-06-12 DIAGNOSIS — J96.11 CHRONIC RESPIRATORY FAILURE WITH HYPOXIA AND HYPERCAPNIA (H): ICD-10-CM

## 2023-06-12 DIAGNOSIS — I25.10 CORONARY ARTERY DISEASE INVOLVING NATIVE CORONARY ARTERY OF NATIVE HEART WITHOUT ANGINA PECTORIS: ICD-10-CM

## 2023-06-12 DIAGNOSIS — E11.9 DIABETES MELLITUS WITHOUT COMPLICATION (H): ICD-10-CM

## 2023-06-12 DIAGNOSIS — I48.0 PAROXYSMAL ATRIAL FIBRILLATION (H): Primary | ICD-10-CM

## 2023-06-12 DIAGNOSIS — R06.89 CO2 NARCOSIS: ICD-10-CM

## 2023-06-12 DIAGNOSIS — I50.32 CHRONIC DIASTOLIC HEART FAILURE (H): ICD-10-CM

## 2023-06-12 DIAGNOSIS — J96.12 CHRONIC RESPIRATORY FAILURE WITH HYPOXIA AND HYPERCAPNIA (H): ICD-10-CM

## 2023-06-12 PROCEDURE — 99215 OFFICE O/P EST HI 40 MIN: CPT | Performed by: INTERNAL MEDICINE

## 2023-06-12 RX ORDER — BUMETANIDE 1 MG/1
TABLET ORAL
Qty: 60 TABLET | Refills: 5 | Status: SHIPPED | OUTPATIENT
Start: 2023-06-12

## 2023-06-12 NOTE — LETTER
6/12/2023    Kyler Mcwilliams MD  [address] [facility] [address]    RE: Carl Vázquez       Dear Colleague,     I had the pleasure of seeing Carl Vázquez in the [facility].    General Cardiology Clinic Progress Note  Carl Vázquez MRN# [id number]   YOB: 1940 Age: 83 year old       Reason for visit: Atrial fibrillation, cardiomyopathy, and congestive heart failure    History of presenting illness:    I had the opportunity to see Carl Vázquez at [facility] today for reevaluation of chronic systolic heart failure.  He has chronic respiratory failure with hypoxia and hypercarbia due to a number of contributing complex medical issues, including COPD, paralyzed hemidiaphragm, chronic heart failure, and now a recent diagnosis of ALS.    I saw Carl when he was in the hospital in February.  At that time he presented with rapid atrial fibrillation with heart rates in the 140s and severe left ventricular dysfunction with an ejection fraction of 25 to 30%.  He had some congestive heart failure at that time which we treated with diuresis and corrected his atrial fibrillation issues.  He is now maintaining sinus rhythm based on his previous EKGs from May and April..  Unfortunately, he has been hospitalized with respiratory failure in March, April, and again in May.  He now indicates that he no longer wishes to pursue any additional hospitalization.  He has oxygen at home as well as CPAP which he uses during the day and BiPAP which he uses at night.  His wife is amazing in her ability to care for him.  She is meticulous about recordkeeping and is constantly monitoring his vital signs including his oxygen levels and helping him stay awake, alert, and using his oxygen or CPAP during the day if needed.    However, with all of these issues and his choice not to pursue hospitalization any further, they are now looking into hospice programs.  They have a couple  of appointments in the next week or 2 with 2 different hospice groups and plan on transitioning to hospice at that time.  However, today he seems much more lethargic than usual and has difficulty staying awake during our visit today in the clinic.    Nevertheless, he is not complaining of shortness of breath at rest and has no evidence of edema or fluid accumulation.    His follow-up echocardiogram on 6/5/2023 demonstrates a dramatic improvement in left ventricular function.  His ejection fraction was up to 55 to 60% with mild aortic valve regurgitation.  During his February hospitalization we referred him for coronary angiography which identified severe proximal LAD disease requiring multiple drug-eluting stents within the LAD for revascularization.  This treatment of his coronary artery disease in addition to heart rate control with return to sinus rhythm probably resulted in improvement in left ventricular function.  He is not reporting any chest discomfort symptoms.    His recent basic metabolic panel on 5/24/2023 looks good with normal kidney function and normal potassium.  He is currently taking Bumex 2 mg in the morning and 1 mg in the afternoon.  His last hemoglobin platelet count and ALT were all normal as well.    His lungs sound clear on examination.  I find no evidence of recurrent pleural effusions which he has had tapped in the past.  Heart rate is 70.  Blood pressure is 102/60.  Weight is 139 and stable.            Assessment and Plan:     ASSESSMENT:    Mr. Carl Vázquez is an 83-year-old gentleman with paroxysmal atrial fibrillation, coronary artery disease, and history of acute systolic heart failure in February 2023.  Despite returning his rhythm to normal sinus, stenting his significant coronary artery disease within the LAD, and getting him on appropriate diuretic therapy, he continues to have recurrent episodes of respiratory failure, both hypoxic and hypercarbic.  Indeed, his left  ventricular function has normalized and he appears euvolemic on examination today.  I do not think congestive heart failure is contributing significantly to his ongoing respiratory problems.  He has severe COPD, hemidiaphragm paralysis, and now recently diagnosed with ALS.    I recommended that we cut back his Bumex to 2 mg in the morning and stop the 1 mg afternoon dose.  I also stopped lisinopril because of low blood pressures.  I will have him continue nebivolol and amiodarone, as well as the Plavix and Xarelto.    Ultimately, I agreed with him and his wife that hospice is the appropriate option for his care.  I see that his respiratory failure problems will only progress and his care is becoming overwhelming for his wife.    I have made follow-up arrangements here in the core clinic for ongoing management of his cardiac issues.    Spencer Rehman MD           Orders this Visit:  Orders Placed This Encounter   Procedures    Basic metabolic panel    Hemoglobin    Follow-Up with Cardiology    Follow-Up with Cardiology [facility]     Orders Placed This Encounter   Medications    bumetanide (BUMEX) 1 MG tablet     Sig: Take 2 tablets (2mg) by mouth in the morning     Dispense:  60 tablet     Refill:  5     Medications Discontinued During This Encounter   Medication Reason    lisinopril (ZESTRIL) 2.5 MG tablet     magnesium oxide (MAG-OX) 400 MG tablet     bumetanide (BUMEX) 1 MG tablet Reorder (No AVS / No eCancel)       Today's clinic visit entailed:  Review of the result(s) of each unique test - Basic metabolic panel, ALT, hemoglobin, platelets, echocardiogram, coronary angiogram  The following tests were independently interpreted by me as noted in my documentation: ECG  Ordering of each unique test  Prescription drug management  50 minutes spent by me on the date of the encounter doing chart review, review of test results, interpretation of tests, patient visit, documentation, discussion with family and Wife present  "  Provider  Link to MDM Help Grid     The level of medical decision making during this visit was of high complexity.           Review of Systems:     Review of Systems:  Skin:  Negative     Eyes:  Negative    ENT:  Negative    Respiratory:  Positive for sleep apnea;CPAP;shortness of breath;dyspnea on exertion  Cardiovascular:  Negative for;palpitations;chest pain;dizziness Positive for;fatigue;chest pain;lightheadedness;edema  Gastroenterology: Negative for nausea;vomiting;heartburn  Genitourinary:  Negative    Musculoskeletal:  Negative    Neurologic:  Positive for local weakness  Psychiatric:  Negative    Heme/Lymph/Imm:  Negative allergies  Endocrine:  Positive for diabetes;thyroid disorder            Physical Exam:     Vitals: /60   Pulse 70   Ht 1.702 m (5' 7\")   Wt 63 kg (139 lb)   SpO2 90%   BMI 21.77 kg/m    Constitutional: Well nourished and in no apparent distress.  Eyes: Pupils equal, round. Sclerae anicteric.   HEENT: Normocephalic, atraumatic.   Neck: Supple. JVD normal  Respiratory: Breathing non-labored. Lungs clear to auscultation bilaterally. No crackles, wheezes, rhonchi, or rales.  Cardiovascular:  Regular rate and rhythm, normal S1 and S2. No murmur, rub, or gallop.  Skin: Warm, dry. No rashes, cyanosis, or xanthelasma.  Extremities: No edema.  Neurologic: No gross motor deficits. Alert, awake, and oriented to person, place and time.  Psychiatric: Affect appropriate.             Medications:     Current Outpatient Medications   Medication Sig Dispense Refill    albuterol (PROAIR HFA/PROVENTIL HFA/VENTOLIN HFA) 108 (90 Base) MCG/ACT inhaler Inhale 2 puffs into the lungs every 6 hours as needed for shortness of breath, wheezing or cough 18 g 0    albuterol (PROVENTIL) (2.5 MG/3ML) 0.083% neb solution Take 1 vial (2.5 mg) by nebulization every 6 hours as needed for shortness of breath, wheezing or cough 180 mL 4    amiodarone (PACERONE) 200 MG tablet Take 1 tablet (200 mg) by mouth daily " 90 tablet 3    bumetanide (BUMEX) 1 MG tablet Take 2 tablets (2mg) by mouth in the morning 60 tablet 5    clopidogrel (PLAVIX) 75 MG tablet Take 75 mg by mouth daily      fluticasone-salmeterol (ADVAIR) 250-50 MCG/ACT inhaler Inhale 1 puff into the lungs every 12 hours 1 each 0    glipiZIDE (GLUCOTROL) 5 MG tablet Take 10 mg by mouth every morning      insulin glargine (LANTUS PEN) 100 UNIT/ML pen Inject 60 Units Subcutaneous At Bedtime      levothyroxine (SYNTHROID/LEVOTHROID) 75 MCG tablet Take 75 mcg by mouth every morning Managed by PCP      nebivolol (BYSTOLIC) 2.5 MG tablet Take 1 tablet (2.5 mg) by mouth 2 times daily 180 tablet 1    nitroGLYcerin (NITROSTAT) 0.4 MG sublingual tablet For chest pain place 1 tablet under the tongue every 5 minutes for 3 doses. If symptoms persist 5 minutes after 1st dose call 911. 15 tablet 0    oxyCODONE (ROXICODONE) 5 MG tablet Take 5 mg by mouth every 4 hours as needed for pain      pantoprazole (PROTONIX) 40 MG EC tablet Take 40 mg by mouth 2 times daily      polyethylene glycol (MIRALAX) 17 GM/Dose powder Take 17 g by mouth daily 510 g 0    predniSONE (DELTASONE) 20 MG tablet Take 2 tablets (40 mg) by mouth daily for 5 days 10 tablet 0    riluzole (RILUTEK) 50 MG tablet Take 1 tablet (50 mg) by mouth every 12 hours 60 tablet 3    rivaroxaban ANTICOAGULANT (XARELTO) 20 MG TABS tablet Take 1 tablet (20 mg) by mouth daily (with dinner) 90 tablet 1    rosuvastatin (CRESTOR) 20 MG tablet Take 1 tablet (20 mg) by mouth daily 90 tablet 3       Family History   Problem Relation Age of Onset    Colon Cancer No family hx of        Social History     Socioeconomic History    Marital status:      Spouse name: Not on file    Number of children: Not on file    Years of education: Not on file    Highest education level: Not on file   Occupational History    Not on file   Tobacco Use    Smoking status: Never    Smokeless tobacco: Never   Vaping Use    Vaping status: Not on file    Substance and Sexual Activity    Alcohol use: No     Comment: drank many years ago    Drug use: No    Sexual activity: Not on file   Other Topics Concern    Parent/sibling w/ CABG, MI or angioplasty before 65F 55M? Not Asked   Social History Narrative    Not on file     Social Determinants of Health     Financial Resource Strain: Not on file   Food Insecurity: Not on file   Transportation Needs: Not on file   Physical Activity: Not on file   Stress: Not on file   Social Connections: Not on file   Intimate Partner Violence: Not on file   Housing Stability: Not on file            Past Medical History:     Past Medical History:   Diagnosis Date    Diabetes (H)     Sleep apnea     uses CPAP machine    Thyroid disease               Past Surgical History:     Past Surgical History:   Procedure Laterality Date    CHOLECYSTECTOMY      at [facility]    COLONOSCOPY      in [place], [place]    COLONOSCOPY  08/22/2017    Dr. Ja SHAHID    CV CORONARY ANGIOGRAM N/A 2/21/2023    Procedure: Coronary Angiogram;  Surgeon: Andrey Watts MD;  Location: [facility] CARDIAC CATH LAB    CV CORONARY LITHOTRIPSY PCI N/A 2/21/2023    Procedure: Percutaneous Coronary Intervention - Lithotripsy;  Surgeon: Andrey Watts MD;  Location: [facility] CARDIAC CATH LAB    CV INTRAVASULAR ULTRASOUND N/A 2/21/2023    Procedure: Intravascular Ultrasound;  Surgeon: Andrey Watts MD;  Location: [facility] CARDIAC CATH LAB    CV PCI N/A 2/21/2023    Procedure: Percutaneous Coronary Intervention;  Surgeon: Andrey Watts MD;  Location: [facility] CARDIAC CATH LAB    CV PCI ATHERECTOMY ORBITAL N/A 2/21/2023    Procedure: Percutaneous Coronary Intervention - Atherectomy Rotational;  Surgeon: Andrey Watts MD;  Location: [facility] CARDIAC CATH LAB    CV RIGHT HEART CATH MEASUREMENTS RECORDED N/A 2/21/2023    Procedure: Right Heart Catheterization;  Surgeon: Andrey Watts MD;  Location: [facility] CARDIAC CATH LAB     ESOPHAGOSCOPY, GASTROSCOPY, DUODENOSCOPY (EGD), COMBINED N/A 6/7/2016    Procedure: COMBINED ESOPHAGOSCOPY, GASTROSCOPY, DUODENOSCOPY (EGD);  Surgeon: Eneida Denton MD;  Location:  GI              Allergies:   Metoprolol, Digoxin, and Statin drugs [statins]       Data:   All laboratory data reviewed:    Recent Labs   Lab Test 03/28/23  1011 03/15/23  1057 03/06/23  1034 03/02/23  1720 01/04/23  1141 07/15/22  0810 06/14/22 2056   TSH  --   --  2.39  --  1.64  --  1.49   NTBNP 242 265  --  984  --    < >  --     < > = values in this interval not displayed.       Lab Results   Component Value Date    WBC 10.8 05/08/2023    WBC 8.0 09/26/2019    RBC 4.73 05/08/2023    RBC 4.70 09/26/2019    HGB 13.6 05/08/2023    HGB 14.5 09/26/2019    HCT 42.0 05/08/2023    HCT 41.7 09/26/2019    MCV 89 05/08/2023    MCV 89 09/26/2019    MCH 28.8 05/08/2023    MCH 30.9 09/26/2019    MCHC 32.4 05/08/2023    MCHC 34.8 09/26/2019    RDW 17.0 (H) 05/08/2023    RDW 13.7 09/26/2019     05/08/2023     (L) 09/26/2019       Lab Results   Component Value Date     05/24/2023     09/26/2019    POTASSIUM 4.1 05/24/2023    POTASSIUM 5.1 03/06/2023    POTASSIUM 3.4 09/26/2019    CHLORIDE 96 (L) 05/24/2023    CHLORIDE 107 03/06/2023    CHLORIDE 107 09/26/2019    CO2 38 (H) 05/24/2023    CO2 39 (H) 03/06/2023    CO2 28 09/26/2019    ANIONGAP 10 05/24/2023    ANIONGAP <1 (L) 03/06/2023    ANIONGAP 4 09/26/2019    GLC 85 05/24/2023     (H) 05/08/2023    GLC 81 03/06/2023     (H) 09/26/2019    BUN 30.0 (H) 05/24/2023    BUN 15 03/06/2023    BUN 13 09/26/2019    CR 1.11 05/24/2023    CR 0.76 09/26/2019    GFRESTIMATED 66 05/24/2023    GFRESTIMATED 86 09/26/2019    GFRESTBLACK >90 09/26/2019    TATE 9.7 05/24/2023    TATE 8.4 (L) 09/26/2019      Lab Results   Component Value Date    AST 24 05/24/2023    AST 11 09/25/2019    ALT 22 05/24/2023    ALT 23 09/25/2019       Lab Results   Component Value Date     A1C 8.2 (H) 01/04/2023       No results found for: ZAY MORALES MD  [facility]      Thank you for allowing me to participate in the care of your patient.      Sincerely,     ZAY NORRIS MD     [facility]  cc:   Mirna Rodriges PA-C  [address] [place] [place] [address]

## 2023-06-12 NOTE — PROGRESS NOTES
General Cardiology Clinic Progress Note  Carl Vázquez MRN# [id number]   YOB: 1940 Age: 83 year old       Reason for visit: Atrial fibrillation, cardiomyopathy, and congestive heart failure    History of presenting illness:    I had the opportunity to see Carl Vázquez at [facility] today for reevaluation of chronic systolic heart failure.  He has chronic respiratory failure with hypoxia and hypercarbia due to a number of contributing complex medical issues, including COPD, paralyzed hemidiaphragm, chronic heart failure, and now a recent diagnosis of ALS.    I saw Carl when he was in the hospital in February.  At that time he presented with rapid atrial fibrillation with heart rates in the 140s and severe left ventricular dysfunction with an ejection fraction of 25 to 30%.  He had some congestive heart failure at that time which we treated with diuresis and corrected his atrial fibrillation issues.  He is now maintaining sinus rhythm based on his previous EKGs from May and April..  Unfortunately, he has been hospitalized with respiratory failure in March, April, and again in May.  He now indicates that he no longer wishes to pursue any additional hospitalization.  He has oxygen at home as well as CPAP which he uses during the day and BiPAP which he uses at night.  His wife is amazing in her ability to care for him.  She is meticulous about recordkeeping and is constantly monitoring his vital signs including his oxygen levels and helping him stay awake, alert, and using his oxygen or CPAP during the day if needed.    However, with all of these issues and his choice not to pursue hospitalization any further, they are now looking into hospice programs.  They have a couple of appointments in the next week or 2 with 2 different hospice groups and plan on transitioning to hospice at that time.  However, today he seems much more lethargic than usual and has difficulty staying  awake during our visit today in the clinic.    Nevertheless, he is not complaining of shortness of breath at rest and has no evidence of edema or fluid accumulation.    His follow-up echocardiogram on 6/5/2023 demonstrates a dramatic improvement in left ventricular function.  His ejection fraction was up to 55 to 60% with mild aortic valve regurgitation.  During his February hospitalization we referred him for coronary angiography which identified severe proximal LAD disease requiring multiple drug-eluting stents within the LAD for revascularization.  This treatment of his coronary artery disease in addition to heart rate control with return to sinus rhythm probably resulted in improvement in left ventricular function.  He is not reporting any chest discomfort symptoms.    His recent basic metabolic panel on 5/24/2023 looks good with normal kidney function and normal potassium.  He is currently taking Bumex 2 mg in the morning and 1 mg in the afternoon.  His last hemoglobin platelet count and ALT were all normal as well.    His lungs sound clear on examination.  I find no evidence of recurrent pleural effusions which he has had tapped in the past.  Heart rate is 70.  Blood pressure is 102/60.  Weight is 139 and stable.            Assessment and Plan:     ASSESSMENT:    Mr. Carl Vázquez is an 83-year-old gentleman with paroxysmal atrial fibrillation, coronary artery disease, and history of acute systolic heart failure in February 2023.  Despite returning his rhythm to normal sinus, stenting his significant coronary artery disease within the LAD, and getting him on appropriate diuretic therapy, he continues to have recurrent episodes of respiratory failure, both hypoxic and hypercarbic.  Indeed, his left ventricular function has normalized and he appears euvolemic on examination today.  I do not think congestive heart failure is contributing significantly to his ongoing respiratory problems.  He has severe COPD,  hemidiaphragm paralysis, and now recently diagnosed with ALS.    I recommended that we cut back his Bumex to 2 mg in the morning and stop the 1 mg afternoon dose.  I also stopped lisinopril because of low blood pressures.  I will have him continue nebivolol and amiodarone, as well as the Plavix and Xarelto.    Ultimately, I agreed with him and his wife that hospice is the appropriate option for his care.  I see that his respiratory failure problems will only progress and his care is becoming overwhelming for his wife.    I have made follow-up arrangements here in the core clinic for ongoing management of his cardiac issues.    Spencer Rehman MD           Orders this Visit:  Orders Placed This Encounter   Procedures     Basic metabolic panel     Hemoglobin     Follow-Up with Cardiology     Follow-Up with Cardiology [facility]     Orders Placed This Encounter   Medications     bumetanide (BUMEX) 1 MG tablet     Sig: Take 2 tablets (2mg) by mouth in the morning     Dispense:  60 tablet     Refill:  5     Medications Discontinued During This Encounter   Medication Reason     lisinopril (ZESTRIL) 2.5 MG tablet      magnesium oxide (MAG-OX) 400 MG tablet      bumetanide (BUMEX) 1 MG tablet Reorder (No AVS / No eCancel)       Today's clinic visit entailed:  Review of the result(s) of each unique test - Basic metabolic panel, ALT, hemoglobin, platelets, echocardiogram, coronary angiogram  The following tests were independently interpreted by me as noted in my documentation: ECG  Ordering of each unique test  Prescription drug management  50 minutes spent by me on the date of the encounter doing chart review, review of test results, interpretation of tests, patient visit, documentation, discussion with family and Wife present   Provider  Link to [facility] Help Grid     The level of medical decision making during this visit was of high complexity.           Review of Systems:     Review of Systems:  Skin:  Negative     Eyes:  Negative   "  ENT:  Negative    Respiratory:  Positive for sleep apnea;CPAP;shortness of breath;dyspnea on exertion  Cardiovascular:  Negative for;palpitations;chest pain;dizziness Positive for;fatigue;chest pain;lightheadedness;edema  Gastroenterology: Negative for nausea;vomiting;heartburn  Genitourinary:  Negative    Musculoskeletal:  Negative    Neurologic:  Positive for local weakness  Psychiatric:  Negative    Heme/Lymph/Imm:  Negative allergies  Endocrine:  Positive for diabetes;thyroid disorder            Physical Exam:     Vitals: /60   Pulse 70   Ht 1.702 m (5' 7\")   Wt 63 kg (139 lb)   SpO2 90%   BMI 21.77 kg/m    Constitutional: Well nourished and in no apparent distress.  Eyes: Pupils equal, round. Sclerae anicteric.   HEENT: Normocephalic, atraumatic.   Neck: Supple. JVD normal  Respiratory: Breathing non-labored. Lungs clear to auscultation bilaterally. No crackles, wheezes, rhonchi, or rales.  Cardiovascular:  Regular rate and rhythm, normal S1 and S2. No murmur, rub, or gallop.  Skin: Warm, dry. No rashes, cyanosis, or xanthelasma.  Extremities: No edema.  Neurologic: No gross motor deficits. Alert, awake, and oriented to person, place and time.  Psychiatric: Affect appropriate.             Medications:     Current Outpatient Medications   Medication Sig Dispense Refill     albuterol (PROAIR HFA/PROVENTIL HFA/VENTOLIN HFA) 108 (90 Base) MCG/ACT inhaler Inhale 2 puffs into the lungs every 6 hours as needed for shortness of breath, wheezing or cough 18 g 0     albuterol (PROVENTIL) (2.5 MG/3ML) 0.083% neb solution Take 1 vial (2.5 mg) by nebulization every 6 hours as needed for shortness of breath, wheezing or cough 180 mL 4     amiodarone (PACERONE) 200 MG tablet Take 1 tablet (200 mg) by mouth daily 90 tablet 3     bumetanide (BUMEX) 1 MG tablet Take 2 tablets (2mg) by mouth in the morning 60 tablet 5     clopidogrel (PLAVIX) 75 MG tablet Take 75 mg by mouth daily       fluticasone-salmeterol " (ADVAIR) 250-50 MCG/ACT inhaler Inhale 1 puff into the lungs every 12 hours 1 each 0     glipiZIDE (GLUCOTROL) 5 MG tablet Take 10 mg by mouth every morning       insulin glargine (LANTUS PEN) 100 UNIT/ML pen Inject 60 Units Subcutaneous At Bedtime       levothyroxine (SYNTHROID/LEVOTHROID) 75 MCG tablet Take 75 mcg by mouth every morning Managed by PCP       nebivolol (BYSTOLIC) 2.5 MG tablet Take 1 tablet (2.5 mg) by mouth 2 times daily 180 tablet 1     nitroGLYcerin (NITROSTAT) 0.4 MG sublingual tablet For chest pain place 1 tablet under the tongue every 5 minutes for 3 doses. If symptoms persist 5 minutes after 1st dose call 911. 15 tablet 0     oxyCODONE (ROXICODONE) 5 MG tablet Take 5 mg by mouth every 4 hours as needed for pain       pantoprazole (PROTONIX) 40 MG EC tablet Take 40 mg by mouth 2 times daily       polyethylene glycol (MIRALAX) 17 GM/Dose powder Take 17 g by mouth daily 510 g 0     predniSONE (DELTASONE) 20 MG tablet Take 2 tablets (40 mg) by mouth daily for 5 days 10 tablet 0     riluzole (RILUTEK) 50 MG tablet Take 1 tablet (50 mg) by mouth every 12 hours 60 tablet 3     rivaroxaban ANTICOAGULANT (XARELTO) 20 MG TABS tablet Take 1 tablet (20 mg) by mouth daily (with dinner) 90 tablet 1     rosuvastatin (CRESTOR) 20 MG tablet Take 1 tablet (20 mg) by mouth daily 90 tablet 3       Family History   Problem Relation Age of Onset     Colon Cancer No family hx of        Social History     Socioeconomic History     Marital status:      Spouse name: Not on file     Number of children: Not on file     Years of education: Not on file     Highest education level: Not on file   Occupational History     Not on file   Tobacco Use     Smoking status: Never     Smokeless tobacco: Never   Vaping Use     Vaping status: Not on file   Substance and Sexual Activity     Alcohol use: No     Comment: drank many years ago     Drug use: No     Sexual activity: Not on file   Other Topics Concern      Parent/sibling w/ CABG, MI or angioplasty before 65F 55M? Not Asked   Social History Narrative     Not on file     Social Determinants of Health     Financial Resource Strain: Not on file   Food Insecurity: Not on file   Transportation Needs: Not on file   Physical Activity: Not on file   Stress: Not on file   Social Connections: Not on file   Intimate Partner Violence: Not on file   Housing Stability: Not on file            Past Medical History:     Past Medical History:   Diagnosis Date     Diabetes (H)      Sleep apnea     uses CPAP machine     Thyroid disease               Past Surgical History:     Past Surgical History:   Procedure Laterality Date     CHOLECYSTECTOMY      at [facility]     COLONOSCOPY      in [place], [place]     COLONOSCOPY  08/22/2017    Dr. Peres [facility]     CV CORONARY ANGIOGRAM N/A 2/21/2023    Procedure: Coronary Angiogram;  Surgeon: Andrey Watts MD;  Location: [facility] CARDIAC CATH LAB     CV CORONARY LITHOTRIPSY PCI N/A 2/21/2023    Procedure: Percutaneous Coronary Intervention - Lithotripsy;  Surgeon: Andrey Watts MD;  Location: [facility] CARDIAC CATH LAB     CV INTRAVASULAR ULTRASOUND N/A 2/21/2023    Procedure: Intravascular Ultrasound;  Surgeon: Andrey Watts MD;  Location: [facility] CARDIAC CATH LAB     CV PCI N/A 2/21/2023    Procedure: Percutaneous Coronary Intervention;  Surgeon: Andrey Watts MD;  Location: [facility] CARDIAC CATH LAB     CV PCI ATHERECTOMY ORBITAL N/A 2/21/2023    Procedure: Percutaneous Coronary Intervention - Atherectomy Rotational;  Surgeon: Andrey Watts MD;  Location: [facility] CARDIAC CATH LAB     CV RIGHT HEART CATH MEASUREMENTS RECORDED N/A 2/21/2023    Procedure: Right Heart Catheterization;  Surgeon: Andrey Watts MD;  Location: [facility] CARDIAC CATH LAB     ESOPHAGOSCOPY, GASTROSCOPY, DUODENOSCOPY (EGD), COMBINED N/A 6/7/2016    Procedure: COMBINED ESOPHAGOSCOPY, GASTROSCOPY, DUODENOSCOPY (EGD);   Surgeon: Eneida Denton MD;  Location:  GI              Allergies:   Metoprolol, Digoxin, and Statin drugs [statins]       Data:   All laboratory data reviewed:    Recent Labs   Lab Test 03/28/23  1011 03/15/23  1057 03/06/23  1034 03/02/23  1720 01/04/23  1141 07/15/22  0810 06/14/22 2056   TSH  --   --  2.39  --  1.64  --  1.49   NTBNP 242 265  --  984  --    < >  --     < > = values in this interval not displayed.       Lab Results   Component Value Date    WBC 10.8 05/08/2023    WBC 8.0 09/26/2019    RBC 4.73 05/08/2023    RBC 4.70 09/26/2019    HGB 13.6 05/08/2023    HGB 14.5 09/26/2019    HCT 42.0 05/08/2023    HCT 41.7 09/26/2019    MCV 89 05/08/2023    MCV 89 09/26/2019    MCH 28.8 05/08/2023    MCH 30.9 09/26/2019    MCHC 32.4 05/08/2023    MCHC 34.8 09/26/2019    RDW 17.0 (H) 05/08/2023    RDW 13.7 09/26/2019     05/08/2023     (L) 09/26/2019       Lab Results   Component Value Date     05/24/2023     09/26/2019    POTASSIUM 4.1 05/24/2023    POTASSIUM 5.1 03/06/2023    POTASSIUM 3.4 09/26/2019    CHLORIDE 96 (L) 05/24/2023    CHLORIDE 107 03/06/2023    CHLORIDE 107 09/26/2019    CO2 38 (H) 05/24/2023    CO2 39 (H) 03/06/2023    CO2 28 09/26/2019    ANIONGAP 10 05/24/2023    ANIONGAP <1 (L) 03/06/2023    ANIONGAP 4 09/26/2019    GLC 85 05/24/2023     (H) 05/08/2023    GLC 81 03/06/2023     (H) 09/26/2019    BUN 30.0 (H) 05/24/2023    BUN 15 03/06/2023    BUN 13 09/26/2019    CR 1.11 05/24/2023    CR 0.76 09/26/2019    GFRESTIMATED 66 05/24/2023    GFRESTIMATED 86 09/26/2019    GFRESTBLACK >90 09/26/2019    TATE 9.7 05/24/2023    TATE 8.4 (L) 09/26/2019      Lab Results   Component Value Date    AST 24 05/24/2023    AST 11 09/25/2019    ALT 22 05/24/2023    ALT 23 09/25/2019       Lab Results   Component Value Date    A1C 8.2 (H) 01/04/2023       No results found for: ZAY MORALES MD  [facility]

## 2023-06-12 NOTE — PATIENT INSTRUCTIONS
It was a pleasure seeing you today and thank you for allowing me to be a part of your health care team.  Should you have any questions regarding your visit or future needs please feel free to reach out to my care team for assistance.      Thank you, Dr. Spencer Rehman        **Nursing: [phone]       **Scheduling: [phone]

## 2023-06-12 NOTE — TELEPHONE ENCOUNTER
Lovely from [facility] called and left  on [facility]ORE line that order for labs was entered into their system today. She asked if they are still needed.     Carl had appt today with Dr Rehman and is currently there     Lisa Streeter, CLEMENTINAN, RN 1:16 PM 06/12/23

## 2023-06-13 NOTE — TELEPHONE ENCOUNTER
Voice mail left for Lovely to call back to discuss follow-up plan for Carl and discuss 6/12 C[facility] MD appt      DAVID Hoyt, RN 10:32 AM 06/13/23

## 2023-06-15 NOTE — CONFIDENTIAL NOTE
Spoke with Dorothy (see separate encounter) and notified her that the neb machine order has been sent to [facility].    Keysha Soni RN

## 2023-06-16 NOTE — TELEPHONE ENCOUNTER
No call back.    Closing this encounter.      Future Appointments   Date Time Provider Department Center   8/10/2023  9:00 AM Ramin Baez MD [facility]         CLEMENTINA HoytN, RN 4:46 PM 06/16/23

## 2023-06-20 DIAGNOSIS — G12.21 ALS (AMYOTROPHIC LATERAL SCLEROSIS) (H): ICD-10-CM

## 2023-06-20 RX ORDER — RILUZOLE 50 MG/1
TABLET, FILM COATED ORAL
Qty: 60 TABLET | Refills: 2 | Status: SHIPPED | OUTPATIENT
Start: 2023-06-20

## 2023-06-26 ENCOUNTER — TELEPHONE (OUTPATIENT)
Dept: CARDIOLOGY | Facility: CLINIC | Age: 83
End: 2023-06-26
Payer: COMMERCIAL

## 2023-06-26 ENCOUNTER — MYC MEDICAL ADVICE (OUTPATIENT)
Dept: CARDIOLOGY | Facility: CLINIC | Age: 83
End: 2023-06-26
Payer: COMMERCIAL

## 2023-06-26 NOTE — TELEPHONE ENCOUNTER
Pt last had visit with Dr. Rehman on 6/12/23 and per the note :  Mr. Carl Vázquez is an 83-year-old gentleman with paroxysmal atrial fibrillation, coronary artery disease, and history of acute systolic heart failure in February 2023.  Despite returning his rhythm to normal sinus, stenting his significant coronary artery disease within the LAD, and getting him on appropriate diuretic therapy, he continues to have recurrent episodes of respiratory failure, both hypoxic and hypercarbic.  Indeed, his left ventricular function has normalized and he appears euvolemic on examination today.  I do not think congestive heart failure is contributing significantly to his ongoing respiratory problems.  He has severe COPD, hemidiaphragm paralysis, and now recently diagnosed with ALS.     I recommended that we cut back his Bumex to 2 mg in the morning and stop the 1 mg afternoon dose.  I also stopped lisinopril because of low blood pressures.  I will have him continue nebivolol and amiodarone, as well as the Plavix and Xarelto.     Ultimately, I agreed with him and his wife that hospice is the appropriate option for his care.  I see that his respiratory failure problems will only progress and his care is becoming overwhelming for his wife.       Marissahart sent to Dr. Rehman on 6/26/23 :  Carl Vázquez  to [facility] Nursing Team (supporting Mirna Rodriges PA-C) 6/26/23  3:05 PM  French has entered into the hospice program with [facility].  Dr. Griggs monitoring along with nurse Kandi William.   Still has difficulty breathing at time.  On Bipap and night and at times during the day.  Weight down to 134 has very little appetite and cannot eat meat, bunches up and cannot swallow so on soft food diet along with boost and protein shakes.    water weight legs good, feet and ankles better wearing copper ankle feet during the day, might be helping some still swollen somewhat.  Appreciate you input and help.  Thank you

## 2023-11-05 ENCOUNTER — HEALTH MAINTENANCE LETTER (OUTPATIENT)
Age: 83
End: 2023-11-05

## 2024-01-14 ENCOUNTER — HEALTH MAINTENANCE LETTER (OUTPATIENT)
Age: 84
End: 2024-01-14

## 2024-01-29 NOTE — PROGRESS NOTES
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   Interval History   Ongoing dyspnea. He met with palliative care this morning. He and his wife are considering hospice. They have a family meeting tomorrow at 1 pm. He had an echo ordered for this morning but refused to have it done. He wasn't sure what is was for. He is open to having it now if needed.  Assessment & Plan   1.  Chronic heart failure with reduced LVEF. Echo in February of 2022 showed EF 25 - 30% and moderately decreased RV function in the setting of tachycardia. Biventricular dysfunction suspected to be tachycardia mediated and possibly ischemic.  -  NtproBNP 444 on admission.   -  On lisinopril 2.5 mg daily and Bystolic 2.5 mg BID.   -  On escalating doses of diuretics as an outpatient - just increased to 4 mg in the morning and 2 mg in the afternoon.   -  Had RHC on 2/21/23 showing he was hypovolemic with a mean RA 3 and mean PCP of 4 thus. Weight at that time was 144 lbs.   2.  Acute on chronic hypoxic respiratory failure. Do not suspect his worsening dyspnea is secondary to acute systolic CHF given stable weights, no improvement with outpatient diuresis, normal NTproBNP and exam.   -  Appreciate pulmonology, neurology, and palliative care consultation. Suspect progressive symptoms are likely related to progressive neuromuscular weakness. Family meeting planned for 1 pm tomorrow to discuss possible hospice.   3.  PAF. On amiodarone. In NSR on admission.   4.   CAD. S/p JESSE x 4 in the LAD in February of 2023.   -  On plavix, Xarelto, crestor 20 mg daily.   5.  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 On riluzole.  6.  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  Hypertension.  8.  DM type II.    Plan:  1.  No further specific cardiac recommendations. Note plans for family meeting tomorrow to discuss hospice further.   2.  Would continue his current cardiac medications.    Cardiology will sign off but please call with questions.     Allison Sherwood PA-C    Physical Exam   Temp: 97.7  F (36.5  C) Temp src: Oral BP: 132/69 Pulse: 61   Resp: 16 SpO2: 96 % O2 Device: Nasal cannula Oxygen Delivery: 3 LPM  Vitals:    04/04/23 1257 04/05/23 0517   Weight: 65.8 kg (145 lb) 67 kg (147 lb 12.8 oz)     Vital Signs with Ranges  Temp:  [97.6  F (36.4  C)-98.4  F (36.9  C)] 97.7  F (36.5  C)  Pulse:  [61-76] 61  Resp:  [10-25] 16  BP: ()/(53-87) 132/69  SpO2:  [94 %-99 %] 96 %  03/31 0700 - 04/05 0659  In: -   Out: 350 [Urine:350]  Net: -350  Constitutional: NAD.   Respiratory: diminished at the bases. On O2.  Cardiovascular: RRR, s1s2  GI: soft, BS+  Skin: warm, no rashes  Musculoskeletal: Moving all extremities. 1-2+ pitting edema.  Neurologic: Alert, oriented x 3  Neuropsychiatric: Normal affect   Data   Results for orders placed or performed during the hospital encounter of 04/04/23 (from the past 24 hour(s))   CT Chest w Contrast    Narrative    EXAM: CT CHEST W CONTRAST  LOCATION: [facility]  DATE/TIME: 4/4/2023 5:08 PM    INDICATION: further eval pleural effusions, worsening respiratory symptoms  COMPARISON: CT pulmonary angiogram 08/26/2022. Chest x-ray 04/04/2023  TECHNIQUE: CT chest with IV contrast. Multiplanar reformats were obtained. Dose reduction techniques were used.    CONTRAST:  73 mL isovue 370    FINDINGS:   LUNGS AND PLEURA: Small right pleural effusion. Moderate atelectatic changes within the right middle and  bilateral lower lobes.    MEDIASTINUM/AXILLAE: No lymphadenopathy. Mild calcified atherosclerotic changes of a normal caliber thoracic aorta. Normal caliber central pulmonary arteries. Mild cardiomegaly.    CORONARY ARTERY CALCIFICATION: Previous intervention (stents ).    UPPER ABDOMEN: Focal fatty infiltration of liver near the falciform ligament. Cholecystectomy.    MUSCULOSKELETAL: Degenerative changes visualized spine.      Impression    IMPRESSION:   1.  Small right pleural effusion.  2.  Moderate atelectasis of the right middle and bilateral lower lobes.   Asymptomatic Influenza A/B, RSV, & SARS-CoV2 PCR (COVID-19) Nasopharyngeal    Specimen: Nasopharyngeal; Swab   Result Value Ref Range    Influenza A PCR Negative Negative    Influenza B PCR Negative Negative    RSV PCR Negative Negative    SARS CoV2 PCR Negative Negative    Narrative    Testing was performed using the Xpert Xpress CoV2/Flu/RSV Assay on the [employer] Instrument. This test should be ordered for the detection of SARS-CoV-2, influenza, and RSV viruses in individuals who meet clinical and/or epidemiological criteria. Test performance is unknown in asymptomatic patients. This test is for in vitro diagnostic use under the FDA EUA for laboratories certified under CLIA to perform high or moderate complexity testing. This test has not been FDA cleared or approved. A negative result does not rule out the presence of PCR inhibitors in the specimen or target RNA in concentration below the limit of detection for the assay. If only one viral target is positive but coinfection with multiple targets is suspected, the sample should be re-tested with another FDA cleared, approved, or authorized test, if coinfection would change clinical management. This test was validated by the [facility] [employer]. These laboratories are certified under the Clinical Laboratory Improvement Amendments of 1988 (CLIA-88) as qualified to perform high complexity  laboratory testing.   Glucose by meter   Result Value Ref Range    GLUCOSE BY METER POCT 163 (H) 70 - 99 mg/dL   Glucose by meter   Result Value Ref Range    GLUCOSE BY METER POCT 280 (H) 70 - 99 mg/dL   Basic metabolic panel   Result Value Ref Range    Sodium 138 136 - 145 mmol/L    Potassium 3.6 3.4 - 5.3 mmol/L    Chloride 91 (L) 98 - 107 mmol/L    Carbon Dioxide (CO2) 40 (H) 22 - 29 mmol/L    Anion Gap 7 7 - 15 mmol/L    Urea Nitrogen 21.1 8.0 - 23.0 mg/dL    Creatinine 0.90 0.67 - 1.17 mg/dL    Calcium 9.1 8.8 - 10.2 mg/dL    Glucose 88 70 - 99 mg/dL    GFR Estimate 85 >60 mL/min/1.73m2   CBC with platelets   Result Value Ref Range    WBC Count 5.5 4.0 - 11.0 10e3/uL    RBC Count 4.89 4.40 - 5.90 10e6/uL    Hemoglobin 13.9 13.3 - 17.7 g/dL    Hematocrit 42.6 40.0 - 53.0 %    MCV 87 78 - 100 fL    MCH 28.4 26.5 - 33.0 pg    MCHC 32.6 31.5 - 36.5 g/dL    RDW 14.8 10.0 - 15.0 %    Platelet Count 118 (L) 150 - 450 10e3/uL   Glucose by meter   Result Value Ref Range    GLUCOSE BY METER POCT 74 70 - 99 mg/dL   Glucose by meter   Result Value Ref Range    GLUCOSE BY METER POCT 232 (H) 70 - 99 mg/dL       Medications     - MEDICATION INSTRUCTIONS -         acetylcysteine  2 mL Nebulization Q4H     amiodarone  200 mg Oral Daily     bumetanide  2 mg Oral BID     clopidogrel  75 mg Oral Daily     insulin aspart  1-7 Units Subcutaneous TID AC     insulin aspart  1-5 Units Subcutaneous At Bedtime     insulin glargine  30 Units Subcutaneous At Bedtime     ipratropium  0.5 mg Nebulization Q4H    And     levalbuterol  1.25 mg Nebulization Q4H DEDRICK     levothyroxine  75 mcg Oral QAM AC     lidocaine  2 patch Transdermal Q24h    And     lidocaine   Transdermal Q8H DEDRICK     [Held by provider] lisinopril  2.5 mg Oral Daily     nebivolol  2.5 mg Oral BID     pantoprazole  40 mg Oral BID     polyethylene glycol  17 g Oral Daily     [Held by provider] potassium chloride ER  10 mEq Oral BID     riluzole  50 mg Oral Q12H     rivaroxaban  ANTICOAGULANT  20 mg Oral Daily with supper     rosuvastatin  20 mg Oral Daily     sodium chloride (PF)  3 mL Intracatheter Q8H

## 2024-02-13 ENCOUNTER — TELEPHONE (OUTPATIENT)
Dept: ANTICOAGULATION | Facility: CLINIC | Age: 84
End: 2024-02-13

## 2024-02-13 NOTE — TELEPHONE ENCOUNTER
ANTICOAGULATION DIRECT ORAL ANTICOAGULANT MONITORING    SUBJECTIVE     The [facility] is evaluating Carl Vázquez's Rivaroxaban (Xarelto) as part of its Anticoagulation Monitoring Program.    Indication:Atrial Fibrillation  Current dose per medication list: Rivaroxaban 20 mg Daily  Recent hospitalizations/ED/Office Visits for bleeding/clotting concerns: No  Other bleeding or side effect concerns: No  Additional findings: history of heart failure, CHF noted on 1/9/23    OBJECTIVE     Age: 83 year old    Wt Readings from Last 2 Encounters:   06/12/23 63 kg (139 lb)   05/07/23 67.4 kg (148 lb 9.6 oz)      Lab Results   Component Value Date    CR 1.11 05/24/2023    CR 1.27 (H) 05/08/2023    CR 1.09 05/07/2023   (Noted 5/6/23 hospital admission for Acute hypoxic hypercapnic respiratory failure secondary to chronic obstructive pulmonary disease (COPD) exacerbation       Creatinine Clearance (using actual bodyweight, mL/min): 45    Lab Results   Component Value Date    HGB 13.6 05/08/2023    HGB 14.5 09/26/2019     05/08/2023     09/26/2019     ASSESSMENT/PLAN     A chart review for Direct Oral Anticoagulant (DOAC) Stewardship has been completed for:     Dosing: recommend adjustment to Rivaroxaban 15 mg Daily for CrCl <= 50 mL/min (using actual bodyweight) (consistent with package insert dosing)    Plan made per [facility] anticoagulation protocol    Rachel Wiley RN  Anticoagulation Clinic

## 2024-02-21 NOTE — TELEPHONE ENCOUNTER
Second review: Agree with dose adjustment  Pt recently diagnosed with ALS:  Mr. Carl Vázquez is an 83-year-old gentleman with paroxysmal atrial fibrillation, coronary artery disease, and history of acute systolic heart failure in February 2023.  Despite returning his rhythm to normal sinus, stenting his significant coronary artery disease within the LAD, and getting him on appropriate diuretic therapy, he continues to have recurrent episodes of respiratory failure, both hypoxic and hypercarbic.  Indeed, his left ventricular function has normalized and he appears euvolemic on examination today.  I do not think congestive heart failure is contributing significantly to his ongoing respiratory problems.  He has severe COPD, hemidiaphragm paralysis, and now recently diagnosed with ALS.     Pt may be transitioning to hospice

## 2024-03-20 NOTE — TELEPHONE ENCOUNTER
Spencer Rehman MD Dinh, Quynh, RN13 days ago     EE  Yes I agree with decreasing the dose of rivaroxaban to 15 mg p.o. daily based on reduced renal function.    Spencer Rehman MD

## 2024-03-24 ENCOUNTER — HEALTH MAINTENANCE LETTER (OUTPATIENT)
Age: 84
End: 2024-03-24

## 2024-04-17 NOTE — LETTER
[facility]  [address]  [place] [address]  Phone: [phone]  Fax: [phone]       To whom it may concern,       Okay to proceed with tooth removal from a cardiac perspective. However, we do not recommend holding of antiplatelet or anticoagulant given very recent cardiac stenting. Holding of these agents would greatly increase the risk of stent thrombosis.     Thanks,           Mirna Rodriges PA-C  Physician Assistant  [facility]   [FreeTextEntry1] : Fan remains clinically seizure-free.  He is compliant with medication and is not experiencing any side effects.

## 2024-08-11 ENCOUNTER — HEALTH MAINTENANCE LETTER (OUTPATIENT)
Age: 84
End: 2024-08-11

## 2024-12-22 ENCOUNTER — HEALTH MAINTENANCE LETTER (OUTPATIENT)
Age: 84
End: 2024-12-22

## 2025-01-26 ENCOUNTER — HEALTH MAINTENANCE LETTER (OUTPATIENT)
Age: 85
End: 2025-01-26

## 2025-03-23 ENCOUNTER — HEALTH MAINTENANCE LETTER (OUTPATIENT)
Age: 85
End: 2025-03-23

## (undated) DEVICE — SLED PULLBACK DISPOSABLE A70200

## (undated) DEVICE — INFL DVC BASIXCOMPAK PLYCRB 30 ATM 13IN 20ML IN4530

## (undated) DEVICE — CATH BALLOON NC EMERGE 3.00X20MM H7493926720300

## (undated) DEVICE — KIT ENDO TURNOVER/PROCEDURE W/CLEAN A SCOPE LINERS 103888

## (undated) DEVICE — CATH ANGIO INFINITI 3DRC 4FRX100CM 538476

## (undated) DEVICE — GUIDEWIRE VASC 0.014INX180CM RUNTHROUGH 25-1011

## (undated) DEVICE — ENDO TRAP POLYP E-TRAP 00711099

## (undated) DEVICE — KIT HAND CONTROL ANGIOTOUCH ACIST 65CM AT-P65

## (undated) DEVICE — DEFIB PRO-PADZ LVP LQD GEL ADULT 8900-2105-01

## (undated) DEVICE — CATH ANGIO INFINITI AL1 4FRX100CM 538445

## (undated) DEVICE — MANIFOLD KIT ANGIO AUTOMATED 014613

## (undated) DEVICE — Device

## (undated) DEVICE — CATH DIAG 4FR JL 4.5 538417

## (undated) DEVICE — CATH CORONARY LITHOTRIPSY SHOCKWAVE 3.0X12MM C2IVL3012

## (undated) DEVICE — VALVE HEMOSTASIS .096" COPILOT MECH 1003331

## (undated) DEVICE — TOTE ANGIO CORP PC15AT SAN32CC83O

## (undated) DEVICE — 4FR X 11CM AVANTI+ SHEATH INTRODUCER, STANDARD LENGTH, W/0.035IN STAINLESS STEEL MINI-GUIDEWIRE (EA/1)

## (undated) DEVICE — CATH IVUS OPTICROSS HD 6 3.6FR 1.18MM DIA 135CML H7493935408

## (undated) DEVICE — CLIP HEMOCLIP ENDOSCOPIC INSTINCT 2.8X230CM INSC-7-230-SS

## (undated) DEVICE — CATH CORONARY LITHOTRIPSY SHOCKWAVE 3.5X12MM C2IVL3512

## (undated) DEVICE — ENDO SNARE POLYPECTOMY OVAL 15MM LOOP SD-240U-15

## (undated) DEVICE — BURR ROTAPRO OS 2.0 1.50MM H749394671500

## (undated) DEVICE — NDL SCLEROTHERAPY 25GA CARR-LOCK  00711811

## (undated) DEVICE — CATH ANGIO INFINITI PIGTAIL 145 6 SH 6FRX110CM  534-652S

## (undated) DEVICE — CATH ANGIO INFINITI JR4 4FRX100CM 538421

## (undated) DEVICE — INTRO SHEATH 6FRX10CM PINNACLE RSS602

## (undated) DEVICE — CATH GUIDING BLUE YELLOW PTFE XB3.5 6FRX100CM 67005400

## (undated) DEVICE — CATH BALLOON NC EMERGE 3.25X8MM H7493926708320

## (undated) DEVICE — ESU GROUND PAD ADULT W/CORD E7507

## (undated) DEVICE — INTRO SHEATH 7FRX10CM PINNACLE RSS702

## (undated) DEVICE — CATH DIAG 4FR AR 1 MOD 538441

## (undated) DEVICE — SLEEVE CATH CABLE SHOCKWAVE 13X244CM C2 IVL10CS

## (undated) DEVICE — CATH BALLOON EMERGE 2.0X12MM H7493918912200

## (undated) RX ORDER — HEPARIN SODIUM 200 [USP'U]/100ML
INJECTION, SOLUTION INTRAVENOUS
Status: DISPENSED
Start: 2023-02-21

## (undated) RX ORDER — FENTANYL CITRATE 50 UG/ML
INJECTION, SOLUTION INTRAMUSCULAR; INTRAVENOUS
Status: DISPENSED
Start: 2017-08-22

## (undated) RX ORDER — FENTANYL CITRATE 50 UG/ML
INJECTION, SOLUTION INTRAMUSCULAR; INTRAVENOUS
Status: DISPENSED
Start: 2023-02-21

## (undated) RX ORDER — LIDOCAINE HYDROCHLORIDE 10 MG/ML
INJECTION, SOLUTION EPIDURAL; INFILTRATION; INTRACAUDAL; PERINEURAL
Status: DISPENSED
Start: 2023-02-21

## (undated) RX ORDER — CLOPIDOGREL 300 MG/1
TABLET, FILM COATED ORAL
Status: DISPENSED
Start: 2023-02-21

## (undated) RX ORDER — NITROGLYCERIN 5 MG/ML
VIAL (ML) INTRAVENOUS
Status: DISPENSED
Start: 2023-02-21

## (undated) RX ORDER — HEPARIN SODIUM 1000 [USP'U]/ML
INJECTION, SOLUTION INTRAVENOUS; SUBCUTANEOUS
Status: DISPENSED
Start: 2023-02-21

## (undated) RX ORDER — VERAPAMIL HYDROCHLORIDE 2.5 MG/ML
INJECTION, SOLUTION INTRAVENOUS
Status: DISPENSED
Start: 2023-02-21